# Patient Record
Sex: FEMALE | Race: BLACK OR AFRICAN AMERICAN | Employment: OTHER
[De-identification: names, ages, dates, MRNs, and addresses within clinical notes are randomized per-mention and may not be internally consistent; named-entity substitution may affect disease eponyms.]

---

## 2017-01-03 ENCOUNTER — OFFICE VISIT (OUTPATIENT)
Dept: INTERNAL MEDICINE | Facility: CLINIC | Age: 62
End: 2017-01-03

## 2017-01-03 VITALS
BODY MASS INDEX: 37.28 KG/M2 | WEIGHT: 232 LBS | DIASTOLIC BLOOD PRESSURE: 74 MMHG | HEIGHT: 66 IN | HEART RATE: 67 BPM | SYSTOLIC BLOOD PRESSURE: 108 MMHG

## 2017-01-03 DIAGNOSIS — E78.5 DYSLIPIDEMIA: ICD-10-CM

## 2017-01-03 DIAGNOSIS — R19.7 DIARRHEA, UNSPECIFIED TYPE: ICD-10-CM

## 2017-01-03 DIAGNOSIS — I10 ESSENTIAL HYPERTENSION: ICD-10-CM

## 2017-01-03 DIAGNOSIS — I42.8 CARDIOMYOPATHY, NONISCHEMIC (HCC): Primary | ICD-10-CM

## 2017-01-03 PROCEDURE — 99213 OFFICE O/P EST LOW 20 MIN: CPT | Performed by: INTERNAL MEDICINE

## 2017-01-03 RX ORDER — BLOOD PRESSURE TEST KIT
1 KIT MISCELLANEOUS DAILY
Qty: 1 KIT | Refills: 0 | Status: SHIPPED | OUTPATIENT
Start: 2017-01-03 | End: 2017-01-23 | Stop reason: ALTCHOICE

## 2017-01-03 ASSESSMENT — PATIENT HEALTH QUESTIONNAIRE - PHQ9
SUM OF ALL RESPONSES TO PHQ9 QUESTIONS 1 & 2: 0
1. LITTLE INTEREST OR PLEASURE IN DOING THINGS: 0
2. FEELING DOWN, DEPRESSED OR HOPELESS: 0
SUM OF ALL RESPONSES TO PHQ QUESTIONS 1-9: 0

## 2017-01-14 PROBLEM — R51.9 CHRONIC HEADACHE: Chronic | Status: ACTIVE | Noted: 2017-01-14

## 2017-01-14 PROBLEM — G89.29 CHRONIC HEADACHE: Chronic | Status: ACTIVE | Noted: 2017-01-14

## 2017-01-14 PROBLEM — I34.0 MITRAL REGURGITATION: Chronic | Status: ACTIVE | Noted: 2017-01-14

## 2017-01-15 PROBLEM — E66.01 MORBID OBESITY DUE TO EXCESS CALORIES (HCC): Chronic | Status: ACTIVE | Noted: 2017-01-15

## 2017-01-23 ENCOUNTER — OFFICE VISIT (OUTPATIENT)
Dept: INTERNAL MEDICINE | Facility: CLINIC | Age: 62
End: 2017-01-23

## 2017-01-23 VITALS
SYSTOLIC BLOOD PRESSURE: 109 MMHG | HEIGHT: 66 IN | HEART RATE: 70 BPM | BODY MASS INDEX: 36.96 KG/M2 | WEIGHT: 230 LBS | DIASTOLIC BLOOD PRESSURE: 82 MMHG

## 2017-01-23 DIAGNOSIS — I10 ESSENTIAL HYPERTENSION: Chronic | ICD-10-CM

## 2017-01-23 DIAGNOSIS — I50.23 ACUTE ON CHRONIC SYSTOLIC CONGESTIVE HEART FAILURE (HCC): Primary | Chronic | ICD-10-CM

## 2017-01-23 PROCEDURE — 99495 TRANSJ CARE MGMT MOD F2F 14D: CPT | Performed by: INTERNAL MEDICINE

## 2017-01-23 RX ORDER — POTASSIUM CHLORIDE 750 MG/1
1 TABLET, FILM COATED, EXTENDED RELEASE ORAL DAILY
Status: ON HOLD | COMMUNITY
Start: 2017-01-19 | End: 2017-03-06 | Stop reason: HOSPADM

## 2017-02-07 ENCOUNTER — HOSPITAL ENCOUNTER (OUTPATIENT)
Dept: OTHER | Age: 62
Discharge: HOME | End: 2017-02-07
Attending: INTERNAL MEDICINE | Admitting: INTERNAL MEDICINE
Payer: MEDICARE

## 2017-02-07 VITALS
DIASTOLIC BLOOD PRESSURE: 72 MMHG | RESPIRATION RATE: 18 BRPM | HEART RATE: 70 BPM | WEIGHT: 232.8 LBS | BODY MASS INDEX: 37.57 KG/M2 | OXYGEN SATURATION: 95 % | SYSTOLIC BLOOD PRESSURE: 110 MMHG

## 2017-02-07 PROCEDURE — 99211 OFF/OP EST MAY X REQ PHY/QHP: CPT

## 2017-02-27 ENCOUNTER — TELEPHONE (OUTPATIENT)
Dept: INTERNAL MEDICINE | Facility: CLINIC | Age: 62
End: 2017-02-27

## 2017-03-04 ENCOUNTER — HOSPITAL ENCOUNTER (INPATIENT)
Age: 62
LOS: 3 days | Discharge: HOME | DRG: 309 | End: 2017-03-07
Attending: EMERGENCY MEDICINE | Admitting: INTERNAL MEDICINE
Payer: MEDICARE

## 2017-03-04 ENCOUNTER — APPOINTMENT (OUTPATIENT)
Dept: GENERAL RADIOLOGY | Age: 62
DRG: 309 | End: 2017-03-04
Payer: MEDICARE

## 2017-03-04 DIAGNOSIS — R55 SYNCOPE AND COLLAPSE: Primary | ICD-10-CM

## 2017-03-04 DIAGNOSIS — Z45.02 AICD DISCHARGE: ICD-10-CM

## 2017-03-04 DIAGNOSIS — E87.6 HYPOKALEMIA: ICD-10-CM

## 2017-03-04 DIAGNOSIS — I42.8 NON-ISCHEMIC CARDIOMYOPATHY (HCC): ICD-10-CM

## 2017-03-04 PROBLEM — N17.9 AKI (ACUTE KIDNEY INJURY) (HCC): Status: ACTIVE | Noted: 2017-03-04

## 2017-03-04 PROBLEM — T50.2X1A: Status: ACTIVE | Noted: 2017-03-04

## 2017-03-04 PROBLEM — Z95.810 AICD (AUTOMATIC CARDIOVERTER/DEFIBRILLATOR) PRESENT: Status: ACTIVE | Noted: 2017-03-04

## 2017-03-04 LAB
ABSOLUTE EOS #: 0.2 K/UL (ref 0–0.4)
ABSOLUTE LYMPH #: 1.6 K/UL (ref 1–4.8)
ABSOLUTE MONO #: 0.7 K/UL (ref 0.1–1.2)
ANION GAP SERPL CALCULATED.3IONS-SCNC: 15 MMOL/L (ref 9–17)
BASOPHILS # BLD: 0 % (ref 0–2)
BASOPHILS ABSOLUTE: 0 K/UL (ref 0–0.2)
BNP INTERPRETATION: ABNORMAL
BUN BLDV-MCNC: 19 MG/DL (ref 8–23)
BUN/CREAT BLD: ABNORMAL (ref 9–20)
CALCIUM SERPL-MCNC: 9.3 MG/DL (ref 8.6–10.4)
CHLORIDE BLD-SCNC: 93 MMOL/L (ref 98–107)
CHLORIDE, UR: 61 MMOL/L
CO2: 26 MMOL/L (ref 20–31)
CREAT SERPL-MCNC: 1.18 MG/DL (ref 0.5–0.9)
CREATININE URINE: 127.4 MG/DL (ref 28–217)
DIFFERENTIAL TYPE: ABNORMAL
EOSINOPHILS RELATIVE PERCENT: 2 % (ref 1–4)
GFR AFRICAN AMERICAN: 56 ML/MIN
GFR NON-AFRICAN AMERICAN: 46 ML/MIN
GFR SERPL CREATININE-BSD FRML MDRD: ABNORMAL ML/MIN/{1.73_M2}
GFR SERPL CREATININE-BSD FRML MDRD: ABNORMAL ML/MIN/{1.73_M2}
GLUCOSE BLD-MCNC: 104 MG/DL (ref 70–99)
HCT VFR BLD CALC: 38.7 % (ref 36–46)
HEMOGLOBIN: 13.6 G/DL (ref 12–16)
LYMPHOCYTES # BLD: 19 % (ref 24–44)
MAGNESIUM: 1.6 MG/DL (ref 1.6–2.6)
MCH RBC QN AUTO: 29 PG (ref 26–34)
MCHC RBC AUTO-ENTMCNC: 35.2 G/DL (ref 31–37)
MCV RBC AUTO: 82.4 FL (ref 80–100)
MONOCYTES # BLD: 9 % (ref 2–11)
PDW BLD-RTO: 14.8 % (ref 12.5–15.4)
PLATELET # BLD: 257 K/UL (ref 140–450)
PLATELET ESTIMATE: ABNORMAL
PMV BLD AUTO: 9.1 FL (ref 6–12)
POC TROPONIN I: 0.02 NG/ML (ref 0–0.1)
POC TROPONIN INTERP: NORMAL
POTASSIUM SERPL-SCNC: 2.7 MMOL/L (ref 3.7–5.3)
POTASSIUM SERPL-SCNC: 3.8 MMOL/L (ref 3.7–5.3)
PRO-BNP: 1174 PG/ML
RBC # BLD: 4.69 M/UL (ref 4–5.2)
RBC # BLD: ABNORMAL 10*6/UL
SEG NEUTROPHILS: 70 % (ref 36–66)
SEGMENTED NEUTROPHILS ABSOLUTE COUNT: 5.6 K/UL (ref 1.8–7.7)
SODIUM BLD-SCNC: 134 MMOL/L (ref 135–144)
SODIUM,UR: 82 MMOL/L
TROPONIN INTERP: NORMAL
TROPONIN T: <0.03 NG/ML
UREA NITROGEN, UR: 921 MG/DL
WBC # BLD: 8.1 K/UL (ref 3.5–11)
WBC # BLD: ABNORMAL 10*3/UL

## 2017-03-04 PROCEDURE — 6370000000 HC RX 637 (ALT 250 FOR IP): Performed by: STUDENT IN AN ORGANIZED HEALTH CARE EDUCATION/TRAINING PROGRAM

## 2017-03-04 PROCEDURE — 83735 ASSAY OF MAGNESIUM: CPT

## 2017-03-04 PROCEDURE — 84132 ASSAY OF SERUM POTASSIUM: CPT

## 2017-03-04 PROCEDURE — 1200000000 HC SEMI PRIVATE

## 2017-03-04 PROCEDURE — 93005 ELECTROCARDIOGRAM TRACING: CPT

## 2017-03-04 PROCEDURE — 84300 ASSAY OF URINE SODIUM: CPT

## 2017-03-04 PROCEDURE — 6360000002 HC RX W HCPCS: Performed by: STUDENT IN AN ORGANIZED HEALTH CARE EDUCATION/TRAINING PROGRAM

## 2017-03-04 PROCEDURE — 84540 ASSAY OF URINE/UREA-N: CPT

## 2017-03-04 PROCEDURE — 2580000003 HC RX 258: Performed by: INTERNAL MEDICINE

## 2017-03-04 PROCEDURE — 71020 XR CHEST STANDARD TWO VW: CPT

## 2017-03-04 PROCEDURE — 82570 ASSAY OF URINE CREATININE: CPT

## 2017-03-04 PROCEDURE — 82436 ASSAY OF URINE CHLORIDE: CPT

## 2017-03-04 PROCEDURE — 83880 ASSAY OF NATRIURETIC PEPTIDE: CPT

## 2017-03-04 PROCEDURE — 99285 EMERGENCY DEPT VISIT HI MDM: CPT

## 2017-03-04 PROCEDURE — 36415 COLL VENOUS BLD VENIPUNCTURE: CPT

## 2017-03-04 PROCEDURE — 85025 COMPLETE CBC W/AUTO DIFF WBC: CPT

## 2017-03-04 PROCEDURE — 2580000003 HC RX 258: Performed by: STUDENT IN AN ORGANIZED HEALTH CARE EDUCATION/TRAINING PROGRAM

## 2017-03-04 PROCEDURE — 6370000000 HC RX 637 (ALT 250 FOR IP): Performed by: EMERGENCY MEDICINE

## 2017-03-04 PROCEDURE — 80048 BASIC METABOLIC PNL TOTAL CA: CPT

## 2017-03-04 PROCEDURE — 84484 ASSAY OF TROPONIN QUANT: CPT

## 2017-03-04 RX ORDER — SODIUM CHLORIDE 0.9 % (FLUSH) 0.9 %
10 SYRINGE (ML) INJECTION EVERY 12 HOURS SCHEDULED
Status: DISCONTINUED | OUTPATIENT
Start: 2017-03-04 | End: 2017-03-07 | Stop reason: HOSPADM

## 2017-03-04 RX ORDER — ACETAMINOPHEN 325 MG/1
650 TABLET ORAL EVERY 4 HOURS PRN
Status: DISCONTINUED | OUTPATIENT
Start: 2017-03-04 | End: 2017-03-07 | Stop reason: HOSPADM

## 2017-03-04 RX ORDER — POTASSIUM CHLORIDE 7.45 MG/ML
10 INJECTION INTRAVENOUS ONCE
Status: DISCONTINUED | OUTPATIENT
Start: 2017-03-04 | End: 2017-03-07

## 2017-03-04 RX ORDER — SODIUM CHLORIDE 0.9 % (FLUSH) 0.9 %
10 SYRINGE (ML) INJECTION PRN
Status: DISCONTINUED | OUTPATIENT
Start: 2017-03-04 | End: 2017-03-07 | Stop reason: HOSPADM

## 2017-03-04 RX ORDER — METOPROLOL TARTRATE 50 MG/1
50 TABLET, FILM COATED ORAL 2 TIMES DAILY
Status: DISCONTINUED | OUTPATIENT
Start: 2017-03-04 | End: 2017-03-07 | Stop reason: HOSPADM

## 2017-03-04 RX ORDER — POTASSIUM CHLORIDE 20MEQ/15ML
60 LIQUID (ML) ORAL ONCE
Status: COMPLETED | OUTPATIENT
Start: 2017-03-04 | End: 2017-03-04

## 2017-03-04 RX ORDER — ALLOPURINOL 100 MG/1
100 TABLET ORAL DAILY
Status: DISCONTINUED | OUTPATIENT
Start: 2017-03-04 | End: 2017-03-07 | Stop reason: HOSPADM

## 2017-03-04 RX ORDER — ONDANSETRON 2 MG/ML
4 INJECTION INTRAMUSCULAR; INTRAVENOUS EVERY 6 HOURS PRN
Status: DISCONTINUED | OUTPATIENT
Start: 2017-03-04 | End: 2017-03-07 | Stop reason: HOSPADM

## 2017-03-04 RX ORDER — UREA 10 %
3 LOTION (ML) TOPICAL NIGHTLY PRN
Status: DISCONTINUED | OUTPATIENT
Start: 2017-03-04 | End: 2017-03-04

## 2017-03-04 RX ORDER — ASPIRIN 81 MG/1
81 TABLET ORAL DAILY
Status: DISCONTINUED | OUTPATIENT
Start: 2017-03-04 | End: 2017-03-07 | Stop reason: HOSPADM

## 2017-03-04 RX ORDER — DIPHENHYDRAMINE HCL 25 MG
25 TABLET ORAL ONCE
Status: COMPLETED | OUTPATIENT
Start: 2017-03-04 | End: 2017-03-05

## 2017-03-04 RX ORDER — POTASSIUM CHLORIDE 20 MEQ/1
40 TABLET, EXTENDED RELEASE ORAL ONCE
Status: COMPLETED | OUTPATIENT
Start: 2017-03-04 | End: 2017-03-04

## 2017-03-04 RX ORDER — SODIUM CHLORIDE 9 MG/ML
INJECTION, SOLUTION INTRAVENOUS CONTINUOUS
Status: DISCONTINUED | OUTPATIENT
Start: 2017-03-04 | End: 2017-03-05

## 2017-03-04 RX ORDER — POTASSIUM CHLORIDE 20 MEQ/1
40 TABLET, EXTENDED RELEASE ORAL PRN
Status: DISCONTINUED | OUTPATIENT
Start: 2017-03-04 | End: 2017-03-07 | Stop reason: HOSPADM

## 2017-03-04 RX ORDER — POTASSIUM CHLORIDE 7.45 MG/ML
10 INJECTION INTRAVENOUS PRN
Status: DISCONTINUED | OUTPATIENT
Start: 2017-03-04 | End: 2017-03-07 | Stop reason: HOSPADM

## 2017-03-04 RX ORDER — MAGNESIUM OXIDE 400 MG/1
400 TABLET ORAL DAILY
Status: DISCONTINUED | OUTPATIENT
Start: 2017-03-04 | End: 2017-03-04

## 2017-03-04 RX ORDER — POTASSIUM CHLORIDE 20MEQ/15ML
40 LIQUID (ML) ORAL PRN
Status: DISCONTINUED | OUTPATIENT
Start: 2017-03-04 | End: 2017-03-07 | Stop reason: HOSPADM

## 2017-03-04 RX ORDER — AMIODARONE HYDROCHLORIDE 200 MG/1
200 TABLET ORAL DAILY
Status: DISCONTINUED | OUTPATIENT
Start: 2017-03-04 | End: 2017-03-07 | Stop reason: HOSPADM

## 2017-03-04 RX ORDER — 0.9 % SODIUM CHLORIDE 0.9 %
500 INTRAVENOUS SOLUTION INTRAVENOUS ONCE
Status: COMPLETED | OUTPATIENT
Start: 2017-03-04 | End: 2017-03-05

## 2017-03-04 RX ADMIN — AMIODARONE HYDROCHLORIDE 200 MG: 200 TABLET ORAL at 18:52

## 2017-03-04 RX ADMIN — MAGNESIUM GLUCONATE 500 MG ORAL TABLET 400 MG: 500 TABLET ORAL at 21:08

## 2017-03-04 RX ADMIN — MAGNESIUM SULFATE IN DEXTROSE 1 G: 10 INJECTION, SOLUTION INTRAVENOUS at 18:42

## 2017-03-04 RX ADMIN — METOPROLOL TARTRATE 50 MG: 50 TABLET, FILM COATED ORAL at 21:08

## 2017-03-04 RX ADMIN — POTASSIUM CHLORIDE 60 MEQ: 40 SOLUTION ORAL at 18:48

## 2017-03-04 RX ADMIN — SODIUM CHLORIDE 500 ML: 0.9 INJECTION, SOLUTION INTRAVENOUS at 21:52

## 2017-03-04 RX ADMIN — POTASSIUM CHLORIDE 40 MEQ: 20 TABLET, EXTENDED RELEASE ORAL at 16:11

## 2017-03-04 RX ADMIN — ALLOPURINOL 100 MG: 100 TABLET ORAL at 21:08

## 2017-03-04 RX ADMIN — ASPIRIN 81 MG: 81 TABLET, COATED ORAL at 18:51

## 2017-03-04 RX ADMIN — SODIUM CHLORIDE: 9 INJECTION, SOLUTION INTRAVENOUS at 18:41

## 2017-03-04 RX ADMIN — MAGNESIUM SULFATE IN DEXTROSE 1 G: 10 INJECTION, SOLUTION INTRAVENOUS at 20:44

## 2017-03-04 ASSESSMENT — ENCOUNTER SYMPTOMS
DIARRHEA: 0
TROUBLE SWALLOWING: 0
COLOR CHANGE: 0
ABDOMINAL PAIN: 0
NAUSEA: 0
SHORTNESS OF BREATH: 0
VOMITING: 0
COUGH: 0
SORE THROAT: 0

## 2017-03-04 ASSESSMENT — PAIN SCALES - GENERAL: PAINLEVEL_OUTOF10: 0

## 2017-03-05 LAB
ABSOLUTE EOS #: 0.1 K/UL (ref 0–0.4)
ABSOLUTE LYMPH #: 1.1 K/UL (ref 1–4.8)
ABSOLUTE MONO #: 0.5 K/UL (ref 0.1–1.2)
ALBUMIN SERPL-MCNC: 3.2 G/DL (ref 3.5–5.2)
ALBUMIN/GLOBULIN RATIO: 0.9 (ref 1–2.5)
ALP BLD-CCNC: 216 U/L (ref 35–104)
ALT SERPL-CCNC: 28 U/L (ref 5–33)
ANION GAP SERPL CALCULATED.3IONS-SCNC: 14 MMOL/L (ref 9–17)
ANION GAP SERPL CALCULATED.3IONS-SCNC: 15 MMOL/L (ref 9–17)
AST SERPL-CCNC: 38 U/L
BASOPHILS # BLD: 1 % (ref 0–2)
BASOPHILS ABSOLUTE: 0 K/UL (ref 0–0.2)
BILIRUB SERPL-MCNC: 0.77 MG/DL (ref 0.3–1.2)
BUN BLDV-MCNC: 16 MG/DL (ref 8–23)
BUN BLDV-MCNC: 17 MG/DL (ref 8–23)
BUN/CREAT BLD: ABNORMAL (ref 9–20)
BUN/CREAT BLD: ABNORMAL (ref 9–20)
CALCIUM SERPL-MCNC: 8.8 MG/DL (ref 8.6–10.4)
CALCIUM SERPL-MCNC: 9 MG/DL (ref 8.6–10.4)
CHLORIDE BLD-SCNC: 100 MMOL/L (ref 98–107)
CHLORIDE BLD-SCNC: 101 MMOL/L (ref 98–107)
CO2: 22 MMOL/L (ref 20–31)
CO2: 24 MMOL/L (ref 20–31)
CREAT SERPL-MCNC: 0.7 MG/DL (ref 0.5–0.9)
CREAT SERPL-MCNC: 0.83 MG/DL (ref 0.5–0.9)
DIFFERENTIAL TYPE: ABNORMAL
EOSINOPHILS RELATIVE PERCENT: 3 % (ref 1–4)
GFR AFRICAN AMERICAN: >60 ML/MIN
GFR AFRICAN AMERICAN: >60 ML/MIN
GFR NON-AFRICAN AMERICAN: >60 ML/MIN
GFR NON-AFRICAN AMERICAN: >60 ML/MIN
GFR SERPL CREATININE-BSD FRML MDRD: ABNORMAL ML/MIN/{1.73_M2}
GLUCOSE BLD-MCNC: 120 MG/DL (ref 70–99)
GLUCOSE BLD-MCNC: 128 MG/DL (ref 70–99)
HCT VFR BLD CALC: 34 % (ref 36–46)
HEMOGLOBIN: 12 G/DL (ref 12–16)
INR BLD: 1
LYMPHOCYTES # BLD: 20 % (ref 24–44)
MAGNESIUM: 2.2 MG/DL (ref 1.6–2.6)
MCH RBC QN AUTO: 28.9 PG (ref 26–34)
MCHC RBC AUTO-ENTMCNC: 35.3 G/DL (ref 31–37)
MCV RBC AUTO: 81.9 FL (ref 80–100)
MONOCYTES # BLD: 9 % (ref 2–11)
PDW BLD-RTO: 14.7 % (ref 12.5–15.4)
PLATELET # BLD: 218 K/UL (ref 140–450)
PLATELET ESTIMATE: ABNORMAL
PMV BLD AUTO: 9.2 FL (ref 6–12)
POTASSIUM SERPL-SCNC: 3 MMOL/L (ref 3.7–5.3)
POTASSIUM SERPL-SCNC: 3.6 MMOL/L (ref 3.7–5.3)
PROTHROMBIN TIME: 11 SEC (ref 9.4–12.6)
RBC # BLD: 4.16 M/UL (ref 4–5.2)
RBC # BLD: ABNORMAL 10*6/UL
SEG NEUTROPHILS: 67 % (ref 36–66)
SEGMENTED NEUTROPHILS ABSOLUTE COUNT: 3.5 K/UL (ref 1.8–7.7)
SODIUM BLD-SCNC: 137 MMOL/L (ref 135–144)
SODIUM BLD-SCNC: 139 MMOL/L (ref 135–144)
TOTAL PROTEIN: 6.7 G/DL (ref 6.4–8.3)
TROPONIN INTERP: NORMAL
TROPONIN T: <0.03 NG/ML
WBC # BLD: 5.3 K/UL (ref 3.5–11)
WBC # BLD: ABNORMAL 10*3/UL

## 2017-03-05 PROCEDURE — 83735 ASSAY OF MAGNESIUM: CPT

## 2017-03-05 PROCEDURE — 6370000000 HC RX 637 (ALT 250 FOR IP): Performed by: INTERNAL MEDICINE

## 2017-03-05 PROCEDURE — G8978 MOBILITY CURRENT STATUS: HCPCS

## 2017-03-05 PROCEDURE — 97530 THERAPEUTIC ACTIVITIES: CPT

## 2017-03-05 PROCEDURE — 80048 BASIC METABOLIC PNL TOTAL CA: CPT

## 2017-03-05 PROCEDURE — 6370000000 HC RX 637 (ALT 250 FOR IP): Performed by: STUDENT IN AN ORGANIZED HEALTH CARE EDUCATION/TRAINING PROGRAM

## 2017-03-05 PROCEDURE — 80053 COMPREHEN METABOLIC PANEL: CPT

## 2017-03-05 PROCEDURE — 85610 PROTHROMBIN TIME: CPT

## 2017-03-05 PROCEDURE — G8979 MOBILITY GOAL STATUS: HCPCS

## 2017-03-05 PROCEDURE — 36415 COLL VENOUS BLD VENIPUNCTURE: CPT

## 2017-03-05 PROCEDURE — 97162 PT EVAL MOD COMPLEX 30 MIN: CPT

## 2017-03-05 PROCEDURE — 85025 COMPLETE CBC W/AUTO DIFF WBC: CPT

## 2017-03-05 PROCEDURE — 84484 ASSAY OF TROPONIN QUANT: CPT

## 2017-03-05 PROCEDURE — 99223 1ST HOSP IP/OBS HIGH 75: CPT | Performed by: INTERNAL MEDICINE

## 2017-03-05 PROCEDURE — 1200000000 HC SEMI PRIVATE

## 2017-03-05 RX ORDER — FUROSEMIDE 40 MG/1
40 TABLET ORAL DAILY
Status: DISCONTINUED | OUTPATIENT
Start: 2017-03-05 | End: 2017-03-07 | Stop reason: HOSPADM

## 2017-03-05 RX ORDER — SPIRONOLACTONE 25 MG/1
12.5 TABLET ORAL DAILY
Status: DISCONTINUED | OUTPATIENT
Start: 2017-03-05 | End: 2017-03-07 | Stop reason: HOSPADM

## 2017-03-05 RX ORDER — POTASSIUM CHLORIDE 20MEQ/15ML
60 LIQUID (ML) ORAL ONCE
Status: COMPLETED | OUTPATIENT
Start: 2017-03-05 | End: 2017-03-05

## 2017-03-05 RX ADMIN — ALLOPURINOL 100 MG: 100 TABLET ORAL at 08:29

## 2017-03-05 RX ADMIN — METOPROLOL TARTRATE 50 MG: 50 TABLET, FILM COATED ORAL at 20:23

## 2017-03-05 RX ADMIN — AMIODARONE HYDROCHLORIDE 200 MG: 200 TABLET ORAL at 08:29

## 2017-03-05 RX ADMIN — METOPROLOL TARTRATE 50 MG: 50 TABLET, FILM COATED ORAL at 08:29

## 2017-03-05 RX ADMIN — POTASSIUM CHLORIDE 60 MEQ: 40 SOLUTION ORAL at 08:36

## 2017-03-05 RX ADMIN — SPIRONOLACTONE 12.5 MG: 25 TABLET ORAL at 11:18

## 2017-03-05 RX ADMIN — DIPHENHYDRAMINE HCL 25 MG: 25 TABLET ORAL at 00:42

## 2017-03-05 RX ADMIN — MAGNESIUM GLUCONATE 500 MG ORAL TABLET 400 MG: 500 TABLET ORAL at 08:29

## 2017-03-05 RX ADMIN — POTASSIUM CHLORIDE 60 MEQ: 20 TABLET, EXTENDED RELEASE ORAL at 01:30

## 2017-03-05 RX ADMIN — ASPIRIN 81 MG: 81 TABLET, COATED ORAL at 08:29

## 2017-03-05 RX ADMIN — FUROSEMIDE 40 MG: 40 TABLET ORAL at 15:43

## 2017-03-06 LAB
ANION GAP SERPL CALCULATED.3IONS-SCNC: 13 MMOL/L (ref 9–17)
BUN BLDV-MCNC: 15 MG/DL (ref 8–23)
BUN/CREAT BLD: ABNORMAL (ref 9–20)
CALCIUM SERPL-MCNC: 9.3 MG/DL (ref 8.6–10.4)
CHLORIDE BLD-SCNC: 99 MMOL/L (ref 98–107)
CO2: 26 MMOL/L (ref 20–31)
CREAT SERPL-MCNC: 0.9 MG/DL (ref 0.5–0.9)
GFR AFRICAN AMERICAN: >60 ML/MIN
GFR NON-AFRICAN AMERICAN: >60 ML/MIN
GFR SERPL CREATININE-BSD FRML MDRD: ABNORMAL ML/MIN/{1.73_M2}
GFR SERPL CREATININE-BSD FRML MDRD: ABNORMAL ML/MIN/{1.73_M2}
GLUCOSE BLD-MCNC: 97 MG/DL (ref 70–99)
MAGNESIUM: 1.5 MG/DL (ref 1.6–2.6)
MAGNESIUM: 1.8 MG/DL (ref 1.6–2.6)
POTASSIUM SERPL-SCNC: 3.5 MMOL/L (ref 3.7–5.3)
POTASSIUM SERPL-SCNC: 3.8 MMOL/L (ref 3.7–5.3)
SODIUM BLD-SCNC: 138 MMOL/L (ref 135–144)

## 2017-03-06 PROCEDURE — 1200000000 HC SEMI PRIVATE

## 2017-03-06 PROCEDURE — 80048 BASIC METABOLIC PNL TOTAL CA: CPT

## 2017-03-06 PROCEDURE — 84132 ASSAY OF SERUM POTASSIUM: CPT

## 2017-03-06 PROCEDURE — 6370000000 HC RX 637 (ALT 250 FOR IP): Performed by: STUDENT IN AN ORGANIZED HEALTH CARE EDUCATION/TRAINING PROGRAM

## 2017-03-06 PROCEDURE — 6360000002 HC RX W HCPCS: Performed by: INTERNAL MEDICINE

## 2017-03-06 PROCEDURE — 36415 COLL VENOUS BLD VENIPUNCTURE: CPT

## 2017-03-06 PROCEDURE — 2580000003 HC RX 258: Performed by: STUDENT IN AN ORGANIZED HEALTH CARE EDUCATION/TRAINING PROGRAM

## 2017-03-06 PROCEDURE — 83735 ASSAY OF MAGNESIUM: CPT

## 2017-03-06 PROCEDURE — 6370000000 HC RX 637 (ALT 250 FOR IP): Performed by: INTERNAL MEDICINE

## 2017-03-06 PROCEDURE — 99233 SBSQ HOSP IP/OBS HIGH 50: CPT | Performed by: INTERNAL MEDICINE

## 2017-03-06 RX ORDER — SPIRONOLACTONE 25 MG/1
12.5 TABLET ORAL DAILY
Qty: 30 TABLET | Refills: 3 | Status: SHIPPED | OUTPATIENT
Start: 2017-03-06 | End: 2018-01-19 | Stop reason: SDUPTHER

## 2017-03-06 RX ORDER — POTASSIUM CHLORIDE 20 MEQ/1
40 TABLET, EXTENDED RELEASE ORAL 2 TIMES DAILY WITH MEALS
Qty: 56 TABLET | Refills: 0 | Status: SHIPPED | OUTPATIENT
Start: 2017-03-06 | End: 2017-03-07 | Stop reason: HOSPADM

## 2017-03-06 RX ORDER — MAGNESIUM OXIDE 400 MG/1
400 TABLET ORAL 2 TIMES DAILY
Qty: 30 TABLET | Refills: 0 | Status: SHIPPED | OUTPATIENT
Start: 2017-03-06 | End: 2017-03-07 | Stop reason: HOSPADM

## 2017-03-06 RX ORDER — POTASSIUM CHLORIDE 20 MEQ/1
40 TABLET, EXTENDED RELEASE ORAL 2 TIMES DAILY WITH MEALS
Status: DISCONTINUED | OUTPATIENT
Start: 2017-03-06 | End: 2017-03-07 | Stop reason: HOSPADM

## 2017-03-06 RX ADMIN — MAGNESIUM SULFATE IN DEXTROSE 1 G: 10 INJECTION, SOLUTION INTRAVENOUS at 12:03

## 2017-03-06 RX ADMIN — Medication 10 ML: at 12:04

## 2017-03-06 RX ADMIN — AMIODARONE HYDROCHLORIDE 200 MG: 200 TABLET ORAL at 09:16

## 2017-03-06 RX ADMIN — METOPROLOL TARTRATE 50 MG: 50 TABLET, FILM COATED ORAL at 09:16

## 2017-03-06 RX ADMIN — ASPIRIN 81 MG: 81 TABLET, COATED ORAL at 09:15

## 2017-03-06 RX ADMIN — POTASSIUM CHLORIDE 40 MEQ: 40 SOLUTION ORAL at 18:29

## 2017-03-06 RX ADMIN — ALLOPURINOL 100 MG: 100 TABLET ORAL at 09:16

## 2017-03-06 RX ADMIN — Medication 10 ML: at 09:16

## 2017-03-06 RX ADMIN — MAGNESIUM GLUCONATE 500 MG ORAL TABLET 400 MG: 500 TABLET ORAL at 09:15

## 2017-03-06 RX ADMIN — Medication 10 ML: at 21:05

## 2017-03-06 RX ADMIN — METOPROLOL TARTRATE 50 MG: 50 TABLET, FILM COATED ORAL at 21:05

## 2017-03-06 RX ADMIN — SPIRONOLACTONE 12.5 MG: 25 TABLET ORAL at 09:15

## 2017-03-06 RX ADMIN — POTASSIUM CHLORIDE 40 MEQ: 40 SOLUTION ORAL at 12:04

## 2017-03-06 ASSESSMENT — PAIN SCALES - GENERAL
PAINLEVEL_OUTOF10: 0
PAINLEVEL_OUTOF10: 0

## 2017-03-07 VITALS
WEIGHT: 235.2 LBS | OXYGEN SATURATION: 98 % | TEMPERATURE: 97.9 F | DIASTOLIC BLOOD PRESSURE: 72 MMHG | HEART RATE: 70 BPM | SYSTOLIC BLOOD PRESSURE: 120 MMHG | HEIGHT: 66 IN | BODY MASS INDEX: 37.8 KG/M2 | RESPIRATION RATE: 14 BRPM

## 2017-03-07 PROBLEM — N17.9 AKI (ACUTE KIDNEY INJURY) (HCC): Status: RESOLVED | Noted: 2017-03-04 | Resolved: 2017-03-07

## 2017-03-07 LAB
ANION GAP SERPL CALCULATED.3IONS-SCNC: 14 MMOL/L (ref 9–17)
BUN BLDV-MCNC: 13 MG/DL (ref 8–23)
BUN/CREAT BLD: ABNORMAL (ref 9–20)
CALCIUM SERPL-MCNC: 8.7 MG/DL (ref 8.6–10.4)
CHLORIDE BLD-SCNC: 100 MMOL/L (ref 98–107)
CO2: 22 MMOL/L (ref 20–31)
CREAT SERPL-MCNC: 0.77 MG/DL (ref 0.5–0.9)
GFR AFRICAN AMERICAN: >60 ML/MIN
GFR NON-AFRICAN AMERICAN: >60 ML/MIN
GFR SERPL CREATININE-BSD FRML MDRD: ABNORMAL ML/MIN/{1.73_M2}
GFR SERPL CREATININE-BSD FRML MDRD: ABNORMAL ML/MIN/{1.73_M2}
GLUCOSE BLD-MCNC: 168 MG/DL (ref 70–99)
MAGNESIUM: 2.5 MG/DL (ref 1.6–2.6)
POTASSIUM SERPL-SCNC: 3.6 MMOL/L (ref 3.7–5.3)
SODIUM BLD-SCNC: 136 MMOL/L (ref 135–144)

## 2017-03-07 PROCEDURE — 83735 ASSAY OF MAGNESIUM: CPT

## 2017-03-07 PROCEDURE — 6370000000 HC RX 637 (ALT 250 FOR IP): Performed by: STUDENT IN AN ORGANIZED HEALTH CARE EDUCATION/TRAINING PROGRAM

## 2017-03-07 PROCEDURE — 80048 BASIC METABOLIC PNL TOTAL CA: CPT

## 2017-03-07 PROCEDURE — 6370000000 HC RX 637 (ALT 250 FOR IP): Performed by: INTERNAL MEDICINE

## 2017-03-07 PROCEDURE — 6360000002 HC RX W HCPCS: Performed by: STUDENT IN AN ORGANIZED HEALTH CARE EDUCATION/TRAINING PROGRAM

## 2017-03-07 PROCEDURE — 36415 COLL VENOUS BLD VENIPUNCTURE: CPT

## 2017-03-07 PROCEDURE — 99233 SBSQ HOSP IP/OBS HIGH 50: CPT | Performed by: INTERNAL MEDICINE

## 2017-03-07 RX ORDER — POTASSIUM CHLORIDE 20MEQ/15ML
40 LIQUID (ML) ORAL 2 TIMES DAILY
Qty: 420 ML | Refills: 0 | Status: SHIPPED | OUTPATIENT
Start: 2017-03-07 | End: 2017-03-07

## 2017-03-07 RX ORDER — POTASSIUM CHLORIDE 20MEQ/15ML
40 LIQUID (ML) ORAL 2 TIMES DAILY
Status: DISCONTINUED | OUTPATIENT
Start: 2017-03-07 | End: 2017-03-07 | Stop reason: HOSPADM

## 2017-03-07 RX ORDER — POTASSIUM CHLORIDE 20MEQ/15ML
40 LIQUID (ML) ORAL 2 TIMES DAILY
Qty: 420 ML | Refills: 1 | Status: SHIPPED | OUTPATIENT
Start: 2017-03-07 | End: 2017-03-20 | Stop reason: SDUPTHER

## 2017-03-07 RX ORDER — METOLAZONE 2.5 MG/1
2.5 TABLET ORAL DAILY
Qty: 30 TABLET | Refills: 3 | Status: SHIPPED | OUTPATIENT
Start: 2017-03-07 | End: 2019-02-18 | Stop reason: SDUPTHER

## 2017-03-07 RX ORDER — FUROSEMIDE 40 MG/1
40 TABLET ORAL DAILY
Qty: 60 TABLET | Refills: 3 | Status: SHIPPED | OUTPATIENT
Start: 2017-03-07 | End: 2017-03-16 | Stop reason: SDUPTHER

## 2017-03-07 RX ORDER — POTASSIUM CHLORIDE 20 MEQ/1
40 TABLET, EXTENDED RELEASE ORAL ONCE
Status: DISCONTINUED | OUTPATIENT
Start: 2017-03-07 | End: 2017-03-07 | Stop reason: HOSPADM

## 2017-03-07 RX ORDER — METOLAZONE 2.5 MG/1
2.5 TABLET ORAL DAILY
Status: DISCONTINUED | OUTPATIENT
Start: 2017-03-07 | End: 2017-03-07 | Stop reason: HOSPADM

## 2017-03-07 RX ADMIN — AMIODARONE HYDROCHLORIDE 200 MG: 200 TABLET ORAL at 08:27

## 2017-03-07 RX ADMIN — MAGNESIUM GLUCONATE 500 MG ORAL TABLET 400 MG: 500 TABLET ORAL at 08:27

## 2017-03-07 RX ADMIN — POTASSIUM CHLORIDE 40 MEQ: 40 SOLUTION ORAL at 12:40

## 2017-03-07 RX ADMIN — POTASSIUM CHLORIDE 40 MEQ: 40 SOLUTION ORAL at 08:35

## 2017-03-07 RX ADMIN — MAGNESIUM SULFATE IN DEXTROSE 2 G: 10 INJECTION, SOLUTION INTRAVENOUS at 07:32

## 2017-03-07 RX ADMIN — METOPROLOL TARTRATE 50 MG: 50 TABLET, FILM COATED ORAL at 08:27

## 2017-03-07 RX ADMIN — ALLOPURINOL 100 MG: 100 TABLET ORAL at 08:27

## 2017-03-07 RX ADMIN — ASPIRIN 81 MG: 81 TABLET, COATED ORAL at 08:27

## 2017-03-07 RX ADMIN — POTASSIUM CHLORIDE 40 MEQ: 40 SOLUTION ORAL at 10:52

## 2017-03-07 RX ADMIN — SPIRONOLACTONE 12.5 MG: 25 TABLET ORAL at 08:27

## 2017-03-07 RX ADMIN — METOLAZONE 2.5 MG: 2.5 TABLET ORAL at 12:39

## 2017-03-08 ENCOUNTER — CARE COORDINATION (OUTPATIENT)
Dept: CASE MANAGEMENT | Age: 62
End: 2017-03-08

## 2017-03-08 ENCOUNTER — TELEPHONE (OUTPATIENT)
Dept: INTERNAL MEDICINE | Facility: CLINIC | Age: 62
End: 2017-03-08

## 2017-03-08 ENCOUNTER — TELEPHONE (OUTPATIENT)
Dept: PHARMACY | Age: 62
End: 2017-03-08

## 2017-03-08 LAB
EKG ATRIAL RATE: 70 BPM
EKG P AXIS: 5 DEGREES
EKG P-R INTERVAL: 142 MS
EKG Q-T INTERVAL: 502 MS
EKG QRS DURATION: 194 MS
EKG QTC CALCULATION (BAZETT): 542 MS
EKG R AXIS: -141 DEGREES
EKG T AXIS: 58 DEGREES
EKG VENTRICULAR RATE: 70 BPM

## 2017-03-13 ENCOUNTER — CARE COORDINATION (OUTPATIENT)
Dept: CASE MANAGEMENT | Age: 62
End: 2017-03-13

## 2017-03-16 ENCOUNTER — OFFICE VISIT (OUTPATIENT)
Dept: INTERNAL MEDICINE | Age: 62
End: 2017-03-16
Payer: MEDICARE

## 2017-03-16 ENCOUNTER — HOSPITAL ENCOUNTER (OUTPATIENT)
Age: 62
Setting detail: SPECIMEN
Discharge: HOME | End: 2017-03-16
Payer: MEDICARE

## 2017-03-16 ENCOUNTER — HOSPITAL ENCOUNTER (OUTPATIENT)
Age: 62
Discharge: HOME | End: 2017-03-16
Payer: MEDICARE

## 2017-03-16 VITALS
HEIGHT: 66 IN | HEART RATE: 72 BPM | BODY MASS INDEX: 36.03 KG/M2 | SYSTOLIC BLOOD PRESSURE: 127 MMHG | WEIGHT: 224.2 LBS | DIASTOLIC BLOOD PRESSURE: 85 MMHG

## 2017-03-16 DIAGNOSIS — I50.23 ACUTE ON CHRONIC SYSTOLIC CONGESTIVE HEART FAILURE (HCC): Chronic | ICD-10-CM

## 2017-03-16 DIAGNOSIS — I50.23 ACUTE ON CHRONIC SYSTOLIC CONGESTIVE HEART FAILURE (HCC): Primary | Chronic | ICD-10-CM

## 2017-03-16 DIAGNOSIS — E87.6 HYPOKALEMIA: ICD-10-CM

## 2017-03-16 LAB
ANION GAP SERPL CALCULATED.3IONS-SCNC: 20 MMOL/L (ref 9–17)
BUN BLDV-MCNC: 35 MG/DL (ref 8–23)
BUN/CREAT BLD: ABNORMAL (ref 9–20)
CALCIUM SERPL-MCNC: 9.8 MG/DL (ref 8.6–10.4)
CHLORIDE BLD-SCNC: 93 MMOL/L (ref 98–107)
CO2: 25 MMOL/L (ref 20–31)
CREAT SERPL-MCNC: 1.37 MG/DL (ref 0.5–0.9)
GFR AFRICAN AMERICAN: 47 ML/MIN
GFR NON-AFRICAN AMERICAN: 39 ML/MIN
GFR SERPL CREATININE-BSD FRML MDRD: ABNORMAL ML/MIN/{1.73_M2}
GFR SERPL CREATININE-BSD FRML MDRD: ABNORMAL ML/MIN/{1.73_M2}
GLUCOSE BLD-MCNC: 122 MG/DL (ref 70–99)
MAGNESIUM: 1.8 MG/DL (ref 1.6–2.6)
POTASSIUM SERPL-SCNC: 3.4 MMOL/L (ref 3.7–5.3)
SODIUM BLD-SCNC: 138 MMOL/L (ref 135–144)

## 2017-03-16 PROCEDURE — 99213 OFFICE O/P EST LOW 20 MIN: CPT | Performed by: INTERNAL MEDICINE

## 2017-03-16 PROCEDURE — 83735 ASSAY OF MAGNESIUM: CPT

## 2017-03-16 PROCEDURE — 80048 BASIC METABOLIC PNL TOTAL CA: CPT

## 2017-03-16 PROCEDURE — 36415 COLL VENOUS BLD VENIPUNCTURE: CPT

## 2017-03-16 PROCEDURE — 99495 TRANSJ CARE MGMT MOD F2F 14D: CPT | Performed by: INTERNAL MEDICINE

## 2017-03-16 RX ORDER — FUROSEMIDE 20 MG/1
20 TABLET ORAL DAILY
Qty: 60 TABLET | Refills: 2 | Status: SHIPPED | OUTPATIENT
Start: 2017-03-16 | End: 2017-09-06 | Stop reason: SDUPTHER

## 2017-03-20 ENCOUNTER — TELEPHONE (OUTPATIENT)
Dept: INTERNAL MEDICINE | Age: 62
End: 2017-03-20

## 2017-03-20 DIAGNOSIS — E87.6 HYPOKALEMIA: Primary | ICD-10-CM

## 2017-03-20 RX ORDER — POTASSIUM CHLORIDE 20MEQ/15ML
40 LIQUID (ML) ORAL 2 TIMES DAILY
Qty: 420 ML | Refills: 1 | Status: ON HOLD | OUTPATIENT
Start: 2017-03-20 | End: 2018-05-19 | Stop reason: HOSPADM

## 2017-03-21 ENCOUNTER — HOSPITAL ENCOUNTER (OUTPATIENT)
Dept: OTHER | Age: 62
Discharge: HOME | End: 2017-03-21
Payer: MEDICARE

## 2017-03-21 VITALS
WEIGHT: 229.2 LBS | SYSTOLIC BLOOD PRESSURE: 118 MMHG | DIASTOLIC BLOOD PRESSURE: 76 MMHG | OXYGEN SATURATION: 98 % | RESPIRATION RATE: 18 BRPM | HEART RATE: 70 BPM | BODY MASS INDEX: 36.99 KG/M2

## 2017-03-21 PROCEDURE — 99211 OFF/OP EST MAY X REQ PHY/QHP: CPT | Performed by: NURSE PRACTITIONER

## 2017-04-04 ENCOUNTER — HOSPITAL ENCOUNTER (OUTPATIENT)
Age: 62
Setting detail: SPECIMEN
Discharge: HOME | End: 2017-04-04
Payer: MEDICARE

## 2017-04-04 DIAGNOSIS — E87.6 HYPOKALEMIA: ICD-10-CM

## 2017-04-04 LAB
ANION GAP SERPL CALCULATED.3IONS-SCNC: 20 MMOL/L (ref 9–17)
BUN BLDV-MCNC: 30 MG/DL (ref 8–23)
BUN/CREAT BLD: ABNORMAL (ref 9–20)
CALCIUM SERPL-MCNC: 9.6 MG/DL (ref 8.6–10.4)
CHLORIDE BLD-SCNC: 95 MMOL/L (ref 98–107)
CO2: 23 MMOL/L (ref 20–31)
CREAT SERPL-MCNC: 1.27 MG/DL (ref 0.5–0.9)
GFR AFRICAN AMERICAN: 52 ML/MIN
GFR NON-AFRICAN AMERICAN: 43 ML/MIN
GFR SERPL CREATININE-BSD FRML MDRD: ABNORMAL ML/MIN/{1.73_M2}
GFR SERPL CREATININE-BSD FRML MDRD: ABNORMAL ML/MIN/{1.73_M2}
GLUCOSE BLD-MCNC: 95 MG/DL (ref 70–99)
POTASSIUM SERPL-SCNC: 4.2 MMOL/L (ref 3.7–5.3)
SODIUM BLD-SCNC: 138 MMOL/L (ref 135–144)

## 2017-04-04 PROCEDURE — 80048 BASIC METABOLIC PNL TOTAL CA: CPT

## 2017-04-04 PROCEDURE — 36415 COLL VENOUS BLD VENIPUNCTURE: CPT

## 2017-04-05 ENCOUNTER — TELEPHONE (OUTPATIENT)
Dept: INTERNAL MEDICINE | Age: 62
End: 2017-04-05

## 2017-04-05 RX ORDER — POTASSIUM CHLORIDE 20 MEQ/1
20 TABLET, EXTENDED RELEASE ORAL DAILY
Qty: 30 TABLET | Refills: 1 | Status: SHIPPED | OUTPATIENT
Start: 2017-04-05 | End: 2018-01-19 | Stop reason: SDUPTHER

## 2017-05-09 ENCOUNTER — HOSPITAL ENCOUNTER (OUTPATIENT)
Age: 62
Discharge: HOME | End: 2017-05-09
Payer: MEDICARE

## 2017-05-09 LAB
POTASSIUM SERPL-SCNC: 3.4 MMOL/L (ref 3.7–5.3)
SODIUM BLD-SCNC: 140 MMOL/L (ref 135–144)

## 2017-05-09 PROCEDURE — 84295 ASSAY OF SERUM SODIUM: CPT

## 2017-05-09 PROCEDURE — 36415 COLL VENOUS BLD VENIPUNCTURE: CPT

## 2017-05-09 PROCEDURE — 84132 ASSAY OF SERUM POTASSIUM: CPT

## 2017-06-07 ENCOUNTER — HOSPITAL ENCOUNTER (OUTPATIENT)
Dept: OTHER | Age: 62
Discharge: HOME | End: 2017-06-07
Payer: MEDICARE

## 2017-06-07 VITALS
HEART RATE: 70 BPM | BODY MASS INDEX: 36.96 KG/M2 | WEIGHT: 229 LBS | RESPIRATION RATE: 18 BRPM | SYSTOLIC BLOOD PRESSURE: 106 MMHG | OXYGEN SATURATION: 99 % | DIASTOLIC BLOOD PRESSURE: 78 MMHG

## 2017-06-07 PROCEDURE — 99211 OFF/OP EST MAY X REQ PHY/QHP: CPT | Performed by: NURSE PRACTITIONER

## 2017-06-08 ENCOUNTER — HOSPITAL ENCOUNTER (OUTPATIENT)
Age: 62
Setting detail: SPECIMEN
Discharge: HOME | End: 2017-06-08
Payer: MEDICARE

## 2017-06-08 ENCOUNTER — OFFICE VISIT (OUTPATIENT)
Dept: INTERNAL MEDICINE | Age: 62
End: 2017-06-08
Payer: MEDICARE

## 2017-06-08 VITALS
WEIGHT: 227.6 LBS | HEART RATE: 69 BPM | SYSTOLIC BLOOD PRESSURE: 111 MMHG | DIASTOLIC BLOOD PRESSURE: 76 MMHG | BODY MASS INDEX: 36.58 KG/M2 | HEIGHT: 66 IN

## 2017-06-08 DIAGNOSIS — I10 ESSENTIAL HYPERTENSION: Chronic | ICD-10-CM

## 2017-06-08 DIAGNOSIS — E87.6 HYPOKALEMIA: Primary | ICD-10-CM

## 2017-06-08 DIAGNOSIS — R79.89 ELEVATED SERUM CREATININE: ICD-10-CM

## 2017-06-08 DIAGNOSIS — R74.8 ELEVATED ALKALINE PHOSPHATASE LEVEL: ICD-10-CM

## 2017-06-08 DIAGNOSIS — I42.0 DILATED CARDIOMYOPATHY (HCC): Chronic | ICD-10-CM

## 2017-06-08 LAB
ALBUMIN SERPL-MCNC: 4.1 G/DL (ref 3.5–5.2)
ALBUMIN/GLOBULIN RATIO: 1 (ref 1–2.5)
ALP BLD-CCNC: 239 U/L (ref 35–104)
ALT SERPL-CCNC: 37 U/L (ref 5–33)
ANION GAP SERPL CALCULATED.3IONS-SCNC: 17 MMOL/L (ref 9–17)
AST SERPL-CCNC: 52 U/L
BILIRUB SERPL-MCNC: 0.83 MG/DL (ref 0.3–1.2)
BUN BLDV-MCNC: 23 MG/DL (ref 8–23)
BUN/CREAT BLD: ABNORMAL (ref 9–20)
CALCIUM SERPL-MCNC: 9.7 MG/DL (ref 8.6–10.4)
CHLORIDE BLD-SCNC: 102 MMOL/L (ref 98–107)
CO2: 24 MMOL/L (ref 20–31)
CREAT SERPL-MCNC: 1.04 MG/DL (ref 0.5–0.9)
GFR AFRICAN AMERICAN: >60 ML/MIN
GFR NON-AFRICAN AMERICAN: 54 ML/MIN
GFR SERPL CREATININE-BSD FRML MDRD: ABNORMAL ML/MIN/{1.73_M2}
GFR SERPL CREATININE-BSD FRML MDRD: ABNORMAL ML/MIN/{1.73_M2}
GLUCOSE BLD-MCNC: 98 MG/DL (ref 70–99)
POTASSIUM SERPL-SCNC: 3.5 MMOL/L (ref 3.7–5.3)
SODIUM BLD-SCNC: 143 MMOL/L (ref 135–144)
TOTAL PROTEIN: 8.2 G/DL (ref 6.4–8.3)

## 2017-06-08 PROCEDURE — 1036F TOBACCO NON-USER: CPT | Performed by: INTERNAL MEDICINE

## 2017-06-08 PROCEDURE — 99214 OFFICE O/P EST MOD 30 MIN: CPT | Performed by: INTERNAL MEDICINE

## 2017-06-08 PROCEDURE — 3014F SCREEN MAMMO DOC REV: CPT | Performed by: INTERNAL MEDICINE

## 2017-06-08 PROCEDURE — G8417 CALC BMI ABV UP PARAM F/U: HCPCS | Performed by: INTERNAL MEDICINE

## 2017-06-08 PROCEDURE — 3017F COLORECTAL CA SCREEN DOC REV: CPT | Performed by: INTERNAL MEDICINE

## 2017-06-08 PROCEDURE — 99213 OFFICE O/P EST LOW 20 MIN: CPT | Performed by: INTERNAL MEDICINE

## 2017-06-08 PROCEDURE — 36415 COLL VENOUS BLD VENIPUNCTURE: CPT

## 2017-06-08 PROCEDURE — 80053 COMPREHEN METABOLIC PANEL: CPT

## 2017-06-08 PROCEDURE — G8427 DOCREV CUR MEDS BY ELIG CLIN: HCPCS | Performed by: INTERNAL MEDICINE

## 2017-06-08 RX ORDER — BLOOD PRESSURE TEST KIT
1 KIT MISCELLANEOUS DAILY
Qty: 1 KIT | Refills: 0 | Status: SHIPPED | OUTPATIENT
Start: 2017-06-08 | End: 2017-08-09

## 2017-06-28 ENCOUNTER — TELEPHONE (OUTPATIENT)
Dept: INTERNAL MEDICINE | Age: 62
End: 2017-06-28

## 2017-07-20 ENCOUNTER — HOSPITAL ENCOUNTER (OUTPATIENT)
Dept: OTHER | Age: 62
Discharge: HOME | End: 2017-07-20
Payer: MEDICARE

## 2017-07-20 VITALS
HEART RATE: 70 BPM | BODY MASS INDEX: 36.71 KG/M2 | RESPIRATION RATE: 20 BRPM | SYSTOLIC BLOOD PRESSURE: 102 MMHG | OXYGEN SATURATION: 100 % | DIASTOLIC BLOOD PRESSURE: 70 MMHG | WEIGHT: 228.4 LBS

## 2017-07-20 PROCEDURE — 99211 OFF/OP EST MAY X REQ PHY/QHP: CPT

## 2017-07-20 PROCEDURE — G0463 HOSPITAL OUTPT CLINIC VISIT: HCPCS

## 2017-07-23 ENCOUNTER — APPOINTMENT (OUTPATIENT)
Dept: GENERAL RADIOLOGY | Age: 62
End: 2017-07-23
Payer: MEDICARE

## 2017-07-23 ENCOUNTER — HOSPITAL ENCOUNTER (EMERGENCY)
Age: 62
Discharge: HOME | End: 2017-07-23
Attending: EMERGENCY MEDICINE
Payer: MEDICARE

## 2017-07-23 VITALS
SYSTOLIC BLOOD PRESSURE: 117 MMHG | HEART RATE: 70 BPM | HEIGHT: 66 IN | RESPIRATION RATE: 19 BRPM | TEMPERATURE: 97.9 F | WEIGHT: 226 LBS | OXYGEN SATURATION: 100 % | DIASTOLIC BLOOD PRESSURE: 74 MMHG | BODY MASS INDEX: 36.32 KG/M2

## 2017-07-23 DIAGNOSIS — R00.2 PALPITATIONS: Primary | ICD-10-CM

## 2017-07-23 DIAGNOSIS — R42 DIZZINESS: ICD-10-CM

## 2017-07-23 LAB
ANION GAP SERPL CALCULATED.3IONS-SCNC: 14 MMOL/L (ref 9–17)
BNP INTERPRETATION: ABNORMAL
BUN BLDV-MCNC: 21 MG/DL (ref 8–23)
BUN/CREAT BLD: ABNORMAL (ref 9–20)
CALCIUM SERPL-MCNC: 8.9 MG/DL (ref 8.6–10.4)
CHLORIDE BLD-SCNC: 100 MMOL/L (ref 98–107)
CO2: 23 MMOL/L (ref 20–31)
CREAT SERPL-MCNC: 0.88 MG/DL (ref 0.5–0.9)
GFR AFRICAN AMERICAN: >60 ML/MIN
GFR NON-AFRICAN AMERICAN: >60 ML/MIN
GFR SERPL CREATININE-BSD FRML MDRD: ABNORMAL ML/MIN/{1.73_M2}
GFR SERPL CREATININE-BSD FRML MDRD: ABNORMAL ML/MIN/{1.73_M2}
GLUCOSE BLD-MCNC: 117 MG/DL (ref 70–99)
MAGNESIUM: 1.9 MG/DL (ref 1.6–2.6)
POC TROPONIN I: 0 NG/ML (ref 0–0.1)
POC TROPONIN I: 0 NG/ML (ref 0–0.1)
POC TROPONIN INTERP: NORMAL
POC TROPONIN INTERP: NORMAL
POTASSIUM SERPL-SCNC: 4.4 MMOL/L (ref 3.7–5.3)
PRO-BNP: 1062 PG/ML
SODIUM BLD-SCNC: 137 MMOL/L (ref 135–144)

## 2017-07-23 PROCEDURE — 80048 BASIC METABOLIC PNL TOTAL CA: CPT

## 2017-07-23 PROCEDURE — 71020 XR CHEST STANDARD TWO VW: CPT

## 2017-07-23 PROCEDURE — 83735 ASSAY OF MAGNESIUM: CPT

## 2017-07-23 PROCEDURE — 99285 EMERGENCY DEPT VISIT HI MDM: CPT

## 2017-07-23 PROCEDURE — 93005 ELECTROCARDIOGRAM TRACING: CPT

## 2017-07-23 PROCEDURE — 83880 ASSAY OF NATRIURETIC PEPTIDE: CPT

## 2017-07-23 PROCEDURE — 84484 ASSAY OF TROPONIN QUANT: CPT

## 2017-07-23 ASSESSMENT — ENCOUNTER SYMPTOMS
CONSTIPATION: 0
VOMITING: 0
SHORTNESS OF BREATH: 0
RHINORRHEA: 1
DIARRHEA: 0
ABDOMINAL PAIN: 0
CHEST TIGHTNESS: 0
SORE THROAT: 0
NAUSEA: 0

## 2017-07-24 ENCOUNTER — CARE COORDINATION (OUTPATIENT)
Dept: INTERNAL MEDICINE | Age: 62
End: 2017-07-24

## 2017-07-24 LAB
EKG ATRIAL RATE: 70 BPM
EKG P AXIS: -10 DEGREES
EKG P-R INTERVAL: 162 MS
EKG Q-T INTERVAL: 502 MS
EKG QRS DURATION: 194 MS
EKG QTC CALCULATION (BAZETT): 542 MS
EKG R AXIS: -118 DEGREES
EKG T AXIS: 92 DEGREES
EKG VENTRICULAR RATE: 70 BPM

## 2017-08-09 ENCOUNTER — NURSE ONLY (OUTPATIENT)
Dept: INTERNAL MEDICINE | Age: 62
End: 2017-08-09

## 2017-08-09 ENCOUNTER — OFFICE VISIT (OUTPATIENT)
Dept: INTERNAL MEDICINE | Age: 62
End: 2017-08-09
Payer: MEDICARE

## 2017-08-09 ENCOUNTER — HOSPITAL ENCOUNTER (OUTPATIENT)
Age: 62
Setting detail: SPECIMEN
Discharge: HOME | End: 2017-08-09
Payer: MEDICARE

## 2017-08-09 VITALS
WEIGHT: 235 LBS | HEIGHT: 66 IN | DIASTOLIC BLOOD PRESSURE: 75 MMHG | BODY MASS INDEX: 37.77 KG/M2 | SYSTOLIC BLOOD PRESSURE: 118 MMHG | HEART RATE: 72 BPM

## 2017-08-09 DIAGNOSIS — M17.0 PRIMARY OSTEOARTHRITIS OF BOTH KNEES: ICD-10-CM

## 2017-08-09 DIAGNOSIS — E78.2 MIXED HYPERLIPIDEMIA: ICD-10-CM

## 2017-08-09 DIAGNOSIS — Z71.89 ACP (ADVANCE CARE PLANNING): Primary | ICD-10-CM

## 2017-08-09 DIAGNOSIS — I42.0 DILATED CARDIOMYOPATHY (HCC): Primary | Chronic | ICD-10-CM

## 2017-08-09 DIAGNOSIS — R74.8 ELEVATED ALKALINE PHOSPHATASE LEVEL: ICD-10-CM

## 2017-08-09 LAB
ALBUMIN SERPL-MCNC: 3.9 G/DL (ref 3.5–5.2)
ALBUMIN/GLOBULIN RATIO: 1.1 (ref 1–2.5)
ALP BLD-CCNC: 230 U/L (ref 35–104)
ALT SERPL-CCNC: 29 U/L (ref 5–33)
AST SERPL-CCNC: 38 U/L
BILIRUB SERPL-MCNC: 0.51 MG/DL (ref 0.3–1.2)
BILIRUBIN DIRECT: 0.17 MG/DL
BILIRUBIN, INDIRECT: 0.34 MG/DL (ref 0–1)
GLOBULIN: ABNORMAL G/DL (ref 1.5–3.8)
T3 FREE: 2.26 PG/ML (ref 2.02–4.43)
THYROXINE, FREE: 1.18 NG/DL (ref 0.93–1.7)
TOTAL PROTEIN: 7.6 G/DL (ref 6.4–8.3)
TSH SERPL DL<=0.05 MIU/L-ACNC: 3.5 MIU/L (ref 0.3–5)

## 2017-08-09 PROCEDURE — 80076 HEPATIC FUNCTION PANEL: CPT

## 2017-08-09 PROCEDURE — 84443 ASSAY THYROID STIM HORMONE: CPT

## 2017-08-09 PROCEDURE — 84439 ASSAY OF FREE THYROXINE: CPT

## 2017-08-09 PROCEDURE — 84481 FREE ASSAY (FT-3): CPT

## 2017-08-09 PROCEDURE — 36415 COLL VENOUS BLD VENIPUNCTURE: CPT

## 2017-08-09 PROCEDURE — 99214 OFFICE O/P EST MOD 30 MIN: CPT | Performed by: INTERNAL MEDICINE

## 2017-08-09 RX ORDER — MELOXICAM 7.5 MG/1
7.5 TABLET ORAL DAILY PRN
Qty: 10 TABLET | Refills: 0 | Status: SHIPPED | OUTPATIENT
Start: 2017-08-09 | End: 2017-09-26 | Stop reason: SDUPTHER

## 2017-08-18 ENCOUNTER — TELEPHONE (OUTPATIENT)
Dept: INTERNAL MEDICINE | Age: 62
End: 2017-08-18

## 2017-08-28 ENCOUNTER — HOSPITAL ENCOUNTER (OUTPATIENT)
Dept: MAMMOGRAPHY | Age: 62
Discharge: HOME | End: 2017-08-28
Payer: MEDICARE

## 2017-08-28 DIAGNOSIS — Z12.39 SCREENING FOR BREAST CANCER: ICD-10-CM

## 2017-08-28 DIAGNOSIS — Z12.31 ENCOUNTER FOR SCREENING MAMMOGRAM FOR MALIGNANT NEOPLASM OF BREAST: ICD-10-CM

## 2017-08-28 PROCEDURE — G0202 SCR MAMMO BI INCL CAD: HCPCS

## 2017-09-06 RX ORDER — FUROSEMIDE 20 MG/1
TABLET ORAL
Qty: 60 TABLET | Refills: 0 | Status: SHIPPED | OUTPATIENT
Start: 2017-09-06 | End: 2017-10-10 | Stop reason: SDUPTHER

## 2017-09-11 ENCOUNTER — HOSPITAL ENCOUNTER (OUTPATIENT)
Age: 62
Setting detail: SPECIMEN
Discharge: HOME | End: 2017-09-11
Payer: MEDICARE

## 2017-09-11 LAB
ANION GAP SERPL CALCULATED.3IONS-SCNC: 17 MMOL/L (ref 9–17)
BUN BLDV-MCNC: 39 MG/DL (ref 8–23)
CHLORIDE BLD-SCNC: 96 MMOL/L (ref 98–107)
CO2: 27 MMOL/L (ref 20–31)
POTASSIUM SERPL-SCNC: 3.4 MMOL/L (ref 3.7–5.3)
SODIUM BLD-SCNC: 140 MMOL/L (ref 135–144)

## 2017-09-11 PROCEDURE — 80051 ELECTROLYTE PANEL: CPT

## 2017-09-11 PROCEDURE — 36415 COLL VENOUS BLD VENIPUNCTURE: CPT

## 2017-09-11 PROCEDURE — 84520 ASSAY OF UREA NITROGEN: CPT

## 2017-09-14 ENCOUNTER — HOSPITAL ENCOUNTER (OUTPATIENT)
Age: 62
Discharge: HOME | End: 2017-09-14
Payer: MEDICARE

## 2017-09-14 LAB
ABSOLUTE EOS #: 0.1 K/UL (ref 0–0.4)
ABSOLUTE LYMPH #: 1.7 K/UL (ref 1–4.8)
ABSOLUTE MONO #: 0.8 K/UL (ref 0.1–1.2)
BASOPHILS # BLD: 1 %
BASOPHILS ABSOLUTE: 0 K/UL (ref 0–0.2)
BUN BLDV-MCNC: 30 MG/DL (ref 8–23)
CREAT SERPL-MCNC: 0.99 MG/DL (ref 0.5–0.9)
DIFFERENTIAL TYPE: NORMAL
EOSINOPHILS RELATIVE PERCENT: 2 %
GFR AFRICAN AMERICAN: >60 ML/MIN
GFR NON-AFRICAN AMERICAN: 57 ML/MIN
GFR SERPL CREATININE-BSD FRML MDRD: ABNORMAL ML/MIN/{1.73_M2}
GFR SERPL CREATININE-BSD FRML MDRD: ABNORMAL ML/MIN/{1.73_M2}
HCT VFR BLD CALC: 39 % (ref 36–46)
HEMOGLOBIN: 13.5 G/DL (ref 12–16)
LYMPHOCYTES # BLD: 22 %
MAGNESIUM: 2.2 MG/DL (ref 1.6–2.6)
MCH RBC QN AUTO: 29.3 PG (ref 26–34)
MCHC RBC AUTO-ENTMCNC: 34.6 G/DL (ref 31–37)
MCV RBC AUTO: 84.8 FL (ref 80–100)
MONOCYTES # BLD: 11 %
PDW BLD-RTO: 14.3 % (ref 12.5–15.4)
PLATELET # BLD: 242 K/UL (ref 140–450)
PLATELET ESTIMATE: NORMAL
PMV BLD AUTO: 9.3 FL (ref 6–12)
POTASSIUM SERPL-SCNC: 3.1 MMOL/L (ref 3.7–5.3)
RBC # BLD: 4.6 M/UL (ref 4–5.2)
RBC # BLD: NORMAL 10*6/UL
SEG NEUTROPHILS: 64 %
SEGMENTED NEUTROPHILS ABSOLUTE COUNT: 5.1 K/UL (ref 1.8–7.7)
SODIUM BLD-SCNC: 141 MMOL/L (ref 135–144)
T3 FREE: 2.25 PG/ML (ref 2.02–4.43)
THYROXINE, FREE: 1.18 NG/DL (ref 0.93–1.7)
TSH SERPL DL<=0.05 MIU/L-ACNC: 2.61 MIU/L (ref 0.3–5)
WBC # BLD: 7.8 K/UL (ref 3.5–11)
WBC # BLD: NORMAL 10*3/UL

## 2017-09-14 PROCEDURE — 36415 COLL VENOUS BLD VENIPUNCTURE: CPT

## 2017-09-14 PROCEDURE — 84132 ASSAY OF SERUM POTASSIUM: CPT

## 2017-09-14 PROCEDURE — 84520 ASSAY OF UREA NITROGEN: CPT

## 2017-09-14 PROCEDURE — 84439 ASSAY OF FREE THYROXINE: CPT

## 2017-09-14 PROCEDURE — 84443 ASSAY THYROID STIM HORMONE: CPT

## 2017-09-14 PROCEDURE — 85025 COMPLETE CBC W/AUTO DIFF WBC: CPT

## 2017-09-14 PROCEDURE — 82565 ASSAY OF CREATININE: CPT

## 2017-09-14 PROCEDURE — 84481 FREE ASSAY (FT-3): CPT

## 2017-09-14 PROCEDURE — 83735 ASSAY OF MAGNESIUM: CPT

## 2017-09-14 PROCEDURE — 84295 ASSAY OF SERUM SODIUM: CPT

## 2017-09-17 ENCOUNTER — HOSPITAL ENCOUNTER (OUTPATIENT)
Age: 62
Discharge: HOME | End: 2017-09-17
Payer: MEDICARE

## 2017-09-17 LAB
ABSOLUTE EOS #: 0.1 K/UL (ref 0–0.4)
ABSOLUTE LYMPH #: 1.8 K/UL (ref 1–4.8)
ABSOLUTE MONO #: 0.7 K/UL (ref 0.1–1.2)
BASOPHILS # BLD: 0 %
BASOPHILS ABSOLUTE: 0 K/UL (ref 0–0.2)
BUN BLDV-MCNC: 35 MG/DL (ref 8–23)
CREAT SERPL-MCNC: 1.11 MG/DL (ref 0.5–0.9)
DIFFERENTIAL TYPE: NORMAL
EOSINOPHILS RELATIVE PERCENT: 2 %
GFR AFRICAN AMERICAN: >60 ML/MIN
GFR NON-AFRICAN AMERICAN: 50 ML/MIN
GFR SERPL CREATININE-BSD FRML MDRD: ABNORMAL ML/MIN/{1.73_M2}
GFR SERPL CREATININE-BSD FRML MDRD: ABNORMAL ML/MIN/{1.73_M2}
HCT VFR BLD CALC: 40.5 % (ref 36–46)
HEMOGLOBIN: 14.1 G/DL (ref 12–16)
LYMPHOCYTES # BLD: 22 %
MAGNESIUM: 2.1 MG/DL (ref 1.6–2.6)
MCH RBC QN AUTO: 29.3 PG (ref 26–34)
MCHC RBC AUTO-ENTMCNC: 34.8 G/DL (ref 31–37)
MCV RBC AUTO: 84.4 FL (ref 80–100)
MONOCYTES # BLD: 9 %
PDW BLD-RTO: 14.3 % (ref 12.5–15.4)
PLATELET # BLD: 268 K/UL (ref 140–450)
PLATELET ESTIMATE: NORMAL
PMV BLD AUTO: 8.9 FL (ref 6–12)
POTASSIUM SERPL-SCNC: 3.7 MMOL/L (ref 3.7–5.3)
RBC # BLD: 4.8 M/UL (ref 4–5.2)
RBC # BLD: NORMAL 10*6/UL
SEG NEUTROPHILS: 67 %
SEGMENTED NEUTROPHILS ABSOLUTE COUNT: 5.5 K/UL (ref 1.8–7.7)
SODIUM BLD-SCNC: 135 MMOL/L (ref 135–144)
T3 FREE: 2.37 PG/ML (ref 2.02–4.43)
THYROXINE, FREE: 1.36 NG/DL (ref 0.93–1.7)
TSH SERPL DL<=0.05 MIU/L-ACNC: 2.94 MIU/L (ref 0.3–5)
WBC # BLD: 8.2 K/UL (ref 3.5–11)
WBC # BLD: NORMAL 10*3/UL

## 2017-09-17 PROCEDURE — 84481 FREE ASSAY (FT-3): CPT

## 2017-09-17 PROCEDURE — 84443 ASSAY THYROID STIM HORMONE: CPT

## 2017-09-17 PROCEDURE — 85025 COMPLETE CBC W/AUTO DIFF WBC: CPT

## 2017-09-17 PROCEDURE — 84132 ASSAY OF SERUM POTASSIUM: CPT

## 2017-09-17 PROCEDURE — 84295 ASSAY OF SERUM SODIUM: CPT

## 2017-09-17 PROCEDURE — 82565 ASSAY OF CREATININE: CPT

## 2017-09-17 PROCEDURE — 84439 ASSAY OF FREE THYROXINE: CPT

## 2017-09-17 PROCEDURE — 84520 ASSAY OF UREA NITROGEN: CPT

## 2017-09-17 PROCEDURE — 83735 ASSAY OF MAGNESIUM: CPT

## 2017-09-27 RX ORDER — MELOXICAM 7.5 MG/1
TABLET ORAL
Qty: 10 TABLET | Refills: 0 | Status: SHIPPED | OUTPATIENT
Start: 2017-09-27 | End: 2017-10-10 | Stop reason: SDUPTHER

## 2017-10-02 ENCOUNTER — TELEPHONE (OUTPATIENT)
Dept: INTERNAL MEDICINE | Age: 62
End: 2017-10-02

## 2017-10-25 ENCOUNTER — TELEPHONE (OUTPATIENT)
Dept: PHARMACY | Facility: CLINIC | Age: 62
End: 2017-10-25

## 2017-10-25 NOTE — LETTER
[address]  [address], Valerie [address]  Phone: [phone], option 7  Fax: [phone] Dominique Gonzalez   [address] [place] [address]           10/26/17     Dear Claudene Deal are eligible for a complete medication therapy review performed by a [address] [place] [address],[address] licensed clinical pharmacist. This review helps ensure that you are getting the most benefit from the medications you receive and includes the following:  ? Review of your medications, including over-the-counter and herbal medications. ? Answering questions about your medications and how to get the most benefit from them. ? Identifying and helping to prevent potential drug interactions or side effects. ? Possibly identifying less costly alternatives that are equally effective. Under this program, [facility] will work with you and your doctor to manage your drug therapy. Please contact the [address] [place] [address] office to set up a time for your medication review with one of our clinical pharmacists. To contact us call [phone] and select Option 7. This will be a phone consult and therefore will not require a trip to the medical office. Please note: This is an optional program. It is a free service provided to help ensure that your medicines are safe, necessary, and effective. Your participation is encouraged, but not required.     Sincerely,  Lula Franks, PharmD  [address]  [phone], option 7

## 2017-10-27 ENCOUNTER — APPOINTMENT (OUTPATIENT)
Dept: GENERAL RADIOLOGY | Age: 62
End: 2017-10-27
Payer: MEDICARE

## 2017-10-27 ENCOUNTER — HOSPITAL ENCOUNTER (OUTPATIENT)
Age: 62
Setting detail: SPECIMEN
Discharge: HOME | End: 2017-10-27
Payer: MEDICARE

## 2017-10-27 ENCOUNTER — HOSPITAL ENCOUNTER (OUTPATIENT)
Age: 62
Setting detail: OBSERVATION
Discharge: HOME | End: 2017-10-28
Attending: EMERGENCY MEDICINE | Admitting: EMERGENCY MEDICINE
Payer: MEDICARE

## 2017-10-27 DIAGNOSIS — R42 DIZZINESS: Primary | ICD-10-CM

## 2017-10-27 LAB
ABSOLUTE EOS #: 0.1 K/UL (ref 0–0.4)
ABSOLUTE IMMATURE GRANULOCYTE: NORMAL K/UL (ref 0–0.3)
ABSOLUTE LYMPH #: 1.4 K/UL (ref 1–4.8)
ABSOLUTE MONO #: 0.6 K/UL (ref 0.1–1.2)
ANION GAP SERPL CALCULATED.3IONS-SCNC: 13 MMOL/L (ref 9–17)
ANION GAP SERPL CALCULATED.3IONS-SCNC: 15 MMOL/L (ref 9–17)
BASOPHILS # BLD: 1 %
BASOPHILS ABSOLUTE: 0 K/UL (ref 0–0.2)
BNP INTERPRETATION: ABNORMAL
BUN BLDV-MCNC: 18 MG/DL (ref 8–23)
BUN BLDV-MCNC: 20 MG/DL (ref 8–23)
BUN/CREAT BLD: ABNORMAL (ref 9–20)
BUN/CREAT BLD: ABNORMAL (ref 9–20)
CALCIUM SERPL-MCNC: 9.2 MG/DL (ref 8.6–10.4)
CALCIUM SERPL-MCNC: 9.9 MG/DL (ref 8.6–10.4)
CHLORIDE BLD-SCNC: 100 MMOL/L (ref 98–107)
CHLORIDE BLD-SCNC: 96 MMOL/L (ref 98–107)
CO2: 25 MMOL/L (ref 20–31)
CO2: 26 MMOL/L (ref 20–31)
CREAT SERPL-MCNC: 0.97 MG/DL (ref 0.5–0.9)
CREAT SERPL-MCNC: 0.98 MG/DL (ref 0.5–0.9)
DIFFERENTIAL TYPE: NORMAL
EOSINOPHILS RELATIVE PERCENT: 2 %
GFR AFRICAN AMERICAN: >60 ML/MIN
GFR AFRICAN AMERICAN: >60 ML/MIN
GFR NON-AFRICAN AMERICAN: 57 ML/MIN
GFR NON-AFRICAN AMERICAN: 58 ML/MIN
GFR SERPL CREATININE-BSD FRML MDRD: ABNORMAL ML/MIN/{1.73_M2}
GLUCOSE BLD-MCNC: 104 MG/DL (ref 70–99)
GLUCOSE BLD-MCNC: 97 MG/DL (ref 70–99)
HCT VFR BLD CALC: 38 % (ref 36–46)
HEMOGLOBIN: 13 G/DL (ref 12–16)
IMMATURE GRANULOCYTES: NORMAL %
LYMPHOCYTES # BLD: 20 %
MCH RBC QN AUTO: 29 PG (ref 26–34)
MCHC RBC AUTO-ENTMCNC: 34.1 G/DL (ref 31–37)
MCV RBC AUTO: 84.8 FL (ref 80–100)
MONOCYTES # BLD: 9 %
PDW BLD-RTO: 14.4 % (ref 12.5–15.4)
PLATELET # BLD: 227 K/UL (ref 140–450)
PLATELET ESTIMATE: NORMAL
PMV BLD AUTO: 8.8 FL (ref 6–12)
POC TROPONIN I: 0.01 NG/ML (ref 0–0.1)
POC TROPONIN INTERP: NORMAL
POTASSIUM SERPL-SCNC: 3.4 MMOL/L (ref 3.7–5.3)
POTASSIUM SERPL-SCNC: 4.3 MMOL/L (ref 3.7–5.3)
PRO-BNP: 979 PG/ML
RBC # BLD: 4.48 M/UL (ref 4–5.2)
RBC # BLD: NORMAL 10*6/UL
SEG NEUTROPHILS: 68 %
SEGMENTED NEUTROPHILS ABSOLUTE COUNT: 4.7 K/UL (ref 1.8–7.7)
SODIUM BLD-SCNC: 134 MMOL/L (ref 135–144)
SODIUM BLD-SCNC: 141 MMOL/L (ref 135–144)
WBC # BLD: 6.9 K/UL (ref 3.5–11)
WBC # BLD: NORMAL 10*3/UL

## 2017-10-27 PROCEDURE — 85025 COMPLETE CBC W/AUTO DIFF WBC: CPT

## 2017-10-27 PROCEDURE — 36415 COLL VENOUS BLD VENIPUNCTURE: CPT

## 2017-10-27 PROCEDURE — 71020 XR CHEST STANDARD TWO VW: CPT

## 2017-10-27 PROCEDURE — 84484 ASSAY OF TROPONIN QUANT: CPT

## 2017-10-27 PROCEDURE — G0378 HOSPITAL OBSERVATION PER HR: HCPCS

## 2017-10-27 PROCEDURE — 99285 EMERGENCY DEPT VISIT HI MDM: CPT

## 2017-10-27 PROCEDURE — 6370000000 HC RX 637 (ALT 250 FOR IP): Performed by: EMERGENCY MEDICINE

## 2017-10-27 PROCEDURE — 80048 BASIC METABOLIC PNL TOTAL CA: CPT

## 2017-10-27 PROCEDURE — 93005 ELECTROCARDIOGRAM TRACING: CPT

## 2017-10-27 PROCEDURE — 83880 ASSAY OF NATRIURETIC PEPTIDE: CPT

## 2017-10-27 RX ORDER — METOPROLOL TARTRATE 50 MG/1
50 TABLET, FILM COATED ORAL 2 TIMES DAILY
Status: DISCONTINUED | OUTPATIENT
Start: 2017-10-27 | End: 2017-10-28 | Stop reason: HOSPADM

## 2017-10-27 RX ORDER — SODIUM CHLORIDE 9 MG/ML
INJECTION, SOLUTION INTRAVENOUS CONTINUOUS
Status: DISCONTINUED | OUTPATIENT
Start: 2017-10-27 | End: 2017-10-28 | Stop reason: HOSPADM

## 2017-10-27 RX ORDER — ACETAMINOPHEN 325 MG/1
650 TABLET ORAL EVERY 4 HOURS PRN
Status: DISCONTINUED | OUTPATIENT
Start: 2017-10-27 | End: 2017-10-28 | Stop reason: HOSPADM

## 2017-10-27 RX ORDER — FUROSEMIDE 20 MG/1
20 TABLET ORAL DAILY
Status: DISCONTINUED | OUTPATIENT
Start: 2017-10-28 | End: 2017-10-28 | Stop reason: HOSPADM

## 2017-10-27 RX ORDER — AMIODARONE HYDROCHLORIDE 200 MG/1
200 TABLET ORAL DAILY
Status: DISCONTINUED | OUTPATIENT
Start: 2017-10-28 | End: 2017-10-28 | Stop reason: HOSPADM

## 2017-10-27 RX ORDER — SODIUM CHLORIDE 0.9 % (FLUSH) 0.9 %
10 SYRINGE (ML) INJECTION EVERY 12 HOURS SCHEDULED
Status: DISCONTINUED | OUTPATIENT
Start: 2017-10-27 | End: 2017-10-28 | Stop reason: HOSPADM

## 2017-10-27 RX ORDER — SODIUM CHLORIDE 0.9 % (FLUSH) 0.9 %
10 SYRINGE (ML) INJECTION PRN
Status: DISCONTINUED | OUTPATIENT
Start: 2017-10-27 | End: 2017-10-28 | Stop reason: HOSPADM

## 2017-10-27 RX ORDER — POTASSIUM CHLORIDE 20MEQ/15ML
40 LIQUID (ML) ORAL ONCE
Status: COMPLETED | OUTPATIENT
Start: 2017-10-27 | End: 2017-10-27

## 2017-10-27 RX ORDER — SPIRONOLACTONE 25 MG/1
12.5 TABLET ORAL DAILY
Status: DISCONTINUED | OUTPATIENT
Start: 2017-10-28 | End: 2017-10-28 | Stop reason: HOSPADM

## 2017-10-27 RX ORDER — ASPIRIN 81 MG/1
81 TABLET, CHEWABLE ORAL DAILY
Status: DISCONTINUED | OUTPATIENT
Start: 2017-10-28 | End: 2017-10-28 | Stop reason: HOSPADM

## 2017-10-27 RX ORDER — DIPHENHYDRAMINE HYDROCHLORIDE 50 MG/ML
25 INJECTION INTRAMUSCULAR; INTRAVENOUS NIGHTLY PRN
Status: DISCONTINUED | OUTPATIENT
Start: 2017-10-27 | End: 2017-10-28

## 2017-10-27 RX ADMIN — POTASSIUM CHLORIDE 40 MEQ: 40 SOLUTION ORAL at 22:25

## 2017-10-28 VITALS
TEMPERATURE: 98.6 F | OXYGEN SATURATION: 96 % | HEART RATE: 70 BPM | WEIGHT: 228 LBS | HEIGHT: 66 IN | DIASTOLIC BLOOD PRESSURE: 82 MMHG | BODY MASS INDEX: 36.64 KG/M2 | SYSTOLIC BLOOD PRESSURE: 117 MMHG | RESPIRATION RATE: 16 BRPM

## 2017-10-28 LAB
ANION GAP SERPL CALCULATED.3IONS-SCNC: 16 MMOL/L (ref 9–17)
BUN BLDV-MCNC: 16 MG/DL (ref 8–23)
BUN/CREAT BLD: ABNORMAL (ref 9–20)
CALCIUM SERPL-MCNC: 9.2 MG/DL (ref 8.6–10.4)
CHLORIDE BLD-SCNC: 104 MMOL/L (ref 98–107)
CO2: 22 MMOL/L (ref 20–31)
CREAT SERPL-MCNC: 0.92 MG/DL (ref 0.5–0.9)
EKG ATRIAL RATE: 70 BPM
EKG ATRIAL RATE: 70 BPM
EKG P AXIS: -8 DEGREES
EKG P AXIS: -8 DEGREES
EKG P-R INTERVAL: 176 MS
EKG P-R INTERVAL: 176 MS
EKG Q-T INTERVAL: 502 MS
EKG Q-T INTERVAL: 510 MS
EKG QRS DURATION: 186 MS
EKG QRS DURATION: 186 MS
EKG QTC CALCULATION (BAZETT): 542 MS
EKG QTC CALCULATION (BAZETT): 550 MS
EKG R AXIS: -179 DEGREES
EKG R AXIS: -83 DEGREES
EKG T AXIS: 77 DEGREES
EKG T AXIS: 88 DEGREES
EKG VENTRICULAR RATE: 70 BPM
EKG VENTRICULAR RATE: 70 BPM
GFR AFRICAN AMERICAN: >60 ML/MIN
GFR NON-AFRICAN AMERICAN: >60 ML/MIN
GFR SERPL CREATININE-BSD FRML MDRD: ABNORMAL ML/MIN/{1.73_M2}
GFR SERPL CREATININE-BSD FRML MDRD: ABNORMAL ML/MIN/{1.73_M2}
GLUCOSE BLD-MCNC: 109 MG/DL (ref 70–99)
POTASSIUM SERPL-SCNC: 3.8 MMOL/L (ref 3.7–5.3)
SODIUM BLD-SCNC: 142 MMOL/L (ref 135–144)

## 2017-10-28 PROCEDURE — G0378 HOSPITAL OBSERVATION PER HR: HCPCS

## 2017-10-28 PROCEDURE — 36415 COLL VENOUS BLD VENIPUNCTURE: CPT

## 2017-10-28 PROCEDURE — 93005 ELECTROCARDIOGRAM TRACING: CPT

## 2017-10-28 PROCEDURE — 80048 BASIC METABOLIC PNL TOTAL CA: CPT

## 2017-10-28 PROCEDURE — 6370000000 HC RX 637 (ALT 250 FOR IP): Performed by: STUDENT IN AN ORGANIZED HEALTH CARE EDUCATION/TRAINING PROGRAM

## 2017-10-28 PROCEDURE — 6370000000 HC RX 637 (ALT 250 FOR IP): Performed by: EMERGENCY MEDICINE

## 2017-10-28 RX ORDER — DIPHENHYDRAMINE HCL 25 MG
25 TABLET ORAL NIGHTLY PRN
Status: DISCONTINUED | OUTPATIENT
Start: 2017-10-28 | End: 2017-10-28

## 2017-10-28 RX ORDER — POTASSIUM CHLORIDE 20MEQ/15ML
40 LIQUID (ML) ORAL ONCE
Status: COMPLETED | OUTPATIENT
Start: 2017-10-28 | End: 2017-10-28

## 2017-10-28 RX ORDER — MECLIZINE HCL 12.5 MG/1
12.5 TABLET ORAL 3 TIMES DAILY PRN
Qty: 20 TABLET | Refills: 0 | Status: SHIPPED | OUTPATIENT
Start: 2017-10-28 | End: 2017-10-28

## 2017-10-28 RX ORDER — LISINOPRIL 5 MG/1
5 TABLET ORAL DAILY
Qty: 30 TABLET | Refills: 1 | Status: SHIPPED | OUTPATIENT
Start: 2017-10-28 | End: 2018-01-19

## 2017-10-28 RX ORDER — MECLIZINE HCL 12.5 MG/1
12.5 TABLET ORAL 3 TIMES DAILY PRN
Qty: 20 TABLET | Refills: 0 | Status: SHIPPED | OUTPATIENT
Start: 2017-10-28 | End: 2017-11-07

## 2017-10-28 RX ORDER — LISINOPRIL 5 MG/1
5 TABLET ORAL DAILY
Qty: 30 TABLET | Refills: 1 | Status: SHIPPED | OUTPATIENT
Start: 2017-10-28 | End: 2017-10-28

## 2017-10-28 RX ORDER — DIPHENHYDRAMINE HCL 25 MG
25 TABLET ORAL NIGHTLY PRN
Status: DISCONTINUED | OUTPATIENT
Start: 2017-10-28 | End: 2017-10-28 | Stop reason: HOSPADM

## 2017-10-28 RX ADMIN — POTASSIUM CHLORIDE 40 MEQ: 40 SOLUTION ORAL at 11:56

## 2017-10-28 RX ADMIN — ASPIRIN 81 MG: 81 TABLET, CHEWABLE ORAL at 09:27

## 2017-10-28 RX ADMIN — MAGNESIUM GLUCONATE 500 MG ORAL TABLET 400 MG: 500 TABLET ORAL at 09:27

## 2017-10-28 RX ADMIN — ACETAMINOPHEN 650 MG: 325 TABLET ORAL at 12:53

## 2017-10-28 RX ADMIN — FUROSEMIDE 20 MG: 20 TABLET ORAL at 09:27

## 2017-10-28 RX ADMIN — AMIODARONE HYDROCHLORIDE 200 MG: 200 TABLET ORAL at 09:27

## 2017-10-28 RX ADMIN — DIPHENHYDRAMINE HCL 25 MG: 25 TABLET ORAL at 01:14

## 2017-10-28 RX ADMIN — SPIRONOLACTONE 12.5 MG: 25 TABLET ORAL at 09:19

## 2017-10-28 RX ADMIN — METOPROLOL TARTRATE 50 MG: 50 TABLET, FILM COATED ORAL at 09:19

## 2017-10-28 ASSESSMENT — PAIN SCALES - GENERAL: PAINLEVEL_OUTOF10: 2

## 2017-10-28 ASSESSMENT — ENCOUNTER SYMPTOMS
ABDOMINAL PAIN: 0
RHINORRHEA: 0
SHORTNESS OF BREATH: 0

## 2017-10-28 NOTE — ED PROVIDER NOTES
[facility]  Emergency Department Encounter  Emergency Medicine Resident     Pt Name: Elsie Conley  MRN: [id number]  [place] 1955  Date of evaluation: 10/27/17  PCP:  Nelson Kim MD    [address] [place] [address]       Chief Complaint   Patient presents with    Dizziness       HISTORY OF PRESENT ILLNESS  (Location/Symptom, Timing/Onset, Context/Setting, Quality, Duration, Modifying Factors, Severity, Associated signs/symptoms)     Elsie Conley is a 60 y.o. female who presents with complaints of dizziness that started earlier today. States the dizziness was described as a spinning sensation while she was laying down as well as while she was sitting up. Denies any particular factors that worsen her improved symptoms. States that she does have a history of having hypokalemia that precipitate similar symptoms. States that she does take oral potassium replacement daily. Patient denying any other symptoms including any chest pain, nausea, vomiting, abdominal pain, numbness, weakness, tingling. She does have a pacemaker defibrillator in place and states that she has not felt it go off. Patient also stated that she talked with her cardiologist today who recommended that she come in and be evaluated in the emergency department. Her cardiologist that she spoke to was Dr. Carlota Garnett. PAST MEDICAL / SURGICAL / SOCIAL / FAMILY HISTORY      has a past medical history of Abnormal Pap smear; Acute on chronic systolic congestive heart failure ([address] AICD (automatic cardioverter/defibrillator) present; MAURO (acute kidney injury) ([address] Anemia; Cardiomyopathy ([address] Cardiomyopathy, nonischemic ([address] Ejection fraction < 50%; HTN (hypertension); Ischemic cardiomyopathy; Left bundle branch block; Mitral regurgitation; Morbid obesity due to excess calories ([address] Pulmonary hypertension; and Sudden onset of severe headache.     has a past surgical history that includes other surgical history (12/20/12);  Tubal ligation; and hernia repair. Social History     Social History    Marital status:      Spouse name: N/A    Number of children: 2    Years of education: N/A     Occupational History    Not on file. Social History Main Topics    Smoking status: Never Smoker    Smokeless tobacco: Never Used    Alcohol use No    Drug use: No    Sexual activity: No     Other Topics Concern    Not on file     Social History Narrative    No narrative on file       Family History   Problem Relation Age of Onset    Cancer Other      ovarian    High Blood Pressure Mother     Other Mother      copd    Arthritis Neg Hx     Asthma Neg Hx     Birth Defects Neg Hx     Depression Neg Hx     Diabetes Neg Hx     Early Death Neg Hx     Hearing Loss Neg Hx     Heart Disease Neg Hx     High Cholesterol Neg Hx     Kidney Disease Neg Hx     Learning Disabilities Neg Hx     Mental Retardation Neg Hx     Mental Illness Neg Hx     Miscarriages / Stillbirths Neg Hx     Stroke Neg Hx     Substance Abuse Neg Hx     Vision Loss Neg Hx     Breast Cancer Neg Hx     Colon Cancer Neg Hx     Eclampsia Neg Hx     Hypertension Neg Hx     Ovarian Cancer Neg Hx      Labor Neg Hx     Spont Abortions Neg Hx        Allergies:  Codeine and Morphine    Home Medications:  Prior to Admission medications    Medication Sig Start Date End Date Taking? Authorizing Provider   furosemide (LASIX) 20 MG tablet Take 1 tablet by mouth daily 10/10/17  Yes Mohit Collado MD   metolazone (ZAROXOLYN) 2.5 MG tablet Take 1 tablet by mouth daily 3/7/17  Yes Tony Rabago MD   magnesium oxide (MAG-OX) 400 (241.3 MG) MG TABS tablet Take 1 tablet by mouth daily for 7 days 3/7/17 10/27/17 Yes Tony Rabago MD   metoprolol tartrate (LOPRESSOR) 50 MG tablet Take 1 tablet by mouth 2 times daily 16  Yes Mohit Collado MD   amiodarone (CORDARONE) 200 MG tablet Take 1 tablet by mouth daily.  16  Yes Mohit Collado MD guarding. Neurological: She is alert and oriented to person, place, and time. Skin: Skin is warm and dry. She is not diaphoretic. Nursing note and vitals reviewed.     DIFFERENTIAL  DIAGNOSIS     PLAN (LABS / IMAGING / EKG):  Orders Placed This Encounter   Procedures    XR CHEST STANDARD (2 VW)    Basic Metabolic Panel    Brain Natriuretic Peptide    CBC Auto Differential    Basic Metabolic Panel    Vital signs per unit routine    Notify physician    Notify physician    Place intermittent pneumatic compression device    Telemetry Monitoring    Telemetry monitoring    Full Code    Inpatient consult to Cardiology    Inpatient consult to Cardiology    POCT troponin    POCT troponin    EKG 12 Lead    Insert peripheral IV    Insert peripheral IV    PATIENT STATUS (FROM ED OR OR/PROCEDURAL) Observation       MEDICATIONS ORDERED:  Orders Placed This Encounter   Medications    potassium chloride 20 MEQ/15ML (10%) oral solution 40 mEq    amiodarone (CORDARONE) tablet 200 mg    aspirin chewable tablet 81 mg    furosemide (LASIX) tablet 20 mg    magnesium oxide (MAG-OX) tablet 400 mg    metoprolol tartrate (LOPRESSOR) tablet 50 mg    spironolactone (ALDACTONE) tablet 12.5 mg    sodium chloride flush 0.9 % injection 10 mL    sodium chloride flush 0.9 % injection 10 mL    acetaminophen (TYLENOL) tablet 650 mg    0.9 % sodium chloride infusion    diphenhydrAMINE (BENADRYL) injection 25 mg     DIAGNOSTIC RESULTS / EMERGENCY DEPARTMENT COURSE / MDM     LABS:  Results for orders placed or performed during the hospital encounter of 26/80/73   Basic Metabolic Panel   Result Value Ref Range    Glucose 97 70 - 99 mg/dL    BUN 18 8 - 23 mg/dL    CREATININE 0.98 (H) 0.50 - 0.90 mg/dL    Bun/Cre Ratio NOT REPORTED 9 - 20    Calcium 9.2 8.6 - 10.4 mg/dL    Sodium 134 (L) 135 - 144 mmol/L    Potassium 3.4 (L) 3.7 - 5.3 mmol/L    Chloride 96 (L) 98 - 107 mmol/L    CO2 25 20 - 31 mmol/L    Anion Gap 13 9 - 17 mmol/L    GFR Non-African American 57 (L) >60 mL/min    GFR African American >60 >60 mL/min    GFR Comment          GFR Staging NOT REPORTED    Brain Natriuretic Peptide   Result Value Ref Range    Pro- (H) <300 pg/mL    BNP Interpretation         CBC Auto Differential   Result Value Ref Range    WBC 6.9 3.5 - 11.0 k/uL    RBC 4.48 4.0 - 5.2 m/uL    Hemoglobin 13.0 12.0 - 16.0 g/dL    Hematocrit 38.0 36 - 46 %    MCV 84.8 80 - 100 fL    MCH 29.0 26 - 34 pg    MCHC 34.1 31 - 37 g/dL    RDW 14.4 12.5 - 15.4 %    Platelets 342 511 - 674 k/uL    MPV 8.8 6.0 - 12.0 fL    Differential Type NOT REPORTED     Seg Neutrophils 68 %    Lymphocytes 20 %    Monocytes 9 %    Eosinophils % 2 %    Basophils 1 %    Immature Granulocytes NOT REPORTED 0 %    Segs Absolute 4.70 1.8 - 7.7 k/uL    Absolute Lymph # 1.40 1.0 - 4.8 k/uL    Absolute Mono # 0.60 0.1 - 1.2 k/uL    Absolute Eos # 0.10 0.0 - 0.4 k/uL    Basophils # 0.00 0.0 - 0.2 k/uL    Absolute Immature Granulocyte NOT REPORTED 0.00 - 0.30 k/uL    WBC Morphology NOT REPORTED     RBC Morphology NOT REPORTED     Platelet Estimate NOT REPORTED    POCT troponin   Result Value Ref Range    POC Troponin I 0.01 0.00 - 0.10 ng/mL    POC Troponin Interp       The Troponin-I (POC) results cannot be compared to the Troponin-T results. RADIOLOGY:  Xr Chest Standard (2 Vw)    Result Date: 10/27/2017  EXAMINATION: TWO VIEWS OF THE CHEST 10/27/2017 9:09 pm COMPARISON: None. HISTORY: ORDERING SYSTEM PROVIDED HISTORY: dizziness TECHNOLOGIST PROVIDED HISTORY: Reason for exam:->dizziness FINDINGS: There is a 3 lead AICD on the left. There is mild cardiomegaly. The mediastinum is unremarkable. The lungs are clear. The bony thorax is intact.      No acute disease     EKG    Rhythm: paced rhythm  Rate: normal  Axis: normal  Ectopy: none  Conduction: normal  ST Segments: nonspecific changes  T Waves: non specific changes  Q Waves: none    Clinical Impression: non-specific with a voice recognition program.  Efforts were made to edit the dictations but occasionally words are mis-transcribed.)       Tigre Trivedi MD  Resident  10/28/17 [id number]

## 2017-10-28 NOTE — ED NOTES
Pt presents to ED w/ c/o dizziness which pt states started today approx 1630 whilst pt was laying down. Pt denies any CP or SOB at this time. Pt states that she is concerned that her potassium may be low, states this is how she feels when that occurs. Respirs even/NL. Pt is A&OX4. Pt has a pacer/diffibrillator. Dr. Marian Olguin gives verbal order for pacer interrogation.  Interrogation completed, pt tolerated well     Emilie Hanson RN  10/27/17 9792

## 2017-10-28 NOTE — H&P
[facility]  CDU / OBSERVATION eNCOUnter  Resident Note     Pt Name: Marquita Brito  MRN: [id number]  [place] 1955  Date of evaluation: 10/28/17  Patient's PCP is :  Rubia Freeman MD    CHIEF COMPLAINT       Chief Complaint   Patient presents with    Dizziness         HISTORY OF PRESENT ILLNESS    Marquita Brito is a 60 y.o. female who presents with acute dizziness described as a spinning sensation that occurs during rest as well as while sitting up ×1 day. Patient reports she has experienced similar symptoms in the past, and was usually related to her biventricular ICD firing or when she has had hypokalemia. Patient is on daily oral potassium replacement. Patient's device was interrogated in the ED, no recent events were recorded. Patient was admitted to the observation unit for cardiology evaluation. Patient also with mildly low potassium, was given potassium replacement. Location/Symptom: Dizziness  Timing/Onset: 1 day  Provocation: None  Quality: Spinning sensation  Radiation: N/A   Severity: N/a  Timing/Duration: Intermittent  Modifying Factors: None    REVIEW OF SYSTEMS       General ROS - No fevers, No malaise   Ophthalmic ROS - No discharge, No changes in vision  ENT ROS -  No sore throat, No rhinorrhea,   Respiratory ROS - no shortness of breath, no cough, no  wheezing  Cardiovascular ROS - No chest pain, no dyspnea on exertion  Gastrointestinal ROS - No abdominal pain, no nausea or vomiting, no change in bowel habits, no black or bloody stools  Genito-Urinary ROS - No dysuria, trouble voiding, or hematuria  Musculoskeletal ROS - No myalgias, No arthalgias  Neurological ROS - No headache, +dizziness No focal weakness, no loss of sensation  Dermatological ROS - No lesions, No rash     (PQRS) Advance directives on face sheet per hospital policy.  No change unless specifically mentioned in chart    PAST MEDICAL HISTORY    has a past medical history of Abnormal Pap smear; Acute on chronic systolic congestive heart failure ([address] AICD (automatic cardioverter/defibrillator) present; MAURO (acute kidney injury) ([address] Anemia; Cardiomyopathy ([address] Cardiomyopathy, nonischemic ([address] Ejection fraction < 50%; HTN (hypertension); Ischemic cardiomyopathy; Left bundle branch block; Mitral regurgitation; Morbid obesity due to excess calories ([address] Pulmonary hypertension; and Sudden onset of severe headache. I have reviewed the past medical history with the patient and it is  pertinent to this complaint. SURGICAL HISTORY      has a past surgical history that includes other surgical history (12/20/12); Tubal ligation; and hernia repair. I have reviewed and agree with Surgical History entered    CURRENT MEDICATIONS       No current facility-administered medications for this encounter. All medication charted and reviewed. ALLERGIES     is allergic to codeine and morphine. FAMILY HISTORY     indicated that her mother is alive. She indicated that the status of her father is unknown. She indicated that her other is alive. She indicated that the status of her neg hx is unknown.      family history includes Cancer in an other family member; High Blood Pressure in her mother; Other in her mother. The patient denies any pertinent family history. I have reviewed and agree with the family history entered. I have reviewed the Family History and it is not significant to the case    SOCIAL HISTORY      reports that she has never smoked. She has never used smokeless tobacco. She reports that she does not drink alcohol or use drugs. I have reviewed and agree with all Social.  There are no concerns for substance abuse/use. PHYSICAL EXAM     INITIAL VITALS:  height is 5' 6\" (1.676 m) and weight is 228 lb (103.4 kg). Her oral temperature is 98.6 °F (37 °C). Her blood pressure is 117/82 and her pulse is 70. Her respiration is 16 and oxygen saturation is 96%.       CONSTITUTIONAL: AOx4, no apparent distress, appears stated age    HEAD: normocephalic, atraumatic   EYES: PERRLA, EOMI    ENT: moist mucous membranes, uvula midline   NECK: supple, symmetric   BACK: symmetric   LUNGS: clear to auscultation bilaterally   CARDIOVASCULAR: regular rate and rhythm, no murmurs, rubs or gallops   ABDOMEN: soft, non-tender, non-distended with normal active bowel sounds   NEUROLOGIC:  MAEx4, no focal sensory or motor deficits   MUSCULOSKELETAL: no clubbing, cyanosis or edema   SKIN: no rash or wounds       DIFFERENTIAL DIAGNOSIS/MDM:   Lightheaded/ Dizzy:  DDX:   Vertigo: Peripheral (BPPV, labyrinthitis, Meniere's) Central: (MS, Acoustic neuroma, carotid artery dissection). Lightheaded: Serious (cardiac valve/ arrhytmia, anemia, low glucose, infection), Common (orthostatic, nonspecific)      DIAGNOSTIC RESULTS   RADIOLOGY:   I directly visualized the following  images and reviewed the radiologist interpretations:    Xr Chest Standard (2 Vw)    Result Date: 10/27/2017  EXAMINATION: TWO VIEWS OF THE CHEST 10/27/2017 9:09 pm COMPARISON: None. HISTORY: ORDERING SYSTEM PROVIDED HISTORY: dizziness TECHNOLOGIST PROVIDED HISTORY: Reason for exam:->dizziness FINDINGS: There is a 3 lead AICD on the left. There is mild cardiomegaly. The mediastinum is unremarkable. The lungs are clear. The bony thorax is intact. No acute disease     LABS:  I have reviewed and interpreted all available lab results.   Labs Reviewed   BASIC METABOLIC PANEL - Abnormal; Notable for the following:        Result Value    CREATININE 0.98 (*)     Sodium 134 (*)     Potassium 3.4 (*)     Chloride 96 (*)     GFR Non- 57 (*)     All other components within normal limits   BRAIN NATRIURETIC PEPTIDE - Abnormal; Notable for the following:     Pro- (*)     All other components within normal limits   BASIC METABOLIC PANEL - Abnormal; Notable for the following:     Glucose 109 (*)     CREATININE 0.92 (*)     All other components within normal limits   CBC WITH AUTO DIFFERENTIAL   POCT TROPONIN   POCT TROPONIN   POCT TROPONIN        Mary Graff is a 60 y.o. female who presents with     1. Acute intermittent dizziness described as a spinning sensation that occurs during rest as well as while sitting up ×1 day  -Patient relates symptoms to either hypokalemia or firing of her biventricular ICD, which was interrogated, showed no recent events  -cardiology consultation: will follow recommendations    · IP CONSULT TO CARDIOLOGY  · IP CONSULT TO CARDIOLOGY  · IP CONSULT TO IV TEAM  · Further workup and evaluation   · Follow up recommendations     · Continue home meds, pain control   · Monitor vitals, labs, imaging     DISPO: pending consults and clinical improvement     CONSULTS:    IP CONSULT TO CARDIOLOGY  IP CONSULT TO CARDIOLOGY  IP CONSULT TO IV TEAM    PROCEDURES:  Not indicated      PATIENT REFERRED TO:    Syeda Nicolas MD  [place] [address] [address] [facility] [address] [facility]  [phone]    Schedule an appointment as soon as possible for a visit in 3 days      Lennox Mcmahon MD  [address] [place] [address] 6  [facility]. [address] [place] [address]    Schedule an appointment as soon as possible for a visit in 3 days        --  Aaron White, DO   Emergency Medicine Resident     This dictation was generated by voice recognition computer software. Although all attempts are made to edit the dictation for accuracy, there may be errors in the transcription that are not intended.

## 2017-10-28 NOTE — CONSULTS
showing only nonocclusive CAD. 3. LBBB. 4. Recurrent CHF. 5. Initial ICD implant on 01/24/2008 at [facility] with [address], 5331 and 7680 leads. ICD change-out with Dr. Tony Douglas on 12/20/2012 to include addition of two LV epicardial leads (Medtronic 0628-78). The new device is a Medtronic Protecta CRT-D, model B898MJE. Patient required epicardial LV pacing with previous development of diaphragmatic stimulation from the coronary sinus lead. 6. Hospitalization at [facility] early March 2017 for multiple ICD shocks related to polymorphic ventricular tachycardia, associated with severe hypokalemia. 7. Hypokalemia. Cardiac cath 2012:  Normal coronaries. EF of 15%. Echo 03/2015:  EF of 15-20%. Moderate to severe MR. Past Surgical History:   has a past surgical history that includes other surgical history (12/20/12); Tubal ligation; and hernia repair. Home Medications:    Prior to Admission medications    Medication Sig Start Date End Date Taking? Authorizing Provider   furosemide (LASIX) 20 MG tablet Take 1 tablet by mouth daily 10/10/17  Yes Will MD Jalen   metolazone (ZAROXOLYN) 2.5 MG tablet Take 1 tablet by mouth daily 3/7/17  Yes Vladimir Angulo MD   magnesium oxide (MAG-OX) 400 (241.3 MG) MG TABS tablet Take 1 tablet by mouth daily for 7 days 3/7/17 10/27/17 Yes Vladimir Angulo MD   metoprolol tartrate (LOPRESSOR) 50 MG tablet Take 1 tablet by mouth 2 times daily 12/6/16  Yes Will MD Jalen   amiodarone (CORDARONE) 200 MG tablet Take 1 tablet by mouth daily. 12/6/16  Yes Will MD Jalen   meloxicam (MOBIC) 7.5 MG tablet TAKE ONE TABLET BY MOUTH DAILY AS NEEDED FOR PAIN 10/10/17   Will MD Jalen   allopurinol (ZYLOPRIM) 100 MG tablet TAKE (1) TABLET DAILY.  10/4/17   Will MD Jalen   potassium chloride (KLOR-CON M) 20 MEQ extended release tablet Take 1 tablet by mouth daily 4/5/17   Will MD Jalen   spironolactone (ALDACTONE) 25 MG tablet Take 0.5 appears stated age  [de-identified]: PERRL, no cervical lymphadenopathy. No masses palpable. Normal oral mucosa  Respiratory:  · Normal excursion and expansion without use of accessory muscles  · Resp Auscultation: Good respiratory effort. No for increased work of breathing. On auscultation: Clear to auscultation. Cardiovascular:  · The apical impulse is not displaced  · Heart tones are crisp and normal. regular S1 and S2. Murmurs: None. · Jugular venous pulsation Normal  · The carotid upstroke is normal in amplitude and contour without delay or bruit  · Peripheral pulses are symmetrical and full   Abdomen:  · No masses or tenderness  · Bowel sounds present  Extremities:  ·  No Cyanosis or Clubbing  ·  Lower extremity edema: No.  ·  Skin: Warm and dry  Neurological:  · Alert and oriented. · Moves all extremities well  · No abnormalities of mood, affect, memory, mentation, or behavior are noted    DATA:    Diagnostics:      EKG: Ventricular paced rhythm      Labs:     CBC:   Recent Labs      10/27/17   2125   WBC  6.9   HGB  13.0   HCT  38.0   PLT  227     BMP:   Recent Labs      10/27/17   2125  10/28/17   0758   NA  134*  142   K  3.4*  3.8   CO2  25  22   BUN  18  16   CREATININE  0.98*  0.92*   LABGLOM  57*  >60   GLUCOSE  97  109*     CARDIAC ENZYMES:  Recent Labs      10/27/17   2125   TROPONINI  0.01     FASTING LIPID PANEL:  Lab Results   Component Value Date    HDL 53 11/07/2016    TRIG 104 11/07/2016       Patient Active Problem List   Diagnosis    Dilated cardiomyopathy ([address]    Acute on chronic systolic congestive heart failure ([address]    Pre-diabetes    Essential hypertension    PAH (pulmonary artery hypertension) ([address] 34 on Echo     Mitral regurgitation    Chronic headache    Morbid obesity due to excess calories (HCC)    Fatigue due to excessive exertion    AICD (automatic cardioverter/defibrillator) present    AICD discharge    Syncope and collapse secondary to VTAC     1.  Severe nonischemic cardiomyopathy, LVEF 20-25%. 2. Atypical chest pain prompting cardiac cath on 12/17/2012 showing only nonocclusive CAD. 3. LBBB. 4. Recurrent CHF. 5. Initial ICD implant on 01/24/2008 at [facility] with [address], 6118 and 0805 leads. ICD change-out with Dr. Lisa White on 12/20/2012 to include addition of two LV epicardial leads (Medtronic 4749-84). The new device is a Medtronic Protecta CRT-D, model C792DTE. Patient required epicardial LV pacing with previous development of diaphragmatic stimulation from the coronary sinus lead. 6. Hospitalization at [facility] early March 2017 for multiple ICD shocks related to polymorphic ventricular tachycardia, associated with severe hypokalemia. 7. Hypokalemia. IMPRESSION:      1. Dizziness suggestive of vertigo with no syncopal episode. 2. No events or arrhythmias noted on ICD interrogation. 3. Non-ischemic CMP. 4. Normal coronaries on cardiac cath. 5. Compensated CHF. 6. Hypokalemia. RECOMMENDATIONS:    1. Continue current cardiac medications with BB, Diuretics, spironolactone. 2. Replace K+  3. Add ACEi  4. OK to discharge. 5. Follow up with Dr. Ana Mckee as OP. Discussed with patient and nursing.     Electronically signed by Dina Clark MD on 10/28/2017 at 9:47 AM.  [facility] cardiology Consultant

## 2017-10-28 NOTE — PROGRESS NOTES
[facility]  CDU / OBSERVATION eNCOUnter  Attending NOte       I performed a history and physical examination of the patient and discussed management with the resident. I reviewed the residents note and agree with the documented findings and plan of care. Any areas of disagreement are noted on the chart. I was personally present for the key portions of any procedures. I have documented in the chart those procedures where I was not present during the key portions. I have reviewed the nurses notes. I agree with the chief complaint, past medical history, past surgical history, allergies, medications, social and family history as documented unless otherwise noted below. The Family history, social history, and ROS are effectively unchanged since admission unless noted elsewhere in the chart. Hypokalemic. Evaluated by cardiology. Discussed with cardiologist directly. Medications for adjustment. Patient for follow-up with cardiologist.  Potassium replacement. Patient appears comfortable. Neurologically intact. We'll follow specialists recommendations.     Valerie Boxer MD  Attending Emergency  Physician

## 2017-10-28 NOTE — ED PROVIDER NOTES
[facility] ED     Emergency Department     Faculty Attestation        I performed a history and physical examination of the patient and discussed management with the resident. I reviewed the residents note and agree with the documented findings and plan of care. Any areas of disagreement are noted on the chart. I was personally present for the key portions of any procedures. I have documented in the chart those procedures where I was not present during the key portions. I have reviewed the emergency nurses triage note. I agree with the chief complaint, past medical history, past surgical history, allergies, medications, social and family history as documented unless otherwise noted below. For mid-level providers such as nurse practitioners as well as physicians assistants:    I have personally seen and evaluated the patient. I find the patient's history and physical exam are consistent with NP/PA documentation. I agree with the care provided, treatment rendered, disposition, & follow-up plan. Additional findings are as noted. Vital Signs: BP (!) 139/95   Pulse 70   Temp 99.9 °F (37.7 °C) (Oral)   Wt 228 lb (103.4 kg)   SpO2 98%   BMI 36.80 kg/m²   PCP:  Pranav Allen MD    Pertinent Comments:     Patient complains of dizziness and presyncopal episode. She has a history of a cardiomyopathy and has AICD in place. She feels this way and she becomes hyperkalemic. We'll obtain cardiac workup as well as the AICD interrogation, discussed with cardiology. Critical Care  None         Note, if the patient's blood pressure was elevated, and they have no history of hypertension, they were informed of the following: The patient may have Pre-hypertension/Hypertension: The patient has been informed that they may have pre-hypertension or Hypertension based on a blood pressure reading in the emergency department.  I recommend that the patient

## 2017-10-29 ENCOUNTER — CARE COORDINATION (OUTPATIENT)
Dept: CASE MANAGEMENT | Age: 62
End: 2017-10-29

## 2017-10-29 NOTE — CARE COORDINATION
[facility] Transitions Initial Follow Up Call    Call within 2 business days of discharge: Yes    Patient: Kaz Palomo Patient : 1955   MRN: [id number]  Reason for Admission: Dizziness  Discharge Date: 10/28/17 RARS: [facility] Risk Score: 15.5     Spoke with: [address]: [facility]  Non-face-to-face services provided:  Obtained and reviewed discharge summary and/or continuity of care documents     Patient feeling well today and having no further symptoms. She denies dizziness and denies any cardiac symptoms such as chest pain, shortness of breath or cough. She has all medications at home and says she is doing well. She needs to schedule f/u with cardiology and PCP and requests that writer do this for her. Expressed will call Monday to schedule appointments, contact info given to patient for CTC and expressed to call with any questions or concerns.      Inpatient Assessment  Care Transitions 24 Hour Call    Do you have any ongoing symptoms?:  No  Do you have a copy of your discharge instructions?:  Yes  Do you have all of your prescriptions and are they filled?:  Yes  Have you been contacted by a [address]?:  No  Have you scheduled your follow up appointment?:  No  Were you discharged with any Home Care or Post Acute Services:  No  Do you feel like you have everything you need to keep you well at home?:  Yes  Care Transitions Interventions         Follow Up  Future Appointments  Date Time Provider Yann Olivares   2017 8:30 AM STV CHF CLINIC RM 1 STVZ CHF CLI [facility]   2017 10:15 AM Tejinder Quigley MD [facility] IVETTE Jacob RN

## 2017-10-30 ENCOUNTER — CARE COORDINATION (OUTPATIENT)
Dept: CASE MANAGEMENT | Age: 62
End: 2017-10-30

## 2017-10-30 NOTE — DISCHARGE SUMMARY
CDU Discharge Summary        Patient:  Phill Santizo  YOB: 1955    MRN: [id number]   Acct: [de-identified]    Primary Care Physician: Deborah Keith MD    Admit date:  10/27/2017  8:06 PM  Discharge date: 10/28/2017  4:49 PM     Discharge Diagnoses:     1. Acute intermittent dizziness described as a spinning sensation that occurs during rest as well as while sitting up ×1 day likely secondary to vertigo  -Patient relates symptoms to either hypokalemia or firing of her biventricular ICD, which was interrogated, showed no recent events  -cardiology evaluated patient, cleared for discharge with outpatient PCP follow up    Follow-up:  Call today/tomorrow for a follow up appointment with Deborah Keith MD , or return to the Emergency Room with worsening symptoms    Stressed to patient the importance of following up with primary care doctor for further workup/management of symptoms. Pt verbalizes understanding and agrees with plan. Discharge Medication Changes:       Medication List      START taking these medications    lisinopril 5 MG tablet  Commonly known as:  PRINIVIL;ZESTRIL  Take 1 tablet by mouth daily     meclizine 12.5 MG tablet  Commonly known as:  ANTIVERT  Take 1 tablet by mouth 3 times daily as needed for Dizziness        CHANGE how you take these medications    spironolactone 25 MG tablet  Commonly known as:  ALDACTONE  Take 0.5 tablets by mouth daily  What changed:  when to take this        CONTINUE taking these medications    allopurinol 100 MG tablet  Commonly known as:  ZYLOPRIM  TAKE (1) TABLET DAILY. amiodarone 200 MG tablet  Commonly known as:  CORDARONE  Take 1 tablet by mouth daily.      aspirin 81 MG tablet  Take 1 tablet by mouth daily     furosemide 20 MG tablet  Commonly known as:  LASIX  Take 1 tablet by mouth daily     magnesium oxide 400 (241.3 Mg) MG Tabs tablet  Commonly known as:  MAG-OX  Take 1 tablet by mouth daily for 7 days     meloxicam 7.5 MG Seg Neutrophils 68 %    Lymphocytes 20 %    Monocytes 9 %    Eosinophils % 2 %    Basophils 1 %    Immature Granulocytes NOT REPORTED 0 %    Segs Absolute 4.70 1.8 - 7.7 k/uL    Absolute Lymph # 1.40 1.0 - 4.8 k/uL    Absolute Mono # 0.60 0.1 - 1.2 k/uL    Absolute Eos # 0.10 0.0 - 0.4 k/uL    Basophils # 0.00 0.0 - 0.2 k/uL    Absolute Immature Granulocyte NOT REPORTED 0.00 - 0.30 k/uL    WBC Morphology NOT REPORTED     RBC Morphology NOT REPORTED     Platelet Estimate NOT REPORTED    Basic Metabolic Panel   Result Value Ref Range    Glucose 109 (H) 70 - 99 mg/dL    BUN 16 8 - 23 mg/dL    CREATININE 0.92 (H) 0.50 - 0.90 mg/dL    Bun/Cre Ratio NOT REPORTED 9 - 20    Calcium 9.2 8.6 - 10.4 mg/dL    Sodium 142 135 - 144 mmol/L    Potassium 3.8 3.7 - 5.3 mmol/L    Chloride 104 98 - 107 mmol/L    CO2 22 20 - 31 mmol/L    Anion Gap 16 9 - 17 mmol/L    GFR Non-African American >60 >60 mL/min    GFR African American >60 >60 mL/min    GFR Comment          GFR Staging NOT REPORTED    POCT troponin   Result Value Ref Range    POC Troponin I 0.01 0.00 - 0.10 ng/mL    POC Troponin Interp       The Troponin-I (POC) results cannot be compared to the Troponin-T results. EKG 12 Lead   Result Value Ref Range    Ventricular Rate 70 BPM    Atrial Rate 70 BPM    P-R Interval 176 ms    QRS Duration 186 ms    Q-T Interval 502 ms    QTc Calculation (Bazett) 542 ms    P Axis -8 degrees    R Axis -179 degrees    T Axis 77 degrees   EKG 12 Lead   Result Value Ref Range    Ventricular Rate 70 BPM    Atrial Rate 70 BPM    P-R Interval 176 ms    QRS Duration 186 ms    Q-T Interval 510 ms    QTc Calculation (Bazett) 550 ms    P Axis -8 degrees    R Axis -83 degrees    T Axis 88 degrees     Xr Chest Standard (2 Vw)    Result Date: 10/27/2017  EXAMINATION: TWO VIEWS OF THE CHEST 10/27/2017 9:09 pm COMPARISON: None.  HISTORY: ORDERING SYSTEM PROVIDED HISTORY: dizziness TECHNOLOGIST PROVIDED HISTORY: Reason for exam:->dizziness FINDINGS: There is a 3 lead AICD on the left. There is mild cardiomegaly. The mediastinum is unremarkable. The lungs are clear. The bony thorax is intact. No acute disease           Physical Exam:    General appearance - NAD, AOx 3   Lungs -CTAB, no R/R/R  Heart - RRR, no M/R/G  Abdomen - Soft, NT/ND  Neurological:  MAEx4, No focal motor deficit, sensory loss  Extremities - Cap refil <2 sec in all ext., no edema  Skin -warm, dry      Hospital Course:  Clinical course has improved, labs and imaging reviewed. Karen Guerra originally presented to the hospital on 10/27/2017  8:06 PM with acute dizziness described as a spinning sensation that occurs during rest as well as while sitting up ×1 day. Patient reports she has experienced similar symptoms in the past, and was usually related to her biventricular ICD firing or when she has had hypokalemia. Patient is on daily oral potassium replacement. Patient's device was interrogated in the ED, no recent events were recorded. Patient was admitted to the observation unit for cardiology evaluation. Patient also with mildly low normal potassium, was given potassium replacement. Patient was evaluated by cardiology, determined to have vertigo like symptoms, was cleared for discharge. Labs and imaging were followed daily. Imaging results as above. She is medically stable to be discharged. Disposition: Home    Patient stated that they will not drive themselves home from the hospital if they have gotten pain killers/ narcotics earlier that day and that they will arrange for transportation on their own or work with the  for a ride. Patient counseled NOT to drive while under the influence of narcotics/ pain killers. Condition: Good    Patient stable and ready for discharge home. I have discussed plan of care with patient and they are in understanding. They were instructed to read discharge paperwork. All of their questions and concerns were addressed.

## 2017-11-03 ENCOUNTER — CARE COORDINATION (OUTPATIENT)
Dept: CASE MANAGEMENT | Age: 62
End: 2017-11-03

## 2017-11-03 NOTE — CARE COORDINATION
[facility] Transitions Follow Up Call    11/3/2017    Patient: Nikki Rodarte  Patient : 1955   MRN: [id number]  Reason for Admission: There are no discharge diagnoses documented for the most recent discharge. Discharge Date: 10/28/17 RARS: Risk Score: 15.5       Spoke with: Nikki Rodarte, patient    Patient reports flu like symptoms today with achy joints, scratchy throat, and generalized malaise. She denies any dizziness or other symptoms. Discussed with her drinking plenty of fluids, taking Tylenol for achiness. She has scheduled her appointment with cardiology for next  at 3pm with Dr. Lavina Cheadle. Denies any further needs or concerns. Care Transitions Subsequent and Final Call    Subsequent and Final Calls  Do you have any ongoing symptoms?:  Yes  Onset of Patient-reported symptoms:  Today  Patient-reported symptoms:  Other  Have your medications changed?:  No  Do you have any questions related to your medications?:  No  Do you currently have any active services?:  No  Do you have any needs or concerns that I can assist you with?:  No  Identified Barriers:  Lack of Education  Care Transitions Interventions  Other Interventions:             Follow Up  Future Appointments  Date Time Provider Yann Olivares   2017 8:30 AM STV CHF CLINIC RM 1 ST CHF I [facility]   2017 10:15 AM Antonette Casarez MD [facility] Adela Jacobs RN

## 2017-11-07 ENCOUNTER — CARE COORDINATION (OUTPATIENT)
Dept: CASE MANAGEMENT | Age: 62
End: 2017-11-07

## 2017-11-07 NOTE — CARE COORDINATION
[facility] Transitions Follow Up Call    2017    Patient: Dar Parnell  Patient : 1955   MRN: [id number]    Reason for Admission: Dizziness    Discharge Date: 10/28/17 RARS: Risk Score: 15.5       Spoke with: patient    Spoke with patient who denies symptoms. She requests writer call CHF clinic (appointment scheduled tomorrow) to cancel for tomorrow - patient will call back to reschedule herself. She states she is in school Monday through Thursday [id number]. States appointment with pcp 17 was rescheduled to 17 ( appointment cancelled per Md)  Patient notes cardiology appointment 17 remains. Encouraged call to CTC if any further questions- v/u  Writer call Lon See at [facility] CHF clinic to cancel appointment for tomorrow informing patient will call to reschedule herself- v/u     Care Transitions Subsequent and Final Call    Subsequent and Final Calls  Do you have any ongoing symptoms?:  No  Do you have any questions related to your medications?:  No  Do you currently have any active services?:  No  Do you have any needs or concerns that I can assist you with?:  Yes  Patient-reported Needs or Concerns:  reschedule CHF clinic appointment   Identified Barriers:  Lack of Education, Lack of Support  Care Transitions Interventions  Other Interventions:             Follow Up  Future Appointments  Date Time Provider Yann Olivares   2017 8:30 AM STV CHF CLINIC RM 1 STVZ CHF CLI [facility]   2017 10:15 AM Aminata Klein MD [address] [place] [address] Bladimir Blake RN

## 2017-11-10 ENCOUNTER — CARE COORDINATION (OUTPATIENT)
Dept: CASE MANAGEMENT | Age: 62
End: 2017-11-10

## 2017-11-10 NOTE — CARE COORDINATION
[facility] Transitions Follow Up Call    11/10/2017    Patient: Deangelo Bryson  Patient : 1955   MRN: [id number]  Reason for Admission: There are no discharge diagnoses documented for the most recent discharge. Discharge Date: 10/28/17 RARS: Risk Score: 15.5       Attempted to make final call to patient. VM left to return call if any needs or concerns. Last call on 17 patient was doing very well and having no issues. End of care transitions. Care Transitions Subsequent and Final Call    Subsequent and Final Calls  Care Transitions Interventions  Other Interventions:             Follow Up  Future Appointments  Date Time Provider Yann Olivares   2017 10:15 AM Maulik Miguel MD [facility] IVETTE Ibrahim RN

## 2017-11-13 ENCOUNTER — OFFICE VISIT (OUTPATIENT)
Dept: INTERNAL MEDICINE | Age: 62
End: 2017-11-13
Payer: MEDICARE

## 2017-11-13 VITALS
HEART RATE: 69 BPM | HEIGHT: 66 IN | WEIGHT: 234 LBS | DIASTOLIC BLOOD PRESSURE: 66 MMHG | SYSTOLIC BLOOD PRESSURE: 105 MMHG | BODY MASS INDEX: 37.61 KG/M2

## 2017-11-13 DIAGNOSIS — I42.0 DILATED CARDIOMYOPATHY (HCC): Chronic | ICD-10-CM

## 2017-11-13 DIAGNOSIS — M17.0 BILATERAL PRIMARY OSTEOARTHRITIS OF KNEE: Primary | ICD-10-CM

## 2017-11-13 PROCEDURE — G8484 FLU IMMUNIZE NO ADMIN: HCPCS | Performed by: INTERNAL MEDICINE

## 2017-11-13 PROCEDURE — 99213 OFFICE O/P EST LOW 20 MIN: CPT | Performed by: INTERNAL MEDICINE

## 2017-11-13 PROCEDURE — G8427 DOCREV CUR MEDS BY ELIG CLIN: HCPCS | Performed by: INTERNAL MEDICINE

## 2017-11-13 PROCEDURE — 1036F TOBACCO NON-USER: CPT | Performed by: INTERNAL MEDICINE

## 2017-11-13 PROCEDURE — 3014F SCREEN MAMMO DOC REV: CPT | Performed by: INTERNAL MEDICINE

## 2017-11-13 PROCEDURE — 3017F COLORECTAL CA SCREEN DOC REV: CPT | Performed by: INTERNAL MEDICINE

## 2017-11-13 PROCEDURE — G8417 CALC BMI ABV UP PARAM F/U: HCPCS | Performed by: INTERNAL MEDICINE

## 2017-11-13 RX ORDER — MELOXICAM 7.5 MG/1
7.5 TABLET ORAL DAILY
Qty: 10 TABLET | Refills: 1 | Status: SHIPPED | OUTPATIENT
Start: 2017-11-13 | End: 2018-01-19 | Stop reason: SDUPTHER

## 2017-11-13 NOTE — PROGRESS NOTES
Subjective:      Patient ID: Elsie Conley is a 60 y.o. female. HPI patient is here to follow up on  PHV-was admitted for dizziness, was seen by cardiology- AICD was interrogated-no events were  found, she feels better now  Was started on lisinopril 5 mg on discharge  H/o cardiomyopathy,- NICMP, Clear cornaries cath 2012 follows up with Dr Graham Miller. Pt is also on metoprolol 50 mg and amiodarone 200 mg . S/p AICD  She is  on Lasix 20 mg twice a day and metolazone 2.5. Aldactone 12.5  Follows with CHF clinic  saw cardiology last week  Hypokalemia- on KCl 20 meq-last potassium 3.8         Other medical issues   H/o hypertension- on metoprolol, lasix, Zaroxolyn, aldactone, lisinopril  Dyslipidemia- Not on any meds, she has refused all the anti-lipid medications in the past  C/o bilateral knee pain, right more than left, able to walk but has to slow down-xray shows severe OA. Pt has refused ortho referral in the past but now wants to see them. Also refused physical therapy. uses topical voltaren intermittently  Prediabetes-not on any meds. Last last A1C 01/17-5.8  Recent blood work showed elevated alkaline phosphatase. She has had it in the past and it is actually getting better. Review of Systems  Negative for fever  Respiratory: Negative for cough, shortness of breath, wheezing and stridor. Cardiovascular: Negative for chest pain, palpitations and leg swelling. Gastrointestinal: Negative for nausea, vomiting, abdominal pain, diarrhea and constipation. Objective:   Physical Exam      Constitutional: She is oriented to person, place, and time. She appears well-developed. No distress. Cardiovascular: Normal rate and regular rhythm. Pulmonary/Chest: Effort normal and breath sounds normal. No stridor. No respiratory distress. no wheezes. no rales. Abdominal: Soft. Bowel sounds are normal.  no distension. There is no tenderness. There is no rebound and no guarding.    Musculoskeletal: Minimal LE edema    Assessment:     1. Bilateral primary osteoarthritis of knee    2. Dilated cardiomyopathy ([facility] [address]        Plan:   1. Will refer to ortho. Continue meloxicam on worse days. Continue topical voltaren. Add Tylenol. 2. Continue  Aldactone and metolazone, metoprolol, amiodarone. Continue follow-up with cardiology and CHF clinic. Continue to use compression stockings. Recently started on lisinopril 5 mg. Will check BMP  2. Recheck LFT  3. I have ordered a mammogram in the past, she is scheduled to do it. Refused testing for HIV and hep C. Also refused Pneumovax. Last Pap was in 2013 she wants to wait on it too. Up-to-date on colonoscopy  4. Return in about 2 months (around 1/13/2018).

## 2017-11-13 NOTE — PROGRESS NOTES
Visit Information    Have you changed or started any medications since your last visit including any over-the-counter medicines, vitamins, or herbal medicines? yes - multi vitamin, flax seed oil   Have you stopped taking any of your medications? Is so, why? -  no  Are you having any side effects from any of your medications? - no    Have you seen any other physician or provider since your last visit? yes - cardiologist   Have you had any other diagnostic tests since your last visit? yes -    Have you been seen in the emergency room and/or had an admission in a hospital since we last saw you?  yes - [facility]   Have you had your routine dental cleaning in the past 6 months?  no     Do you have an active MyChart account? If no, what is the barrier?   No:     Patient Care Team:  Mohit Collado MD as PCP - General (Internal Medicine)  Mohit Collado MD as PCP - S Attributed Provider  Earl Reyes MD as Referring Physician (Cardiology)  Abraham Gifford DO as Referring Physician (Cardiology)    Medical History Review  Past Medical, Family, and Social History reviewed and does contribute to the patient presenting condition    Health Maintenance   Topic Date Due    DTaP/Tdap/Td vaccine (1 - Tdap) 04/24/1973    Pneumococcal med risk (1 of 1 - PPSV23) 04/24/1973    Cervical cancer screen  05/28/2016    Flu vaccine (1) 09/01/2017    Hepatitis C screen  08/09/2018 (Originally 1955)    HIV screen  08/09/2018 (Originally 4/24/1970)    Breast cancer screen  08/28/2019    Diabetes screen  01/15/2020    Lipid screen  11/07/2021    Colon cancer screen colonoscopy  02/23/2024    Zostavax vaccine  Addressed

## 2017-12-04 DIAGNOSIS — M25.561 PAIN IN BOTH KNEES, UNSPECIFIED CHRONICITY: Primary | ICD-10-CM

## 2017-12-04 DIAGNOSIS — M25.562 PAIN IN BOTH KNEES, UNSPECIFIED CHRONICITY: Primary | ICD-10-CM

## 2017-12-06 ENCOUNTER — OFFICE VISIT (OUTPATIENT)
Dept: ORTHOPEDIC SURGERY | Age: 62
End: 2017-12-06
Payer: MEDICARE

## 2017-12-06 VITALS — BODY MASS INDEX: 38.25 KG/M2 | WEIGHT: 238 LBS | HEIGHT: 66 IN

## 2017-12-06 DIAGNOSIS — M17.0 PRIMARY OSTEOARTHRITIS OF BOTH KNEES: Primary | ICD-10-CM

## 2017-12-06 PROCEDURE — 99203 OFFICE O/P NEW LOW 30 MIN: CPT | Performed by: ORTHOPAEDIC SURGERY

## 2017-12-06 PROCEDURE — 3017F COLORECTAL CA SCREEN DOC REV: CPT | Performed by: ORTHOPAEDIC SURGERY

## 2017-12-06 PROCEDURE — 3014F SCREEN MAMMO DOC REV: CPT | Performed by: ORTHOPAEDIC SURGERY

## 2017-12-06 PROCEDURE — 20610 DRAIN/INJ JOINT/BURSA W/O US: CPT | Performed by: ORTHOPAEDIC SURGERY

## 2017-12-06 PROCEDURE — G8427 DOCREV CUR MEDS BY ELIG CLIN: HCPCS | Performed by: ORTHOPAEDIC SURGERY

## 2017-12-06 PROCEDURE — G8484 FLU IMMUNIZE NO ADMIN: HCPCS | Performed by: ORTHOPAEDIC SURGERY

## 2017-12-06 PROCEDURE — G8417 CALC BMI ABV UP PARAM F/U: HCPCS | Performed by: ORTHOPAEDIC SURGERY

## 2017-12-06 RX ORDER — BUPIVACAINE HYDROCHLORIDE 2.5 MG/ML
2 INJECTION, SOLUTION INFILTRATION; PERINEURAL ONCE
Status: COMPLETED | OUTPATIENT
Start: 2017-12-06 | End: 2017-12-07

## 2017-12-06 RX ORDER — METHYLPREDNISOLONE ACETATE 80 MG/ML
80 INJECTION, SUSPENSION INTRA-ARTICULAR; INTRALESIONAL; INTRAMUSCULAR; SOFT TISSUE ONCE
Status: COMPLETED | OUTPATIENT
Start: 2017-12-06 | End: 2017-12-07

## 2017-12-06 ASSESSMENT — ENCOUNTER SYMPTOMS
DIARRHEA: 0
ABDOMINAL PAIN: 0
NAUSEA: 0
TROUBLE SWALLOWING: 0
SHORTNESS OF BREATH: 0
COUGH: 0
VOICE CHANGE: 0
CONSTIPATION: 0
CHOKING: 0
VOMITING: 0
WHEEZING: 0

## 2017-12-06 NOTE — LETTER
Dr. Teresa Fairchild  [facility]  [address] [address]  Dept: [phone]  Dept Fax: [phone]        12/17/17    Patient: Ekaterina Hawthorne  YOB: 1955    Dear Oralia Aase, MD,    I had the pleasure of seeing one of your patients, Hola Hagan today in the office. Below are the relevant portions of my assessment and plan of care. IMPRESSION:     1. Primary osteoarthritis of both knees      PLAN:     Discussed etiologies and natural histories of bilateral knee arthritis. The treatment options may include anti inflammatories, bracing, injections further imaging, and last resort surgery. The patient opted to continue with tylenol and meloxicam, also bilateral corticosteroid injections (which the patient tolerated well without complications today) and physical therapy. Return to clinic 6 weeks. Call the office immediately with any problems. Thank you for allowing me to participate in the care of this patient. I will keep you updated on this patient's follow up and I look forward to serving you and your patients again in the future. Please don't hesitate to contact me at my mobile number [phone].         Laurie Nelson

## 2017-12-06 NOTE — PROGRESS NOTES
[facility]  [address] [place], [place] [address]y [address] [address]  Dept: [phone]    Ambulatory Orthopedic Consult      CHIEF COMPLAINT:    Chief Complaint   Patient presents with    Knee Pain     Bilateral       HISTORY OF PRESENT ILLNESS:      The patient is a 60 y.o. female who is being seen at the request of  Syeda Nicolas MD for consultation and evaluation of bilateral knees. Annemarie presents for bilateral knee pain L>R. The pain has been present for couple years but more the past 3 months. The patient has a history of gout. The patient has tried Meloxicam and Tylenol ES,  Topicals with some improvement. The pain is now described as throbbing and a sharp pain is noted occasionally. .  There is pain on weight bearing. The knee does not swell. There is no painful popping or clicking. The knee has not caught or locked up. The knee(s) has not given out. The patient notes stiffness when rising from a seated position with both knees. .  The knee(s) is  painful to go up or down stairs.           Past Medical History:    Past Medical History:   Diagnosis Date    Abnormal Pap smear     Acute on chronic systolic congestive heart failure (HCC)     AICD (automatic cardioverter/defibrillator) present     MAURO (acute kidney injury) ([address] 3/4/2017    Anemia     Cardiomyopathy ([address] 12/20/12    robotic replacement of 2 left ventricular leads/replacement of ICD generator    Cardiomyopathy, nonischemic ([address] 12/20/2012    Ejection fraction < 50%     HTN (hypertension)     Ischemic cardiomyopathy     Left bundle branch block     Mitral regurgitation     Morbid obesity due to excess calories ([address] 1/15/2017    Pulmonary hypertension     Sudden onset of severe headache        Past Surgical History:    Past Surgical History:   Procedure Laterality Date    HERNIA REPAIR      OTHER SURGICAL HISTORY  12/20/12    robotic placement of 2 left ventricular leads/replacement of ICD Other Mother      copd    Arthritis Neg Hx     Asthma Neg Hx     Birth Defects Neg Hx     Depression Neg Hx     Diabetes Neg Hx     Early Death Neg Hx     Hearing Loss Neg Hx     Heart Disease Neg Hx     High Cholesterol Neg Hx     Kidney Disease Neg Hx     Learning Disabilities Neg Hx     Mental Retardation Neg Hx     Mental Illness Neg Hx     Miscarriages / Stillbirths Neg Hx     Stroke Neg Hx     Substance Abuse Neg Hx     Vision Loss Neg Hx     Breast Cancer Neg Hx     Colon Cancer Neg Hx     Eclampsia Neg Hx     Hypertension Neg Hx     Ovarian Cancer Neg Hx      Labor Neg Hx     Spont Abortions Neg Hx          REVIEW OF SYSTEMS:  Review of Systems   Constitutional: Negative. Negative for fever. HENT: Negative for ear discharge, ear pain, hearing loss, tinnitus, trouble swallowing and voice change. Respiratory: Negative for cough, choking, shortness of breath and wheezing. Cardiovascular: Negative for chest pain, palpitations and leg swelling. Gastrointestinal: Negative for abdominal pain, constipation, diarrhea, nausea and vomiting. Endocrine: Negative. Genitourinary: Negative. Musculoskeletal: Positive for arthralgias. Neurological: Negative for dizziness. I have reviewed the CC, HPI, ROS, PMH, FHX, Social History. I agree with the documentation provided by other staff, residents, and/or medical students and have reviewed their documentation prior to providing my signature indicating agreement. PHYSICAL EXAM:  Ht 5' 6\" (1.676 m)   Wt 238 lb (108 kg)   BMI 38.41 kg/m²  Body mass index is 38.41 kg/m². Physical Exam  Gen: alert and oriented  Psych:  Appropriate affect; Appropriate knowledge base; Appropriate mood; No hallucinations; Head: normocephalic atraumatic   Chest: symmetric chest excursion  Pelvis: stable  Ortho Exam  Extremity: Evaluation of the Right knee reveals no significant outward deformity.   There is no erythema, warmth, skin lesions, signs of infection. There is tenderness over the medial joint line. There is a mildknee effusion. Range of motion of the Right knee is  full. No instability of the knee is appreciated at 0 and 30° of flexion. There is a negative anterior drawer Lachman's test.  There is increased pain with varus Enrique's testing. No calf tenderness is noted. There is a negative hip log roll and Stinchfield test.  Motor, sensory, vascular examination to the Right lower extremity is intact. Patient has full range of motion of the ankle. Evaluation of the Left knee reveals no significant outward deformity. There is no erythema, warmth, skin lesions, signs of infection. There is tenderness over the medial joint line. There is a mildknee effusion. Range of motion of the Left knee is  full. No instability of the knee is appreciated at 0 and 30° of flexion. There is a negative anterior drawer Lachman's test.  There is increased pain with varus Enrique's testing. No calf tenderness is noted. There is a positive hip log roll and Stinchfield test.  Motor, sensory, vascular examination to the Left lower extremity is intact. Patient has full range of motion of the ankle. Radiology:     History: Right knee pain    Findings: Standing AP, lateral, merchant, tunnel view x-rays of right knee done the office today shows complete loss of medial joint space with bone-on-bone gonarthrosis, lateral subluxation of the tibia, significant periarticular osteophytosis, medial joint line sclerosis and subchondral cystic changes. No evidence of fracture, subluxation, dislocation or radiopaque foreign body, radiopaque tumors noted. Impression: Severe right knee degenerative changes as described above.         History: Left knee pain    Findings:Standing AP, lateral, merchant, tunnel view x-rays of left knee done the office today shows complete loss of medial joint space with bone-on-bone gonarthrosis, lateral subluxation of methylPREDNISolone acetate (DEPO-MEDROL) injection 80 mg    bupivacaine (MARCAINE) 0.25 % injection 5 mg    bupivacaine (MARCAINE) 0.25 % injection 5 mg       Orders Placed This Encounter   Procedures    Ambulatory referral to Physical Therapy     Referral Priority:   Routine     Referral Type:   Eval and Treat     Referral Reason:   Specialty Services Required     Requested Specialty:   Physical Therapy     Number of Visits Requested:   1    (2)  [id number] - ND DRAIN/INJECT LARGE JOINT/BURSA       I, Velvet Gomez LPN am scribing for and in the presence of Dr Alivia Key. 12/17/2017  10:11 AM      I have reviewed and made changes accordingly to the work scribed by Velvet Gomez LPN. The documentation accurately reflects work and decisions made by me. I have also reviewed documentation completed by clinical staff.     Alivia Key DO, [address] [place] [address]  12/17/2017 10:14 AM          Electronically signed by Stephanie Payne DO, FAOAO on 12/17/2017 at 10:11 AM

## 2017-12-07 RX ADMIN — BUPIVACAINE HYDROCHLORIDE 5 MG: 2.5 INJECTION, SOLUTION INFILTRATION; PERINEURAL at 11:18

## 2017-12-07 RX ADMIN — METHYLPREDNISOLONE ACETATE 80 MG: 80 INJECTION, SUSPENSION INTRA-ARTICULAR; INTRALESIONAL; INTRAMUSCULAR; SOFT TISSUE at 11:19

## 2017-12-11 ENCOUNTER — HOSPITAL ENCOUNTER (EMERGENCY)
Age: 62
Discharge: HOME | End: 2017-12-11
Attending: EMERGENCY MEDICINE
Payer: MEDICARE

## 2017-12-11 ENCOUNTER — APPOINTMENT (OUTPATIENT)
Dept: GENERAL RADIOLOGY | Age: 62
End: 2017-12-11
Payer: MEDICARE

## 2017-12-11 VITALS
SYSTOLIC BLOOD PRESSURE: 144 MMHG | RESPIRATION RATE: 14 BRPM | HEART RATE: 71 BPM | OXYGEN SATURATION: 97 % | WEIGHT: 230 LBS | TEMPERATURE: 98.2 F | HEIGHT: 66 IN | DIASTOLIC BLOOD PRESSURE: 89 MMHG | BODY MASS INDEX: 36.96 KG/M2

## 2017-12-11 DIAGNOSIS — M25.552 CHRONIC LEFT HIP PAIN: Primary | ICD-10-CM

## 2017-12-11 DIAGNOSIS — G89.29 CHRONIC LEFT HIP PAIN: Primary | ICD-10-CM

## 2017-12-11 PROCEDURE — 73502 X-RAY EXAM HIP UNI 2-3 VIEWS: CPT

## 2017-12-11 PROCEDURE — 6370000000 HC RX 637 (ALT 250 FOR IP): Performed by: EMERGENCY MEDICINE

## 2017-12-11 PROCEDURE — G0382 LEV 3 HOSP TYPE B ED VISIT: HCPCS

## 2017-12-11 RX ORDER — TRAMADOL HYDROCHLORIDE 50 MG/1
50 TABLET ORAL ONCE
Status: COMPLETED | OUTPATIENT
Start: 2017-12-11 | End: 2017-12-11

## 2017-12-11 RX ORDER — TRAMADOL HYDROCHLORIDE 50 MG/1
50 TABLET ORAL EVERY 8 HOURS PRN
Qty: 10 TABLET | Refills: 0 | Status: SHIPPED | OUTPATIENT
Start: 2017-12-11 | End: 2017-12-21

## 2017-12-11 RX ADMIN — TRAMADOL HYDROCHLORIDE 50 MG: 50 TABLET, FILM COATED ORAL at 20:20

## 2017-12-11 ASSESSMENT — PAIN SCALES - GENERAL
PAINLEVEL_OUTOF10: 10
PAINLEVEL_OUTOF10: 10

## 2017-12-11 ASSESSMENT — PAIN DESCRIPTION - ORIENTATION: ORIENTATION: LEFT

## 2017-12-11 ASSESSMENT — PAIN DESCRIPTION - LOCATION: LOCATION: HIP

## 2017-12-11 ASSESSMENT — PAIN DESCRIPTION - PAIN TYPE: TYPE: ACUTE PAIN

## 2017-12-12 NOTE — ED PROVIDER NOTES
[facility] ED  eMERGENCY dEPARTMENT eNCOUnter   Attending Attestation     Pt Name: Anisa Mcconnell  MRN: [id number]  [place] 1955  Date of evaluation: 12/11/17       Anisa Mcconnell is a 60 y.o. female who presents with Hip Pain (left hip pain x2 weeks)      History: Patient does have left hip pain for the last 2 weeks. Patient denies any fever, chills, nausea vomiting, diarrhea. Patient saw her orthopedic surgeon for this last week but he only evaluated her knees and told her should her to come back for another visit for her hips. Exam: Patient able to walk without difficulty. Patient has mild point tenderness over the left hip. X-ray of the hip and pelvis. Plan for discharge. Treat the patient's pain. I performed a history and physical examination of the patient and discussed management with the resident. I reviewed the residents note and agree with the documented findings and plan of care. Any areas of disagreement are noted on the chart. I was personally present for the key portions of any procedures. I have documented in the chart those procedures where I was not present during the key portions. I have personally reviewed all images and agree with the resident's interpretation. I have reviewed the emergency nurses triage note. I agree with the chief complaint, past medical history, past surgical history, allergies, medications, social and family history as documented unless otherwise noted below. Documentation of the HPI, Physical Exam and Medical Decision Making performed by medical students or scribes is based on my personal performance of the HPI, PE and MDM. For Phys Assistant/ Nurse Practitioner cases/documentation I have had a face to face evaluation of this patient and have completed at least one if not all key elements of the E/M (history, physical exam, and MDM). Additional findings are as noted.     For APC cases I have personally evaluated and examined the patient

## 2017-12-12 NOTE — ED PROVIDER NOTES
Spouse name: N/A    Number of children: 2    Years of education: N/A     Occupational History    Not on file. Social History Main Topics    Smoking status: Never Smoker    Smokeless tobacco: Never Used    Alcohol use No    Drug use: No    Sexual activity: No     Other Topics Concern    Not on file     Social History Narrative    No narrative on file       Family History   Problem Relation Age of Onset    Cancer Other      ovarian    High Blood Pressure Mother     Other Mother      copd    Arthritis Neg Hx     Asthma Neg Hx     Birth Defects Neg Hx     Depression Neg Hx     Diabetes Neg Hx     Early Death Neg Hx     Hearing Loss Neg Hx     Heart Disease Neg Hx     High Cholesterol Neg Hx     Kidney Disease Neg Hx     Learning Disabilities Neg Hx     Mental Retardation Neg Hx     Mental Illness Neg Hx     Miscarriages / Stillbirths Neg Hx     Stroke Neg Hx     Substance Abuse Neg Hx     Vision Loss Neg Hx     Breast Cancer Neg Hx     Colon Cancer Neg Hx     Eclampsia Neg Hx     Hypertension Neg Hx     Ovarian Cancer Neg Hx      Labor Neg Hx     Spont Abortions Neg Hx        Allergies:  Codeine and Morphine    Home Medications:  Prior to Admission medications    Medication Sig Start Date End Date Taking? Authorizing Provider   traMADol (ULTRAM) 50 MG tablet Take 1 tablet by mouth every 8 hours as needed for Pain . 17 Yes Rasheed Mcclendon, DO   meloxicam (MOBIC) 7.5 MG tablet Take 1 tablet by mouth daily 17   Virgil Griffin MD   lisinopril (PRINIVIL;ZESTRIL) 5 MG tablet Take 1 tablet by mouth daily 10/28/17   Gil Hernandez DO   furosemide (LASIX) 20 MG tablet Take 1 tablet by mouth daily 10/10/17   Virgil Griffin MD   allopurinol (ZYLOPRIM) 100 MG tablet TAKE (1) TABLET DAILY.  10/4/17   Virgil Griffin MD   potassium chloride (KLOR-CON M) 20 MEQ extended release tablet Take 1 tablet by mouth daily 17   Virgil Griffin MD sensory or motor deficits   MUSCULOSKELETAL: no clubbing, cyanosis or edema, painful range of motion of left hip    SKIN: no exposed rash, No overlying cellulitis      DIFFERENTIAL  DIAGNOSIS     PLAN (LABS / IMAGING / EKG):  Orders Placed This Encounter   Procedures    XR HIP 2-3 VW W PELVIS LEFT       MEDICATIONS ORDERED:  Orders Placed This Encounter   Medications    traMADol (ULTRAM) tablet 50 mg    traMADol (ULTRAM) 50 MG tablet     Sig: Take 1 tablet by mouth every 8 hours as needed for Pain . Dispense:  10 tablet     Refill:  0       DDX:Arthritis, dislocation, chronic pain    DIAGNOSTIC RESULTS / EMERGENCY DEPARTMENT COURSE / MDM     LABS:  No results found for this visit on 12/11/17. IMPRESSION: Chronic left hip pain    RADIOLOGY:  Xr Knee Left (min 4 Views)    Result Date: 12/6/2017  Radiology exam is complete. No Radiologist dictation. Please follow up with ordering provider. Xr Knee Right (min 4 Views)    Result Date: 12/6/2017  Radiology exam is complete. No Radiologist dictation. Please follow up with ordering provider. Xr Hip 2-3 Vw W Pelvis Left    Result Date: 12/11/2017  EXAMINATION: SINGLE VIEW OF THE PELVIS AND 2 VIEWS LEFT HIP 12/11/2017 8:10 pm COMPARISON: 10/09/2009 HISTORY: ORDERING SYSTEM PROVIDED HISTORY: L hip pain TECHNOLOGIST PROVIDED HISTORY: Reason for exam:->L hip pain FINDINGS: AP view of the pelvis demonstrates intact sacrum and iliac bones. Pubic rami intact. Mild degenerative changes about the pubic symphysis. Two views of the left hip demonstrate moderate axial joint space narrowing. No fracture or malalignment is appreciated. Soft tissues unremarkable. Degenerative changes are present. No acute bony abnormality detected.        EKG  None indicated    All EKG's are interpreted by the Emergency Department Physician who either signs or Co-signs this chart in the absence of a cardiologist.    EMERGENCY DEPARTMENT COURSE:  60 y.o. female with acute exacerbation of her chronic hip pain. X-ray revealed no acute illness. She received tramadol which improved pain. Patient visualized ambulating around the emergency room. Instructed to follow up with her PCP and orthopedic surgeon. Okay to be discharged home. PLAN/MEDS:  Orders Placed This Encounter   Procedures    XR HIP 2-3 VW W PELVIS LEFT       Orders Placed This Encounter   Medications    traMADol (ULTRAM) tablet 50 mg    traMADol (ULTRAM) 50 MG tablet     Sig: Take 1 tablet by mouth every 8 hours as needed for Pain . Dispense:  10 tablet     Refill:  0       :        PROCEDURES:  None    CONSULTS:  None    CRITICAL CARE:  None    FINAL IMPRESSION      1. Chronic left hip pain          DISPOSITION / PLAN     DISPOSITION Decision to Discharge    PATIENT REFERRED TO:  Jarrod Evans MD  [address] [phone]    Call in 1 day  for re-evaluation      DISCHARGE MEDICATIONS:  New Prescriptions    TRAMADOL (ULTRAM) 50 MG TABLET    Take 1 tablet by mouth every 8 hours as needed for Pain . Carli Hope DO  Emergency Medicine Resident    (Please note that portions of this note were completed with a voice recognition program.  Efforts were made to edit the dictations but occasionally words are mis-transcribed.  Whenever words are used in this note in any gender, they shall be construed as though they were used in the gender appropriate to the circumstances; and whenever words are used in this note in the singular or plural form, they shall be construed as though they were used in the form appropriate to the circumstances.))       Carli Hope DO  Resident  12/11/17 [id number]

## 2017-12-13 ENCOUNTER — CARE COORDINATION (OUTPATIENT)
Dept: CARE COORDINATION | Age: 62
End: 2017-12-13

## 2017-12-13 NOTE — CARE COORDINATION
Ambulatory Care Coordination ED Follow up Call       Reason for ED Visit:  Chronic left hip pain  Care Management Risk Score: CMRS 9  How are you feeling? :     improved  Patient Reports the following:  Pain medication given in ED covered her pain relief until late yesterday, obtained tramadol and will take as ordered             Contact RNCC regarding any worsening symptoms from above. Did you call your PCP prior to going to the ED? No          Post Discharge Status:  What health concerns since you left the Emergency Room? Chronic left hip pain    Do you have wounds that you are caring for at home? No    Do you have a follow up appt scheduled? yes    Review of Instructions:                                 Do you have any questions regarding your discharge instructions?:  No  Medications:    What questions do you have about your medications? No  Are you taking your medications as directed? If not - why? Yes   Can you afford your medications? yes  ADLS:  Do you need assistance of any kind at home? No   What assistance is needed?  na      FU appts/Provider:    Future Appointments  Date Time Provider Yann Olivares   1/17/2018 8:20 AM DO Marylou Ellison [facility]   1/19/2018 3:15 PM Hilario Delgado MD [facility] IM [address] Maintenance Due   Topic Date Due    DTaP/Tdap/Td vaccine (1 - Tdap) 04/24/1973    Pneumococcal med risk (1 of 1 - PPSV23) 04/24/1973    Cervical cancer screen  05/28/2016     Patient advised to contact PCP office to have HM items/records faxed to PCP Office directly?   N/A

## 2017-12-29 ENCOUNTER — HOSPITAL ENCOUNTER (OUTPATIENT)
Age: 62
Discharge: HOME | End: 2017-12-29
Payer: MEDICARE

## 2017-12-29 ENCOUNTER — HOSPITAL ENCOUNTER (OUTPATIENT)
Dept: PHYSICAL THERAPY | Age: 62
Setting detail: THERAPIES SERIES
Discharge: HOME | End: 2017-12-29
Payer: MEDICARE

## 2017-12-29 DIAGNOSIS — I42.0 DILATED CARDIOMYOPATHY (HCC): Chronic | ICD-10-CM

## 2017-12-29 LAB
ANION GAP SERPL CALCULATED.3IONS-SCNC: 13 MMOL/L (ref 9–17)
BUN BLDV-MCNC: 16 MG/DL (ref 8–23)
BUN BLDV-MCNC: 16 MG/DL (ref 8–23)
BUN/CREAT BLD: ABNORMAL (ref 9–20)
CALCIUM SERPL-MCNC: 9.1 MG/DL (ref 8.6–10.4)
CHLORIDE BLD-SCNC: 98 MMOL/L (ref 98–107)
CO2: 28 MMOL/L (ref 20–31)
CREAT SERPL-MCNC: 0.88 MG/DL (ref 0.5–0.9)
CREAT SERPL-MCNC: 0.88 MG/DL (ref 0.5–0.9)
GFR AFRICAN AMERICAN: >60 ML/MIN
GFR AFRICAN AMERICAN: >60 ML/MIN
GFR NON-AFRICAN AMERICAN: >60 ML/MIN
GFR NON-AFRICAN AMERICAN: >60 ML/MIN
GFR SERPL CREATININE-BSD FRML MDRD: ABNORMAL ML/MIN/{1.73_M2}
GFR SERPL CREATININE-BSD FRML MDRD: ABNORMAL ML/MIN/{1.73_M2}
GFR SERPL CREATININE-BSD FRML MDRD: NORMAL ML/MIN/{1.73_M2}
GFR SERPL CREATININE-BSD FRML MDRD: NORMAL ML/MIN/{1.73_M2}
GLUCOSE BLD-MCNC: 90 MG/DL (ref 70–99)
GLUCOSE BLD-MCNC: 90 MG/DL (ref 70–99)
POTASSIUM SERPL-SCNC: 3.3 MMOL/L (ref 3.7–5.3)
POTASSIUM SERPL-SCNC: 3.3 MMOL/L (ref 3.7–5.3)
SODIUM BLD-SCNC: 139 MMOL/L (ref 135–144)
SODIUM BLD-SCNC: 139 MMOL/L (ref 135–144)

## 2017-12-29 PROCEDURE — 80048 BASIC METABOLIC PNL TOTAL CA: CPT

## 2017-12-29 PROCEDURE — G8979 MOBILITY GOAL STATUS: HCPCS

## 2017-12-29 PROCEDURE — 97110 THERAPEUTIC EXERCISES: CPT

## 2017-12-29 PROCEDURE — G8978 MOBILITY CURRENT STATUS: HCPCS

## 2017-12-29 PROCEDURE — 97162 PT EVAL MOD COMPLEX 30 MIN: CPT

## 2017-12-29 NOTE — FLOWSHEET NOTE
[facility] Fall Risk Assessment    Patient Name:  Cristal Junior  : 1955        Risk Factor Scale  Score   History of Falls [x] Yes  [] No 25  0 25   Secondary Diagnosis [] Yes  [x] No 15  0 0   Ambulatory Aid [] Furniture  [] Crutches/cane/walker  [x] None/bedrest/wheelchair/nurse 30  15  0 0   IV/Heparin Lock [] Yes  [x] No 20  0 0   Gait/Transferring [] Impaired  [x] Weak  [] Normal/bedrest/immobile 20  10  0 10   Mental Status [] Forgets limitations  [x] Oriented to own ability 15  0 0      Total: 35     Based on the Assessment score: check the appropriate box.     []  No intervention needed   Low =   Score of 0-24    [x]  Use standard prevention interventions Moderate =  Score of 24-44   [x] Give patient handout and discuss fall prevention strategies   [x] Establish goal of education for patient/family RE: fall prevention strategies    []  Use high risk prevention interventions High = Score of 45 and higher   [] Give patient handout and discuss fall prevention strategies   [] Establish goal of education for patient/family Re: fall prevention strategies   [] Discuss lifeline / other resources    Electronically signed by:   Frida Mcdonald PT  Date: 2017

## 2017-12-29 NOTE — CONSULTS
Normal Duty  [] Light Duty  [] Off D/T Condition  [x] Retired    [] Not Employed    []  Disability  [x] Other:           Return to work:   Job/ADL Description:  Volunteers in  class Mon-Thurs, has to sit a large amount, when gets up to walk is very sore      Pain:  [x] Yes  [] No Location: B knees Pain Rating: (0-10 scale) 4/10 L thigh/hip; 0/10 B knees  Pain altered Tx:  [] Yes  [x] No  Action:  Symptoms:  [] Improving [x] Worsening [] Same  Better:  [] AM    [] PM    [] Sit    [] Rise/Sit    []Stand    [] Walk    [] Lying    [] Other:  Worse: [] AM    [] PM    [] Sit    [] Rise/Sit    [x]Stand    [x] Walk    [x] Lying    [] Bend                             [] Valsalva    [] Other:  Sleep: [] OK    [x] Disturbed-sleeping on R side with pillow b/t knees since hip started hurting    Objective:    ROM  ° A/P STRENGTH TESTS (+/-) Left Right Not Tested    Left Right Left Right Ant.  Drawer   []   Hip Flex   2-p 3+ Post. Drawer   []   Ext     Lachmans   []   ER     Valgus Stress   []   IR     Varus Stress   []   ABD   2+p 2+ Enriques   []   ADD     Apleys Comp.   []   Knee Flex 63°pp 110°p 2-p 2+ Apleys Dist.   []   Ext 12°p 9°p 3+p 4- Hip Scouring   []   Ankle DF   4+ 4+ CHERISEs   []   PF     Piriformis   []   INV     Ernies   []   EVER     Talor Tilt   []        Pat-Fem Grind   []       OBSERVATION No Deficit Deficit Not Tested Comments   Posture       Forward Head [] [x] []    Rounded Shoulders [] [x] []    Kyphosis [] [] []    Lordosis [] [] []    Lateral Shift [] [] []    Scoliosis [] [] []    Iliac Crest [] [] []    PSIS [] [] []    ASIS [] [] []    Genu Valgus [] [] []    Genu Varus [] [] []    Genu Recurvatum [] [] []    Pronation [] [] []    Supination [] [] []    Leg Length Discrp [] [] []    Slumped Sitting [] [] []    Palpation [] [] []    Sensation [x] [] []    Edema [] [] []    Neurological [] [] []    Patellar Mobility [] [] []    Patellar Orientation [] [] []    Gait [] [x] []

## 2018-01-02 ENCOUNTER — HOSPITAL ENCOUNTER (OUTPATIENT)
Dept: PHYSICAL THERAPY | Age: 63
Setting detail: THERAPIES SERIES
Discharge: HOME | End: 2018-01-02
Payer: MEDICARE

## 2018-01-02 PROCEDURE — 97110 THERAPEUTIC EXERCISES: CPT

## 2018-01-02 NOTE — FLOWSHEET NOTE
[x] [facility]       Outpatient Physical        Therapy       [address]       Phone: [phone]       Fax: [phone] [] [facility] at [address]       Phone: [phone]       Fax: [phone] [] [facility]  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 Selwyn Medina: 2018  Visit# / total visits: 2/10  Cancels/No Shows: 0/0    Subjective:    Pain:  [x] Yes  [] No Location: left hip and bilateral knees  Pain Rating: (0-10 scale) 1/10 bilateral knees; 3/10 left hip   Pain altered Tx:  [] No  [] Yes  Action:  Comments:    Objective:  Modalities: consider adding for sympotm control  Precautions:left hip symptoms  Exercises: bilateral knees ; MD instructions for quad strengthening  Exercise   bilateral knees Reps/ Time Weight/ Level Comments   sitting      Long arc quads 10 rep  Reviewed with patient   Adductor sets 20 reps  Reviewed with patient   Hamstring stretching 3 reps  Verbal instruct with demo for home use; .ritten   Sit with abdominal contraction   Verbal instruct with demo for home use; written instructions/education issued to the patient   Sit and lean with abdominal contraction   Verbal instruct with demo for home use; written instructions/education issued to the patient   Sit to stand with lean and abdominal contraction   Verbal instruct with demo for home use; written instructions/education issued to the patient   supine      Quad sets 10 reps  Reviewed with patient   Short arc quads 10 reps  Reviewed with patient   Heel slides  10 reps  Reviewed with patient   Hip abduction 10 reps  Reviewed with

## 2018-01-04 RX ORDER — FUROSEMIDE 20 MG/1
20 TABLET ORAL DAILY
Qty: 60 TABLET | Refills: 0 | Status: SHIPPED | OUTPATIENT
Start: 2018-01-04 | End: 2018-01-09 | Stop reason: SDUPTHER

## 2018-01-05 ENCOUNTER — HOSPITAL ENCOUNTER (OUTPATIENT)
Dept: PHYSICAL THERAPY | Age: 63
Setting detail: THERAPIES SERIES
Discharge: HOME | End: 2018-01-05
Payer: MEDICARE

## 2018-01-05 PROCEDURE — 97110 THERAPEUTIC EXERCISES: CPT

## 2018-01-08 ENCOUNTER — HOSPITAL ENCOUNTER (OUTPATIENT)
Dept: PHYSICAL THERAPY | Age: 63
Setting detail: THERAPIES SERIES
End: 2018-01-08
Payer: MEDICARE

## 2018-01-09 RX ORDER — FUROSEMIDE 20 MG/1
20 TABLET ORAL DAILY
Qty: 60 TABLET | Refills: 0 | Status: SHIPPED | OUTPATIENT
Start: 2018-01-09 | End: 2018-01-19 | Stop reason: SDUPTHER

## 2018-01-12 ENCOUNTER — HOSPITAL ENCOUNTER (OUTPATIENT)
Dept: PHYSICAL THERAPY | Age: 63
Setting detail: THERAPIES SERIES
Discharge: HOME | End: 2018-01-12
Payer: MEDICARE

## 2018-01-12 PROCEDURE — 97110 THERAPEUTIC EXERCISES: CPT

## 2018-01-12 NOTE — FLOWSHEET NOTE
curls 10x  Notes pain with unloading stance leg to change sides. May consider 2 sets of 5 reps next Rx. Hip ext 10x  Tap toe  Notes pain with unloading stance leg to change sides. May consider 2 sets of 5 reps next Rx. Hip abd 2 x 5  Tap toe. Other:     Specific Instructions for next treatment: update HEP, consider SciFit, progress knee ex, consider modalities for symptom control; focus on HEP as Pt will only be able to attend PT 1x per week most weeks; review previous exercises, isometrics for left hip flexion and abduction           Treatment Charges: Mins Units   []  Modalities     [x]  Ther Exercise 35 2   []  Manual Therapy     []  Ther Activities     []  Aquatics     []  Vasocompression     []  Other     Total Treatment time 35 min        Assessment: [x] Progressing toward goals. Added standing hip abd, supine bridge, and supine clamshell as noted. Time constraint as patient needed to get blood work done prior to cab arrival at 1030am.  Patient felt better after exs. Demo'd for patient how to get to floor to do exs and how to get back up. Encouraged patient to practice with niece home to ensure she is able to. Also encouraged patient to keep her cellular phone nearby in case of emergency. Patient stated that she does not feel like she is able to do her HEP on her bed and that is why she has not been doing it consistently. [] No change. [] Other:    STG: (to be met in 10 treatments)  1. ? Pain: B knees 4/10 at worst (goal met)  2. ? ROM: R knee 5-115°(progress made 1/2/18), L knee 8-90° (goal met 12/2/18)  3. ? Strength: L hip 3+/5, R hip 4-/5, L knee 4-/5, R knee 4/5  4. ? Function:LEFS 50% impaired  5. Independent with Home Exercise Programs (progress made)  6.  Demonstrate Knowledge of fall prevention  Patient goals: \"Painfree, able to sleep at night\"     G-CODE     Functional Limitation: Walking, Mobility  Functional Assessment Used: LEFS  Current Status Modifier: CL 60-80%

## 2018-01-15 ENCOUNTER — HOSPITAL ENCOUNTER (OUTPATIENT)
Age: 63
Discharge: HOME | End: 2018-01-15
Payer: MEDICARE

## 2018-01-15 ENCOUNTER — HOSPITAL ENCOUNTER (OUTPATIENT)
Dept: PHYSICAL THERAPY | Age: 63
Setting detail: THERAPIES SERIES
Discharge: HOME | End: 2018-01-15
Payer: MEDICARE

## 2018-01-15 LAB
ANION GAP SERPL CALCULATED.3IONS-SCNC: 17 MMOL/L (ref 9–17)
BUN BLDV-MCNC: 22 MG/DL (ref 8–23)
CHLORIDE BLD-SCNC: 96 MMOL/L (ref 98–107)
CO2: 23 MMOL/L (ref 20–31)
CREAT SERPL-MCNC: 1.03 MG/DL (ref 0.5–0.9)
GFR AFRICAN AMERICAN: >60 ML/MIN
GFR NON-AFRICAN AMERICAN: 54 ML/MIN
GFR SERPL CREATININE-BSD FRML MDRD: ABNORMAL ML/MIN/{1.73_M2}
GFR SERPL CREATININE-BSD FRML MDRD: ABNORMAL ML/MIN/{1.73_M2}
POTASSIUM SERPL-SCNC: 3.6 MMOL/L (ref 3.7–5.3)
SODIUM BLD-SCNC: 136 MMOL/L (ref 135–144)

## 2018-01-15 PROCEDURE — 82565 ASSAY OF CREATININE: CPT

## 2018-01-15 PROCEDURE — 84520 ASSAY OF UREA NITROGEN: CPT

## 2018-01-15 PROCEDURE — 36415 COLL VENOUS BLD VENIPUNCTURE: CPT

## 2018-01-15 PROCEDURE — 80051 ELECTROLYTE PANEL: CPT

## 2018-01-15 PROCEDURE — 97110 THERAPEUTIC EXERCISES: CPT

## 2018-01-15 NOTE — FLOWSHEET NOTE
(switched to R 2x5 from 10 reps)   Clamshell 12x  Increased reps   Bridge 10x            Standing       HR 15x     HS curls 2x5  Switched to 2 sets of 5 reps   Hip ext 2x5  Tap toe. Switched to 2 sets of 5 reps   Hip abd 2x5  Tap toe. Lateral step up 2x5 2\" Added   Other:     Specific Instructions for next treatment: consider SciFit, progress knee ex, consider modalities for symptom control; focus on HEP as Pt will only be able to attend PT 1x per week most weeks; review previous exercises, isometrics for left hip flexion and abduction           Treatment Charges: Mins Units   []  Modalities     [x]  Ther Exercise 45 3   []  Manual Therapy     []  Ther Activities     []  Aquatics     []  Vasocompression     []  Other     Total Treatment time 45 min        Assessment: [x] Progressing toward goals. Patient with fair tolerance to treatment. Limited progressions d/t general deconditioning status and reports of higher pain. Moderate cueing throughout for proper technique. [] No change. [] Other:    STG: (to be met in 10 treatments)  1. ? Pain: B knees 4/10 at worst (goal met)  2. ? ROM: R knee 5-115°(progress made 1/2/18), L knee 8-90° (goal met 12/2/18)  3. ? Strength: L hip 3+/5, R hip 4-/5, L knee 4-/5, R knee 4/5  4. ? Function:LEFS 50% impaired  5. Independent with Home Exercise Programs (progress made)  6. Demonstrate Knowledge of fall prevention  Patient goals: \"Painfree, able to sleep at night\"     G-CODE     Functional Limitation: Walking, Mobility  Functional Assessment Used: LEFS  Current Status Modifier: CL 60-80% limited  Goal Status Modifier: CK40-60% limited  Discharge Status Modifier:     Pt. Education:  [x] Yes  [] No  [x] Reviewed Prior home exercise program/education. instructions  Method of Education: [x] Verbal  [x] Demo  [x] Written  Comprehension of Education:  [x] Verbalizes understanding. [x] Demonstrates understanding. [x] Needs review.   [] Demonstrates/verbalizes home exercise

## 2018-01-19 ENCOUNTER — HOSPITAL ENCOUNTER (OUTPATIENT)
Dept: PHYSICAL THERAPY | Age: 63
Setting detail: THERAPIES SERIES
Discharge: HOME | End: 2018-01-19
Payer: MEDICARE

## 2018-01-19 ENCOUNTER — OFFICE VISIT (OUTPATIENT)
Dept: INTERNAL MEDICINE | Age: 63
End: 2018-01-19
Payer: MEDICARE

## 2018-01-19 ENCOUNTER — OFFICE VISIT (OUTPATIENT)
Dept: ORTHOPEDIC SURGERY | Age: 63
End: 2018-01-19
Payer: MEDICARE

## 2018-01-19 VITALS
DIASTOLIC BLOOD PRESSURE: 83 MMHG | WEIGHT: 235 LBS | HEIGHT: 66 IN | SYSTOLIC BLOOD PRESSURE: 121 MMHG | BODY MASS INDEX: 37.77 KG/M2

## 2018-01-19 VITALS
DIASTOLIC BLOOD PRESSURE: 71 MMHG | WEIGHT: 227.4 LBS | HEART RATE: 70 BPM | BODY MASS INDEX: 36.7 KG/M2 | SYSTOLIC BLOOD PRESSURE: 110 MMHG

## 2018-01-19 DIAGNOSIS — M25.552 LEFT HIP PAIN: ICD-10-CM

## 2018-01-19 DIAGNOSIS — Z23 NEEDS FLU SHOT: ICD-10-CM

## 2018-01-19 DIAGNOSIS — M17.0 PRIMARY OSTEOARTHRITIS OF BOTH KNEES: Primary | ICD-10-CM

## 2018-01-19 DIAGNOSIS — I42.8 NON-ISCHEMIC CARDIOMYOPATHY (HCC): ICD-10-CM

## 2018-01-19 DIAGNOSIS — R73.03 PRE-DIABETES: Chronic | ICD-10-CM

## 2018-01-19 DIAGNOSIS — E87.6 HYPOKALEMIA: Primary | ICD-10-CM

## 2018-01-19 LAB — HBA1C MFR BLD: 6.3 %

## 2018-01-19 PROCEDURE — 1036F TOBACCO NON-USER: CPT | Performed by: INTERNAL MEDICINE

## 2018-01-19 PROCEDURE — 97110 THERAPEUTIC EXERCISES: CPT

## 2018-01-19 PROCEDURE — G8427 DOCREV CUR MEDS BY ELIG CLIN: HCPCS | Performed by: ORTHOPAEDIC SURGERY

## 2018-01-19 PROCEDURE — 99213 OFFICE O/P EST LOW 20 MIN: CPT | Performed by: ORTHOPAEDIC SURGERY

## 2018-01-19 PROCEDURE — 83036 HEMOGLOBIN GLYCOSYLATED A1C: CPT | Performed by: INTERNAL MEDICINE

## 2018-01-19 PROCEDURE — G8417 CALC BMI ABV UP PARAM F/U: HCPCS | Performed by: ORTHOPAEDIC SURGERY

## 2018-01-19 PROCEDURE — 3017F COLORECTAL CA SCREEN DOC REV: CPT | Performed by: INTERNAL MEDICINE

## 2018-01-19 PROCEDURE — 3017F COLORECTAL CA SCREEN DOC REV: CPT | Performed by: ORTHOPAEDIC SURGERY

## 2018-01-19 PROCEDURE — G8417 CALC BMI ABV UP PARAM F/U: HCPCS | Performed by: INTERNAL MEDICINE

## 2018-01-19 PROCEDURE — 1036F TOBACCO NON-USER: CPT | Performed by: ORTHOPAEDIC SURGERY

## 2018-01-19 PROCEDURE — 90688 IIV4 VACCINE SPLT 0.5 ML IM: CPT | Performed by: INTERNAL MEDICINE

## 2018-01-19 PROCEDURE — G8484 FLU IMMUNIZE NO ADMIN: HCPCS | Performed by: ORTHOPAEDIC SURGERY

## 2018-01-19 PROCEDURE — 3014F SCREEN MAMMO DOC REV: CPT | Performed by: INTERNAL MEDICINE

## 2018-01-19 PROCEDURE — G0008 ADMIN INFLUENZA VIRUS VAC: HCPCS | Performed by: INTERNAL MEDICINE

## 2018-01-19 PROCEDURE — G8484 FLU IMMUNIZE NO ADMIN: HCPCS | Performed by: INTERNAL MEDICINE

## 2018-01-19 PROCEDURE — 99213 OFFICE O/P EST LOW 20 MIN: CPT | Performed by: INTERNAL MEDICINE

## 2018-01-19 PROCEDURE — G0444 DEPRESSION SCREEN ANNUAL: HCPCS | Performed by: INTERNAL MEDICINE

## 2018-01-19 PROCEDURE — 3014F SCREEN MAMMO DOC REV: CPT | Performed by: ORTHOPAEDIC SURGERY

## 2018-01-19 PROCEDURE — G8427 DOCREV CUR MEDS BY ELIG CLIN: HCPCS | Performed by: INTERNAL MEDICINE

## 2018-01-19 RX ORDER — POTASSIUM CHLORIDE 1500 MG/1
20 TABLET, FILM COATED, EXTENDED RELEASE ORAL 2 TIMES DAILY WITH MEALS
Qty: 60 TABLET | Refills: 2 | Status: ON HOLD | OUTPATIENT
Start: 2018-01-19 | End: 2018-05-19

## 2018-01-19 RX ORDER — POTASSIUM CHLORIDE 20 MEQ/1
20 TABLET, EXTENDED RELEASE ORAL DAILY
Qty: 30 TABLET | Refills: 1 | Status: ON HOLD | OUTPATIENT
Start: 2018-01-19 | End: 2018-05-19 | Stop reason: HOSPADM

## 2018-01-19 RX ORDER — MELOXICAM 7.5 MG/1
7.5 TABLET ORAL DAILY
Qty: 10 TABLET | Refills: 1 | Status: SHIPPED | OUTPATIENT
Start: 2018-01-19 | End: 2018-05-22 | Stop reason: SDUPTHER

## 2018-01-19 RX ORDER — MELOXICAM 7.5 MG/1
TABLET ORAL
COMMUNITY
Start: 2018-01-04 | End: 2018-02-02 | Stop reason: SDUPTHER

## 2018-01-19 RX ORDER — FUROSEMIDE 20 MG/1
20 TABLET ORAL DAILY
Qty: 60 TABLET | Refills: 0 | Status: SHIPPED | OUTPATIENT
Start: 2018-01-19 | End: 2019-02-18 | Stop reason: SDUPTHER

## 2018-01-19 RX ORDER — AMIODARONE HYDROCHLORIDE 200 MG/1
200 TABLET ORAL DAILY
Qty: 30 TABLET | Refills: 5 | Status: SHIPPED | OUTPATIENT
Start: 2018-01-19 | End: 2019-02-18 | Stop reason: SDUPTHER

## 2018-01-19 RX ORDER — POTASSIUM CHLORIDE 1500 MG/1
TABLET, FILM COATED, EXTENDED RELEASE ORAL
COMMUNITY
Start: 2018-01-08 | End: 2018-01-19 | Stop reason: SDUPTHER

## 2018-01-19 RX ORDER — ALLOPURINOL 100 MG/1
100 TABLET ORAL DAILY
Qty: 30 TABLET | Refills: 3 | Status: SHIPPED | OUTPATIENT
Start: 2018-01-19 | End: 2019-01-07 | Stop reason: SDUPTHER

## 2018-01-19 RX ORDER — SPIRONOLACTONE 25 MG/1
12.5 TABLET ORAL DAILY
Qty: 30 TABLET | Refills: 3 | Status: SHIPPED | OUTPATIENT
Start: 2018-01-19 | End: 2019-02-18 | Stop reason: SDUPTHER

## 2018-01-19 RX ORDER — LOSARTAN POTASSIUM 25 MG/1
25 TABLET ORAL DAILY
Qty: 30 TABLET | Refills: 3 | Status: SHIPPED | OUTPATIENT
Start: 2018-01-19 | End: 2018-07-22 | Stop reason: SDUPTHER

## 2018-01-19 RX ORDER — METHYLPREDNISOLONE 4 MG/1
TABLET ORAL
Qty: 1 KIT | Refills: 0 | Status: SHIPPED | OUTPATIENT
Start: 2018-01-19 | End: 2018-01-25

## 2018-01-19 RX ORDER — METOPROLOL TARTRATE 50 MG/1
50 TABLET, FILM COATED ORAL 2 TIMES DAILY
Qty: 60 TABLET | Refills: 5 | Status: SHIPPED | OUTPATIENT
Start: 2018-01-19 | End: 2019-02-18 | Stop reason: SDUPTHER

## 2018-01-19 ASSESSMENT — PATIENT HEALTH QUESTIONNAIRE - PHQ9
1. LITTLE INTEREST OR PLEASURE IN DOING THINGS: 0
2. FEELING DOWN, DEPRESSED OR HOPELESS: 0
8. MOVING OR SPEAKING SO SLOWLY THAT OTHER PEOPLE COULD HAVE NOTICED. OR THE OPPOSITE, BEING SO FIGETY OR RESTLESS THAT YOU HAVE BEEN MOVING AROUND A LOT MORE THAN USUAL: 0
9. THOUGHTS THAT YOU WOULD BE BETTER OFF DEAD, OR OF HURTING YOURSELF: 0
3. TROUBLE FALLING OR STAYING ASLEEP: 0
10. IF YOU CHECKED OFF ANY PROBLEMS, HOW DIFFICULT HAVE THESE PROBLEMS MADE IT FOR YOU TO DO YOUR WORK, TAKE CARE OF THINGS AT HOME, OR GET ALONG WITH OTHER PEOPLE: 0
6. FEELING BAD ABOUT YOURSELF - OR THAT YOU ARE A FAILURE OR HAVE LET YOURSELF OR YOUR FAMILY DOWN: 0
5. POOR APPETITE OR OVEREATING: 0
4. FEELING TIRED OR HAVING LITTLE ENERGY: 0
SUM OF ALL RESPONSES TO PHQ QUESTIONS 1-9: 0
SUM OF ALL RESPONSES TO PHQ9 QUESTIONS 1 & 2: 0
7. TROUBLE CONCENTRATING ON THINGS, SUCH AS READING THE NEWSPAPER OR WATCHING TELEVISION: 0

## 2018-01-19 ASSESSMENT — ENCOUNTER SYMPTOMS
CONSTIPATION: 0
DIARRHEA: 0
NAUSEA: 0
COUGH: 0

## 2018-01-19 NOTE — FLOWSHEET NOTE
2x5 x AA on L LE for 1 set   Clamshell 12x  x Increased reps   Bridge 15x   x Progressed reps 1/19          Standing        HR 15x  x    HS curls 2x5  x Switched to 2 sets of 5 reps   Hip ext 2x5  x Tap toe. Switched to 2 sets of 5 reps   Hip abd 2x5  x      Lateral step up 2x5 2\" x x   Other: Cont ex per above log, progressed reps bridges. Initiated SciFit, for gentle ROM and increased circulation. Pt reports she liked it SciFit. Specific Instructions for next treatment: update HEP; progress knee ex, consider modalities for symptom control; focus on HEP as Pt will only be able to attend PT 1x per week most weeks; review previous exercises, isometrics for left hip flexion and abduction           Treatment Charges: Mins Units   []  Modalities     [x]  Ther Exercise 49 3   []  Manual Therapy     []  Ther Activities     []  Aquatics     []  Vasocompression     []  Other     Total Treatment time min        Assessment: [x] Progressing toward goals. [] No change. [] Other:    STG: (to be met in 10 treatments)  1. ? Pain: B knees 4/10 at worst (goal met)  2. ? ROM: R knee 5-115°(progress made 1/2/18), L knee 8-90° (goal met 12/2/18)  3. ? Strength: L hip 3+/5, R hip 4-/5, L knee 4-/5, R knee 4/5  4. ? Function:LEFS 50% impaired  5. Independent with Home Exercise Programs (progress made)  6. Demonstrate Knowledge of fall prevention  Patient goals: \"Painfree, able to sleep at night\"     G-CODE (due 10th Rx)     Functional Limitation: Walking, Mobility  Functional Assessment Used: LEFS  Current Status Modifier: CL 60-80% limited  Goal Status Modifier: CK40-60% limited  Discharge Status Modifier:     Pt. Education:  [x] Yes  [] No  [x] Reviewed Prior home exercise program/education. instructions  Method of Education: [x] Verbal  [x] Demo  [x] Written  Comprehension of Education:  [x] Verbalizes understanding. [x] Demonstrates understanding. [x] Needs review.   [] Demonstrates/verbalizes home exercise

## 2018-01-19 NOTE — PROGRESS NOTES
[address] [place] [id number]  Dept: [phone]  Dept Fax: [phone]        Ambulatory Follow Up      Subjective:   Trung Moreno is a 60y.o. year old female who presents to our office today for routine followup regarding her   1. Primary osteoarthritis of both knees    2. Left hip pain    . Chief Complaint   Patient presents with    Follow-up     bilateral knee pain       HPI-Annemarie High presents to the office today for follow-up after corticosteroid  Injections of bilateral knees on 12/06/2017. The patient notes 90% improvement with the injection. Patient states her knees hurt a lot after the first few days but started to feel better. Patient says she has been going to physical therapy and she's doing good. Patient cannot take antiinflammatories due to cardiac issues. Patient also doesn't;t want total knee arthroplasty due to her cardiac issues. Patients doctor did say it was okay to take meloxicam but she is to take it every 3 days. Patient did mention that her left hip is bothering her and she went to the ER and had x-rays taken. Review of Systems   Constitutional: Negative for chills and fever. Respiratory: Negative for cough. Gastrointestinal: Negative for constipation, diarrhea and nausea. Musculoskeletal: Positive for arthralgias (bilateral knees, left hip). Negative for gait problem, joint swelling and myalgias. Neurological: Negative for dizziness, weakness and numbness. I have reviewed the CC, HPI, ROS, PMH, FHX, Social History. I agree with the documentation provided by other staff, residents, and/or medical students and have reviewed their documentation prior to providing my signature indicating agreement. Objective :   General: Trung Moreno is a 60 y.o. female who is alert and oriented and sitting comfortably in our office.   Ortho Exam  MS:  Evaluation of the Bilateral knee reveals no significant outward deformity. There is no erythema, warmth, skin lesions, signs of infection. There is tenderness over the medial joint line. There is a mildknee effusion. Range of motion of the Bilateral knee is  full. No instability of the knee is appreciated at 0 and 30° of flexion. There is a negative anterior drawer Lachman's test.  There is increased pain with varus Enrique's testing. No calf tenderness is noted. There is a mildly postive left hip log roll and Stinchfield test.  Motor, sensory, vascular examination to the Bilateral lower extremity is intact. Patient has full range of motion of the ankle. Neuro: alert. oriented  Eyes: Extra-ocular muscles intact  Mouth: Oral mucosa moist. No perioral lesions  Pulm: Respirations unlabored and regular. Skin: warm, well perfused    Psych:   Patient has good fund of knowledge and displays understanging of exam, diagnosis, and plan. Radiology:     History:  Left hip pain    Findings:   Low AP pelvis, AP left hip, Frog leg lateral xrays left hip done in the office today shows mild degenerative narrowing of joint with mild joint line sclerosis. No evidence of fracture, subluxation, dislocation, radioopaque foreign body/tumor is noted. Impression:   Mild left hip DJD as described above. Assessment:      1. Primary osteoarthritis of both knees    2. Left hip pain       Plan:      Talked over with patient that she would benefit from from knee arthroplasty. In the meantime patient can have corticosteroid injections every 4 months. Patient is to continue meloxicam at her PCP's discretion. Take tylenol as needed. Continue with physical therapy. Will prescribe a medrol dose pack for her left hip. Follow up as needed    Follow up:Return if symptoms worsen or fail to improve.      Renuka Kuo RN am scribing for and in the presence of Dr. Yael Carrillo  1/23/2018 7:34 AM      Orders Placed This Encounter   Medications    methylPREDNISolone

## 2018-01-19 NOTE — PROGRESS NOTES
Non-ischemic cardiomyopathy ([facility]    3. Pre-diabetes    4. Needs flu shot        Plan:   1. Check potassium and magnesium. Continue current medications including potassium  2. Continue  Aldactone and metolazone, metoprolol, amiodarone. Continue follow-up with cardiology and CHF clinic. Continue to use compression stockings. Discontinue lisinopril and start losartan 25 mg.  3.  Lifestyle modifications  4. Flu shot  5. Continue meloxicam on worse days. Continue topical voltaren. Add Tylenol. Continue follow-up with orthopedics  6. I have ordered a mammogram in the past, she is scheduled to do it. Refused testing for HIV and hep C. Also refused Pneumovax. Last Pap was in 2013 she wants to wait on it too. Up-to-date on colonoscopy  7. Return in about 2 months (around 3/19/2018).

## 2018-01-26 ENCOUNTER — HOSPITAL ENCOUNTER (OUTPATIENT)
Dept: PHYSICAL THERAPY | Age: 63
Setting detail: THERAPIES SERIES
Discharge: HOME | End: 2018-01-26
Payer: MEDICARE

## 2018-01-26 ENCOUNTER — HOSPITAL ENCOUNTER (OUTPATIENT)
Age: 63
Discharge: HOME | End: 2018-01-26
Payer: MEDICARE

## 2018-01-26 DIAGNOSIS — E87.6 HYPOKALEMIA: ICD-10-CM

## 2018-01-26 LAB
ANION GAP SERPL CALCULATED.3IONS-SCNC: 12 MMOL/L (ref 9–17)
BUN BLDV-MCNC: 20 MG/DL (ref 8–23)
BUN/CREAT BLD: ABNORMAL (ref 9–20)
CALCIUM SERPL-MCNC: 9.5 MG/DL (ref 8.6–10.4)
CHLORIDE BLD-SCNC: 95 MMOL/L (ref 98–107)
CO2: 29 MMOL/L (ref 20–31)
CREAT SERPL-MCNC: 0.79 MG/DL (ref 0.5–0.9)
GFR AFRICAN AMERICAN: >60 ML/MIN
GFR NON-AFRICAN AMERICAN: >60 ML/MIN
GFR SERPL CREATININE-BSD FRML MDRD: ABNORMAL ML/MIN/{1.73_M2}
GFR SERPL CREATININE-BSD FRML MDRD: ABNORMAL ML/MIN/{1.73_M2}
GLUCOSE BLD-MCNC: 89 MG/DL (ref 70–99)
MAGNESIUM: 1.7 MG/DL (ref 1.6–2.6)
POTASSIUM SERPL-SCNC: 4 MMOL/L (ref 3.7–5.3)
SODIUM BLD-SCNC: 136 MMOL/L (ref 135–144)

## 2018-01-26 PROCEDURE — 80048 BASIC METABOLIC PNL TOTAL CA: CPT

## 2018-01-26 PROCEDURE — 36415 COLL VENOUS BLD VENIPUNCTURE: CPT

## 2018-01-26 PROCEDURE — 83735 ASSAY OF MAGNESIUM: CPT

## 2018-01-26 PROCEDURE — 97110 THERAPEUTIC EXERCISES: CPT

## 2018-01-26 NOTE — PRE-CERTIFICATION NOTE
Medicare Cap   [x] Physical Therapy  [] Speech Therapy  [] Occupational therapy  *PT and Speech caps combine      $2010 Cap limit < kx modifier needed < $5897 requires pre-cert        Patient Name: Ada Mireles  YOB: 1955     Date of [address] Name # units/ charge $$$ charge Daily Total Charge Ongoing Total $$$   1/2 Ex 3 31.19+23.89+23.89  78.97 78.97   1/5 Ex 3 31.19+23.89+23.89  78.97 157.94   1/12 Ex 2 31.19+23.89  55.08 213.02   1/15 Ex 3 31.19+23.89+23.89  78.97 291.99   1/19 Ex 3 31.19+23.89+23.89  78.97 370.96   1/26 Ex 3 31.19+23.89+23.89  78.97 449.93

## 2018-01-26 NOTE — FLOWSHEET NOTE
plan of care.    [] Other:      Time In: 7875           Time Out: 6947    Electronically signed by:  Edwar Esteves PT

## 2018-01-28 ENCOUNTER — APPOINTMENT (OUTPATIENT)
Dept: GENERAL RADIOLOGY | Age: 63
End: 2018-01-28
Payer: MEDICARE

## 2018-01-28 ENCOUNTER — HOSPITAL ENCOUNTER (EMERGENCY)
Age: 63
Discharge: HOME | End: 2018-01-28
Attending: EMERGENCY MEDICINE
Payer: MEDICARE

## 2018-01-28 ENCOUNTER — APPOINTMENT (OUTPATIENT)
Dept: CT IMAGING | Age: 63
End: 2018-01-28
Payer: MEDICARE

## 2018-01-28 VITALS
RESPIRATION RATE: 23 BRPM | TEMPERATURE: 98.6 F | BODY MASS INDEX: 38.25 KG/M2 | OXYGEN SATURATION: 100 % | HEART RATE: 70 BPM | DIASTOLIC BLOOD PRESSURE: 71 MMHG | SYSTOLIC BLOOD PRESSURE: 136 MMHG | WEIGHT: 237 LBS

## 2018-01-28 DIAGNOSIS — R42 VERTIGO: Primary | ICD-10-CM

## 2018-01-28 LAB
ABSOLUTE EOS #: 0.13 K/UL (ref 0–0.44)
ABSOLUTE IMMATURE GRANULOCYTE: <0.03 K/UL (ref 0–0.3)
ABSOLUTE LYMPH #: 2.68 K/UL (ref 1.1–3.7)
ABSOLUTE MONO #: 0.89 K/UL (ref 0.1–1.2)
ANION GAP SERPL CALCULATED.3IONS-SCNC: 13 MMOL/L (ref 9–17)
BASOPHILS # BLD: 0 % (ref 0–2)
BASOPHILS ABSOLUTE: 0.03 K/UL (ref 0–0.2)
BNP INTERPRETATION: ABNORMAL
BUN BLDV-MCNC: 14 MG/DL (ref 8–23)
BUN/CREAT BLD: ABNORMAL (ref 9–20)
CALCIUM SERPL-MCNC: 9.3 MG/DL (ref 8.6–10.4)
CHLORIDE BLD-SCNC: 87 MMOL/L (ref 98–107)
CO2: 26 MMOL/L (ref 20–31)
CREAT SERPL-MCNC: 0.94 MG/DL (ref 0.5–0.9)
DIFFERENTIAL TYPE: ABNORMAL
EKG ATRIAL RATE: 70 BPM
EKG P AXIS: 24 DEGREES
EKG P-R INTERVAL: 176 MS
EKG Q-T INTERVAL: 490 MS
EKG QRS DURATION: 188 MS
EKG QTC CALCULATION (BAZETT): 529 MS
EKG R AXIS: -104 DEGREES
EKG T AXIS: 80 DEGREES
EKG VENTRICULAR RATE: 70 BPM
EOSINOPHILS RELATIVE PERCENT: 1 % (ref 1–4)
GFR AFRICAN AMERICAN: >60 ML/MIN
GFR NON-AFRICAN AMERICAN: >60 ML/MIN
GFR SERPL CREATININE-BSD FRML MDRD: ABNORMAL ML/MIN/{1.73_M2}
GFR SERPL CREATININE-BSD FRML MDRD: ABNORMAL ML/MIN/{1.73_M2}
GLUCOSE BLD-MCNC: 80 MG/DL (ref 70–99)
HCT VFR BLD CALC: 38.7 % (ref 36.3–47.1)
HEMOGLOBIN: 13.7 G/DL (ref 11.9–15.1)
IMMATURE GRANULOCYTES: 0 %
LYMPHOCYTES # BLD: 28 % (ref 24–43)
MCH RBC QN AUTO: 28.8 PG (ref 25.2–33.5)
MCHC RBC AUTO-ENTMCNC: 35.4 G/DL (ref 28.4–34.8)
MCV RBC AUTO: 81.5 FL (ref 82.6–102.9)
MONOCYTES # BLD: 9 % (ref 3–12)
NRBC AUTOMATED: 0 PER 100 WBC
PDW BLD-RTO: 13.1 % (ref 11.8–14.4)
PLATELET # BLD: 284 K/UL (ref 138–453)
PLATELET ESTIMATE: ABNORMAL
PMV BLD AUTO: 10.1 FL (ref 8.1–13.5)
POC TROPONIN I: 0.02 NG/ML (ref 0–0.1)
POC TROPONIN INTERP: NORMAL
POTASSIUM SERPL-SCNC: 3.4 MMOL/L (ref 3.7–5.3)
PRO-BNP: 1609 PG/ML
RBC # BLD: 4.75 M/UL (ref 3.95–5.11)
RBC # BLD: ABNORMAL 10*6/UL
SEG NEUTROPHILS: 62 % (ref 36–65)
SEGMENTED NEUTROPHILS ABSOLUTE COUNT: 5.72 K/UL (ref 1.5–8.1)
SODIUM BLD-SCNC: 126 MMOL/L (ref 135–144)
WBC # BLD: 9.5 K/UL (ref 3.5–11.3)
WBC # BLD: ABNORMAL 10*3/UL

## 2018-01-28 PROCEDURE — 80048 BASIC METABOLIC PNL TOTAL CA: CPT

## 2018-01-28 PROCEDURE — 84484 ASSAY OF TROPONIN QUANT: CPT

## 2018-01-28 PROCEDURE — 99285 EMERGENCY DEPT VISIT HI MDM: CPT

## 2018-01-28 PROCEDURE — 6370000000 HC RX 637 (ALT 250 FOR IP): Performed by: STUDENT IN AN ORGANIZED HEALTH CARE EDUCATION/TRAINING PROGRAM

## 2018-01-28 PROCEDURE — 93005 ELECTROCARDIOGRAM TRACING: CPT

## 2018-01-28 PROCEDURE — 70450 CT HEAD/BRAIN W/O DYE: CPT

## 2018-01-28 PROCEDURE — 85025 COMPLETE CBC W/AUTO DIFF WBC: CPT

## 2018-01-28 PROCEDURE — 83880 ASSAY OF NATRIURETIC PEPTIDE: CPT

## 2018-01-28 PROCEDURE — 71045 X-RAY EXAM CHEST 1 VIEW: CPT

## 2018-01-28 RX ORDER — ACETAMINOPHEN 325 MG/1
650 TABLET ORAL ONCE
Status: COMPLETED | OUTPATIENT
Start: 2018-01-28 | End: 2018-01-28

## 2018-01-28 RX ORDER — MECLIZINE HCL 12.5 MG/1
25 TABLET ORAL 3 TIMES DAILY PRN
Status: DISCONTINUED | OUTPATIENT
Start: 2018-01-28 | End: 2018-01-28

## 2018-01-28 RX ORDER — MECLIZINE HYDROCHLORIDE CHEWABLE TABLETS 25 MG/1
25 TABLET, CHEWABLE ORAL 3 TIMES DAILY PRN
Qty: 30 TABLET | Refills: 0 | Status: SHIPPED | OUTPATIENT
Start: 2018-01-28 | End: 2019-08-27

## 2018-01-28 RX ORDER — MECLIZINE HCL 12.5 MG/1
25 TABLET ORAL ONCE
Status: COMPLETED | OUTPATIENT
Start: 2018-01-28 | End: 2018-01-28

## 2018-01-28 RX ADMIN — ACETAMINOPHEN 650 MG: 325 TABLET ORAL at 15:11

## 2018-01-28 RX ADMIN — MECLIZINE HCL 25 MG: 12.5 TABLET ORAL at 15:11

## 2018-01-28 ASSESSMENT — PAIN SCALES - GENERAL: PAINLEVEL_OUTOF10: 10

## 2018-01-28 NOTE — ED PROVIDER NOTES
[facility] ED  Emergency Department Encounter  Emergency Medicine Resident     Pt Name: Remedios Stone  MRN: [id number]  [place] 1955  Date of evaluation: 1/28/18  PCP:  Quoc Alvarez MD    [address] [place] [address]       Chief Complaint   Patient presents with    Dizziness    Shortness of Breath       HISTORY OF PRESENT ILLNESS  (Location/Symptom, Timing/Onset, Context/Setting, Quality, Duration, Modifying Factors, Severity.)      Remedios Stone is a 60 y.o. female who presents with Dizziness, shortness of breath for 3 days. Patient also states that today, approximately 9:00 on patient started to feel confused and cannot open her bilateral on her IV pad like usual.  Patient denies any slurred speech. She states that she has been having trouble walking, feeling very dizzy like the room is spinning. She has a previous diagnosis of the BPPV. PAST MEDICAL / SURGICAL / SOCIAL / FAMILY HISTORY      has a past medical history of Abnormal Pap smear; Acute on chronic systolic congestive heart failure ([address] AICD (automatic cardioverter/defibrillator) present; MAURO (acute kidney injury) ([address] Anemia; Cardiomyopathy ([address] Cardiomyopathy, nonischemic ([address] Ejection fraction < 50%; HTN (hypertension); Ischemic cardiomyopathy; Left bundle branch block; Mitral regurgitation; Morbid obesity due to excess calories ([address] Pulmonary hypertension; and Sudden onset of severe headache.     has a past surgical history that includes other surgical history (12/20/12); Tubal ligation; and hernia repair. Social History     Social History    Marital status:      Spouse name: N/A    Number of children: 2    Years of education: N/A     Occupational History    Not on file.      Social History Main Topics    Smoking status: Never Smoker    Smokeless tobacco: Never Used    Alcohol use No    Drug use: No    Sexual activity: No     Other Topics Concern    Not on file     Social History Narrative tablet Take 1 tablet by mouth 2 times daily 1/19/18   Tiara Sharp MD   amiodarone (CORDARONE) 200 MG tablet Take 1 tablet by mouth daily 1/19/18   Tiara hSarp MD   potassium chloride 20 MEQ/15ML (10%) oral solution Take 30 mLs by mouth 2 times daily for 14 days 3/20/17 1/19/19  Tiara Sharp MD   metolazone (ZAROXOLYN) 2.5 MG tablet Take 1 tablet by mouth daily 3/7/17   Destiny Kelly MD   aspirin 81 MG tablet Take 1 tablet by mouth daily 5/3/16   Tiara Sharp MD       REVIEW OF SYSTEMS    (2-9 systems for level 4, 10 or more for level 5)      Review of Systems   Constitutional: Negative for chills and fever. HENT: Negative for sore throat and trouble swallowing. Eyes: Negative for photophobia. Respiratory: Negative for cough, chest tightness, shortness of breath and wheezing. Cardiovascular: Negative for chest pain and palpitations. Gastrointestinal: Negative for abdominal pain, nausea and vomiting. Endocrine: Negative for polyuria. Genitourinary: Negative for dysuria and flank pain. Musculoskeletal: Negative for back pain and neck pain. Skin: Negative for rash and wound. Neurological: Negative for syncope, weakness, light-headedness and headaches. Confusion   Psychiatric/Behavioral: Negative for agitation and confusion. PHYSICAL EXAM   (up to 7 for level 4, 8 or more for level 5)      INITIAL VITALS:   /71   Pulse 70   Temp 98.6 °F (37 °C) (Oral)   Resp 23   Wt 237 lb (107.5 kg)   SpO2 100%   BMI 38.25 kg/m²     Physical Exam   Constitutional: She is oriented to person, place, and time. She appears well-developed and well-nourished. HENT:   Head: Normocephalic and atraumatic. Nose: Nose normal.   Mouth/Throat: Oropharynx is clear and moist.   Eyes: EOM are normal. Pupils are equal, round, and reactive to light. Neck: Normal range of motion. Neck supple.    Cardiovascular: Normal rate, regular rhythm, normal heart sounds and intact distal reassess. Labs unremarkable. Patient's symptoms improved with Antivert. We will discharge home with PCP follow-up. PROCEDURES:  None    CONSULTS:  None    CRITICAL CARE:  None    FINAL IMPRESSION      1.  Vertigo          DISPOSITION / PLAN     DISPOSITION Decision To Discharge 01/28/2018 03:49:56 PM      PATIENT REFERRED TO:  Steve Borrego MD  [address] [place], [place] [address]  [phone]    Call in 2 days  As needed      DISCHARGE MEDICATIONS:  Discharge Medication List as of 1/28/2018  3:52 PM          Mavis Griffin MD  Emergency Medicine Resident    (Please note that portions of this note were completed with a voice recognition program.  Efforts were made to edit the dictations but occasionally words are mis-transcribed.)       Mavis Griffin MD  02/01/18 2028

## 2018-02-01 ENCOUNTER — HOSPITAL ENCOUNTER (OUTPATIENT)
Dept: PHYSICAL THERAPY | Age: 63
Setting detail: THERAPIES SERIES
Discharge: HOME | End: 2018-02-01
Payer: MEDICARE

## 2018-02-01 PROCEDURE — 97110 THERAPEUTIC EXERCISES: CPT

## 2018-02-01 ASSESSMENT — ENCOUNTER SYMPTOMS
BACK PAIN: 0
SORE THROAT: 0
ABDOMINAL PAIN: 0
TROUBLE SWALLOWING: 0
SHORTNESS OF BREATH: 0
NAUSEA: 0
VOMITING: 0
CHEST TIGHTNESS: 0
PHOTOPHOBIA: 0
WHEEZING: 0
COUGH: 0

## 2018-02-01 NOTE — FLOWSHEET NOTE
15x   x           Standing        Calf stretch 3x 15sec x Slant board   HR   -    HS curls   - reps   Hip ext   - Tap toe   Hip abd   -      Lateral step up  2\" --    Other: Cont ex per above log, progressed time SciFit, held standing ex due to Pt vertigo. Pt cont to be able to perform SLR actively on L for both sets, note Pt able to perform with less difficulty. Specific Instructions for next treatment: update HEP; progress knee ex, consider modalities for symptom control; focus on HEP as Pt will only be able to attend PT 1x per week most weeks; review previous exercises, isometrics for left hip flexion and abduction           Treatment Charges: Mins Units   []  Modalities     [x]  Ther Exercise 46 3   []  Manual Therapy     []  Ther Activities     []  Aquatics     []  Vasocompression     []  Other     Total Treatment time 46 min        Assessment: [x] Progressing toward goals. [] No change. [] Other:    STG: (to be met in 10 treatments)  1. ? Pain: B knees 4/10 at worst (goal met)  2. ? ROM: R knee 5-115°(progress made 1/2/18), L knee 8-90° (goal met 12/2/18)  3. ? Strength: L hip 3+/5, R hip 4-/5, L knee 4-/5, R knee 4/5  4. ? Function:LEFS 50% impaired  5. Independent with Home Exercise Programs (progress made)  6. Demonstrate Knowledge of fall prevention  Patient goals: \"Painfree, able to sleep at night\"     G-CODE (due 10th Rx)     Functional Limitation: Walking, Mobility  Functional Assessment Used: LEFS  Current Status Modifier: CL 60-80% limited  Goal Status Modifier: CK40-60% limited  Discharge Status Modifier:     Pt. Education:  [x] Yes  [] No  [] Reviewed Prior home exercise program/education. instructions  Method of Education: [x] Verbal  [] Demo  [] Written  Comprehension of Education: 1/29 correct technique, purpose calf stretch  [x] Verbalizes understanding. [] Demonstrates understanding. [] Needs review.   [] Demonstrates/verbalizes home exercise program/education/instructions

## 2018-02-02 ENCOUNTER — OFFICE VISIT (OUTPATIENT)
Dept: OBGYN | Age: 63
End: 2018-02-02
Payer: MEDICARE

## 2018-02-02 ENCOUNTER — HOSPITAL ENCOUNTER (OUTPATIENT)
Age: 63
Setting detail: SPECIMEN
Discharge: HOME | End: 2018-02-02
Payer: MEDICARE

## 2018-02-02 VITALS
BODY MASS INDEX: 36.16 KG/M2 | HEART RATE: 69 BPM | DIASTOLIC BLOOD PRESSURE: 79 MMHG | HEIGHT: 66 IN | WEIGHT: 225 LBS | SYSTOLIC BLOOD PRESSURE: 115 MMHG

## 2018-02-02 DIAGNOSIS — Z01.419 WELL WOMAN EXAM WITH ROUTINE GYNECOLOGICAL EXAM: Primary | ICD-10-CM

## 2018-02-02 DIAGNOSIS — N89.8 VAGINAL DISCHARGE: ICD-10-CM

## 2018-02-02 LAB
DIRECT EXAM: NORMAL
Lab: NORMAL
SPECIMEN DESCRIPTION: NORMAL
STATUS: NORMAL

## 2018-02-02 PROCEDURE — G0101 CA SCREEN;PELVIC/BREAST EXAM: HCPCS | Performed by: OBSTETRICS & GYNECOLOGY

## 2018-02-02 PROCEDURE — 1036F TOBACCO NON-USER: CPT | Performed by: STUDENT IN AN ORGANIZED HEALTH CARE EDUCATION/TRAINING PROGRAM

## 2018-02-02 PROCEDURE — G8484 FLU IMMUNIZE NO ADMIN: HCPCS | Performed by: STUDENT IN AN ORGANIZED HEALTH CARE EDUCATION/TRAINING PROGRAM

## 2018-02-02 PROCEDURE — G8417 CALC BMI ABV UP PARAM F/U: HCPCS | Performed by: STUDENT IN AN ORGANIZED HEALTH CARE EDUCATION/TRAINING PROGRAM

## 2018-02-02 PROCEDURE — 3014F SCREEN MAMMO DOC REV: CPT | Performed by: STUDENT IN AN ORGANIZED HEALTH CARE EDUCATION/TRAINING PROGRAM

## 2018-02-02 PROCEDURE — G8427 DOCREV CUR MEDS BY ELIG CLIN: HCPCS | Performed by: STUDENT IN AN ORGANIZED HEALTH CARE EDUCATION/TRAINING PROGRAM

## 2018-02-02 PROCEDURE — 3017F COLORECTAL CA SCREEN DOC REV: CPT | Performed by: STUDENT IN AN ORGANIZED HEALTH CARE EDUCATION/TRAINING PROGRAM

## 2018-02-02 NOTE — PATIENT INSTRUCTIONS
Patient Education          calcium and vitamin D combination  Pronunciation:  STAR see um and GEORGE ta min D  Brand:  Mushtaq Micaela with D, Caltrate 600 + D, Calvite P & D, Citracal + D, Citrus Calcium with Vitamin D, Os-Chang Extra D3, Oysco D, Oyster Shell Calcium with Vitamin D, Oyster-D, Posture-D H/P, Risacal-D, UpCal D  What is the most important information I should know about calcium and vitamin D combination? Ask a doctor or pharmacist if it is safe for you to take this medicine if you have kidney disease, past or present kidney stones, heart disease, circulation problems, a parathyroid disorder, or if you are pregnant or breast-feeding. Avoid taking any other vitamin or mineral supplements that contain calcium or vitamin D without first talking to your doctor. What is calcium and vitamin D combination? Calcium is a mineral that is found naturally in foods. Calcium is necessary for many normal functions of your body, especially bone formation and maintenance. Vitamin D is important for the absorption of calcium from the stomach and for the functioning of calcium in the body. Calcium and vitamin D combination is used to prevent or to treat a calcium deficiency. Calcium and vitamin D combination may also be used for purposes not listed in this medication guide. What should I discuss with my healthcare provider before taking calcium and vitamin D combination? Ask a doctor or pharmacist if it is safe for you to take this medicine if you have:  · kidney disease;  · past or present kidney stones;  · heart disease;  · cancer;  · high levels of calcium in your blood;  · circulation problems; or  · a parathyroid gland disorder. Do not take calcium and vitamin D without your doctor's advice if you are pregnant or breast-feeding a baby. Your dose needs may be different during this time. How should I take calcium and vitamin D combination?   Use exactly as directed on the label, or as prescribed by your

## 2018-02-02 NOTE — PROGRESS NOTES
arthralgias  Neurological:  negative dizziness, negative weakness  Behavior/Psych: negative depression, negative anxiety    Patient Active Problem List   Diagnosis    Dilated cardiomyopathy ([address]    Acute on chronic systolic congestive heart failure (HCC)    Pre-diabetes    Essential hypertension    PAH (pulmonary artery hypertension) ([address] 34 on Echo     Mitral regurgitation    Chronic headache    Morbid obesity due to excess calories (HCC)    Fatigue due to excessive exertion    AICD (automatic cardioverter/defibrillator) present    AICD discharge    Syncope and collapse secondary to [facility]        Gynecological History:   Age of Menarche: 14  Age of Menopause: 45s     Sexually Active: not sexually active  STD History: past history: trichomonas     Pap History: Last PAP was normal; May/2013. Colposcopy History: denies    Permanent Sterilization: yes - tubal ligation  Reversible Birth Control: no  Hormone Replacement Exposure: no    Obstetrical History:     Obstetric History       T4      L2     SAB0   TAB0   Ectopic0   Molar0   Multiple0   Live Births0       # Outcome Date GA Lbr Severo/2nd Weight Sex Delivery Anes PTL Lv   4 Term      Vag-Spont      3 Term      Vag-Spont      2 Term      Vag-Spont      1 Term      Vag-Spont               Past Medical History:    has a past medical history of Abnormal Pap smear; Acute on chronic systolic congestive heart failure ([address] AICD (automatic cardioverter/defibrillator) present; MAURO (acute kidney injury) ([address] Anemia; Cardiomyopathy ([address] Cardiomyopathy, nonischemic ([address] Ejection fraction < 50%; HTN (hypertension); Ischemic cardiomyopathy; Left bundle branch block; Mitral regurgitation; Morbid obesity due to excess calories ([address] Pulmonary hypertension; and Sudden onset of severe headache. Past Surgical History:    has a past surgical history that includes other surgical history (12); Tubal ligation; and hernia repair.     Allergies: problems were noted     OMM EXAM:  The patient did not complain of a Chief complaint requiring OMM. Chief Complaint:none    Structural Exam: No Interest    Laboratory data:    No results found for this visit on 02/02/18. No results found. ASSESSMENT AND PLAN:    Trung Moreno is a 60 y.o. female [id number]  [facility] was seen today for gynecologic exam.    Diagnoses and all orders for this visit:    Well woman exam with routine gynecological exam    Other orders  -     PAP Smear collected, if WNL will not need another pap per ASCCP guidelines  -     Vaginitis DNA Probe  -     C. Trachomatis / N. Gonorrhoeae, DNA  -     Continue daily MVI   -     Mammogram next August  -     Will need bone density scan at age 71   -     Follow up in 1 year for annual exam, or sooner if needed       Patient Active Problem List    Diagnosis Date Noted    Pre-diabetes 08/08/2014     Priority: High    Essential hypertension 08/08/2014     Priority: High    PAH (pulmonary artery hypertension) ([facility] [address] 34 on Echo  08/08/2014     Priority: High    Acute on chronic systolic congestive heart failure ([address] 02/01/2013     Priority: High    Dilated cardiomyopathy ([address] 12/20/2012     Priority: High    AICD (automatic cardioverter/defibrillator) present 03/04/2017    AICD discharge 03/04/2017    Syncope and collapse secondary to [facility] 03/04/2017    Morbid obesity due to excess calories ([address] 01/15/2017    Fatigue due to excessive exertion     Mitral regurgitation 01/14/2017    Chronic headache 01/14/2017       Follow Up:    Return in about 1 year (around 2/2/2019) for Annual Exam.     Counseling:   · discussed need for repeat pap as per American Society for Colposcopy and Cervical Pathology guidelines. · discussed need for mammograms every 1 year, If >44 yo and last mammogram was negative. · discussed Calcium and Vitamin D dosing. · discussed need for colonoscopy screening as well as onset for bone density testing.   · Hereditary

## 2018-02-05 LAB
C TRACH DNA GENITAL QL NAA+PROBE: NEGATIVE
HPV SAMPLE: NORMAL
HPV SOURCE: NORMAL
HPV, GENOTYPE 16: NOT DETECTED
HPV, GENOTYPE 18: NOT DETECTED
HPV, HIGH RISK OTHER: NOT DETECTED
HPV, INTERPRETATION: NORMAL
N. GONORRHOEAE DNA: NEGATIVE

## 2018-02-09 ENCOUNTER — HOSPITAL ENCOUNTER (OUTPATIENT)
Dept: PHYSICAL THERAPY | Age: 63
Setting detail: THERAPIES SERIES
Discharge: HOME | End: 2018-02-09
Payer: MEDICARE

## 2018-02-13 LAB — CYTOLOGY REPORT: NORMAL

## 2018-02-16 ENCOUNTER — HOSPITAL ENCOUNTER (OUTPATIENT)
Dept: PHYSICAL THERAPY | Age: 63
Setting detail: THERAPIES SERIES
Discharge: HOME | End: 2018-02-16
Payer: MEDICARE

## 2018-02-16 PROCEDURE — 97110 THERAPEUTIC EXERCISES: CPT

## 2018-02-16 NOTE — FLOWSHEET NOTE
[x] Colleen Harding       Outpatient Physical        Therapy       [address]       Phone: [phone]       Fax: [phone] [] [facility] at [address]       Phone: [phone]       Fax: [phone] [] [facility]  [address] [facility]   Phone: [phone]   Fax:  [phone]     Physical Therapy Daily Treatment Note    Date:  2018  Patient Name:  Kenneth Romo    :    MRN: [id number]  Physician: Veronica Negron DO     Insurance: Medicare [facility]  Diagnosis: Primary OA B knees M17.0    Onset Date: 2017   Next Dr. Dede Levy: 2018  Visit# / total visits: 8/10  Cancels/No Shows: 0/0    Subjective:    Pain:  [x] Yes  [] No Location: left hip and bilateral knees    Pain Rating: (0-10 scale) 210 bilateral knees;   Pain altered Tx:  [x] No  [] Yes  Action:  Comments:  Pt reports pain at 2/10 this date. Pt reports has not had any problems with her vertigo. Pt requesting another copy of her HEP handouts. Objective:  Modalities: consider adding for symptom control  Precautions:Left hip symptoms.  Patient reports having a pacemaker/defibulator     Exercises: bilateral knees ; MD instructions for quad strengthening  Exercise   bilateral knees Reps/ Time Weight/ Level  Comments   SciFit 2 10 min ML1.5 x Progressed time 2/   Sitting       LAQ 15 reps 1lb x    Adductor sets 15x 5 sec  x    HS stretch 5 reps 30 sec x reviewed   Sit with abdominal contraction 10x 5 sec x Cues to avoid holding breath          Supine       Quad sets 10x 5sec x Combination quad set/glut set,    Glut sets 10x 5sec x Combination quad set/glut set   SAQ 15 reps 1 lb x    Heel slides  10x 1lb x Orange slide tube   Hip abduction 10x 1lb x Orange slide tube   SLR L 10x R 10x x Indep on L LE    Clamshell 15x  x    Bridge 15x   x           Standing        Calf stretch 3x 15sec x Slant board   HR 15x

## 2018-02-23 ENCOUNTER — HOSPITAL ENCOUNTER (OUTPATIENT)
Dept: PHYSICAL THERAPY | Age: 63
Setting detail: THERAPIES SERIES
Discharge: HOME | End: 2018-02-23
Payer: MEDICARE

## 2018-02-23 PROCEDURE — 97110 THERAPEUTIC EXERCISES: CPT

## 2018-03-02 ENCOUNTER — HOSPITAL ENCOUNTER (OUTPATIENT)
Dept: PHYSICAL THERAPY | Age: 63
Setting detail: THERAPIES SERIES
Discharge: HOME | End: 2018-03-02
Payer: MEDICARE

## 2018-03-02 PROCEDURE — 97110 THERAPEUTIC EXERCISES: CPT

## 2018-03-02 PROCEDURE — G8979 MOBILITY GOAL STATUS: HCPCS

## 2018-03-02 PROCEDURE — G8978 MOBILITY CURRENT STATUS: HCPCS

## 2018-03-02 NOTE — FLOWSHEET NOTE
[x] Feliberto Pelayo       Outpatient Physical        Therapy       [address]       Phone: [phone]       Fax: [phone] [] [facility] at [address] [facility]       Phone: [phone]       Fax: [phone] [] [facility]. [address] [place] [address] [facility]  [address]   Phone: [phone]   Fax:  [phone]     Physical Therapy Daily Treatment Note    Date:  3/2/2018  Patient Name:  Bindu Hobbs    :  1955  MRN: [id number]  Physician: Martha Jones DO     Insurance: Medicare [facility]  Diagnosis: Primary OA B knees M17.0    Onset Date: 2017     Next Dr. Oseguera La: ortho as needed; PCP   Visit# / total visits: 10/10  Cancels/No Shows: 0/0    Subjective:    Pain:  [x] Yes  [] No Location: left hip and bilateral knees    Pain Rating: (0-10 scale) 3/10 bilateral knees;   Pain altered Tx:  [x] No  [] Yes  Action:  Comments:  Pt reports increased pain yesterday, so took a pain pill last night, doesn't seem to be helping this morning. Pt complains of some increased popping in knee when walking, is not painful. Pt reports she would like to work on exercises to stop her limping. Pain at worst 5/10 last night, pain in knees and thighs, unbearable when walks. Objective:  Modalities: consider adding for symptom control  Precautions:Left hip symptoms.  Patient reports having a pacemaker/defibulator     Exercises: bilateral knees ; MD instructions for quad strengthening  Exercise   bilateral knees Reps/ Time Weight/ Level  Comments   SciFit 2 10 min ML1.5 x    Sitting       LAQ 15 reps 1lb x    Adductor sets 15x 5 sec  x    HS stretch 5 reps 30 sec x reviewed   Sit with abdominal contraction 10x 5 sec x Cues to avoid holding breath   Side bends 5x  x Added 3/2          Supine       Quad sets 10x 5sec x Combination quad set/glut set,    Glut sets 10x 5sec x Combination quad set/glut set   SAQ 15 reps 1 lb x Heel slides  15x 1lb x Orange slide tube   Hip abduction 15x 1lb x Orange slide tube, Progressed reps 3/2   SLR L 15x R 15x x w/rest break B   Clamshell 15x  x    Bridge 15x   x           Standing        Calf stretch 3x 15sec x Slant board   HR 15x  x    HS curls 15x  x    Hip ext 15x  x Tap toe, Progressed reps 3/2   Hip abd 15x  x   Progressed reps 3/2   Lateral step up 15x 2\" x Progressed reps 3/2   Hip Hike 10x 4\" x Added 3/2          Other: Note Pt with compensated Trendelenburg gait. Cont ex per above log, progressed reps standing hip abd, hip ext and lat step up. Initiated hip hike off step to improve gait. Pt cont to be able to perform SLR actively on L, note Pt did need a rest break on L this date. LEFS 69% loss of LE function. Specific Instructions for next treatment: progress hip abd strength, update HEP; progress knee ex, consider modalities for symptom control; focus on HEP as Pt will only be able to attend PT 1x per week most weeks; isometrics for left hip flexion and abduction      ROM DEGREES A/P ROM DEGREES A/P STRENGTH  STRENGTH     LEFT  RIGHT LEFT RIGHT   HIP FLEX   3+ 4-   HIPEXT       HIP ABD       HIP ADD              KNEE FLEX 110°      KNEE EXT 7°  4- 4          ANKLE DF                    PF               Treatment Charges: Mins Units   []  Modalities     [x]  Ther Exercise 55 4   []  Manual Therapy     []  Ther Activities     []  Aquatics     []  Vasocompression     []  Other     Total Treatment time 55 min        Assessment: [x] Progressing toward goals. [] No change. [] Other:    STG: (to be met in 10 treatments)  1. ? Pain: B knees 4/10 at worst-Progress Toward  2. ? ROM: R knee 5-115°, L knee 8-90°-Met for L knee, Progress toward R  3. ? Strength: L hip 3+/5, R hip 4-/5, L knee 4-/5, R knee 4/5-Met for B hip flex, knee ext  4. ? Function:LEFS 50% impaired-Progress Toward  5. Independent with Home Exercise Programs -Met for current HEP  6.  Demonstrate Knowledge of fall prevention--Progress Toward    Patient goals: \"Painfree, able to sleep at night\"     G-CODE (updated 10th Rx; due 20th Rx)     Functional Limitation: Walking, Mobility  Functional Assessment Used: LEFS  Current Status Modifier: CL 60-80% limited  Goal Status Modifier: CK40-60% limited  Discharge Status Modifier:     Pt. Education:  [x] Yes  [] No  [] Reviewed Prior home exercise program/education. instructions  Method of Education: [x] Verbal  [] Demo  [] Written  Comprehension of Education: 1/29 correct technique, purpose calf stretch  [x] Verbalizes understanding. [] Demonstrates understanding. [] Needs review. [] Demonstrates/verbalizes home exercise program/education/instructions previously given. 1/15/18: heel raises, HS curls, hip abd/ext, and glut sets     Plan: [x] Continue per plan of care.    [] Other:      Time In: 8705           Time Out: 4850    Electronically signed by:  Zach Spring PT

## 2018-03-09 ENCOUNTER — HOSPITAL ENCOUNTER (OUTPATIENT)
Dept: PHYSICAL THERAPY | Age: 63
Setting detail: THERAPIES SERIES
Discharge: HOME | End: 2018-03-09
Payer: MEDICARE

## 2018-03-09 PROCEDURE — 97110 THERAPEUTIC EXERCISES: CPT

## 2018-03-09 NOTE — FLOWSHEET NOTE
program/education. instructions  Method of Education: [x] Verbal  [] Demo  [] Written  Comprehension of Education: 1/29 correct technique, purpose calf stretch  [x] Verbalizes understanding. [] Demonstrates understanding. [] Needs review. [] Demonstrates/verbalizes home exercise program/education/instructions previously given. 1/15/18: heel raises, HS curls, hip abd/ext, and glut sets     Plan: [x] Continue per plan of care.    [] Other:      Time In: 3119           Time Out: 3484    Electronically signed by:  Izabela Hyatt, PT

## 2018-03-16 ENCOUNTER — HOSPITAL ENCOUNTER (OUTPATIENT)
Dept: PHYSICAL THERAPY | Age: 63
Setting detail: THERAPIES SERIES
Discharge: HOME | End: 2018-03-16
Payer: MEDICARE

## 2018-03-16 PROCEDURE — 97110 THERAPEUTIC EXERCISES: CPT

## 2018-03-16 NOTE — FLOWSHEET NOTE
tube, Progressed weight 3/9   SLR L 15x R 15x x w/rest break B   Clamshell 15x  x    Bridge 15x   x    Hip flexor stretch off side of mat 3x20\"  x Added 3/16   SL hip abd    add          Standing        Calf stretch 3x 20 sec x Slant board, progressed hold time 3/9   HR 15x  x    HS curls 15x  x    Hip ext 15x  x Tap toe   Hip abd 15x  x      Lateral step up 15x 2\" x    Hip Hike 10x 4\" x    Lat lunges 10x  x Added 3/9          Other: Completed exercises marked with \"x\" Added hip flexor stretch and added ball/hip adduction with LAQ for VMO focus. ENCOURAGED PT TO ADDRESS HEP AT LEAST 2X WK. Specific Instructions for next treatment: progress hip abd strength, update HEP; progress knee ex, consider modalities for symptom control; focus on HEP as Pt will only be able to attend PT 1x per week most weeks; add sidelying abduction         Treatment Charges: Mins Units   []  Modalities     [x]  Ther Exercise 52 3   []  Manual Therapy     []  Ther Activities     []  Aquatics     []  Vasocompression     []  Other     Total Treatment time 52 min 3       Assessment: [x] Progressing toward goals Added exercises/stretches  per log pt good tolerance. Pt reports no pain post session. Reports no pain post session. [] No change. [] Other:    STG: (to be met in 10 treatments)  1. ? Pain: B knees 4/10 at worst-Progress Toward  2. ? ROM: R knee 5-115°, L knee 8-90°-Met for L knee, Progress toward R  3. ? Strength: L hip 3+/5, R hip 4-/5, L knee 4-/5, R knee 4/5-Met for B hip flex, knee ext  4. ? Function:LEFS 50% impaired-Progress Toward  5. Independent with Home Exercise Programs -Met for current HEP  6.  Demonstrate Knowledge of fall prevention--Progress Toward    Patient goals: \"Painfree, able to sleep at night\"     G-CODE (updated 10th Rx; due 20th Rx)     Functional Limitation: Walking, Mobility  Functional Assessment Used: LEFS  Current Status Modifier: CL 60-80% limited  Goal Status Modifier: CK40-60% limited  Discharge Status Modifier:     Pt. Education:  [x] Yes  [] No  [x] Reviewed Prior home exercise program/education. instructions  Method of Education: [x] Verbal  [] Demo  [] Written  Comprehension of Education: 1/29 correct technique, purpose calf stretch  [x] Verbalizes understanding. [] Demonstrates understanding. [] Needs review. [] Demonstrates/verbalizes home exercise program/education/instructions previously given. 1/15/18: heel raises, HS curls, hip abd/ext, and glut sets     Plan: [x] Continue per plan of care.    [] Other:      Time In:   1687     AM    Time Out:   0800    AM    Electronically signed by:  Taz Avina PTA

## 2018-04-03 ENCOUNTER — HOSPITAL ENCOUNTER (OUTPATIENT)
Age: 63
Discharge: HOME | End: 2018-04-03
Payer: MEDICARE

## 2018-04-03 ENCOUNTER — HOSPITAL ENCOUNTER (OUTPATIENT)
Dept: PHYSICAL THERAPY | Age: 63
Setting detail: THERAPIES SERIES
Discharge: HOME | End: 2018-04-03
Payer: MEDICARE

## 2018-04-03 LAB
ANION GAP SERPL CALCULATED.3IONS-SCNC: 14 MMOL/L (ref 9–17)
BUN BLDV-MCNC: 16 MG/DL (ref 8–23)
BUN/CREAT BLD: ABNORMAL (ref 9–20)
CALCIUM SERPL-MCNC: 9.5 MG/DL (ref 8.6–10.4)
CHLORIDE BLD-SCNC: 101 MMOL/L (ref 98–107)
CO2: 24 MMOL/L (ref 20–31)
CREAT SERPL-MCNC: 0.79 MG/DL (ref 0.5–0.9)
GFR AFRICAN AMERICAN: >60 ML/MIN
GFR NON-AFRICAN AMERICAN: >60 ML/MIN
GFR SERPL CREATININE-BSD FRML MDRD: ABNORMAL ML/MIN/{1.73_M2}
GFR SERPL CREATININE-BSD FRML MDRD: ABNORMAL ML/MIN/{1.73_M2}
GLUCOSE BLD-MCNC: 175 MG/DL (ref 70–99)
POTASSIUM SERPL-SCNC: 3.2 MMOL/L (ref 3.7–5.3)
SODIUM BLD-SCNC: 139 MMOL/L (ref 135–144)

## 2018-04-03 PROCEDURE — 80048 BASIC METABOLIC PNL TOTAL CA: CPT

## 2018-04-03 PROCEDURE — 97110 THERAPEUTIC EXERCISES: CPT

## 2018-04-03 PROCEDURE — 36415 COLL VENOUS BLD VENIPUNCTURE: CPT

## 2018-04-13 ENCOUNTER — HOSPITAL ENCOUNTER (OUTPATIENT)
Dept: PHYSICAL THERAPY | Age: 63
Setting detail: THERAPIES SERIES
Discharge: HOME | End: 2018-04-13
Payer: MEDICARE

## 2018-04-13 ENCOUNTER — HOSPITAL ENCOUNTER (OUTPATIENT)
Age: 63
Discharge: HOME | End: 2018-04-13
Payer: MEDICARE

## 2018-04-13 LAB
ANION GAP SERPL CALCULATED.3IONS-SCNC: 14 MMOL/L (ref 9–17)
BUN BLDV-MCNC: 16 MG/DL (ref 8–23)
BUN/CREAT BLD: ABNORMAL (ref 9–20)
CALCIUM SERPL-MCNC: 10.1 MG/DL (ref 8.6–10.4)
CHLORIDE BLD-SCNC: 104 MMOL/L (ref 98–107)
CO2: 24 MMOL/L (ref 20–31)
CREAT SERPL-MCNC: 0.83 MG/DL (ref 0.5–0.9)
GFR AFRICAN AMERICAN: >60 ML/MIN
GFR NON-AFRICAN AMERICAN: >60 ML/MIN
GFR SERPL CREATININE-BSD FRML MDRD: ABNORMAL ML/MIN/{1.73_M2}
GFR SERPL CREATININE-BSD FRML MDRD: ABNORMAL ML/MIN/{1.73_M2}
GLUCOSE BLD-MCNC: 104 MG/DL (ref 70–99)
POTASSIUM SERPL-SCNC: 4.1 MMOL/L (ref 3.7–5.3)
SODIUM BLD-SCNC: 142 MMOL/L (ref 135–144)

## 2018-04-13 PROCEDURE — 36415 COLL VENOUS BLD VENIPUNCTURE: CPT

## 2018-04-13 PROCEDURE — 97110 THERAPEUTIC EXERCISES: CPT

## 2018-04-13 PROCEDURE — 80048 BASIC METABOLIC PNL TOTAL CA: CPT

## 2018-04-27 ENCOUNTER — HOSPITAL ENCOUNTER (OUTPATIENT)
Dept: PHYSICAL THERAPY | Age: 63
Setting detail: THERAPIES SERIES
Discharge: HOME | End: 2018-04-27
Payer: MEDICARE

## 2018-04-27 PROCEDURE — 97110 THERAPEUTIC EXERCISES: CPT

## 2018-05-04 ENCOUNTER — HOSPITAL ENCOUNTER (OUTPATIENT)
Dept: PHYSICAL THERAPY | Age: 63
Setting detail: THERAPIES SERIES
Discharge: HOME | End: 2018-05-04
Payer: MEDICARE

## 2018-05-04 PROCEDURE — 97110 THERAPEUTIC EXERCISES: CPT

## 2018-05-04 PROCEDURE — G8979 MOBILITY GOAL STATUS: HCPCS

## 2018-05-04 PROCEDURE — G8978 MOBILITY CURRENT STATUS: HCPCS

## 2018-05-11 ENCOUNTER — HOSPITAL ENCOUNTER (OUTPATIENT)
Dept: PHYSICAL THERAPY | Age: 63
Setting detail: THERAPIES SERIES
Discharge: HOME | End: 2018-05-11
Payer: MEDICARE

## 2018-05-18 ENCOUNTER — HOSPITAL ENCOUNTER (OUTPATIENT)
Age: 63
Setting detail: OBSERVATION
Discharge: HOME | End: 2018-05-19
Attending: EMERGENCY MEDICINE | Admitting: EMERGENCY MEDICINE
Payer: MEDICARE

## 2018-05-18 ENCOUNTER — APPOINTMENT (OUTPATIENT)
Dept: GENERAL RADIOLOGY | Age: 63
End: 2018-05-18
Payer: MEDICARE

## 2018-05-18 DIAGNOSIS — R07.9 CHEST PAIN, UNSPECIFIED TYPE: ICD-10-CM

## 2018-05-18 DIAGNOSIS — E87.6 HYPOKALEMIA: Primary | ICD-10-CM

## 2018-05-18 LAB
ABSOLUTE EOS #: 0.13 K/UL (ref 0–0.44)
ABSOLUTE IMMATURE GRANULOCYTE: <0.03 K/UL (ref 0–0.3)
ABSOLUTE LYMPH #: 1.62 K/UL (ref 1.1–3.7)
ABSOLUTE MONO #: 0.84 K/UL (ref 0.1–1.2)
ANION GAP SERPL CALCULATED.3IONS-SCNC: 13 MMOL/L (ref 9–17)
BASOPHILS # BLD: 0 % (ref 0–2)
BASOPHILS ABSOLUTE: 0.03 K/UL (ref 0–0.2)
BNP INTERPRETATION: ABNORMAL
BUN BLDV-MCNC: 22 MG/DL (ref 8–23)
BUN/CREAT BLD: ABNORMAL (ref 9–20)
CALCIUM SERPL-MCNC: 9.2 MG/DL (ref 8.6–10.4)
CHLORIDE BLD-SCNC: 98 MMOL/L (ref 98–107)
CO2: 28 MMOL/L (ref 20–31)
CREAT SERPL-MCNC: 0.83 MG/DL (ref 0.5–0.9)
DIFFERENTIAL TYPE: ABNORMAL
EOSINOPHILS RELATIVE PERCENT: 2 % (ref 1–4)
GFR AFRICAN AMERICAN: >60 ML/MIN
GFR NON-AFRICAN AMERICAN: >60 ML/MIN
GFR SERPL CREATININE-BSD FRML MDRD: ABNORMAL ML/MIN/{1.73_M2}
GFR SERPL CREATININE-BSD FRML MDRD: ABNORMAL ML/MIN/{1.73_M2}
GLUCOSE BLD-MCNC: 118 MG/DL (ref 70–99)
HCT VFR BLD CALC: 37.2 % (ref 36.3–47.1)
HEMOGLOBIN: 13.1 G/DL (ref 11.9–15.1)
IMMATURE GRANULOCYTES: 0 %
LYMPHOCYTES # BLD: 21 % (ref 24–43)
MCH RBC QN AUTO: 29.6 PG (ref 25.2–33.5)
MCHC RBC AUTO-ENTMCNC: 35.2 G/DL (ref 28.4–34.8)
MCV RBC AUTO: 84 FL (ref 82.6–102.9)
MONOCYTES # BLD: 11 % (ref 3–12)
NRBC AUTOMATED: 0 PER 100 WBC
PDW BLD-RTO: 12.7 % (ref 11.8–14.4)
PLATELET # BLD: 271 K/UL (ref 138–453)
PLATELET ESTIMATE: ABNORMAL
PMV BLD AUTO: 11.2 FL (ref 8.1–13.5)
POC TROPONIN I: 0.01 NG/ML (ref 0–0.1)
POC TROPONIN INTERP: NORMAL
POTASSIUM SERPL-SCNC: 2.9 MMOL/L (ref 3.7–5.3)
POTASSIUM SERPL-SCNC: 3.4 MMOL/L (ref 3.7–5.3)
PRO-BNP: 701 PG/ML
RBC # BLD: 4.43 M/UL (ref 3.95–5.11)
RBC # BLD: ABNORMAL 10*6/UL
SEG NEUTROPHILS: 66 % (ref 36–65)
SEGMENTED NEUTROPHILS ABSOLUTE COUNT: 4.98 K/UL (ref 1.5–8.1)
SODIUM BLD-SCNC: 139 MMOL/L (ref 135–144)
TROPONIN INTERP: NORMAL
TROPONIN INTERP: NORMAL
TROPONIN T: <0.03 NG/ML
TROPONIN T: <0.03 NG/ML
WBC # BLD: 7.6 K/UL (ref 3.5–11.3)
WBC # BLD: ABNORMAL 10*3/UL

## 2018-05-18 PROCEDURE — 99285 EMERGENCY DEPT VISIT HI MDM: CPT

## 2018-05-18 PROCEDURE — G0378 HOSPITAL OBSERVATION PER HR: HCPCS

## 2018-05-18 PROCEDURE — 6370000000 HC RX 637 (ALT 250 FOR IP): Performed by: EMERGENCY MEDICINE

## 2018-05-18 PROCEDURE — 93005 ELECTROCARDIOGRAM TRACING: CPT

## 2018-05-18 PROCEDURE — 80048 BASIC METABOLIC PNL TOTAL CA: CPT

## 2018-05-18 PROCEDURE — 71045 X-RAY EXAM CHEST 1 VIEW: CPT

## 2018-05-18 PROCEDURE — 36415 COLL VENOUS BLD VENIPUNCTURE: CPT

## 2018-05-18 PROCEDURE — 84484 ASSAY OF TROPONIN QUANT: CPT

## 2018-05-18 PROCEDURE — 85025 COMPLETE CBC W/AUTO DIFF WBC: CPT

## 2018-05-18 PROCEDURE — 2580000003 HC RX 258: Performed by: EMERGENCY MEDICINE

## 2018-05-18 PROCEDURE — 83880 ASSAY OF NATRIURETIC PEPTIDE: CPT

## 2018-05-18 PROCEDURE — 84132 ASSAY OF SERUM POTASSIUM: CPT

## 2018-05-18 RX ORDER — POTASSIUM CHLORIDE 20MEQ/15ML
40 LIQUID (ML) ORAL PRN
Status: DISCONTINUED | OUTPATIENT
Start: 2018-05-18 | End: 2018-05-19 | Stop reason: HOSPADM

## 2018-05-18 RX ORDER — ASPIRIN 81 MG/1
TABLET, CHEWABLE ORAL
Status: DISPENSED
Start: 2018-05-18 | End: 2018-05-18

## 2018-05-18 RX ORDER — POTASSIUM CHLORIDE 600 MG/1
20 TABLET, FILM COATED, EXTENDED RELEASE ORAL 2 TIMES DAILY WITH MEALS
Status: DISCONTINUED | OUTPATIENT
Start: 2018-05-18 | End: 2018-05-18

## 2018-05-18 RX ORDER — POTASSIUM CHLORIDE 20 MEQ/1
40 TABLET, EXTENDED RELEASE ORAL PRN
Status: DISCONTINUED | OUTPATIENT
Start: 2018-05-18 | End: 2018-05-19 | Stop reason: HOSPADM

## 2018-05-18 RX ORDER — NITROGLYCERIN 0.4 MG/1
0.4 TABLET SUBLINGUAL EVERY 5 MIN PRN
Status: DISCONTINUED | OUTPATIENT
Start: 2018-05-18 | End: 2018-05-19 | Stop reason: HOSPADM

## 2018-05-18 RX ORDER — SPIRONOLACTONE 25 MG/1
12.5 TABLET ORAL DAILY
Status: DISCONTINUED | OUTPATIENT
Start: 2018-05-18 | End: 2018-05-19 | Stop reason: HOSPADM

## 2018-05-18 RX ORDER — POTASSIUM CHLORIDE 20 MEQ/1
40 TABLET, EXTENDED RELEASE ORAL ONCE
Status: COMPLETED | OUTPATIENT
Start: 2018-05-18 | End: 2018-05-18

## 2018-05-18 RX ORDER — UREA 10 %
6 LOTION (ML) TOPICAL NIGHTLY PRN
Status: DISCONTINUED | OUTPATIENT
Start: 2018-05-18 | End: 2018-05-19 | Stop reason: HOSPADM

## 2018-05-18 RX ORDER — ACETAMINOPHEN 325 MG/1
650 TABLET ORAL EVERY 4 HOURS PRN
Status: DISCONTINUED | OUTPATIENT
Start: 2018-05-18 | End: 2018-05-19 | Stop reason: HOSPADM

## 2018-05-18 RX ORDER — SODIUM CHLORIDE 0.9 % (FLUSH) 0.9 %
10 SYRINGE (ML) INJECTION PRN
Status: DISCONTINUED | OUTPATIENT
Start: 2018-05-18 | End: 2018-05-19 | Stop reason: HOSPADM

## 2018-05-18 RX ORDER — METOLAZONE 2.5 MG/1
2.5 TABLET ORAL DAILY
Status: DISCONTINUED | OUTPATIENT
Start: 2018-05-18 | End: 2018-05-19 | Stop reason: HOSPADM

## 2018-05-18 RX ORDER — LOSARTAN POTASSIUM 25 MG/1
25 TABLET ORAL DAILY
Status: DISCONTINUED | OUTPATIENT
Start: 2018-05-18 | End: 2018-05-19 | Stop reason: HOSPADM

## 2018-05-18 RX ORDER — AMIODARONE HYDROCHLORIDE 200 MG/1
200 TABLET ORAL DAILY
Status: DISCONTINUED | OUTPATIENT
Start: 2018-05-18 | End: 2018-05-19 | Stop reason: HOSPADM

## 2018-05-18 RX ORDER — METOPROLOL TARTRATE 50 MG/1
50 TABLET, FILM COATED ORAL 2 TIMES DAILY
Status: DISCONTINUED | OUTPATIENT
Start: 2018-05-18 | End: 2018-05-19 | Stop reason: HOSPADM

## 2018-05-18 RX ORDER — ONDANSETRON 4 MG/1
4 TABLET, ORALLY DISINTEGRATING ORAL EVERY 8 HOURS PRN
Status: DISCONTINUED | OUTPATIENT
Start: 2018-05-18 | End: 2018-05-19 | Stop reason: HOSPADM

## 2018-05-18 RX ORDER — ASPIRIN 81 MG/1
324 TABLET, CHEWABLE ORAL ONCE
Status: COMPLETED | OUTPATIENT
Start: 2018-05-18 | End: 2018-05-18

## 2018-05-18 RX ORDER — ASPIRIN 81 MG/1
81 TABLET, CHEWABLE ORAL DAILY
Status: DISCONTINUED | OUTPATIENT
Start: 2018-05-18 | End: 2018-05-19 | Stop reason: HOSPADM

## 2018-05-18 RX ORDER — POTASSIUM CHLORIDE 1500 MG/1
20 TABLET, FILM COATED, EXTENDED RELEASE ORAL 2 TIMES DAILY WITH MEALS
Status: DISCONTINUED | OUTPATIENT
Start: 2018-05-18 | End: 2018-05-19 | Stop reason: HOSPADM

## 2018-05-18 RX ORDER — FUROSEMIDE 20 MG/1
20 TABLET ORAL DAILY
Status: DISCONTINUED | OUTPATIENT
Start: 2018-05-18 | End: 2018-05-19 | Stop reason: HOSPADM

## 2018-05-18 RX ORDER — POTASSIUM CHLORIDE 7.45 MG/ML
10 INJECTION INTRAVENOUS PRN
Status: DISCONTINUED | OUTPATIENT
Start: 2018-05-18 | End: 2018-05-19 | Stop reason: HOSPADM

## 2018-05-18 RX ORDER — SODIUM CHLORIDE 0.9 % (FLUSH) 0.9 %
10 SYRINGE (ML) INJECTION EVERY 12 HOURS SCHEDULED
Status: DISCONTINUED | OUTPATIENT
Start: 2018-05-18 | End: 2018-05-19 | Stop reason: HOSPADM

## 2018-05-18 RX ADMIN — NITROGLYCERIN 0.4 MG: 0.4 TABLET SUBLINGUAL at 11:34

## 2018-05-18 RX ADMIN — Medication 10 ML: at 21:31

## 2018-05-18 RX ADMIN — METOLAZONE 2.5 MG: 2.5 TABLET ORAL at 15:23

## 2018-05-18 RX ADMIN — ASPIRIN 81 MG 324 MG: 81 TABLET ORAL at 11:33

## 2018-05-18 RX ADMIN — FUROSEMIDE 20 MG: 20 TABLET ORAL at 15:23

## 2018-05-18 RX ADMIN — AMIODARONE HYDROCHLORIDE 200 MG: 200 TABLET ORAL at 15:23

## 2018-05-18 RX ADMIN — SPIRONOLACTONE 12.5 MG: 25 TABLET ORAL at 15:24

## 2018-05-18 RX ADMIN — POTASSIUM CHLORIDE 40 MEQ: 20 TABLET, EXTENDED RELEASE ORAL at 12:28

## 2018-05-18 RX ADMIN — ASPIRIN 81 MG: 81 TABLET, CHEWABLE ORAL at 15:24

## 2018-05-18 RX ADMIN — POTASSIUM CHLORIDE 20 MEQ: 20 TABLET, EXTENDED RELEASE ORAL at 21:29

## 2018-05-18 RX ADMIN — NITROGLYCERIN 0.4 MG: 0.4 TABLET SUBLINGUAL at 12:28

## 2018-05-18 RX ADMIN — ACETAMINOPHEN 650 MG: 325 TABLET ORAL at 14:54

## 2018-05-18 RX ADMIN — METOPROLOL TARTRATE 50 MG: 50 TABLET, FILM COATED ORAL at 15:23

## 2018-05-18 ASSESSMENT — PAIN DESCRIPTION - DESCRIPTORS: DESCRIPTORS: SHOOTING

## 2018-05-18 ASSESSMENT — ENCOUNTER SYMPTOMS
COLOR CHANGE: 0
VOMITING: 0
ABDOMINAL PAIN: 0
EYE PAIN: 0
NAUSEA: 0
COUGH: 0

## 2018-05-18 ASSESSMENT — PAIN DESCRIPTION - ORIENTATION: ORIENTATION: MID;UPPER;LEFT

## 2018-05-18 ASSESSMENT — PAIN DESCRIPTION - LOCATION: LOCATION: CHEST

## 2018-05-18 ASSESSMENT — PAIN SCALES - GENERAL
PAINLEVEL_OUTOF10: 3
PAINLEVEL_OUTOF10: 0

## 2018-05-18 ASSESSMENT — PAIN DESCRIPTION - PAIN TYPE: TYPE: ACUTE PAIN

## 2018-05-18 ASSESSMENT — PAIN DESCRIPTION - FREQUENCY: FREQUENCY: INTERMITTENT

## 2018-05-19 VITALS
SYSTOLIC BLOOD PRESSURE: 107 MMHG | HEIGHT: 66 IN | WEIGHT: 232.5 LBS | RESPIRATION RATE: 18 BRPM | BODY MASS INDEX: 37.37 KG/M2 | TEMPERATURE: 98.4 F | HEART RATE: 69 BPM | DIASTOLIC BLOOD PRESSURE: 66 MMHG | OXYGEN SATURATION: 96 %

## 2018-05-19 LAB
ANION GAP SERPL CALCULATED.3IONS-SCNC: 13 MMOL/L (ref 9–17)
BUN BLDV-MCNC: 25 MG/DL (ref 8–23)
BUN/CREAT BLD: ABNORMAL (ref 9–20)
CALCIUM SERPL-MCNC: 9.3 MG/DL (ref 8.6–10.4)
CHLORIDE BLD-SCNC: 96 MMOL/L (ref 98–107)
CO2: 27 MMOL/L (ref 20–31)
CREAT SERPL-MCNC: 0.91 MG/DL (ref 0.5–0.9)
EKG ATRIAL RATE: 71 BPM
EKG ATRIAL RATE: 83 BPM
EKG P AXIS: -26 DEGREES
EKG P AXIS: 4 DEGREES
EKG P-R INTERVAL: 176 MS
EKG P-R INTERVAL: 176 MS
EKG Q-T INTERVAL: 504 MS
EKG Q-T INTERVAL: 526 MS
EKG QRS DURATION: 196 MS
EKG QRS DURATION: 198 MS
EKG QTC CALCULATION (BAZETT): 544 MS
EKG QTC CALCULATION (BAZETT): 571 MS
EKG R AXIS: -102 DEGREES
EKG R AXIS: -119 DEGREES
EKG T AXIS: 75 DEGREES
EKG T AXIS: 91 DEGREES
EKG VENTRICULAR RATE: 70 BPM
EKG VENTRICULAR RATE: 71 BPM
GFR AFRICAN AMERICAN: >60 ML/MIN
GFR NON-AFRICAN AMERICAN: >60 ML/MIN
GFR SERPL CREATININE-BSD FRML MDRD: ABNORMAL ML/MIN/{1.73_M2}
GFR SERPL CREATININE-BSD FRML MDRD: ABNORMAL ML/MIN/{1.73_M2}
GLUCOSE BLD-MCNC: 126 MG/DL (ref 70–99)
LV EF: 13 %
LVEF MODALITY: NORMAL
MAGNESIUM: 1.8 MG/DL (ref 1.6–2.6)
POTASSIUM SERPL-SCNC: 3.2 MMOL/L (ref 3.7–5.3)
POTASSIUM SERPL-SCNC: 3.8 MMOL/L (ref 3.7–5.3)
SODIUM BLD-SCNC: 136 MMOL/L (ref 135–144)
TROPONIN INTERP: NORMAL
TROPONIN INTERP: NORMAL
TROPONIN T: <0.03 NG/ML
TROPONIN T: <0.03 NG/ML

## 2018-05-19 PROCEDURE — 84484 ASSAY OF TROPONIN QUANT: CPT

## 2018-05-19 PROCEDURE — 6370000000 HC RX 637 (ALT 250 FOR IP): Performed by: STUDENT IN AN ORGANIZED HEALTH CARE EDUCATION/TRAINING PROGRAM

## 2018-05-19 PROCEDURE — G0378 HOSPITAL OBSERVATION PER HR: HCPCS

## 2018-05-19 PROCEDURE — 6370000000 HC RX 637 (ALT 250 FOR IP): Performed by: EMERGENCY MEDICINE

## 2018-05-19 PROCEDURE — 36415 COLL VENOUS BLD VENIPUNCTURE: CPT

## 2018-05-19 PROCEDURE — 93005 ELECTROCARDIOGRAM TRACING: CPT

## 2018-05-19 PROCEDURE — 84132 ASSAY OF SERUM POTASSIUM: CPT

## 2018-05-19 PROCEDURE — 2580000003 HC RX 258: Performed by: EMERGENCY MEDICINE

## 2018-05-19 PROCEDURE — 93306 TTE W/DOPPLER COMPLETE: CPT

## 2018-05-19 PROCEDURE — 83735 ASSAY OF MAGNESIUM: CPT

## 2018-05-19 PROCEDURE — 80048 BASIC METABOLIC PNL TOTAL CA: CPT

## 2018-05-19 RX ORDER — POTASSIUM CHLORIDE 1500 MG/1
20 TABLET, FILM COATED, EXTENDED RELEASE ORAL 2 TIMES DAILY WITH MEALS
Qty: 30 TABLET | Refills: 0 | Status: SHIPPED | OUTPATIENT
Start: 2018-05-19 | End: 2019-02-18 | Stop reason: SDUPTHER

## 2018-05-19 RX ORDER — POTASSIUM CHLORIDE 1500 MG/1
20 TABLET, FILM COATED, EXTENDED RELEASE ORAL
Qty: 60 TABLET | Refills: 0 | Status: SHIPPED | OUTPATIENT
Start: 2018-05-19 | End: 2018-05-22 | Stop reason: SDUPTHER

## 2018-05-19 RX ADMIN — POTASSIUM CHLORIDE 20 MEQ: 20 TABLET, EXTENDED RELEASE ORAL at 09:00

## 2018-05-19 RX ADMIN — METOPROLOL TARTRATE 50 MG: 50 TABLET, FILM COATED ORAL at 08:55

## 2018-05-19 RX ADMIN — METOLAZONE 2.5 MG: 2.5 TABLET ORAL at 08:57

## 2018-05-19 RX ADMIN — AMIODARONE HYDROCHLORIDE 200 MG: 200 TABLET ORAL at 08:55

## 2018-05-19 RX ADMIN — SPIRONOLACTONE 12.5 MG: 25 TABLET ORAL at 08:55

## 2018-05-19 RX ADMIN — ASPIRIN 81 MG: 81 TABLET, CHEWABLE ORAL at 08:58

## 2018-05-19 RX ADMIN — Medication 10 ML: at 08:56

## 2018-05-19 RX ADMIN — POTASSIUM CHLORIDE 40 MEQ: 20 TABLET, EXTENDED RELEASE ORAL at 08:55

## 2018-05-22 ENCOUNTER — OFFICE VISIT (OUTPATIENT)
Dept: INTERNAL MEDICINE | Age: 63
End: 2018-05-22
Payer: MEDICARE

## 2018-05-22 VITALS
SYSTOLIC BLOOD PRESSURE: 129 MMHG | BODY MASS INDEX: 37.28 KG/M2 | DIASTOLIC BLOOD PRESSURE: 75 MMHG | HEART RATE: 74 BPM | HEIGHT: 66 IN | WEIGHT: 232 LBS

## 2018-05-22 DIAGNOSIS — E87.6 HYPOKALEMIA: Primary | ICD-10-CM

## 2018-05-22 DIAGNOSIS — I42.0 DILATED CARDIOMYOPATHY (HCC): Chronic | ICD-10-CM

## 2018-05-22 PROCEDURE — 1036F TOBACCO NON-USER: CPT | Performed by: INTERNAL MEDICINE

## 2018-05-22 PROCEDURE — 99213 OFFICE O/P EST LOW 20 MIN: CPT | Performed by: INTERNAL MEDICINE

## 2018-05-22 PROCEDURE — G8427 DOCREV CUR MEDS BY ELIG CLIN: HCPCS | Performed by: INTERNAL MEDICINE

## 2018-05-22 PROCEDURE — 3017F COLORECTAL CA SCREEN DOC REV: CPT | Performed by: INTERNAL MEDICINE

## 2018-05-22 PROCEDURE — G8417 CALC BMI ABV UP PARAM F/U: HCPCS | Performed by: INTERNAL MEDICINE

## 2018-05-22 RX ORDER — MELOXICAM 7.5 MG/1
TABLET ORAL
Qty: 10 TABLET | Refills: 1 | Status: SHIPPED | OUTPATIENT
Start: 2018-05-22 | End: 2019-01-07 | Stop reason: SDUPTHER

## 2018-06-25 ENCOUNTER — HOSPITAL ENCOUNTER (OUTPATIENT)
Age: 63
Discharge: HOME | End: 2018-06-25
Payer: MEDICARE

## 2018-06-25 LAB
ANION GAP SERPL CALCULATED.3IONS-SCNC: 14 MMOL/L (ref 9–17)
BUN BLDV-MCNC: 24 MG/DL (ref 8–23)
BUN/CREAT BLD: ABNORMAL (ref 9–20)
CALCIUM SERPL-MCNC: 9.4 MG/DL (ref 8.6–10.4)
CHLORIDE BLD-SCNC: 103 MMOL/L (ref 98–107)
CO2: 23 MMOL/L (ref 20–31)
CREAT SERPL-MCNC: 0.96 MG/DL (ref 0.5–0.9)
GFR AFRICAN AMERICAN: >60 ML/MIN
GFR NON-AFRICAN AMERICAN: 59 ML/MIN
GFR SERPL CREATININE-BSD FRML MDRD: ABNORMAL ML/MIN/{1.73_M2}
GFR SERPL CREATININE-BSD FRML MDRD: ABNORMAL ML/MIN/{1.73_M2}
GLUCOSE BLD-MCNC: 100 MG/DL (ref 70–99)
POTASSIUM SERPL-SCNC: 3.9 MMOL/L (ref 3.7–5.3)
SODIUM BLD-SCNC: 140 MMOL/L (ref 135–144)

## 2018-06-25 PROCEDURE — 80048 BASIC METABOLIC PNL TOTAL CA: CPT

## 2018-06-25 PROCEDURE — 36415 COLL VENOUS BLD VENIPUNCTURE: CPT

## 2018-07-11 ENCOUNTER — TELEPHONE (OUTPATIENT)
Dept: INTERNAL MEDICINE | Age: 63
End: 2018-07-11

## 2018-07-23 RX ORDER — LOSARTAN POTASSIUM 25 MG/1
TABLET ORAL
Qty: 30 TABLET | Refills: 3 | Status: SHIPPED | OUTPATIENT
Start: 2018-07-23 | End: 2019-02-18 | Stop reason: SDUPTHER

## 2018-07-23 NOTE — TELEPHONE ENCOUNTER
escrinayla request for losartan (COZAAR) 25 MG tablet, future appt scheduled      Health Maintenance   Topic Date Due    DTaP/Tdap/Td vaccine (1 - Tdap) 04/24/1973    Pneumococcal med risk (1 of 1 - PPSV23) 04/24/1973    Shingles Vaccine (1 of 2 - 2 Dose Series) 04/24/2004    Hepatitis C screen  08/09/2018 (Originally 1955)    HIV screen  08/09/2018 (Originally 4/24/1970)    Flu vaccine (1) 09/01/2018    TSH testing  09/17/2018    A1C test (Diabetic or Prediabetic)  01/19/2019    Potassium monitoring  06/25/2019    Creatinine monitoring  06/25/2019    Breast cancer screen  08/28/2019    Colon cancer screen colonoscopy  02/23/2020    Lipid screen  11/07/2021    Cervical cancer screen  02/02/2023             (applicable per patient's age: Cancer Screenings, Depression Screening, Fall Risk Screening, Immunizations)    Hemoglobin A1C (%)   Date Value   01/19/2018 6.3   01/15/2017 5.8   05/03/2016 6.0     LDL Cholesterol (mg/dL)   Date Value   11/07/2016 122     AST (U/L)   Date Value   08/09/2017 38 (H)     ALT (U/L)   Date Value   08/09/2017 29     BUN (mg/dL)   Date Value   06/25/2018 24 (H)      (goal A1C is < 7)   (goal LDL is <100) need 30-50% reduction from baseline     BP Readings from Last 3 Encounters:   05/22/18 129/75   05/19/18 107/66   02/02/18 115/79    (goal /80)      All Future Testing planned in [facility]:  Lab Frequency Next Occurrence   Potassium Once 09/02/2018       Next Visit Date:  Future Appointments  Date Time Provider Yann Olivares   8/6/2018 1:30 PM Pato Diaz MD [id number]            Patient Active Problem List:     Dilated cardiomyopathy ([address]     Acute on chronic systolic congestive heart failure ([address]     Pre-diabetes     Essential hypertension     PAH (pulmonary artery hypertension) ([address] 34 on Echo      Mitral regurgitation     Chronic headache     Morbid obesity due to excess calories (HCC)     Fatigue due to excessive exertion     AICD (automatic

## 2018-08-07 ENCOUNTER — OFFICE VISIT (OUTPATIENT)
Dept: INTERNAL MEDICINE | Age: 63
End: 2018-08-07
Payer: MEDICARE

## 2018-08-07 VITALS
DIASTOLIC BLOOD PRESSURE: 79 MMHG | WEIGHT: 230 LBS | HEART RATE: 71 BPM | SYSTOLIC BLOOD PRESSURE: 114 MMHG | HEIGHT: 66 IN | BODY MASS INDEX: 36.96 KG/M2

## 2018-08-07 DIAGNOSIS — R73.03 PRE-DIABETES: Chronic | ICD-10-CM

## 2018-08-07 DIAGNOSIS — I10 ESSENTIAL HYPERTENSION: Chronic | ICD-10-CM

## 2018-08-07 DIAGNOSIS — Z95.810 AICD (AUTOMATIC CARDIOVERTER/DEFIBRILLATOR) PRESENT: ICD-10-CM

## 2018-08-07 DIAGNOSIS — E66.01 MORBID OBESITY DUE TO EXCESS CALORIES (HCC): Chronic | ICD-10-CM

## 2018-08-07 DIAGNOSIS — I42.0 DILATED CARDIOMYOPATHY (HCC): Primary | Chronic | ICD-10-CM

## 2018-08-07 PROCEDURE — 99213 OFFICE O/P EST LOW 20 MIN: CPT | Performed by: STUDENT IN AN ORGANIZED HEALTH CARE EDUCATION/TRAINING PROGRAM

## 2018-08-07 PROCEDURE — 99212 OFFICE O/P EST SF 10 MIN: CPT | Performed by: INTERNAL MEDICINE

## 2018-08-07 PROCEDURE — G8417 CALC BMI ABV UP PARAM F/U: HCPCS | Performed by: STUDENT IN AN ORGANIZED HEALTH CARE EDUCATION/TRAINING PROGRAM

## 2018-08-07 PROCEDURE — G8427 DOCREV CUR MEDS BY ELIG CLIN: HCPCS | Performed by: STUDENT IN AN ORGANIZED HEALTH CARE EDUCATION/TRAINING PROGRAM

## 2018-08-07 PROCEDURE — 1036F TOBACCO NON-USER: CPT | Performed by: STUDENT IN AN ORGANIZED HEALTH CARE EDUCATION/TRAINING PROGRAM

## 2018-08-07 PROCEDURE — 3017F COLORECTAL CA SCREEN DOC REV: CPT | Performed by: STUDENT IN AN ORGANIZED HEALTH CARE EDUCATION/TRAINING PROGRAM

## 2018-08-07 NOTE — PROGRESS NOTES
[facility]    Progress Note    Date of patient's visit: 8/7/2018  YOB: 1955  Patient's Name:  Gladys Fam    Patient Care Team:  Sean Jones MD as PCP - General (Internal Medicine)  Lisa Brown MD as Referring Physician (Cardiology)  Melvin Dumont DO as Referring Physician (Cardiology)  Sean Jones MD as Referring Physician (Internal Medicine)    REASON FOR VISIT: Routine outpatient follow     HISTORY OF PRESENT ILLNESS:    History was obtained from the patient. Gladys Fam is a 58 y.o. is here for a  Regular follow-up. Patient does have history of dilated cardiomyopathy, systolic heart failure with ejection fraction 10-15% status post AICD, hypertension, is here for routine follow-up. Patient does not have any complaint. Patient does have some bilateral leg swelling. As per patient she did not take her Lasix during this weekend. Asked echo showed ejection fraction 10-15%. She is on Aldactone 25 mg, Lasix 20 mg, Lopressor 50 mg twice a day losartan 25 mg and amiodarone.   Patient Active Problem List   Diagnosis    Dilated cardiomyopathy ([address]    Acute on chronic systolic congestive heart failure ([address]    Pre-diabetes    Essential hypertension    PAH (pulmonary artery hypertension) ([address] 34 on Echo     Mitral regurgitation    Chronic headache    Morbid obesity due to excess calories (HCC)    Fatigue due to excessive exertion    AICD (automatic cardioverter/defibrillator) present    AICD discharge    Syncope and collapse secondary to [facility]    Chest pain       ALLERGIES      Allergies   Allergen Reactions    Codeine      Hallucinations    Morphine      Hallucinations           MEDICATIONS:      Current Outpatient Prescriptions on File Prior to Visit   Medication Sig Dispense Refill    losartan (COZAAR) 25 MG tablet TAKE ONE TABLET BY MOUTH ONE TIME A DAY 30 tablet 3    meloxicam (MOBIC) 7.5 MG tablet TAKE ONE TABLET BY MOUTH ONE TIME A DAY 10 tablet 1    potassium chloride (KLOR-CON M) 20 MEQ TBCR extended release tablet Take 1 tablet by mouth 2 times daily (with meals) 30 tablet 0    meclizine (ANTIVERT) 25 MG CHEW Take 1 tablet by mouth 3 times daily as needed (dizziness) 30 tablet 0    furosemide (LASIX) 20 MG tablet Take 1 tablet by mouth daily 60 tablet 0    allopurinol (ZYLOPRIM) 100 MG tablet Take 1 tablet by mouth daily 30 tablet 3    spironolactone (ALDACTONE) 25 MG tablet Take 0.5 tablets by mouth daily 30 tablet 3    metoprolol tartrate (LOPRESSOR) 50 MG tablet Take 1 tablet by mouth 2 times daily 60 tablet 5    amiodarone (CORDARONE) 200 MG tablet Take 1 tablet by mouth daily 30 tablet 5    metolazone (ZAROXOLYN) 2.5 MG tablet Take 1 tablet by mouth daily 30 tablet 3    aspirin 81 MG tablet Take 1 tablet by mouth daily 30 tablet 11     No current facility-administered medications on file prior to visit. SOCIAL HISTORY    Reviewed and no change from previous record. Annemarie  reports that she has never smoked.  She has never used smokeless tobacco.    FAMILY HISTORY:    Reviewed and No change from previous visit    REVIEW OF SYSTEMS:    ENT: negative  Respiratory: no cough, shortness of breath, or wheezing  Cardiovascular: no chest pain or dyspnea on exertion  Gastrointestinal: no abdominal pain, black or bloody stools  Neurological: no TIA or stroke symptoms  Endocrine: negative  Genito-Urinary: no dysuria, trouble voiding, or hematuria  Musculoskeletal: negative  Dermatological: negative    PHYSICAL EXAM:      Vitals:    08/07/18 1017   BP: 114/79   Pulse: 71     General appearance - alert, well appearing, and in no distress  Eyes - pupils equal and reactive, extraocular eye movements intact  Ears - bilateral TM's and external ear canals normal  Chest - clear to auscultation, no wheezes, rales or rhonchi, symmetric air entry  Heart - normal rate, regular rhythm, normal S1, S2, no murmurs, rubs, clicks or gallops  Back exam - full range of motion, no tenderness, palpable spasm or pain on motion  Neurological - alert, oriented, normal speech, no focal findings or movement disorder noted  Musculoskeletal - no joint tenderness, deformity or swelling  Extremities - Bilateral leg swelling. Skin - normal coloration and turgor, no rashes, no suspicious skin lesions noted    LABORATORY FINDINGS:    CBC:  Lab Results   Component Value Date    WBC 7.6 05/18/2018    HGB 13.1 05/18/2018     05/18/2018     04/03/2012     BMP:    Lab Results   Component Value Date     06/25/2018    K 3.9 06/25/2018     06/25/2018    CO2 23 06/25/2018    BUN 24 06/25/2018    CREATININE 0.96 06/25/2018    GLUCOSE 100 06/25/2018    GLUCOSE 100 04/03/2012     HEMOGLOBIN A1C:   Lab Results   Component Value Date    LABA1C 6.3 01/19/2018     MICROALBUMIN URINE: No results found for: MICROALBUR  FASTING LIPID PANEL:  Lab Results   Component Value Date    CHOL 196 11/07/2016    HDL 53 11/07/2016    TRIG 104 11/07/2016     LIVER PROFILE:  Lab Results   Component Value Date    ALT 29 08/09/2017    AST 38 08/09/2017    PROT 7.6 08/09/2017    BILITOT 0.51 08/09/2017    BILIDIR 0.17 08/09/2017    LABALBU 3.9 08/09/2017      THYROID FUNCTION:   Lab Results   Component Value Date    TSH 2.94 09/17/2017      URINE ANALYSIS: No results found for: LABURIN  ASSESSMENT AND PLAN:    1. Dilated cardiomyopathy (HCC)  Lasix, Aldactone, metoprolol, losartan last echo ejection fraction 10-15%. Patient does have cardiologist.    2. Morbid obesity due to excess calories ([address]      3. Pre-diabetes:  Last hemoglobin A1c was 6.1.    4. Essential hypertension  On losartan and metoprolol. 5. AICD (automatic cardioverter/defibrillator) present  *      FOLLOW UP:       1. Annemarie received counseling on the following healthy behaviors: nutrition, exercise and medication adherence  2.   Patient given educational materials - see patient

## 2018-08-13 ENCOUNTER — TELEPHONE (OUTPATIENT)
Dept: INTERNAL MEDICINE | Age: 63
End: 2018-08-13

## 2018-08-13 DIAGNOSIS — Z12.31 BREAST CANCER SCREENING BY MAMMOGRAM: Primary | ICD-10-CM

## 2018-08-16 ENCOUNTER — HOSPITAL ENCOUNTER (OUTPATIENT)
Age: 63
Discharge: HOME | End: 2018-08-16
Payer: MEDICARE

## 2018-08-16 LAB
ALT SERPL-CCNC: 27 U/L (ref 5–33)
AST SERPL-CCNC: 37 U/L
T3 FREE: 2.06 PG/ML (ref 2.02–4.43)
THYROXINE, FREE: 1.3 NG/DL (ref 0.93–1.7)
TSH SERPL DL<=0.05 MIU/L-ACNC: 3.3 MIU/L (ref 0.3–5)

## 2018-08-16 PROCEDURE — 36415 COLL VENOUS BLD VENIPUNCTURE: CPT

## 2018-08-16 PROCEDURE — 84481 FREE ASSAY (FT-3): CPT

## 2018-08-16 PROCEDURE — 84450 TRANSFERASE (AST) (SGOT): CPT

## 2018-08-16 PROCEDURE — 84460 ALANINE AMINO (ALT) (SGPT): CPT

## 2018-08-16 PROCEDURE — 84439 ASSAY OF FREE THYROXINE: CPT

## 2018-08-16 PROCEDURE — 84443 ASSAY THYROID STIM HORMONE: CPT

## 2018-08-16 NOTE — TELEPHONE ENCOUNTER
Mammogram order faxed to [facility] at [phone]
Miguel Dick calling from [facility] calling to get order faxed for mammogram through [employer] to the number given
TRENT Martino at [facility] to call the office regarding mammogram order. PC to patient---She is frustrated that she has received so many phone calls regarding mammogram scheduling. The order is in the computer and she has scheduling phone number. She will call to schedule as soon as she is able with her work and school schedule.
Unable to fax to [phone] (line was busy)  LM for Lenin Mullen to call the office to check on fax number
Morbid obesity due to excess calories (HCC)     Fatigue due to excessive exertion     AICD (automatic cardioverter/defibrillator) present     AICD discharge     Syncope and collapse secondary to [facility]     Chest pain

## 2018-09-28 ENCOUNTER — HOSPITAL ENCOUNTER (OUTPATIENT)
Dept: PULMONOLOGY | Age: 63
Discharge: HOME | End: 2018-09-28
Payer: MEDICARE

## 2018-09-28 PROCEDURE — 94640 AIRWAY INHALATION TREATMENT: CPT

## 2018-09-28 PROCEDURE — 94664 DEMO&/EVAL PT USE INHALER: CPT

## 2018-09-28 PROCEDURE — 94727 GAS DIL/WSHOT DETER LNG VOL: CPT

## 2018-09-28 PROCEDURE — 94729 DIFFUSING CAPACITY: CPT

## 2018-09-28 PROCEDURE — 94726 PLETHYSMOGRAPHY LUNG VOLUMES: CPT

## 2018-09-28 PROCEDURE — 88738 HGB QUANT TRANSCUTANEOUS: CPT

## 2018-10-19 ENCOUNTER — HOSPITAL ENCOUNTER (OUTPATIENT)
Dept: OTHER | Age: 63
Discharge: HOME | End: 2018-10-19
Payer: MEDICARE

## 2018-10-19 VITALS
HEART RATE: 78 BPM | WEIGHT: 234.8 LBS | SYSTOLIC BLOOD PRESSURE: 118 MMHG | OXYGEN SATURATION: 98 % | RESPIRATION RATE: 20 BRPM | DIASTOLIC BLOOD PRESSURE: 72 MMHG | BODY MASS INDEX: 37.9 KG/M2

## 2018-10-19 PROCEDURE — 99211 OFF/OP EST MAY X REQ PHY/QHP: CPT

## 2018-10-19 ASSESSMENT — PAIN SCALES - GENERAL: PAINLEVEL_OUTOF10: 0

## 2018-12-14 ENCOUNTER — HOSPITAL ENCOUNTER (OUTPATIENT)
Dept: OTHER | Age: 63
Discharge: HOME | End: 2018-12-14
Payer: MEDICARE

## 2018-12-14 VITALS
BODY MASS INDEX: 37.77 KG/M2 | OXYGEN SATURATION: 100 % | HEART RATE: 74 BPM | SYSTOLIC BLOOD PRESSURE: 111 MMHG | RESPIRATION RATE: 20 BRPM | WEIGHT: 234 LBS | DIASTOLIC BLOOD PRESSURE: 73 MMHG

## 2018-12-14 PROCEDURE — 99211 OFF/OP EST MAY X REQ PHY/QHP: CPT

## 2019-01-07 RX ORDER — ALLOPURINOL 100 MG/1
TABLET ORAL
Qty: 30 TABLET | Refills: 0 | Status: SHIPPED | OUTPATIENT
Start: 2019-01-07 | End: 2019-02-18 | Stop reason: SDUPTHER

## 2019-01-07 RX ORDER — MELOXICAM 7.5 MG/1
TABLET ORAL
Qty: 10 TABLET | Refills: 0 | Status: SHIPPED | OUTPATIENT
Start: 2019-01-07 | End: 2019-02-18 | Stop reason: SDUPTHER

## 2019-02-11 ENCOUNTER — HOSPITAL ENCOUNTER (OUTPATIENT)
Age: 64
Discharge: HOME | End: 2019-02-11
Payer: MEDICARE

## 2019-02-11 DIAGNOSIS — E87.6 HYPOKALEMIA: ICD-10-CM

## 2019-02-11 LAB
ALT SERPL-CCNC: 20 U/L (ref 5–33)
AST SERPL-CCNC: 27 U/L
POTASSIUM SERPL-SCNC: 3.8 MMOL/L (ref 3.7–5.3)
T3 FREE: 2.46 PG/ML (ref 2.02–4.43)
THYROXINE, FREE: 1.34 NG/DL (ref 0.93–1.7)
TSH SERPL DL<=0.05 MIU/L-ACNC: 4.18 MIU/L (ref 0.3–5)

## 2019-02-11 PROCEDURE — 84460 ALANINE AMINO (ALT) (SGPT): CPT

## 2019-02-11 PROCEDURE — 84439 ASSAY OF FREE THYROXINE: CPT

## 2019-02-11 PROCEDURE — 84132 ASSAY OF SERUM POTASSIUM: CPT

## 2019-02-11 PROCEDURE — 36415 COLL VENOUS BLD VENIPUNCTURE: CPT

## 2019-02-11 PROCEDURE — 84443 ASSAY THYROID STIM HORMONE: CPT

## 2019-02-11 PROCEDURE — 84481 FREE ASSAY (FT-3): CPT

## 2019-02-11 PROCEDURE — 84450 TRANSFERASE (AST) (SGOT): CPT

## 2019-02-14 ENCOUNTER — OFFICE VISIT (OUTPATIENT)
Dept: INTERNAL MEDICINE | Age: 64
End: 2019-02-14
Payer: MEDICARE

## 2019-02-14 VITALS
WEIGHT: 228 LBS | HEART RATE: 70 BPM | DIASTOLIC BLOOD PRESSURE: 65 MMHG | HEIGHT: 66 IN | BODY MASS INDEX: 36.64 KG/M2 | SYSTOLIC BLOOD PRESSURE: 104 MMHG

## 2019-02-14 DIAGNOSIS — I50.22 CHRONIC SYSTOLIC CONGESTIVE HEART FAILURE (HCC): Primary | ICD-10-CM

## 2019-02-14 DIAGNOSIS — R73.03 PRE-DIABETES: Chronic | ICD-10-CM

## 2019-02-14 DIAGNOSIS — Z23 NEED FOR PROPHYLACTIC VACCINATION AGAINST DIPHTHERIA-TETANUS-PERTUSSIS (DTP): ICD-10-CM

## 2019-02-14 DIAGNOSIS — Z23 NEED FOR PROPHYLACTIC VACCINATION AGAINST STREPTOCOCCUS PNEUMONIAE (PNEUMOCOCCUS): ICD-10-CM

## 2019-02-14 DIAGNOSIS — Z12.31 ENCOUNTER FOR SCREENING MAMMOGRAM FOR BREAST CANCER: ICD-10-CM

## 2019-02-14 LAB — HBA1C MFR BLD: 5.8 %

## 2019-02-14 PROCEDURE — 99214 OFFICE O/P EST MOD 30 MIN: CPT | Performed by: INTERNAL MEDICINE

## 2019-02-14 PROCEDURE — 99211 OFF/OP EST MAY X REQ PHY/QHP: CPT | Performed by: INTERNAL MEDICINE

## 2019-02-14 PROCEDURE — 83036 HEMOGLOBIN GLYCOSYLATED A1C: CPT | Performed by: INTERNAL MEDICINE

## 2019-02-14 PROCEDURE — 1036F TOBACCO NON-USER: CPT | Performed by: INTERNAL MEDICINE

## 2019-02-14 PROCEDURE — G8417 CALC BMI ABV UP PARAM F/U: HCPCS | Performed by: INTERNAL MEDICINE

## 2019-02-14 PROCEDURE — G8484 FLU IMMUNIZE NO ADMIN: HCPCS | Performed by: INTERNAL MEDICINE

## 2019-02-14 PROCEDURE — 3017F COLORECTAL CA SCREEN DOC REV: CPT | Performed by: INTERNAL MEDICINE

## 2019-02-14 PROCEDURE — G8427 DOCREV CUR MEDS BY ELIG CLIN: HCPCS | Performed by: INTERNAL MEDICINE

## 2019-02-14 PROCEDURE — 90670 PCV13 VACCINE IM: CPT | Performed by: INTERNAL MEDICINE

## 2019-02-14 ASSESSMENT — PATIENT HEALTH QUESTIONNAIRE - PHQ9
SUM OF ALL RESPONSES TO PHQ9 QUESTIONS 1 & 2: 0
2. FEELING DOWN, DEPRESSED OR HOPELESS: 0
SUM OF ALL RESPONSES TO PHQ QUESTIONS 1-9: 0
1. LITTLE INTEREST OR PLEASURE IN DOING THINGS: 0
SUM OF ALL RESPONSES TO PHQ QUESTIONS 1-9: 0

## 2019-02-18 DIAGNOSIS — I42.8 NON-ISCHEMIC CARDIOMYOPATHY (HCC): ICD-10-CM

## 2019-02-18 RX ORDER — POTASSIUM CHLORIDE 1500 MG/1
20 TABLET, FILM COATED, EXTENDED RELEASE ORAL 2 TIMES DAILY WITH MEALS
Qty: 30 TABLET | Refills: 3 | Status: ON HOLD | OUTPATIENT
Start: 2019-02-18 | End: 2021-06-29 | Stop reason: HOSPADM

## 2019-02-18 RX ORDER — METOPROLOL TARTRATE 50 MG/1
50 TABLET, FILM COATED ORAL 2 TIMES DAILY
Qty: 60 TABLET | Refills: 3 | Status: ON HOLD | OUTPATIENT
Start: 2019-02-18 | End: 2020-05-25 | Stop reason: SDUPTHER

## 2019-02-18 RX ORDER — LOSARTAN POTASSIUM 25 MG/1
25 TABLET ORAL DAILY
Qty: 30 TABLET | Refills: 3 | Status: ON HOLD | OUTPATIENT
Start: 2019-02-18 | End: 2021-06-29 | Stop reason: HOSPADM

## 2019-02-18 RX ORDER — SPIRONOLACTONE 25 MG/1
12.5 TABLET ORAL DAILY
Qty: 30 TABLET | Refills: 3 | Status: SHIPPED | OUTPATIENT
Start: 2019-02-18

## 2019-02-18 RX ORDER — FUROSEMIDE 20 MG/1
20 TABLET ORAL DAILY
Qty: 60 TABLET | Refills: 3 | Status: ON HOLD | OUTPATIENT
Start: 2019-02-18 | End: 2020-05-25 | Stop reason: HOSPADM

## 2019-02-18 RX ORDER — MELOXICAM 7.5 MG/1
7.5 TABLET ORAL DAILY
Qty: 10 TABLET | Refills: 0 | Status: SHIPPED | OUTPATIENT
Start: 2019-02-18 | End: 2019-06-03 | Stop reason: HOSPADM

## 2019-02-18 RX ORDER — ALLOPURINOL 100 MG/1
100 TABLET ORAL DAILY
Qty: 30 TABLET | Refills: 3 | Status: SHIPPED | OUTPATIENT
Start: 2019-02-18

## 2019-02-18 RX ORDER — AMIODARONE HYDROCHLORIDE 200 MG/1
200 TABLET ORAL DAILY
Qty: 30 TABLET | Refills: 5 | Status: ON HOLD | OUTPATIENT
Start: 2019-02-18 | End: 2021-06-29 | Stop reason: HOSPADM

## 2019-02-18 RX ORDER — METOLAZONE 2.5 MG/1
2.5 TABLET ORAL DAILY
Qty: 30 TABLET | Refills: 3 | Status: ON HOLD | OUTPATIENT
Start: 2019-02-18 | End: 2020-05-25 | Stop reason: SDUPTHER

## 2019-03-22 ENCOUNTER — HOSPITAL ENCOUNTER (OUTPATIENT)
Dept: OTHER | Age: 64
Discharge: HOME | End: 2019-03-22
Payer: MEDICARE

## 2019-03-22 VITALS
WEIGHT: 229 LBS | RESPIRATION RATE: 20 BRPM | SYSTOLIC BLOOD PRESSURE: 114 MMHG | DIASTOLIC BLOOD PRESSURE: 73 MMHG | HEART RATE: 73 BPM | BODY MASS INDEX: 36.96 KG/M2 | OXYGEN SATURATION: 99 %

## 2019-03-22 PROCEDURE — 99211 OFF/OP EST MAY X REQ PHY/QHP: CPT

## 2019-05-23 ENCOUNTER — HOSPITAL ENCOUNTER (OUTPATIENT)
Age: 64
Discharge: HOME | End: 2019-05-25
Payer: MEDICARE

## 2019-05-23 ENCOUNTER — HOSPITAL ENCOUNTER (OUTPATIENT)
Dept: GENERAL RADIOLOGY | Age: 64
Discharge: HOME | End: 2019-05-25
Payer: MEDICARE

## 2019-05-23 DIAGNOSIS — T82.110A FAILURE OF IMPLANTABLE CARDIOVERTER-DEFIBRILLATOR (ICD) LEAD, INITIAL ENCOUNTER: ICD-10-CM

## 2019-05-23 PROCEDURE — 71046 X-RAY EXAM CHEST 2 VIEWS: CPT

## 2019-06-03 ENCOUNTER — HOSPITAL ENCOUNTER (OUTPATIENT)
Dept: CARDIAC CATH/INVASIVE PROCEDURES | Age: 64
Discharge: HOME | End: 2019-06-03
Payer: MEDICARE

## 2019-06-03 VITALS
SYSTOLIC BLOOD PRESSURE: 98 MMHG | RESPIRATION RATE: 24 BRPM | DIASTOLIC BLOOD PRESSURE: 63 MMHG | OXYGEN SATURATION: 99 % | HEIGHT: 66 IN | HEART RATE: 70 BPM | BODY MASS INDEX: 37.28 KG/M2 | TEMPERATURE: 97.6 F | WEIGHT: 232 LBS

## 2019-06-03 DIAGNOSIS — Z45.02 ICD (IMPLANTABLE CARDIOVERTER-DEFIBRILLATOR) BATTERY DEPLETION: ICD-10-CM

## 2019-06-03 LAB
BUN BLDV-MCNC: 25 MG/DL (ref 8–23)
EKG ATRIAL RATE: 70 BPM
EKG P AXIS: 10 DEGREES
EKG P-R INTERVAL: 172 MS
EKG Q-T INTERVAL: 526 MS
EKG QRS DURATION: 202 MS
EKG QTC CALCULATION (BAZETT): 568 MS
EKG R AXIS: -96 DEGREES
EKG T AXIS: 87 DEGREES
EKG VENTRICULAR RATE: 70 BPM
GFR NON-AFRICAN AMERICAN: 56 ML/MIN
GFR SERPL CREATININE-BSD FRML MDRD: >60 ML/MIN
GFR SERPL CREATININE-BSD FRML MDRD: ABNORMAL ML/MIN/{1.73_M2}
GLUCOSE BLD-MCNC: 117 MG/DL (ref 74–100)
PLATELET # BLD: 256 K/UL (ref 138–453)
POC CHLORIDE: 103 MMOL/L (ref 98–107)
POC CREATININE: 0.99 MG/DL (ref 0.51–1.19)
POC HEMATOCRIT: 42 % (ref 36–46)
POC HEMOGLOBIN: 14.2 G/DL (ref 12–16)
POC IONIZED CALCIUM: 1.2 MMOL/L (ref 1.15–1.33)
POC POTASSIUM: 3.7 MMOL/L (ref 3.5–4.5)
POC SODIUM: 141 MMOL/L (ref 138–146)

## 2019-06-03 PROCEDURE — 2720000010 HC SURG SUPPLY STERILE

## 2019-06-03 PROCEDURE — 84520 ASSAY OF UREA NITROGEN: CPT

## 2019-06-03 PROCEDURE — 93005 ELECTROCARDIOGRAM TRACING: CPT | Performed by: INTERNAL MEDICINE

## 2019-06-03 PROCEDURE — 7100000010 HC PHASE II RECOVERY - FIRST 15 MIN

## 2019-06-03 PROCEDURE — 82435 ASSAY OF BLOOD CHLORIDE: CPT

## 2019-06-03 PROCEDURE — 84132 ASSAY OF SERUM POTASSIUM: CPT

## 2019-06-03 PROCEDURE — 84295 ASSAY OF SERUM SODIUM: CPT

## 2019-06-03 PROCEDURE — 82947 ASSAY GLUCOSE BLOOD QUANT: CPT

## 2019-06-03 PROCEDURE — C1882 AICD, OTHER THAN SING/DUAL: HCPCS

## 2019-06-03 PROCEDURE — 82330 ASSAY OF CALCIUM: CPT

## 2019-06-03 PROCEDURE — 85049 AUTOMATED PLATELET COUNT: CPT

## 2019-06-03 PROCEDURE — 82565 ASSAY OF CREATININE: CPT

## 2019-06-03 PROCEDURE — 2580000003 HC RX 258

## 2019-06-03 PROCEDURE — 2500000003 HC RX 250 WO HCPCS

## 2019-06-03 PROCEDURE — 6360000002 HC RX W HCPCS

## 2019-06-03 PROCEDURE — 7100000011 HC PHASE II RECOVERY - ADDTL 15 MIN

## 2019-06-03 PROCEDURE — 85014 HEMATOCRIT: CPT

## 2019-06-03 PROCEDURE — 2709999900 HC NON-CHARGEABLE SUPPLY

## 2019-06-03 PROCEDURE — 33264 RMVL & RPLCMT DFB GEN MLT LD: CPT | Performed by: INTERNAL MEDICINE

## 2019-06-03 RX ORDER — CEFAZOLIN SODIUM 1 G/50ML
1 INJECTION, SOLUTION INTRAVENOUS ONCE
Status: DISCONTINUED | OUTPATIENT
Start: 2019-06-03 | End: 2019-06-04 | Stop reason: HOSPADM

## 2019-06-03 RX ORDER — SODIUM CHLORIDE 0.9 % (FLUSH) 0.9 %
10 SYRINGE (ML) INJECTION EVERY 12 HOURS SCHEDULED
Status: DISCONTINUED | OUTPATIENT
Start: 2019-06-03 | End: 2019-06-04 | Stop reason: HOSPADM

## 2019-06-03 RX ORDER — SODIUM CHLORIDE 0.9 % (FLUSH) 0.9 %
10 SYRINGE (ML) INJECTION PRN
Status: DISCONTINUED | OUTPATIENT
Start: 2019-06-03 | End: 2019-06-04 | Stop reason: HOSPADM

## 2019-06-03 RX ORDER — SODIUM CHLORIDE 9 MG/ML
INJECTION, SOLUTION INTRAVENOUS CONTINUOUS
Status: DISCONTINUED | OUTPATIENT
Start: 2019-06-03 | End: 2019-06-04 | Stop reason: HOSPADM

## 2019-06-03 RX ORDER — VANCOMYCIN HYDROCHLORIDE 1 G/200ML
1000 INJECTION, SOLUTION INTRAVENOUS ONCE
Status: DISCONTINUED | OUTPATIENT
Start: 2019-06-03 | End: 2019-06-04 | Stop reason: HOSPADM

## 2019-06-03 RX ORDER — ACETAMINOPHEN 325 MG/1
650 TABLET ORAL EVERY 4 HOURS PRN
Status: DISCONTINUED | OUTPATIENT
Start: 2019-06-03 | End: 2019-06-04 | Stop reason: HOSPADM

## 2019-06-03 NOTE — PROGRESS NOTES
Received post battery change out procedure to [facility] room 8. Assessment obtained. Restrictions reviewed with patient. Post procedure pathway initiated. Left chest  site soft slightly swollen, pressure dressing dry and intact. No hematoma noted. Family at side. Patient without complaints.

## 2019-06-03 NOTE — PROGRESS NOTES
Received patient for battery change on her AICD. Patient is alert and oriented. Denies pain. Vital signs stable. Patient remains npo.

## 2019-06-03 NOTE — PROGRESS NOTES
Dressing to left chest wall dry and intact,patient instructed not  to get the dressing wet . Do not submerge in water, no swimming, no tub bath, and follow up with Dr Vladimir George as instructed. Vital signs stable. Discharge instruction was reviewed with patient and family, and patient was discharged home accompanied by her daughter.

## 2019-06-03 NOTE — H&P
PLEASE SEE UPDATED H/P FROM [facility]    This is a 71 y.o woman with stable cardiomyopathy and ICD in-situ, nearing battery depletion. She comes today for elective outpatient ICD revision.

## 2019-06-03 NOTE — PROGRESS NOTES
Patient was seen by Dr Cristina Baker and he spoke to the family regarding care at home. He reinforced not to touch the dressing until she is seen in his office. Dressing clean dry and intact. Vital signs stable.  Patient remains drowsy but rousable'

## 2019-06-27 ENCOUNTER — HOSPITAL ENCOUNTER (OUTPATIENT)
Dept: OTHER | Age: 64
Discharge: HOME | End: 2019-06-27
Payer: MEDICARE

## 2019-06-27 VITALS
SYSTOLIC BLOOD PRESSURE: 107 MMHG | HEART RATE: 75 BPM | OXYGEN SATURATION: 99 % | DIASTOLIC BLOOD PRESSURE: 65 MMHG | BODY MASS INDEX: 37.77 KG/M2 | RESPIRATION RATE: 20 BRPM | WEIGHT: 234 LBS

## 2019-06-27 PROCEDURE — 99212 OFFICE O/P EST SF 10 MIN: CPT

## 2019-06-27 NOTE — PROGRESS NOTES
Date:  2019  Time:  8:46 AM    CHF Clinic at [facility]    Office: [phone] Fax: [phone]    Re:  Ariadna Castro   Patient : 1955    Vital Signs: /65   Pulse 75   Resp 20   Wt 234 lb (106.1 kg)   SpO2 99%   BMI 37.77 kg/m²                       O2 Device: None (Room air)                           No results for input(s): CBC, HGB, HCT, WBC, PLATELET, NA, K, CL, CO2, BUN, CREATININE, GLUCOSE, BNP, INR in the last 72 hours. Respiratory:         Breath Sounds  Right Upper Lobe: Clear  Right Middle Lobe: Clear  Right Lower Lobe: Clear  Left Upper Lobe: Clear  Left Lower Lobe: Clear    Cough/Sputum  Cough: None         Peripheral Vascular  RLE Edema: Trace  LLE Edema: Trace      Complaints: No c/o shortness of breath or chest pain      Comment : Weight is up 5 lbs in 3 months. She denies shortness of breath, dizziness  or chest pain. States she had a new AICD device placed on . Cardiosight optivol fluid index does not show increased fluid. Left anterior chest AICD device site is healing well without redness, swelling, drainage or S/S of infection. Current diuretic Lasix 20 mg once daily, Zaroxolyn 2.5 mg once daily and Aldactone 25 mg one half tablet once daily. Reminded pt to follow a low sodium diet and fluid limits of 2 liters daily. She has a follow up with Dr. Karolyn Fatima on . Assessment from today along with Cardiosight report faxed to Dr. Mukund Pulido office. Next f/u here in 6 weeks. She was instructed to call for appointment sooner if increased S/S of CHF develop prior to next scheduled appointment.       Electronically signed by Chris Moise RN on 2019 at 8:46 AM

## 2019-08-15 ENCOUNTER — HOSPITAL ENCOUNTER (OUTPATIENT)
Dept: OTHER | Age: 64
Discharge: HOME | End: 2019-08-15
Payer: MEDICARE

## 2019-08-15 ENCOUNTER — HOSPITAL ENCOUNTER (OUTPATIENT)
Age: 64
Discharge: HOME | End: 2019-08-15
Payer: MEDICARE

## 2019-08-15 VITALS
DIASTOLIC BLOOD PRESSURE: 64 MMHG | OXYGEN SATURATION: 100 % | BODY MASS INDEX: 37.87 KG/M2 | HEART RATE: 80 BPM | SYSTOLIC BLOOD PRESSURE: 100 MMHG | RESPIRATION RATE: 18 BRPM | WEIGHT: 234.6 LBS

## 2019-08-15 LAB
ALT SERPL-CCNC: 28 U/L (ref 5–33)
AST SERPL-CCNC: 39 U/L
T3 FREE: 2.22 PG/ML (ref 2.02–4.43)
THYROXINE, FREE: 1.28 NG/DL (ref 0.93–1.7)
TSH SERPL DL<=0.05 MIU/L-ACNC: 3.49 MIU/L (ref 0.3–5)

## 2019-08-15 PROCEDURE — 99212 OFFICE O/P EST SF 10 MIN: CPT

## 2019-08-15 PROCEDURE — 84450 TRANSFERASE (AST) (SGOT): CPT

## 2019-08-15 PROCEDURE — 84460 ALANINE AMINO (ALT) (SGPT): CPT

## 2019-08-15 PROCEDURE — 84481 FREE ASSAY (FT-3): CPT

## 2019-08-15 PROCEDURE — 84443 ASSAY THYROID STIM HORMONE: CPT

## 2019-08-15 PROCEDURE — 84439 ASSAY OF FREE THYROXINE: CPT

## 2019-08-15 PROCEDURE — 36415 COLL VENOUS BLD VENIPUNCTURE: CPT

## 2019-08-15 NOTE — PROGRESS NOTES
Date:  8/15/2019  Time:  8:42 AM    CHF Clinic at [facility]    Office: [phone] Fax: [phone]    Re:  Bertrand Goldman   Patient : 1955    Vital Signs: /64   Pulse 80   Resp 18   Wt 234 lb 9.6 oz (106.4 kg)   SpO2 100%   BMI 37.87 kg/m²                       O2 Device: None (Room air)                           No results for input(s): CBC, HGB, HCT, WBC, PLATELET, NA, K, CL, CO2, BUN, CREATININE, GLUCOSE, BNP, INR in the last 72 hours. Respiratory:         Breath Sounds  Right Upper Lobe: Clear  Right Middle Lobe: Clear  Right Lower Lobe: Clear  Left Upper Lobe: Clear  Left Lower Lobe: Clear    Cough/Sputum  Cough: None         Peripheral Vascular  RLE Edema: Non-pitting, Trace  LLE Edema: Non-pitting, Trace      Complaints: Denies increased shortness of breath or chest pain. Comment : Wt  Is unchanged in 6 weeks. She has some mild increased fluid and decreased thoracic impedance but she states she had some family reunions recently and had been eating a lot of salty foods like hotdogs, hamburgers, and been drinking increased fluids. She denies increased shortness of breath , just occasional with exertion. Reminded pt to follow a low sodium diet and fluid limits of 2 liters daily. Current diuretics: Lasix 20 mg daily, Aldactone 12.5 mg po once daily and Zaroxolyn 2.5 mg every Monday , Wednesday and Friday. ( verified doses with Abiola Hunter at [place] cardiology office per office visit with Dr. Sohail Gaines on 19. Pt brought in a lab slip from Dr. Mary Nunez office  for thyroid studies and AST/ALT. States she will get those drawn today after appt today. Next f/u here in 6 weeks.      Electronically signed by Minerva Roberts RN on 8/15/2019 at 8:42 AM

## 2019-08-27 ENCOUNTER — OFFICE VISIT (OUTPATIENT)
Dept: OBGYN | Age: 64
End: 2019-08-27
Payer: MEDICARE

## 2019-08-27 ENCOUNTER — HOSPITAL ENCOUNTER (OUTPATIENT)
Age: 64
Setting detail: SPECIMEN
Discharge: HOME | End: 2019-08-27
Payer: MEDICARE

## 2019-08-27 VITALS
SYSTOLIC BLOOD PRESSURE: 104 MMHG | WEIGHT: 237.25 LBS | DIASTOLIC BLOOD PRESSURE: 74 MMHG | HEIGHT: 66 IN | BODY MASS INDEX: 38.13 KG/M2 | HEART RATE: 71 BPM

## 2019-08-27 DIAGNOSIS — R10.2 SUPRAPUBIC PAIN: ICD-10-CM

## 2019-08-27 DIAGNOSIS — N89.8 VAGINAL DISCHARGE: ICD-10-CM

## 2019-08-27 DIAGNOSIS — N89.8 VAGINAL DISCHARGE: Primary | ICD-10-CM

## 2019-08-27 PROCEDURE — 81003 URINALYSIS AUTO W/O SCOPE: CPT | Performed by: STUDENT IN AN ORGANIZED HEALTH CARE EDUCATION/TRAINING PROGRAM

## 2019-08-27 PROCEDURE — 99213 OFFICE O/P EST LOW 20 MIN: CPT | Performed by: STUDENT IN AN ORGANIZED HEALTH CARE EDUCATION/TRAINING PROGRAM

## 2019-08-27 PROCEDURE — G8427 DOCREV CUR MEDS BY ELIG CLIN: HCPCS | Performed by: STUDENT IN AN ORGANIZED HEALTH CARE EDUCATION/TRAINING PROGRAM

## 2019-08-27 PROCEDURE — G8400 PT W/DXA NO RESULTS DOC: HCPCS | Performed by: STUDENT IN AN ORGANIZED HEALTH CARE EDUCATION/TRAINING PROGRAM

## 2019-08-27 PROCEDURE — 1090F PRES/ABSN URINE INCON ASSESS: CPT | Performed by: STUDENT IN AN ORGANIZED HEALTH CARE EDUCATION/TRAINING PROGRAM

## 2019-08-27 PROCEDURE — 4040F PNEUMOC VAC/ADMIN/RCVD: CPT | Performed by: STUDENT IN AN ORGANIZED HEALTH CARE EDUCATION/TRAINING PROGRAM

## 2019-08-27 PROCEDURE — 3017F COLORECTAL CA SCREEN DOC REV: CPT | Performed by: STUDENT IN AN ORGANIZED HEALTH CARE EDUCATION/TRAINING PROGRAM

## 2019-08-27 PROCEDURE — G8417 CALC BMI ABV UP PARAM F/U: HCPCS | Performed by: STUDENT IN AN ORGANIZED HEALTH CARE EDUCATION/TRAINING PROGRAM

## 2019-08-27 PROCEDURE — 1123F ACP DISCUSS/DSCN MKR DOCD: CPT | Performed by: STUDENT IN AN ORGANIZED HEALTH CARE EDUCATION/TRAINING PROGRAM

## 2019-08-27 PROCEDURE — 1036F TOBACCO NON-USER: CPT | Performed by: STUDENT IN AN ORGANIZED HEALTH CARE EDUCATION/TRAINING PROGRAM

## 2019-08-27 ASSESSMENT — PATIENT HEALTH QUESTIONNAIRE - PHQ9
SUM OF ALL RESPONSES TO PHQ9 QUESTIONS 1 & 2: 0
SUM OF ALL RESPONSES TO PHQ QUESTIONS 1-9: 0
2. FEELING DOWN, DEPRESSED OR HOPELESS: 0
1. LITTLE INTEREST OR PLEASURE IN DOING THINGS: 0
SUM OF ALL RESPONSES TO PHQ QUESTIONS 1-9: 0

## 2019-08-27 NOTE — PROGRESS NOTES
Ivon Abad  2019    YOB: 1955      HPI:  Ivon Abad is a 71 y.o. female [id number]     The patient was seen today. States that has pubic discomfort and vaginal discharge. She states that 2 weeks ago she noticed increased vaginal discharge that had a foul odor. She bought Monostat 3 at the drug store and this did not decrease her discharge. Reports this pubic pain she is feeling is a dull ache that is tender only to touch. Does admit to urinary hesitation and dysuria. Denies any pruritis or sharp stabbing sensations. Denies any vaginal bleeding. She has not been sexually active in over 5 years.      OB History    Para Term  AB Living   4 4 4 0 0 2   SAB TAB Ectopic Molar Multiple Live Births   0 0 0 0 0 0      # Outcome Date GA Lbr Severo/2nd Weight Sex Delivery Anes PTL Lv   4 Term      Vag-Spont      3 Term      Vag-Spont      2 Term      Vag-Spont      1 Term      Vag-Spont          Past Medical History:   Diagnosis Date    Abnormal Pap smear     Acute on chronic systolic congestive heart failure (HCC)     AICD (automatic cardioverter/defibrillator) present     MAURO (acute kidney injury) ([address] 3/4/2017    Anemia     Arthritis     Cardiomyopathy ([address] 12    robotic replacement of 2 left ventricular leads/replacement of ICD generator    Cardiomyopathy, nonischemic ([address] 2012    Ejection fraction < 50%     HTN (hypertension)     Ischemic cardiomyopathy     Left bundle branch block     Mitral regurgitation     Morbid obesity due to excess calories ([address] 1/15/2017    Pulmonary hypertension ([address]     Sudden onset of severe headache        Past Surgical History:   Procedure Laterality Date    COLONOSCOPY      HERNIA REPAIR      OTHER SURGICAL HISTORY  12    robotic placement of 2 left ventricular leads/replacement of ICD generator    PACEMAKER PLACEMENT      TUBAL LIGATION         Family History   Problem Relation Age of Onset    Cancer

## 2019-08-28 LAB
BILIRUBIN URINE: NEGATIVE
COLOR: YELLOW
COMMENT UA: NORMAL
DIRECT EXAM: NORMAL
GLUCOSE URINE: NEGATIVE
KETONES, URINE: NEGATIVE
LEUKOCYTE ESTERASE, URINE: NEGATIVE
Lab: NORMAL
NITRITE, URINE: NEGATIVE
PH UA: 6 (ref 5–8)
PROTEIN UA: NEGATIVE
SPECIFIC GRAVITY UA: 1.02 (ref 1–1.03)
SPECIMEN DESCRIPTION: NORMAL
TURBIDITY: CLEAR
URINE HGB: NEGATIVE
UROBILINOGEN, URINE: NORMAL

## 2019-08-29 LAB
C TRACH DNA GENITAL QL NAA+PROBE: NEGATIVE
N. GONORRHOEAE DNA: NEGATIVE
SPECIMEN DESCRIPTION: NORMAL

## 2019-09-05 ENCOUNTER — APPOINTMENT (OUTPATIENT)
Dept: GENERAL RADIOLOGY | Age: 64
DRG: 149 | End: 2019-09-05
Payer: MEDICARE

## 2019-09-05 ENCOUNTER — APPOINTMENT (OUTPATIENT)
Dept: CT IMAGING | Age: 64
DRG: 149 | End: 2019-09-05
Payer: MEDICARE

## 2019-09-05 ENCOUNTER — HOSPITAL ENCOUNTER (INPATIENT)
Age: 64
LOS: 1 days | Discharge: HOME | DRG: 149 | End: 2019-09-06
Attending: EMERGENCY MEDICINE | Admitting: INTERNAL MEDICINE
Payer: MEDICARE

## 2019-09-05 DIAGNOSIS — I50.23 ACUTE ON CHRONIC SYSTOLIC CONGESTIVE HEART FAILURE (HCC): ICD-10-CM

## 2019-09-05 DIAGNOSIS — H81.10 BENIGN PAROXYSMAL POSITIONAL VERTIGO, UNSPECIFIED LATERALITY: Primary | ICD-10-CM

## 2019-09-05 DIAGNOSIS — R55 NEAR SYNCOPE: ICD-10-CM

## 2019-09-05 PROBLEM — M10.9 GOUT: Status: ACTIVE | Noted: 2019-09-05

## 2019-09-05 PROBLEM — R42 DIZZINESS: Status: ACTIVE | Noted: 2019-09-05

## 2019-09-05 LAB
ABSOLUTE EOS #: 0.24 K/UL (ref 0–0.44)
ABSOLUTE IMMATURE GRANULOCYTE: <0.03 K/UL (ref 0–0.3)
ABSOLUTE LYMPH #: 1.88 K/UL (ref 1.1–3.7)
ABSOLUTE MONO #: 0.76 K/UL (ref 0.1–1.2)
ANION GAP SERPL CALCULATED.3IONS-SCNC: 14 MMOL/L (ref 9–17)
BASOPHILS # BLD: 1 % (ref 0–2)
BASOPHILS ABSOLUTE: 0.08 K/UL (ref 0–0.2)
BNP INTERPRETATION: ABNORMAL
BUN BLDV-MCNC: 18 MG/DL (ref 8–23)
BUN/CREAT BLD: ABNORMAL (ref 9–20)
CALCIUM SERPL-MCNC: 9.4 MG/DL (ref 8.6–10.4)
CHLORIDE BLD-SCNC: 103 MMOL/L (ref 98–107)
CO2: 23 MMOL/L (ref 20–31)
CREAT SERPL-MCNC: 0.86 MG/DL (ref 0.5–0.9)
DIFFERENTIAL TYPE: NORMAL
EOSINOPHILS RELATIVE PERCENT: 3 % (ref 1–4)
GFR AFRICAN AMERICAN: >60 ML/MIN
GFR NON-AFRICAN AMERICAN: >60 ML/MIN
GFR SERPL CREATININE-BSD FRML MDRD: ABNORMAL ML/MIN/{1.73_M2}
GFR SERPL CREATININE-BSD FRML MDRD: ABNORMAL ML/MIN/{1.73_M2}
GLUCOSE BLD-MCNC: 107 MG/DL (ref 70–99)
HCT VFR BLD CALC: 39.2 % (ref 36.3–47.1)
HEMOGLOBIN: 13.1 G/DL (ref 11.9–15.1)
IMMATURE GRANULOCYTES: 0 %
LYMPHOCYTES # BLD: 27 % (ref 24–43)
MAGNESIUM: 2.1 MG/DL (ref 1.6–2.6)
MCH RBC QN AUTO: 28.9 PG (ref 25.2–33.5)
MCHC RBC AUTO-ENTMCNC: 33.4 G/DL (ref 28.4–34.8)
MCV RBC AUTO: 86.3 FL (ref 82.6–102.9)
MONOCYTES # BLD: 11 % (ref 3–12)
NRBC AUTOMATED: 0 PER 100 WBC
PDW BLD-RTO: 14.3 % (ref 11.8–14.4)
PLATELET # BLD: NORMAL K/UL (ref 138–453)
PLATELET ESTIMATE: NORMAL
PLATELET, FLUORESCENCE: NORMAL K/UL (ref 138–453)
PMV BLD AUTO: NORMAL FL (ref 8.1–13.5)
POTASSIUM SERPL-SCNC: 4.2 MMOL/L (ref 3.7–5.3)
PRO-BNP: 1137 PG/ML
RBC # BLD: 4.54 M/UL (ref 3.95–5.11)
RBC # BLD: NORMAL 10*6/UL
SEG NEUTROPHILS: 58 % (ref 36–65)
SEGMENTED NEUTROPHILS ABSOLUTE COUNT: 4.12 K/UL (ref 1.5–8.1)
SODIUM BLD-SCNC: 140 MMOL/L (ref 135–144)
TROPONIN INTERP: NORMAL
TROPONIN T: NORMAL NG/ML
TROPONIN, HIGH SENSITIVITY: 13 NG/L (ref 0–14)
TROPONIN, HIGH SENSITIVITY: 13 NG/L (ref 0–14)
TROPONIN, HIGH SENSITIVITY: 14 NG/L (ref 0–14)
TSH SERPL DL<=0.05 MIU/L-ACNC: 4.69 MIU/L (ref 0.3–5)
WBC # BLD: 7.1 K/UL (ref 3.5–11.3)
WBC # BLD: NORMAL 10*3/UL

## 2019-09-05 PROCEDURE — 93005 ELECTROCARDIOGRAM TRACING: CPT | Performed by: STUDENT IN AN ORGANIZED HEALTH CARE EDUCATION/TRAINING PROGRAM

## 2019-09-05 PROCEDURE — 99285 EMERGENCY DEPT VISIT HI MDM: CPT

## 2019-09-05 PROCEDURE — 80048 BASIC METABOLIC PNL TOTAL CA: CPT

## 2019-09-05 PROCEDURE — 70450 CT HEAD/BRAIN W/O DYE: CPT

## 2019-09-05 PROCEDURE — 85025 COMPLETE CBC W/AUTO DIFF WBC: CPT

## 2019-09-05 PROCEDURE — G0378 HOSPITAL OBSERVATION PER HR: HCPCS

## 2019-09-05 PROCEDURE — 36415 COLL VENOUS BLD VENIPUNCTURE: CPT

## 2019-09-05 PROCEDURE — 1200000000 HC SEMI PRIVATE

## 2019-09-05 PROCEDURE — 84443 ASSAY THYROID STIM HORMONE: CPT

## 2019-09-05 PROCEDURE — 6370000000 HC RX 637 (ALT 250 FOR IP): Performed by: STUDENT IN AN ORGANIZED HEALTH CARE EDUCATION/TRAINING PROGRAM

## 2019-09-05 PROCEDURE — 71045 X-RAY EXAM CHEST 1 VIEW: CPT

## 2019-09-05 PROCEDURE — 85055 RETICULATED PLATELET ASSAY: CPT

## 2019-09-05 PROCEDURE — 83880 ASSAY OF NATRIURETIC PEPTIDE: CPT

## 2019-09-05 PROCEDURE — 6360000002 HC RX W HCPCS: Performed by: STUDENT IN AN ORGANIZED HEALTH CARE EDUCATION/TRAINING PROGRAM

## 2019-09-05 PROCEDURE — 84484 ASSAY OF TROPONIN QUANT: CPT

## 2019-09-05 PROCEDURE — 83735 ASSAY OF MAGNESIUM: CPT

## 2019-09-05 RX ORDER — SODIUM CHLORIDE 0.9 % (FLUSH) 0.9 %
10 SYRINGE (ML) INJECTION PRN
Status: DISCONTINUED | OUTPATIENT
Start: 2019-09-05 | End: 2019-09-06 | Stop reason: HOSPADM

## 2019-09-05 RX ORDER — SODIUM CHLORIDE 0.9 % (FLUSH) 0.9 %
10 SYRINGE (ML) INJECTION EVERY 12 HOURS SCHEDULED
Status: DISCONTINUED | OUTPATIENT
Start: 2019-09-05 | End: 2019-09-06 | Stop reason: HOSPADM

## 2019-09-05 RX ORDER — SPIRONOLACTONE 25 MG/1
12.5 TABLET ORAL DAILY
Status: DISCONTINUED | OUTPATIENT
Start: 2019-09-05 | End: 2019-09-06 | Stop reason: HOSPADM

## 2019-09-05 RX ORDER — ACETAMINOPHEN 325 MG/1
650 TABLET ORAL EVERY 4 HOURS PRN
Status: DISCONTINUED | OUTPATIENT
Start: 2019-09-05 | End: 2019-09-06 | Stop reason: HOSPADM

## 2019-09-05 RX ORDER — ASPIRIN 81 MG/1
81 TABLET ORAL DAILY
Status: DISCONTINUED | OUTPATIENT
Start: 2019-09-06 | End: 2019-09-06 | Stop reason: HOSPADM

## 2019-09-05 RX ORDER — POTASSIUM CHLORIDE 20 MEQ/1
40 TABLET, EXTENDED RELEASE ORAL PRN
Status: DISCONTINUED | OUTPATIENT
Start: 2019-09-05 | End: 2019-09-06 | Stop reason: HOSPADM

## 2019-09-05 RX ORDER — ONDANSETRON 2 MG/ML
4 INJECTION INTRAMUSCULAR; INTRAVENOUS EVERY 6 HOURS PRN
Status: DISCONTINUED | OUTPATIENT
Start: 2019-09-05 | End: 2019-09-06 | Stop reason: HOSPADM

## 2019-09-05 RX ORDER — FUROSEMIDE 20 MG/1
20 TABLET ORAL DAILY
Status: DISCONTINUED | OUTPATIENT
Start: 2019-09-06 | End: 2019-09-06

## 2019-09-05 RX ORDER — POTASSIUM CHLORIDE 7.45 MG/ML
10 INJECTION INTRAVENOUS PRN
Status: DISCONTINUED | OUTPATIENT
Start: 2019-09-05 | End: 2019-09-06 | Stop reason: HOSPADM

## 2019-09-05 RX ORDER — LOSARTAN POTASSIUM 25 MG/1
25 TABLET ORAL DAILY
Status: DISCONTINUED | OUTPATIENT
Start: 2019-09-06 | End: 2019-09-06 | Stop reason: HOSPADM

## 2019-09-05 RX ORDER — MECLIZINE HCL 12.5 MG/1
25 TABLET ORAL ONCE
Status: COMPLETED | OUTPATIENT
Start: 2019-09-05 | End: 2019-09-05

## 2019-09-05 RX ORDER — POTASSIUM CHLORIDE 750 MG/1
20 CAPSULE, EXTENDED RELEASE ORAL 2 TIMES DAILY WITH MEALS
Status: DISCONTINUED | OUTPATIENT
Start: 2019-09-06 | End: 2019-09-06 | Stop reason: HOSPADM

## 2019-09-05 RX ORDER — FUROSEMIDE 10 MG/ML
20 INJECTION INTRAMUSCULAR; INTRAVENOUS ONCE
Status: DISCONTINUED | OUTPATIENT
Start: 2019-09-05 | End: 2019-09-05

## 2019-09-05 RX ORDER — DIPHENHYDRAMINE HCL 25 MG
25 TABLET ORAL NIGHTLY PRN
Status: DISCONTINUED | OUTPATIENT
Start: 2019-09-06 | End: 2019-09-05

## 2019-09-05 RX ORDER — DIPHENHYDRAMINE HCL 25 MG
50 TABLET ORAL NIGHTLY PRN
Status: DISCONTINUED | OUTPATIENT
Start: 2019-09-06 | End: 2019-09-05

## 2019-09-05 RX ORDER — SODIUM CHLORIDE 0.9 % (FLUSH) 0.9 %
10 SYRINGE (ML) INJECTION EVERY 12 HOURS SCHEDULED
Status: CANCELLED | OUTPATIENT
Start: 2019-09-05

## 2019-09-05 RX ORDER — DIPHENHYDRAMINE HCL 25 MG
25 TABLET ORAL NIGHTLY PRN
Status: DISCONTINUED | OUTPATIENT
Start: 2019-09-05 | End: 2019-09-06 | Stop reason: HOSPADM

## 2019-09-05 RX ORDER — ACETAMINOPHEN 325 MG/1
650 TABLET ORAL EVERY 4 HOURS PRN
Status: CANCELLED | OUTPATIENT
Start: 2019-09-05

## 2019-09-05 RX ORDER — ALLOPURINOL 100 MG/1
100 TABLET ORAL DAILY
Status: DISCONTINUED | OUTPATIENT
Start: 2019-09-06 | End: 2019-09-06 | Stop reason: HOSPADM

## 2019-09-05 RX ORDER — SODIUM CHLORIDE 0.9 % (FLUSH) 0.9 %
10 SYRINGE (ML) INJECTION PRN
Status: CANCELLED | OUTPATIENT
Start: 2019-09-05

## 2019-09-05 RX ORDER — METOPROLOL TARTRATE 50 MG/1
50 TABLET, FILM COATED ORAL 2 TIMES DAILY
Status: DISCONTINUED | OUTPATIENT
Start: 2019-09-05 | End: 2019-09-06 | Stop reason: HOSPADM

## 2019-09-05 RX ORDER — AMIODARONE HYDROCHLORIDE 200 MG/1
200 TABLET ORAL DAILY
Status: DISCONTINUED | OUTPATIENT
Start: 2019-09-06 | End: 2019-09-06 | Stop reason: HOSPADM

## 2019-09-05 RX ORDER — METOLAZONE 2.5 MG/1
2.5 TABLET ORAL
Status: DISCONTINUED | OUTPATIENT
Start: 2019-09-06 | End: 2019-09-06 | Stop reason: HOSPADM

## 2019-09-05 RX ORDER — FUROSEMIDE 10 MG/ML
20 INJECTION INTRAMUSCULAR; INTRAVENOUS ONCE
Status: DISCONTINUED | OUTPATIENT
Start: 2019-09-05 | End: 2019-09-06

## 2019-09-05 RX ORDER — DIPHENHYDRAMINE HCL 25 MG
50 TABLET ORAL NIGHTLY PRN
Status: DISCONTINUED | OUTPATIENT
Start: 2019-09-05 | End: 2019-09-06 | Stop reason: HOSPADM

## 2019-09-05 RX ADMIN — SPIRONOLACTONE 12.5 MG: 25 TABLET ORAL at 22:13

## 2019-09-05 RX ADMIN — MECLIZINE HYDROCHLORIDE 25 MG: 12.5 TABLET, FILM COATED ORAL at 14:53

## 2019-09-05 RX ADMIN — DIPHENHYDRAMINE HCL 25 MG: 25 TABLET ORAL at 23:09

## 2019-09-05 RX ADMIN — METOPROLOL TARTRATE 50 MG: 50 TABLET, FILM COATED ORAL at 22:13

## 2019-09-05 ASSESSMENT — ENCOUNTER SYMPTOMS
CHEST TIGHTNESS: 0
ABDOMINAL PAIN: 0
EYE REDNESS: 0
COUGH: 0
NAUSEA: 0
BLOOD IN STOOL: 0
DIARRHEA: 0
SINUS PAIN: 0
EYE DISCHARGE: 0
SHORTNESS OF BREATH: 1
SINUS PRESSURE: 0
VOMITING: 0
ANAL BLEEDING: 0

## 2019-09-05 ASSESSMENT — PAIN SCALES - GENERAL: PAINLEVEL_OUTOF10: 3

## 2019-09-05 NOTE — ED PROVIDER NOTES
[facility]     Emergency Department     Faculty Note/ Attestation      Pt Name: Stacie Barrera                                       MRN: [id number]  Vanessagfjulio 1955  Date of evaluation: 9/5/2019    Patients PCP:    Kaylee Collier MD    Attestation  I performed a history and physical examination of the patient and discussed management with the resident. I reviewed the residents note and agree with the documented findings and plan of care. Any areas of disagreement are noted on the chart. I was personally present for the key portions of any procedures. I have documented in the chart those procedures where I was not present during the key portions. I have reviewed the emergency nurses triage note. I agree with the chief complaint, past medical history, past surgical history, allergies, medications, social and family history as documented unless otherwise noted below. For Physician Assistant/ Nurse Practitioner cases/documentation I have personally evaluated this patient and have completed at least one if not all key elements of the E/M (history, physical exam, and MDM). Additional findings are as noted. Initial Screens:        Walton Coma Scale  Eye Opening: Spontaneous  Best Verbal Response: Oriented  Best Motor Response: Obeys commands  Barbie Coma Scale Score: 15    Vitals:    Vitals:    09/05/19 1401 09/05/19 1417   BP: 114/83 110/74   Pulse: 70 70   Resp: 16 17   Temp: 98 °F (36.7 °C)    TempSrc: Oral    SpO2: 100% 98%   Weight: 234 lb (106.1 kg)        CHIEF COMPLAINT       Chief Complaint   Patient presents with    Dizziness     pt with c/o dizziness since last week. states that she bought antivert over the counter and is taking with no relief of dizziness. does c/o increased shortness of breath over the past few months. denies chest pain. pt states, \"it feels like im on a boat. \" alert and oriented at this time.         She has been dealing with vertigo type symptoms for the last 2 weeks also having shortness of breath gradually worsening however today symptoms progressively worsening patient notes feeling like she is on a boat. Her meclizine is not helping anymore no numbness weakness tingling no vision loss. DIAGNOSTIC RESULTS     RADIOLOGY:   CT Head WO Contrast    (Results Pending)   XR CHEST PORTABLE    (Results Pending)       LABS:  Labs Reviewed   BASIC METABOLIC PANEL   BRAIN NATRIURETIC PEPTIDE   CBC WITH AUTO DIFFERENTIAL   MAGNESIUM   TROPONIN   TROPONIN       EMERGENCY DEPARTMENT COURSE:     -------------------------  BP: 110/74, Temp: 98 °F (36.7 °C), Pulse: 70, Resp: 17  Physical Exam   Constitutional: She appears well-developed and well-nourished. HENT:   Head: Normocephalic and atraumatic. Right Ear: External ear normal.   Left Ear: External ear normal.   Eyes: Right eye exhibits no discharge. Left eye exhibits no discharge. No scleral icterus. Neck: Normal range of motion. No tracheal deviation present. Cardiovascular: Normal rate, regular rhythm and intact distal pulses. Exam reveals no friction rub. No murmur heard. Pulmonary/Chest: Effort normal. No stridor. No respiratory distress. Abdominal: Soft. Bowel sounds are normal. She exhibits no mass. Musculoskeletal: Normal range of motion. She exhibits no edema. Neurological: She is alert. Coordination normal.   The patient is alert oriented x 4 and Conversant with at GCS of 15    Cranial nerves II through XII intact    Upper extremity strength 5 out of 5 bilaterally in flexors extensors and intrinsics hand muscles. Sensation equal bilaterally. Sense of positioning intact bilaterally. Lower extremity strength 5 out of 5 with hip flexion and extension flexion and extension of the feet. Sense of proprioception intact bilaterally. Sensation equal bilaterally    Patient has no deficit with finger to nose.       DTRs intact    The pt had no Skew on exam did have vertigo sx with An halpike

## 2019-09-05 NOTE — H&P
Lymphatic ROS:  Completed and except as mentioned above were negative  Respiratory ROS:  Completed and except as mentioned above were negative  Cardiovascular ROS:  Completed and except as mentioned above were negative  Gastrointestinal ROS: Completed and except as mentioned above were negative  Genito-Urinary ROS:  Completed and except as mentioned above were negative  Musculoskeletal ROS:  Completed and except as mentioned above were negative  Neurological ROS:  Completed and except as mentioned above were negative  Skin & Dermatological ROS:  Completed and except as mentioned above were negative  Psychological ROS:  Completed and except as mentioned above were negative    PAST MEDICAL HISTORY     Past Medical History:   Diagnosis Date    Abnormal Pap smear     Acute on chronic systolic congestive heart failure (HCC)     AICD (automatic cardioverter/defibrillator) present     [facility]URO (acute kidney [facility]) ([facility] 3/4/2017    Anemia     Arthritis     Cardiomyopathy ([facility] 12/20/12    robotic replacement of 2 left ventricular leads/replacement of ICD generator    Cardiomyopathy, nonischemic ([facility] 12/20/2012    Ejection fraction < 50%     HTN (hypertension)     Ischemic cardiomyopathy     Left bundle branch block     Mitral regurgitation     Morbid obesity due to excess calories ([facility] 1/15/2017    Pulmonary hypertension ([facility]     Sudden onset of severe headache        PAST SURGICAL HISTORY     Past Surgical History:   Procedure Laterality Date    COLONOSCOPY      HERNIA REPAIR      OTHER SURGICAL HISTORY  12/20/12    robotic placement of 2 left ventricular leads/replacement of ICD generator    PACEMAKER PLACEMENT      TUBAL LIGATION         ALLERGIES     Codeine and Morphine    MEDICATIONS PRIOR TO ADMISSION     Prior to Admission medications    Medication Sig Start Date End Date Taking?  Authorizing Provider   aspirin 81 MG tablet Take 81 mg by mouth daily    Historical Provider, MD   allopurinol

## 2019-09-05 NOTE — ED PROVIDER NOTES
Neurological: She is alert and oriented to person, place, and time. CN II-XII grossly intact; 5/5 strength in upper and lower extremities b/l; no changes in sensation; finger to nose normal   Skin: Skin is warm and dry. No rash noted. No erythema. Psychiatric: She has a normal mood and affect. Her behavior is normal.   Nursing note and vitals reviewed. DIFFERENTIAL  DIAGNOSIS     PLAN (LABS / IMAGING / EKG):  Orders Placed This Encounter   Procedures    CT Head WO Contrast    XR CHEST PORTABLE    Basic Metabolic Panel    Brain Natriuretic Peptide    CBC Auto Differential    Magnesium    Troponin    Immature Platelet Fraction    Basic Metabolic Panel w/ Reflex to MG    Magnesium    Troponin    Lipid panel - fasting    CBC auto differential    TSH without Reflex    DIET GENERAL; Low Sodium (2 GM);  Daily Fluid Restriction: 1800 ml    Vital signs per unit routine    Telemetry monitoring    Orthostatic blood pressure    Notify physician    Up with assistance    Daily weights    Intake and output    Tobacco cessation education    Full Code    Inpatient consult to Internal Medicine    Inpatient consult to Heart Failure Nurse/Coordinator    Inpatient consult to Dietitian    Inpatient Consult to Neurology    Initiate Oxygen Therapy Protocol    Pulse oximetry, continuous    Pacer Interrogate    EKG 12 Lead    EKG 12 lead    Echocardiogram complete 2D with doppler with color    PATIENT STATUS (FROM ED OR OR/PROCEDURAL) Inpatient       MEDICATIONS ORDERED:  Orders Placed This Encounter   Medications    meclizine (ANTIVERT) tablet 25 mg    allopurinol (ZYLOPRIM) tablet 100 mg    amiodarone (CORDARONE) tablet 200 mg    aspirin EC tablet 81 mg    furosemide (LASIX) tablet 20 mg    losartan (COZAAR) tablet 25 mg    metOLazone (ZAROXOLYN) tablet 2.5 mg    metoprolol tartrate (LOPRESSOR) tablet 50 mg    potassium chloride (MICRO-K) extended release capsule 20 mEq    spironolactone (ALDACTONE) tablet 12.5 mg    sodium chloride flush 0.9 % injection 10 mL    sodium chloride flush 0.9 % injection 10 mL    OR Linked Order Group     potassium chloride (KLOR-CON M) extended release tablet 40 mEq     potassium bicarb-citric acid (EFFER-K) effervescent tablet 40 mEq     potassium chloride 10 mEq/100 mL IVPB (Peripheral Line)    magnesium hydroxide (MILK OF MAGNESIA) 400 MG/5ML suspension 30 mL    ondansetron (ZOFRAN) injection 4 mg    enoxaparin (LOVENOX) injection 40 mg    furosemide (LASIX) injection 20 mg    furosemide (LASIX) injection 20 mg       DDX: ACS versus CHF exacerbation versus fluid overload versus electrolyte abnormality    Initial MDM/Plan: 71 y.o. female who presents with vertiginous sensation as well as increased shortness of breath and decreased exercise tolerance. We will get chest pain work-up as well as BNP. Symptoms consistent with peripheral vertigo. normal neurological exam and cerebellar testing making acute cerebellar infarct or other CVA unlikely.      DIAGNOSTIC RESULTS / EMERGENCY DEPARTMENT COURSE / MDM     LABS:  Labs Reviewed   BASIC METABOLIC PANEL - Abnormal; Notable for the following components:       Result Value    Glucose 107 (*)     All other components within normal limits   BRAIN NATRIURETIC PEPTIDE - Abnormal; Notable for the following components:    Pro-BNP 1,137 (*)     All other components within normal limits   CBC WITH AUTO DIFFERENTIAL   MAGNESIUM   TROPONIN   TROPONIN   IMMATURE PLATELET FRACTION   BASIC METABOLIC PANEL W/ REFLEX TO MG FOR LOW K   MAGNESIUM   TROPONIN   LIPID PANEL   CBC WITH AUTO DIFFERENTIAL   TSH WITHOUT REFLEX         RADIOLOGY:  Ct Head Wo Contrast    Result Date: 9/5/2019  EXAMINATION: CT OF THE HEAD WITHOUT CONTRAST  9/5/2019 6:48 pm TECHNIQUE: CT of the head was performed without the administration of intravenous contrast. Dose modulation, iterative reconstruction, and/or weight based adjustment of Sensitivity: 13 [MS]   1521 Platelet, Fluorescence: Platelet clumps present, count appears adequate. [MS]   1522 Immature Platelet Fraction:    Platelet, Fluorescence Platelet clumps present, count appears adequate. [MS]   1551 Pro-BNP(!): 1,137 [MS]      ED Course User Index  [MS] Moose Dsouza DO     BNP elevated at 1100. Awaiting results of CT head. All other testing normal.  We will plan admission to internal medicine for acute on chronic heart failure. Discussed case with resident and patient admitted. Signout given to Dr. Lawrence Steward. PROCEDURES:  None    CONSULTS:  IP CONSULT TO INTERNAL MEDICINE  IP CONSULT TO HEART FAILURE NURSE/COORDINATOR  IP CONSULT TO DIETITIAN  IP CONSULT TO NEUROLOGY    CRITICAL CARE:  Please see attending note    FINAL IMPRESSION      1. Near syncope    2.  Acute on chronic systolic congestive heart failure ([facility]          DISPOSITION / PLAN     DISPOSITION Admitted 09/05/2019 04:22:44 PM        PATIENTREFERRED TO:  Josie Parra MD  [address] [phone]            DISCHARGE MEDICATIONS:  Current Discharge Medication List          Moose Dsouza DO  EmergencyMedicine Resident    (Please note that portions of this note were completed with a voice recognition program.  Efforts were made to edit the dictations but occasionally words are mis-transcribed.)      Moose Dsouza DO  Resident  09/05/19 3969

## 2019-09-06 VITALS
HEART RATE: 70 BPM | WEIGHT: 238.1 LBS | DIASTOLIC BLOOD PRESSURE: 71 MMHG | HEIGHT: 66 IN | OXYGEN SATURATION: 98 % | SYSTOLIC BLOOD PRESSURE: 111 MMHG | RESPIRATION RATE: 18 BRPM | TEMPERATURE: 98.4 F | BODY MASS INDEX: 38.27 KG/M2

## 2019-09-06 PROBLEM — H81.10 BENIGN POSITIONAL VERTIGO, UNSPECIFIED LATERALITY: Status: ACTIVE | Noted: 2019-09-06

## 2019-09-06 LAB
ABSOLUTE EOS #: 0.23 K/UL (ref 0–0.44)
ABSOLUTE IMMATURE GRANULOCYTE: <0.03 K/UL (ref 0–0.3)
ABSOLUTE LYMPH #: 1.56 K/UL (ref 1.1–3.7)
ABSOLUTE MONO #: 0.61 K/UL (ref 0.1–1.2)
ANION GAP SERPL CALCULATED.3IONS-SCNC: 12 MMOL/L (ref 9–17)
BASOPHILS # BLD: 1 % (ref 0–2)
BASOPHILS ABSOLUTE: 0.05 K/UL (ref 0–0.2)
BUN BLDV-MCNC: 16 MG/DL (ref 8–23)
BUN/CREAT BLD: NORMAL (ref 9–20)
CALCIUM SERPL-MCNC: 9.2 MG/DL (ref 8.6–10.4)
CHLORIDE BLD-SCNC: 106 MMOL/L (ref 98–107)
CHOLESTEROL/HDL RATIO: 3.8
CHOLESTEROL: 172 MG/DL
CO2: 22 MMOL/L (ref 20–31)
CREAT SERPL-MCNC: 0.84 MG/DL (ref 0.5–0.9)
DIFFERENTIAL TYPE: NORMAL
EKG ATRIAL RATE: 70 BPM
EKG ATRIAL RATE: 70 BPM
EKG P AXIS: 28 DEGREES
EKG P-R INTERVAL: 136 MS
EKG P-R INTERVAL: 136 MS
EKG Q-T INTERVAL: 492 MS
EKG Q-T INTERVAL: 510 MS
EKG QRS DURATION: 174 MS
EKG QRS DURATION: 178 MS
EKG QTC CALCULATION (BAZETT): 531 MS
EKG QTC CALCULATION (BAZETT): 550 MS
EKG R AXIS: -90 DEGREES
EKG R AXIS: -98 DEGREES
EKG T AXIS: 106 DEGREES
EKG T AXIS: 99 DEGREES
EKG VENTRICULAR RATE: 70 BPM
EKG VENTRICULAR RATE: 70 BPM
EOSINOPHILS RELATIVE PERCENT: 4 % (ref 1–4)
GFR AFRICAN AMERICAN: >60 ML/MIN
GFR NON-AFRICAN AMERICAN: >60 ML/MIN
GFR SERPL CREATININE-BSD FRML MDRD: NORMAL ML/MIN/{1.73_M2}
GFR SERPL CREATININE-BSD FRML MDRD: NORMAL ML/MIN/{1.73_M2}
GLUCOSE BLD-MCNC: 94 MG/DL (ref 70–99)
HCT VFR BLD CALC: 36.6 % (ref 36.3–47.1)
HDLC SERPL-MCNC: 45 MG/DL
HEMOGLOBIN: 11.9 G/DL (ref 11.9–15.1)
IMMATURE GRANULOCYTES: 0 %
LDL CHOLESTEROL: 105 MG/DL (ref 0–130)
LYMPHOCYTES # BLD: 26 % (ref 24–43)
MAGNESIUM: 1.9 MG/DL (ref 1.6–2.6)
MCH RBC QN AUTO: 28.8 PG (ref 25.2–33.5)
MCHC RBC AUTO-ENTMCNC: 32.5 G/DL (ref 28.4–34.8)
MCV RBC AUTO: 88.6 FL (ref 82.6–102.9)
MONOCYTES # BLD: 10 % (ref 3–12)
NRBC AUTOMATED: 0 PER 100 WBC
PDW BLD-RTO: 14.3 % (ref 11.8–14.4)
PLATELET # BLD: 185 K/UL (ref 138–453)
PLATELET ESTIMATE: NORMAL
PMV BLD AUTO: 11.7 FL (ref 8.1–13.5)
POTASSIUM SERPL-SCNC: 3.9 MMOL/L (ref 3.7–5.3)
RBC # BLD: 4.13 M/UL (ref 3.95–5.11)
RBC # BLD: NORMAL 10*6/UL
SEG NEUTROPHILS: 59 % (ref 36–65)
SEGMENTED NEUTROPHILS ABSOLUTE COUNT: 3.56 K/UL (ref 1.5–8.1)
SODIUM BLD-SCNC: 140 MMOL/L (ref 135–144)
TRIGL SERPL-MCNC: 108 MG/DL
VLDLC SERPL CALC-MCNC: NORMAL MG/DL (ref 1–30)
WBC # BLD: 6 K/UL (ref 3.5–11.3)
WBC # BLD: NORMAL 10*3/UL

## 2019-09-06 PROCEDURE — G0378 HOSPITAL OBSERVATION PER HR: HCPCS

## 2019-09-06 PROCEDURE — 36415 COLL VENOUS BLD VENIPUNCTURE: CPT

## 2019-09-06 PROCEDURE — 6370000000 HC RX 637 (ALT 250 FOR IP): Performed by: STUDENT IN AN ORGANIZED HEALTH CARE EDUCATION/TRAINING PROGRAM

## 2019-09-06 PROCEDURE — 80048 BASIC METABOLIC PNL TOTAL CA: CPT

## 2019-09-06 PROCEDURE — 85025 COMPLETE CBC W/AUTO DIFF WBC: CPT

## 2019-09-06 PROCEDURE — 99222 1ST HOSP IP/OBS MODERATE 55: CPT | Performed by: PSYCHIATRY & NEUROLOGY

## 2019-09-06 PROCEDURE — 83735 ASSAY OF MAGNESIUM: CPT

## 2019-09-06 PROCEDURE — 80061 LIPID PANEL: CPT

## 2019-09-06 PROCEDURE — 99222 1ST HOSP IP/OBS MODERATE 55: CPT | Performed by: INTERNAL MEDICINE

## 2019-09-06 PROCEDURE — 2580000003 HC RX 258: Performed by: STUDENT IN AN ORGANIZED HEALTH CARE EDUCATION/TRAINING PROGRAM

## 2019-09-06 PROCEDURE — 96376 TX/PRO/DX INJ SAME DRUG ADON: CPT

## 2019-09-06 PROCEDURE — 93010 ELECTROCARDIOGRAM REPORT: CPT | Performed by: INTERNAL MEDICINE

## 2019-09-06 PROCEDURE — 96374 THER/PROPH/DIAG INJ IV PUSH: CPT

## 2019-09-06 PROCEDURE — 93005 ELECTROCARDIOGRAM TRACING: CPT | Performed by: STUDENT IN AN ORGANIZED HEALTH CARE EDUCATION/TRAINING PROGRAM

## 2019-09-06 PROCEDURE — 6360000002 HC RX W HCPCS: Performed by: STUDENT IN AN ORGANIZED HEALTH CARE EDUCATION/TRAINING PROGRAM

## 2019-09-06 RX ORDER — FUROSEMIDE 10 MG/ML
20 INJECTION INTRAMUSCULAR; INTRAVENOUS ONCE
Status: COMPLETED | OUTPATIENT
Start: 2019-09-06 | End: 2019-09-06

## 2019-09-06 RX ORDER — FUROSEMIDE 10 MG/ML
20 INJECTION INTRAMUSCULAR; INTRAVENOUS ONCE
Status: DISCONTINUED | OUTPATIENT
Start: 2019-09-06 | End: 2019-09-06

## 2019-09-06 RX ORDER — MECLIZINE HCL 12.5 MG/1
25 TABLET ORAL DAILY PRN
Status: DISCONTINUED | OUTPATIENT
Start: 2019-09-06 | End: 2019-09-06 | Stop reason: HOSPADM

## 2019-09-06 RX ORDER — FUROSEMIDE 20 MG/1
20 TABLET ORAL DAILY
Status: DISCONTINUED | OUTPATIENT
Start: 2019-09-07 | End: 2019-09-06 | Stop reason: HOSPADM

## 2019-09-06 RX ORDER — MECLIZINE HYDROCHLORIDE 25 MG/1
25 TABLET ORAL 3 TIMES DAILY PRN
Qty: 15 TABLET | Refills: 0 | Status: SHIPPED | OUTPATIENT
Start: 2019-09-06 | End: 2019-09-16

## 2019-09-06 RX ADMIN — METOPROLOL TARTRATE 50 MG: 50 TABLET, FILM COATED ORAL at 08:33

## 2019-09-06 RX ADMIN — Medication 10 ML: at 08:34

## 2019-09-06 RX ADMIN — SPIRONOLACTONE 12.5 MG: 25 TABLET ORAL at 08:32

## 2019-09-06 RX ADMIN — FUROSEMIDE 20 MG: 10 INJECTION, SOLUTION INTRAMUSCULAR; INTRAVENOUS at 08:28

## 2019-09-06 RX ADMIN — FUROSEMIDE 20 MG: 10 INJECTION, SOLUTION INTRAMUSCULAR; INTRAVENOUS at 10:30

## 2019-09-06 RX ADMIN — POTASSIUM CHLORIDE 20 MEQ: 10 CAPSULE, COATED, EXTENDED RELEASE ORAL at 08:33

## 2019-09-06 RX ADMIN — AMIODARONE HYDROCHLORIDE 200 MG: 200 TABLET ORAL at 08:32

## 2019-09-06 RX ADMIN — ALLOPURINOL 100 MG: 100 TABLET ORAL at 08:33

## 2019-09-06 RX ADMIN — ACETAMINOPHEN 650 MG: 325 TABLET ORAL at 08:28

## 2019-09-06 RX ADMIN — Medication 81 MG: at 08:32

## 2019-09-06 RX ADMIN — LOSARTAN POTASSIUM 25 MG: 25 TABLET, FILM COATED ORAL at 08:33

## 2019-09-06 ASSESSMENT — PAIN - FUNCTIONAL ASSESSMENT: PAIN_FUNCTIONAL_ASSESSMENT: ACTIVITIES ARE NOT PREVENTED

## 2019-09-06 ASSESSMENT — PAIN SCALES - GENERAL
PAINLEVEL_OUTOF10: 3
PAINLEVEL_OUTOF10: 0

## 2019-09-06 ASSESSMENT — PAIN DESCRIPTION - LOCATION: LOCATION: HEAD

## 2019-09-06 ASSESSMENT — ENCOUNTER SYMPTOMS
EYES NEGATIVE: 1
RESPIRATORY NEGATIVE: 1
GASTROINTESTINAL NEGATIVE: 1

## 2019-09-06 ASSESSMENT — PAIN DESCRIPTION - PROGRESSION: CLINICAL_PROGRESSION: NOT CHANGED

## 2019-09-06 ASSESSMENT — PAIN DESCRIPTION - ORIENTATION: ORIENTATION: POSTERIOR

## 2019-09-06 ASSESSMENT — PAIN DESCRIPTION - ONSET: ONSET: ON-GOING

## 2019-09-06 ASSESSMENT — PAIN DESCRIPTION - PAIN TYPE: TYPE: ACUTE PAIN

## 2019-09-06 NOTE — CONSULTS
Scores:       Tricep reflexes are 2+ on the right side and 2+ on the left side. Bicep reflexes are 2+ on the right side and 2+ on the left side. Brachioradialis reflexes are 2+ on the right side and 2+ on the left side. Patellar reflexes are 2+ on the right side and 2+ on the left side. Achilles reflexes are 2+ on the right side and 2+ on the left side.         DATA  Recent Results (from the past 24 hour(s))   EKG 12 Lead    Collection Time: 09/05/19  2:00 PM   Result Value Ref Range    Ventricular Rate 70 BPM    Atrial Rate 70 BPM    P-R Interval 136 ms    QRS Duration 174 ms    Q-T Interval 492 ms    QTc Calculation (Bazett) 531 ms    R Axis -90 degrees    T Axis 99 degrees   Basic Metabolic Panel    Collection Time: 09/05/19  2:35 PM   Result Value Ref Range    Glucose 107 (H) 70 - 99 mg/dL    BUN 18 8 - 23 mg/dL    CREATININE 0.86 0.50 - 0.90 mg/dL    Bun/Cre Ratio NOT REPORTED 9 - 20    Calcium 9.4 8.6 - 10.4 mg/dL    Sodium 140 135 - 144 mmol/L    Potassium 4.2 3.7 - 5.3 mmol/L    Chloride 103 98 - 107 mmol/L    CO2 23 20 - 31 mmol/L    Anion Gap 14 9 - 17 mmol/L    GFR Non-African American >60 >60 mL/min    GFR African American >60 >60 mL/min    GFR Comment          GFR Staging NOT REPORTED    Brain Natriuretic Peptide    Collection Time: 09/05/19  2:35 PM   Result Value Ref Range    Pro-BNP 1,137 (H) <300 pg/mL    BNP Interpretation Pro-BNP Reference Range:    CBC Auto Differential    Collection Time: 09/05/19  2:35 PM   Result Value Ref Range    WBC 7.1 3.5 - 11.3 k/uL    RBC 4.54 3.95 - 5.11 m/uL    Hemoglobin 13.1 11.9 - 15.1 g/dL    Hematocrit 39.2 36.3 - 47.1 %    MCV 86.3 82.6 - 102.9 fL    MCH 28.9 25.2 - 33.5 pg    MCHC 33.4 28.4 - 34.8 g/dL    RDW 14.3 11.8 - 14.4 %    Platelets See Reflexed IPF Result 138 - 453 k/uL    MPV NOT REPORTED 8.1 - 13.5 fL    NRBC Automated 0.0 0.0 per 100 WBC    Differential Type NOT REPORTED     WBC Morphology NOT REPORTED     RBC Morphology NOT REPORTED     Platelet Estimate NOT REPORTED     Seg Neutrophils 58 36 - 65 %    Lymphocytes 27 24 - 43 %    Monocytes 11 3 - 12 %    Eosinophils % 3 1 - 4 %    Basophils 1 0 - 2 %    Immature Granulocytes 0 0 %    Segs Absolute 4.12 1.50 - 8.10 k/uL    Absolute Lymph # 1.88 1.10 - 3.70 k/uL    Absolute Mono # 0.76 0.10 - 1.20 k/uL    Absolute Eos # 0.24 0.00 - 0.44 k/uL    Basophils Absolute 0.08 0.00 - 0.20 k/uL    Absolute Immature Granulocyte <0.03 0.00 - 0.30 k/uL   Magnesium    Collection Time: 09/05/19  2:35 PM   Result Value Ref Range    Magnesium 2.1 1.6 - 2.6 mg/dL   Troponin    Collection Time: 09/05/19  2:35 PM   Result Value Ref Range    Troponin, High Sensitivity 13 0 - 14 ng/L    Troponin T NOT REPORTED <0.03 ng/mL    Troponin Interp NOT REPORTED    Immature Platelet Fraction    Collection Time: 09/05/19  2:35 PM   Result Value Ref Range    Platelet, Fluorescence Platelet clumps present, count appears adequate.  138 - 453 k/uL   Troponin    Collection Time: 09/05/19  5:16 PM   Result Value Ref Range    Troponin, High Sensitivity 13 0 - 14 ng/L    Troponin T NOT REPORTED <0.03 ng/mL    Troponin Interp NOT REPORTED    Troponin    Collection Time: 09/05/19 10:26 PM   Result Value Ref Range    Troponin, High Sensitivity 14 0 - 14 ng/L    Troponin T NOT REPORTED <0.03 ng/mL    Troponin Interp NOT REPORTED    TSH without Reflex    Collection Time: 09/05/19 10:26 PM   Result Value Ref Range    TSH 4.69 0.30 - 5.00 mIU/L   Basic Metabolic Panel w/ Reflex to MG    Collection Time: 09/06/19  4:48 AM   Result Value Ref Range    Glucose 94 70 - 99 mg/dL    BUN 16 8 - 23 mg/dL    CREATININE 0.84 0.50 - 0.90 mg/dL    Bun/Cre Ratio NOT REPORTED 9 - 20    Calcium 9.2 8.6 - 10.4 mg/dL    Sodium 140 135 - 144 mmol/L    Potassium 3.9 3.7 - 5.3 mmol/L    Chloride 106 98 - 107 mmol/L    CO2 22 20 - 31 mmol/L    Anion Gap 12 9 - 17 mmol/L    GFR Non-African American >60 >60 mL/min    GFR African American >60 >60 mL/min rehabilitation     Thank you for the consultation. Will follow.  Case consulted with Dr. Prudence Villasenor MD  Neurology Resident PGY-2  9/6/2019 at 7:41 AM

## 2019-09-06 NOTE — PROGRESS NOTES
Smoking Cessation - topics covered   []  Health Risks  []  Benefits of Quitting   []  Smoking Cessation  [x]  Patient has no history of tobacco use  []  Patient is former smoker. [x]  No need for tobacco cessation education. []  Booklet given  []  Patient verbalizes understanding. []  Patient denies need for tobacco cessation education. []  Unable to meet with patient today. Will follow up as able.   Joselin Capellan  7:51 AM
past which does relieve her symptoms. Admits to associated symptoms of headache x 2 days and shortness of breath worsening from baseline. OBJECTIVE     Vital Signs:  /79   Pulse 70   Temp 98.2 °F (36.8 °C) (Oral)   Resp 20   Ht 5' 6\" (1.676 m)   Wt 238 lb 1.6 oz (108 kg)   SpO2 98%   BMI 38.43 kg/m²     Temp (24hrs), Av.1 °F (36.7 °C), Min:98 °F (36.7 °C), Max:98.2 °F (36.8 °C)    No intake/output data recorded. Physical Exam:  Constitutional: This is a well developed, well nourished, 35-39.9 - Obesity Grade II 71y.o. year old female who is alert, oriented, cooperative and in no apparent distress. Appears comfortable making jokes. Head: normocephalic and atraumatic. EENT:  PERRLA. No conjunctival injections. No nystagmus. Neck: No elevated JVP. Trachea was midline. Respiratory: Chest was symmetrical without dullness to percussion. Breath sounds bilaterally were clear to auscultation. There were no wheezes, rhonchi or rales. There is no intercostal retraction or use of accessory muscles. No crackles. Cardiovascular: Regular without murmur, clicks, gallops or rubs. Abdomen: Slightly rounded and soft without organomegaly. No rebound, rigidity or guarding was appreciated. Musculoskeletal: No gross muscle weakness. Extremities:  No lower extremity edema, ulcerations, tenderness, varicosities or erythema. Muscle size, tone and strength are normal.  No involuntary movements are noted. 5/5 strength upper and lower extremities bilaterally. Moves extremities. Skin:  Warm and dry. Good color, turgor and pigmentation. No lesions or scars.   No cyanosis or clubbing  Neurological/Psychiatric: The patient's general behavior, level of consciousness, thought content and emotional status is normal.       Medications:  Scheduled Medications:    allopurinol  100 mg Oral Daily    amiodarone  200 mg Oral Daily    aspirin  81 mg Oral Daily    furosemide  20 mg Oral Daily    losartan  25

## 2019-09-06 NOTE — CARE COORDINATION
Discharge [facility] Case Management Department  Written by: Stef Taylor RN    Patient Name: Sharon Harvey  Attending Provider: No att. providers found  Admit Date: 2019  1:43 PM  MRN: [id number]  Account: [de-identified]                     : 1955  Discharge Date: 2019      Disposition: home    Stef Taylor RN

## 2019-12-30 ENCOUNTER — HOSPITAL ENCOUNTER (OUTPATIENT)
Dept: OTHER | Age: 64
Discharge: HOME | End: 2019-12-30
Payer: MEDICARE

## 2019-12-30 VITALS
SYSTOLIC BLOOD PRESSURE: 100 MMHG | BODY MASS INDEX: 37.64 KG/M2 | WEIGHT: 233.2 LBS | HEART RATE: 83 BPM | DIASTOLIC BLOOD PRESSURE: 70 MMHG | RESPIRATION RATE: 16 BRPM | OXYGEN SATURATION: 100 %

## 2019-12-30 PROCEDURE — 99212 OFFICE O/P EST SF 10 MIN: CPT

## 2019-12-30 ASSESSMENT — PAIN SCALES - GENERAL: PAINLEVEL_OUTOF10: 0

## 2020-01-07 ENCOUNTER — HOSPITAL ENCOUNTER (EMERGENCY)
Age: 65
Discharge: HOME | End: 2020-01-07
Attending: EMERGENCY MEDICINE
Payer: MEDICARE

## 2020-01-07 ENCOUNTER — APPOINTMENT (OUTPATIENT)
Dept: GENERAL RADIOLOGY | Age: 65
End: 2020-01-07
Payer: MEDICARE

## 2020-01-07 VITALS
HEART RATE: 70 BPM | OXYGEN SATURATION: 98 % | SYSTOLIC BLOOD PRESSURE: 114 MMHG | DIASTOLIC BLOOD PRESSURE: 85 MMHG | TEMPERATURE: 98.1 F | RESPIRATION RATE: 17 BRPM

## 2020-01-07 LAB
ABSOLUTE EOS #: 0.23 K/UL (ref 0–0.44)
ABSOLUTE IMMATURE GRANULOCYTE: <0.03 K/UL (ref 0–0.3)
ABSOLUTE LYMPH #: 1.33 K/UL (ref 1.1–3.7)
ABSOLUTE MONO #: 0.61 K/UL (ref 0.1–1.2)
ALBUMIN SERPL-MCNC: 3.8 G/DL (ref 3.5–5.2)
ALBUMIN/GLOBULIN RATIO: 1 (ref 1–2.5)
ALP BLD-CCNC: 237 U/L (ref 35–104)
ALT SERPL-CCNC: 26 U/L (ref 5–33)
ANION GAP SERPL CALCULATED.3IONS-SCNC: 14 MMOL/L (ref 9–17)
AST SERPL-CCNC: 37 U/L
BASOPHILS # BLD: 1 % (ref 0–2)
BASOPHILS ABSOLUTE: 0.05 K/UL (ref 0–0.2)
BILIRUB SERPL-MCNC: 0.61 MG/DL (ref 0.3–1.2)
BUN BLDV-MCNC: 17 MG/DL (ref 8–23)
BUN/CREAT BLD: ABNORMAL (ref 9–20)
CALCIUM SERPL-MCNC: 9.2 MG/DL (ref 8.6–10.4)
CHLORIDE BLD-SCNC: 104 MMOL/L (ref 98–107)
CO2: 21 MMOL/L (ref 20–31)
CREAT SERPL-MCNC: 0.68 MG/DL (ref 0.5–0.9)
DIFFERENTIAL TYPE: ABNORMAL
EOSINOPHILS RELATIVE PERCENT: 4 % (ref 1–4)
GFR AFRICAN AMERICAN: >60 ML/MIN
GFR NON-AFRICAN AMERICAN: >60 ML/MIN
GFR SERPL CREATININE-BSD FRML MDRD: ABNORMAL ML/MIN/{1.73_M2}
GFR SERPL CREATININE-BSD FRML MDRD: ABNORMAL ML/MIN/{1.73_M2}
GLUCOSE BLD-MCNC: 129 MG/DL (ref 70–99)
HCT VFR BLD CALC: 35.1 % (ref 36.3–47.1)
HEMOGLOBIN: 12.2 G/DL (ref 11.9–15.1)
IMMATURE GRANULOCYTES: 0 %
LYMPHOCYTES # BLD: 21 % (ref 24–43)
MCH RBC QN AUTO: 29.1 PG (ref 25.2–33.5)
MCHC RBC AUTO-ENTMCNC: 34.8 G/DL (ref 28.4–34.8)
MCV RBC AUTO: 83.8 FL (ref 82.6–102.9)
MONOCYTES # BLD: 10 % (ref 3–12)
NRBC AUTOMATED: 0 PER 100 WBC
PDW BLD-RTO: 13.9 % (ref 11.8–14.4)
PLATELET # BLD: 235 K/UL (ref 138–453)
PLATELET ESTIMATE: ABNORMAL
PMV BLD AUTO: 10.8 FL (ref 8.1–13.5)
POTASSIUM SERPL-SCNC: 3.8 MMOL/L (ref 3.7–5.3)
RBC # BLD: 4.19 M/UL (ref 3.95–5.11)
RBC # BLD: ABNORMAL 10*6/UL
SEG NEUTROPHILS: 64 % (ref 36–65)
SEGMENTED NEUTROPHILS ABSOLUTE COUNT: 4.01 K/UL (ref 1.5–8.1)
SODIUM BLD-SCNC: 139 MMOL/L (ref 135–144)
TOTAL PROTEIN: 7.6 G/DL (ref 6.4–8.3)
TROPONIN INTERP: NORMAL
TROPONIN T: NORMAL NG/ML
TROPONIN, HIGH SENSITIVITY: 14 NG/L (ref 0–14)
WBC # BLD: 6.3 K/UL (ref 3.5–11.3)
WBC # BLD: ABNORMAL 10*3/UL

## 2020-01-07 PROCEDURE — 80053 COMPREHEN METABOLIC PANEL: CPT

## 2020-01-07 PROCEDURE — 93005 ELECTROCARDIOGRAM TRACING: CPT | Performed by: STUDENT IN AN ORGANIZED HEALTH CARE EDUCATION/TRAINING PROGRAM

## 2020-01-07 PROCEDURE — 84484 ASSAY OF TROPONIN QUANT: CPT

## 2020-01-07 PROCEDURE — 6370000000 HC RX 637 (ALT 250 FOR IP): Performed by: STUDENT IN AN ORGANIZED HEALTH CARE EDUCATION/TRAINING PROGRAM

## 2020-01-07 PROCEDURE — 85025 COMPLETE CBC W/AUTO DIFF WBC: CPT

## 2020-01-07 PROCEDURE — 71046 X-RAY EXAM CHEST 2 VIEWS: CPT

## 2020-01-07 PROCEDURE — 99284 EMERGENCY DEPT VISIT MOD MDM: CPT

## 2020-01-07 RX ORDER — ACETAMINOPHEN 325 MG/1
325 TABLET ORAL EVERY 8 HOURS PRN
Qty: 30 TABLET | Refills: 0 | Status: SHIPPED | OUTPATIENT
Start: 2020-01-07 | End: 2022-01-27

## 2020-01-07 RX ORDER — BENZONATATE 100 MG/1
100 CAPSULE ORAL 3 TIMES DAILY PRN
Qty: 30 CAPSULE | Refills: 0 | Status: SHIPPED | OUTPATIENT
Start: 2020-01-07 | End: 2020-01-14

## 2020-01-07 RX ORDER — ASPIRIN 81 MG/1
324 TABLET, CHEWABLE ORAL ONCE
Status: COMPLETED | OUTPATIENT
Start: 2020-01-07 | End: 2020-01-07

## 2020-01-07 RX ORDER — BENZONATATE 100 MG/1
100 CAPSULE ORAL ONCE
Status: COMPLETED | OUTPATIENT
Start: 2020-01-07 | End: 2020-01-07

## 2020-01-07 RX ADMIN — BENZONATATE 100 MG: 100 CAPSULE ORAL at 11:44

## 2020-01-07 RX ADMIN — ASPIRIN 81 MG 324 MG: 81 TABLET ORAL at 11:44

## 2020-01-07 ASSESSMENT — ENCOUNTER SYMPTOMS
SHORTNESS OF BREATH: 1
VOMITING: 0
RHINORRHEA: 0
SORE THROAT: 0
EYE REDNESS: 0
DIARRHEA: 0
BACK PAIN: 1
NAUSEA: 0
EYE ITCHING: 0
COUGH: 1
BLOOD IN STOOL: 0

## 2020-01-07 NOTE — ED PROVIDER NOTES
[facility] ED     Emergency Department     Faculty Attestation    I performed a history and physical examination of the patient and discussed management with the resident. I reviewed the residents note and agree with the documented findings and plan of care. Any areas of disagreement are noted on the chart. I was personally present for the key portions of any procedures. I have documented in the chart those procedures where I was not present during the key portions. I have reviewed the emergency nurses triage note. I agree with the chief complaint, past medical history, past surgical history, allergies, medications, social and family history as documented unless otherwise noted below. For Physician Assistant/ Nurse Practitioner cases/documentation I have personally evaluated this patient and have completed at least one if not all key elements of the E/M (history, physical exam, and MDM). Additional findings are as noted. Patient presents complaining of cough, shortness of breath, sore throat, and hoarseness of voice. She says this all started on Thursday. She says she has had some chills but does not know if she is had a fever. She denies vomiting, nausea or diarrhea. Patient does have a history of cardiomyopathy and has an AICD in place. Patient does not smoke. On exam, patient is resting comfortably in the bed. She does have a hoarseness to her voice but there is no stridor present patient handle secretions without difficulty. Lungs are clear to auscultation bilaterally and heart sounds are normal.  Abdomen is soft and nontender. The oropharynx appears normal.  Believe patient most likely has an upper respiratory infection. Will get a chest x-ray to rule out pneumonia and EKG and blood work due to patient's heart history. Will reassess.     EKG Interpretation    Interpreted by emergency department physician    Rhythm: paced  Rate: normal  Axis: left  Ectopy: none  Conduction: normal  ST Segments: nonspecific changes  T Waves: non specific changes  Q Waves: nonspecific    Clinical Impression: paced rhythm    Fanny Auguste MD  Attending Emergency  Physician              Vahe Beyer MD  01/07/20 1555

## 2020-01-07 NOTE — ED NOTES
Pt presents to the ED with complaints of a cough that started last sat. Pt states she has a past cardiac history and was concerned when her chest started to ache after her coughing. She is alert and oriented and ambulated back to the room. Line, labs and EKG was obtained. VSS. Will continue to monitor.       Yana Amador RN  01/07/20 9023

## 2020-01-07 NOTE — ED PROVIDER NOTES
[facility]  ED  Emergency Department Encounter  Emergency Medicine Resident     Pt Name: Ed Asim  MRN: [id number]  Vanessagfjulio 1955  Date ofevaluation: 1/7/20  PCP:  Amanda Moyer MD    [address] [place] [address]       Chief Complaint   Patient presents with   [facility]    Pleurisy     when coughing      HISTORY OF PRESENT ILLNESS  (Location/Symptom, Timing/Onset, Context/Setting, Quality, Duration, Modifying Factors, Severity, Associated signs/symptoms)     Ed Asim is a 71 y.o. female who presents acute onset of a productive cough with some associated subjective fevers and chills, shortness of breath and chest discomfort. She states that she has been having the symptoms for the last several days, lost her voice and thinks her symptoms are worsening. Also reports some back pain from this and some urinary incontinence when she coughs. She is a history of pacemaker placement for dilated cardiomyopathy, but denies any history of MI or stent placement. No nausea, vomiting, urinary symptoms, weakness numbness tingling, or other concerns. PAST MEDICAL / SURGICAL / SOCIAL / FAMILY HISTORY      has a past medical history of Abnormal Pap smear, Acute on chronic systolic congestive heart failure ([address] AICD (automatic cardioverter/defibrillator) present, MAURO (acute kidney injury) ([address] Anemia, Arthritis, Cardiomyopathy ([address] Cardiomyopathy, nonischemic ([address] Ejection fraction < 50%, HTN (hypertension), Ischemic cardiomyopathy, Left bundle branch block, Mitral regurgitation, Morbid obesity due to excess calories ([address] Pulmonary hypertension ([address] and Sudden onset of severe headache.     has a past surgical history that includes other surgical history (12/20/12); Tubal ligation; hernia repair; Colonoscopy; and pacemaker placement.     Social History     Socioeconomic History    Marital status:      Spouse name: Not on file    Number of children: 2    Years of education: Not on file    Highest education level: Not on file   Occupational History    Not on file   Social Needs    Financial resource strain: Not on file    Food insecurity:     Worry: Not on file     Inability: Not on file    Transportation needs:     Medical: Not on file     Non-medical: Not on file   Tobacco Use    Smoking status: Never Smoker    Smokeless tobacco: Never Used   Substance and Sexual Activity    Alcohol use: No     Alcohol/week: 0.0 standard drinks    Drug use: Not Currently    Sexual activity: Never   Lifestyle    Physical activity:     Days per week: Not on file     Minutes per session: Not on file    Stress: Not on file   Relationships    Social connections:     Talks on phone: Not on file     Gets together: Not on file     Attends Jehovah's witness service: Not on file     Active member of club or organization: Not on file     Attends meetings of clubs or organizations: Not on file     Relationship status: Not on file    Intimate partner violence:     Fear of current or ex partner: Not on file     Emotionally abused: Not on file     Physically abused: Not on file     Forced sexual activity: Not on file   Other Topics Concern    Not on file   Social History Narrative    Not on file       Family History   Problem Relation Age of Onset    Cancer Other         ovarian    High Blood Pressure Mother     Other Mother         copd    Arthritis Neg Hx     Asthma Neg Hx     Birth Defects Neg Hx     Depression Neg Hx     Diabetes Neg Hx     Early Death Neg Hx     Hearing Loss Neg Hx     Heart Disease Neg Hx     High Cholesterol Neg Hx     Kidney Disease Neg Hx     Learning Disabilities Neg Hx     Mental Retardation Neg Hx     Mental Illness Neg Hx     Miscarriages / Stillbirths Neg Hx     Stroke Neg Hx     Substance Abuse Neg Hx     Vision Loss Neg Hx     Breast Cancer Neg Hx     Colon Cancer Neg Hx     Eclampsia Neg Hx     Hypertension Neg Hx     Ovarian Cancer Neg Hx      Labor Neg Hx     Spont Abortions Neg Hx        Allergies:  Codeine and Morphine    Home Medications:  Prior to Admission medications    Medication Sig Start Date End Date Taking? Authorizing Provider   benzonatate (TESSALON PERLES) 100 MG capsule Take 1 capsule by mouth 3 times daily as needed for Cough 1/7/20 1/14/20 Yes Tj Alt, DO   acetaminophen (TYLENOL) 325 MG tablet Take 1 tablet by mouth every 8 hours as needed for Pain 1/7/20  Yes Tj Alt, DO   aspirin 81 MG tablet Take 81 mg by mouth daily    Historical Provider, MD   allopurinol (ZYLOPRIM) 100 MG tablet Take 1 tablet by mouth daily 2/18/19   Alvin Alvarez MD   amiodarone (CORDARONE) 200 MG tablet Take 1 tablet by mouth daily 2/18/19   Alvin Alvarez MD   furosemide (LASIX) 20 MG tablet Take 1 tablet by mouth daily 2/18/19   Alvin Alvarez MD   losartan (COZAAR) 25 MG tablet Take 1 tablet by mouth daily 2/18/19   Alvin Alvarez MD   metolazone (ZAROXOLYN) 2.5 MG tablet Take 1 tablet by mouth daily 2/18/19   Alvin Alvarez MD   metoprolol tartrate (LOPRESSOR) 50 MG tablet Take 1 tablet by mouth 2 times daily 2/18/19   Alvin Alvarez MD   potassium chloride (KLOR-CON M) 20 MEQ TBCR extended release tablet Take 1 tablet by mouth 2 times daily (with meals) 2/18/19   Alvin Alvarez MD   spironolactone (ALDACTONE) 25 MG tablet Take 0.5 tablets by mouth daily 2/18/19   Alvin Alvarez MD       REVIEW OF SYSTEMS    (2-9 systems for level 4, 10 or more for level 5)      Review of Systems   Constitutional: Positive for chills and fever. HENT: Negative for rhinorrhea and sore throat. Eyes: Negative for redness and itching. Respiratory: Positive for cough and shortness of breath. Cardiovascular: Positive for chest pain. Gastrointestinal: Negative for blood in stool, diarrhea, nausea and vomiting. Genitourinary: Negative for dysuria, frequency and hematuria. Musculoskeletal: Positive for back pain.    Skin: needed for Cough     Dispense:  30 capsule     Refill:  0    acetaminophen (TYLENOL) 325 MG tablet     Sig: Take 1 tablet by mouth every 8 hours as needed for Pain     Dispense:  30 tablet     Refill:  0       DIAGNOSTIC RESULTS / EMERGENCYDEPARTMENT COURSE / MDM     LABS:  Results for orders placed or performed during the hospital encounter of 01/07/20   CBC WITH AUTO DIFFERENTIAL   Result Value Ref Range    WBC 6.3 3.5 - 11.3 k/uL    RBC 4.19 3.95 - 5.11 m/uL    Hemoglobin 12.2 11.9 - 15.1 g/dL    Hematocrit 35.1 (L) 36.3 - 47.1 %    MCV 83.8 82.6 - 102.9 fL    MCH 29.1 25.2 - 33.5 pg    MCHC 34.8 28.4 - 34.8 g/dL    RDW 13.9 11.8 - 14.4 %    Platelets 459 725 - 622 k/uL    MPV 10.8 8.1 - 13.5 fL    NRBC Automated 0.0 0.0 per 100 WBC    Differential Type NOT REPORTED     Seg Neutrophils 64 36 - 65 %    Lymphocytes 21 (L) 24 - 43 %    Monocytes 10 3 - 12 %    Eosinophils % 4 1 - 4 %    Basophils 1 0 - 2 %    Immature Granulocytes 0 0 %    Segs Absolute 4.01 1.50 - 8.10 k/uL    Absolute Lymph # 1.33 1.10 - 3.70 k/uL    Absolute Mono # 0.61 0.10 - 1.20 k/uL    Absolute Eos # 0.23 0.00 - 0.44 k/uL    Basophils Absolute 0.05 0.00 - 0.20 k/uL    Absolute Immature Granulocyte <0.03 0.00 - 0.30 k/uL    WBC Morphology NOT REPORTED     RBC Morphology NOT REPORTED     Platelet Estimate NOT REPORTED    Comprehensive Metabolic Panel   Result Value Ref Range    Glucose 129 (H) 70 - 99 mg/dL    BUN 17 8 - 23 mg/dL    CREATININE 0.68 0.50 - 0.90 mg/dL    Bun/Cre Ratio NOT REPORTED 9 - 20    Calcium 9.2 8.6 - 10.4 mg/dL    Sodium 139 135 - 144 mmol/L    Potassium 3.8 3.7 - 5.3 mmol/L    Chloride 104 98 - 107 mmol/L    CO2 21 20 - 31 mmol/L    Anion Gap 14 9 - 17 mmol/L    Alkaline Phosphatase 237 (H) 35 - 104 U/L    ALT 26 5 - 33 U/L    AST 37 (H) <32 U/L    Total Bilirubin 0.61 0.3 - 1.2 mg/dL    Total Protein 7.6 6.4 - 8.3 g/dL    Alb 3.8 3.5 - 5.2 g/dL    Albumin/Globulin Ratio 1.0 1.0 - 2.5    GFR Non-African American >60 >60 mL/min    GFR African American >60 >60 mL/min    GFR Comment          GFR Staging NOT REPORTED    Troponin   Result Value Ref Range    Troponin, High Sensitivity 14 0 - 14 ng/L    Troponin T NOT REPORTED <0.03 ng/mL    Troponin Interp NOT REPORTED    EKG 12 Lead   Result Value Ref Range    Ventricular Rate 76 BPM    Atrial Rate 76 BPM    P-R Interval 134 ms    QRS Duration 178 ms    Q-T Interval 490 ms    QTc Calculation (Bazett) 551 ms    P Axis 95 degrees    R Axis -123 degrees    T Axis 113 degrees       RADIOLOGY:  XR CHEST STANDARD (2 VW)   Final Result   1. Mild left basilar atelectasis and scarring. 2. Stable mild cardiomegaly. 3. Low lung volume study. No acute focal airspace consolidation. 4. Left-sided subclavian approach cardiac pacemaker device distal lead tip   stable in position compared with 05/23/2019. EKG  Rhythm: AV dual paced rhythm  Rate: normal  Axis: Left  Ectopy: none  Conduction: AV dual paced with resulting prolonged QTc  ST Segments: no acute changes  T Waves: no acute change  Q Waves: none    Clinical Impression: When compared to EKG dated 9/6/2019, no acute changes and non-specific EKG    Normal Interval Reference:  P-wave <110 ms  -200 ms  QRS <100 ms  QT <420 ms  QTc 330-470 ms    All EKG's are interpreted by the Emergency Department Physician who either signs or Co-signsthis chart in the absence of a cardiologist.    EMERGENCY DEPARTMENT COURSE:    Patient evaluated by attending physician and myself. Patient presenting with the acute onset of cough, chest discomfort, and URI symptoms. On exam she is well-appearing no acute distress. Her vitals unremarkable. Heart regular rate and rhythm, lungs are clear to auscultation bilaterally. Abdomen is soft nontender. Differential diagnosis includes URI, pneumonia, pneumothorax, ACS, musculoskeletal chest pain, among others.   Given patient's history of cardiomyopathy status post pacer placement, will obtain cardiac work-up, as well as chest x-ray to rule out pneumonia. Will provide symptomatic relief and reassess. Lab work unremarkable, troponin unchanged from prior levels. Chest x-ray without any evidence of pneumonia. Impression is likely URI. Discussed supportive care measures with patient. Strict return precautions given. Patient instructed to follow with her primary care provider as soon as possible for follow up. Patient and/or family expressed understanding and agreement with this plan. PROCEDURES:  None  CONSULTS:  None    FINAL IMPRESSION      1.  Acute upper respiratory infection          DISPOSITION / PLAN     DISPOSITION Decision To Discharge 01/07/2020 12:38:16 PM      PATIENT REFERRED TO:  [facility]  [address] [facility] [address]  [phone]  Go to   If symptoms worsen    Jose Chavez MD  [id number] [place] [address] 36. [phone]    Schedule an appointment as soon as possible for a visit   Follow up of this visit      DISCHARGE MEDICATIONS:  Discharge Medication List as of 1/7/2020 12:43 PM      START taking these medications    Details   benzonatate (TESSALON PERLES) 100 MG capsule Take 1 capsule by mouth 3 times daily as needed for Cough, Disp-30 capsule, R-0Print      acetaminophen (TYLENOL) 325 MG tablet Take 1 tablet by mouth every 8 hours as needed for Pain, Disp-30 tablet, R-0Print             Nidia Meadows DO  Emergency Medicine Resident  [address]    (Please note that portions of this note were completed with a voice recognition program.  Efforts were made to edit thedictations but occasionally words are mis-transcribed.)      Nidia Meadwos DO  Resident  01/07/20 0655

## 2020-01-08 LAB
EKG ATRIAL RATE: 76 BPM
EKG P AXIS: 95 DEGREES
EKG P-R INTERVAL: 134 MS
EKG Q-T INTERVAL: 490 MS
EKG QRS DURATION: 178 MS
EKG QTC CALCULATION (BAZETT): 551 MS
EKG R AXIS: -123 DEGREES
EKG T AXIS: 113 DEGREES
EKG VENTRICULAR RATE: 76 BPM

## 2020-01-08 PROCEDURE — 93010 ELECTROCARDIOGRAM REPORT: CPT | Performed by: INTERNAL MEDICINE

## 2020-03-11 ENCOUNTER — HOSPITAL ENCOUNTER (OUTPATIENT)
Age: 65
Discharge: HOME | End: 2020-03-11
Payer: MEDICARE

## 2020-03-11 LAB
ALT SERPL-CCNC: 19 U/L (ref 5–33)
AST SERPL-CCNC: 27 U/L
T3 FREE: 2.44 PG/ML (ref 2.02–4.43)
THYROXINE, FREE: 1.28 NG/DL (ref 0.93–1.7)
TSH SERPL DL<=0.05 MIU/L-ACNC: 6.12 MIU/L (ref 0.3–5)

## 2020-03-11 PROCEDURE — 84443 ASSAY THYROID STIM HORMONE: CPT

## 2020-03-11 PROCEDURE — 84439 ASSAY OF FREE THYROXINE: CPT

## 2020-03-11 PROCEDURE — 36415 COLL VENOUS BLD VENIPUNCTURE: CPT

## 2020-03-11 PROCEDURE — 84450 TRANSFERASE (AST) (SGOT): CPT

## 2020-03-11 PROCEDURE — 84481 FREE ASSAY (FT-3): CPT

## 2020-03-11 PROCEDURE — 84460 ALANINE AMINO (ALT) (SGPT): CPT

## 2020-04-14 ENCOUNTER — HOSPITAL ENCOUNTER (OUTPATIENT)
Age: 65
Discharge: HOME | End: 2020-04-14
Payer: MEDICARE

## 2020-04-14 LAB
ANION GAP SERPL CALCULATED.3IONS-SCNC: 14 MMOL/L (ref 9–17)
BUN BLDV-MCNC: 17 MG/DL (ref 8–23)
BUN/CREAT BLD: ABNORMAL (ref 9–20)
CALCIUM SERPL-MCNC: 9.6 MG/DL (ref 8.6–10.4)
CHLORIDE BLD-SCNC: 100 MMOL/L (ref 98–107)
CO2: 23 MMOL/L (ref 20–31)
CREAT SERPL-MCNC: 0.96 MG/DL (ref 0.5–0.9)
GFR AFRICAN AMERICAN: >60 ML/MIN
GFR NON-AFRICAN AMERICAN: 58 ML/MIN
GFR SERPL CREATININE-BSD FRML MDRD: ABNORMAL ML/MIN/{1.73_M2}
GFR SERPL CREATININE-BSD FRML MDRD: ABNORMAL ML/MIN/{1.73_M2}
GLUCOSE BLD-MCNC: 106 MG/DL (ref 70–99)
POTASSIUM SERPL-SCNC: 3.8 MMOL/L (ref 3.7–5.3)
SODIUM BLD-SCNC: 137 MMOL/L (ref 135–144)

## 2020-04-14 PROCEDURE — 80048 BASIC METABOLIC PNL TOTAL CA: CPT

## 2020-04-14 PROCEDURE — 36415 COLL VENOUS BLD VENIPUNCTURE: CPT

## 2020-05-22 ENCOUNTER — HOSPITAL ENCOUNTER (INPATIENT)
Age: 65
LOS: 3 days | Discharge: HOME | DRG: 292 | End: 2020-05-25
Attending: EMERGENCY MEDICINE | Admitting: INTERNAL MEDICINE
Payer: MEDICARE

## 2020-05-22 ENCOUNTER — APPOINTMENT (OUTPATIENT)
Dept: GENERAL RADIOLOGY | Age: 65
DRG: 292 | End: 2020-05-22
Payer: MEDICARE

## 2020-05-22 ENCOUNTER — APPOINTMENT (OUTPATIENT)
Dept: CT IMAGING | Age: 65
DRG: 292 | End: 2020-05-22
Payer: MEDICARE

## 2020-05-22 PROBLEM — I50.9 HEART FAILURE (HCC): Status: ACTIVE | Noted: 2020-05-22

## 2020-05-22 LAB
ABSOLUTE EOS #: 0.1 K/UL (ref 0–0.44)
ABSOLUTE IMMATURE GRANULOCYTE: <0.03 K/UL (ref 0–0.3)
ABSOLUTE LYMPH #: 2.18 K/UL (ref 1.1–3.7)
ABSOLUTE MONO #: 0.84 K/UL (ref 0.1–1.2)
ANION GAP SERPL CALCULATED.3IONS-SCNC: 18 MMOL/L (ref 9–17)
BASOPHILS # BLD: 1 % (ref 0–2)
BASOPHILS ABSOLUTE: 0.04 K/UL (ref 0–0.2)
BNP INTERPRETATION: ABNORMAL
BUN BLDV-MCNC: 27 MG/DL (ref 8–23)
BUN/CREAT BLD: ABNORMAL (ref 9–20)
CALCIUM SERPL-MCNC: 9.8 MG/DL (ref 8.6–10.4)
CHLORIDE BLD-SCNC: 99 MMOL/L (ref 98–107)
CO2: 21 MMOL/L (ref 20–31)
CREAT SERPL-MCNC: 1.38 MG/DL (ref 0.5–0.9)
DIFFERENTIAL TYPE: ABNORMAL
EOSINOPHILS RELATIVE PERCENT: 1 % (ref 1–4)
GFR AFRICAN AMERICAN: 46 ML/MIN
GFR NON-AFRICAN AMERICAN: 38 ML/MIN
GFR SERPL CREATININE-BSD FRML MDRD: ABNORMAL ML/MIN/{1.73_M2}
GFR SERPL CREATININE-BSD FRML MDRD: ABNORMAL ML/MIN/{1.73_M2}
GLUCOSE BLD-MCNC: 127 MG/DL (ref 70–99)
HCT VFR BLD CALC: 41.3 % (ref 36.3–47.1)
HEMOGLOBIN: 14.4 G/DL (ref 11.9–15.1)
IMMATURE GRANULOCYTES: 0 %
LYMPHOCYTES # BLD: 25 % (ref 24–43)
MCH RBC QN AUTO: 29.4 PG (ref 25.2–33.5)
MCHC RBC AUTO-ENTMCNC: 34.9 G/DL (ref 28.4–34.8)
MCV RBC AUTO: 84.5 FL (ref 82.6–102.9)
MONOCYTES # BLD: 10 % (ref 3–12)
NRBC AUTOMATED: 0 PER 100 WBC
PDW BLD-RTO: 13.6 % (ref 11.8–14.4)
PLATELET # BLD: 303 K/UL (ref 138–453)
PLATELET ESTIMATE: ABNORMAL
PMV BLD AUTO: 11.1 FL (ref 8.1–13.5)
POTASSIUM SERPL-SCNC: 4 MMOL/L (ref 3.7–5.3)
PRO-BNP: 3254 PG/ML
RBC # BLD: 4.89 M/UL (ref 3.95–5.11)
RBC # BLD: ABNORMAL 10*6/UL
SEG NEUTROPHILS: 63 % (ref 36–65)
SEGMENTED NEUTROPHILS ABSOLUTE COUNT: 5.47 K/UL (ref 1.5–8.1)
SODIUM BLD-SCNC: 138 MMOL/L (ref 135–144)
TROPONIN INTERP: ABNORMAL
TROPONIN T: ABNORMAL NG/ML
TROPONIN, HIGH SENSITIVITY: 25 NG/L (ref 0–14)
WBC # BLD: 8.7 K/UL (ref 3.5–11.3)
WBC # BLD: ABNORMAL 10*3/UL

## 2020-05-22 PROCEDURE — 80048 BASIC METABOLIC PNL TOTAL CA: CPT

## 2020-05-22 PROCEDURE — 2060000000 HC ICU INTERMEDIATE R&B

## 2020-05-22 PROCEDURE — 71260 CT THORAX DX C+: CPT

## 2020-05-22 PROCEDURE — 6360000004 HC RX CONTRAST MEDICATION: Performed by: STUDENT IN AN ORGANIZED HEALTH CARE EDUCATION/TRAINING PROGRAM

## 2020-05-22 PROCEDURE — 99285 EMERGENCY DEPT VISIT HI MDM: CPT

## 2020-05-22 PROCEDURE — 71046 X-RAY EXAM CHEST 2 VIEWS: CPT

## 2020-05-22 PROCEDURE — 87086 URINE CULTURE/COLONY COUNT: CPT

## 2020-05-22 PROCEDURE — 85025 COMPLETE CBC W/AUTO DIFF WBC: CPT

## 2020-05-22 PROCEDURE — 93005 ELECTROCARDIOGRAM TRACING: CPT | Performed by: INTERNAL MEDICINE

## 2020-05-22 PROCEDURE — 84484 ASSAY OF TROPONIN QUANT: CPT

## 2020-05-22 PROCEDURE — 99222 1ST HOSP IP/OBS MODERATE 55: CPT | Performed by: NURSE PRACTITIONER

## 2020-05-22 PROCEDURE — 6370000000 HC RX 637 (ALT 250 FOR IP): Performed by: NURSE PRACTITIONER

## 2020-05-22 PROCEDURE — 83880 ASSAY OF NATRIURETIC PEPTIDE: CPT

## 2020-05-22 RX ORDER — UREA 10 %
3 LOTION (ML) TOPICAL NIGHTLY PRN
Status: DISCONTINUED | OUTPATIENT
Start: 2020-05-23 | End: 2020-05-25 | Stop reason: HOSPADM

## 2020-05-22 RX ORDER — ALBUTEROL SULFATE 90 UG/1
2 AEROSOL, METERED RESPIRATORY (INHALATION) EVERY 6 HOURS PRN
Status: DISCONTINUED | OUTPATIENT
Start: 2020-05-22 | End: 2020-05-25 | Stop reason: HOSPADM

## 2020-05-22 RX ORDER — FUROSEMIDE 10 MG/ML
40 INJECTION INTRAMUSCULAR; INTRAVENOUS ONCE
Status: DISCONTINUED | OUTPATIENT
Start: 2020-05-22 | End: 2020-05-25 | Stop reason: HOSPADM

## 2020-05-22 RX ADMIN — Medication 3 MG: at 23:21

## 2020-05-22 RX ADMIN — IOHEXOL 75 ML: 350 INJECTION, SOLUTION INTRAVENOUS at 18:41

## 2020-05-22 NOTE — ED PROVIDER NOTES
Neg Hx     Kidney Disease Neg Hx     Learning Disabilities Neg Hx     Mental Retardation Neg Hx     Mental Illness Neg Hx     Miscarriages / Stillbirths Neg Hx     Stroke Neg Hx     Substance Abuse Neg Hx     Vision Loss Neg Hx     Breast Cancer Neg Hx     Colon Cancer Neg Hx     Eclampsia Neg Hx     Hypertension Neg Hx     Ovarian Cancer Neg Hx      Labor Neg Hx     Spont Abortions Neg Hx        Allergies:  Codeine and Morphine    Home Medications:  Prior to Admission medications    Medication Sig Start Date End Date Taking?  Authorizing Provider   acetaminophen (TYLENOL) 325 MG tablet Take 1 tablet by mouth every 8 hours as needed for Pain 20   Curtis Goodwin DO   aspirin 81 MG tablet Take 81 mg by mouth daily    Historical Provider, MD   allopurinol (ZYLOPRIM) 100 MG tablet Take 1 tablet by mouth daily 19   Pedro Pablo Garrison MD   amiodarone (CORDARONE) 200 MG tablet Take 1 tablet by mouth daily 19   Pedro Pablo Garrison MD   furosemide (LASIX) 20 MG tablet Take 1 tablet by mouth daily 19   Pedro Pablo Garrison MD   losartan (COZAAR) 25 MG tablet Take 1 tablet by mouth daily 19   Pedro Pablo Garrison MD   metolazone (ZAROXOLYN) 2.5 MG tablet Take 1 tablet by mouth daily 19   Pedro Pablo Garrison MD   metoprolol tartrate (LOPRESSOR) 50 MG tablet Take 1 tablet by mouth 2 times daily 19   Pedro Pablo Garrison MD   potassium chloride (KLOR-CON M) 20 MEQ TBCR extended release tablet Take 1 tablet by mouth 2 times daily (with meals) 19   Pedro Pablo Garrison MD   spironolactone (ALDACTONE) 25 MG tablet Take 0.5 tablets by mouth daily 19   Pedro Pablo Garrison MD       REVIEW OF SYSTEMS    (2-9 systems for level 4, 10 or more for level 5)      General ROS - No fevers, No chills, no gradual weight loss, no night sweats  Ophthalmic ROS - No discharge, No changes in vision  ENT ROS -  No sore throat, No rhinorrhea,   Respiratory ROS -shortness of breath, mild to little cough, Ref Range    Pro-BNP 3,254 (H) <300 pg/mL    BNP Interpretation Pro-BNP Reference Range:        RADIOLOGY:  No results found. CT CHEST PULMONARY EMBOLISM W CONTRAST   Final Result   1. Negative for pulmonary embolus. 2. Bibasilar bronchiectasis with associated air trapping. XR CHEST STANDARD (2 VW)   Final Result   Cardiomegaly with perihilar congestion. Bibasilar airspace opacities suggestive of atelectasis versus edema. EKG  ECG shows atrial sensed ventricular paced rhythm wide QRS, slightly different morphology in the left lateral leads than prior to deactivation of the left ventricular paced lead rate 106. All EKG's are interpreted by the Emergency Department Physician who either signs or Co-signs this chart in the absence of a cardiologist.    EMERGENCY DEPARTMENT COURSE/IMPRESSION:     58-year-old female history of severe dilated cardiomyopathy here with shortness of breath over 1 day without other classic symptoms of fluid overload. No lower extremity edema no rales no obvious wheezing saturating 96 to 97% on room air admission with blood pressure initially 054 systolic and mild tachycardia 101. I did speak to the [employer] representative who stated that the patient's left-sided pacer lead was turned off on the 30th there is some evidence of fluid impedance but less so than previous, no dysrhythmias. We will get chest x-ray troponins BNP basic labs and speak to patient's cardiologist.  It is possible that since the deactivation of the left-sided pacing lead patient has had worsening of ejection fraction. Bedside ultrasound shows what appears to be a low left ventricular ejection fraction. Symptoms controlled with nasal oxygen 4 L. Initial troponin 25, creatinine up to 1.38 from previous levels of 0.96, 0.68, 0.8. Other electrolytes within normal limits, BNP 3254 -- highest on record.      CT pulmonary embolism study negative spoke to cardiology who requested

## 2020-05-22 NOTE — ED NOTES
Pt to room 16 with c/o SOB starting this morning. Pt reports she has a hx of COPD, \"but you can't believe everything the doctor says. \" Pt reports she does not wear home O2, nor uses any inhalers or nebulizers. Pt reports a pacer/defib by [employer] to the left upper chest. Pt reports her cardiologist recently \"deactivated something,\" and has concerns this is related to that. Pacer interrogated bedside. Pt denies pain. Pt reports \"I should stop watching the news because it works me up. \" Pt denies a hx of anxiety. Pt has notable tachypnea, and is unable to speak a full sentence. Pt maintains O2 saturations above 94% on RA. Pt placed on NC at 5L by Dr. Blue Lerner. Pt placed on continuous cardiac monitoring, BP, Pulse ox. EKG obtained. IV established and labs drawn. Pt alert and oriented x4, talking in incomplete sentences, respirations labored. Pt acting age appropriate. White board updated, will continue to monitor.         Stefani Rich RN  05/22/20 [id number]

## 2020-05-22 NOTE — ED PROVIDER NOTES
[facility] [address] [facility]     Emergency Department     Faculty Attestation    I performed a history and physical examination of the patient and discussed management with the resident. I reviewed the residents note and agree with the documented findings and plan of care. Any areas of disagreement are noted on the chart. I was personally present for the key portions of any procedures. I have documented in the chart those procedures where I was not present during the key portions. I have reviewed the emergency nurses triage note. I agree with the chief complaint, past medical history, past surgical history, allergies, medications, social and family history as documented unless otherwise noted below. For Physician Assistant/ Nurse Practitioner cases/documentation I have personally evaluated this patient and have completed at least one if not all key elements of the E/M (history, physical exam, and MDM). Additional findings are as noted. This patient was evaluated in the Emergency Department for symptoms described in the history of present illness. He/she was evaluated in the context of the global COVID-19 pandemic, which necessitated consideration that the patient might be at risk for infection with the SARS-CoV-2 virus that causes COVID-19. Institutional protocols and algorithms that pertain to the evaluation of patients at risk for COVID-19 are in a state of rapid change based on information released by regulatory bodies including the CDC and federal and state organizations. These policies and algorithms were followed during the patient's care in the ED. Patient here with shortness of breath significantly worse today. No chest pain. Does not wear oxygen at home was not hypoxic but visibly dyspneic on arrival placed on supplemental oxygen and patient did report improvement in symptoms. Speaking in nearly full sentences despite tachypnea.   Symmetrical bilateral lower extremity edema no focal calf

## 2020-05-23 ENCOUNTER — APPOINTMENT (OUTPATIENT)
Dept: GENERAL RADIOLOGY | Age: 65
DRG: 292 | End: 2020-05-23
Payer: MEDICARE

## 2020-05-23 PROBLEM — I95.9 HYPOTENSION (ARTERIAL): Status: ACTIVE | Noted: 2020-05-23

## 2020-05-23 PROBLEM — I47.29 NSVT (NONSUSTAINED VENTRICULAR TACHYCARDIA) (HCC): Status: ACTIVE | Noted: 2020-05-23

## 2020-05-23 LAB
ALBUMIN SERPL-MCNC: 3.7 G/DL (ref 3.5–5.2)
ALBUMIN SERPL-MCNC: 3.7 G/DL (ref 3.5–5.2)
ALBUMIN/GLOBULIN RATIO: 1 (ref 1–2.5)
ALP BLD-CCNC: 183 U/L (ref 35–104)
ALT SERPL-CCNC: 17 U/L (ref 5–33)
ANION GAP SERPL CALCULATED.3IONS-SCNC: 17 MMOL/L (ref 9–17)
AST SERPL-CCNC: 32 U/L
BILIRUB SERPL-MCNC: 0.88 MG/DL (ref 0.3–1.2)
BILIRUBIN DIRECT: 0.3 MG/DL
BILIRUBIN, INDIRECT: 0.58 MG/DL (ref 0–1)
BNP INTERPRETATION: ABNORMAL
BUN BLDV-MCNC: 25 MG/DL (ref 8–23)
BUN/CREAT BLD: ABNORMAL (ref 9–20)
CALCIUM SERPL-MCNC: 9.3 MG/DL (ref 8.6–10.4)
CHLORIDE BLD-SCNC: 95 MMOL/L (ref 98–107)
CHOLESTEROL/HDL RATIO: 4.3
CHOLESTEROL: 180 MG/DL
CO2: 24 MMOL/L (ref 20–31)
CREAT SERPL-MCNC: 1.23 MG/DL (ref 0.5–0.9)
EKG ATRIAL RATE: 70 BPM
EKG ATRIAL RATE: 70 BPM
EKG P AXIS: 1 DEGREES
EKG P AXIS: 44 DEGREES
EKG P-R INTERVAL: 150 MS
EKG P-R INTERVAL: 156 MS
EKG Q-T INTERVAL: 588 MS
EKG Q-T INTERVAL: 594 MS
EKG QRS DURATION: 212 MS
EKG QRS DURATION: 264 MS
EKG QTC CALCULATION (BAZETT): 635 MS
EKG QTC CALCULATION (BAZETT): 641 MS
EKG R AXIS: -71 DEGREES
EKG R AXIS: -85 DEGREES
EKG T AXIS: 111 DEGREES
EKG T AXIS: 115 DEGREES
EKG VENTRICULAR RATE: 70 BPM
EKG VENTRICULAR RATE: 70 BPM
GFR AFRICAN AMERICAN: 53 ML/MIN
GFR NON-AFRICAN AMERICAN: 44 ML/MIN
GFR SERPL CREATININE-BSD FRML MDRD: ABNORMAL ML/MIN/{1.73_M2}
GFR SERPL CREATININE-BSD FRML MDRD: ABNORMAL ML/MIN/{1.73_M2}
GLOBULIN: ABNORMAL G/DL (ref 1.5–3.8)
GLUCOSE BLD-MCNC: 98 MG/DL (ref 70–99)
HCT VFR BLD CALC: 35.7 % (ref 36.3–47.1)
HDLC SERPL-MCNC: 42 MG/DL
HEMOGLOBIN: 12.7 G/DL (ref 11.9–15.1)
LDL CHOLESTEROL: 122 MG/DL (ref 0–130)
MAGNESIUM: 1.7 MG/DL (ref 1.6–2.6)
MCH RBC QN AUTO: 30.2 PG (ref 25.2–33.5)
MCHC RBC AUTO-ENTMCNC: 35.6 G/DL (ref 28.4–34.8)
MCV RBC AUTO: 84.8 FL (ref 82.6–102.9)
NRBC AUTOMATED: 0 PER 100 WBC
PDW BLD-RTO: 13.7 % (ref 11.8–14.4)
PHOSPHORUS: 4 MG/DL (ref 2.6–4.5)
PLATELET # BLD: 211 K/UL (ref 138–453)
PMV BLD AUTO: 11.1 FL (ref 8.1–13.5)
POTASSIUM SERPL-SCNC: 3.5 MMOL/L (ref 3.7–5.3)
PRO-BNP: 4363 PG/ML
RBC # BLD: 4.21 M/UL (ref 3.95–5.11)
SODIUM BLD-SCNC: 136 MMOL/L (ref 135–144)
TOTAL PROTEIN: 7.3 G/DL (ref 6.4–8.3)
TRIGL SERPL-MCNC: 79 MG/DL
TROPONIN INTERP: ABNORMAL
TROPONIN T: ABNORMAL NG/ML
TROPONIN, HIGH SENSITIVITY: 35 NG/L (ref 0–14)
TSH SERPL DL<=0.05 MIU/L-ACNC: 6.09 MIU/L (ref 0.3–5)
VLDLC SERPL CALC-MCNC: NORMAL MG/DL (ref 1–30)
WBC # BLD: 6.9 K/UL (ref 3.5–11.3)

## 2020-05-23 PROCEDURE — 84484 ASSAY OF TROPONIN QUANT: CPT

## 2020-05-23 PROCEDURE — 93010 ELECTROCARDIOGRAM REPORT: CPT | Performed by: INTERNAL MEDICINE

## 2020-05-23 PROCEDURE — 85027 COMPLETE CBC AUTOMATED: CPT

## 2020-05-23 PROCEDURE — P9047 ALBUMIN (HUMAN), 25%, 50ML: HCPCS | Performed by: INTERNAL MEDICINE

## 2020-05-23 PROCEDURE — 83036 HEMOGLOBIN GLYCOSYLATED A1C: CPT

## 2020-05-23 PROCEDURE — 2580000003 HC RX 258: Performed by: NURSE PRACTITIONER

## 2020-05-23 PROCEDURE — 97530 THERAPEUTIC ACTIVITIES: CPT

## 2020-05-23 PROCEDURE — 83735 ASSAY OF MAGNESIUM: CPT

## 2020-05-23 PROCEDURE — 97162 PT EVAL MOD COMPLEX 30 MIN: CPT

## 2020-05-23 PROCEDURE — 80076 HEPATIC FUNCTION PANEL: CPT

## 2020-05-23 PROCEDURE — 93005 ELECTROCARDIOGRAM TRACING: CPT | Performed by: INTERNAL MEDICINE

## 2020-05-23 PROCEDURE — 82248 BILIRUBIN DIRECT: CPT

## 2020-05-23 PROCEDURE — 71046 X-RAY EXAM CHEST 2 VIEWS: CPT

## 2020-05-23 PROCEDURE — 2060000000 HC ICU INTERMEDIATE R&B

## 2020-05-23 PROCEDURE — 36415 COLL VENOUS BLD VENIPUNCTURE: CPT

## 2020-05-23 PROCEDURE — 84443 ASSAY THYROID STIM HORMONE: CPT

## 2020-05-23 PROCEDURE — 99232 SBSQ HOSP IP/OBS MODERATE 35: CPT | Performed by: INTERNAL MEDICINE

## 2020-05-23 PROCEDURE — 6370000000 HC RX 637 (ALT 250 FOR IP): Performed by: NURSE PRACTITIONER

## 2020-05-23 PROCEDURE — 97165 OT EVAL LOW COMPLEX 30 MIN: CPT

## 2020-05-23 PROCEDURE — 80053 COMPREHEN METABOLIC PANEL: CPT

## 2020-05-23 PROCEDURE — 97535 SELF CARE MNGMENT TRAINING: CPT

## 2020-05-23 PROCEDURE — 6360000002 HC RX W HCPCS: Performed by: INTERNAL MEDICINE

## 2020-05-23 PROCEDURE — 80061 LIPID PANEL: CPT

## 2020-05-23 PROCEDURE — 6360000002 HC RX W HCPCS: Performed by: NURSE PRACTITIONER

## 2020-05-23 PROCEDURE — 84100 ASSAY OF PHOSPHORUS: CPT

## 2020-05-23 PROCEDURE — 83880 ASSAY OF NATRIURETIC PEPTIDE: CPT

## 2020-05-23 PROCEDURE — 80069 RENAL FUNCTION PANEL: CPT

## 2020-05-23 RX ORDER — AMIODARONE HYDROCHLORIDE 200 MG/1
200 TABLET ORAL DAILY
Status: DISCONTINUED | OUTPATIENT
Start: 2020-05-23 | End: 2020-05-25 | Stop reason: HOSPADM

## 2020-05-23 RX ORDER — SODIUM CHLORIDE 0.9 % (FLUSH) 0.9 %
10 SYRINGE (ML) INJECTION EVERY 12 HOURS SCHEDULED
Status: DISCONTINUED | OUTPATIENT
Start: 2020-05-23 | End: 2020-05-25 | Stop reason: HOSPADM

## 2020-05-23 RX ORDER — POTASSIUM CHLORIDE 20 MEQ/1
20 TABLET, EXTENDED RELEASE ORAL 2 TIMES DAILY WITH MEALS
Status: DISCONTINUED | OUTPATIENT
Start: 2020-05-23 | End: 2020-05-25 | Stop reason: HOSPADM

## 2020-05-23 RX ORDER — DIPHENHYDRAMINE HYDROCHLORIDE 50 MG/ML
25 INJECTION INTRAMUSCULAR; INTRAVENOUS EVERY 6 HOURS PRN
Status: DISCONTINUED | OUTPATIENT
Start: 2020-05-23 | End: 2020-05-25 | Stop reason: HOSPADM

## 2020-05-23 RX ORDER — PROMETHAZINE HYDROCHLORIDE 25 MG/1
12.5 TABLET ORAL EVERY 6 HOURS PRN
Status: DISCONTINUED | OUTPATIENT
Start: 2020-05-23 | End: 2020-05-25 | Stop reason: HOSPADM

## 2020-05-23 RX ORDER — MAGNESIUM SULFATE 1 G/100ML
1 INJECTION INTRAVENOUS
Status: DISPENSED | OUTPATIENT
Start: 2020-05-23 | End: 2020-05-23

## 2020-05-23 RX ORDER — LOSARTAN POTASSIUM 25 MG/1
25 TABLET ORAL DAILY
Status: DISCONTINUED | OUTPATIENT
Start: 2020-05-23 | End: 2020-05-25 | Stop reason: HOSPADM

## 2020-05-23 RX ORDER — ACETAMINOPHEN 650 MG/1
650 SUPPOSITORY RECTAL EVERY 6 HOURS PRN
Status: DISCONTINUED | OUTPATIENT
Start: 2020-05-23 | End: 2020-05-25 | Stop reason: HOSPADM

## 2020-05-23 RX ORDER — ALBUMIN (HUMAN) 12.5 G/50ML
25 SOLUTION INTRAVENOUS ONCE
Status: COMPLETED | OUTPATIENT
Start: 2020-05-23 | End: 2020-05-23

## 2020-05-23 RX ORDER — FUROSEMIDE 10 MG/ML
40 INJECTION INTRAMUSCULAR; INTRAVENOUS 2 TIMES DAILY
Status: DISCONTINUED | OUTPATIENT
Start: 2020-05-23 | End: 2020-05-23

## 2020-05-23 RX ORDER — METOPROLOL TARTRATE 50 MG/1
50 TABLET, FILM COATED ORAL 2 TIMES DAILY
Status: DISCONTINUED | OUTPATIENT
Start: 2020-05-23 | End: 2020-05-25 | Stop reason: HOSPADM

## 2020-05-23 RX ORDER — ASPIRIN 81 MG/1
81 TABLET ORAL DAILY
Status: DISCONTINUED | OUTPATIENT
Start: 2020-05-23 | End: 2020-05-25 | Stop reason: HOSPADM

## 2020-05-23 RX ORDER — ACETAMINOPHEN 325 MG/1
650 TABLET ORAL EVERY 6 HOURS PRN
Status: DISCONTINUED | OUTPATIENT
Start: 2020-05-23 | End: 2020-05-25 | Stop reason: HOSPADM

## 2020-05-23 RX ORDER — SODIUM CHLORIDE 0.9 % (FLUSH) 0.9 %
10 SYRINGE (ML) INJECTION PRN
Status: DISCONTINUED | OUTPATIENT
Start: 2020-05-23 | End: 2020-05-25 | Stop reason: HOSPADM

## 2020-05-23 RX ORDER — NICOTINE 21 MG/24HR
1 PATCH, TRANSDERMAL 24 HOURS TRANSDERMAL DAILY PRN
Status: DISCONTINUED | OUTPATIENT
Start: 2020-05-23 | End: 2020-05-25 | Stop reason: HOSPADM

## 2020-05-23 RX ORDER — ALLOPURINOL 100 MG/1
100 TABLET ORAL DAILY
Status: DISCONTINUED | OUTPATIENT
Start: 2020-05-23 | End: 2020-05-25 | Stop reason: HOSPADM

## 2020-05-23 RX ORDER — SPIRONOLACTONE 25 MG/1
12.5 TABLET ORAL DAILY
Status: DISCONTINUED | OUTPATIENT
Start: 2020-05-23 | End: 2020-05-25 | Stop reason: HOSPADM

## 2020-05-23 RX ORDER — FUROSEMIDE 10 MG/ML
20 INJECTION INTRAMUSCULAR; INTRAVENOUS 2 TIMES DAILY
Status: DISCONTINUED | OUTPATIENT
Start: 2020-05-24 | End: 2020-05-25 | Stop reason: HOSPADM

## 2020-05-23 RX ORDER — DIPHENHYDRAMINE HYDROCHLORIDE 50 MG/ML
25 INJECTION INTRAMUSCULAR; INTRAVENOUS ONCE
Status: COMPLETED | OUTPATIENT
Start: 2020-05-23 | End: 2020-05-23

## 2020-05-23 RX ORDER — POLYETHYLENE GLYCOL 3350 17 G/17G
17 POWDER, FOR SOLUTION ORAL DAILY PRN
Status: DISCONTINUED | OUTPATIENT
Start: 2020-05-23 | End: 2020-05-25 | Stop reason: HOSPADM

## 2020-05-23 RX ORDER — ONDANSETRON 2 MG/ML
4 INJECTION INTRAMUSCULAR; INTRAVENOUS EVERY 6 HOURS PRN
Status: DISCONTINUED | OUTPATIENT
Start: 2020-05-23 | End: 2020-05-25 | Stop reason: HOSPADM

## 2020-05-23 RX ADMIN — AMIODARONE HYDROCHLORIDE 200 MG: 200 TABLET ORAL at 08:13

## 2020-05-23 RX ADMIN — SODIUM CHLORIDE, PRESERVATIVE FREE 10 ML: 5 INJECTION INTRAVENOUS at 08:15

## 2020-05-23 RX ADMIN — LOSARTAN POTASSIUM 25 MG: 25 TABLET, FILM COATED ORAL at 08:13

## 2020-05-23 RX ADMIN — MAGNESIUM SULFATE IN DEXTROSE 1 G: 10 INJECTION, SOLUTION INTRAVENOUS at 11:00

## 2020-05-23 RX ADMIN — SODIUM CHLORIDE, PRESERVATIVE FREE 10 ML: 5 INJECTION INTRAVENOUS at 22:02

## 2020-05-23 RX ADMIN — FUROSEMIDE 40 MG: 10 INJECTION, SOLUTION INTRAMUSCULAR; INTRAVENOUS at 08:14

## 2020-05-23 RX ADMIN — Medication 81 MG: at 08:13

## 2020-05-23 RX ADMIN — POTASSIUM CHLORIDE 20 MEQ: 1500 TABLET, EXTENDED RELEASE ORAL at 17:14

## 2020-05-23 RX ADMIN — POTASSIUM CHLORIDE 20 MEQ: 1500 TABLET, EXTENDED RELEASE ORAL at 08:14

## 2020-05-23 RX ADMIN — ALLOPURINOL 100 MG: 100 TABLET ORAL at 08:13

## 2020-05-23 RX ADMIN — METOPROLOL TARTRATE 50 MG: 50 TABLET, FILM COATED ORAL at 08:13

## 2020-05-23 RX ADMIN — SPIRONOLACTONE 12.5 MG: 25 TABLET ORAL at 08:13

## 2020-05-23 RX ADMIN — DIPHENHYDRAMINE HYDROCHLORIDE 25 MG: 50 INJECTION, SOLUTION INTRAMUSCULAR; INTRAVENOUS at 13:11

## 2020-05-23 RX ADMIN — ACETAMINOPHEN 650 MG: 325 TABLET ORAL at 07:07

## 2020-05-23 RX ADMIN — ACETAMINOPHEN 650 MG: 325 TABLET ORAL at 18:26

## 2020-05-23 RX ADMIN — ALBUMIN (HUMAN) 25 G: 0.25 INJECTION, SOLUTION INTRAVENOUS at 17:14

## 2020-05-23 ASSESSMENT — PAIN SCALES - GENERAL
PAINLEVEL_OUTOF10: 3
PAINLEVEL_OUTOF10: 4
PAINLEVEL_OUTOF10: 9
PAINLEVEL_OUTOF10: 0

## 2020-05-23 ASSESSMENT — ENCOUNTER SYMPTOMS
CONSTIPATION: 0
COUGH: 1
NAUSEA: 0
BLOOD IN STOOL: 0
SHORTNESS OF BREATH: 1
DIARRHEA: 0
WHEEZING: 1
VOMITING: 0
ABDOMINAL PAIN: 0
STRIDOR: 0

## 2020-05-23 NOTE — PROGRESS NOTES
would lay down\" and then states that since that change her symptoms has been progressively worsening with the dyspnea. Review of Systems:     Constitutional:  negative for chills, fevers, sweats  Respiratory:  negative for cough, positive fordyspnea on exertion, shortness of breath, no wheezing  Cardiovascular:  negative for chest pain, chest pressure/discomfort, lower extremity edema, palpitations  Gastrointestinal:  negative for abdominal pain, constipation, diarrhea, nausea, vomiting  Neurological:  negative for dizziness, headache    Medications: Allergies:     Allergies   Allergen Reactions    Codeine      Hallucinations    Morphine      Hallucinations         Current Meds:   Scheduled Meds:    allopurinol  100 mg Oral Daily    amiodarone  200 mg Oral Daily    aspirin  81 mg Oral Daily    [Held by provider] losartan  25 mg Oral Daily    [Held by provider] metoprolol tartrate  50 mg Oral BID    potassium chloride  20 mEq Oral BID WC    spironolactone  12.5 mg Oral Daily    sodium chloride flush  10 mL Intravenous 2 times per day    enoxaparin  40 mg Subcutaneous Daily    [START ON 5/24/2020] furosemide  20 mg Intravenous BID    furosemide  40 mg Intravenous Once     Continuous Infusions:   PRN Meds: sodium chloride flush, acetaminophen **OR** acetaminophen, polyethylene glycol, promethazine **OR** ondansetron, nicotine, diphenhydrAMINE, albuterol sulfate HFA, melatonin    Data:     Past Medical History:   has a past medical history of Abnormal Pap smear, Acute on chronic systolic congestive heart failure (HCC), AICD (automatic cardioverter/defibrillator) present, MUARO (acute kidney injury) ([facility] [address] [address] Anemia, Arthritis, Cardiomyopathy ([facility] [address] Cardiomyopathy, nonischemic (HCC), Ejection fraction < 50%, HTN (hypertension), Ischemic cardiomyopathy, Left bundle branch block, Mitral regurgitation, Morbid obesity due to excess calories ([address] Pulmonary hypertension ([address] and Sudden onset of severe

## 2020-05-23 NOTE — PROGRESS NOTES
Occupational Therapy   Occupational Therapy Initial Assessment  Date: 2020   Patient Name: Nayely Camarena  MRN: [id number]     : 1955    Date of Service: 2020    Discharge Recommendations:    No occupational therapy recommended at discharge. Assessment   Treatment Diagnosis: COPD excerbation   Prognosis: Good  Decision Making: Low Complexity  Patient Education: pt ed on POC, purpose of eval, importance of movement, balancing rest with movement. good return   No Skilled OT: Independent with functional mobility; Independent with ADL's;No OT goals identified(will defer to PT for further mobility and endurance training )  REQUIRES OT FOLLOW UP: No  Activity Tolerance  Activity Tolerance: Patient Tolerated treatment well;Patient limited by fatigue  Safety Devices  Safety Devices in place: Yes  Type of devices: Gait belt;Call light within reach; Left in bed  Restraints  Initially in place: No           Patient Diagnosis(es): The primary encounter diagnosis was Other congestive heart failure ([address] A diagnosis of Dyspnea, unspecified type was also pertinent to this visit. has a past medical history of Abnormal Pap smear, Acute on chronic systolic congestive heart failure ([address] AICD (automatic cardioverter/defibrillator) present, MAURO (acute kidney injury) ([address] Anemia, Arthritis, Cardiomyopathy ([address] Cardiomyopathy, nonischemic ([address] Ejection fraction < 50%, HTN (hypertension), Ischemic cardiomyopathy, Left bundle branch block, Mitral regurgitation, Morbid obesity due to excess calories ([address] Pulmonary hypertension ([address] and Sudden onset of severe headache.   has a past surgical history that includes other surgical history (12); Tubal ligation; hernia repair; Colonoscopy; and pacemaker placement.     Treatment Diagnosis: COPD excerbation       Restrictions  Restrictions/Precautions  Required Braces or Orthoses?: No  Position Activity Restriction  Other position/activity restrictions: up with assist     Subjective   General  Patient assessed for rehabilitation services?: Yes  Family / Caregiver Present: No  Diagnosis: COPD excerbation   General Comment  Comments: Rn ok'd for therapy this morning. Pt agreeable to participate in session and cooperative/pleasant throughout   Patient Currently in Pain: Denies    Oxygen Therapy  O2 Device: None (Room air)    Social/Functional History  Social/Functional History  Lives With: (mother and niece)  Type of Home: House  Home Layout: One level, Laundry in basement  Home Access: Stairs to enter with rails  Entrance Stairs - Number of Steps: 5  Entrance Stairs - Rails: Both  Bathroom Shower/Tub: Tub/Shower unit  Bathroom Toilet: Standard  Bathroom Equipment: Grab bars in shower  Home Equipment: Cane(ambulates without AD at baseline)  ADL Assistance: [address] [place] [address] Avenue: Independent  Homemaking Responsibilities: Yes(shares with niece)  Ambulation Assistance: Independent  Transfer Assistance: Independent  Active : Yes  Mode of Transportation: Car  Leisure & Hobbies: Enjoys shark tank  Additional Comments: Pt reports providing supervision for mother due to cognitive deficits.        Objective   Vision: Within Functional Limits  Hearing: Within functional limits    Orientation  Overall Orientation Status: Within Functional Limits     Balance  Sitting Balance: Independent(~15 minutes on EOB )  Standing Balance: Stand by assistance  Standing Balance  Time: ~4 minutes   Activity: pt completed functional mobility in hallway with PT and within hospital room  Comment: pt with no LOB, but SOB upon completion   ADL  Feeding: Independent  Grooming: Independent  UE Bathing: Independent  LE Bathing: Modified independent   UE Dressing: Independent(pt donned gown around back )  LE Dressing: Modified independent (pt donned B shoes while sitting on eOB )  Toileting: Modified independent   Tone RUE  RUE Tone: Normotonic  Tone LUE  LUE Tone:

## 2020-05-23 NOTE — PROGRESS NOTES
Physical Therapy    Facility/Department: [facility] 4B STEPDOWN  Initial Assessment    NAME: Jazmine Coelho  : 1955  MRN: [id number]  Chief Complaint   Patient presents with    Shortness of Breath     ongoing, hx of COPD, worsened today     Date of Service: 2020    Discharge Recommendations: No further therapy required at discharge. PT Equipment Recommendations  Equipment Needed: No    Assessment   Body structures, Functions, Activity limitations: Decreased functional mobility ; Decreased endurance;Decreased balance  Assessment: The pt ambulated 100ft on room air and required 4 standing rest breaks. Recommend continued acute PT to address endurance deficits. Prognosis: Good  Decision Making: Medium Complexity  PT Education: Goals;PT Role;Plan of Care  REQUIRES PT FOLLOW UP: Yes  Activity Tolerance  Activity Tolerance: Patient limited by endurance       Patient Diagnosis(es): The primary encounter diagnosis was Other congestive heart failure ([address] A diagnosis of Dyspnea, unspecified type was also pertinent to this visit. has a past medical history of Abnormal Pap smear, Acute on chronic systolic congestive heart failure ([address] AICD (automatic cardioverter/defibrillator) present, MAURO (acute kidney injury) ([address] Anemia, Arthritis, Cardiomyopathy ([address] Cardiomyopathy, nonischemic ([address] Ejection fraction < 50%, HTN (hypertension), Ischemic cardiomyopathy, Left bundle branch block, Mitral regurgitation, Morbid obesity due to excess calories ([address] Pulmonary hypertension ([address] and Sudden onset of severe headache.   has a past surgical history that includes other surgical history (12); Tubal ligation; hernia repair; Colonoscopy; and pacemaker placement.     Restrictions  Restrictions/Precautions  Restrictions/Precautions: Fall Risk  Required Braces or Orthoses?: No  Position Activity Restriction  Other position/activity restrictions: up with assist   Vision/Hearing  Vision: Within Functional Limits  Hearing: Within functional limits     Subjective  General  Patient assessed for rehabilitation services?: Yes  Response To Previous Treatment: Not applicable  Family / Caregiver Present: No  Follows Commands: Within Functional Limits  Subjective  Subjective: RN and pt agreeable to PT. Pt sitting EOB upon arrival, pleasant and cooperative throughout. Pain Screening  Patient Currently in Pain: Denies  Vital Signs  Patient Currently in Pain: Denies       Orientation  Orientation  Overall Orientation Status: Within Functional Limits  Social/Functional History  Social/Functional History  Lives With: (mother and niece)  Type of Home: House  Home Layout: One level, Laundry in basement  Home Access: Stairs to enter with rails  Entrance Stairs - Number of Steps: 5  Entrance Stairs - Rails: Both  Bathroom Shower/Tub: Tub/Shower unit  Bathroom Toilet: Standard  Bathroom Equipment: Grab bars in shower  Home Equipment: Cane(ambulates without AD at baseline)  ADL Assistance: [address] [facility] [address]: Independent  Homemaking Responsibilities: Yes(shares with niece)  Ambulation Assistance: Independent  Transfer Assistance: Independent  Active : Yes  Mode of Transportation: Car  Leisure & Hobbies: Enjoys shark tank  Additional Comments: Pt reports providing supervision for mother due to cognitive deficits.   Cognition   Cognition  Overall Cognitive Status: WFL    Objective          Joint Mobility  Spine: WFL  ROM RLE: WFL  ROM LLE: WFL  ROM RUE: WFL  ROM LUE: WFL  Strength RLE  Strength RLE: WFL  Strength LLE  Strength LLE: WFL  Strength RUE  Strength RUE: WFL  Strength LUE  Strength LUE: WFL  Tone RLE  RLE Tone: Normotonic  Tone LLE  LLE Tone: Normotonic  Motor Control  Gross Motor?: WFL  Sensation  Overall Sensation Status: WFL  Bed mobility  Comment: pt sitting EOB upon arrival and exit  Transfers  Sit to Stand: Contact guard assistance  Stand to sit: Contact guard assistance  Stand Pivot Transfers: Contact guard assistance  Comment: Pt mildly unsteady with wide YAMIL upon standing- pt reports B knee OA  Ambulation  Ambulation?: Yes  Ambulation 1  Surface: level tile  Device: No Device  Assistance: Contact guard assistance  Quality of Gait: wide YAMIL, lateral sway, decreased B knee flexion during swing  Gait Deviations: Slow Marie; Increased YAMIL; Deviated path;Decreased step length  Distance: 100ft  Comments: Pt required 4 standing rest breaks during ambulation, SOB, SPO2 96% following ambulation  Stairs/Curb  Stairs?: No     Balance  Posture: Good  Sitting - Static: Good  Sitting - Dynamic: Good  Standing - Static: Good;-  Standing - Dynamic: Fair;+  Comments: standing balance assessed without AD        Plan   Plan  Times per week: 3-5x/wk  Current Treatment Recommendations: Strengthening, ROM, Balance Training, Functional Mobility Training, Transfer Training, Safety Education & Training, Home Exercise Program, Patient/Caregiver Education & Training, Gait Training, Endurance Training  Safety Devices  Type of devices: Nurse notified, Left in bed, Call light within reach  Restraints  Initially in place: No      AM-PAC Score  AM-PAC Inpatient Mobility Raw Score : 18 (05/23/20 1419)  AM-PAC Inpatient T-Scale Score : 43.63 (05/23/20 1419)  Mobility Inpatient CMS 0-100% Score: 46.58 (05/23/20 1419)  Mobility Inpatient CMS G-Code Modifier : CK (05/23/20 1419)          Goals  Short term goals  Time Frame for Short term goals: 14 visits  Short term goal 1: Perform bed mobility and functional transfers independently  Short term goal 2: Ambulate 300ft independently with SPO2 > 92%  Short term goal 3: Ascend/descend 9 steps with HR and CGA  Short term goal 4: Participate in 30 minutes of therapy to demo increased endurance       Therapy Time   Individual Concurrent Group Co-treatment   Time In 0903         Time Out 0921         Minutes 18         Timed Code Treatment Minutes: 8 Minutes       Guera Osman, PT

## 2020-05-23 NOTE — H&P
k/uL    WBC Morphology NOT REPORTED     RBC Morphology NOT REPORTED     Platelet Estimate NOT REPORTED    BASIC METABOLIC PANEL    Collection Time: 05/22/20  5:28 PM   Result Value Ref Range    Glucose 127 (H) 70 - 99 mg/dL    BUN 27 (H) 8 - 23 mg/dL    CREATININE 1.38 (H) 0.50 - 0.90 mg/dL    Bun/Cre Ratio NOT REPORTED 9 - 20    Calcium 9.8 8.6 - 10.4 mg/dL    Sodium 138 135 - 144 mmol/L    Potassium 4.0 3.7 - 5.3 mmol/L    Chloride 99 98 - 107 mmol/L    CO2 21 20 - 31 mmol/L    Anion Gap 18 (H) 9 - 17 mmol/L    GFR Non-African American 38 (L) >60 mL/min    GFR  46 (L) >60 mL/min    GFR Comment          GFR Staging NOT REPORTED    Brain Natriuretic Peptide    Collection Time: 05/22/20  5:28 PM   Result Value Ref Range    Pro-BNP 3,254 (H) <300 pg/mL    BNP Interpretation Pro-BNP Reference Range:        Imaging/Diagnostics:  Xr Chest Standard (2 Vw)    Result Date: 5/22/2020  Cardiomegaly with perihilar congestion. Bibasilar airspace opacities suggestive of atelectasis versus edema. Ct Chest Pulmonary Embolism W Contrast    Result Date: 5/22/2020  1. Negative for pulmonary embolus. 2. Bibasilar bronchiectasis with associated air trapping. Assessment :      Hospital Problems           Last Modified POA    * (Principal) Acute on chronic systolic heart failure ([address] 5/23/2020 Yes    Pre-diabetes (Chronic) 5/23/2020 Yes    Essential hypertension (Chronic) 5/23/2020 Yes    PAH (pulmonary artery hypertension) ([address] 34 on Echo  (Chronic) 5/23/2020 Yes    AICD (automatic cardioverter/defibrillator) present 5/23/2020 Yes    Syncope and collapse secondary to [facility] 5/23/2020 Yes    Morbid obesity due to excess calories ([address] (Chronic) 5/23/2020 Yes    MAURO (acute kidney injury) ([address] 5/23/2020 Yes          Plan:     Patient status inpatient in the Progressive Unit/Step down    1. Diuresis which may help the kidney function as well. I& O , daily weights. CHF clinic referral   2.  Consult cardiology to evaluate device for additional alterations to settings and leads as she feels she exacerbated right after her EP specialist altered the device last visit. 3. Monitor glucose levels utilize insulin sliding scale as needed. Prevent hyperglycemia that further causes fluid retention and prevent hypoglycemia   4. Monitor kidney function with labs. 5. GI proph  6. dvt proph. Consultations:   IP CONSULT TO CARDIOLOGY  IP CONSULT TO HOSPITALIST  IP CONSULT TO HEART FAILURE NURSE/COORDINATOR  IP CONSULT TO DIETITIAN     Patient is admitted as inpatient status because of co-morbidities listed above, severity of signs and symptoms as outlined, requirement for current medical therapies and most importantly because of direct risk to patient if care not provided in a hospital setting.     Joyce Leong, APRN - CNP  5/23/2020  1:25 AM    Copy sent to Dr. Lina Diaz MD

## 2020-05-23 NOTE — PLAN OF CARE
Will verbalize the importance of balancing activity with adequate rest periods  Description: Will verbalize the importance of balancing activity with adequate rest periods  Outcome: Ongoing     Problem: Cardiac:  Goal: Hemodynamic stability will improve  Description: Hemodynamic stability will improve  Outcome: Ongoing  Goal: Ability to maintain an adequate cardiac output will improve  Description: Ability to maintain an adequate cardiac output will improve  Outcome: Ongoing     Problem: Coping:  Goal: Verbalizations of decreased anxiety will decrease  Description: Verbalizations of decreased anxiety will decrease  Outcome: Ongoing     Problem: Fluid Volume:  Goal: Risk for excess fluid volume will decrease  Description: Risk for excess fluid volume will decrease  Outcome: Ongoing  Goal: Maintenance of adequate hydration will improve  Description: Maintenance of adequate hydration will improve  Outcome: Ongoing  Goal: Will show no signs and symptoms of electrolyte imbalance  Description: Will show no signs and symptoms of electrolyte imbalance  Outcome: Ongoing     Problem: Health Behavior:  Goal: Ability to manage health-related needs will improve  Description: Ability to manage health-related needs will improve  Outcome: Ongoing  Goal: Ability to seek appropriate health care will improve  Description: Ability to seek appropriate health care will improve  Outcome: Ongoing     Problem: Nutritional:  Goal: Maintenance of adequate nutrition will improve  Description: Maintenance of adequate nutrition will improve  Outcome: Ongoing     Problem: Physical Regulation:  Goal: Complications related to the disease process, condition or treatment will be avoided or minimized  Description: Complications related to the disease process, condition or treatment will be avoided or minimized  Outcome: Ongoing     Problem: Respiratory:  Goal: Ability to maintain adequate ventilation will improve  Description: Ability to maintain adequate ventilation will improve  Outcome: Ongoing  Goal: Respiratory status will improve  Description: Respiratory status will improve  Outcome: Ongoing

## 2020-05-23 NOTE — CONSULTS
from the coronary sinus lead. Patient had ICD revision to Medtronic Viva XT CRT-D, on 06/13/16. 6. Hospitalization at [facility] early March 2017 for multiple ICD shocks related to polymorphic ventricular tachycardia, associated with severe hypokalemia. 7. Hypokalemia. 8. THE PATIENT IS PACEMAKER DEPENDENT. Will discuss with rounding attending Dr. Masha Katz for final recommendations. Lili Morelos MD  Cardiology Service  Internal Medicine Residency Program, PGY-1  [facility]    Attending Cardiologist Addendum: I have reviewed and performed the history, physical, subjective, objective, assessment, and plan with the resident/fellow and agree with the note. I performed the history and physical personally. I have made changes to the note above as needed. Thank you for allowing me to participate in the care of this patient, please do not hesitate to call if you have any questions. Annika Ramirez, [id number] [facility]  [facility]. [facility]  [phone]

## 2020-05-23 NOTE — CARE COORDINATION
Case Management Initial Discharge Plan  Roxi Mckeon,             Met with:patient to discuss discharge plans. Information verified: address, contacts, phone number, , insurance Yes  Emergency Contact/Next of Kin name & number: Yashira Nascimento (daughter) [phone]  PCP: Jessica Whelan MD  Date of last visit: 2020 telephone visit with NP    Insurance Provider: [facility] Medicare, [address] [place] [address]    Discharge Planning    Living Arrangements:  Parent   Support Systems:  Family Members, Parent    Home has 1 stories  5 stairs to climb to get into front door, n/a stairs to climb to reach second floor  Location of bedroom/bathroom in home main level    Patient able to perform ADL's:Independent    Current Services (outpatient & in home) n/a  DME equipment: n/a  DME provider: n/a      Potential Assistance Needed:  Transportation    Patient agreeable to home care: No  Orient of choice provided:  n/a    Prior SNF/Rehab Placement and Facility: No  Agreeable to SNF/Rehab: No  Orient of choice provided: n/a   Evaluation: n/a    Expected Discharge date:  20  Patient expects to be discharged to:  home  Follow Up Appointment: Best Day/ Time:      Transportation provider: self or cab  Transportation arrangements needed for discharge: Yes     Readmission Risk              Risk of Unplanned Readmission:        17             Does patient have a readmission risk score greater than 14?: Yes  If yes, follow-up appointment must be made within 7 days of discharge. Goals of Care: easier breathing      Discharge Plan: Home independently,   Has follow up appt with NP on 20.          Electronically signed by Libra Ramirez RN on 20 at 4:05 PM EDT

## 2020-05-24 ENCOUNTER — APPOINTMENT (OUTPATIENT)
Dept: GENERAL RADIOLOGY | Age: 65
DRG: 292 | End: 2020-05-24
Payer: MEDICARE

## 2020-05-24 LAB
ALBUMIN SERPL-MCNC: 3.6 G/DL (ref 3.5–5.2)
ALBUMIN SERPL-MCNC: 4.6 G/DL (ref 3.5–5.2)
ANION GAP SERPL CALCULATED.3IONS-SCNC: 14 MMOL/L (ref 9–17)
ANION GAP SERPL CALCULATED.3IONS-SCNC: 15 MMOL/L (ref 9–17)
BUN BLDV-MCNC: 26 MG/DL (ref 8–23)
BUN BLDV-MCNC: 26 MG/DL (ref 8–23)
BUN/CREAT BLD: ABNORMAL (ref 9–20)
BUN/CREAT BLD: ABNORMAL (ref 9–20)
CALCIUM SERPL-MCNC: 8.9 MG/DL (ref 8.6–10.4)
CALCIUM SERPL-MCNC: 9.7 MG/DL (ref 8.6–10.4)
CHLORIDE BLD-SCNC: 93 MMOL/L (ref 98–107)
CHLORIDE BLD-SCNC: 97 MMOL/L (ref 98–107)
CO2: 23 MMOL/L (ref 20–31)
CO2: 24 MMOL/L (ref 20–31)
CREAT SERPL-MCNC: 1.17 MG/DL (ref 0.5–0.9)
CREAT SERPL-MCNC: 1.31 MG/DL (ref 0.5–0.9)
CULTURE: NORMAL
EKG ATRIAL RATE: 106 BPM
EKG P AXIS: 36 DEGREES
EKG P-R INTERVAL: 116 MS
EKG Q-T INTERVAL: 442 MS
EKG QRS DURATION: 166 MS
EKG QTC CALCULATION (BAZETT): 587 MS
EKG R AXIS: -79 DEGREES
EKG T AXIS: 93 DEGREES
EKG VENTRICULAR RATE: 106 BPM
ESTIMATED AVERAGE GLUCOSE: 114 MG/DL
GFR AFRICAN AMERICAN: 49 ML/MIN
GFR AFRICAN AMERICAN: 56 ML/MIN
GFR NON-AFRICAN AMERICAN: 41 ML/MIN
GFR NON-AFRICAN AMERICAN: 46 ML/MIN
GFR SERPL CREATININE-BSD FRML MDRD: ABNORMAL ML/MIN/{1.73_M2}
GLUCOSE BLD-MCNC: 110 MG/DL (ref 70–99)
GLUCOSE BLD-MCNC: 121 MG/DL (ref 70–99)
HBA1C MFR BLD: 5.6 % (ref 4–6)
Lab: NORMAL
MAGNESIUM: 2 MG/DL (ref 1.6–2.6)
PHOSPHORUS: 3.5 MG/DL (ref 2.6–4.5)
PHOSPHORUS: 3.9 MG/DL (ref 2.6–4.5)
POTASSIUM SERPL-SCNC: 3.3 MMOL/L (ref 3.7–5.3)
POTASSIUM SERPL-SCNC: 3.5 MMOL/L (ref 3.7–5.3)
SODIUM BLD-SCNC: 131 MMOL/L (ref 135–144)
SODIUM BLD-SCNC: 135 MMOL/L (ref 135–144)
SPECIMEN DESCRIPTION: NORMAL

## 2020-05-24 PROCEDURE — 71045 X-RAY EXAM CHEST 1 VIEW: CPT

## 2020-05-24 PROCEDURE — 83735 ASSAY OF MAGNESIUM: CPT

## 2020-05-24 PROCEDURE — 2060000000 HC ICU INTERMEDIATE R&B

## 2020-05-24 PROCEDURE — 6370000000 HC RX 637 (ALT 250 FOR IP): Performed by: NURSE PRACTITIONER

## 2020-05-24 PROCEDURE — 80069 RENAL FUNCTION PANEL: CPT

## 2020-05-24 PROCEDURE — P9047 ALBUMIN (HUMAN), 25%, 50ML: HCPCS | Performed by: INTERNAL MEDICINE

## 2020-05-24 PROCEDURE — 99232 SBSQ HOSP IP/OBS MODERATE 35: CPT | Performed by: INTERNAL MEDICINE

## 2020-05-24 PROCEDURE — 36415 COLL VENOUS BLD VENIPUNCTURE: CPT

## 2020-05-24 PROCEDURE — 6360000002 HC RX W HCPCS: Performed by: INTERNAL MEDICINE

## 2020-05-24 PROCEDURE — 93010 ELECTROCARDIOGRAM REPORT: CPT | Performed by: INTERNAL MEDICINE

## 2020-05-24 PROCEDURE — 6370000000 HC RX 637 (ALT 250 FOR IP): Performed by: INTERNAL MEDICINE

## 2020-05-24 PROCEDURE — 2580000003 HC RX 258: Performed by: NURSE PRACTITIONER

## 2020-05-24 RX ORDER — ALBUMIN (HUMAN) 12.5 G/50ML
25 SOLUTION INTRAVENOUS ONCE
Status: COMPLETED | OUTPATIENT
Start: 2020-05-24 | End: 2020-05-24

## 2020-05-24 RX ORDER — POTASSIUM CHLORIDE 20MEQ/15ML
40 LIQUID (ML) ORAL 2 TIMES DAILY
Status: DISCONTINUED | OUTPATIENT
Start: 2020-05-24 | End: 2020-05-24

## 2020-05-24 RX ADMIN — ALLOPURINOL 100 MG: 100 TABLET ORAL at 07:58

## 2020-05-24 RX ADMIN — SODIUM CHLORIDE, PRESERVATIVE FREE 10 ML: 5 INJECTION INTRAVENOUS at 09:00

## 2020-05-24 RX ADMIN — POTASSIUM BICARBONATE 20 MEQ: 782 TABLET, EFFERVESCENT ORAL at 20:26

## 2020-05-24 RX ADMIN — ALBUMIN (HUMAN) 25 G: 0.25 INJECTION, SOLUTION INTRAVENOUS at 10:23

## 2020-05-24 RX ADMIN — SODIUM CHLORIDE, PRESERVATIVE FREE 10 ML: 5 INJECTION INTRAVENOUS at 20:26

## 2020-05-24 RX ADMIN — Medication 81 MG: at 07:57

## 2020-05-24 RX ADMIN — POTASSIUM CHLORIDE 20 MEQ: 1500 TABLET, EXTENDED RELEASE ORAL at 16:20

## 2020-05-24 RX ADMIN — POTASSIUM BICARBONATE 20 MEQ: 782 TABLET, EFFERVESCENT ORAL at 15:19

## 2020-05-24 RX ADMIN — AMIODARONE HYDROCHLORIDE 200 MG: 200 TABLET ORAL at 07:57

## 2020-05-24 RX ADMIN — Medication 3 MG: at 22:04

## 2020-05-24 RX ADMIN — FUROSEMIDE 20 MG: 10 INJECTION, SOLUTION INTRAMUSCULAR; INTRAVENOUS at 17:29

## 2020-05-24 RX ADMIN — FUROSEMIDE 20 MG: 10 INJECTION, SOLUTION INTRAMUSCULAR; INTRAVENOUS at 09:00

## 2020-05-24 RX ADMIN — SPIRONOLACTONE 12.5 MG: 25 TABLET ORAL at 07:57

## 2020-05-24 RX ADMIN — POTASSIUM CHLORIDE 20 MEQ: 1500 TABLET, EXTENDED RELEASE ORAL at 07:57

## 2020-05-24 NOTE — PROGRESS NOTES
[facility]    Progress Note    5/24/2020    4:37 PM    Name:   Nikki Rodarte  MRN:     [id number]     Acct:      [de-identified]   Room:   [address] [place] [address] Day:  2  Admit Date:  5/22/2020  5:06 PM    PCP:   Antonette Casarez MD  Code Status:  Full Code    Subjective:     C/C:   Chief Complaint   Patient presents with    Shortness of Breath     ongoing, hx of COPD, worsened today      Interval History Status: not changed. Patient seen and examined in the AM. Found again to be hypokalemic with normal magnesium level of 2. Patient was told borderline hypotensive. She was scheduled for 25 g of 25% human albumin. Patient was noted to have dyspnea on exertion when she was walked by her RN in the morning. She was scheduled to walk again sometime in the afternoon. Noted cardiology clearance for discharge. Brief History:     Nikki Rodarte is a 76 y.o. Non-/non  female who presents with Shortness of Breath (ongoing, hx of COPD, worsened today)   and is admitted to the hospital for the management of acute on chronic systolic/diastolic heart failure exacerbation. Patient presents to the emergency room for progressive then acute dyspnea. Patient states that over the past few days she has been experiencing p[rogressive dyspnea and mild swelling, however today the shortness of breath was so extreme she could not even walk more than 6 feet. Her work up in the emergency room revealed acute on chronic exacerbation of CHF as evidence of a bnp of 3254, Cardiomegaly with perihilar congestion. Bibasilar airspace opacities suggestive of atelectasis versus edema, and a CTA which was negative for PE or infectious process.   Patient states that she recently underwent an alteration to her device and \" one of the sections was turned off because I could feel my pulse in my chest when I would lay down\" and then states that since that change her symptoms has been progressively worsening with the dyspnea. Review of Systems:     Constitutional:  negative for chills, fevers, sweats  Respiratory:  negative for cough, positive fordyspnea on exertion, shortness of breath, no wheezing  Cardiovascular:  negative for chest pain, chest pressure/discomfort, lower extremity edema, palpitations  Gastrointestinal:  negative for abdominal pain, constipation, diarrhea, nausea, vomiting  Neurological:  negative for dizziness, headache    Medications: Allergies: Allergies   Allergen Reactions    Codeine      Hallucinations    Morphine      Hallucinations         Current Meds:   Scheduled Meds:    potassium bicarb-citric acid  20 mEq Oral BID    allopurinol  100 mg Oral Daily    amiodarone  200 mg Oral Daily    aspirin  81 mg Oral Daily    [Held by provider] losartan  25 mg Oral Daily    [Held by provider] metoprolol tartrate  50 mg Oral BID    potassium chloride  20 mEq Oral BID WC    spironolactone  12.5 mg Oral Daily    sodium chloride flush  10 mL Intravenous 2 times per day    enoxaparin  40 mg Subcutaneous Daily    furosemide  20 mg Intravenous BID    furosemide  40 mg Intravenous Once     Continuous Infusions:   PRN Meds: sodium chloride flush, acetaminophen **OR** acetaminophen, polyethylene glycol, promethazine **OR** ondansetron, nicotine, diphenhydrAMINE, albuterol sulfate HFA, melatonin    Data:     Past Medical History:   has a past medical history of Abnormal Pap smear, Acute on chronic systolic congestive heart failure (HCC), AICD (automatic cardioverter/defibrillator) present, MAURO (acute kidney injury) ([facility] Anemia, Arthritis, Cardiomyopathy ([facility] Cardiomyopathy, nonischemic (HCC), Ejection fraction < 50%, HTN (hypertension), Ischemic cardiomyopathy, Left bundle branch block, Mitral regurgitation, Morbid obesity due to excess calories ([address] Pulmonary hypertension ([address] and Sudden onset of severe headache.     Social History:

## 2020-05-24 NOTE — PLAN OF CARE
Problem: Breathing Pattern - Ineffective:  Goal: Ability to achieve and maintain a regular respiratory rate will improve  Description: Ability to achieve and maintain a regular respiratory rate will improve  Outcome: Ongoing     Problem: Activity Intolerance:  Goal: Ability to tolerate increased activity will improve  Description: Ability to tolerate increased activity will improve  Outcome: Ongoing     Problem: Airway Clearance - Ineffective:  Goal: Ability to maintain a clear airway will improve  Description: Ability to maintain a clear airway will improve  Outcome: Ongoing     Problem: Breathing Pattern - Ineffective:  Goal: Ability to achieve and maintain a regular respiratory rate will improve  Description: Ability to achieve and maintain a regular respiratory rate will improve  Outcome: Ongoing     Problem:  Activity:  Goal: Capacity to carry out activities will improve  Description: Capacity to carry out activities will improve  Outcome: Ongoing     Problem: Cardiac:  Goal: Hemodynamic stability will improve  Description: Hemodynamic stability will improve  Outcome: Ongoing  Goal: Ability to maintain an adequate cardiac output will improve  Description: Ability to maintain an adequate cardiac output will improve  Outcome: Ongoing     Problem: Fluid Volume:  Goal: Risk for excess fluid volume will decrease  Description: Risk for excess fluid volume will decrease  Outcome: Ongoing

## 2020-05-24 NOTE — PROGRESS NOTES
[facility]        Date:   5/24/2020  Patient name: Sunny Kaiser  Date of admission:  5/22/2020  5:06 PM  MRN:   [id number]  YOB: 1955  PCP: Alla Mendes MD    Reason for Consult:       Subjective: No new cardiac issues. No overnight events. Chart reviewed. Physical Exam:   Vitals: /69   Pulse 72   Temp 97.7 °F (36.5 °C) (Oral)   Resp 18   Ht 5' 6\" (1.676 m)   Wt 229 lb 8 oz (104.1 kg)   SpO2 96%   BMI 37.04 kg/m²   General appearance: alert and cooperative with exam  HEENT: Head: Normocephalic, no lesions, without obvious abnormality. Neck: no adenopathy, no carotid bruit, no JVD, supple, symmetrical, trachea midline and thyroid not enlarged, symmetric, no tenderness/mass/nodules  Lungs: clear to auscultation bilaterally  Heart: regular rate and rhythm, S1, S2 normal, no murmur, click, rub or gallop  Abdomen: soft, non-tender; bowel sounds normal; no masses,  no organomegaly  Extremities: extremities normal, atraumatic, no cyanosis or edema  Neurologic: Mental status: Alert, oriented, thought content appropriate      Lab work, imaging, nursing, and chart reviewed extensively.     Medications:   Scheduled Meds:   potassium chloride  20 mEq Oral BID    allopurinol  100 mg Oral Daily    amiodarone  200 mg Oral Daily    aspirin  81 mg Oral Daily    [Held by provider] losartan  25 mg Oral Daily    [Held by provider] metoprolol tartrate  50 mg Oral BID    potassium chloride  20 mEq Oral BID     spironolactone  12.5 mg Oral Daily    sodium chloride flush  10 mL Intravenous 2 times per day    enoxaparin  40 mg Subcutaneous Daily    furosemide  20 mg Intravenous BID    furosemide  40 mg Intravenous Once     Continuous Infusions:      Labs:     CBC:   Recent Labs     05/22/20  1728 05/23/20  0336   WBC 8.7 6.9   HGB 14.4 12.7    211     BMP:    Recent Labs     05/22/20  1728 05/23/20  1055 05/24/20  0555    136 135   K 4.0 3.5* 3.3*   CL

## 2020-05-25 VITALS
HEART RATE: 75 BPM | DIASTOLIC BLOOD PRESSURE: 84 MMHG | RESPIRATION RATE: 20 BRPM | TEMPERATURE: 98.4 F | HEIGHT: 66 IN | WEIGHT: 229.06 LBS | SYSTOLIC BLOOD PRESSURE: 121 MMHG | BODY MASS INDEX: 36.81 KG/M2 | OXYGEN SATURATION: 99 %

## 2020-05-25 LAB
ALBUMIN SERPL-MCNC: 4.1 G/DL (ref 3.5–5.2)
ANION GAP SERPL CALCULATED.3IONS-SCNC: 15 MMOL/L (ref 9–17)
BNP INTERPRETATION: ABNORMAL
BUN BLDV-MCNC: 29 MG/DL (ref 8–23)
BUN/CREAT BLD: ABNORMAL (ref 9–20)
CALCIUM SERPL-MCNC: 9.6 MG/DL (ref 8.6–10.4)
CHLORIDE BLD-SCNC: 96 MMOL/L (ref 98–107)
CO2: 25 MMOL/L (ref 20–31)
CREAT SERPL-MCNC: 1.14 MG/DL (ref 0.5–0.9)
GFR AFRICAN AMERICAN: 58 ML/MIN
GFR NON-AFRICAN AMERICAN: 48 ML/MIN
GFR SERPL CREATININE-BSD FRML MDRD: ABNORMAL ML/MIN/{1.73_M2}
GFR SERPL CREATININE-BSD FRML MDRD: ABNORMAL ML/MIN/{1.73_M2}
GLUCOSE BLD-MCNC: 115 MG/DL (ref 70–99)
MAGNESIUM: 2 MG/DL (ref 1.6–2.6)
PHOSPHORUS: 3.2 MG/DL (ref 2.6–4.5)
POTASSIUM SERPL-SCNC: 4.2 MMOL/L (ref 3.7–5.3)
PRO-BNP: 1975 PG/ML
SODIUM BLD-SCNC: 136 MMOL/L (ref 135–144)

## 2020-05-25 PROCEDURE — 80069 RENAL FUNCTION PANEL: CPT

## 2020-05-25 PROCEDURE — 83880 ASSAY OF NATRIURETIC PEPTIDE: CPT

## 2020-05-25 PROCEDURE — 83735 ASSAY OF MAGNESIUM: CPT

## 2020-05-25 PROCEDURE — 99239 HOSP IP/OBS DSCHRG MGMT >30: CPT | Performed by: INTERNAL MEDICINE

## 2020-05-25 PROCEDURE — 36415 COLL VENOUS BLD VENIPUNCTURE: CPT

## 2020-05-25 RX ORDER — METOLAZONE 2.5 MG/1
2.5 TABLET ORAL EVERY OTHER DAY
Qty: 30 TABLET | Refills: 3 | Status: ON HOLD | OUTPATIENT
Start: 2020-05-25 | End: 2021-06-29 | Stop reason: HOSPADM

## 2020-05-25 RX ORDER — TORSEMIDE 20 MG/1
20 TABLET ORAL DAILY
Qty: 30 TABLET | Refills: 3 | Status: SHIPPED | OUTPATIENT
Start: 2020-05-25 | End: 2020-06-15 | Stop reason: ALTCHOICE

## 2020-05-26 ENCOUNTER — CARE COORDINATION (OUTPATIENT)
Dept: CASE MANAGEMENT | Age: 65
End: 2020-05-26

## 2020-05-26 PROCEDURE — 1111F DSCHRG MED/CURRENT MED MERGE: CPT | Performed by: INTERNAL MEDICINE

## 2020-05-26 NOTE — CARE COORDINATION
your prescriptions and are they filled?:  Yes (Comment: [facility] Baugh is delivering scripts today)  Have you been contacted by a [address]?:  No  Have you scheduled your follow up appointment?:  Yes (Comment: Cardiologist - Dr Ramirez Mins - )  How are you going to get to your appointment?:  Car - drive self  Were you discharged with any Home Care or Post Acute Services:  No  Do you feel like you have everything you need to keep you well at home?:  Yes (Comment: meds being delivered today)  Care Transitions Interventions         Patient contacted regarding Jerome Pedrochandrakant. Discussed COVID-19 related testing which was not done at this time. Test results were not done. Care Transition Nurse/ contacted the patient by telephone to perform post discharge assessment. Verified name and  with patient as identifiers. Provided introduction to self, and explanation of the CTN/ role, and reason for call due to risk factors for infection and/or exposure to COVID-19. Symptoms reviewed with patient who verbalized the following symptoms: shortness of breath, no new symptoms and no worsening symptoms. Due to no new or worsening symptoms encounter was not routed to provider for escalation. Patient has following risk factors of: heart failure, COPD and diabetes. CTN/ reviewed discharge instructions, medical action plan and red flags such as increased shortness of breath, increasing fever and signs of decompensation with patient who verbalized understanding. Discussed exposure protocols and quarantine with CDC Guidelines What to do if you are sick with coronavirus disease 2019.  Patient was given an opportunity for questions and concerns. The patient agrees to contact the [facility] exposure line [phone], local [facility] department [place] Department of Health: ([phone]) and PCP office for questions related to their healthcare. CTN/provided contact information for future needs.   Patient chose not to write

## 2020-05-28 ENCOUNTER — HOSPITAL ENCOUNTER (OUTPATIENT)
Age: 65
Discharge: HOME | End: 2020-05-28
Payer: MEDICARE

## 2020-05-28 LAB
ANION GAP SERPL CALCULATED.3IONS-SCNC: 19 MMOL/L (ref 9–17)
BUN BLDV-MCNC: 34 MG/DL (ref 8–23)
BUN/CREAT BLD: ABNORMAL (ref 9–20)
CALCIUM SERPL-MCNC: 9.7 MG/DL (ref 8.6–10.4)
CHLORIDE BLD-SCNC: 100 MMOL/L (ref 98–107)
CO2: 23 MMOL/L (ref 20–31)
CREAT SERPL-MCNC: 1.32 MG/DL (ref 0.5–0.9)
GFR AFRICAN AMERICAN: 49 ML/MIN
GFR NON-AFRICAN AMERICAN: 40 ML/MIN
GFR SERPL CREATININE-BSD FRML MDRD: ABNORMAL ML/MIN/{1.73_M2}
GFR SERPL CREATININE-BSD FRML MDRD: ABNORMAL ML/MIN/{1.73_M2}
GLUCOSE BLD-MCNC: 97 MG/DL (ref 70–99)
POTASSIUM SERPL-SCNC: 4.1 MMOL/L (ref 3.7–5.3)
SODIUM BLD-SCNC: 142 MMOL/L (ref 135–144)

## 2020-05-28 PROCEDURE — 36415 COLL VENOUS BLD VENIPUNCTURE: CPT

## 2020-05-28 PROCEDURE — 80048 BASIC METABOLIC PNL TOTAL CA: CPT

## 2020-06-03 ENCOUNTER — CARE COORDINATION (OUTPATIENT)
Dept: CASE MANAGEMENT | Age: 65
End: 2020-06-03

## 2020-06-03 NOTE — CARE COORDINATION
[address] Follow Up Call  6/3/2020    Patient: Aicha Bills  Patient : 1955   MRN: [id number]    Reason for Admission: CHF  Discharge Date: 20       RARS: Readmission Risk Score: 17       Patient reports feeling significantly improved since she had her appt with Dr Frankie Corona EP - he reactivated her RV lead on ICD. Tells me she is now \"doing very well\"  Lasix was switched to demadex. Has appt to be seen at CHF clinic this month    Patient contacted regarding COVID-19 risk and screening. Previously discussed COVID-19 related testing which was available at this time. Test results were negative. Care Transition Nurse/ Ambulatory Care Manager contacted the patient by telephone to perform follow-up assessment. Verified name and  with patient as identifiers. Patient has following risk factors of: heart failure, COPD and diabetes    Symptoms reviewed with patient who verbalized the following symptoms: shortness of breath. Due to no new or worsening symptoms encounter was not routed to provider for escalation. Education provided regarding infection prevention, and signs and symptoms of COVID-19 and when to seek medical attention with patient who verbalized understanding. Discussed exposure protocols and quarantine from [address] you at higher risk for severe illness  and given an opportunity for questions and concerns. The patient agrees to contact the COVID-19 hotline [phone] or PCP office for questions related to their healthcare. CTN/ACM provided contact information for future reference. From CDC: Are you at higher risk for severe illness?  Wash your hands often.  Avoid close contact (6 feet, which is about two arm lengths) with people who are sick.  Put distance between yourself and other people if COVID-19 is spreading in your community.  Clean and disinfect frequently touched surfaces.    Avoid all cruise travel and non-essential

## 2020-06-11 ENCOUNTER — CARE COORDINATION (OUTPATIENT)
Dept: CASE MANAGEMENT | Age: 65
End: 2020-06-11

## 2020-06-11 NOTE — CARE COORDINATION
[facility] Follow Up Call    2020    Patient: Erick Mathur  Patient : 1955   MRN: [id number]       Spoke with: patient. Continues to say how much better she feels on demadex over lasix. Also factored in was the reactivation of RV ICD lead per Dr Lawrence Hobbs. Patient only had to take one extra demadex dose for increase in weight & had good results immediately. She is scheduled for CHF clinic tomorrow,  & is taking her daily weight log with her. Care Transitions Subsequent and Final Call    Subsequent and Final Calls  Do you have any ongoing symptoms?:  No  Have your medications changed?:  Yes  Patient Reports:  continues with demadex instead of lasix  Do you have any questions related to your medications?:  No  Do you currently have any active services?:  Yes  Are you currently active with any services?:  Outpatient/Community Services  Do you have any needs or concerns that I can assist you with?:  No  Care Transitions Interventions  Other Interventions:             Patient resolved from the Care Transitions episode on   COVID-19 related testing was not done at this time. Test results were not done. Patient/family has been provided the following resources and education related to COVID-19:                         Signs, symptoms and red flags related to COVID-19            CDC exposure and quarantine guidelines            [facility] exposure contact - [phone]            Contact for their local Department of Health                 Patient currently reports that the following symptoms have improved: denies further SOB which improved with diuretic change & RV lead activation on ICD. No further outreach scheduled with this CTN/ACM. Episode of Care resolved. Patient has this CTN/ACM contact information if future needs arise.     Follow Up  Future Appointments   Date Time Provider Yann Olivares   2020  2:30 PM [facility]IN[facility] RM 1 [facility]

## 2020-06-12 ENCOUNTER — HOSPITAL ENCOUNTER (OUTPATIENT)
Dept: OTHER | Age: 65
Discharge: HOME | End: 2020-06-12
Payer: MEDICARE

## 2020-06-12 VITALS
OXYGEN SATURATION: 99 % | SYSTOLIC BLOOD PRESSURE: 120 MMHG | WEIGHT: 234 LBS | TEMPERATURE: 97.2 F | BODY MASS INDEX: 37.77 KG/M2 | DIASTOLIC BLOOD PRESSURE: 90 MMHG | RESPIRATION RATE: 14 BRPM | HEART RATE: 88 BPM

## 2020-06-12 PROCEDURE — 99212 OFFICE O/P EST SF 10 MIN: CPT

## 2020-06-12 RX ORDER — MAGNESIUM OXIDE 400 MG/1
400 TABLET ORAL DAILY
Status: ON HOLD | COMMUNITY
End: 2021-12-08 | Stop reason: SDUPTHER

## 2020-06-15 ENCOUNTER — TELEPHONE (OUTPATIENT)
Dept: CARDIOLOGY | Age: 65
End: 2020-06-15

## 2020-06-15 RX ORDER — TORSEMIDE 20 MG/1
20 TABLET ORAL DAILY
Qty: 30 TABLET | Refills: 0 | Status: ON HOLD | OUTPATIENT
Start: 2020-06-15 | End: 2021-06-29 | Stop reason: HOSPADM

## 2020-06-15 NOTE — TELEPHONE ENCOUNTER
Loveihed pt calling in to  CHF Clinic stating she would like brand name demadex ordered, not generic torsemide. She was sent home from last admit with a change to torsemide 20 mg qd, after receiving lasix IV during her admission. Pt states she received Demadex during her admit, but review of MAR shows lasix IV administration, with d/c orders to start on torsemide po at home. CHF Clinic is happy to assist pt with this request, but we have concerns for the cost of the brand name drug pt may encounter. Pharmacy cannot give an estimate of drug cost without an order.  Will place order today per pt request.

## 2020-06-20 ENCOUNTER — HOSPITAL ENCOUNTER (OUTPATIENT)
Dept: MAMMOGRAPHY | Age: 65
Discharge: HOME | End: 2020-06-22
Payer: MEDICARE

## 2020-06-20 PROCEDURE — 77063 BREAST TOMOSYNTHESIS BI: CPT

## 2020-08-26 ENCOUNTER — HOSPITAL ENCOUNTER (OUTPATIENT)
Age: 65
Discharge: HOME | End: 2020-08-26
Payer: MEDICARE

## 2020-08-26 LAB
HCT VFR BLD CALC: 38 % (ref 36.3–47.1)
HEMOGLOBIN: 13.5 G/DL (ref 11.9–15.1)
MCH RBC QN AUTO: 30.3 PG (ref 25.2–33.5)
MCHC RBC AUTO-ENTMCNC: 35.5 G/DL (ref 28.4–34.8)
MCV RBC AUTO: 85.2 FL (ref 82.6–102.9)
NRBC AUTOMATED: 0 PER 100 WBC
PDW BLD-RTO: 13.9 % (ref 11.8–14.4)
PLATELET # BLD: 199 K/UL (ref 138–453)
PMV BLD AUTO: 10.6 FL (ref 8.1–13.5)
POTASSIUM SERPL-SCNC: 3.8 MMOL/L (ref 3.7–5.3)
RBC # BLD: 4.46 M/UL (ref 3.95–5.11)
SODIUM BLD-SCNC: 141 MMOL/L (ref 135–144)
WBC # BLD: 5.7 K/UL (ref 3.5–11.3)

## 2020-08-26 PROCEDURE — 85027 COMPLETE CBC AUTOMATED: CPT

## 2020-08-26 PROCEDURE — 36415 COLL VENOUS BLD VENIPUNCTURE: CPT

## 2020-08-26 PROCEDURE — 84295 ASSAY OF SERUM SODIUM: CPT

## 2020-08-26 PROCEDURE — 84132 ASSAY OF SERUM POTASSIUM: CPT

## 2020-11-23 ENCOUNTER — OFFICE VISIT (OUTPATIENT)
Dept: ORTHOPEDIC SURGERY | Age: 65
End: 2020-11-23
Payer: MEDICARE

## 2020-11-23 ENCOUNTER — HOSPITAL ENCOUNTER (OUTPATIENT)
Age: 65
Discharge: HOME | End: 2020-11-23
Payer: MEDICARE

## 2020-11-23 VITALS — HEIGHT: 66 IN | WEIGHT: 240.6 LBS | BODY MASS INDEX: 38.67 KG/M2

## 2020-11-23 LAB
ANION GAP SERPL CALCULATED.3IONS-SCNC: 15 MMOL/L (ref 9–17)
BUN BLDV-MCNC: 28 MG/DL (ref 8–23)
BUN/CREAT BLD: ABNORMAL (ref 9–20)
CALCIUM SERPL-MCNC: 9.4 MG/DL (ref 8.6–10.4)
CHLORIDE BLD-SCNC: 102 MMOL/L (ref 98–107)
CO2: 23 MMOL/L (ref 20–31)
CREAT SERPL-MCNC: 1.18 MG/DL (ref 0.5–0.9)
GFR AFRICAN AMERICAN: 56 ML/MIN
GFR NON-AFRICAN AMERICAN: 46 ML/MIN
GFR SERPL CREATININE-BSD FRML MDRD: ABNORMAL ML/MIN/{1.73_M2}
GFR SERPL CREATININE-BSD FRML MDRD: ABNORMAL ML/MIN/{1.73_M2}
GLUCOSE BLD-MCNC: 112 MG/DL (ref 70–99)
HCT VFR BLD CALC: 38 % (ref 36.3–47.1)
HEMOGLOBIN: 13.3 G/DL (ref 11.9–15.1)
MAGNESIUM: 1.6 MG/DL (ref 1.6–2.6)
MCH RBC QN AUTO: 29.8 PG (ref 25.2–33.5)
MCHC RBC AUTO-ENTMCNC: 35 G/DL (ref 28.4–34.8)
MCV RBC AUTO: 85.2 FL (ref 82.6–102.9)
NRBC AUTOMATED: 0 PER 100 WBC
PDW BLD-RTO: 13.3 % (ref 11.8–14.4)
PLATELET # BLD: 273 K/UL (ref 138–453)
PMV BLD AUTO: 10.9 FL (ref 8.1–13.5)
POTASSIUM SERPL-SCNC: 3.8 MMOL/L (ref 3.7–5.3)
RBC # BLD: 4.46 M/UL (ref 3.95–5.11)
SODIUM BLD-SCNC: 140 MMOL/L (ref 135–144)
WBC # BLD: 6.1 K/UL (ref 3.5–11.3)

## 2020-11-23 PROCEDURE — 3017F COLORECTAL CA SCREEN DOC REV: CPT | Performed by: ORTHOPAEDIC SURGERY

## 2020-11-23 PROCEDURE — G8417 CALC BMI ABV UP PARAM F/U: HCPCS | Performed by: ORTHOPAEDIC SURGERY

## 2020-11-23 PROCEDURE — 1090F PRES/ABSN URINE INCON ASSESS: CPT | Performed by: ORTHOPAEDIC SURGERY

## 2020-11-23 PROCEDURE — 36415 COLL VENOUS BLD VENIPUNCTURE: CPT

## 2020-11-23 PROCEDURE — 83735 ASSAY OF MAGNESIUM: CPT

## 2020-11-23 PROCEDURE — G8484 FLU IMMUNIZE NO ADMIN: HCPCS | Performed by: ORTHOPAEDIC SURGERY

## 2020-11-23 PROCEDURE — 80048 BASIC METABOLIC PNL TOTAL CA: CPT

## 2020-11-23 PROCEDURE — G8400 PT W/DXA NO RESULTS DOC: HCPCS | Performed by: ORTHOPAEDIC SURGERY

## 2020-11-23 PROCEDURE — 1123F ACP DISCUSS/DSCN MKR DOCD: CPT | Performed by: ORTHOPAEDIC SURGERY

## 2020-11-23 PROCEDURE — G8427 DOCREV CUR MEDS BY ELIG CLIN: HCPCS | Performed by: ORTHOPAEDIC SURGERY

## 2020-11-23 PROCEDURE — 85027 COMPLETE CBC AUTOMATED: CPT

## 2020-11-23 PROCEDURE — 4040F PNEUMOC VAC/ADMIN/RCVD: CPT | Performed by: ORTHOPAEDIC SURGERY

## 2020-11-23 PROCEDURE — 20610 DRAIN/INJ JOINT/BURSA W/O US: CPT | Performed by: ORTHOPAEDIC SURGERY

## 2020-11-23 PROCEDURE — 1036F TOBACCO NON-USER: CPT | Performed by: ORTHOPAEDIC SURGERY

## 2020-11-23 PROCEDURE — 99214 OFFICE O/P EST MOD 30 MIN: CPT | Performed by: ORTHOPAEDIC SURGERY

## 2020-11-23 RX ORDER — BUPIVACAINE HYDROCHLORIDE 2.5 MG/ML
2 INJECTION, SOLUTION INFILTRATION; PERINEURAL ONCE
Status: COMPLETED | OUTPATIENT
Start: 2020-11-23 | End: 2020-11-23

## 2020-11-23 RX ORDER — METHYLPREDNISOLONE ACETATE 80 MG/ML
80 INJECTION, SUSPENSION INTRA-ARTICULAR; INTRALESIONAL; INTRAMUSCULAR; SOFT TISSUE ONCE
Status: COMPLETED | OUTPATIENT
Start: 2020-11-23 | End: 2020-11-23

## 2020-11-23 RX ORDER — LIDOCAINE 50 MG/G
PATCH TOPICAL
Qty: 30 PATCH | Refills: 0 | Status: SHIPPED | OUTPATIENT
Start: 2020-11-23 | End: 2022-09-30

## 2020-11-23 RX ADMIN — METHYLPREDNISOLONE ACETATE 80 MG: 80 INJECTION, SUSPENSION INTRA-ARTICULAR; INTRALESIONAL; INTRAMUSCULAR; SOFT TISSUE at 14:49

## 2020-11-23 RX ADMIN — BUPIVACAINE HYDROCHLORIDE 5 MG: 2.5 INJECTION, SOLUTION INFILTRATION; PERINEURAL at 14:48

## 2020-11-23 RX ADMIN — BUPIVACAINE HYDROCHLORIDE 5 MG: 2.5 INJECTION, SOLUTION INFILTRATION; PERINEURAL at 14:49

## 2020-11-23 ASSESSMENT — ENCOUNTER SYMPTOMS
NAUSEA: 0
CONSTIPATION: 0
DIARRHEA: 0
COUGH: 0

## 2020-11-23 NOTE — PROGRESS NOTES
[facility]  [facility]  [address] [place] [address]  Dept: [phone]  Dept Fax: [phone]        Ambulatory Follow Up      Subjective:   Sherron Menjivar is a 76y.o. year old female who presents to our office today for routine followup regarding her   1. Arthritis of both knees    . Chief Complaint   Patient presents with    Knee Pain     R>L       HPI- Patient presents to office today for bilateral knee pain, right being worse than left. Patient was last seen on 1/19/2018 for bilateral knee arthritis. Patients would like to have corticosteroid injections today. Patient continues not to want a total knee arthroplasty to either knee. She says that with her cardiac history she does not feel comfortable going through with such a big surgery. Patients last corticosteroid injections was on 1/19/2018. Patient continues to take mobic 7.5 mg. She is inquiring about lidocaine patches to see if that can help her pain        Review of Systems   Constitutional: Negative for chills and fever. Respiratory: Negative for cough. Gastrointestinal: Negative for constipation, diarrhea and nausea. Musculoskeletal: Positive for arthralgias (bilateral knee). Negative for gait problem, joint swelling and myalgias. Neurological: Negative for dizziness, weakness and numbness. I have reviewed the CC, HPI, ROS, PMH, FHX, Social History, and if not present in this note, I have reviewed in the patient's chart. I agree with the documentation provided by other staff and have reviewed their documentation prior to providing my signature indicating agreement. Objective :   Ht 5' 6\" (1.676 m)   Wt 240 lb 9.6 oz (109.1 kg)   BMI 38.83 kg/m²  Body mass index is 38.83 kg/m². General: Sherron Menjivar is a 76 y.o. female who is alert and oriented and sitting comfortably in our office. Ortho Exam  MS:  Evaluation of the Right knee reveals no significant outward deformity.   There is no erythema, warmth, skin lesions, signs of infection. There is tenderness over the medial joint line. There is a mildknee effusion. Range of motion of the Right knee is  5-full. No instability of the knee is appreciated at 0 and 30° of flexion. There is a negative anterior drawer Lachman's test.  There is increased pain with varus Enrique's testing. No calf tenderness is noted. There is a negative hip log roll and Stinchfield test.  Motor, sensory, vascular examination to the Right lower extremity is intact. Patient has full range of motion of the ankle. Evaluation of the Left knee reveals no significant outward deformity. There is no erythema, warmth, skin lesions, signs of infection. There is tenderness over the medial joint line. There is a mildknee effusion. Range of motion of the Left knee is  full. No instability of the knee is appreciated at 0 and 30° of flexion. There is a negative anterior drawer Lachman's test.  There is increased pain with varus Enrique's testing. No calf tenderness is noted. There is a negative hip log roll and Stinchfield test.  Motor, sensory, vascular examination to the Left lower extremity is intact. Patient has full range of motion of the ankle. Patient has chronic lymphedema changes bilateral lower extremities with compression socks on. Neuro: alert and oriented to person and place. Eyes: Extra-ocular muscles intact  Mouth: Oral mucosa moist. No perioral lesions  Pulm: Respirations unlabored and regular. Symmetric chest excursion without outward deformity is noted. Skin: warm, well perfused  Psych:   Patient has good fund of knowledge and displays understanging of exam, diagnosis, and plan.     Radiology:     Xr Knee Left (min 4 Views)    Result Date: 11/23/2020  History:   Left knee pain Findings:   Standing AP/Lateral/Tunnel/Merchant view xrays of the Left done in the office today shows severe  medial joint space narrowing, tricompartmental osteophytosis, joint line sclerosis medially. No evidence of fracture, subluxation, dislocation, radioopaque foreign body/tumor is noted. Lateral subluxation of the tibia is appreciated. Impression:   Left knee severe  degenerative changes as described above. Xr Knee Right (min 4 Views)    Result Date: 11/23/2020  History:   Right knee pain Findings:   Standing AP/Lateral/Tunnel/Merchant view xrays of the Right done in the office today shows severe  medial joint space narrowing, tricompartmental osteophytosis, joint line sclerosis medially. No evidence of fracture, subluxation, dislocation, radioopaque foreign body/tumor is noted. Lateral subluxation of the tibia is appreciated. Impression:   Right knee severe  degenerative changes as described above. KNEE INJECTION PROCEDURE NOTE:  The patient was identified. The right knee was confirmed with the patient. After a sterile prep with Betadine the knee was injected using a lateral joint line approach with a mixture of 2 mL of 0.25% Marcaine and 80 mg of Depo-Medrol. Patient tolerated the procedure well without post injection complications. I instructed the patient to call our office immediately if they have any swelling or increased pain at the injection site. KNEE INJECTION PROCEDURE NOTE:  The patient was identified. The left knee was confirmed with the patient. After a sterile prep with Betadine the knee was injected using a lateral joint line approach with a mixture of 2 mL of 0.25% Marcaine and 80 mg of Depo-Medrol. Patient tolerated the procedure well without post injection complications. I instructed the patient to call our office immediately if they have any swelling or increased pain at the injection site. Assessment:      1. Arthritis of both knees       Plan:       Went over radiographs with patient. Discussed with her that she would benefit from a total knee arthroplasty.  In the meantime patient can have corticosteroid injections every 4 months. Patient is to continue meloxicam at her PCP's discretion. Will order lidocaine patches to see if that can help with her pain. She may follow up as needed. Follow up:Return if symptoms worsen or fail to improve. Orders Placed This Encounter   Medications    methylPREDNISolone acetate (DEPO-MEDROL) injection 80 mg    methylPREDNISolone acetate (DEPO-MEDROL) injection 80 mg    bupivacaine (MARCAINE) 0.25 % injection 5 mg    bupivacaine (MARCAINE) 0.25 % injection 5 mg    lidocaine (LIDODERM) 5 %     Sig: Place one patch to both knees 12 hours on, 12 hours off. Dispense:  30 patch     Refill:  0         Orders Placed This Encounter   Procedures    (2)  20610 - FL DRAIN/INJECT LARGE JOINT/BURSA     I, Joseph Molina RN am scribing for and in the presence of Dr. Silas Jorgensen  11/23/2020 3:23 PM      I have reviewed and made changes accordingly to the work scribed by Joseph Molina RN. The documentation accurately reflects work and decisions made by me. I have also reviewed documentation completed by clinical staff.     Silas Jorgensen DO, [facility]  11/23/2020 3:23 PM    This note is created with the assistance of a speech recognition program.  While intending to generate a document that actually reflects the content of the visit, the document can still have some errors including those of syntax and sound a like substitutions which may escape proof reading.  In such instances, actual meaning can be extrapolated by contextual diversion      Electronically signed by Annie Paul DO, Marquez Briceño on 11/23/2020 at 3:23 PM

## 2021-01-30 ENCOUNTER — APPOINTMENT (OUTPATIENT)
Dept: GENERAL RADIOLOGY | Age: 66
End: 2021-01-30
Payer: MEDICARE

## 2021-01-30 ENCOUNTER — HOSPITAL ENCOUNTER (EMERGENCY)
Age: 66
Discharge: HOME | End: 2021-01-30
Attending: EMERGENCY MEDICINE
Payer: MEDICARE

## 2021-01-30 VITALS
BODY MASS INDEX: 41.65 KG/M2 | DIASTOLIC BLOOD PRESSURE: 85 MMHG | SYSTOLIC BLOOD PRESSURE: 119 MMHG | HEART RATE: 73 BPM | WEIGHT: 250 LBS | HEIGHT: 65 IN | TEMPERATURE: 97.3 F | OXYGEN SATURATION: 99 % | RESPIRATION RATE: 20 BRPM

## 2021-01-30 DIAGNOSIS — U07.1 COVID-19: Primary | ICD-10-CM

## 2021-01-30 LAB
ABSOLUTE EOS #: 0.19 K/UL (ref 0–0.44)
ABSOLUTE IMMATURE GRANULOCYTE: <0.03 K/UL (ref 0–0.3)
ABSOLUTE LYMPH #: 1.75 K/UL (ref 1.1–3.7)
ABSOLUTE MONO #: 0.75 K/UL (ref 0.1–1.2)
ANION GAP SERPL CALCULATED.3IONS-SCNC: 11 MMOL/L (ref 9–17)
BASOPHILS # BLD: 1 % (ref 0–2)
BASOPHILS ABSOLUTE: 0.05 K/UL (ref 0–0.2)
BNP INTERPRETATION: ABNORMAL
BUN BLDV-MCNC: 16 MG/DL (ref 8–23)
BUN/CREAT BLD: ABNORMAL (ref 9–20)
CALCIUM SERPL-MCNC: 9.4 MG/DL (ref 8.6–10.4)
CHLORIDE BLD-SCNC: 101 MMOL/L (ref 98–107)
CO2: 22 MMOL/L (ref 20–31)
CREAT SERPL-MCNC: 0.78 MG/DL (ref 0.5–0.9)
D-DIMER QUANTITATIVE: 0.5 MG/L FEU
DIFFERENTIAL TYPE: ABNORMAL
EOSINOPHILS RELATIVE PERCENT: 3 % (ref 1–4)
GFR AFRICAN AMERICAN: >60 ML/MIN
GFR NON-AFRICAN AMERICAN: >60 ML/MIN
GFR SERPL CREATININE-BSD FRML MDRD: ABNORMAL ML/MIN/{1.73_M2}
GFR SERPL CREATININE-BSD FRML MDRD: ABNORMAL ML/MIN/{1.73_M2}
GLUCOSE BLD-MCNC: 119 MG/DL (ref 70–99)
HCT VFR BLD CALC: 38.7 % (ref 36.3–47.1)
HEMOGLOBIN: 13.6 G/DL (ref 11.9–15.1)
IMMATURE GRANULOCYTES: 0 %
LYMPHOCYTES # BLD: 25 % (ref 24–43)
MCH RBC QN AUTO: 29.9 PG (ref 25.2–33.5)
MCHC RBC AUTO-ENTMCNC: 35.1 G/DL (ref 28.4–34.8)
MCV RBC AUTO: 85.1 FL (ref 82.6–102.9)
MONOCYTES # BLD: 11 % (ref 3–12)
NRBC AUTOMATED: 0 PER 100 WBC
PDW BLD-RTO: 13.8 % (ref 11.8–14.4)
PLATELET # BLD: 258 K/UL (ref 138–453)
PLATELET ESTIMATE: ABNORMAL
PMV BLD AUTO: 10.7 FL (ref 8.1–13.5)
POTASSIUM SERPL-SCNC: 3.5 MMOL/L (ref 3.7–5.3)
PRO-BNP: 1949 PG/ML
RBC # BLD: 4.55 M/UL (ref 3.95–5.11)
RBC # BLD: ABNORMAL 10*6/UL
SARS-COV-2, RAPID: DETECTED
SARS-COV-2: ABNORMAL
SARS-COV-2: ABNORMAL
SEG NEUTROPHILS: 60 % (ref 36–65)
SEGMENTED NEUTROPHILS ABSOLUTE COUNT: 4.16 K/UL (ref 1.5–8.1)
SODIUM BLD-SCNC: 134 MMOL/L (ref 135–144)
SOURCE: ABNORMAL
TROPONIN INTERP: ABNORMAL
TROPONIN INTERP: NORMAL
TROPONIN T: ABNORMAL NG/ML
TROPONIN T: NORMAL NG/ML
TROPONIN, HIGH SENSITIVITY: 14 NG/L (ref 0–14)
TROPONIN, HIGH SENSITIVITY: 16 NG/L (ref 0–14)
WBC # BLD: 6.9 K/UL (ref 3.5–11.3)
WBC # BLD: ABNORMAL 10*3/UL

## 2021-01-30 PROCEDURE — 85025 COMPLETE CBC W/AUTO DIFF WBC: CPT

## 2021-01-30 PROCEDURE — 99284 EMERGENCY DEPT VISIT MOD MDM: CPT

## 2021-01-30 PROCEDURE — 71045 X-RAY EXAM CHEST 1 VIEW: CPT

## 2021-01-30 PROCEDURE — 6370000000 HC RX 637 (ALT 250 FOR IP): Performed by: STUDENT IN AN ORGANIZED HEALTH CARE EDUCATION/TRAINING PROGRAM

## 2021-01-30 PROCEDURE — 80048 BASIC METABOLIC PNL TOTAL CA: CPT

## 2021-01-30 PROCEDURE — 85379 FIBRIN DEGRADATION QUANT: CPT

## 2021-01-30 PROCEDURE — 93005 ELECTROCARDIOGRAM TRACING: CPT | Performed by: STUDENT IN AN ORGANIZED HEALTH CARE EDUCATION/TRAINING PROGRAM

## 2021-01-30 PROCEDURE — 84484 ASSAY OF TROPONIN QUANT: CPT

## 2021-01-30 PROCEDURE — 83880 ASSAY OF NATRIURETIC PEPTIDE: CPT

## 2021-01-30 PROCEDURE — U0002 COVID-19 LAB TEST NON-CDC: HCPCS

## 2021-01-30 RX ORDER — ASPIRIN 81 MG/1
324 TABLET, CHEWABLE ORAL ONCE
Status: COMPLETED | OUTPATIENT
Start: 2021-01-30 | End: 2021-01-30

## 2021-01-30 RX ADMIN — ASPIRIN 324 MG: 81 TABLET, CHEWABLE ORAL at 11:47

## 2021-01-30 ASSESSMENT — ENCOUNTER SYMPTOMS
NAUSEA: 0
VOMITING: 0
WHEEZING: 0
SORE THROAT: 1
COUGH: 1
ABDOMINAL PAIN: 0
SHORTNESS OF BREATH: 1

## 2021-01-30 ASSESSMENT — PAIN DESCRIPTION - PAIN TYPE: TYPE: ACUTE PAIN

## 2021-01-30 ASSESSMENT — PAIN DESCRIPTION - LOCATION: LOCATION: BACK

## 2021-01-30 NOTE — ED PROVIDER NOTES
[facility]  Emergency Department  Faculty Attestation     I performed a history and physical examination of the patient and discussed management with the resident. I reviewed the residents note and agree with the documented findings and plan of care. Any areas of disagreement are noted on the chart. I was personally present for the key portions of any procedures. I have documented in the chart those procedures where I was not present during the key portions. I have reviewed the emergency nurses triage note. I agree with the chief complaint, past medical history, past surgical history, allergies, medications, social and family history as documented unless otherwise noted below. For Physician Assistant/ Nurse Practitioner cases/documentation I have personally evaluated this patient and have completed at least one if not all key elements of the E/M (history, physical exam, and MDM). Additional findings are as noted.       Primary Care Physician:  Julianna Gan MD    Screenings:  [unfilled]    CHIEF COMPLAINT       Chief Complaint   Patient presents with    Shortness of Breath     since yesterday    Cough     reported, not actively coughing    Back Pain     with cough       RECENT VITALS:   Temp: 97.3 °F (36.3 °C),  Pulse: 91, Resp: 24,      LABS:  Labs Reviewed   CBC WITH AUTO DIFFERENTIAL - Abnormal; Notable for the following components:       Result Value    MCHC 35.1 (*)     All other components within normal limits   BASIC METABOLIC PANEL - Abnormal; Notable for the following components:    Glucose 119 (*)     Sodium 134 (*)     Potassium 3.5 (*)     All other components within normal limits   TROPONIN - Abnormal; Notable for the following components:    Troponin, High Sensitivity 16 (*)     All other components within normal limits   BRAIN NATRIURETIC PEPTIDE - Abnormal; Notable for the following components:    Pro-BNP 1,949 (*)     All other components within normal limits   D-DIMER, QUANTITATIVE   COVID-19    Narrative:     Rachelle Espinosa RCP     1/30/2021 11:24 AM  RAPID Covid 19 swab taken from right nare, labeled, placed in red   dot bag, and handed off to second healthcare worker outside of   room for transport to laboratory per hospital policy and   procedure. Patient tolerated procedure well. TROPONIN       Radiology  XR CHEST PORTABLE   Final Result   Cardiomegaly. Vascular prominence compatible with mild vascular congestion. Small bilateral effusions. Indwelling pacemaker. EKG:   EKG Interpretation    Interpreted by me    Rhythm: Paced  Rate: normal  Axis: normal  Ectopy: none  Conduction: Bundle branch block pattern  ST Segments: Minimal discordant elevation inferiorly and anteriorly  T Waves: Inversion L  Q Waves: Inferior and anterior    Clinical Impression: Paced rhythm, no acute changes    Attending Physician Additional  Notes    Patient had mild sore throat starting last night as well as some shortness of breath. She thought she heard wheezing with her breathing. She is able to ambulate without dyspnea on exertion. There is a dry cough but no sputum production. No fever chills or sweats. No loss of smell/taste. No myalgias or headache. No vomiting or diarrhea. No exposures to others with Covid illness. Her mother just returned from the hospital, she is the primary caretaker, and the patient noticed that her mother has a dry cough but she is also on lisinopril. Patient has a history of cardiomyopathy, ICD, no recent defibrillations, pulmonary hypertension, hypertension. She is a compliant with her medications but is not yet taken her morning doses. Pulse oximetry on room air is 94-98% while in bed with a respiratory rate of 20. Lungs are clear. No murmur gallop rub or JVD. No edema cords Homans calf tenderness. EKG is paced rhythm.   Plan is chest x-ray, laboratory studies, Covid swab, pulse oximetry stress test and reassess. If she desaturates suggest further Covid inflammatory labs and admission. If her saturations are normal anticipate discharge with early follow-up. Nicole Caruso.  Kike Gil MD, [facility]  Attending Emergency  Physician               Clarita Hong MD  01/30/21 5502

## 2021-01-30 NOTE — ED TRIAGE NOTES
Pt STS she has had \"some SOB since yesterday but it got worst today\". Pt STS intermit cough \"that makes my back hurt sometime\", no t actively coughing at this time. Pt speaking in complete sentences without distress. Pt denied loss of taste and smell. Pt is alert ox3 and cooperative, pt STS \"I think I was just afraid, they keep calling me to get that vaccine\".

## 2021-01-30 NOTE — ED NOTES
Trop obtained and sent. Pt continues to rest quietly at this time.       Jarrod Chavez RN  01/30/21 [id number]

## 2021-01-30 NOTE — ED PROVIDER NOTES
level: Not on file   Occupational History    Not on file   Social Needs    Financial resource strain: Not on file    Food insecurity     Worry: Not on file     Inability: Not on file    Transportation needs     Medical: Not on file     Non-medical: Not on file   Tobacco Use    Smoking status: Never Smoker    Smokeless tobacco: Never Used   Substance and Sexual Activity    Alcohol use: No     Alcohol/week: 0.0 standard drinks    Drug use: Not Currently    Sexual activity: Never   Lifestyle    Physical activity     Days per week: Not on file     Minutes per session: Not on file    Stress: Not on file   Relationships    Social connections     Talks on phone: Not on file     Gets together: Not on file     Attends Jewish service: Not on file     Active member of club or organization: Not on file     Attends meetings of clubs or organizations: Not on file     Relationship status: Not on file    Intimate partner violence     Fear of current or ex partner: Not on file     Emotionally abused: Not on file     Physically abused: Not on file     Forced sexual activity: Not on file   Other Topics Concern    Not on file   Social History Narrative    Not on file       Family History   Problem Relation Age of Onset    Cancer Other         ovarian    High Blood Pressure Mother     Other Mother         copd    Arthritis Neg Hx     Asthma Neg Hx     Birth Defects Neg Hx     Depression Neg Hx     Diabetes Neg Hx     Early Death Neg Hx     Hearing Loss Neg Hx     Heart Disease Neg Hx     High Cholesterol Neg Hx     Kidney Disease Neg Hx     Learning Disabilities Neg Hx     Mental Retardation Neg Hx     Mental Illness Neg Hx     Miscarriages / Stillbirths Neg Hx     Stroke Neg Hx     Substance Abuse Neg Hx     Vision Loss Neg Hx     Breast Cancer Neg Hx     Colon Cancer Neg Hx     Eclampsia Neg Hx     Hypertension Neg Hx     Ovarian Cancer Neg Hx      Labor Neg Hx     Spont Abortions Neg Hx         Allergies:  Codeine and Morphine    Home Medications:  Prior to Admission medications    Medication Sig Start Date End Date Taking? Authorizing Provider   lidocaine (LIDODERM) 5 % Place one patch to both knees 12 hours on, 12 hours off. 11/23/20   Tony Pierce DO   DEMADEX 20 MG tablet Take 1 tablet by mouth daily 6/15/20 7/15/20  Oracio Dougherty APRN Nettie LORA   magnesium oxide (MAG-OX) 400 MG tablet Take 400 mg by mouth daily Patient states takes 200mg PO daily    Historical Provider, MD   metoprolol tartrate (LOPRESSOR) 25 MG tablet Take 1 tablet by mouth 2 times daily 5/25/20   Teofilo Bates MD   metOLazone (ZAROXOLYN) 2.5 MG tablet Take 1 tablet by mouth every other day 5/25/20   Teofilo Bates MD   acetaminophen (TYLENOL) 325 MG tablet Take 1 tablet by mouth every 8 hours as needed for Pain 1/7/20   Abdias Gallo DO   aspirin 81 MG tablet Take 81 mg by mouth daily    Historical Provider, MD   allopurinol (ZYLOPRIM) 100 MG tablet Take 1 tablet by mouth daily 2/18/19   Qi Clark MD   amiodarone (CORDARONE) 200 MG tablet Take 1 tablet by mouth daily 2/18/19   Qi Clark MD   losartan (COZAAR) 25 MG tablet Take 1 tablet by mouth daily 2/18/19   Qi Clark MD   potassium chloride (KLOR-CON M) 20 MEQ TBCR extended release tablet Take 1 tablet by mouth 2 times daily (with meals) 2/18/19   Qi Clark MD   spironolactone (ALDACTONE) 25 MG tablet Take 0.5 tablets by mouth daily 2/18/19   Qi Clark MD       REVIEW OFSYSTEMS    (2-9 systems for level 4, 10 or more for level 5)      Review of Systems   Constitutional: Negative for chills and fever. HENT: Positive for sore throat. Eyes: Negative for visual disturbance. Respiratory: Positive for cough and shortness of breath. Negative for wheezing. Cardiovascular: Negative for chest pain, palpitations and leg swelling. Gastrointestinal: Negative for abdominal pain, nausea and vomiting. Genitourinary: Negative for dysuria and hematuria. Musculoskeletal: Negative for neck pain. Skin: Negative for rash and wound. Neurological: Positive for headaches. Negative for weakness, light-headedness and numbness. PHYSICAL EXAM   (up to 7 for level 4, 8 or more forlevel 5)      INITIAL VITALS:   Vitals:    01/30/21 1021 01/30/21 1032 01/30/21 1116 01/30/21 1200   BP:   119/85    Pulse:  91 72 73   Resp:  24 23 20   Temp: 97.3 °F (36.3 °C)      TempSrc: Infrared      SpO2:  98% 98% 99%   Weight:  250 lb (113.4 kg)     Height:  5' 5\" (1.651 m)             Physical Exam  Vitals signs and nursing note reviewed. Constitutional:       General: She is not in acute distress. Appearance: Normal appearance. She is not ill-appearing, toxic-appearing or diaphoretic. HENT:      Mouth/Throat:      Mouth: Mucous membranes are moist.      Pharynx: Oropharynx is clear. No oropharyngeal exudate or posterior oropharyngeal erythema. Cardiovascular:      Rate and Rhythm: Normal rate and regular rhythm. Heart sounds: No murmur. No gallop. Pulmonary:      Effort: Pulmonary effort is normal. No respiratory distress. Breath sounds: Normal breath sounds. No stridor. No wheezing, rhonchi or rales. Abdominal:      General: There is no distension. Palpations: Abdomen is soft. Tenderness: There is no abdominal tenderness. There is no guarding. Musculoskeletal:         General: No tenderness. Right lower leg: No edema. Left lower leg: No edema. Skin:     General: Skin is warm and dry. Neurological:      Mental Status: She is alert.          DIFFERENTIAL  DIAGNOSIS     PLAN (LABS / IMAGING / EKG):  Orders Placed This Encounter   Procedures    XR CHEST PORTABLE    CBC WITH AUTO DIFFERENTIAL    BASIC METABOLIC PANEL    Troponin    Troponin    D-DIMER, QUANTITATIVE    Brain Natriuretic Peptide    COVID-19    EKG 12 Lead       MEDICATIONS ORDERED:  Orders Placed This Encounter   Medications    aspirin chewable tablet 324 mg       DDX: ACS, CHF exacerbation, COVID-19, pneumonia, viral illness    Initial MDM/Plan/ED course: 76 y.o. female who presents with cough, shortness of breath, headaches and sore throat. On exam vitals are normal patient is in no acute distress and saturating well on room air. Physical exam unremarkable normal heart and lung sounds, abdomen soft nontender, no leg swelling or calf tenderness. Cardiac work-up with troponins x2 as well as BNP was obtained. COVID-19 swab also obtained as patient is a potential admission. All cardiac work-up was baseline although patient was COVID-19 positive. Patient was exerted in the room for 1 minute marching in place and saying alphabet twice and did not desaturate afterwards. Patient already has a home pulse oximeter. Patient does not want to be in any clinical trials at this time for COVID-19. Patient given education material on COVID-19 and instructed to return if her pulse oximeter reads below 92% at home. Patient agreed this plan was discharged.     DIAGNOSTIC RESULTS / EMERGENCY DEPARTMENT COURSE / MDM     LABS:  Labs Reviewed   CBC WITH AUTO DIFFERENTIAL - Abnormal; Notable for the following components:       Result Value    MCHC 35.1 (*)     All other components within normal limits   BASIC METABOLIC PANEL - Abnormal; Notable for the following components:    Glucose 119 (*)     Sodium 134 (*)     Potassium 3.5 (*)     All other components within normal limits   TROPONIN - Abnormal; Notable for the following components:    Troponin, High Sensitivity 16 (*)     All other components within normal limits   BRAIN NATRIURETIC PEPTIDE - Abnormal; Notable for the following components:    Pro-BNP 1,949 (*)     All other components within normal limits   COVID-19 - Abnormal; Notable for the following components:    SARS-CoV-2, Rapid DETECTED (*)     All other components within normal limits   TROPONIN   D-DIMER, QUANTITATIVE         RADIOLOGY:  Xr Chest Portable    Result Date: 1/30/2021  EXAMINATION: ONE XRAY VIEW OF THE CHEST 1/30/2021 11:10 am COMPARISON: 24 May 2020 HISTORY: ORDERING SYSTEM PROVIDED HISTORY: SOB, cough TECHNOLOGIST PROVIDED HISTORY: SOB, cough FINDINGS: AP portable view of the chest time stamped at 1110 hours demonstrates overlying cardiac monitoring electrodes. Heart size is stable. A multiple polar pacemaker enters from the left with intact leads in appropriate positions. Mild vascular congestion is again noted with small effusions. No extrapleural air is noted. Faint strand-like opacities at the bases are noted favoring atelectasis. Cardiomegaly. Vascular prominence compatible with mild vascular congestion. Small bilateral effusions. Indwelling pacemaker.          EKG      All EKG's are interpreted by the EmergencyDepartment Physician who either signs or Co-signs this chart in the absence of a cardiologist.      PROCEDURES:  None    CONSULTS:  None    CRITICAL CARE:  Please see attending note    FINAL IMPRESSION      1. COVID-19          DISPOSITION / [id number]    D/c    PATIENT REFERRED TO:  [facility] ED  [address] [place] [id number]  Go to   As needed, If symptoms worsen, or if your home pulse oximeter dips below 92%      DISCHARGE MEDICATIONS:  New Prescriptions    No medications on file       Muriel Mejia DO  Emergency Medicine Resident    (Please note that portions of this note were completed with a voice recognition program.Efforts were made to edit the dictations but occasionally words are mis-transcribed.)        Mamadou Diaz DO  Resident  01/30/21 0283

## 2021-02-01 ENCOUNTER — CARE COORDINATION (OUTPATIENT)
Dept: CARE COORDINATION | Age: 66
End: 2021-02-01

## 2021-02-01 NOTE — CARE COORDINATION
Patient contacted regarding SSTFB-12 diagnosis\". Discussed COVID-19 related testing which was available at this time. Test results were positive. Patient informed of results, if available? patient is already aware     Care Transition Nurse/ Ambulatory Care Manager contacted the patient by telephone to perform post discharge assessment. Call within 2 business days of discharge: Yes. Verified name and  with patient as identifiers. Provided introduction to self, and explanation of the CTN/ACM role, and reason for call due to risk factors for infection and/or exposure to COVID-19. Symptoms reviewed with patient who verbalized the following symptoms: no new symptoms and no worsening symptoms. Due to no new or worsening symptoms encounter was not routed to provider for escalation. Discussed follow-up appointments. If no appointment was previously scheduled, appointment scheduling offered: [address] follow up appointment(s): No future appointments. Non-[facility] follow up appointment(s):     Non-face-to-face services provided:  Obtained and reviewed discharge summary and/or continuity of care documents  Education of patient/family/caregiver/guardian to support self-management-yes  Assessment and support for treatment adherence and medication management-yes     Advance Care Planning:   Does patient have an Advance Directive:  patient declined education. Patient has following risk factors of: heart failure. CTN/ACM reviewed discharge instructions, medical action plan and red flags such as increased shortness of breath, increasing fever and signs of decompensation with patient who verbalized understanding. Discussed exposure protocols and quarantine with CDC Guidelines What to do if you are sick with coronavirus disease .  Patient was given an opportunity for questions and concerns.  The patient agrees to contact the [facility] exposure line [phone], [facility] department [place] [facility]: ([phone]) and PCP office for questions related to their healthcare. CTN/ACM provided contact information for future needs. Reviewed and educated patient on any new and changed medications related to discharge diagnosis     Patient/family/caregiver given information for [employer] Loop and agrees to enroll no  Patient's preferred e-mail:    Patient's preferred phone number:   Based on Loop alert triggers, patient will be contacted by nurse care manager for worsening symptoms. Plan for follow-up call in 3-5 days based on severity of symptoms and risk factors. Spoke to patient, symptoms improving   No new and no worsening symptoms  Patient is getting a repeat COVID test today  Patient prefers to keep the call [address] with COVID-19 may have no symptoms, mild symptoms, such as fever, cough, and shortness of breath or they may have more severe illness, developing severe and fatal pneumonia. As a result, Advance Care Planning with attention to naming a health care decision maker (someone you trust to make healthcare decisions for you if you could not speak for yourself) and sharing other health care preferences is important BEFORE a possible health crisis. Please contact your Primary Care Provider to discuss Advance Care Planning. Preventing the Spread of Coronavirus Disease 2019 in Homes and Residential Communities  For the most recent information go to [email or web address]    Prevention steps for People with confirmed or suspected COVID-19 (including persons under investigation) who do not need to be hospitalized  and   People with confirmed COVID-19 who were hospitalized and determined to be medically stable to go home    Your healthcare provider and public health staff will evaluate whether you can be cared for at home.  If it is determined that you do not need to be hospitalized and can be isolated at home, you will attention if your illness is worsening (e.g., difficulty breathing). Before seeking care, call your healthcare provider and tell them that you have, or are being evaluated for, COVID-19. Put on a facemask before you enter the facility. These steps will help the healthcare providers office to keep other people in the office or waiting room from getting infected or exposed. Ask your healthcare provider to call the local or state health department. Persons who are placed under active monitoring or facilitated self-monitoring should follow instructions provided by their local health department or occupational health professionals, as appropriate. When working with your local health department check their available hours. If you have a medical emergency and need to call 911, notify the dispatch personnel that you have, or are being evaluated for COVID-19. If possible, put on a facemask before emergency medical services arrive. Discontinuing home isolation  Patients with confirmed COVID-19 should remain under home isolation precautions until the risk of secondary transmission to others is thought to be low. The decision to discontinue home isolation precautions should be made on a case-by-case basis, in consultation with healthcare providers and state and local health departments.

## 2021-02-02 ENCOUNTER — CARE COORDINATION (OUTPATIENT)
Dept: CARE COORDINATION | Age: 66
End: 2021-02-02

## 2021-02-02 LAB
EKG ATRIAL RATE: 90 BPM
EKG P AXIS: 49 DEGREES
EKG P-R INTERVAL: 122 MS
EKG Q-T INTERVAL: 452 MS
EKG QRS DURATION: 184 MS
EKG QTC CALCULATION (BAZETT): 552 MS
EKG R AXIS: -124 DEGREES
EKG T AXIS: 75 DEGREES
EKG VENTRICULAR RATE: 90 BPM

## 2021-02-02 PROCEDURE — 93010 ELECTROCARDIOGRAM REPORT: CPT | Performed by: INTERNAL MEDICINE

## 2021-02-05 ENCOUNTER — CARE COORDINATION (OUTPATIENT)
Dept: CARE COORDINATION | Age: 66
End: 2021-02-05

## 2021-02-05 NOTE — CARE COORDINATION
Patient contacted regarding COVID-19 risk and screening. Discussed COVID-19 related testing which was available at this time. Test results were positive. Patient informed of results, if available? Aware of COVID test results at 21 ED visit     Care Transition Nurse/ Ambulatory Care Manager contacted the patient by telephone to perform follow-up assessment. Verified name and  with patient as identifiers. Patient has following risk factors of: heart failure. Symptoms reviewed with patient who verbalized the following symptoms: no new symptoms, no worsening symptoms and symptoms are improving . Due to no new or worsening symptoms encounter was not routed to provider for escalation. Education provided regarding infection prevention, and signs and symptoms of COVID-19 and when to seek medical attention with patient who verbalized understanding. Discussed exposure protocols and quarantine from [address] you at higher risk for severe illness  and given an opportunity for questions and concerns. The patient agrees to contact the COVID-19 hotline [phone] or PCP office for questions related to their healthcare. CTN/ACM provided contact information for future reference. From CDC: Are you at higher risk for severe illness?  Wash your hands often.  Avoid close contact (6 feet, which is about two arm lengths) with people who are sick.  Put distance between yourself and other people if COVID-19 is spreading in your community.  Clean and disinfect frequently touched surfaces.  Avoid all cruise travel and non-essential air travel.  Call your healthcare professional if you have concerns about COVID-19 and your underlying condition or if you are sick.     For more information on steps you can take to protect yourself, see CDC's How to Protect Yourself      Declines calls     [address] with COVID-19 may have no symptoms, mild symptoms, such as fever, cough, and shortness of breath or they may have more severe illness, developing severe and fatal pneumonia. As a result, Advance Care Planning with attention to naming a health care decision maker (someone you trust to make healthcare decisions for you if you could not speak for yourself) and sharing other health care preferences is important BEFORE a possible health crisis. Please contact your Primary Care Provider to discuss Advance Care Planning. Preventing the Spread of Coronavirus Disease 2019 in Homes and Residential Communities  For the most recent information go to [email or web address]    Prevention steps for People with confirmed or suspected COVID-19 (including persons under investigation) who do not need to be hospitalized  and   People with confirmed COVID-19 who were hospitalized and determined to be medically stable to go home    Your healthcare provider and public health staff will evaluate whether you can be cared for at home. If it is determined that you do not need to be hospitalized and can be isolated at home, you will be monitored by staff from your local or state health department. You should follow the prevention steps below until a healthcare provider or local or state health department says you can return to your normal activities. Stay home except to get medical care  People who are mildly ill with COVID-19 are able to isolate at home during their illness. You should restrict activities outside your home, except for getting medical care. Do not go to work, school, or public areas. Avoid using public transportation, ride-sharing, or taxis. Separate yourself from other people and animals in your home  People: As much as possible, you should stay in a specific room and away from other people in your home. Also, you should use a separate bathroom, if available. Animals:  You should restrict contact with pets and other animals while you are  with at least 60% alcohol, covering all surfaces of your hands and rubbing them together until they feel dry. Soap and water are the best option if hands are visibly dirty. Avoid touching your eyes, nose, and mouth with unwashed hands. Avoid sharing personal household items  You should not share dishes, drinking glasses, cups, eating utensils, towels, or bedding with other people or pets in your home. After using these items, they should be washed thoroughly with soap and water. Clean all high-touch surfaces everyday  High touch surfaces include counters, tabletops, doorknobs, bathroom fixtures, toilets, phones, keyboards, tablets, and bedside tables. Also, clean any surfaces that may have blood, stool, or body fluids on them. Use a household cleaning spray or wipe, according to the label instructions. Labels contain instructions for safe and effective use of the cleaning product including precautions you should take when applying the product, such as wearing gloves and making sure you have good ventilation during use of the product. Monitor your symptoms  Seek prompt medical attention if your illness is worsening (e.g., difficulty breathing). Before seeking care, call your healthcare provider and tell them that you have, or are being evaluated for, COVID-19. Put on a facemask before you enter the facility. These steps will help the healthcare providers office to keep other people in the office or waiting room from getting infected or exposed. Ask your healthcare provider to call the local or state health department. Persons who are placed under active monitoring or facilitated self-monitoring should follow instructions provided by their local health department or occupational health professionals, as appropriate. When working with your local health department check their available hours.   If you have a medical emergency and need to call 911, notify the dispatch personnel that you have, or are being evaluated for COVID-19. If possible, put on a facemask before emergency medical services arrive. Discontinuing home isolation  Patients with confirmed COVID-19 should remain under home isolation precautions until the risk of secondary transmission to others is thought to be low. The decision to discontinue home isolation precautions should be made on a case-by-case basis, in consultation with healthcare providers and state and local health departments.

## 2021-04-09 ENCOUNTER — TELEPHONE (OUTPATIENT)
Dept: ORTHOPEDIC SURGERY | Age: 66
End: 2021-04-09

## 2021-04-09 NOTE — TELEPHONE ENCOUNTER
Last OV 11/23/2021  No future visits scheduled. DX  bilat knee arthritis has been receiving injections. Requesting a handicap placard.      Please Advise

## 2021-04-19 ENCOUNTER — OFFICE VISIT (OUTPATIENT)
Dept: PRIMARY CARE CLINIC | Age: 66
End: 2021-04-19
Payer: MEDICARE

## 2021-04-19 VITALS
SYSTOLIC BLOOD PRESSURE: 131 MMHG | WEIGHT: 243 LBS | OXYGEN SATURATION: 98 % | HEIGHT: 66 IN | BODY MASS INDEX: 39.05 KG/M2 | DIASTOLIC BLOOD PRESSURE: 94 MMHG | TEMPERATURE: 96.8 F | HEART RATE: 98 BPM

## 2021-04-19 DIAGNOSIS — R06.02 SOB (SHORTNESS OF BREATH): Primary | ICD-10-CM

## 2021-04-19 PROCEDURE — 1123F ACP DISCUSS/DSCN MKR DOCD: CPT | Performed by: INTERNAL MEDICINE

## 2021-04-19 PROCEDURE — 3017F COLORECTAL CA SCREEN DOC REV: CPT | Performed by: INTERNAL MEDICINE

## 2021-04-19 PROCEDURE — G8417 CALC BMI ABV UP PARAM F/U: HCPCS | Performed by: INTERNAL MEDICINE

## 2021-04-19 PROCEDURE — 4040F PNEUMOC VAC/ADMIN/RCVD: CPT | Performed by: INTERNAL MEDICINE

## 2021-04-19 PROCEDURE — 1090F PRES/ABSN URINE INCON ASSESS: CPT | Performed by: INTERNAL MEDICINE

## 2021-04-19 PROCEDURE — G8400 PT W/DXA NO RESULTS DOC: HCPCS | Performed by: INTERNAL MEDICINE

## 2021-04-19 PROCEDURE — 99213 OFFICE O/P EST LOW 20 MIN: CPT | Performed by: INTERNAL MEDICINE

## 2021-04-19 PROCEDURE — 1036F TOBACCO NON-USER: CPT | Performed by: INTERNAL MEDICINE

## 2021-04-19 PROCEDURE — G8427 DOCREV CUR MEDS BY ELIG CLIN: HCPCS | Performed by: INTERNAL MEDICINE

## 2021-04-19 RX ORDER — MELOXICAM 7.5 MG/1
7.5 TABLET ORAL DAILY PRN
Status: ON HOLD | COMMUNITY
Start: 2021-02-22 | End: 2021-06-29 | Stop reason: HOSPADM

## 2021-04-19 RX ORDER — AZITHROMYCIN 250 MG/1
TABLET, FILM COATED ORAL
Qty: 1 PACKET | Refills: 0 | Status: ON HOLD | OUTPATIENT
Start: 2021-04-19 | End: 2021-06-29 | Stop reason: HOSPADM

## 2021-04-19 RX ORDER — POTASSIUM CHLORIDE 20 MEQ/1
TABLET, EXTENDED RELEASE ORAL
Status: ON HOLD | COMMUNITY
Start: 2021-01-19 | End: 2021-06-29 | Stop reason: HOSPADM

## 2021-04-19 ASSESSMENT — ENCOUNTER SYMPTOMS
ORTHOPNEA: 0
SHORTNESS OF BREATH: 1

## 2021-04-19 NOTE — PROGRESS NOTES
[facility]  [facility] [place] [facility]  [address] [id number]  Dept: [phone]  Dept Fax: [phone]    Laura Avila is a 76 y.o. female who presents to the urgent care today for her medicalconditions/complaints as noted below. Laura Avila is c/o of Shortness of Breath (onset for 4 days)      HPI:     Shortness of Breath  This is a new problem. The current episode started in the past 7 days. The problem has been gradually worsening. Associated symptoms include chest pain (pain in right shoulder). Pertinent negatives include no orthopnea. The symptoms are aggravated by exercise (any exertion). She has tried OTC cough suppressants for the symptoms. Tested on 3/19/21, and negative for COVID. Tested her AICD yesterday and cardiologist told her things were good except her fluids were low. Past Medical History:   Diagnosis Date    Abnormal Pap smear     Acute on chronic systolic congestive heart failure (HCC)     AICD (automatic cardioverter/defibrillator) present     MAURO (acute kidney injury) ([address] 3/4/2017    Anemia     Arthritis     Cardiomyopathy ([address] 12/20/12    robotic replacement of 2 left ventricular leads/replacement of ICD generator    Cardiomyopathy, nonischemic ([address] 12/20/2012    Ejection fraction < 50%     HTN (hypertension)     Ischemic cardiomyopathy     Left bundle branch block     Mitral regurgitation     Morbid obesity due to excess calories ([address] 1/15/2017    Pulmonary hypertension ([address]     Sudden onset of severe headache         Current Outpatient Medications   Medication Sig Dispense Refill    meloxicam (MOBIC) 7.5 MG tablet Take 7.5 mg by mouth daily as needed      azithromycin (ZITHROMAX) 250 MG tablet Take 2 tabs (500 mg) on Day 1, and take 1 tab (250 mg) on days 2 through 5. 1 packet 0    lidocaine (LIDODERM) 5 % Place one patch to both knees 12 hours on, 12 hours off.  30 patch 0    magnesium oxide (MAG-OX) 400 MG tablet vaccine  Aged Out    Hepatitis B vaccine  Aged Out    Hib vaccine  Aged Out    Meningococcal (ACWY) vaccine  Aged Out       Subjective:      Review of Systems   Respiratory: Positive for shortness of breath. Cardiovascular: Positive for chest pain (pain in right shoulder). Negative for orthopnea. All other systems reviewed and are negative. Objective:     Physical Exam  Vitals signs reviewed. Constitutional:       Appearance: Normal appearance. HENT:      Head: Normocephalic and atraumatic. Cardiovascular:      Rate and Rhythm: Normal rate and regular rhythm. Pulmonary:      Effort: Pulmonary effort is normal.      Breath sounds: Normal breath sounds. Skin:     General: Skin is warm and dry. Neurological:      General: No focal deficit present. Mental Status: She is alert and oriented to person, place, and time. Psychiatric:         Mood and Affect: Mood normal.         Behavior: Behavior normal.       BP (!) 131/94 (Site: Left Upper Arm, Position: Sitting, Cuff Size: Large Adult)   Pulse 98   Temp 96.8 °F (36 °C) (Infrared)   Ht 5' 6\" (1.676 m)   Wt 243 lb (110.2 kg)   SpO2 98%   BMI 39.22 kg/m²       Assessment:       Diagnosis Orders   1. SOB (shortness of breath)  azithromycin (ZITHROMAX) 250 MG tablet    XR CHEST (2 VW)       Plan:      No follow-ups on file. Orders Placed This Encounter   Medications    azithromycin (ZITHROMAX) 250 MG tablet     Sig: Take 2 tabs (500 mg) on Day 1, and take 1 tab (250 mg) on days 2 through 5. Dispense:  1 packet     Refill:  0     Orders Placed This Encounter   Procedures    XR CHEST (2 VW)     Standing Status:   Future     Standing Expiration Date:   4/19/2022            Patient given educational materials - see patient instructions. Discussed use, benefit, and side effects of prescribed medications. All patientquestions answered. Pt voiced understanding.     Electronically signed by Tamiko Hunter MD on 4/19/2021at 5:38 PM

## 2021-06-26 ENCOUNTER — APPOINTMENT (OUTPATIENT)
Dept: GENERAL RADIOLOGY | Age: 66
DRG: 293 | End: 2021-06-26
Payer: MEDICARE

## 2021-06-26 ENCOUNTER — HOSPITAL ENCOUNTER (INPATIENT)
Age: 66
LOS: 3 days | Discharge: HOME | DRG: 293 | End: 2021-06-29
Attending: EMERGENCY MEDICINE | Admitting: INTERNAL MEDICINE
Payer: MEDICARE

## 2021-06-26 DIAGNOSIS — R07.9 CHEST PAIN, UNSPECIFIED TYPE: Primary | ICD-10-CM

## 2021-06-26 DIAGNOSIS — I50.23 ACUTE ON CHRONIC SYSTOLIC CHF (CONGESTIVE HEART FAILURE) (HCC): ICD-10-CM

## 2021-06-26 LAB
ABSOLUTE EOS #: 0.21 K/UL (ref 0–0.44)
ABSOLUTE IMMATURE GRANULOCYTE: <0.03 K/UL (ref 0–0.3)
ABSOLUTE LYMPH #: 1.66 K/UL (ref 1.1–3.7)
ABSOLUTE MONO #: 0.67 K/UL (ref 0.1–1.2)
ANION GAP SERPL CALCULATED.3IONS-SCNC: 12 MMOL/L (ref 9–17)
BASOPHILS # BLD: 1 % (ref 0–2)
BASOPHILS ABSOLUTE: 0.04 K/UL (ref 0–0.2)
BNP INTERPRETATION: ABNORMAL
BUN BLDV-MCNC: 17 MG/DL (ref 8–23)
BUN/CREAT BLD: ABNORMAL (ref 9–20)
CALCIUM SERPL-MCNC: 9.1 MG/DL (ref 8.6–10.4)
CHLORIDE BLD-SCNC: 104 MMOL/L (ref 98–107)
CO2: 21 MMOL/L (ref 20–31)
CREAT SERPL-MCNC: 0.83 MG/DL (ref 0.5–0.9)
DIFFERENTIAL TYPE: NORMAL
EOSINOPHILS RELATIVE PERCENT: 3 % (ref 1–4)
GFR AFRICAN AMERICAN: >60 ML/MIN
GFR NON-AFRICAN AMERICAN: >60 ML/MIN
GFR SERPL CREATININE-BSD FRML MDRD: ABNORMAL ML/MIN/{1.73_M2}
GFR SERPL CREATININE-BSD FRML MDRD: ABNORMAL ML/MIN/{1.73_M2}
GLUCOSE BLD-MCNC: 141 MG/DL (ref 70–99)
HCT VFR BLD CALC: 37.8 % (ref 36.3–47.1)
HEMOGLOBIN: 13 G/DL (ref 11.9–15.1)
IMMATURE GRANULOCYTES: 0 %
LYMPHOCYTES # BLD: 25 % (ref 24–43)
MAGNESIUM: 1.7 MG/DL (ref 1.6–2.6)
MCH RBC QN AUTO: 29.3 PG (ref 25.2–33.5)
MCHC RBC AUTO-ENTMCNC: 34.4 G/DL (ref 28.4–34.8)
MCV RBC AUTO: 85.1 FL (ref 82.6–102.9)
MONOCYTES # BLD: 10 % (ref 3–12)
NRBC AUTOMATED: 0 PER 100 WBC
PDW BLD-RTO: 13.6 % (ref 11.8–14.4)
PLATELET # BLD: 227 K/UL (ref 138–453)
PLATELET ESTIMATE: NORMAL
PMV BLD AUTO: 11.3 FL (ref 8.1–13.5)
POTASSIUM SERPL-SCNC: 3.3 MMOL/L (ref 3.7–5.3)
PRO-BNP: 1650 PG/ML
RBC # BLD: 4.44 M/UL (ref 3.95–5.11)
RBC # BLD: NORMAL 10*6/UL
SEG NEUTROPHILS: 61 % (ref 36–65)
SEGMENTED NEUTROPHILS ABSOLUTE COUNT: 4.14 K/UL (ref 1.5–8.1)
SODIUM BLD-SCNC: 137 MMOL/L (ref 135–144)
THYROXINE, FREE: 0.53 NG/DL (ref 0.93–1.7)
TROPONIN INTERP: ABNORMAL
TROPONIN INTERP: ABNORMAL
TROPONIN T: ABNORMAL NG/ML
TROPONIN T: ABNORMAL NG/ML
TROPONIN, HIGH SENSITIVITY: 16 NG/L (ref 0–14)
TROPONIN, HIGH SENSITIVITY: 17 NG/L (ref 0–14)
TSH SERPL DL<=0.05 MIU/L-ACNC: 17.74 MIU/L (ref 0.3–5)
WBC # BLD: 6.7 K/UL (ref 3.5–11.3)
WBC # BLD: NORMAL 10*3/UL

## 2021-06-26 PROCEDURE — 99223 1ST HOSP IP/OBS HIGH 75: CPT | Performed by: INTERNAL MEDICINE

## 2021-06-26 PROCEDURE — 83880 ASSAY OF NATRIURETIC PEPTIDE: CPT

## 2021-06-26 PROCEDURE — 2500000003 HC RX 250 WO HCPCS: Performed by: STUDENT IN AN ORGANIZED HEALTH CARE EDUCATION/TRAINING PROGRAM

## 2021-06-26 PROCEDURE — 83735 ASSAY OF MAGNESIUM: CPT

## 2021-06-26 PROCEDURE — 99284 EMERGENCY DEPT VISIT MOD MDM: CPT

## 2021-06-26 PROCEDURE — 85025 COMPLETE CBC W/AUTO DIFF WBC: CPT

## 2021-06-26 PROCEDURE — 96374 THER/PROPH/DIAG INJ IV PUSH: CPT

## 2021-06-26 PROCEDURE — 84443 ASSAY THYROID STIM HORMONE: CPT

## 2021-06-26 PROCEDURE — 6360000002 HC RX W HCPCS

## 2021-06-26 PROCEDURE — 6370000000 HC RX 637 (ALT 250 FOR IP): Performed by: STUDENT IN AN ORGANIZED HEALTH CARE EDUCATION/TRAINING PROGRAM

## 2021-06-26 PROCEDURE — 84484 ASSAY OF TROPONIN QUANT: CPT

## 2021-06-26 PROCEDURE — 93005 ELECTROCARDIOGRAM TRACING: CPT | Performed by: STUDENT IN AN ORGANIZED HEALTH CARE EDUCATION/TRAINING PROGRAM

## 2021-06-26 PROCEDURE — 84439 ASSAY OF FREE THYROXINE: CPT

## 2021-06-26 PROCEDURE — 80048 BASIC METABOLIC PNL TOTAL CA: CPT

## 2021-06-26 PROCEDURE — 71045 X-RAY EXAM CHEST 1 VIEW: CPT

## 2021-06-26 PROCEDURE — 2060000000 HC ICU INTERMEDIATE R&B

## 2021-06-26 RX ORDER — SODIUM CHLORIDE 0.9 % (FLUSH) 0.9 %
5-40 SYRINGE (ML) INJECTION PRN
Status: DISCONTINUED | OUTPATIENT
Start: 2021-06-26 | End: 2021-06-26

## 2021-06-26 RX ORDER — ASPIRIN 81 MG/1
81 TABLET ORAL DAILY
Status: DISCONTINUED | OUTPATIENT
Start: 2021-06-26 | End: 2021-06-29 | Stop reason: HOSPADM

## 2021-06-26 RX ORDER — ONDANSETRON 2 MG/ML
4 INJECTION INTRAMUSCULAR; INTRAVENOUS EVERY 6 HOURS PRN
Status: DISCONTINUED | OUTPATIENT
Start: 2021-06-26 | End: 2021-06-29 | Stop reason: HOSPADM

## 2021-06-26 RX ORDER — SPIRONOLACTONE 25 MG/1
12.5 TABLET ORAL DAILY
Status: DISCONTINUED | OUTPATIENT
Start: 2021-06-26 | End: 2021-06-29 | Stop reason: HOSPADM

## 2021-06-26 RX ORDER — SODIUM CHLORIDE 9 MG/ML
25 INJECTION, SOLUTION INTRAVENOUS PRN
Status: DISCONTINUED | OUTPATIENT
Start: 2021-06-26 | End: 2021-06-26

## 2021-06-26 RX ORDER — LOSARTAN POTASSIUM 25 MG/1
25 TABLET ORAL DAILY
Status: DISCONTINUED | OUTPATIENT
Start: 2021-06-26 | End: 2021-06-27

## 2021-06-26 RX ORDER — ONDANSETRON 2 MG/ML
4 INJECTION INTRAMUSCULAR; INTRAVENOUS EVERY 6 HOURS PRN
Status: DISCONTINUED | OUTPATIENT
Start: 2021-06-26 | End: 2021-06-26

## 2021-06-26 RX ORDER — BUMETANIDE 0.25 MG/ML
1 INJECTION, SOLUTION INTRAMUSCULAR; INTRAVENOUS DAILY
Status: DISCONTINUED | OUTPATIENT
Start: 2021-06-26 | End: 2021-06-27

## 2021-06-26 RX ORDER — POTASSIUM CHLORIDE 20 MEQ/1
40 TABLET, EXTENDED RELEASE ORAL ONCE
Status: COMPLETED | OUTPATIENT
Start: 2021-06-26 | End: 2021-06-26

## 2021-06-26 RX ORDER — SODIUM CHLORIDE 0.9 % (FLUSH) 0.9 %
5-40 SYRINGE (ML) INJECTION EVERY 12 HOURS SCHEDULED
Status: DISCONTINUED | OUTPATIENT
Start: 2021-06-26 | End: 2021-06-26

## 2021-06-26 RX ORDER — FAMOTIDINE 20 MG/1
20 TABLET, FILM COATED ORAL 2 TIMES DAILY
Status: DISCONTINUED | OUTPATIENT
Start: 2021-06-26 | End: 2021-06-29 | Stop reason: HOSPADM

## 2021-06-26 RX ORDER — METOLAZONE 2.5 MG/1
2.5 TABLET ORAL EVERY OTHER DAY
Status: DISCONTINUED | OUTPATIENT
Start: 2021-06-26 | End: 2021-06-29 | Stop reason: HOSPADM

## 2021-06-26 RX ORDER — MAGNESIUM SULFATE 1 G/100ML
INJECTION INTRAVENOUS
Status: COMPLETED
Start: 2021-06-26 | End: 2021-06-26

## 2021-06-26 RX ORDER — SODIUM CHLORIDE 0.9 % (FLUSH) 0.9 %
5-40 SYRINGE (ML) INJECTION PRN
Status: DISCONTINUED | OUTPATIENT
Start: 2021-06-26 | End: 2021-06-29 | Stop reason: HOSPADM

## 2021-06-26 RX ORDER — ONDANSETRON 4 MG/1
4 TABLET, ORALLY DISINTEGRATING ORAL EVERY 8 HOURS PRN
Status: DISCONTINUED | OUTPATIENT
Start: 2021-06-26 | End: 2021-06-26

## 2021-06-26 RX ORDER — NITROGLYCERIN 20 MG/100ML
5-200 INJECTION INTRAVENOUS CONTINUOUS
Status: DISCONTINUED | OUTPATIENT
Start: 2021-06-26 | End: 2021-06-29 | Stop reason: HOSPADM

## 2021-06-26 RX ORDER — POLYETHYLENE GLYCOL 3350 17 G/17G
17 POWDER, FOR SOLUTION ORAL DAILY PRN
Status: DISCONTINUED | OUTPATIENT
Start: 2021-06-26 | End: 2021-06-29 | Stop reason: HOSPADM

## 2021-06-26 RX ORDER — ALLOPURINOL 100 MG/1
100 TABLET ORAL DAILY
Status: DISCONTINUED | OUTPATIENT
Start: 2021-06-26 | End: 2021-06-29 | Stop reason: HOSPADM

## 2021-06-26 RX ORDER — SODIUM CHLORIDE 9 MG/ML
25 INJECTION, SOLUTION INTRAVENOUS PRN
Status: DISCONTINUED | OUTPATIENT
Start: 2021-06-26 | End: 2021-06-29 | Stop reason: HOSPADM

## 2021-06-26 RX ORDER — ACETAMINOPHEN 650 MG/1
650 SUPPOSITORY RECTAL EVERY 6 HOURS PRN
Status: DISCONTINUED | OUTPATIENT
Start: 2021-06-26 | End: 2021-06-29 | Stop reason: HOSPADM

## 2021-06-26 RX ORDER — MAGNESIUM SULFATE 1 G/100ML
1000 INJECTION INTRAVENOUS ONCE
Status: COMPLETED | OUTPATIENT
Start: 2021-06-26 | End: 2021-06-26

## 2021-06-26 RX ORDER — NITROGLYCERIN 0.4 MG/1
0.4 TABLET SUBLINGUAL EVERY 5 MIN PRN
Status: DISCONTINUED | OUTPATIENT
Start: 2021-06-26 | End: 2021-06-29 | Stop reason: HOSPADM

## 2021-06-26 RX ORDER — AMIODARONE HYDROCHLORIDE 200 MG/1
200 TABLET ORAL DAILY
Status: DISCONTINUED | OUTPATIENT
Start: 2021-06-26 | End: 2021-06-29 | Stop reason: HOSPADM

## 2021-06-26 RX ORDER — ACETAMINOPHEN 325 MG/1
650 TABLET ORAL EVERY 6 HOURS PRN
Status: DISCONTINUED | OUTPATIENT
Start: 2021-06-26 | End: 2021-06-29 | Stop reason: HOSPADM

## 2021-06-26 RX ORDER — ONDANSETRON 4 MG/1
4 TABLET, ORALLY DISINTEGRATING ORAL EVERY 8 HOURS PRN
Status: DISCONTINUED | OUTPATIENT
Start: 2021-06-26 | End: 2021-06-29 | Stop reason: HOSPADM

## 2021-06-26 RX ORDER — SODIUM CHLORIDE 0.9 % (FLUSH) 0.9 %
5-40 SYRINGE (ML) INJECTION EVERY 12 HOURS SCHEDULED
Status: DISCONTINUED | OUTPATIENT
Start: 2021-06-26 | End: 2021-06-29 | Stop reason: HOSPADM

## 2021-06-26 RX ORDER — ACETAMINOPHEN 325 MG/1
650 TABLET ORAL EVERY 4 HOURS PRN
Status: DISCONTINUED | OUTPATIENT
Start: 2021-06-26 | End: 2021-06-26

## 2021-06-26 RX ORDER — METOPROLOL TARTRATE 50 MG/1
25 TABLET, FILM COATED ORAL 2 TIMES DAILY
Status: DISCONTINUED | OUTPATIENT
Start: 2021-06-26 | End: 2021-06-29 | Stop reason: HOSPADM

## 2021-06-26 RX ORDER — POTASSIUM CHLORIDE 20 MEQ/1
20 TABLET, EXTENDED RELEASE ORAL DAILY
Status: DISCONTINUED | OUTPATIENT
Start: 2021-06-27 | End: 2021-06-27

## 2021-06-26 RX ADMIN — METOLAZONE 2.5 MG: 2.5 TABLET ORAL at 17:00

## 2021-06-26 RX ADMIN — MAGNESIUM SULFATE HEPTAHYDRATE 1000 MG: 1 INJECTION, SOLUTION INTRAVENOUS at 19:46

## 2021-06-26 RX ADMIN — AMIODARONE HYDROCHLORIDE 200 MG: 200 TABLET ORAL at 17:00

## 2021-06-26 RX ADMIN — POTASSIUM CHLORIDE 40 MEQ: 1500 TABLET, EXTENDED RELEASE ORAL at 13:30

## 2021-06-26 RX ADMIN — METOPROLOL TARTRATE 25 MG: 50 TABLET, FILM COATED ORAL at 20:52

## 2021-06-26 RX ADMIN — MAGNESIUM SULFATE 1000 MG: 1 INJECTION INTRAVENOUS at 19:46

## 2021-06-26 RX ADMIN — ALLOPURINOL 100 MG: 100 TABLET ORAL at 17:00

## 2021-06-26 RX ADMIN — LOSARTAN POTASSIUM 25 MG: 25 TABLET, FILM COATED ORAL at 17:00

## 2021-06-26 RX ADMIN — NITROGLYCERIN 5 MCG/MIN: 20 INJECTION INTRAVENOUS at 12:27

## 2021-06-26 RX ADMIN — Medication 81 MG: at 17:00

## 2021-06-26 RX ADMIN — BUMETANIDE 1 MG: 0.25 INJECTION INTRAMUSCULAR; INTRAVENOUS at 17:01

## 2021-06-26 RX ADMIN — SPIRONOLACTONE 12.5 MG: 25 TABLET ORAL at 17:00

## 2021-06-26 RX ADMIN — ACETAMINOPHEN 650 MG: 325 TABLET ORAL at 23:54

## 2021-06-26 RX ADMIN — NITROGLYCERIN 0.4 MG: 0.4 TABLET SUBLINGUAL at 10:45

## 2021-06-26 ASSESSMENT — ENCOUNTER SYMPTOMS
COUGH: 0
WHEEZING: 0
CONSTIPATION: 0
SORE THROAT: 0
SHORTNESS OF BREATH: 1
RHINORRHEA: 0
ABDOMINAL PAIN: 0
NAUSEA: 0
BLOOD IN STOOL: 0
VOMITING: 0
DIARRHEA: 0

## 2021-06-26 ASSESSMENT — PAIN DESCRIPTION - ORIENTATION: ORIENTATION: LEFT

## 2021-06-26 ASSESSMENT — PAIN SCALES - GENERAL
PAINLEVEL_OUTOF10: 6
PAINLEVEL_OUTOF10: 0
PAINLEVEL_OUTOF10: 4

## 2021-06-26 ASSESSMENT — PAIN DESCRIPTION - LOCATION: LOCATION: CHEST

## 2021-06-26 ASSESSMENT — PAIN DESCRIPTION - PAIN TYPE: TYPE: ACUTE PAIN

## 2021-06-26 NOTE — ED NOTES
The following labs labeled with pt sticker and tubed:     [] Lavender   [] on ice   [] Blue   [x] Green/yellow  [] Green/black [] on ice  [] Pink  [] Red  [] Yellow       [] COVID-19 swab    [] Rapid     [] Urine Sample  [] Pelvic Cultures    [] Blood Cultures     Alexia Solorio RN  06/26/21 0832

## 2021-06-26 NOTE — ED PROVIDER NOTES
[facility] ED  Emergency Department Encounter  EmergencyMedicine Resident     Pt Name:Annemarie Ware  MRN: [id number]  [place] 1955  Date of evaluation: 6/26/21  PCP:  Karthikeyan Zacarias MD    This patient was evaluated in the Emergency Department for symptoms described in the history of present illness. The patient was evaluated in the context of the global COVID-19 pandemic, which necessitated consideration that the patient might be at risk for infection with the SARS-CoV-2 virus that causes COVID-19. Institutional protocols and algorithms that pertain to the evaluation of patients at risk for COVID-19 are in a state of rapid change based on information released by regulatory bodies including the CDC and federal and state organizations. These policies and algorithms were followed during the patient's care in the ED. CHIEF COMPLAINT       Chief Complaint   Patient presents with    Chest Pain       HISTORY OF PRESENT ILLNESS  (Location/Symptom, Timing/Onset, Context/Setting, Quality, Duration, Modifying Factors, Severity.)      Nubia Lau is a 78 y.o. female who presents with chest pain. Patient presents with 1 hour of chest pain, anterior, pressure-like, radiating to her left jaw, started while at rest, improved with aspirin, associated with lightheadedness, shortness of breath. Patient denies fevers, chills, cough, congestion, headaches, visual changes, diaphoresis, palpitations, abdominal pain, nausea, vomiting, diarrhea, dysuria, lower extremity pain. Patient does report progressive worsening shortness of breath over the past few days, associated with some progressively worsening bilateral lower extremity edema. Patient has history of CHF with EF less than 15%, has a pacemaker, reports that she usually gets lightheaded when she is having a dysrhythmia.   Patient called EMS, was administered 324 mg aspirin prior to arrival.    PAST MEDICAL / SURGICAL / SOCIAL / FAMILY HISTORY has a past medical history of Abnormal Pap smear, Acute on chronic systolic congestive heart failure ([facility] AICD (automatic cardioverter/defibrillator) present, MAURO (acute kidney injury) ([facility] Anemia, Arthritis, Cardiomyopathy ([facility] Cardiomyopathy, nonischemic ([facility] Ejection fraction < 50%, HTN (hypertension), Ischemic cardiomyopathy, Left bundle branch block, Mitral regurgitation, Morbid obesity due to excess calories ([facility] Pulmonary hypertension ([facility] and Sudden onset of severe headache.       has a past surgical history that includes other surgical history (12/20/12); Tubal ligation; hernia repair; Colonoscopy; and pacemaker placement. Social History     Socioeconomic History    Marital status:      Spouse name: Not on file    Number of children: 2    Years of education: Not on file    Highest education level: Not on file   Occupational History    Not on file   Tobacco Use    Smoking status: Never Smoker    Smokeless tobacco: Never Used   Substance and Sexual Activity    Alcohol use: No     Alcohol/week: 0.0 standard drinks    Drug use: Not Currently    Sexual activity: Never   Other Topics Concern    Not on file   Social History Narrative    Not on file     Social Determinants of Health     Financial Resource Strain:     Difficulty of Paying Living Expenses:    Food Insecurity:     Worried About Running Out of Food in the Last Year:     [address] in the Last Year:    Transportation Needs:     Lack of Transportation (Medical):      Lack of Transportation (Non-Medical):    Physical Activity:     Days of Exercise per Week:     Minutes of Exercise per Session:    Stress:     Feeling of Stress :    Social Connections:     Frequency of Communication with Friends and Family:     Frequency of Social Gatherings with Friends and Family:     Attends [employer] Services:     Active Member of Clubs or Organizations:     Attends Club or Organization Meetings:     Marital Status:    Intimate Partner Violence:     Fear of Current or Ex-Partner:     Emotionally Abused:     Physically Abused:     Sexually Abused:        Family History   Problem Relation Age of Onset    Cancer Other         ovarian    High Blood Pressure Mother     Other Mother         copd    Arthritis Neg Hx     Asthma Neg Hx     Birth Defects Neg Hx     Depression Neg Hx     Diabetes Neg Hx     Early Death Neg Hx     Hearing Loss Neg Hx     Heart Disease Neg Hx     High Cholesterol Neg Hx     Kidney Disease Neg Hx     Learning Disabilities Neg Hx     Mental Retardation Neg Hx     Mental Illness Neg Hx     Miscarriages / Stillbirths Neg Hx     Stroke Neg Hx     Substance Abuse Neg Hx     Vision Loss Neg Hx     Breast Cancer Neg Hx     Colon Cancer Neg Hx     Eclampsia Neg Hx     Hypertension Neg Hx     Ovarian Cancer Neg Hx      Labor Neg Hx     Spont Abortions Neg Hx        Allergies:  Codeine and Morphine    Home Medications:  Prior to Admission medications    Medication Sig Start Date End Date Taking?  Authorizing Provider   potassium chloride (KLOR-CON M) 20 MEQ extended release tablet  21   Historical Provider, MD   meloxicam (MOBIC) 7.5 MG tablet Take 7.5 mg by mouth daily as needed 21   Historical Provider, MD   azithromycin (ZITHROMAX) 250 MG tablet Take 2 tabs (500 mg) on Day 1, and take 1 tab (250 mg) on days 2 through 5. 21   Kaur Alcocer MD   Handicap Placard MISC by Does not apply route 5 years please 21   Stephanie Trotter DO   lidocaine (LIDODERM) 5 % Place one patch to both knees 12 hours on, 12 hours off. 20   Stephanie Trotter DO   DEMADEX 20 MG tablet Take 1 tablet by mouth daily 6/15/20 7/15/20  Alexis Zamora, APRN - CNP   magnesium oxide (MAG-OX) 400 MG tablet Take 400 mg by mouth daily Patient states takes 200mg PO daily    Historical Provider, MD   metoprolol tartrate (LOPRESSOR) 25 MG tablet Take 1 tablet by mouth 2 times daily 5/25/20   Trixie Hope MD   metOLazone (ZAROXOLYN) 2.5 MG tablet Take 1 tablet by mouth every other day 5/25/20   Trixie Hope MD   acetaminophen (TYLENOL) 325 MG tablet Take 1 tablet by mouth every 8 hours as needed for Pain 1/7/20   [place] DO Dominic   aspirin 81 MG tablet Take 81 mg by mouth daily    Historical Provider, MD   allopurinol (ZYLOPRIM) 100 MG tablet Take 1 tablet by mouth daily 2/18/19   Ashley Brown MD   amiodarone (CORDARONE) 200 MG tablet Take 1 tablet by mouth daily 2/18/19   Ashley Brown MD   losartan (COZAAR) 25 MG tablet Take 1 tablet by mouth daily 2/18/19   Ashley Brown MD   potassium chloride (KLOR-CON M) 20 MEQ TBCR extended release tablet Take 1 tablet by mouth 2 times daily (with meals) 2/18/19   Ashley Brown MD   spironolactone (ALDACTONE) 25 MG tablet Take 0.5 tablets by mouth daily 2/18/19   Ashley Brown MD       REVIEW OF SYSTEMS    (2-9 systems for level 4, 10 or more for level 5)      Review of Systems   Constitutional: Negative for chills, diaphoresis, fatigue and fever. HENT: Negative for congestion, rhinorrhea and sore throat. Eyes: Negative for visual disturbance. Respiratory: Positive for shortness of breath. Negative for cough and wheezing. Cardiovascular: Positive for chest pain. Negative for palpitations and leg swelling. Gastrointestinal: Negative for abdominal pain, blood in stool, constipation, diarrhea, nausea and vomiting. Genitourinary: Negative for dysuria, frequency, hematuria and urgency. Musculoskeletal: Negative for neck pain and neck stiffness. Skin: Negative for pallor and rash. Neurological: Positive for light-headedness. Negative for dizziness, syncope, weakness and headaches. Psychiatric/Behavioral: Negative for confusion.        PHYSICAL EXAM   (up to 7 for level 4, 8 or more for level 5)      INITIAL VITALS:   /89   Pulse 69   Temp 97.8 °F (36.6 °C) (Oral)   Resp 18   Ht 5' 7\" (1.702 m)   Wt 242 lb (109.8 kg)   SpO2 100%   BMI 37.90 kg/m²     Physical Exam  Constitutional:       General: She is not in acute distress. Appearance: She is well-developed. She is not ill-appearing, toxic-appearing or diaphoretic. HENT:      Head: Normocephalic and atraumatic. Eyes:      General:         Right eye: No discharge. Left eye: No discharge. Extraocular Movements: Extraocular movements intact. Pupils: Pupils are equal, round, and reactive to light. Neck:      Vascular: No JVD. Trachea: No tracheal deviation. Cardiovascular:      Rate and Rhythm: Normal rate and regular rhythm. Pulses: Normal pulses. Heart sounds: Normal heart sounds. No murmur heard. No friction rub. No gallop. Pulmonary:      Effort: Pulmonary effort is normal. No respiratory distress. Breath sounds: Normal breath sounds. No stridor. No wheezing, rhonchi or rales. Chest:      Chest wall: No tenderness. Abdominal:      General: There is no distension. Palpations: Abdomen is soft. There is no mass. Tenderness: There is no abdominal tenderness. There is no right CVA tenderness, left CVA tenderness or guarding. Musculoskeletal:         General: Normal range of motion. Cervical back: Normal range of motion and neck supple. Right lower leg: No edema. Left lower leg: No edema. Skin:     General: Skin is warm. Capillary Refill: Capillary refill takes less than 2 seconds. Neurological:      Mental Status: She is alert and oriented to person, place, and time. Cranial Nerves: No cranial nerve deficit. Sensory: No sensory deficit. Motor: No weakness.    Psychiatric:         Mood and Affect: Mood normal.         Behavior: Behavior normal.         DIFFERENTIAL  DIAGNOSIS     PLAN (LABS / IMAGING / EKG):  Orders Placed This Encounter   Procedures    XR CHEST PORTABLE    CBC Auto Differential    Basic Metabolic Panel w/ Reflex to MG  Troponin    Brain Natriuretic Peptide    Magnesium    TSH with Reflex    Basic Metabolic Panel w/ Reflex to MG    CBC auto differential    Troponin    T4, Free    ADULT DIET; Regular;  Low Sodium (2 gm); 1500 ml    Vital signs per unit routine    Notify physician    Notify physician    Up as tolerated    Vital signs per unit routine    Up as tolerated    Intake and output    Daily weights    Full Code    Inpatient consult to Cardiology    Inpatient consult to Internal Medicine    Inpatient consult to Case Management    Inpatient consult to CHF Nurse    OT eval and treat    PT evaluation and treat    Initiate Oxygen Therapy Protocol    Pacer Interrogate    EKG 12 Lead    PATIENT STATUS (FROM ED OR OR/PROCEDURAL) Inpatient       MEDICATIONS ORDERED:  Orders Placed This Encounter   Medications    nitroGLYCERIN (NITROSTAT) SL tablet 0.4 mg    nitroGLYCERIN 50 mg in dextrose 5% 250 mL infusion    potassium chloride (KLOR-CON M) extended release tablet 40 mEq    DISCONTD: sodium chloride flush 0.9 % injection 5-40 mL    DISCONTD: sodium chloride flush 0.9 % injection 5-40 mL    DISCONTD: 0.9 % sodium chloride infusion    DISCONTD: enoxaparin (LOVENOX) injection 40 mg    DISCONTD: acetaminophen (TYLENOL) tablet 650 mg    DISCONTD: ondansetron (ZOFRAN-ODT) disintegrating tablet 4 mg    DISCONTD: ondansetron (ZOFRAN) injection 4 mg    allopurinol (ZYLOPRIM) tablet 100 mg    amiodarone (CORDARONE) tablet 200 mg    aspirin EC tablet 81 mg    losartan (COZAAR) tablet 25 mg    metOLazone (ZAROXOLYN) tablet 2.5 mg    metoprolol tartrate (LOPRESSOR) tablet 25 mg    potassium chloride (KLOR-CON M) extended release tablet 20 mEq    spironolactone (ALDACTONE) tablet 12.5 mg    bumetanide (BUMEX) injection 1 mg    sodium chloride flush 0.9 % injection 5-40 mL    sodium chloride flush 0.9 % injection 5-40 mL    0.9 % sodium chloride infusion    enoxaparin (LOVENOX) injection 40 mg  OR Linked Order Group     ondansetron (ZOFRAN-ODT) disintegrating tablet 4 mg     ondansetron (ZOFRAN) injection 4 mg    polyethylene glycol (GLYCOLAX) packet 17 g    OR Linked Order Group     acetaminophen (TYLENOL) tablet 650 mg     acetaminophen (TYLENOL) suppository 650 mg    famotidine (PEPCID) tablet 20 mg       DDX:     DIAGNOSTIC RESULTS / EMERGENCY DEPARTMENT COURSE / MDM   LAB RESULTS:  Results for orders placed or performed during the hospital encounter of 06/26/21   CBC Auto Differential   Result Value Ref Range    WBC 6.7 3.5 - 11.3 k/uL    RBC 4.44 3.95 - 5.11 m/uL    Hemoglobin 13.0 11.9 - 15.1 g/dL    Hematocrit 37.8 36.3 - 47.1 %    MCV 85.1 82.6 - 102.9 fL    MCH 29.3 25.2 - 33.5 pg    MCHC 34.4 28.4 - 34.8 g/dL    RDW 13.6 11.8 - 14.4 %    Platelets 040 291 - 882 k/uL    MPV 11.3 8.1 - 13.5 fL    NRBC Automated 0.0 0.0 per 100 WBC    Differential Type NOT REPORTED     Seg Neutrophils 61 36 - 65 %    Lymphocytes 25 24 - 43 %    Monocytes 10 3 - 12 %    Eosinophils % 3 1 - 4 %    Basophils 1 0 - 2 %    Immature Granulocytes 0 0 %    Segs Absolute 4.14 1.50 - 8.10 k/uL    Absolute Lymph # 1.66 1.10 - 3.70 k/uL    Absolute Mono # 0.67 0.10 - 1.20 k/uL    Absolute Eos # 0.21 0.00 - 0.44 k/uL    Basophils Absolute 0.04 0.00 - 0.20 k/uL    Absolute Immature Granulocyte <0.03 0.00 - 0.30 k/uL    WBC Morphology NOT REPORTED     RBC Morphology NOT REPORTED     Platelet Estimate NOT REPORTED    Basic Metabolic Panel w/ Reflex to MG   Result Value Ref Range    Glucose 141 (H) 70 - 99 mg/dL    BUN 17 8 - 23 mg/dL    CREATININE 0.83 0.50 - 0.90 mg/dL    Bun/Cre Ratio NOT REPORTED 9 - 20    Calcium 9.1 8.6 - 10.4 mg/dL    Sodium 137 135 - 144 mmol/L    Potassium 3.3 (L) 3.7 - 5.3 mmol/L    Chloride 104 98 - 107 mmol/L    CO2 21 20 - 31 mmol/L    Anion Gap 12 9 - 17 mmol/L    GFR Non-African American >60 >60 mL/min    GFR African American >60 >60 mL/min    GFR Comment          GFR Staging NOT REPORTED Troponin   Result Value Ref Range    Troponin, High Sensitivity 17 (H) 0 - 14 ng/L    Troponin T NOT REPORTED <0.03 ng/mL    Troponin Interp NOT REPORTED    Troponin   Result Value Ref Range    Troponin, High Sensitivity 16 (H) 0 - 14 ng/L    Troponin T NOT REPORTED <0.03 ng/mL    Troponin Interp NOT REPORTED    Brain Natriuretic Peptide   Result Value Ref Range    Pro-BNP 1,650 (H) <300 pg/mL    BNP Interpretation Pro-BNP Reference Range:    Magnesium   Result Value Ref Range    Magnesium 1.7 1.6 - 2.6 mg/dL   TSH with Reflex   Result Value Ref Range    TSH 17.74 (H) 0.30 - 5.00 mIU/L   T4, Free   Result Value Ref Range    Thyroxine, Free 0.53 (L) 0.93 - 1.70 ng/dL       IMPRESSION: 58-year-old female with acute onset chest pain, lightheadedness, shortness of breath, concerning story, will obtain EKG, troponins for further evaluation. Will interrogate pacemaker to evaluate for dysrhythmia. Will obtain basic labs to evaluate for electrolyte abnormality, anemia. Will obtain chest x-ray, BMP to evaluate for heart failure, pneumothorax, pneumonia. RADIOLOGY:  XR CHEST PORTABLE    Result Date: 6/26/2021  EXAMINATION: ONE XRAY VIEW OF THE CHEST 6/26/2021 10:49 am COMPARISON: 01/30/2021 HISTORY: ORDERING SYSTEM PROVIDED HISTORY: Chest pain TECHNOLOGIST PROVIDED HISTORY: Chest pain 58-year-old female with chest pain FINDINGS: Portable upright view of the chest. Left subclavian approach cardiac pacemaker device in place. Mild cardiomegaly. Mild pulmonary vascular congestion. Atherosclerotic calcification of the aortic knob. Trachea midline. No pneumothorax. No free air. Mild left basilar atelectasis and scarring. Visualized osseous structures remain unchanged. 1. Mild cardiomegaly. Mild pulmonary vascular congestion. 2. Mild left basilar atelectasis and scarring.        EKG  EKG Interpretation    Interpreted by me    Rhythm: Biventricular paced  Rate: normal  Axis: normal  Ectopy: none  Conduction: Biventricular paced  ST Segments: no acute change  T Waves: no acute change  Q Waves: none    Clinical Impression: no acute changes and normal EKG, similar to prior EKG    All EKG's are interpreted by the Emergency Department Physician who either signs or Co-signs this chart in the absence of a cardiologist.    EMERGENCY DEPARTMENT COURSE:  Patient came to emergency department, HPI and physical exam were conducted. All nursing notes were reviewed. Patient interrogated, patient was found to have rapid atrial rhythm, which is new for patient, consulted with cardiology who will evaluate patient. EKG stable for patient, no ischemic changes, troponin stable. Patient has improvement in pain with nitro, with recurrence of pain, will start patient on a nitro drip. Will admit patient to internal medicine for further management. PROCEDURES:      CONSULTS:  IP CONSULT TO CARDIOLOGY  IP CONSULT TO INTERNAL MEDICINE  IP CONSULT TO CASE MANAGEMENT  IP CONSULT TO HEART FAILURE NURSE/COORDINATOR    CRITICAL CARE:      FINAL IMPRESSION      1. Chest pain, unspecified type          DISPOSITION / PLAN     DISPOSITION Admitted 06/26/2021 12:36:42 PM      PATIENT REFERRED TO:  No follow-up provider specified.     DISCHARGE MEDICATIONS:  New Prescriptions    No medications on file       Kathy Winters MD  Emergency Medicine Resident    (Please note that portions of thisnote were completed with a voice recognition program.  Efforts were made to edit the dictations but occasionally words are mis-transcribed.)        Kathy Winters MD  Resident  06/26/21 2682

## 2021-06-26 NOTE — ED PROVIDER NOTES
[facility]     Emergency Department     Faculty Note/ Attestation      Pt Name: Kiesha Connor                                       MRN: [id number]  [place] 1955  Date of evaluation: 6/26/2021    Patients PCP:    Brandee Bettencourt MD      Attestation  I performed a history and physical examination of the patient and discussed management with the resident. I reviewed the residents note and agree with the documented findings and plan of care. Any areas of disagreement are noted on the chart. I was personally present for the key portions of any procedures. I have documented in the chart those procedures where I was not present during the key portions. I have reviewed the emergency nurses triage note. I agree with the chief complaint, past medical history, past surgical history, allergies, medications, social and family history as documented unless otherwise noted below. For Physician Assistant/ Nurse Practitioner cases/documentation I have personally evaluated this patient and have completed at least one if not all key elements of the E/M (history, physical exam, and MDM). Additional findings are as noted. Initial Screens:           Vitals:    Vitals:    06/26/21 1013   BP: 127/89   Pulse: 69   Resp: 18   Temp: 97.8 °F (36.6 °C)   TempSrc: Oral   SpO2: 100%   Weight: 242 lb (109.8 kg)   Height: 5' 7\" (1.702 m)       CHIEF COMPLAINT       Chief Complaint   Patient presents with    Chest Pain             DIAGNOSTIC RESULTS             RADIOLOGY:   XR CHEST PORTABLE   Final Result   1. Mild cardiomegaly. Mild pulmonary vascular congestion. 2. Mild left basilar atelectasis and scarring.                LABS:  Labs Reviewed   BASIC METABOLIC PANEL W/ REFLEX TO MG FOR LOW K - Abnormal; Notable for the following components:       Result Value    Glucose 141 (*)     Potassium 3.3 (*)     All other components within normal limits   TROPONIN - Abnormal; Notable for the following

## 2021-06-26 NOTE — ED NOTES
Patient presents to ED for evaluation of chest pain. Patient reports left sided chest pain with associated neck and jaw pain beginning one hour PTA. Associated shortness of breath. Patient denies fever, chills, cough, abdominal pain, back pain, nausea, vomiting, diarrhea.      Carlene Lynne, TANVIR  06/26/21  [address], RN  06/26/21 8863

## 2021-06-26 NOTE — ED NOTES
The following labs labeled with pt sticker and tubed:     [x] Lavender   [] on ice   [] Blue   [x] Green/yellow  [x] Green/black [] on ice  [] Pink  [] Red  [] Yellow       [] COVID-19 swab    [] Rapid     [] Urine Sample  [] Pelvic Cultures    [] Blood Cultures     Priscila Herron RN  06/26/21 1552

## 2021-06-26 NOTE — ED NOTES
Patient given boxed lunch and bedside commode. Patient denies any other needs at this time. Awaiting bed placement. Will continue to monitor.      Miranda Rodriguez RN  06/26/21 1038

## 2021-06-26 NOTE — H&P
[address]     Department of Internal Medicine - Staff Internal Medicine Teaching Service          ADMISSION NOTE/HISTORY AND PHYSICAL EXAMINATION   Date: 6/26/2021  Patient Name: Keila Menchaca  Date of admission: 6/26/2021 10:13 AM  YOB: 1955  PCP: Ashvin Lazo MD  History Obtained From:  patient    CHIEF COMPLAINT     Chief complaint: Chest discomfort, jaw pain, shortness of breath, dizziness    HISTORY OF PRESENTING ILLNESS     The patient is a pleasant 78 y.o. female with past medical history of nonischemic cardiomyopathy with AICD in place since 2008, cath in 2012 -clear coronaries ,hypertension , MR, PAH, obesity, syncope and collapse, polymorphic ventricular tachycardia due to severe hypokalemia,  gout, BPPV , Covid infection      presents with a chief complaint of chest pain which started about 9 AM this morning, patient describes it as more like pressure-like discomfort, 4 on 10 in intensity when it started, on the anterior side of chest, radiating to jaw, started at rest, improved with baby aspirin and nitro. She took 2 baby aspirin's at home and then 2 while in route to hospital.  Patient also complained of lightheadedness, states that she usually feels dizzy when she has abnormal rhythm. Patient also complained of worsening shortness of breath with lower extremity edema. She states that she has been using extra pillows at night for past few days. Patient is unsure if she takes all her medications at home. She follows up with Dr. Katy Lucero. Patient denies any fever, chills, cough, abdominal pain, nausea, vomiting, diarrhea, constipation, urinary complaints, palpitations. Patient has been seen multiple times previously for similar complaints. As per chart review, for her dizziness she was given meclizine in ED and Tavo-Hallpike maneuver was done which was positive.  Neurology evaluated the patient and recommended Antivert for BPPV with further follow-up as outpatient. On arrival to ED, vitals stable, afebrile  Patient was also started on nitro drip for pain, currently off the drip  Pacer interrogation revealed rapid atrial rhythm, cardiology consulted  proBNP 1650, troponin 16> 17, potassium 3.3 replaced, TSH 17.74, thyroxine 0.53  EKG showed biventricular paced rhythm, no ischemic changes  Chest x-ray showed mild congestion    Last echo in 2018-showed EF of 10 to 15%, with severe mitral regurgitation, mild to moderate tricuspid regurgitation, RVSP 33 mmHg    Currently she is feeling better, her pain has resolved, she still feels short of breath and dizzy.     Review of Systems:  General ROS: Completed and except as mentioned above were negative   HEENT ROS: Completed and except as mentioned above were negative   Allergy and Immunology ROS:  Completed and except as mentioned above were negative  Hematological and Lymphatic ROS:  Completed and except as mentioned above were negative  Respiratory ROS:  Completed and except as mentioned above were negative  Cardiovascular ROS:  Completed and except as mentioned above were negative  Gastrointestinal ROS: Completed and except as mentioned above were negative  Genito-Urinary ROS:  Completed and except as mentioned above were negative  Musculoskeletal ROS:  Completed and except as mentioned above were negative  Neurological ROS:  Completed and except as mentioned above were negative  Skin & Dermatological ROS:  Completed and except as mentioned above were negative  Psychological ROS:  Completed and except as mentioned above were negative    PAST MEDICAL HISTORY     Past Medical History:   Diagnosis Date    Abnormal Pap smear     Acute on chronic systolic congestive heart failure (HCC)     AICD (automatic cardioverter/defibrillator) present     MAURO (acute kidney injury) ([facility] 3/4/2017    Anemia     Arthritis     Cardiomyopathy ([facility] 12/20/12    robotic replacement of 2 left ventricular leads/replacement of ICD generator    Cardiomyopathy, nonischemic ([facility] 12/20/2012    Ejection fraction < 50%     HTN (hypertension)     Ischemic cardiomyopathy     Left bundle branch block     Mitral regurgitation     Morbid obesity due to excess calories ([facility] 1/15/2017    [facility]     Sudden onset of severe headache        PAST SURGICAL HISTORY     Past Surgical History:   Procedure Laterality Date    COLONOSCOPY      HERNIA REPAIR      OTHER SURGICAL HISTORY  12/20/12    robotic placement of 2 left ventricular leads/replacement of ICD generator    PACEMAKER PLACEMENT      TUBAL LIGATION         ALLERGIES     Codeine and Morphine    MEDICATIONS PRIOR TO ADMISSION     Prior to Admission medications    Medication Sig Start Date End Date Taking?  Authorizing Provider   potassium chloride (KLOR-CON M) 20 MEQ extended release tablet  1/19/21   Historical Provider, MD   meloxicam (MOBIC) 7.5 MG tablet Take 7.5 mg by mouth daily as needed 2/22/21   Historical Provider, MD   azithromycin (ZITHROMAX) 250 MG tablet Take 2 tabs (500 mg) on Day 1, and take 1 tab (250 mg) on days 2 through 5. 4/19/21   Angle Arreaga MD   Handicap Placard MISC by Does not apply route 5 years please 4/12/21   Cher Guerra DO   lidocaine (LIDODERM) 5 % Place one patch to both knees 12 hours on, 12 hours off. 11/23/20   Cher Guerra DO   DEMADEX 20 MG tablet Take 1 tablet by mouth daily 6/15/20 7/15/20  Darryl Rob, APRN - CNP   magnesium oxide (MAG-OX) 400 MG tablet Take 400 mg by mouth daily Patient states takes 200mg PO daily    Historical Provider, MD   metoprolol tartrate (LOPRESSOR) 25 MG tablet Take 1 tablet by mouth 2 times daily 5/25/20   Nata Paul MD   metOLazone (ZAROXOLYN) 2.5 MG tablet Take 1 tablet by mouth every other day 5/25/20   Nata Paul MD   acetaminophen (TYLENOL) 325 MG tablet Take 1 tablet by mouth every 8 hours as needed for Pain 1/7/20   Dionisio Hill Eddie Reddy,    aspirin 81 MG tablet Take 81 mg by mouth daily    Historical Provider, MD   allopurinol (ZYLOPRIM) 100 MG tablet Take 1 tablet by mouth daily 19   Mercedes Mclean MD   amiodarone (CORDARONE) 200 MG tablet Take 1 tablet by mouth daily 19   Mercedes Mclean MD   losartan (COZAAR) 25 MG tablet Take 1 tablet by mouth daily 19   Mercedes Mclean MD   potassium chloride (KLOR-CON M) 20 MEQ TBCR extended release tablet Take 1 tablet by mouth 2 times daily (with meals) 19   Mercedes Mclean MD   spironolactone (ALDACTONE) 25 MG tablet Take 0.5 tablets by mouth daily 19   Mercedes Mclean MD       SOCIAL HISTORY     Tobacco: Denies  Alcohol: Denies   Illicits: Denies  Occupation: Did not ask    FAMILY HISTORY     Family History   Problem Relation Age of Onset    Cancer Other         ovarian    High Blood Pressure Mother     Other Mother         copd    Arthritis Neg Hx     Asthma Neg Hx     Birth Defects Neg Hx     Depression Neg Hx     Diabetes Neg Hx     Early Death Neg Hx     Hearing Loss Neg Hx     Heart Disease Neg Hx     High Cholesterol Neg Hx     Kidney Disease Neg Hx     Learning Disabilities Neg Hx     Mental Retardation Neg Hx     Mental Illness Neg Hx     Miscarriages / Stillbirths Neg Hx     Stroke Neg Hx     Substance Abuse Neg Hx     Vision Loss Neg Hx     Breast Cancer Neg Hx     Colon Cancer Neg Hx     Eclampsia Neg Hx     Hypertension Neg Hx     Ovarian Cancer Neg Hx      Labor Neg Hx     Spont Abortions Neg Hx        PHYSICAL EXAM     Vitals: /89   Pulse 69   Temp 97.8 °F (36.6 °C) (Oral)   Resp 18   Ht 5' 7\" (1.702 m)   Wt 242 lb (109.8 kg)   SpO2 100%   BMI 37.90 kg/m²   Tmax: Temp (24hrs), Av.8 °F (36.6 °C), Min:97.8 °F (36.6 °C), Max:97.8 °F (36.6 °C)    Last Body weight:   Wt Readings from Last 3 Encounters:   21 242 lb (109.8 kg)   21 243 lb (110.2 kg)   21 250 lb (113.4 kg)     Body Mass Result Value Ref Range    Thyroxine, Free 0.53 (L) 0.93 - 1.70 ng/dL       Imaging:   XR CHEST PORTABLE    Result Date: 6/26/2021  1. Mild cardiomegaly. Mild pulmonary vascular congestion. 2. Mild left basilar atelectasis and scarring. ASSESSMENT & PLAN     ASSESSMENT / PLAN:     Acute on chronic systolic heart failure   Severe nonischemic cardiomyopathy, LVEF 15% by most recent echo  S/p ICD implant in 2008, with revision in 2012 and then in 2016  Nonocclusive CAD on cath in 2012  Severe MR, PAH  -Continue Bumex IV for now, continue metolazone and Aldactone, did not resume Demadex  -Cardiology on board  -Pacer interrogation revealed rapid atrial rhythm. -Trend troponins  -Continue aspirin, Cozaar, Lopressor, nitro as needed  -CHF education, daily weights, intake output    Dyslipidemia  Patient has refused antilipid medications in the past, will discuss again with patient. History of polymorphic ventricular tachycardia associated with severe hypokalemia  Hold amiodarone for now because of abnormal thyroid function test. Will follow cardiology recommendations.  -Continue potassium supplements, replace magnesium as needed. Essential hypertension  -Continue home meds, adjust as needed    BPPV  -Previously evaluated by neurology, Dhaval positive  -Minimal improvement with meclizine  - follow-up outpatient    Gout  Continue home meds    DVT ppx: Lovenox  GI ppx: Pepcid    PT/OT/SW-ongoing  Discharge Planning: Ongoing    Aidee Yanez MD  Internal Medicine Resident, PGY-1  [facility];  [place], [place]  6/26/2021, 5:39 PM

## 2021-06-26 NOTE — CARE COORDINATION
Case Management Initial Discharge Plan  Cedrick Camarena,             Met with:patient to discuss discharge plans. Information verified: address, contacts, phone number, , insurance Yes  [facility]    Emergency Contact/Next of Kin name & number: Junior0 Jaren Landis daughter as per face sheet  Who are involved in patient's support system? daughter    PCP: Jaziel Morgan MD  Date of last visit: 2 months      Discharge Planning    Living Arrangements:    jordyn lives with patient,, patient is care giver    Home has 1 stories (lives in lower duplex)  3 stairs to climb to get into front door, no stairs to climb to reach second floor  Location of bedroom/bathroom in home main    Patient able to perform ADL's:Independent    Current Services (outpatient & in home) DME  DME equipment: has [employer] device that interogates Pacer (pacer is interogated automatically every month)  DME provider:     Is patient receiving oral anticoagulation therapy? No    If indicated:   Physician managing anticoagulation treatment: na  Where does patient obtain lab work for ATC treatment? na      Potential Assistance Needed:       Patient agreeable to home care: Yes  Freedom of choice provided:  if needed    Prior SNF/Rehab Placement and Facility: yes  Agreeable to SNF/Rehab: No  [place] of choice provided: was in [facility] SNF doesnt remember where     Evaluation: no    Expected Discharge date:       Patient expects to be discharged to:        If home: is the family and/or caregiver wiling & able to provide support at home? yes  Who will be providing this support? daughter    Follow Up Appointment: Best Day/ Time:      Transportation provider: daughter  Transportation arrangements needed for discharge: No    Readmission Risk              Risk of Unplanned Readmission:  7             Does patient have a readmission risk score greater than 14?: No  If yes, follow-up appointment must be made within 7 days of discharge.      Goals of Care: self care      Educated pt on transitional options, provided freedom of choice and are agreeable with plan      Discharge Plan: home independent, is agreeable to [address] [place] [address] if needed for [facility].          Electronically signed by Shira Calderón RN on 6/26/21 at 2:44 PM EDT

## 2021-06-27 LAB
ABSOLUTE EOS #: 0.17 K/UL (ref 0–0.44)
ABSOLUTE IMMATURE GRANULOCYTE: <0.03 K/UL (ref 0–0.3)
ABSOLUTE LYMPH #: 1.27 K/UL (ref 1.1–3.7)
ABSOLUTE MONO #: 0.62 K/UL (ref 0.1–1.2)
ANION GAP SERPL CALCULATED.3IONS-SCNC: 12 MMOL/L (ref 9–17)
BASOPHILS # BLD: 1 % (ref 0–2)
BASOPHILS ABSOLUTE: 0.04 K/UL (ref 0–0.2)
BUN BLDV-MCNC: 19 MG/DL (ref 8–23)
BUN/CREAT BLD: ABNORMAL (ref 9–20)
CALCIUM SERPL-MCNC: 8.8 MG/DL (ref 8.6–10.4)
CHLORIDE BLD-SCNC: 104 MMOL/L (ref 98–107)
CO2: 22 MMOL/L (ref 20–31)
CREAT SERPL-MCNC: 0.87 MG/DL (ref 0.5–0.9)
DIFFERENTIAL TYPE: ABNORMAL
EOSINOPHILS RELATIVE PERCENT: 3 % (ref 1–4)
GFR AFRICAN AMERICAN: >60 ML/MIN
GFR NON-AFRICAN AMERICAN: >60 ML/MIN
GFR SERPL CREATININE-BSD FRML MDRD: ABNORMAL ML/MIN/{1.73_M2}
GFR SERPL CREATININE-BSD FRML MDRD: ABNORMAL ML/MIN/{1.73_M2}
GLUCOSE BLD-MCNC: 102 MG/DL (ref 70–99)
HCT VFR BLD CALC: 34 % (ref 36.3–47.1)
HEMOGLOBIN: 11.8 G/DL (ref 11.9–15.1)
IMMATURE GRANULOCYTES: 0 %
LYMPHOCYTES # BLD: 24 % (ref 24–43)
MCH RBC QN AUTO: 29.4 PG (ref 25.2–33.5)
MCHC RBC AUTO-ENTMCNC: 34.7 G/DL (ref 28.4–34.8)
MCV RBC AUTO: 84.8 FL (ref 82.6–102.9)
MONOCYTES # BLD: 12 % (ref 3–12)
NRBC AUTOMATED: 0 PER 100 WBC
PDW BLD-RTO: 13.6 % (ref 11.8–14.4)
PLATELET # BLD: 211 K/UL (ref 138–453)
PLATELET ESTIMATE: ABNORMAL
PMV BLD AUTO: 11 FL (ref 8.1–13.5)
POTASSIUM SERPL-SCNC: 3.6 MMOL/L (ref 3.7–5.3)
RBC # BLD: 4.01 M/UL (ref 3.95–5.11)
RBC # BLD: ABNORMAL 10*6/UL
SEG NEUTROPHILS: 60 % (ref 36–65)
SEGMENTED NEUTROPHILS ABSOLUTE COUNT: 3.23 K/UL (ref 1.5–8.1)
SODIUM BLD-SCNC: 138 MMOL/L (ref 135–144)
TROPONIN INTERP: ABNORMAL
TROPONIN T: ABNORMAL NG/ML
TROPONIN, HIGH SENSITIVITY: 22 NG/L (ref 0–14)
WBC # BLD: 5.4 K/UL (ref 3.5–11.3)
WBC # BLD: ABNORMAL 10*3/UL

## 2021-06-27 PROCEDURE — 85025 COMPLETE CBC W/AUTO DIFF WBC: CPT

## 2021-06-27 PROCEDURE — 2580000003 HC RX 258: Performed by: STUDENT IN AN ORGANIZED HEALTH CARE EDUCATION/TRAINING PROGRAM

## 2021-06-27 PROCEDURE — 86800 THYROGLOBULIN ANTIBODY: CPT

## 2021-06-27 PROCEDURE — 6370000000 HC RX 637 (ALT 250 FOR IP): Performed by: STUDENT IN AN ORGANIZED HEALTH CARE EDUCATION/TRAINING PROGRAM

## 2021-06-27 PROCEDURE — 86376 MICROSOMAL ANTIBODY EACH: CPT

## 2021-06-27 PROCEDURE — 99232 SBSQ HOSP IP/OBS MODERATE 35: CPT | Performed by: INTERNAL MEDICINE

## 2021-06-27 PROCEDURE — 84484 ASSAY OF TROPONIN QUANT: CPT

## 2021-06-27 PROCEDURE — 2500000003 HC RX 250 WO HCPCS: Performed by: STUDENT IN AN ORGANIZED HEALTH CARE EDUCATION/TRAINING PROGRAM

## 2021-06-27 PROCEDURE — 80048 BASIC METABOLIC PNL TOTAL CA: CPT

## 2021-06-27 PROCEDURE — 36415 COLL VENOUS BLD VENIPUNCTURE: CPT

## 2021-06-27 PROCEDURE — 2060000000 HC ICU INTERMEDIATE R&B

## 2021-06-27 RX ORDER — POTASSIUM CHLORIDE 20 MEQ/1
40 TABLET, EXTENDED RELEASE ORAL DAILY
Status: DISCONTINUED | OUTPATIENT
Start: 2021-06-27 | End: 2021-06-28

## 2021-06-27 RX ORDER — BUMETANIDE 0.25 MG/ML
1 INJECTION, SOLUTION INTRAMUSCULAR; INTRAVENOUS 2 TIMES DAILY
Status: DISCONTINUED | OUTPATIENT
Start: 2021-06-27 | End: 2021-06-28

## 2021-06-27 RX ADMIN — POTASSIUM CHLORIDE 40 MEQ: 1500 TABLET, EXTENDED RELEASE ORAL at 09:30

## 2021-06-27 RX ADMIN — SODIUM CHLORIDE, PRESERVATIVE FREE 10 ML: 5 INJECTION INTRAVENOUS at 10:00

## 2021-06-27 RX ADMIN — LOSARTAN POTASSIUM 25 MG: 25 TABLET, FILM COATED ORAL at 09:30

## 2021-06-27 RX ADMIN — SODIUM CHLORIDE, PRESERVATIVE FREE 10 ML: 5 INJECTION INTRAVENOUS at 21:13

## 2021-06-27 RX ADMIN — ALLOPURINOL 100 MG: 100 TABLET ORAL at 09:29

## 2021-06-27 RX ADMIN — FAMOTIDINE 20 MG: 20 TABLET, FILM COATED ORAL at 21:13

## 2021-06-27 RX ADMIN — ACETAMINOPHEN 650 MG: 325 TABLET ORAL at 21:13

## 2021-06-27 RX ADMIN — Medication 81 MG: at 09:29

## 2021-06-27 RX ADMIN — BUMETANIDE 1 MG: 0.25 INJECTION INTRAMUSCULAR; INTRAVENOUS at 10:31

## 2021-06-27 RX ADMIN — METOPROLOL TARTRATE 25 MG: 50 TABLET, FILM COATED ORAL at 09:30

## 2021-06-27 RX ADMIN — FAMOTIDINE 20 MG: 20 TABLET, FILM COATED ORAL at 09:29

## 2021-06-27 RX ADMIN — SPIRONOLACTONE 12.5 MG: 25 TABLET ORAL at 09:29

## 2021-06-27 ASSESSMENT — PAIN SCALES - GENERAL
PAINLEVEL_OUTOF10: 0
PAINLEVEL_OUTOF10: 4
PAINLEVEL_OUTOF10: 0

## 2021-06-27 NOTE — FLOWSHEET NOTE
[address] [facility] [address]  PROGRESS NOTE    Room # 5283/2500-95   Name: Mary Rizvi            Age: 78 y.o. Gender: female            Admit Date & Time: 6/26/2021 10:13 AM    PATIENT/EVENT DESCRIPTION:  Mary Rizvi is a 78 y.o. female        SPIRITUAL ASSESSMENT/INTERVENTION:  The patient was calm and approachable. The patient had family members visiting her. The  was able to activity listen and explore their thoughts and feelings. They engaged in the conversation and appeared to be coping. The  offered spiritual support. They were comforted by prayer. SPIRITUAL CARE FOLLOW-UP PLAN:  Chaplains will remain available to offer spiritual and emotional support as needed. Chaplains can be contacted 24/7 by [facility]    Electronically signed by Emily Franco, on 6/27/2021 at 5:38 PM.  [address]  [phone]       06/27/21 9748   Encounter Summary   Services provided to: Patient and family together   Referral/Consult From: [address] [place] [address] Family members   Continue Visiting   (06/27/21)   Complexity of Encounter Moderate   Length of Encounter 15 minutes   Spiritual Assessment Completed Yes   Routine   Type Initial   Assessment Calm; Approachable   Intervention Active listening   Outcome Engaged in conversation

## 2021-06-27 NOTE — ED NOTES
Report called to [facility] TANVIR Olivas, all questions answered at this time.      Lurdes Cadleron RN  06/26/21 2121

## 2021-06-27 NOTE — PROGRESS NOTES
[facility]  Internal Medicine Teaching Residency Program  Inpatient Daily Progress Note  ______________________________________________________________________________    Patient: Chino Gil  YOB: 1955   ICD:[id number]    Acct: [de-identified]     Room: [address] [place] [address]  Admit date: 6/26/2021  Today's date: 06/27/21  Number of days in the hospital: 1    SUBJECTIVE   Admitting Diagnosis: <principal problem not specified>  CC: Chest discomfort, jaw pain, shortness of breath, dizziness  Pt examined at bedside. Chart & results reviewed. Patient states that her shortness of breath has improved since yesterday. Troponins 16> 17> 22  Potassium replaced this morning  Thyroid antibodies pending.   Repeat echo pending, continued on IV Bumex  Losartan switched to Entresto half tablet 24/26 mg p.o. twice daily  We will probably need referral to structural heart disease clinic  Recommendations regarding anticoagulation after discussion with EP physician,    ROS:  Constitutional:  negative for chills, fevers, sweats  Respiratory:  negative for cough, dyspnea on exertion, hemoptysis, shortness of breath, wheezing  Cardiovascular:  negative for chest pain, chest pressure/discomfort, lower extremity edema, palpitations  Gastrointestinal:  negative for abdominal pain, constipation, diarrhea, nausea, vomiting  Neurological:  negative for dizziness, headache  BRIEF HISTORY     The patient is a pleasant 78 y.o. female with past medical history of nonischemic cardiomyopathy with AICD in place since 2008, cath in 2012 -clear coronaries ,hypertension , MR, PAH, obesity, syncope and collapse, polymorphic ventricular tachycardia due to severe hypokalemia,  gout, BPPV , Covid infection        presents with a chief complaint of chest pain which started about 9 AM this morning, patient describes it as more like pressure-like discomfort, 4 on 10 in intensity when it started, on the anterior side of chest, radiating to jaw, started at rest, improved with baby aspirin and nitro. She took 2 baby aspirin's at home and then 2 while in route to hospital.  Patient also complained of lightheadedness, states that she usually feels dizzy when she has abnormal rhythm. Patient also complained of worsening shortness of breath with lower extremity edema. She states that she has been using extra pillows at night for past few days.     Patient is unsure if she takes all her medications at home. She follows up with Dr. Mario Alberto Lopez.      Patient denies any fever, chills, cough, abdominal pain, nausea, vomiting, diarrhea, constipation, urinary complaints, palpitations.     Patient has been seen multiple times previously for similar complaints. As per chart review, for her dizziness she was given meclizine in ED and [place]-Hallpike maneuver was done which was positive.  Neurology evaluated the patient and recommended Antivert for BPPV with further follow-up as outpatient.     On arrival to ED, vitals stable, afebrile  Patient was also started on nitro drip for pain, currently off the drip  Pacer interrogation revealed rapid atrial rhythm, cardiology consulted  proBNP 1650, troponin 16> 17, potassium 3.3 replaced, TSH 17.74, thyroxine 0.53  EKG showed biventricular paced rhythm, no ischemic changes  Chest x-ray showed mild congestion     Last echo in 2018-showed EF of 10 to 15%, with severe mitral regurgitation, mild to moderate tricuspid regurgitation, RVSP 33 mmHg     Currently she is feeling better, her pain has resolved, she still feels short of breath and dizzy.       OBJECTIVE     Vital Signs:  /62   Pulse 70   Temp 97.3 °F (36.3 °C) (Axillary)   Resp 23   Ht 5' 6\" (1.676 m)   Wt 233 lb 11.2 oz (106 kg)   SpO2 93%   BMI 37.72 kg/m²     Temp (24hrs), Av.8 °F (36.6 °C), Min:97.3 °F (36.3 °C), Max:98.1 °F (36.7 °C)    In: 300   Out: 1650 [Urine:1650]    Physical Exam:  Constitutional: This is a well developed, well nourished, 35-39.9 - Obesity Grade II 78y.o. year old female who is alert, oriented, cooperative and in no apparent distress. Head:normocephalic and atraumatic. EENT:  PERRLA. No conjunctival injections. Septum was midline, mucosa was without erythema, exudates or cobblestoning. No thrush was noted. Neck: Supple without thyromegaly. No elevated JVP. Trachea was midline. Respiratory: Chest was symmetrical without dullness to percussion. Breath sounds bilaterally were clear to auscultation. There were no wheezes, rhonchi or rales. There is no intercostal retraction or use of accessory muscles. No egophony noted. Cardiovascular: Regular without murmur, clicks, gallops or rubs. Abdomen: Slightly rounded and soft without organomegaly. No rebound, rigidity or guarding was appreciated. Lymphatic: No lymphadenopathy. Musculoskeletal: Normal curvature of the spine. No gross muscle weakness. Extremities:  No lower extremity edema, ulcerations, tenderness, varicosities or erythema. Muscle size, tone and strength are normal.  No involuntary movements are noted. Skin:  Warm and dry. Good color, turgor and pigmentation. No lesions or scars.   No cyanosis or clubbing  Neurological/Psychiatric: The patient's general behavior, level of consciousness, thought content and emotional status is normal.        Medications:  Scheduled Medications:    potassium chloride  40 mEq Oral Daily    [START ON 6/28/2021] sacubitril-valsartan  0.5 tablet Oral BID    allopurinol  100 mg Oral Daily    [Held by provider] amiodarone  200 mg Oral Daily    aspirin  81 mg Oral Daily    metOLazone  2.5 mg Oral Every Other Day    metoprolol tartrate  25 mg Oral BID    spironolactone  12.5 mg Oral Daily    bumetanide  1 mg Intravenous Daily    sodium chloride flush  5-40 mL Intravenous 2 times per day    enoxaparin  40 mg Subcutaneous Daily    famotidine  20 mg Oral BID     Continuous Infusions:    nitroGLYCERIN Stopped (06/26/21 1332)    sodium chloride       PRN MedicationsnitroGLYCERIN, 0.4 mg, Q5 Min PRN  sodium chloride flush, 5-40 mL, PRN  sodium chloride, 25 mL, PRN  ondansetron, 4 mg, Q8H PRN   Or  ondansetron, 4 mg, Q6H PRN  polyethylene glycol, 17 g, Daily PRN  acetaminophen, 650 mg, Q6H PRN   Or  acetaminophen, 650 mg, Q6H PRN        Diagnostic Labs:  CBC:   Recent Labs     06/26/21  1034 06/27/21  0636   WBC 6.7 5.4   RBC 4.44 4.01   HGB 13.0 11.8*   HCT 37.8 34.0*   MCV 85.1 84.8   RDW 13.6 13.6    211     BMP:   Recent Labs     06/26/21  1034 06/27/21  0636    138   K 3.3* 3.6*    104   CO2 21 22   BUN 17 19   CREATININE 0.83 0.87     BNP: No results for input(s): BNP in the last 72 hours. PT/INR: No results for input(s): PROTIME, INR in the last 72 hours. APTT: No results for input(s): APTT in the last 72 hours. CARDIAC ENZYMES: No results for input(s): CKMB, CKMBINDEX, TROPONINI in the last 72 hours. Invalid input(s): CKTOTAL;3  FASTING LIPID PANEL:  Lab Results   Component Value Date    CHOL 180 05/23/2020    HDL 42 05/23/2020    TRIG 79 05/23/2020     LIVER PROFILE: No results for input(s): AST, ALT, ALB, BILIDIR, BILITOT, ALKPHOS in the last 72 hours. MICROBIOLOGY:   Lab Results   Component Value Date/Time    CULTURE NO SPECIMEN RECEIVED 05/22/2020 05:15 PM       Imaging:    XR CHEST PORTABLE    Result Date: 6/26/2021  1. Mild cardiomegaly. Mild pulmonary vascular congestion. 2. Mild left basilar atelectasis and scarring.        ASSESSMENT & PLAN     ASSESSMENT / PLAN:     Acute on chronic systolic heart failure   Severe nonischemic cardiomyopathy, LVEF 15% by most recent echo  S/p ICD implant in 2008, with revision in 2012 and then in 2016  Nonocclusive CAD on cath in 2012  Severe MR, PAH  -Continue Bumex IV for now, continue metolazone and Aldactone, did not resume Demadex  -Cardiology on board  -Pacer interrogation revealed rapid atrial rhythm.  -Troponin 16>17>22  -Continue aspirin,  Lopressor, nitro as needed, start on Entresto as per cardiology recommendations. -CHF education, daily weights, intake output  -Pending repeat echo  -Referral to structural heart disease clinic  -Decision for anticoagulation to be decided by cardiology.     Dyslipidemia  Patient has refused antilipid medications in the past, will discuss again with patient.     History of polymorphic ventricular tachycardia associated with severe hypokalemia  Hold amiodarone for now because of abnormal thyroid function test. Will follow cardiology recommendations.  -Continue potassium supplements, replace magnesium as needed.     Essential hypertension  -Continue home meds, adjust as needed     BPPV  -Previously evaluated by neurology, Dhaval positive  -Minimal improvement with meclizine  - follow-up outpatient     Gout  Continue home meds     DVT ppx: Lovenox  GI ppx: Pepcid     PT/OT/SW-ongoing  Discharge Planning: Ongoing     Lucrecia Whittaker MD  Internal Medicine Resident, PGY-1  [facility];  [place], [place]  6/27/2021, 10:38 AM

## 2021-06-27 NOTE — CONSULTS
[facility] Cardiology Cardiology   History & Physical               Today's Date: 6/27/2021  Patient Name: Nenita Dhillon  Date of admission: 6/26/2021 10:13 AM  Patient's age: 78 y.o., 1955  Admission Dx: Chest pain [R07.9]    Reason for Admission:  CHF    CHIEF COMPLAINT: Patient need  Chief Complaint   Patient presents with    Chest Pain       History Obtained From:  patient, electronic medical record    HISTORY OF PRESENT ILLNESS:      The patient is a 78 y.o.  female who is admitted to the hospital for shortness of breath. She has history of nonischemic cardiomyopathy with last EF 15%. She has a CRT-D in place. She presented with heart failure symptoms. Denies any chest pain. No nausea or vomiting. No diaphoresis. Interrogation of the device showed rapid atrial rhythm. Chest x-ray showed mild cardiomegaly/mild pulmonary vascular congestion  Last echocardiogram in our chart in 2018. Showed EF 15%/severe mitral regurgitation. ProBNP 1600  -1.3 L   TSH high low  Free thyroxine   EKG biventricular paced       Past Medical History:   has a past medical history of Abnormal Pap smear, Acute on chronic systolic congestive heart failure ([address] AICD (automatic cardioverter/defibrillator) present, MAURO (acute kidney injury) ([address] Anemia, Arthritis, Cardiomyopathy ([address] Cardiomyopathy, nonischemic ([address] Ejection fraction < 50%, HTN (hypertension), Hypothyroidism, Ischemic cardiomyopathy, Left bundle branch block, Mitral regurgitation, Morbid obesity due to excess calories ([address] Pulmonary hypertension ([address] and Sudden onset of severe headache. Past Surgical History:   has a past surgical history that includes other surgical history (12/20/12); Tubal ligation; hernia repair; Colonoscopy; and pacemaker placement. Home Medications:    Prior to Admission medications    Medication Sig Start Date End Date Taking?  Authorizing Provider   potassium chloride (KLOR-CON M) 20 MEQ extended Continuous  allopurinol (ZYLOPRIM) tablet 100 mg, 100 mg, Oral, Daily  [Held by provider] amiodarone (CORDARONE) tablet 200 mg, 200 mg, Oral, Daily  aspirin EC tablet 81 mg, 81 mg, Oral, Daily  losartan (COZAAR) tablet 25 mg, 25 mg, Oral, Daily  metOLazone (ZAROXOLYN) tablet 2.5 mg, 2.5 mg, Oral, Every Other Day  metoprolol tartrate (LOPRESSOR) tablet 25 mg, 25 mg, Oral, BID  potassium chloride (KLOR-CON M) extended release tablet 20 mEq, 20 mEq, Oral, Daily  spironolactone (ALDACTONE) tablet 12.5 mg, 12.5 mg, Oral, Daily  bumetanide (BUMEX) injection 1 mg, 1 mg, Intravenous, Daily  sodium chloride flush 0.9 % injection 5-40 mL, 5-40 mL, Intravenous, 2 times per day  sodium chloride flush 0.9 % injection 5-40 mL, 5-40 mL, Intravenous, PRN  0.9 % sodium chloride infusion, 25 mL, Intravenous, PRN  enoxaparin (LOVENOX) injection 40 mg, 40 mg, Subcutaneous, Daily  ondansetron (ZOFRAN-ODT) disintegrating tablet 4 mg, 4 mg, Oral, Q8H PRN **OR** ondansetron (ZOFRAN) injection 4 mg, 4 mg, Intravenous, Q6H PRN  polyethylene glycol (GLYCOLAX) packet 17 g, 17 g, Oral, Daily PRN  acetaminophen (TYLENOL) tablet 650 mg, 650 mg, Oral, Q6H PRN **OR** acetaminophen (TYLENOL) suppository 650 mg, 650 mg, Rectal, Q6H PRN  famotidine (PEPCID) tablet 20 mg, 20 mg, Oral, BID    Allergies:  Codeine and Morphine    Social History:   reports that she has never smoked. She has never used smokeless tobacco. She reports previous drug use. She reports that she does not drink alcohol. Family History: family history includes Cancer in an other family member; High Blood Pressure in her mother; Other in her mother. REVIEW OF SYSTEMS:      Constitutional: there has been no unanticipated weight loss. Eyes: No visual changes or diplopia. ENT: No Headaches  Cardiovascular:  Remaining as above  Respiratory: No cough  Gastrointestinal: No abdominal pain. No change in bowel or bladder habits.   Genitourinary: No dysuria, trouble voiding, or hematuria. Neurological: No headache. PHYSICAL EXAM:      /62   Pulse 70   Temp 97.3 °F (36.3 °C) (Axillary)   Resp 23   Ht 5' 6\" (1.676 m)   Wt 233 lb 11.2 oz (106 kg)   SpO2 93%   BMI 37.72 kg/m²      Intake/Output Summary (Last 24 hours) at 6/27/2021 0644  Last data filed at 6/27/2021 0600  Gross per 24 hour   Intake 302.55 ml   Output 1650 ml   Net -1347.45 ml           Constitutional and General Appearance:   alert, cooperative, no distress and appears stated age  HEENT:  PERRL, EOMI  Respiratory:  Normal excursion and expansion without use of accessory muscles  Resp Auscultation:  Good respiratory effort. No for increased work of breathing. On auscultation: clear to auscultation bilaterally  Cardiovascular:  Regular rate and rhythm  X2/A1  Systolic murmur   The apical impulse is not displaced  Abdomen:  Soft  Bowel sounds present  Non-tender to palpation  Extremities:  No cyanosis or clubbing  Lower extremity edema: No  Skin:  Warm and dry  Neurological:  Alert and oriented. Moves all extremities well        DATA:    Diagnostics:       Echo 2018. EF 15 %. Severe MR     ICD CHANGE OUT 6/13/16: Done by Dr. Ruba Orozco due to battery depletion.      ECHO 3/9/15: EF 10-15%, moderate-severe MR, mild TR, RVSP is 34 mmHg       CATH 12/17/12: Nonischemic cardiomyopathy with EF 15% and normal coronaries       ICD 12/16/12: Medtronic device placed by Dr. Gabe Barrios 6/20/11: Small area anteroapically of mild ischemia, moderate to large anteroapical infarct.  EF 25%  Labs:     CBC:   Recent Labs     06/26/21  1034   WBC 6.7   HGB 13.0   HCT 37.8        BMP:   Recent Labs     06/26/21  1034      K 3.3*   CO2 21   BUN 17   CREATININE 0.83   LABGLOM >60   GLUCOSE 141*     Pro-BNP:    Recent Labs     06/26/21  1034   PROBNP 1,650*       Recent Labs     06/26/21  1034 06/26/21  1159   TROPONINT NOT REPORTED NOT REPORTED       FASTING LIPID PANEL:  Lab Results   Component Value Date    HDL 42 05/23/2020    TRIG 79 05/23/2020     LIVER PROFILE:No results for input(s): AST, ALT, LABALBU in the last 72 hours. Patient's Active Problem List  Active Problems:    Acute on chronic systolic CHF (congestive heart failure) (HCC)    Chest pain  Resolved Problems:    * No resolved hospital problems. *        IMPRESSION:      Acute on chronic systolic heart failure  AT/AFIB on device interrogation. 5%   Severe nonischemic cardiomyopathy, LVEF 15% by most recent echo in 2015. Continue IV Lasix 40 mg twice daily and monitor strict I's and O's. Advised the patient to take metolazone before Lasix at home. Cardiac cath on 12/17/2012 showing only nonocclusive CAD. Minimal troponin elevation secondary to 1   LBBB. Initial ICD implant on 01/24/2008 at [facility] with [address], 3162 and 4103 leads. ICD change-out with Dr. Bernarda Flores on 12/20/2012 to include addition of two LV epicardial leads (Medtronic 9339-46). The new device is a Medtronic Protecta CRT-D, model A889YIV. Patient required epicardial LV pacing with previous development of diaphragmatic stimulation from the coronary sinus lead. Patient had ICD revision to Medtronic Viva XT CRT-D, on 06/13/16. Hospitalization at [facility] early March 2017 for multiple ICD shocks related to polymorphic ventricular tachycardia, associated with severe hypokalemia. Hypokalemia    RECOMMENDATIONS:    Acute Systolic heart failure. IV bumex 1 mg BID. Check 2D Echocardiogram. Strict I/Os. Monitor kidney function  Continue Metoprolol 25 mg BID  On GDMT On aldactone/Metolazone/Cozaar. Follow up echo and mitral valve regurgitation. Device interrogation and further recommendations to follow   Replace lytes         Discussed with patient and Nurse. Lulu Dietz MD, M.D. Fellow, [address] [facility]      Please note that part of this chart were generated using voice recognition  dictation software.   Although every effort was made to ensure the accuracy of this automated transcription, some errors in transcription may have occurred. Attestation signed by      Attending Physician Statement:    I have discussed the care of  Stefani Terry , including pertinent history and exam findings, with the Cardiology fellow/resident. I have seen and examined the patient and the key elements of all parts of the encounter have been performed by me. I agree with the assessment, plan and orders as documented by the fellow/resident, after I modified exam findings and plan of treatments, and the final version is my approved version of the assessment. Additional Comments: Dyspnea, orthopnea. Acute on chronic systolic CHF. Continue IV bumex. Replace electrolytes as needed. Switch losartan to Entresto 1/2 tablet 24/26mg po BID. Obtain TTE. She has h/o severe MR with NICM. Will refer to structural heart disease clinic. Will review ICD interrogation with Dr. Pam Salmeron to decide about need for anticoagulation. 5.1% AT/AF noted on interrogation.     Maria Elena White MD

## 2021-06-27 NOTE — PLAN OF CARE
Educated patient on pain scale for assessing level of pain, the need to notify a [facility] provider of episodes of pain, pharmacologic/non-pharmacologic pain management,and potential side effects of prescribed medications. Educate patient on safety precautions and fall prevention measures. Bed/Chair alarms remain on. Falling star in place. Fall sticker on ID band. Non-slip socks on. Clutter free environment. Inspect skin daily. Elevate heels off of the bed at all times. Turn/reposition every 2 hours. Mepilex to coccyx for prevention and assess every shift. Compression therapy to bilateral legs. Educate on how to cough and deep breathe, dyspnea management, and optimal breathing techniques. Give incentive spirometer, instruct how to properly use it, and educate benefits of using. Educate patient on cardiac monitoring, information regarding decreased cardiac output, energy conservation techniques and activity level. Maintain telemetry and Spo2 monitoring. Check vitals every 4 hrs and PRN.

## 2021-06-27 NOTE — FLOWSHEET NOTE
Pt admitted from ER to room  3012. Pt oriented to room and call light use. Telemetry applied and connected to monitor. Assessment completed and vital signs obtained. Plan of care reviewed with pt. Pt assisted to and from bathroom without difficulty, but did become very short of breath and labored breathing. SpO2 maintained at 98%. Pt's sister is in room 2011.

## 2021-06-28 ENCOUNTER — APPOINTMENT (OUTPATIENT)
Dept: ULTRASOUND IMAGING | Age: 66
DRG: 293 | End: 2021-06-28
Payer: MEDICARE

## 2021-06-28 LAB
ABSOLUTE EOS #: 0.15 K/UL (ref 0–0.44)
ABSOLUTE IMMATURE GRANULOCYTE: <0.03 K/UL (ref 0–0.3)
ABSOLUTE LYMPH #: 1.19 K/UL (ref 1.1–3.7)
ABSOLUTE MONO #: 0.68 K/UL (ref 0.1–1.2)
ANION GAP SERPL CALCULATED.3IONS-SCNC: 15 MMOL/L (ref 9–17)
BASOPHILS # BLD: 1 % (ref 0–2)
BASOPHILS ABSOLUTE: 0.05 K/UL (ref 0–0.2)
BUN BLDV-MCNC: 20 MG/DL (ref 8–23)
BUN/CREAT BLD: ABNORMAL (ref 9–20)
CALCIUM SERPL-MCNC: 8.9 MG/DL (ref 8.6–10.4)
CHLORIDE BLD-SCNC: 104 MMOL/L (ref 98–107)
CO2: 21 MMOL/L (ref 20–31)
CREAT SERPL-MCNC: 0.86 MG/DL (ref 0.5–0.9)
DIFFERENTIAL TYPE: ABNORMAL
EKG ATRIAL RATE: 227 BPM
EKG Q-T INTERVAL: 480 MS
EKG QRS DURATION: 184 MS
EKG QTC CALCULATION (BAZETT): 521 MS
EKG R AXIS: -123 DEGREES
EKG T AXIS: 73 DEGREES
EKG VENTRICULAR RATE: 71 BPM
EOSINOPHILS RELATIVE PERCENT: 3 % (ref 1–4)
GFR AFRICAN AMERICAN: >60 ML/MIN
GFR NON-AFRICAN AMERICAN: >60 ML/MIN
GFR SERPL CREATININE-BSD FRML MDRD: ABNORMAL ML/MIN/{1.73_M2}
GFR SERPL CREATININE-BSD FRML MDRD: ABNORMAL ML/MIN/{1.73_M2}
GLUCOSE BLD-MCNC: 123 MG/DL (ref 70–99)
HCT VFR BLD CALC: 35.3 % (ref 36.3–47.1)
HEMOGLOBIN: 12.1 G/DL (ref 11.9–15.1)
IMMATURE GRANULOCYTES: 0 %
LV EF: 17 %
LVEF MODALITY: NORMAL
LYMPHOCYTES # BLD: 20 % (ref 24–43)
MAGNESIUM: 1.6 MG/DL (ref 1.6–2.6)
MCH RBC QN AUTO: 29.5 PG (ref 25.2–33.5)
MCHC RBC AUTO-ENTMCNC: 34.3 G/DL (ref 28.4–34.8)
MCV RBC AUTO: 86.1 FL (ref 82.6–102.9)
MONOCYTES # BLD: 11 % (ref 3–12)
NRBC AUTOMATED: 0 PER 100 WBC
PDW BLD-RTO: 13.6 % (ref 11.8–14.4)
PLATELET # BLD: 211 K/UL (ref 138–453)
PLATELET ESTIMATE: ABNORMAL
PMV BLD AUTO: 10.9 FL (ref 8.1–13.5)
POTASSIUM SERPL-SCNC: 3.2 MMOL/L (ref 3.7–5.3)
RBC # BLD: 4.1 M/UL (ref 3.95–5.11)
RBC # BLD: ABNORMAL 10*6/UL
SEG NEUTROPHILS: 66 % (ref 36–65)
SEGMENTED NEUTROPHILS ABSOLUTE COUNT: 3.95 K/UL (ref 1.5–8.1)
SODIUM BLD-SCNC: 140 MMOL/L (ref 135–144)
THYROGLOBULIN AB: <12 IU/ML (ref 0–40)
THYROID PEROXIDASE (TPO) AB: <4 IU/ML (ref 0–25)
WBC # BLD: 6 K/UL (ref 3.5–11.3)
WBC # BLD: ABNORMAL 10*3/UL

## 2021-06-28 PROCEDURE — 99232 SBSQ HOSP IP/OBS MODERATE 35: CPT | Performed by: INTERNAL MEDICINE

## 2021-06-28 PROCEDURE — 6370000000 HC RX 637 (ALT 250 FOR IP): Performed by: STUDENT IN AN ORGANIZED HEALTH CARE EDUCATION/TRAINING PROGRAM

## 2021-06-28 PROCEDURE — 2060000000 HC ICU INTERMEDIATE R&B

## 2021-06-28 PROCEDURE — 36415 COLL VENOUS BLD VENIPUNCTURE: CPT

## 2021-06-28 PROCEDURE — 6370000000 HC RX 637 (ALT 250 FOR IP): Performed by: NURSE PRACTITIONER

## 2021-06-28 PROCEDURE — 80048 BASIC METABOLIC PNL TOTAL CA: CPT

## 2021-06-28 PROCEDURE — 2580000003 HC RX 258: Performed by: STUDENT IN AN ORGANIZED HEALTH CARE EDUCATION/TRAINING PROGRAM

## 2021-06-28 PROCEDURE — 76536 US EXAM OF HEAD AND NECK: CPT

## 2021-06-28 PROCEDURE — 93010 ELECTROCARDIOGRAM REPORT: CPT | Performed by: INTERNAL MEDICINE

## 2021-06-28 PROCEDURE — 6360000002 HC RX W HCPCS: Performed by: NURSE PRACTITIONER

## 2021-06-28 PROCEDURE — 85025 COMPLETE CBC W/AUTO DIFF WBC: CPT

## 2021-06-28 PROCEDURE — 83735 ASSAY OF MAGNESIUM: CPT

## 2021-06-28 PROCEDURE — 93306 TTE W/DOPPLER COMPLETE: CPT

## 2021-06-28 PROCEDURE — 2500000003 HC RX 250 WO HCPCS: Performed by: STUDENT IN AN ORGANIZED HEALTH CARE EDUCATION/TRAINING PROGRAM

## 2021-06-28 RX ORDER — DIPHENHYDRAMINE HYDROCHLORIDE 50 MG/ML
12.5 INJECTION INTRAMUSCULAR; INTRAVENOUS ONCE
Status: COMPLETED | OUTPATIENT
Start: 2021-06-28 | End: 2021-06-28

## 2021-06-28 RX ORDER — POTASSIUM CHLORIDE 20 MEQ/1
40 TABLET, EXTENDED RELEASE ORAL 2 TIMES DAILY WITH MEALS
Status: COMPLETED | OUTPATIENT
Start: 2021-06-28 | End: 2021-06-29

## 2021-06-28 RX ORDER — POTASSIUM CHLORIDE 7.45 MG/ML
10 INJECTION INTRAVENOUS PRN
Status: DISCONTINUED | OUTPATIENT
Start: 2021-06-28 | End: 2021-06-29 | Stop reason: HOSPADM

## 2021-06-28 RX ORDER — BUMETANIDE 0.25 MG/ML
1 INJECTION, SOLUTION INTRAMUSCULAR; INTRAVENOUS ONCE
Status: DISCONTINUED | OUTPATIENT
Start: 2021-06-29 | End: 2021-06-29

## 2021-06-28 RX ORDER — POTASSIUM CHLORIDE 20 MEQ/1
40 TABLET, EXTENDED RELEASE ORAL PRN
Status: DISCONTINUED | OUTPATIENT
Start: 2021-06-28 | End: 2021-06-29 | Stop reason: HOSPADM

## 2021-06-28 RX ORDER — MAGNESIUM SULFATE HEPTAHYDRATE 40 MG/ML
4000 INJECTION, SOLUTION INTRAVENOUS ONCE
Status: COMPLETED | OUTPATIENT
Start: 2021-06-28 | End: 2021-06-28

## 2021-06-28 RX ORDER — MELOXICAM 7.5 MG/1
7.5 TABLET ORAL DAILY
Status: DISCONTINUED | OUTPATIENT
Start: 2021-06-28 | End: 2021-06-29 | Stop reason: HOSPADM

## 2021-06-28 RX ORDER — LEVOTHYROXINE SODIUM 0.03 MG/1
25 TABLET ORAL DAILY
Status: DISCONTINUED | OUTPATIENT
Start: 2021-06-28 | End: 2021-06-29 | Stop reason: HOSPADM

## 2021-06-28 RX ADMIN — POTASSIUM CHLORIDE 40 MEQ: 1500 TABLET, EXTENDED RELEASE ORAL at 16:42

## 2021-06-28 RX ADMIN — MAGNESIUM SULFATE IN WATER 4000 MG: 40 INJECTION, SOLUTION INTRAVENOUS at 14:09

## 2021-06-28 RX ADMIN — MELOXICAM 7.5 MG: 7.5 TABLET ORAL at 09:43

## 2021-06-28 RX ADMIN — SACUBITRIL AND VALSARTAN 0.5 TABLET: 24; 26 TABLET, FILM COATED ORAL at 09:42

## 2021-06-28 RX ADMIN — FAMOTIDINE 20 MG: 20 TABLET, FILM COATED ORAL at 20:13

## 2021-06-28 RX ADMIN — SODIUM CHLORIDE, PRESERVATIVE FREE 10 ML: 5 INJECTION INTRAVENOUS at 07:55

## 2021-06-28 RX ADMIN — SODIUM CHLORIDE, PRESERVATIVE FREE 10 ML: 5 INJECTION INTRAVENOUS at 20:14

## 2021-06-28 RX ADMIN — SPIRONOLACTONE 12.5 MG: 25 TABLET ORAL at 07:52

## 2021-06-28 RX ADMIN — FAMOTIDINE 20 MG: 20 TABLET, FILM COATED ORAL at 07:54

## 2021-06-28 RX ADMIN — METOPROLOL TARTRATE 25 MG: 50 TABLET, FILM COATED ORAL at 07:53

## 2021-06-28 RX ADMIN — Medication 81 MG: at 09:43

## 2021-06-28 RX ADMIN — POTASSIUM CHLORIDE 40 MEQ: 1500 TABLET, EXTENDED RELEASE ORAL at 07:53

## 2021-06-28 RX ADMIN — BUMETANIDE 1 MG: 0.25 INJECTION INTRAMUSCULAR; INTRAVENOUS at 07:55

## 2021-06-28 RX ADMIN — LEVOTHYROXINE SODIUM 25 MCG: 25 TABLET ORAL at 16:41

## 2021-06-28 RX ADMIN — ALLOPURINOL 100 MG: 100 TABLET ORAL at 07:55

## 2021-06-28 RX ADMIN — ACETAMINOPHEN 650 MG: 325 TABLET ORAL at 20:13

## 2021-06-28 RX ADMIN — ACETAMINOPHEN 650 MG: 325 TABLET ORAL at 08:02

## 2021-06-28 RX ADMIN — RIVAROXABAN 20 MG: 20 TABLET, FILM COATED ORAL at 16:41

## 2021-06-28 RX ADMIN — SACUBITRIL AND VALSARTAN 0.5 TABLET: 24; 26 TABLET, FILM COATED ORAL at 20:13

## 2021-06-28 RX ADMIN — DIPHENHYDRAMINE HYDROCHLORIDE 12.5 MG: 50 INJECTION, SOLUTION INTRAMUSCULAR; INTRAVENOUS at 23:40

## 2021-06-28 RX ADMIN — METOLAZONE 2.5 MG: 2.5 TABLET ORAL at 07:54

## 2021-06-28 ASSESSMENT — PAIN SCALES - GENERAL
PAINLEVEL_OUTOF10: 4
PAINLEVEL_OUTOF10: 6
PAINLEVEL_OUTOF10: 6

## 2021-06-28 NOTE — PROGRESS NOTES
Physical Therapy        Physical Therapy Cancel Note      DATE: 2021    NAME: Castillo Santana  MRN: [id number]   : 1955      Patient not seen this date for Physical Therapy due to:    Patient independent with functional mobility. Will defer PT evaluation at this time. Pt independently ambulating within hallway. Please reorder PT if future needs arise.        Electronically signed by Cj Maurice PT on 2021 at 11:00 AM

## 2021-06-28 NOTE — PROGRESS NOTES
[facility]  Occupational Therapy Not Seen Note    DATE: 2021    NAME: Rosendo Narvaez  MRN: [id number]   : 1955      Patient not seen this date for Occupational Therapy due to:    Patient independent with ADLs and functional tasks with no acute OT needs. Pt observed walking in hallway independently. Will defer OT evaluation at this time. Please reorder OT if future needs arise.      Next Scheduled Treatment: NA     Electronically signed by SANDY Jose on 2021 at 9:24 AM

## 2021-06-28 NOTE — PLAN OF CARE
Problem: Falls - Risk of:  Goal: Will remain free from falls  Description: Will remain free from falls  6/28/2021 1029 by Boris Milton RN  Outcome: Ongoing  6/28/2021 0157 by Valentin Franco RN  Outcome: Ongoing  Goal: Absence of physical injury  Description: Absence of physical injury  6/28/2021 1029 by Boris Milton RN  Outcome: Ongoing  6/28/2021 0157 by Valentin Franco RN  Outcome: Ongoing     Problem: Cardiac Output - Decreased:  Goal: Hemodynamic stability will improve  Description: Hemodynamic stability will improve  6/28/2021 1030 by Boris Milton RN  Outcome: Ongoing  6/28/2021 1029 by Boris Milton RN  Outcome: Ongoing  6/28/2021 0157 by Valentin Franco RN  Outcome: Ongoing     Problem: Discharge Planning:  Goal: Discharged to appropriate level of care  Description: Discharged to appropriate level of care  6/28/2021 1030 by Boris Milton RN  Outcome: Ongoing  6/28/2021 1029 by Boris Milton RN  Outcome: Ongoing  6/28/2021 0157 by Valentin Franco RN  Outcome: Ongoing

## 2021-06-28 NOTE — PROGRESS NOTES
[facility]   Progress Note                   Date:   6/28/2021  Patient name: Cedrick Camarena  Date of admission:  6/26/2021 10:13 AM  MRN:   [id number]  YOB: 1955  PCP: Jaziel Morgan MD    Reason for Admission: Chest pain [R07.9]    Subjective:       Clinical Changes / Abnormalities: Patient seen and examined at the bed side. No new acute events overnight. Patient is doing well, has no complaints. Denies chest pain or SOB. Reviewed vitals, labs, tele, & previous testing. AV paced on tele. Medications:   Scheduled Meds:   meloxicam  7.5 mg Oral Daily    potassium chloride  40 mEq Oral BID WC    sacubitril-valsartan  0.5 tablet Oral BID    bumetanide  1 mg Intravenous BID    allopurinol  100 mg Oral Daily    [Held by provider] amiodarone  200 mg Oral Daily    aspirin  81 mg Oral Daily    metOLazone  2.5 mg Oral Every Other Day    metoprolol tartrate  25 mg Oral BID    spironolactone  12.5 mg Oral Daily    sodium chloride flush  5-40 mL Intravenous 2 times per day    enoxaparin  40 mg Subcutaneous Daily    famotidine  20 mg Oral BID     Continuous Infusions:   nitroGLYCERIN Stopped (06/26/21 1332)    sodium chloride       CBC:   Recent Labs     06/26/21  1034 06/27/21  0636 06/28/21  0644   WBC 6.7 5.4 6.0   HGB 13.0 11.8* 12.1    211 211     BMP:    Recent Labs     06/26/21  1034 06/27/21  0636 06/28/21  0644    138 140   K 3.3* 3.6* 3.2*    104 104   CO2 21 22 21   BUN 17 19 20   CREATININE 0.83 0.87 0.86   GLUCOSE 141* 102* 123*     Hepatic: No results for input(s): AST, ALT, ALB, BILITOT, ALKPHOS in the last 72 hours. Troponin:   Recent Labs     06/26/21  1034 06/26/21  1159 06/27/21  0636   TROPHS 17* 16* 22*     BNP: No results for input(s): BNP in the last 72 hours. Lipids: No results for input(s): CHOL, HDL in the last 72 hours. Invalid input(s): LDLCALCU  INR: No results for input(s): INR in the last 72 hours.     Objective: Vitals: /71   Pulse 71   Temp 98.2 °F (36.8 °C) (Oral)   Resp 13   Ht 5' 6\" (1.676 m)   Wt 240 lb 3.2 oz (109 kg)   SpO2 97%   BMI 38.77 kg/m²   General appearance: alert and cooperative with exam  HEENT: Head: Normocephalic, no lesions, without obvious abnormality. Neck: no JVD, trachea midline, no adenopathy  Lungs: Clear to auscultation  Heart: Regular rate and rhythm, s1/s2 auscultated, no murmurs. AV paced. Abdomen: soft, non-tender, bowel sounds active  Extremities: no edema  Neurologic: not done    ECHO 6/28/2021  Summary  Left ventricle is moderately enlarged. Global left ventricular systolic function is severely reduced, calculated  ejection fraction is 17% (by Hatfield's Method.)  [place] is shyanne with severe global hypokinesis of remaining segments. Increased septal wall thickness. Evidence of severe (grade III) diastolic dysfunction. Left atrium is severely dilated. Right atrium is mildly dilated . Pacing/ICD lead seen in the right atrium/ventricle. Right ventricular dilatation with normal systolic function. Aortic valve is sclerotic but opens well. Trivial aortic insufficiency. Mitral valve is sclerotic. Mean gradient is 3mmHg. There is posterior leaflet restriction. Severe mitral regurgitation (eccentric jet.)  Mitral regurgitation: MR radius 1.0cm, MR EOA 0.48cm2, MR Volume 75mL, MR  Vena Contracta 0.91cm. Mild to moderate tricuspid regurgitation. Estimated right ventricular systolic pressure is 49 mmHg, suggesting mild  pulmonary HTN. Trivial pulmonic insufficiency. ECHO 5/19/2018  LVEF 10-15%  Severe Mitral Regurgitation with Pulmonary vein flow reversal  Mild to moderate Tricuspid Regurgitations. Assessment / Acute Cardiac Problems:   1. Acute on chronic systolic heart failure  2. AT/AFIB on device interrogation. 5%   3. Severe nonischemic cardiomyopathy, LVEF 15% by most recent echo in 2015.   4. Cardiac cath on 12/17/2012 showing only nonocclusive CAD. 5. Minimal troponin elevation secondary to 1   6. LBBB. 7. Initial ICD implant on 01/24/2008 at [facility] with [address], 3052 and 9471 leads. ICD change-out with Dr. Pepe Daniel on 12/20/2012 to include addition of two LV epicardial leads (Medtronic 3562-49). The new device is a Medtronic Protecta CRT-D, model Q133TNT. Patient required epicardial LV pacing with previous development of diaphragmatic stimulation from the coronary sinus lead. Patient had ICD revision to Medtronic Viva XT CRT-D, on 06/13/16. 8. Hospitalization at [facility] early March 2017 for multiple ICD shocks related to polymorphic ventricular tachycardia, associated with severe hypokalemia.    9. Hypokalemia    Patient Active Problem List:     Dilated cardiomyopathy ([address]     Pre-diabetes     Essential hypertension     PAH (pulmonary artery hypertension) ([address] 34 on Echo      Mitral regurgitation     Chronic headache     Morbid obesity due to excess calories ([address]     AICD (automatic cardioverter/defibrillator) present     Syncope and collapse secondary to [facility]     MAURO (acute kidney injury) ([address]     ICD (implantable cardioverter-defibrillator) battery depletion     Acute on chronic systolic CHF (congestive heart failure) (HCC)     Gout     Dizziness     Benign paroxysmal positional vertigo     Heart failure (HCC)     Hypotension (arterial)     NSVT (nonsustained ventricular tachycardia) ([facility])     Chest pain     Hypothyroidism    [id number] Score for Atrial Fibrillation Stroke Risk   Risk   Factors  Component Value   C CHF Yes 1   H HTN Yes 1   A2 Age >= 75 No,  (77 y.o.) 0   D DM No 0   S2 Prior Stroke/TIA No 0   V Vascular Disease No 0   A Age 74-69 Yes,  (77 y.o.) 1   Sc Sex female 1    [id number]  Score  4   Score last updated 6/28/21 4:67 PM EDT    Click here for a link to the UpToDate guideline \"Atrial Fibrillation: Anticoagulation therapy to prevent embolization    Disclaimer: Risk Score calculation is dependent on accuracy of patient problem list and past encounter diagnosis. Plan of Treatment:     1. Acute Systolic heart failure. Negative -2,600 since admission. Will decrease Bumex to 1mg IV daily and change to PO diuretics tomorrow. 2. 2D Echocardiogram completed with results as noted above. LVEF remains reduced at 17%. Continues to show severe MR and mild to mod TR. Continue GDMT. Continue strict I/Os. And daily weights. Continue BB, aldactone, zaroxolyn, and Entresto. 3. Hx of AF/AT. Amiodarone held per primary service elevated TSH. Continue Metoprolol 25 mg BID. Thyroid US pending. Patient expressed desire to remain off Amiodarone at this time due to thyroid dysfunction. 4. Device interrogation completed and in chart. Reviewed AT/AF with 5.1% burden with Dr. Joseph Jurado who recommends patient be started on oral anticoagulation. [facility]. Reviewed benefits and risks of anticoagulation therapy and she agrees to start [facility]. Will start Xarelto today. 5. Keep K+ > 4.0, and Mg+ > 2.0. K 3.2 today. Sliding scale potassium replacement ordered. Mag 1.6 today. Replacement ordered. Will recheck labs in the morning.          Electronically signed by NELLA Fernandes CNP on 6/28/2021 at 11:31 AM  [facility]      [phone]

## 2021-06-28 NOTE — PROGRESS NOTES
[facility]  Internal Medicine Teaching Residency Program  Inpatient Daily Progress Note  ______________________________________________________________________________    Patient: Kaia Quiroz  YOB: 1955   AX    Acct: [de-identified]     Room: [facility]-  Admit date: 2021  Today's date: 21  Number of days in the hospital: 2    SUBJECTIVE   Admitting Diagnosis: Acute on chronic systolic CHF (congestive heart failure) ([facility] [address]  CC: Chest discomfort, jaw pain, shortness of breath, dizziness    Patient was seen and evaluated at bedside, no acute events overnight. She continues to be hemodynamically stable, was started on entresto - /71 mm Hg, HR 70/min. She diuresed 1.5 L in last 24 hours. Labs and imaging reviewed, K 3.2 - replaced, Mg 1.6 - replaced. 2D ECHO revealed EF 17% with grade III DD. US thyroid revealed thyroid nodule with recommended follow up for the same OP. Amiodarone was held as per Cardiology recommendations and the patient was started on synthroid 25 mcg daily.      ROS:  Constitutional:  negative for chills, fevers, sweats  Respiratory:  negative for cough, dyspnea on exertion, hemoptysis, shortness of breath, wheezing  Cardiovascular:  negative for chest pain, chest pressure/discomfort, lower extremity edema, palpitations  Gastrointestinal:  negative for abdominal pain, constipation, diarrhea, nausea, vomiting  Neurological:  negative for dizziness, headache  BRIEF HISTORY     The patient is a pleasant 78 y.o. female with past medical history of nonischemic cardiomyopathy with AICD in place since , cath in  -clear coronaries ,hypertension , MR, PAH, obesity, syncope and collapse, polymorphic ventricular tachycardia due to severe hypokalemia,  gout, BPPV , Covid infection        presents with a chief complaint of chest pain which started about 9 AM this morning, patient describes it as more like pressure-like discomfort, 4 on 10 in intensity when it started, on the anterior side of chest, radiating to jaw, started at rest, improved with baby aspirin and nitro. She took 2 baby aspirin's at home and then 2 while in route to hospital.  Patient also complained of lightheadedness, states that she usually feels dizzy when she has abnormal rhythm. Patient also complained of worsening shortness of breath with lower extremity edema. She states that she has been using extra pillows at night for past few days.     Patient is unsure if she takes all her medications at home. She follows up with Dr. Shantal Elizalde.      Patient denies any fever, chills, cough, abdominal pain, nausea, vomiting, diarrhea, constipation, urinary complaints, palpitations.     Patient has been seen multiple times previously for similar complaints. As per chart review, for her dizziness she was given meclizine in ED and Tavo-Hallpike maneuver was done which was positive.  Neurology evaluated the patient and recommended Antivert for BPPV with further follow-up as outpatient.     On arrival to ED, vitals stable, afebrile  Patient was also started on nitro drip for pain, currently off the drip  Pacer interrogation revealed rapid atrial rhythm, cardiology consulted  proBNP 1650, troponin 16> 17, potassium 3.3 replaced, TSH 17.74, thyroxine 0.53  EKG showed biventricular paced rhythm, no ischemic changes  Chest x-ray showed mild congestion     Last echo in 2018-showed EF of 10 to 15%, with severe mitral regurgitation, mild to moderate tricuspid regurgitation, RVSP 33 mmHg     Currently she is feeling better, her pain has resolved, she still feels short of breath and dizzy.       OBJECTIVE     Vital Signs:  /71   Pulse 70   Temp 98.2 °F (36.8 °C) (Oral)   Resp 13   Ht 5' 6\" (1.676 m)   Wt 240 lb 3.2 oz (109 kg)   SpO2 97%   BMI 38.77 kg/m²     Temp (24hrs), Av °F (36.7 °C), Min:97.4 °F (36.3 °C), Max:98.4 °F (36.9 °C)    In: 210   Out: 1700 [Urine:1700]    Physical Exam:  Constitutional: This is a well developed, well nourished, 35-39.9 - Obesity Grade II 78y.o. year old female who is alert, oriented, cooperative and in no apparent distress. Head:normocephalic and atraumatic. EENT:  PERRLA. No conjunctival injections. Septum was midline, mucosa was without erythema, exudates or cobblestoning. No thrush was noted. Neck: Supple without thyromegaly. No elevated JVP. Trachea was midline. Respiratory: Chest was symmetrical without dullness to percussion. Breath sounds bilaterally were clear to auscultation. There were no wheezes, rhonchi or rales. There is no intercostal retraction or use of accessory muscles. No egophony noted. Cardiovascular: Regular without murmur, clicks, gallops or rubs. Abdomen: Slightly rounded and soft without organomegaly. No rebound, rigidity or guarding was appreciated. Lymphatic: No lymphadenopathy. Musculoskeletal: Normal curvature of the spine. No gross muscle weakness. Extremities:  No lower extremity edema, ulcerations, tenderness, varicosities or erythema. Muscle size, tone and strength are normal.  No involuntary movements are noted. Skin:  Warm and dry. Good color, turgor and pigmentation. No lesions or scars.   No cyanosis or clubbing  Neurological/Psychiatric: The patient's general behavior, level of consciousness, thought content and emotional status is normal.        Medications:  Scheduled Medications:    meloxicam  7.5 mg Oral Daily    potassium chloride  40 mEq Oral Daily    sacubitril-valsartan  0.5 tablet Oral BID    bumetanide  1 mg Intravenous BID    allopurinol  100 mg Oral Daily    [Held by provider] amiodarone  200 mg Oral Daily    aspirin  81 mg Oral Daily    metOLazone  2.5 mg Oral Every Other Day    metoprolol tartrate  25 mg Oral BID    spironolactone  12.5 mg Oral Daily    sodium chloride flush  5-40 mL Intravenous 2 times per day    enoxaparin  40 mg Subcutaneous Daily    famotidine  20 mg Oral BID     Continuous Infusions:    nitroGLYCERIN Stopped (06/26/21 1332)    sodium chloride       PRN MedicationsnitroGLYCERIN, 0.4 mg, Q5 Min PRN  sodium chloride flush, 5-40 mL, PRN  sodium chloride, 25 mL, PRN  ondansetron, 4 mg, Q8H PRN   Or  ondansetron, 4 mg, Q6H PRN  polyethylene glycol, 17 g, Daily PRN  acetaminophen, 650 mg, Q6H PRN   Or  acetaminophen, 650 mg, Q6H PRN        Diagnostic Labs:  CBC:   Recent Labs     06/26/21  1034 06/27/21  0636 06/28/21  0644   WBC 6.7 5.4 6.0   RBC 4.44 4.01 4.10   HGB 13.0 11.8* 12.1   HCT 37.8 34.0* 35.3*   MCV 85.1 84.8 86.1   RDW 13.6 13.6 13.6    211 211     BMP:   Recent Labs     06/26/21  1034 06/27/21  0636 06/28/21  0644    138 140   K 3.3* 3.6* 3.2*    104 104   CO2 21 22 21   BUN 17 19 20   CREATININE 0.83 0.87 0.86     BNP: No results for input(s): BNP in the last 72 hours. PT/INR: No results for input(s): PROTIME, INR in the last 72 hours. APTT: No results for input(s): APTT in the last 72 hours. CARDIAC ENZYMES: No results for input(s): CKMB, CKMBINDEX, TROPONINI in the last 72 hours. Invalid input(s): CKTOTAL;3  FASTING LIPID PANEL:  Lab Results   Component Value Date    CHOL 180 05/23/2020    HDL 42 05/23/2020    TRIG 79 05/23/2020     LIVER PROFILE: No results for input(s): AST, ALT, ALB, BILIDIR, BILITOT, ALKPHOS in the last 72 hours. MICROBIOLOGY:   Lab Results   Component Value Date/Time    CULTURE NO SPECIMEN RECEIVED 05/22/2020 05:15 PM       Imaging:    XR CHEST PORTABLE    Result Date: 6/26/2021  1. Mild cardiomegaly. Mild pulmonary vascular congestion. 2. Mild left basilar atelectasis and scarring.        ASSESSMENT & PLAN     ASSESSMENT / PLAN:     Acute on chronic systolic heart failure   Severe nonischemic cardiomyopathy, LVEF 15% by most recent echo  S/p ICD implant in 2008, with revision in 2012 and then in 2016  Nonocclusive CAD on cath in 2012  Severe MR, PAH  - Continue Bumex IV daily, will change to PO tomorrow. Continue metolazone every other day and Aldactone. - Pacer interrogation revealed 5.1% AT/AF.   - Cardiology on board, started on entresto, continuing metoprolol, await Dr. Miki Ibrahim recommendations regarding anticoagulation and amiodarone considering newly diagnosed hypothyroidism.   - Continue aspirin.   - CHF education, daily weights, intake output  - Referral to structural heart disease clinic     Dyslipidemia  Patient has refused antilipid medications in the past.      History of polymorphic ventricular tachycardia associated with severe hypokalemia  Hold amiodarone for now because of abnormal thyroid function test. Will follow cardiology recommendations. - Continue potassium and magnesium supplementation as needed to keep K > 4 and Mg > 2.      Newly diagnosed Hypothyroidism  - TSH 17, T4 low, started on synthroid 25 mcg daily and plan to follow up with PCP OP. TSH in 6-8 weeks. - US thyroid revealed thyroid nodule, plan to follow up the same outpatient in 1 year with repeat scan. Essential hypertension  - Continue home meds, adjust as needed     BPPV  - Previously evaluated by neurology, Dhaval positive  - Minimal improvement with meclizine     Gout  Continue home meds     DVT ppx: Lovenox  GI ppx: Pepcid     PT/OT/SW-ongoing  Discharge Planning: Ongoing     Shwetha Alvarado MD  Internal Medicine Resident, PGY-2  Gracie De La Fuente  [facility]  6/28/2021, 8:15 AM

## 2021-06-28 NOTE — PLAN OF CARE
Problem: Falls - Risk of:  Goal: Will remain free from falls  Description: Will remain free from falls  6/28/2021 0157 by Sundar Marcano RN  Outcome: Ongoing  6/27/2021 1750 by Raul Bills RN  Outcome: Ongoing  Goal: Absence of physical injury  Description: Absence of physical injury  6/28/2021 0157 by Sundar Marcano RN  Outcome: Ongoing  6/27/2021 1750 by Raul Bills RN  Outcome: Ongoing     Problem: Breathing Pattern - Ineffective:  Goal: Ability to achieve and maintain a regular respiratory rate will improve  Description: Ability to achieve and maintain a regular respiratory rate will improve  6/28/2021 0157 by Sundar Marcano RN  Outcome: Ongoing  6/27/2021 1750 by Raul Bills RN  Outcome: Ongoing     Problem: Cardiac Output - Decreased:  Goal: Hemodynamic stability will improve  Description: Hemodynamic stability will improve  6/28/2021 0157 by Sundar Marcano RN  Outcome: Ongoing  6/27/2021 1750 by Raul Bills RN  Outcome: Ongoing     Problem: Discharge Planning:  Goal: Discharged to appropriate level of care  Description: Discharged to appropriate level of care  6/28/2021 0157 by Sundar Marcano RN  Outcome: Ongoing  6/27/2021 1750 by Raul Bills RN  Outcome: Ongoing

## 2021-06-28 NOTE — PLAN OF CARE
Problem: Falls - Risk of:  Goal: Will remain free from falls  Description: Will remain free from falls  6/28/2021 1029 by Los Castro RN  Outcome: Ongoing  6/28/2021 0157 by Giacomo Fleischer, RN  Outcome: Ongoing  Goal: Absence of physical injury  Description: Absence of physical injury  6/28/2021 1029 by Los Castro RN  Outcome: Ongoing  6/28/2021 0157 by Giacomo Fleischer, RN  Outcome: Ongoing     Problem: Breathing Pattern - Ineffective:  Goal: Ability to achieve and maintain a regular respiratory rate will improve  Description: Ability to achieve and maintain a regular respiratory rate will improve  6/28/2021 1029 by Los Castro RN  Outcome: Ongoing  6/28/2021 0157 by Giacomo Fleischer, RN  Outcome: Ongoing     Problem: Cardiac Output - Decreased:  Goal: Hemodynamic stability will improve  Description: Hemodynamic stability will improve  6/28/2021 1029 by Los Castro RN  Outcome: Ongoing  6/28/2021 0157 by Giacomo Fleischer, RN  Outcome: Ongoing     Problem: Discharge Planning:  Goal: Discharged to appropriate level of care  Description: Discharged to appropriate level of care  6/28/2021 1029 by Los Castro RN  Outcome: Ongoing  6/28/2021 0157 by Giacomo Fleischer, RN  Outcome: Ongoing

## 2021-06-29 VITALS
DIASTOLIC BLOOD PRESSURE: 68 MMHG | RESPIRATION RATE: 16 BRPM | OXYGEN SATURATION: 96 % | SYSTOLIC BLOOD PRESSURE: 96 MMHG | BODY MASS INDEX: 38.6 KG/M2 | WEIGHT: 240.2 LBS | TEMPERATURE: 97.8 F | HEIGHT: 66 IN | HEART RATE: 70 BPM

## 2021-06-29 LAB
ABSOLUTE EOS #: 0.19 K/UL (ref 0–0.44)
ABSOLUTE IMMATURE GRANULOCYTE: <0.03 K/UL (ref 0–0.3)
ABSOLUTE LYMPH #: 1.57 K/UL (ref 1.1–3.7)
ABSOLUTE MONO #: 0.68 K/UL (ref 0.1–1.2)
ANION GAP SERPL CALCULATED.3IONS-SCNC: 13 MMOL/L (ref 9–17)
BASOPHILS # BLD: 1 % (ref 0–2)
BASOPHILS ABSOLUTE: 0.05 K/UL (ref 0–0.2)
BUN BLDV-MCNC: 19 MG/DL (ref 8–23)
BUN/CREAT BLD: ABNORMAL (ref 9–20)
CALCIUM SERPL-MCNC: 9.4 MG/DL (ref 8.6–10.4)
CHLORIDE BLD-SCNC: 104 MMOL/L (ref 98–107)
CO2: 21 MMOL/L (ref 20–31)
CREAT SERPL-MCNC: 0.83 MG/DL (ref 0.5–0.9)
DIFFERENTIAL TYPE: ABNORMAL
EOSINOPHILS RELATIVE PERCENT: 3 % (ref 1–4)
GFR AFRICAN AMERICAN: >60 ML/MIN
GFR NON-AFRICAN AMERICAN: >60 ML/MIN
GFR SERPL CREATININE-BSD FRML MDRD: ABNORMAL ML/MIN/{1.73_M2}
GFR SERPL CREATININE-BSD FRML MDRD: ABNORMAL ML/MIN/{1.73_M2}
GLUCOSE BLD-MCNC: 113 MG/DL (ref 70–99)
HCT VFR BLD CALC: 40.5 % (ref 36.3–47.1)
HEMOGLOBIN: 13.6 G/DL (ref 11.9–15.1)
IMMATURE GRANULOCYTES: 0 %
LYMPHOCYTES # BLD: 23 % (ref 24–43)
MAGNESIUM: 2 MG/DL (ref 1.6–2.6)
MCH RBC QN AUTO: 29.3 PG (ref 25.2–33.5)
MCHC RBC AUTO-ENTMCNC: 33.6 G/DL (ref 28.4–34.8)
MCV RBC AUTO: 87.3 FL (ref 82.6–102.9)
MONOCYTES # BLD: 10 % (ref 3–12)
NRBC AUTOMATED: 0 PER 100 WBC
PDW BLD-RTO: 13.8 % (ref 11.8–14.4)
PLATELET # BLD: 247 K/UL (ref 138–453)
PLATELET ESTIMATE: ABNORMAL
PMV BLD AUTO: 10.9 FL (ref 8.1–13.5)
POTASSIUM SERPL-SCNC: 4.3 MMOL/L (ref 3.7–5.3)
RBC # BLD: 4.64 M/UL (ref 3.95–5.11)
RBC # BLD: ABNORMAL 10*6/UL
SEG NEUTROPHILS: 63 % (ref 36–65)
SEGMENTED NEUTROPHILS ABSOLUTE COUNT: 4.23 K/UL (ref 1.5–8.1)
SODIUM BLD-SCNC: 138 MMOL/L (ref 135–144)
WBC # BLD: 6.7 K/UL (ref 3.5–11.3)
WBC # BLD: ABNORMAL 10*3/UL

## 2021-06-29 PROCEDURE — 36415 COLL VENOUS BLD VENIPUNCTURE: CPT

## 2021-06-29 PROCEDURE — 85025 COMPLETE CBC W/AUTO DIFF WBC: CPT

## 2021-06-29 PROCEDURE — 83735 ASSAY OF MAGNESIUM: CPT

## 2021-06-29 PROCEDURE — 6370000000 HC RX 637 (ALT 250 FOR IP): Performed by: STUDENT IN AN ORGANIZED HEALTH CARE EDUCATION/TRAINING PROGRAM

## 2021-06-29 PROCEDURE — 99239 HOSP IP/OBS DSCHRG MGMT >30: CPT | Performed by: INTERNAL MEDICINE

## 2021-06-29 PROCEDURE — 80048 BASIC METABOLIC PNL TOTAL CA: CPT

## 2021-06-29 RX ORDER — BUMETANIDE 2 MG/1
2 TABLET ORAL DAILY
Qty: 30 TABLET | Refills: 3 | Status: SHIPPED | OUTPATIENT
Start: 2021-06-29 | End: 2022-09-30

## 2021-06-29 RX ORDER — NITROGLYCERIN 0.4 MG/1
TABLET SUBLINGUAL
Qty: 25 TABLET | Refills: 3 | Status: SHIPPED | OUTPATIENT
Start: 2021-06-29 | End: 2022-11-02 | Stop reason: SDUPTHER

## 2021-06-29 RX ORDER — BUMETANIDE 1 MG/1
2 TABLET ORAL DAILY
Status: DISCONTINUED | OUTPATIENT
Start: 2021-06-29 | End: 2021-06-29 | Stop reason: HOSPADM

## 2021-06-29 RX ORDER — LEVOTHYROXINE SODIUM 0.03 MG/1
25 TABLET ORAL DAILY
Qty: 30 TABLET | Refills: 3 | Status: SHIPPED | OUTPATIENT
Start: 2021-06-30 | End: 2022-09-13

## 2021-06-29 RX ORDER — POTASSIUM CHLORIDE 20 MEQ/1
40 TABLET, EXTENDED RELEASE ORAL PRN
Qty: 60 TABLET | Refills: 3 | Status: ON HOLD | OUTPATIENT
Start: 2021-06-29 | End: 2021-12-08 | Stop reason: SDUPTHER

## 2021-06-29 RX ADMIN — MELOXICAM 7.5 MG: 7.5 TABLET ORAL at 09:45

## 2021-06-29 RX ADMIN — SACUBITRIL AND VALSARTAN 0.5 TABLET: 24; 26 TABLET, FILM COATED ORAL at 09:45

## 2021-06-29 RX ADMIN — Medication 81 MG: at 09:44

## 2021-06-29 RX ADMIN — FAMOTIDINE 20 MG: 20 TABLET, FILM COATED ORAL at 09:44

## 2021-06-29 RX ADMIN — LEVOTHYROXINE SODIUM 25 MCG: 25 TABLET ORAL at 09:44

## 2021-06-29 RX ADMIN — ALLOPURINOL 100 MG: 100 TABLET ORAL at 09:44

## 2021-06-29 RX ADMIN — POTASSIUM CHLORIDE 40 MEQ: 1500 TABLET, EXTENDED RELEASE ORAL at 09:44

## 2021-06-29 ASSESSMENT — PAIN SCALES - GENERAL: PAINLEVEL_OUTOF10: 5

## 2021-06-29 NOTE — PROGRESS NOTES
[facility]   Progress Note                   Date:   6/29/2021  Patient name: Stefani Terry  Date of admission:  6/26/2021 10:13 AM  MRN:   [id number]  YOB: 1955  PCP: René Mayes MD    Reason for Admission: Chest pain [R07.9]    Subjective:       Clinical Changes / Abnormalities: Patient seen and examined at the bed side. No new acute events overnight. Patient is doing well, has no complaints. Denies chest pain or SOB. Reviewed vitals, labs, tele, & previous testing. AV paced on tele. Medications:   Scheduled Meds:   meloxicam  7.5 mg Oral Daily    bumetanide  1 mg Intravenous Once    levothyroxine  25 mcg Oral Daily    rivaroxaban  20 mg Oral Daily    sacubitril-valsartan  0.5 tablet Oral BID    allopurinol  100 mg Oral Daily    [Held by provider] amiodarone  200 mg Oral Daily    aspirin  81 mg Oral Daily    metOLazone  2.5 mg Oral Every Other Day    metoprolol tartrate  25 mg Oral BID    spironolactone  12.5 mg Oral Daily    sodium chloride flush  5-40 mL Intravenous 2 times per day    famotidine  20 mg Oral BID     Continuous Infusions:   nitroGLYCERIN Stopped (06/26/21 1332)    sodium chloride       CBC:   Recent Labs     06/27/21  0636 06/28/21  0644 06/29/21  0750   WBC 5.4 6.0 6.7   HGB 11.8* 12.1 13.6    211 247     BMP:    Recent Labs     06/27/21  0636 06/28/21  0644 06/29/21  0750    140 138   K 3.6* 3.2* 4.3    104 104   CO2 22 21 21   BUN 19 20 19   CREATININE 0.87 0.86 0.83   GLUCOSE 102* 123* 113*     Hepatic: No results for input(s): AST, ALT, ALB, BILITOT, ALKPHOS in the last 72 hours. Troponin:   Recent Labs     06/27/21  0636   TROPHS 22*     BNP: No results for input(s): BNP in the last 72 hours. Lipids: No results for input(s): CHOL, HDL in the last 72 hours. Invalid input(s): LDLCALCU  INR: No results for input(s): INR in the last 72 hours.     Objective:   Vitals: BP 96/68   Pulse 70   Temp 97.8 °F (36.6 °C) (Oral)   Resp 16   Ht 5' 6\" (1.676 m)   Wt 240 lb 3.2 oz (109 kg)   SpO2 96%   BMI 38.77 kg/m²   General appearance: alert and cooperative with exam  HEENT: Head: Normocephalic, no lesions, without obvious abnormality. Neck: no JVD, trachea midline, no adenopathy  Lungs: Clear to auscultation  Heart: Regular rate and rhythm, s1/s2 auscultated, no murmurs. AV paced. Abdomen: soft, non-tender, bowel sounds active  Extremities: no edema  Neurologic: not done    ECHO 6/28/2021  Summary  Left ventricle is moderately enlarged. Global left ventricular systolic function is severely reduced, calculated  ejection fraction is 17% (by Hatfield's Method.)  Benoit is shyanne with severe global hypokinesis of remaining segments. Increased septal wall thickness. Evidence of severe (grade III) diastolic dysfunction. Left atrium is severely dilated. Right atrium is mildly dilated . Pacing/ICD lead seen in the right atrium/ventricle. Right ventricular dilatation with normal systolic function. Aortic valve is sclerotic but opens well. Trivial aortic insufficiency. Mitral valve is sclerotic. Mean gradient is 3mmHg. There is posterior leaflet restriction. Severe mitral regurgitation (eccentric jet.)  Mitral regurgitation: MR radius 1.0cm, MR EOA 0.48cm2, MR Volume 75mL, MR  Vena Contracta 0.91cm. Mild to moderate tricuspid regurgitation. Estimated right ventricular systolic pressure is 49 mmHg, suggesting mild  pulmonary HTN. Trivial pulmonic insufficiency. ECHO 5/19/2018  LVEF 10-15%  Severe Mitral Regurgitation with Pulmonary vein flow reversal  Mild to moderate Tricuspid Regurgitations. Assessment / Acute Cardiac Problems:   1. Acute on chronic systolic heart failure  2. AT/AFIB on device interrogation. 5%   3. Severe nonischemic cardiomyopathy, LVEF 15% by most recent echo in 2015.   4. Cardiac cath on 12/17/2012 showing only nonocclusive CAD. 5. Minimal troponin elevation secondary to 1   6. LBBB. 7. Initial ICD implant on 01/24/2008 at [facility] with [address], 8848 and 8070 leads. ICD change-out with Dr. Zackary Goodwin on 12/20/2012 to include addition of two LV epicardial leads (Medtronic 6511-66). The new device is a Medtronic Protecta CRT-D, model F682JOU. Patient required epicardial LV pacing with previous development of diaphragmatic stimulation from the coronary sinus lead. Patient had ICD revision to Medtronic Viva XT CRT-D, on 06/13/16. 8. Hospitalization at [facility] early March 2017 for multiple ICD shocks related to polymorphic ventricular tachycardia, associated with severe hypokalemia.    9. Hypokalemia    Patient Active Problem List:     Dilated cardiomyopathy ([facility] [address]     Pre-diabetes     Essential hypertension     PAH (pulmonary artery hypertension) ([facility] [address] 34 on Echo      Mitral regurgitation     Chronic headache     Morbid obesity due to excess calories ([facility] [address]     AICD (automatic cardioverter/defibrillator) present     Syncope and collapse secondary to [facility]     MAURO (acute kidney injury) ([facility] [address]     ICD (implantable cardioverter-defibrillator) battery depletion     Acute on chronic systolic CHF (congestive heart failure) (HCC)     Gout     Dizziness     Benign paroxysmal positional vertigo     Heart failure (HCC)     Hypotension (arterial)     NSVT (nonsustained ventricular tachycardia) ([facility])     Chest pain     Hypothyroidism    [id number] Score for Atrial Fibrillation Stroke Risk   Risk   Factors  Component Value   C CHF Yes 1   H HTN Yes 1   A2 Age >= 75 No,  (77 y.o.) 0   D DM No 0   S2 Prior Stroke/TIA No 0   V Vascular Disease No 0   A Age 74-69 Yes,  (77 y.o.) 1   Sc Sex female 1    [id number]  Score  4   Score last updated 6/28/21 0:16 PM EDT    Click here for a link to the UpToDate guideline \"Atrial Fibrillation: Anticoagulation therapy to prevent embolization    Disclaimer: Risk Score calculation is dependent on accuracy of patient problem list and past encounter diagnosis. Plan of Treatment:     1. Acute Systolic heart failure. Improved. Will have patient begin taking Bumex 2mg PO daily at discharge. Stressed importance of daily weights. Encouraged to monitor BP 2 hours after morning medications. 2. 2D Echocardiogram completed with results as noted above. LVEF remains reduced at 17%. Continues to show severe MR and mild to mod TR. Continue GDMT. Continue strict I/Os. And daily weights. Continue BB, aldactone, zaroxolyn, and Entresto. 3. Hx of AF/AT. Amiodarone held per primary service elevated TSH. Continue Metoprolol 25 mg BID. Thyroid US noted. Patient expressed desire to remain off Amiodarone at this time due to thyroid dysfunction. Will re-evaluate Amiodarone with Dr. Jorje Meehan at follow up. 4. Device interrogation completed and in chart. Reviewed AT/AF with 5.1% burden with Dr. Charles Gaitan who recommends patient be started on oral anticoagulation. CHANDSVASC score 4. Continue Xarelto daily. 5. No objection to discharge today from CV perspective. Patient has follow up appointment scheduled with Dr. Jorje Meehan on July 8th.        Electronically signed by Jaxon Tanner, NELLA Sheffield CNP on 6/29/2021 at 12:16 PM  [facility]      [phone]

## 2021-06-29 NOTE — PROGRESS NOTES
CLINICAL PHARMACY NOTE: MEDS TO BEDS    Total # of Prescriptions Filled: 5   The following medications were delivered to the patient:  · xarelto  · entresto  · Levothyroxine  · Nitroglycerin  · bumex    Additional Documentation:

## 2021-06-29 NOTE — PROGRESS NOTES
[facility]  Internal Medicine Teaching Residency Program  Inpatient Daily Progress Note  ______________________________________________________________________________    Patient: Jay Morales  YOB: 1955   KAH:[id number]    Acct: [de-identified]     Room: [facility]  Admit date: 6/26/2021  Today's date: 06/29/21  Number of days in the hospital: 3    SUBJECTIVE   Admitting Diagnosis: Acute on chronic systolic CHF (congestive heart failure) ([facility] [address]  CC: Chest discomfort, jaw pain, shortness of breath, dizziness    Patient was seen and evaluated at bedside, no acute events overnight. Vitals stable, afebrile, blood pressure on lower side, did not receive diuretics today. Labs unremarkable today , hemoglobin stable after starting Xarelto  Needs optimization of medications before discharge, will follow cardiology recommendations. ROS:  Constitutional:  negative for chills, fevers, sweats  Respiratory:  negative for cough, dyspnea on exertion, hemoptysis, shortness of breath, wheezing  Cardiovascular:  negative for chest pain, chest pressure/discomfort, lower extremity edema, palpitations  Gastrointestinal:  negative for abdominal pain, constipation, diarrhea, nausea, vomiting  Neurological:  negative for dizziness, headache  BRIEF HISTORY     The patient is a pleasant 78 y.o. female with past medical history of nonischemic cardiomyopathy with AICD in place since 2008, cath in 2012 -clear coronaries ,hypertension , MR, PAH, obesity, syncope and collapse, polymorphic ventricular tachycardia due to severe hypokalemia,  gout, BPPV , Covid infection        presents with a chief complaint of chest pain which started about 9 AM this morning, patient describes it as more like pressure-like discomfort, 4 on 10 in intensity when it started, on the anterior side of chest, radiating to jaw, started at rest, improved with baby aspirin and nitro.   She took 2 baby aspirin's at home and then 2 while in route to hospital.  Patient also complained of lightheadedness, states that she usually feels dizzy when she has abnormal rhythm. Patient also complained of worsening shortness of breath with lower extremity edema. She states that she has been using extra pillows at night for past few days.     Patient is unsure if she takes all her medications at home. She follows up with Dr. Chad Piedra.      Patient denies any fever, chills, cough, abdominal pain, nausea, vomiting, diarrhea, constipation, urinary complaints, palpitations.     Patient has been seen multiple times previously for similar complaints. As per chart review, for her dizziness she was given meclizine in ED and Tavo-Hallpike maneuver was done which was positive. Neurology evaluated the patient and recommended Antivert for BPPV with further follow-up as outpatient.     On arrival to ED, vitals stable, afebrile  Patient was also started on nitro drip for pain, currently off the drip  Pacer interrogation revealed rapid atrial rhythm, cardiology consulted  proBNP 1650, troponin 16> 17, potassium 3.3 replaced, TSH 17.74, thyroxine 0.53  EKG showed biventricular paced rhythm, no ischemic changes  Chest x-ray showed mild congestion     Last echo in 2018-showed EF of 10 to 15%, with severe mitral regurgitation, mild to moderate tricuspid regurgitation, RVSP 33 mmHg     Currently she is feeling better, her pain has resolved, she still feels short of breath and dizzy.       OBJECTIVE     Vital Signs:  BP (!) 94/58   Pulse 70   Temp 98.4 °F (36.9 °C) (Oral)   Resp 16   Ht 5' 6\" (1.676 m)   Wt 240 lb 3.2 oz (109 kg)   SpO2 96%   BMI 38.77 kg/m²     Temp (24hrs), Av.1 °F (36.7 °C), Min:97.9 °F (36.6 °C), Max:98.4 °F (36.9 °C)    No intake/output data recorded.     Physical Exam:  Constitutional: This is a well developed, well nourished, 35-39.9 - Obesity Grade II 78y.o. year old female who is alert, oriented, cooperative and in no apparent distress. Head:normocephalic and atraumatic. EENT:  PERRLA. No conjunctival injections. Septum was midline, mucosa was without erythema, exudates or cobblestoning. No thrush was noted. Neck: Supple without thyromegaly. No elevated JVP. Trachea was midline. Respiratory: Chest was symmetrical without dullness to percussion. Breath sounds bilaterally were clear to auscultation. There were no wheezes, rhonchi or rales. There is no intercostal retraction or use of accessory muscles. No egophony noted. Cardiovascular: Regular without murmur, clicks, gallops or rubs. Abdomen: Slightly rounded and soft without organomegaly. No rebound, rigidity or guarding was appreciated. Lymphatic: No lymphadenopathy. Musculoskeletal: Normal curvature of the spine. No gross muscle weakness. Extremities:  No lower extremity edema, ulcerations, tenderness, varicosities or erythema. Muscle size, tone and strength are normal.  No involuntary movements are noted. Skin:  Warm and dry. Good color, turgor and pigmentation. No lesions or scars.   No cyanosis or clubbing  Neurological/Psychiatric: The patient's general behavior, level of consciousness, thought content and emotional status is normal.        Medications:  Scheduled Medications:    meloxicam  7.5 mg Oral Daily    bumetanide  1 mg Intravenous Once    levothyroxine  25 mcg Oral Daily    rivaroxaban  20 mg Oral Daily    sacubitril-valsartan  0.5 tablet Oral BID    allopurinol  100 mg Oral Daily    [Held by provider] amiodarone  200 mg Oral Daily    aspirin  81 mg Oral Daily    metOLazone  2.5 mg Oral Every Other Day    metoprolol tartrate  25 mg Oral BID    spironolactone  12.5 mg Oral Daily    sodium chloride flush  5-40 mL Intravenous 2 times per day    famotidine  20 mg Oral BID     Continuous Infusions:    nitroGLYCERIN Stopped (06/26/21 1332)    sodium chloride       PRN Medicationspotassium chloride, 10 mEq, PRN  potassium chloride, 40 mEq, PRN   Or  potassium alternative oral replacement, 40 mEq, PRN   Or  potassium chloride, 10 mEq, PRN  nitroGLYCERIN, 0.4 mg, Q5 Min PRN  sodium chloride flush, 5-40 mL, PRN  sodium chloride, 25 mL, PRN  ondansetron, 4 mg, Q8H PRN   Or  ondansetron, 4 mg, Q6H PRN  polyethylene glycol, 17 g, Daily PRN  acetaminophen, 650 mg, Q6H PRN   Or  acetaminophen, 650 mg, Q6H PRN        Diagnostic Labs:  CBC:   Recent Labs     06/27/21  0636 06/28/21  0644 06/29/21  0750   WBC 5.4 6.0 6.7   RBC 4.01 4.10 4.64   HGB 11.8* 12.1 13.6   HCT 34.0* 35.3* 40.5   MCV 84.8 86.1 87.3   RDW 13.6 13.6 13.8    211 247     BMP:   Recent Labs     06/27/21  0636 06/28/21  0644 06/29/21  0750    140 138   K 3.6* 3.2* 4.3    104 104   CO2 22 21 21   BUN 19 20 19   CREATININE 0.87 0.86 0.83     BNP: No results for input(s): BNP in the last 72 hours. PT/INR: No results for input(s): PROTIME, INR in the last 72 hours. APTT: No results for input(s): APTT in the last 72 hours. CARDIAC ENZYMES: No results for input(s): CKMB, CKMBINDEX, TROPONINI in the last 72 hours. Invalid input(s): CKTOTAL;3  FASTING LIPID PANEL:  Lab Results   Component Value Date    CHOL 180 05/23/2020    HDL 42 05/23/2020    TRIG 79 05/23/2020     LIVER PROFILE: No results for input(s): AST, ALT, ALB, BILIDIR, BILITOT, ALKPHOS in the last 72 hours. MICROBIOLOGY:   Lab Results   Component Value Date/Time    CULTURE NO SPECIMEN RECEIVED 05/22/2020 05:15 PM       Imaging:    XR CHEST PORTABLE    Result Date: 6/26/2021  1. Mild cardiomegaly. Mild pulmonary vascular congestion. 2. Mild left basilar atelectasis and scarring.        ASSESSMENT & PLAN     ASSESSMENT / PLAN:     Acute on chronic systolic heart failure   Severe nonischemic cardiomyopathy, LVEF 15% by most recent echo  S/p ICD implant in 2008, with revision in 2012 and then in 2016  Nonocclusive CAD on cath in 2012  Severe MR, PAH  History of polymorphic ventricular tachycardia associated with severe hypokalemia- Continue Bumex oral, continue metolazone every other day and Aldactone daily.  - Pacer interrogation revealed 5.1% AT/AF. Repeat echo showed EF of 17% with grade 3 diastolic dysfunction with severe MR  - Cardiology on board, started on Entresto, continuing metoprolol, aspirin, started on Xarelto  - CHF education, daily weights, intake output  - Referral to structural heart disease clinic as outpatient.  -We will discontinue amiodarone , we will follow cardiology recommendations regarding any change in medications.     Dyslipidemia  Patient has refused antilipid medications in the past.      Newly diagnosed Hypothyroidism  - TSH 17, T4 low, started on synthroid 25 mcg daily and plan to follow up with PCP OP. TSH in 6-8 weeks.   -Discontinue amiodarone  - US thyroid revealed thyroid nodule, plan to follow up the same outpatient in 1 year with repeat scan. Essential hypertension  - Continue home meds, adjust as needed     BPPV  - Previously evaluated by neurology, Dhaval positive  - Minimal improvement with meclizine     Gout  Continue home meds     DVT ppx:  Xarelto  GI ppx: Pepcid     PT/OT/SW-ongoing  Discharge Planning: Ongoing     Carrie Corbin MD  Internal Medicine Resident, PGY-1  [address], [place]  6/29/2021, 11:25 AM

## 2021-06-29 NOTE — PROGRESS NOTES
Congestive Heart Failure Education completed and charted. CHF booklet given. Patient was receptive to education. Discussed the  importance of medication compliance. Discussed the importance of a heart healthy diet. Discussed 2000 mg sodium-restricted daily diet. Patient instructed to limit fluid intake to  1.5 to 2 liters per day. Patient instructed to weigh self at the same time of each day each morning, reinforced teaching to monitor for 3-5 lb weight increase over 1-2 days notify physician if change noted. Signs and symptoms of CHF discussed with patient, such as feeling more tired than normal, feeling short of breath, coughing that increases when lying down, sudden weight gain, swelling of the feet, legs or belly. Patient verbalized understanding to notify physician office if these symptoms occur. EF 17%   Pt was previously active at  [facility].  If her referral is currently inactive, will place new referral.

## 2021-06-29 NOTE — CARE COORDINATION
Transitional Planning:    Met with the pt, she stated that she feels safe to return home with her mother, she declined need for  DME/HH and reported that she will have a ride home by her daughter.      Discharge 1 [facility] Case Management Department  Written by: Ean Contreras RN    Patient Name: Chino Gil  Attending Provider: Jesús Wilson MD  Admit Date: 2021 10:13 AM  MRN: [id number]  Account: [de-identified]                     : 1955  Discharge Date: 21      Disposition: home    Ean Contreras RN

## 2021-07-01 NOTE — PROGRESS NOTES
Physician Progress Note      PATIENT:               Amarilis Altamirano  CSN #:                  [id number]  :                       1955  ADMIT DATE:       2021 10:13 AM  DISCH DATE:        2021 3:22 PM  RESPONDING  PROVIDER #:        Daniel Cerna MD          QUERY TEXT:    Patient admitted with Acute CHF , noted to have atrial fibrillation. on   amiodarone at home and metoprolol per cardiology progress note on  pacer   interrogation reviewed AT/AF. and starting on anticoagulation. noted xarelto   ordered . If possible, please document in progress notes and discharge summary   further specificity regarding the type of atrial fibrillation: The medical record reflects the following:  Risk Factors: history of a fib , pacemaker device , acute CHF cardiomyopathy  Clinical Indicators: per cardiology consult device interrogation showed AF/AT   holding amiodarone due to hypothyroidism recommend starting on   anticoagulation. .  Treatment: po xarelto  Chronic: nonspecific term that could be referring to paroxysmal, persistent,   or permanent  Longstanding persistent: persistent and continuous, lasting > 1 year. Paroxysmal - self-terminating or intermittent; resolves with or without   intervention within 7 days of onset; may recur with various frequency. Persistent - Fails to terminate within 7 days; Often requires meds or   cardioversion to restore to NSR. Permanent - longstanding & persistent; Medication has been ineffective in   restoring NSR &/or cardioversion is contraindicated    Definitions per MS-DR[employer] Training Guide and Quick Reference Guide, [address] 5   Diseases and Disorders of the Circulatory System; 2019; [employer]. Software content   from the [employer]?  Advanced [employer] Transformation Program    Please Call if any questions Thank you  Christy Atkins RN BSN CCDS  Options provided:  -- Longstanding Persistent Atrial Fibrillation  -- Permanent Atrial Fibrillation  -- Persistent Atrial Fibrillation  -- Chronic Atrial

## 2021-07-11 NOTE — DISCHARGE SUMMARY
[facility]     Department of Internal Medicine - Staff Internal Medicine Teaching Service    INPATIENT DISCHARGE SUMMARY      Patient Identification:  Nubia Lau is a 78 y.o. female. :  1955  MRN: [id number]     Acct: [de-identified]   PCP: Karthikeyan Zacarias MD  Admit Date:  2021  Discharge date and time: 2021  3:22 PM   Attending Provider: No att. providers found                                     [address] Problem Lists:  Principal Problem:    Acute on chronic systolic CHF (congestive heart failure) ([address]  Active Problems:    Dilated cardiomyopathy ([address]    Essential hypertension    PAH (pulmonary artery hypertension) ([address] 34 on Echo     Mitral regurgitation    AICD (automatic cardioverter/defibrillator) present    Chest pain    Hypothyroidism  Resolved Problems:    * No resolved hospital problems. *      HOSPITAL STAY     Brief Inpatient course:   Nubia Lau is a 78 y.o. female who was admitted for the management of Acute on chronic systolic CHF (congestive heart failure) ([address] presented to the emergency department with chest pain, dizziness and lightheadedness, shortness of breath with lower extremity edema. Has been admitted multiple times for similar complaints, on arrival to ED started on nitro drip for pain, pacer interrogation revealed rapid atrial rhythm, cardiology consulted, EKG showed biventricular paced rhythm, no ischemic changes, chest x-ray showed mild congestion, patient was diuresed while in hospital, with modification in cardiac medications as below. Patient is to follow-up with her PCP and cardiologist as outpatient. She has newly diagnosed hypothyroidism which is why amiodarone was discontinued, ultrasound thyroid revealed thyroid nodule, patient to follow-up outpatient for the same.   Currently stable for discharge    Consults:     Consults:     Final Specialist Recommendations/Findings:   IP CONSULT TO INTERNAL MEDICINE  IP CONSULT TO CASE MANAGEMENT  IP CONSULT TO HEART FAILURE NURSE/COORDINATOR  IP CONSULT TO CARDIOLOGY      Any Hospital Acquired Infections: none    Discharge Functional Status:  stable    DISCHARGE PLAN     Disposition: home    Patient Instructions:   Discharge Medication List as of 6/29/2021  2:37 PM      START taking these medications    Details   nitroGLYCERIN (NITROSTAT) 0.4 MG SL tablet up to max of 3 total doses.  If no relief after 1 dose, call 911., Disp-25 tablet, R-3Normal      rivaroxaban (XARELTO) 20 MG TABS tablet Take 1 tablet by mouth daily, Disp-30 tablet, R-11Normal      sacubitril-valsartan (ENTRESTO) 24-26 MG per tablet Take 0.5 tablets by mouth 2 times daily, Disp-60 tablet, R-11Normal      bumetanide (BUMEX) 2 MG tablet Take 1 tablet by mouth daily, Disp-30 tablet, R-3Normal      levothyroxine (SYNTHROID) 25 MCG tablet Take 1 tablet by mouth Daily, Disp-30 tablet, R-3Normal         CONTINUE these medications which have CHANGED    Details   potassium chloride (KLOR-CON M) 20 MEQ extended release tablet Take 2 tablets by mouth as needed (Potassium Replacement), Disp-60 tablet, R-3Normal         CONTINUE these medications which have NOT CHANGED    Details   Handicap Placard [facility] Starting Mon 4/12/2021, Disp-1 each, R-0, Print5 years please      lidocaine (LIDODERM) 5 % Place one patch to both knees 12 hours on, 12 hours off., Disp-30 patch,R-0Normal      magnesium oxide (MAG-OX) 400 MG tablet Take 400 mg by mouth daily Patient states takes 200mg PO dailyHistorical Med      metoprolol tartrate (LOPRESSOR) 25 MG tablet Take 1 tablet by mouth 2 times daily, Disp-60 tablet, R-2Normal      acetaminophen (TYLENOL) 325 MG tablet Take 1 tablet by mouth every 8 hours as needed for Pain, Disp-30 tablet, R-0Print      aspirin 81 MG tablet Take 81 mg by mouth dailyHistorical Med      allopurinol (ZYLOPRIM) 100 MG tablet Take 1 tablet by mouth daily, Disp-30 tablet, R-3Normal spironolactone (ALDACTONE) 25 MG tablet Take 0.5 tablets by mouth daily, Disp-30 tablet, R-3Normal         STOP taking these medications       meloxicam (MOBIC) 7.5 MG tablet Comments:   Reason for Stopping:         azithromycin (ZITHROMAX) 250 MG tablet Comments:   Reason for Stopping:         DEMADEX 20 MG tablet Comments:   Reason for Stopping:         metOLazone (ZAROXOLYN) 2.5 MG tablet Comments:   Reason for Stopping:         amiodarone (CORDARONE) 200 MG tablet Comments:   Reason for Stopping:         losartan (COZAAR) 25 MG tablet Comments:   Reason for Stopping:         potassium chloride (KLOR-CON M) 20 MEQ TBCR extended release tablet Comments:   Reason for Stopping:               Activity: activity as tolerated    Diet: cardiac diet    Follow-up:    Pina Melendez MD  [facility] [address] [place] [address]    On 7/8/2021  [address]  [phone]    Schedule an appointment as soon as possible for a visit in 2 weeks  Please follow up with PCP for post hospitalization follow up. Patient Instructions: Please stop taking demadex, metolazone, amiodarone and losartan. Start taking bumex 2 mg daily, entresto and xarelto. Please start taking synthroid 25 mcg daily and follow up with PCP for a repeat TSH test in 6-8 weeks. Follow up with PCP for post hospitalization follow up. Please follow up with Dr. Kristi Hernandez for low EF and medication changes made during hospitalization. Follow up labs: None  Follow up imaging: None    Note that over 30 minutes was spent in preparing discharge papers, discussing discharge with patient, medication review, etc.      Whitney Haas MD,   Internal Medicine Resident, PGY-2  [address] [place], [place]  7/11/2021, 12:03 PM

## 2021-07-13 ENCOUNTER — HOSPITAL ENCOUNTER (OUTPATIENT)
Dept: OTHER | Age: 66
Discharge: HOME | End: 2021-07-13
Payer: MEDICARE

## 2021-07-13 VITALS
DIASTOLIC BLOOD PRESSURE: 78 MMHG | WEIGHT: 228.6 LBS | RESPIRATION RATE: 20 BRPM | BODY MASS INDEX: 36.9 KG/M2 | HEART RATE: 78 BPM | SYSTOLIC BLOOD PRESSURE: 100 MMHG | OXYGEN SATURATION: 100 %

## 2021-07-13 PROCEDURE — 99212 OFFICE O/P EST SF 10 MIN: CPT

## 2021-07-13 RX ORDER — AMIODARONE HYDROCHLORIDE 100 MG/1
200 TABLET ORAL DAILY
COMMUNITY
End: 2022-02-08

## 2021-07-13 NOTE — PROGRESS NOTES
Date:  2021  Time:  10:19 AM    CHF Clinic at [facility]    Office: [phone] Fax: [phone]    Re:  Stefani Terry   Patient : 1955    Vital Signs: /78   Pulse 78   Resp 20   Wt 228 lb 9.6 oz (103.7 kg)   SpO2 100%   BMI 36.90 kg/m²                       O2 Device: None (Room air)                           No results for input(s): CBC, HGB, HCT, WBC, PLATELET, NA, K, CL, CO2, BUN, CREATININE, GLUCOSE, BNP, INR in the last 72 hours. Respiratory:    Assessment  Charting Type: Reassessment    Breath Sounds  Right Upper Lobe: Clear  Right Middle Lobe: Clear  Right Lower Lobe: Clear  Left Upper Lobe: Clear  Left Lower Lobe: Clear    Cough/Sputum  Cough: None       Peripheral Vascular  RLE Edema: None  LLE Edema: None    Complaints: Nothing new    Comment : Patient here ambulatory for routine visit. Weight down 6 lbs since last visit one year ago. OptiVol fluid index shows a steep decrease in fluid index, after recent hospitalization. She is now on bumex 2 mg 1x day. Reminded of low salt diet and fluid limits. No new or acute s/s CHF. Next visit here 2 months 2021. Seeing Tiny Alatorre CNP with cardiology 2021.     Electronically signed by Rob Washburn RN on 2021 at 10:19 AM

## 2021-07-14 PROBLEM — I50.42 CHRONIC COMBINED SYSTOLIC (CONGESTIVE) AND DIASTOLIC (CONGESTIVE) HEART FAILURE (HCC): Status: ACTIVE | Noted: 2021-07-14

## 2021-07-21 ENCOUNTER — TELEPHONE (OUTPATIENT)
Dept: CARDIOLOGY | Age: 66
End: 2021-07-21

## 2021-07-21 NOTE — TELEPHONE ENCOUNTER
Writer communicated  with Radha Bullock NP regarding pt. Pt will not be referred to [place] [address] [facility] despite her severe MR. Tarik Mclean NP has discussed pt with Dr Scott Dumont in the past. Pt has had severe MR for some time now.  Her EF remains low and she is not a candidate for Brittany Clip repair, per her Cardiologist.     Pati Dietz APRN G[place]  [place] [address] [facility]

## 2021-09-07 ENCOUNTER — HOSPITAL ENCOUNTER (OUTPATIENT)
Dept: OTHER | Age: 66
Discharge: HOME | End: 2021-09-07
Payer: MEDICARE

## 2021-09-07 VITALS
RESPIRATION RATE: 20 BRPM | DIASTOLIC BLOOD PRESSURE: 70 MMHG | OXYGEN SATURATION: 100 % | WEIGHT: 234.6 LBS | BODY MASS INDEX: 37.87 KG/M2 | HEART RATE: 89 BPM | SYSTOLIC BLOOD PRESSURE: 120 MMHG

## 2021-09-07 PROCEDURE — 99212 OFFICE O/P EST SF 10 MIN: CPT

## 2021-09-07 NOTE — PROGRESS NOTES
Date:  2021  Time:  9:38 AM    CHF Clinic at [facility]    Office: [phone] Fax: [phone]    Re:  Herrera Patricio   Patient : 1955    Vital Signs: /70   Pulse 89   Resp 20   Wt 234 lb 9.6 oz (106.4 kg)   SpO2 100%   BMI 37.87 kg/m²                       O2 Device: None (Room air)                           No results for input(s): CBC, HGB, HCT, WBC, PLATELET, NA, K, CL, CO2, BUN, CREATININE, GLUCOSE, BNP, INR in the last 72 hours. Respiratory:    Assessment  Charting Type: Reassessment    Breath Sounds  Right Upper Lobe: Clear  Right Middle Lobe: Clear  Right Lower Lobe: Clear  Left Upper Lobe: Clear  Left Lower Lobe: Clear    Cough/Sputum  Cough: None       Peripheral Vascular  RLE Edema: None  LLE Edema: None    Complaints: Tired and SOB    Comment : Patient here ambulatory for routine visit. Weight increased 6 lbs in one month. OptiVol fluid index has been increasing since early July. Patient states she has had a lot of family stress lately and probably has not been eating correctly. States compliance with medications and cardiology follow up. Will schedule an appt with PCP soon. Has appt with cardiology Oct 15. She will take an extra bumex for one day only, for her SOB. She states if she takes it for 3 days straight her \"kidneys hurt\". Next visit here she wishes for 6 weeks. Instructed to phone here if SOB continues or worsens.      Electronically signed by Miguel Mendes RN on 2021 at 9:38 AM

## 2021-09-14 ENCOUNTER — HOSPITAL ENCOUNTER (OUTPATIENT)
Age: 66
Discharge: HOME | End: 2021-09-14
Payer: MEDICARE

## 2021-09-14 LAB
ANION GAP SERPL CALCULATED.3IONS-SCNC: 13 MMOL/L (ref 9–17)
BUN BLDV-MCNC: 18 MG/DL (ref 8–23)
BUN/CREAT BLD: ABNORMAL (ref 9–20)
CALCIUM SERPL-MCNC: 9.3 MG/DL (ref 8.6–10.4)
CHLORIDE BLD-SCNC: 105 MMOL/L (ref 98–107)
CO2: 21 MMOL/L (ref 20–31)
CREAT SERPL-MCNC: 0.98 MG/DL (ref 0.5–0.9)
GFR AFRICAN AMERICAN: >60 ML/MIN
GFR NON-AFRICAN AMERICAN: 57 ML/MIN
GFR SERPL CREATININE-BSD FRML MDRD: ABNORMAL ML/MIN/{1.73_M2}
GFR SERPL CREATININE-BSD FRML MDRD: ABNORMAL ML/MIN/{1.73_M2}
GLUCOSE BLD-MCNC: 107 MG/DL (ref 70–99)
MAGNESIUM: 1.8 MG/DL (ref 1.6–2.6)
POTASSIUM SERPL-SCNC: 4 MMOL/L (ref 3.7–5.3)
SODIUM BLD-SCNC: 139 MMOL/L (ref 135–144)

## 2021-09-14 PROCEDURE — 83735 ASSAY OF MAGNESIUM: CPT

## 2021-09-14 PROCEDURE — 80048 BASIC METABOLIC PNL TOTAL CA: CPT

## 2021-10-26 ENCOUNTER — HOSPITAL ENCOUNTER (OUTPATIENT)
Dept: OTHER | Age: 66
Discharge: HOME | End: 2021-10-26
Payer: MEDICARE

## 2021-10-26 VITALS
RESPIRATION RATE: 20 BRPM | DIASTOLIC BLOOD PRESSURE: 80 MMHG | OXYGEN SATURATION: 100 % | WEIGHT: 236 LBS | BODY MASS INDEX: 38.09 KG/M2 | SYSTOLIC BLOOD PRESSURE: 110 MMHG | HEART RATE: 85 BPM

## 2021-10-26 PROCEDURE — 99212 OFFICE O/P EST SF 10 MIN: CPT

## 2021-10-26 ASSESSMENT — PAIN SCALES - GENERAL: PAINLEVEL_OUTOF10: 0

## 2021-10-26 NOTE — PROGRESS NOTES
Verbally reviewed medication list with patient; patient verbalized understanding. Discussed 2000mg/day sodium restricted diet; patient verbalized understanding. Moderate daily exercise encouraged as tolerated. Discussed rest breaks as needed; patient verbalized understanding. Patient instructed to weigh self at the same time of each day, using same clothes and same scale; reinforced teaching to monitor for 3-5 lb weight increase over 1-2 days, and to notify the CHF clinic at [phone] or physician office if weight change noted. Patient verbalized understanding. Risks of smoking discussed with the patient if applicable; patient strongly discouraged to smoke. Patient verbalized understanding. Signs and symptoms of CHF discussed with patient, such as feeling more tired than normal, feeling short of breath, coughing that increases when you lie down, sudden weight gain, swelling of your feet, legs or belly. Patient verbalized understanding to notify the CHF clinic at [phone] or physician office if these symptoms occur. Compliance with plan of care and further disease process causes discussed with patient, patient encouraged to keep all follow up appointments. Patient verbalized understanding. Ductal carcinoma in situ (DCIS) of left breast  09/25/2018    Active  Chela Espinal

## 2021-12-04 ENCOUNTER — HOSPITAL ENCOUNTER (INPATIENT)
Age: 66
LOS: 4 days | Discharge: HOME | DRG: 392 | End: 2021-12-08
Attending: EMERGENCY MEDICINE | Admitting: INTERNAL MEDICINE
Payer: MEDICARE

## 2021-12-04 ENCOUNTER — APPOINTMENT (OUTPATIENT)
Dept: CT IMAGING | Age: 66
DRG: 392 | End: 2021-12-04
Payer: MEDICARE

## 2021-12-04 DIAGNOSIS — K57.32 DIVERTICULITIS OF COLON: Primary | ICD-10-CM

## 2021-12-04 DIAGNOSIS — K65.1 ABSCESS OF ABDOMINAL CAVITY (HCC): ICD-10-CM

## 2021-12-04 PROBLEM — K57.92 DIVERTICULITIS: Status: ACTIVE | Noted: 2021-12-04

## 2021-12-04 PROBLEM — K63.0 ABSCESS OF SIGMOID COLON: Status: ACTIVE | Noted: 2021-12-04

## 2021-12-04 PROBLEM — I50.42 CHRONIC HEART FAILURE WITH REDUCED EJECTION FRACTION AND DIASTOLIC DYSFUNCTION (HCC): Status: ACTIVE | Noted: 2020-05-22

## 2021-12-04 PROBLEM — K57.90 DIVERTICULOSIS: Status: ACTIVE | Noted: 2021-12-04

## 2021-12-04 LAB
ABSOLUTE EOS #: 0.08 K/UL (ref 0–0.44)
ABSOLUTE IMMATURE GRANULOCYTE: 0.03 K/UL (ref 0–0.3)
ABSOLUTE LYMPH #: 1.24 K/UL (ref 1.1–3.7)
ABSOLUTE MONO #: 0.83 K/UL (ref 0.1–1.2)
ALBUMIN SERPL-MCNC: 3.7 G/DL (ref 3.5–5.2)
ALBUMIN/GLOBULIN RATIO: 0.9 (ref 1–2.5)
ALP BLD-CCNC: 236 U/L (ref 35–104)
ALT SERPL-CCNC: 14 U/L (ref 5–33)
ANION GAP SERPL CALCULATED.3IONS-SCNC: 13 MMOL/L (ref 9–17)
AST SERPL-CCNC: 21 U/L
BASOPHILS # BLD: 0 % (ref 0–2)
BASOPHILS ABSOLUTE: 0.04 K/UL (ref 0–0.2)
BILIRUB SERPL-MCNC: 0.89 MG/DL (ref 0.3–1.2)
BILIRUBIN URINE: NEGATIVE
BUN BLDV-MCNC: 21 MG/DL (ref 8–23)
BUN/CREAT BLD: ABNORMAL (ref 9–20)
CALCIUM SERPL-MCNC: 9.6 MG/DL (ref 8.6–10.4)
CHLORIDE BLD-SCNC: 101 MMOL/L (ref 98–107)
CO2: 19 MMOL/L (ref 20–31)
COLOR: YELLOW
COMMENT UA: ABNORMAL
CREAT SERPL-MCNC: 1.05 MG/DL (ref 0.5–0.9)
DIFFERENTIAL TYPE: ABNORMAL
EOSINOPHILS RELATIVE PERCENT: 1 % (ref 1–4)
GFR AFRICAN AMERICAN: >60 ML/MIN
GFR NON-AFRICAN AMERICAN: 52 ML/MIN
GFR SERPL CREATININE-BSD FRML MDRD: ABNORMAL ML/MIN/{1.73_M2}
GFR SERPL CREATININE-BSD FRML MDRD: ABNORMAL ML/MIN/{1.73_M2}
GLUCOSE BLD-MCNC: 110 MG/DL (ref 70–99)
GLUCOSE URINE: NEGATIVE
HCT VFR BLD CALC: 37 % (ref 36.3–47.1)
HEMOGLOBIN: 13.5 G/DL (ref 11.9–15.1)
IMMATURE GRANULOCYTES: 0 %
KETONES, URINE: NEGATIVE
LEUKOCYTE ESTERASE, URINE: NEGATIVE
LIPASE: 19 U/L (ref 13–60)
LYMPHOCYTES # BLD: 12 % (ref 24–43)
MCH RBC QN AUTO: 30.9 PG (ref 25.2–33.5)
MCHC RBC AUTO-ENTMCNC: 36.5 G/DL (ref 28.4–34.8)
MCV RBC AUTO: 84.7 FL (ref 82.6–102.9)
MONOCYTES # BLD: 8 % (ref 3–12)
NITRITE, URINE: NEGATIVE
NRBC AUTOMATED: 0 PER 100 WBC
PDW BLD-RTO: 12.9 % (ref 11.8–14.4)
PH UA: 5 (ref 5–8)
PLATELET # BLD: 319 K/UL (ref 138–453)
PLATELET ESTIMATE: ABNORMAL
PMV BLD AUTO: 11.1 FL (ref 8.1–13.5)
POTASSIUM SERPL-SCNC: 4.1 MMOL/L (ref 3.7–5.3)
PROTEIN UA: NEGATIVE
RBC # BLD: 4.37 M/UL (ref 3.95–5.11)
RBC # BLD: ABNORMAL 10*6/UL
SEG NEUTROPHILS: 79 % (ref 36–65)
SEGMENTED NEUTROPHILS ABSOLUTE COUNT: 8.3 K/UL (ref 1.5–8.1)
SODIUM BLD-SCNC: 133 MMOL/L (ref 135–144)
SPECIFIC GRAVITY UA: 1.09 (ref 1–1.03)
TOTAL PROTEIN: 8 G/DL (ref 6.4–8.3)
TURBIDITY: CLEAR
URINE HGB: NEGATIVE
UROBILINOGEN, URINE: NORMAL
WBC # BLD: 10.5 K/UL (ref 3.5–11.3)
WBC # BLD: ABNORMAL 10*3/UL

## 2021-12-04 PROCEDURE — 80053 COMPREHEN METABOLIC PANEL: CPT

## 2021-12-04 PROCEDURE — 1200000000 HC SEMI PRIVATE

## 2021-12-04 PROCEDURE — 74177 CT ABD & PELVIS W/CONTRAST: CPT

## 2021-12-04 PROCEDURE — 6370000000 HC RX 637 (ALT 250 FOR IP): Performed by: STUDENT IN AN ORGANIZED HEALTH CARE EDUCATION/TRAINING PROGRAM

## 2021-12-04 PROCEDURE — 99284 EMERGENCY DEPT VISIT MOD MDM: CPT

## 2021-12-04 PROCEDURE — 96365 THER/PROPH/DIAG IV INF INIT: CPT

## 2021-12-04 PROCEDURE — 6360000002 HC RX W HCPCS: Performed by: STUDENT IN AN ORGANIZED HEALTH CARE EDUCATION/TRAINING PROGRAM

## 2021-12-04 PROCEDURE — 2580000003 HC RX 258: Performed by: STUDENT IN AN ORGANIZED HEALTH CARE EDUCATION/TRAINING PROGRAM

## 2021-12-04 PROCEDURE — 6360000004 HC RX CONTRAST MEDICATION: Performed by: STUDENT IN AN ORGANIZED HEALTH CARE EDUCATION/TRAINING PROGRAM

## 2021-12-04 PROCEDURE — 96366 THER/PROPH/DIAG IV INF ADDON: CPT

## 2021-12-04 PROCEDURE — 85025 COMPLETE CBC W/AUTO DIFF WBC: CPT

## 2021-12-04 PROCEDURE — 87086 URINE CULTURE/COLONY COUNT: CPT

## 2021-12-04 PROCEDURE — 99223 1ST HOSP IP/OBS HIGH 75: CPT | Performed by: INTERNAL MEDICINE

## 2021-12-04 PROCEDURE — 96376 TX/PRO/DX INJ SAME DRUG ADON: CPT

## 2021-12-04 PROCEDURE — 83690 ASSAY OF LIPASE: CPT

## 2021-12-04 PROCEDURE — 6360000002 HC RX W HCPCS: Performed by: EMERGENCY MEDICINE

## 2021-12-04 PROCEDURE — 96375 TX/PRO/DX INJ NEW DRUG ADDON: CPT

## 2021-12-04 PROCEDURE — 81003 URINALYSIS AUTO W/O SCOPE: CPT

## 2021-12-04 RX ORDER — SODIUM CHLORIDE 9 MG/ML
INJECTION, SOLUTION INTRAVENOUS CONTINUOUS
Status: DISCONTINUED | OUTPATIENT
Start: 2021-12-04 | End: 2021-12-07

## 2021-12-04 RX ORDER — HEPARIN SODIUM 1000 [USP'U]/ML
4000 INJECTION, SOLUTION INTRAVENOUS; SUBCUTANEOUS ONCE
Status: COMPLETED | OUTPATIENT
Start: 2021-12-04 | End: 2021-12-05

## 2021-12-04 RX ORDER — POTASSIUM CHLORIDE 20 MEQ/1
40 TABLET, EXTENDED RELEASE ORAL PRN
Status: DISCONTINUED | OUTPATIENT
Start: 2021-12-04 | End: 2021-12-08 | Stop reason: HOSPADM

## 2021-12-04 RX ORDER — BUMETANIDE 1 MG/1
2 TABLET ORAL DAILY
Status: DISCONTINUED | OUTPATIENT
Start: 2021-12-04 | End: 2021-12-08 | Stop reason: HOSPADM

## 2021-12-04 RX ORDER — HEPARIN SODIUM 1000 [USP'U]/ML
2000 INJECTION, SOLUTION INTRAVENOUS; SUBCUTANEOUS PRN
Status: DISCONTINUED | OUTPATIENT
Start: 2021-12-04 | End: 2021-12-07

## 2021-12-04 RX ORDER — METOPROLOL TARTRATE 50 MG/1
25 TABLET, FILM COATED ORAL 2 TIMES DAILY
Status: DISCONTINUED | OUTPATIENT
Start: 2021-12-04 | End: 2021-12-08 | Stop reason: HOSPADM

## 2021-12-04 RX ORDER — FENTANYL CITRATE 50 UG/ML
50 INJECTION, SOLUTION INTRAMUSCULAR; INTRAVENOUS ONCE
Status: COMPLETED | OUTPATIENT
Start: 2021-12-04 | End: 2021-12-04

## 2021-12-04 RX ORDER — POLYETHYLENE GLYCOL 3350 17 G/17G
17 POWDER, FOR SOLUTION ORAL DAILY PRN
Status: DISCONTINUED | OUTPATIENT
Start: 2021-12-04 | End: 2021-12-08 | Stop reason: HOSPADM

## 2021-12-04 RX ORDER — HEPARIN SODIUM 1000 [USP'U]/ML
4000 INJECTION, SOLUTION INTRAVENOUS; SUBCUTANEOUS PRN
Status: DISCONTINUED | OUTPATIENT
Start: 2021-12-04 | End: 2021-12-07

## 2021-12-04 RX ORDER — PROMETHAZINE HYDROCHLORIDE 12.5 MG/1
12.5 TABLET ORAL EVERY 6 HOURS PRN
Status: DISCONTINUED | OUTPATIENT
Start: 2021-12-04 | End: 2021-12-08 | Stop reason: HOSPADM

## 2021-12-04 RX ORDER — POTASSIUM CHLORIDE 7.45 MG/ML
10 INJECTION INTRAVENOUS PRN
Status: DISCONTINUED | OUTPATIENT
Start: 2021-12-04 | End: 2021-12-08 | Stop reason: HOSPADM

## 2021-12-04 RX ORDER — ACETAMINOPHEN 650 MG/1
650 SUPPOSITORY RECTAL EVERY 6 HOURS PRN
Status: DISCONTINUED | OUTPATIENT
Start: 2021-12-04 | End: 2021-12-08 | Stop reason: HOSPADM

## 2021-12-04 RX ORDER — SODIUM CHLORIDE 9 MG/ML
25 INJECTION, SOLUTION INTRAVENOUS PRN
Status: DISCONTINUED | OUTPATIENT
Start: 2021-12-04 | End: 2021-12-08 | Stop reason: HOSPADM

## 2021-12-04 RX ORDER — FENTANYL CITRATE 50 UG/ML
25 INJECTION, SOLUTION INTRAMUSCULAR; INTRAVENOUS
Status: DISCONTINUED | OUTPATIENT
Start: 2021-12-04 | End: 2021-12-08 | Stop reason: HOSPADM

## 2021-12-04 RX ORDER — ASPIRIN 81 MG/1
81 TABLET ORAL DAILY
Status: DISCONTINUED | OUTPATIENT
Start: 2021-12-04 | End: 2021-12-07

## 2021-12-04 RX ORDER — ACETAMINOPHEN 500 MG
1000 TABLET ORAL ONCE
Status: COMPLETED | OUTPATIENT
Start: 2021-12-04 | End: 2021-12-04

## 2021-12-04 RX ORDER — 0.9 % SODIUM CHLORIDE 0.9 %
1000 INTRAVENOUS SOLUTION INTRAVENOUS ONCE
Status: COMPLETED | OUTPATIENT
Start: 2021-12-04 | End: 2021-12-04

## 2021-12-04 RX ORDER — AMIODARONE HYDROCHLORIDE 200 MG/1
200 TABLET ORAL DAILY
Status: DISCONTINUED | OUTPATIENT
Start: 2021-12-04 | End: 2021-12-08 | Stop reason: HOSPADM

## 2021-12-04 RX ORDER — ONDANSETRON 2 MG/ML
4 INJECTION INTRAMUSCULAR; INTRAVENOUS EVERY 6 HOURS PRN
Status: DISCONTINUED | OUTPATIENT
Start: 2021-12-04 | End: 2021-12-08 | Stop reason: HOSPADM

## 2021-12-04 RX ORDER — SODIUM CHLORIDE 0.9 % (FLUSH) 0.9 %
10 SYRINGE (ML) INJECTION PRN
Status: DISCONTINUED | OUTPATIENT
Start: 2021-12-04 | End: 2021-12-08 | Stop reason: HOSPADM

## 2021-12-04 RX ORDER — LEVOTHYROXINE SODIUM 0.03 MG/1
25 TABLET ORAL DAILY
Status: DISCONTINUED | OUTPATIENT
Start: 2021-12-04 | End: 2021-12-05

## 2021-12-04 RX ORDER — SPIRONOLACTONE 25 MG/1
12.5 TABLET ORAL DAILY
Status: DISCONTINUED | OUTPATIENT
Start: 2021-12-04 | End: 2021-12-08 | Stop reason: HOSPADM

## 2021-12-04 RX ORDER — ONDANSETRON 2 MG/ML
4 INJECTION INTRAMUSCULAR; INTRAVENOUS ONCE
Status: COMPLETED | OUTPATIENT
Start: 2021-12-04 | End: 2021-12-04

## 2021-12-04 RX ORDER — SODIUM CHLORIDE 0.9 % (FLUSH) 0.9 %
10 SYRINGE (ML) INJECTION EVERY 12 HOURS SCHEDULED
Status: DISCONTINUED | OUTPATIENT
Start: 2021-12-04 | End: 2021-12-08 | Stop reason: HOSPADM

## 2021-12-04 RX ORDER — HEPARIN SODIUM 10000 [USP'U]/100ML
5-30 INJECTION, SOLUTION INTRAVENOUS CONTINUOUS
Status: DISCONTINUED | OUTPATIENT
Start: 2021-12-04 | End: 2021-12-07

## 2021-12-04 RX ORDER — ACETAMINOPHEN 325 MG/1
650 TABLET ORAL EVERY 6 HOURS PRN
Status: DISCONTINUED | OUTPATIENT
Start: 2021-12-04 | End: 2021-12-08 | Stop reason: HOSPADM

## 2021-12-04 RX ADMIN — ONDANSETRON 4 MG: 2 INJECTION INTRAMUSCULAR; INTRAVENOUS at 09:24

## 2021-12-04 RX ADMIN — SODIUM CHLORIDE: 9 INJECTION, SOLUTION INTRAVENOUS at 18:02

## 2021-12-04 RX ADMIN — FENTANYL CITRATE 50 MCG: 50 INJECTION, SOLUTION INTRAMUSCULAR; INTRAVENOUS at 11:45

## 2021-12-04 RX ADMIN — ACETAMINOPHEN 1000 MG: 500 TABLET ORAL at 09:19

## 2021-12-04 RX ADMIN — PIPERACILLIN AND TAZOBACTAM 4500 MG: 4; .5 INJECTION, POWDER, LYOPHILIZED, FOR SOLUTION INTRAVENOUS; PARENTERAL at 11:38

## 2021-12-04 RX ADMIN — PIPERACILLIN AND TAZOBACTAM 3375 MG: 3; .375 INJECTION, POWDER, FOR SOLUTION INTRAVENOUS at 20:04

## 2021-12-04 RX ADMIN — FENTANYL CITRATE 25 MCG: 50 INJECTION, SOLUTION INTRAMUSCULAR; INTRAVENOUS at 18:07

## 2021-12-04 RX ADMIN — FENTANYL CITRATE 25 MCG: 50 INJECTION, SOLUTION INTRAMUSCULAR; INTRAVENOUS at 20:09

## 2021-12-04 RX ADMIN — SACUBITRIL AND VALSARTAN 0.5 TABLET: 24; 26 TABLET, FILM COATED ORAL at 20:02

## 2021-12-04 RX ADMIN — FENTANYL CITRATE 50 MCG: 50 INJECTION, SOLUTION INTRAMUSCULAR; INTRAVENOUS at 13:36

## 2021-12-04 RX ADMIN — LEVOTHYROXINE SODIUM 25 MCG: 25 TABLET ORAL at 18:02

## 2021-12-04 RX ADMIN — IOPAMIDOL 75 ML: 755 INJECTION, SOLUTION INTRAVENOUS at 10:39

## 2021-12-04 RX ADMIN — AMIODARONE HYDROCHLORIDE 200 MG: 200 TABLET ORAL at 18:03

## 2021-12-04 RX ADMIN — ENOXAPARIN SODIUM 40 MG: 40 INJECTION SUBCUTANEOUS at 18:06

## 2021-12-04 RX ADMIN — METOPROLOL TARTRATE 25 MG: 50 TABLET, FILM COATED ORAL at 20:01

## 2021-12-04 RX ADMIN — SODIUM CHLORIDE: 9 INJECTION, SOLUTION INTRAVENOUS at 21:28

## 2021-12-04 RX ADMIN — SODIUM CHLORIDE 1000 ML: 9 INJECTION, SOLUTION INTRAVENOUS at 09:18

## 2021-12-04 RX ADMIN — FENTANYL CITRATE 50 MCG: 50 INJECTION, SOLUTION INTRAMUSCULAR; INTRAVENOUS at 09:19

## 2021-12-04 RX ADMIN — SPIRONOLACTONE 12.5 MG: 25 TABLET ORAL at 18:03

## 2021-12-04 RX ADMIN — FENTANYL CITRATE 25 MCG: 50 INJECTION, SOLUTION INTRAMUSCULAR; INTRAVENOUS at 23:11

## 2021-12-04 ASSESSMENT — ENCOUNTER SYMPTOMS
TROUBLE SWALLOWING: 0
COUGH: 1
ABDOMINAL PAIN: 1
SHORTNESS OF BREATH: 1
EYES NEGATIVE: 1
BACK PAIN: 0
VOMITING: 0
VOICE CHANGE: 0
NAUSEA: 1
DIARRHEA: 1
CONSTIPATION: 0

## 2021-12-04 ASSESSMENT — PAIN DESCRIPTION - LOCATION: LOCATION: ABDOMEN

## 2021-12-04 ASSESSMENT — PAIN SCALES - GENERAL
PAINLEVEL_OUTOF10: 8
PAINLEVEL_OUTOF10: 8
PAINLEVEL_OUTOF10: 6
PAINLEVEL_OUTOF10: 8
PAINLEVEL_OUTOF10: 8
PAINLEVEL_OUTOF10: 9
PAINLEVEL_OUTOF10: 5

## 2021-12-04 ASSESSMENT — PAIN DESCRIPTION - PAIN TYPE: TYPE: ACUTE PAIN

## 2021-12-04 ASSESSMENT — PAIN DESCRIPTION - ONSET: ONSET: ON-GOING

## 2021-12-04 ASSESSMENT — PAIN DESCRIPTION - DIRECTION: RADIATING_TOWARDS: LEFT FLANK AND BACK

## 2021-12-04 ASSESSMENT — PAIN DESCRIPTION - PROGRESSION: CLINICAL_PROGRESSION: GRADUALLY WORSENING

## 2021-12-04 ASSESSMENT — PAIN DESCRIPTION - FREQUENCY: FREQUENCY: CONTINUOUS

## 2021-12-04 ASSESSMENT — PAIN DESCRIPTION - ORIENTATION: ORIENTATION: MID

## 2021-12-04 ASSESSMENT — PAIN DESCRIPTION - DESCRIPTORS: DESCRIPTORS: DULL;SHARP

## 2021-12-04 NOTE — ED NOTES
Bed: 26  Expected date:   Expected time:   Means of arrival:   Comments:     Iwona Seay RN  12/04/21 5877

## 2021-12-04 NOTE — ED PROVIDER NOTES
[facility]  FACULTY HANDOFF       Handoff taken on the following patient from prior Attending Physician:  Pt Name: Rosa Godfrey  PCP:  Pb Roldan MD    Attestation  I was available and discussed any additional care issues that arose and coordinated the management plans with the resident(s) caring for the patient during my duty period. Any areas of disagreement with resident's documentation of care or procedures are noted on the chart. I was personally present for the key portions of any/all procedures during my duty period. I have documented in the chart those procedures where I was not present during the key portions.             Kaylyn Bills MD  12/04/21 0902

## 2021-12-04 NOTE — ED PROVIDER NOTES
[facility]     Emergency Department     Faculty Attestation    I performed a history and physical examination of the patient and discussed management with the resident. I reviewed the residents note and agree with the documented findings and plan of care. Any areas of disagreement are noted on the chart. I was personally present for the key portions of any procedures. I have documented in the chart those procedures where I was not present during the key portions. I have reviewed the emergency nurses triage note. I agree with the chief complaint, past medical history, past surgical history, allergies, medications, social and family history as documented unless otherwise noted below. For Physician Assistant/ Nurse Practitioner cases/documentation I have personally evaluated this patient and have completed at least one if not all key elements of the E/M (history, physical exam, and MDM). Additional findings are as noted. With history of diverticulitis presents with left lower quadrant abdominal pain that she says radiates around to her back. She says that this started on Wednesday. She has had nausea and vomiting as well. She says that her bowel movements have been pasty in consistency but denies any blood in the stool. She denies any problems with urinating. She denies fever, chest pain, shortness of breath. On exam, patient is lying in the bed and appears mildly uncomfortable. Lungs clear to auscultation bilaterally heart sounds are normal.  Abdomen is soft with moderate left-sided tenderness. No rebound or guarding is present. Will check labs and CT scan of the abdomen. Will treat patient's pain and nausea and reassess.       Aren Staley MD  Attending Emergency  Physician             Jeannette Stinosn MD  12/04/21 [id number]

## 2021-12-04 NOTE — ED PROVIDER NOTES
[facility] ED  Emergency Department Encounter  Emergency Medicine Resident     Pt Name: Elle Aguilar  PVB:[id number]  Jessicatrongfjulio 1955  Date of evaluation: 12/4/21  PCP:  Vishal Thapa MD    CHIEF COMPLAINT       Chief Complaint   Patient presents with    Abdominal Pain     middle radiating to left flank, patient states it is a diverticulitis flare up, took tylenol extra strength at 12:30am w/no relief       HISTORY OF PRESENT ILLNESS  (Location/Symptom, Timing/Onset, Context/Setting, Quality, Duration, ModifyingFactors, Severity.)      Elle Aguilar is a 78 y.o. female with PMH of Diverticulitis, MAURO presents emergency department for acute onset of abdominal pain. Patient states that she has a mild peanut allergy and states that she did eat a payday candy bar on Tuesday. Patient states that on Wednesday all of her symptoms began with acute onset left lower quadrant and hypogastric abdominal pain in addition to several large bouts of diarrhea. Patient states diarrhea has continued and the pain now radiates from her belly back up to her flank. Patient denies any blood in the urine, urinary frequency urgency. Patient states that she has had diverticulitis flares up in the past and this feels very similar to that. PAST MEDICAL / SURGICAL / SOCIAL /FAMILY HISTORY      has a past medical history of Abnormal Pap smear, Acute on chronic systolic congestive heart failure (HCC), AICD (automatic cardioverter/defibrillator) present, MAURO (acute kidney injury) ([address] Anemia, Arthritis, Cardiomyopathy ([address] Cardiomyopathy, nonischemic (HCC), Chronic combined systolic and diastolic congestive heart failure ([address] Ejection fraction < 50%, HTN (hypertension), Hypothyroidism, Ischemic cardiomyopathy, Left bundle branch block, Mitral regurgitation, Morbid obesity due to excess calories ([address] Pulmonary hypertension ([address] and Sudden onset of severe headache.   No other pertinent PMH on review with patient/guardian. has a past surgical history that includes other surgical history (12/20/2012); Tubal ligation; hernia repair; Colonoscopy; and pacemaker placement. No other pertinent PSH on review with patient/guardian. Social History     Socioeconomic History    Marital status:      Spouse name: Not on file    Number of children: 2    Years of education: Not on file    Highest education level: Not on file   Occupational History    Not on file   Tobacco Use    Smoking status: Never Smoker    Smokeless tobacco: Never Used   Substance and Sexual Activity    Alcohol use: No     Alcohol/week: 0.0 standard drinks    Drug use: Not Currently    Sexual activity: Never   Other Topics Concern    Not on file   Social History Narrative    Not on file     Social Determinants of Health     Financial Resource Strain:     Difficulty of Paying Living Expenses: Not on file   Food Insecurity:     Worried About Running Out of Food in the Last Year: Not on file    Ninfa of Food in the Last Year: Not on file   Transportation Needs:     Lack of Transportation (Medical): Not on file    Lack of Transportation (Non-Medical):  Not on file   Physical Activity:     Days of Exercise per Week: Not on file    Minutes of Exercise per Session: Not on file   Stress:     Feeling of Stress : Not on file   Social Connections:     Frequency of Communication with Friends and Family: Not on file    Frequency of Social Gatherings with Friends and Family: Not on file    Attends Zoroastrianism Services: Not on file    Active Member of Clubs or Organizations: Not on file    Attends Club or Organization Meetings: Not on file    Marital Status: Not on file   Intimate Partner Violence:     Fear of Current or Ex-Partner: Not on file    Emotionally Abused: Not on file    Physically Abused: Not on file    Sexually Abused: Not on file   Housing Stability:     Unable to Pay for Housing in the Last Year: Not on file    Handicap Placard MISC by Does not apply route 5 years please 4/12/21   Govind Valdes DO   lidocaine (LIDODERM) 5 % Place one patch to both knees 12 hours on, 12 hours off. 11/23/20   Govind Valdes DO   magnesium oxide (MAG-OX) 400 MG tablet Take 400 mg by mouth daily Patient states takes 200mg PO daily    Historical Provider, MD   metoprolol tartrate (LOPRESSOR) 25 MG tablet Take 1 tablet by mouth 2 times daily 5/25/20   Natali Little MD   acetaminophen (TYLENOL) 325 MG tablet Take 1 tablet by mouth every 8 hours as needed for Pain 1/7/20   Tucker Fermin DO   aspirin 81 MG tablet Take 81 mg by mouth daily    Historical Provider, MD   allopurinol (ZYLOPRIM) 100 MG tablet Take 1 tablet by mouth daily 2/18/19   Gerald Fofana MD   spironolactone (ALDACTONE) 25 MG tablet Take 0.5 tablets by mouth daily 2/18/19   Gerald Fofana MD       REVIEW OF SYSTEMS    (2-9 systems for level 4, 10 ormore for level 5)      Review of Systems   Constitutional: Negative for activity change, appetite change and fever. HENT: Negative for congestion, trouble swallowing and voice change. Eyes: Negative. Respiratory: Positive for cough and shortness of breath. Cardiovascular: Negative for chest pain. Gastrointestinal: Positive for abdominal pain, diarrhea and nausea. Negative for constipation and vomiting. Genitourinary: Negative. Musculoskeletal: Negative for arthralgias and back pain. Skin: Negative. Neurological: Negative. Psychiatric/Behavioral: Negative. PHYSICAL EXAM   (up to 7 for level 4, 8 or more for level 5)      INITIAL VITALS:   /73   Pulse 76   Temp 97.9 °F (36.6 °C) (Oral)   Resp 18   Ht 5' 6\" (1.676 m)   Wt 234 lb (106.1 kg)   SpO2 96%   BMI 37.77 kg/m²     Physical Exam  Constitutional:       Appearance: She is obese. She is not ill-appearing or diaphoretic.    HENT:      Mouth/Throat:      Mouth: Mucous membranes are moist.      Pharynx: Oropharynx is clear. Eyes:      Extraocular Movements: Extraocular movements intact. Cardiovascular:      Rate and Rhythm: Normal rate and regular rhythm. Heart sounds: Normal heart sounds. Pulmonary:      Effort: Pulmonary effort is normal.      Breath sounds: Normal breath sounds. Abdominal:      General: Abdomen is protuberant. There is no distension. Palpations: Abdomen is soft. Tenderness: There is abdominal tenderness in the periumbilical area and left lower quadrant. There is left CVA tenderness. There is no right CVA tenderness. Skin:     General: Skin is warm and dry. Capillary Refill: Capillary refill takes less than 2 seconds. Neurological:      General: No focal deficit present. Mental Status: She is alert.    Psychiatric:         Mood and Affect: Mood normal.         DIFFERENTIAL  DIAGNOSIS     DDX: Diverticulitis, kidney stone, acute cystitis, bowel perforation    PLAN (LABS / IMAGING / EKG):  Orders Placed This Encounter   Procedures    Culture, Urine    CT ABDOMEN PELVIS W IV CONTRAST Additional Contrast? None    CBC Auto Differential    Comprehensive Metabolic Panel w/ Reflex to MG    Lipase    Urinalysis, reflex to microscopic    Inpatient consult to General Surgery    Inpatient consult to IR    Inpatient consult to Internal Medicine    PATIENT STATUS (FROM ED OR OR/PROCEDURAL) Inpatient       MEDICATIONS ORDERED:  Orders Placed This Encounter   Medications    fentaNYL (SUBLIMAZE) injection 50 mcg    acetaminophen (TYLENOL) tablet 1,000 mg    0.9 % sodium chloride bolus    ondansetron (ZOFRAN) injection 4 mg    iopamidol (ISOVUE-370) 76 % injection 75 mL    piperacillin-tazobactam (ZOSYN) 4,500 mg in dextrose 5 % 100 mL IVPB (mini-bag)     Order Specific Question:   Antimicrobial Indications     Answer:   Intra-Abdominal Infection    fentaNYL (SUBLIMAZE) injection 50 mcg    fentaNYL (SUBLIMAZE) injection 50 mcg    piperacillin-tazobactam (ZOSYN) 3,375 mg in dextrose 5 % 50 mL IVPB extended infusion (mini-bag)     Order Specific Question:   Antimicrobial Indications     Answer:   Intra-Abdominal Infection           DIAGNOSTIC RESULTS / EMERGENCY DEPARTMENT COURSE / MDM     LABS:  Results for orders placed or performed during the hospital encounter of 12/04/21   CBC Auto Differential   Result Value Ref Range    WBC 10.5 3.5 - 11.3 k/uL    RBC 4.37 3.95 - 5.11 m/uL    Hemoglobin 13.5 11.9 - 15.1 g/dL    Hematocrit 37.0 36.3 - 47.1 %    MCV 84.7 82.6 - 102.9 fL    MCH 30.9 25.2 - 33.5 pg    MCHC 36.5 (H) 28.4 - 34.8 g/dL    RDW 12.9 11.8 - 14.4 %    Platelets 418 609 - 899 k/uL    MPV 11.1 8.1 - 13.5 fL    NRBC Automated 0.0 0.0 per 100 WBC    Differential Type NOT REPORTED     Seg Neutrophils 79 (H) 36 - 65 %    Lymphocytes 12 (L) 24 - 43 %    Monocytes 8 3 - 12 %    Eosinophils % 1 1 - 4 %    Basophils 0 0 - 2 %    Immature Granulocytes 0 0 %    Segs Absolute 8.30 (H) 1.50 - 8.10 k/uL    Absolute Lymph # 1.24 1.10 - 3.70 k/uL    Absolute Mono # 0.83 0.10 - 1.20 k/uL    Absolute Eos # 0.08 0.00 - 0.44 k/uL    Basophils Absolute 0.04 0.00 - 0.20 k/uL    Absolute Immature Granulocyte 0.03 0.00 - 0.30 k/uL    WBC Morphology NOT REPORTED     RBC Morphology NOT REPORTED     Platelet Estimate NOT REPORTED    Comprehensive Metabolic Panel w/ Reflex to MG   Result Value Ref Range    Glucose 110 (H) 70 - 99 mg/dL    BUN 21 8 - 23 mg/dL    CREATININE 1.05 (H) 0.50 - 0.90 mg/dL    Bun/Cre Ratio NOT REPORTED 9 - 20    Calcium 9.6 8.6 - 10.4 mg/dL    Sodium 133 (L) 135 - 144 mmol/L    Potassium 4.1 3.7 - 5.3 mmol/L    Chloride 101 98 - 107 mmol/L    CO2 19 (L) 20 - 31 mmol/L    Anion Gap 13 9 - 17 mmol/L    Alkaline Phosphatase 236 (H) 35 - 104 U/L    ALT 14 5 - 33 U/L    AST 21 <32 U/L    Total Bilirubin 0.89 0.3 - 1.2 mg/dL    Total Protein 8.0 6.4 - 8.3 g/dL    Albumin 3.7 3.5 - 5.2 g/dL    Albumin/Globulin Ratio 0.9 (L) 1.0 - 2.5    GFR Non-African American 52 (L) >60 mL/min GFR African American >60 >60 mL/min    GFR Comment          GFR Staging NOT REPORTED    Lipase   Result Value Ref Range    Lipase 19 13 - 60 U/L   Urinalysis, reflex to microscopic   Result Value Ref Range    Color, UA Yellow Yellow    Turbidity UA Clear Clear    Glucose, Ur NEGATIVE NEGATIVE    Bilirubin Urine NEGATIVE NEGATIVE    Ketones, Urine NEGATIVE NEGATIVE    Specific Gravity, UA 1.090 (H) 1.005 - 1.030    Urine Hgb NEGATIVE NEGATIVE    pH, UA 5.0 5.0 - 8.0    Protein, UA NEGATIVE NEGATIVE    Urobilinogen, Urine Normal Normal    Nitrite, Urine NEGATIVE NEGATIVE    Leukocyte Esterase, Urine NEGATIVE NEGATIVE    Urinalysis Comments       Microscopic exam not performed based on chemical results unless requested in original order. IMPRESSION/MDM/ED COURSE:  78 y.o. female presented with abdominal pain does localize more to the left lower quadrant and periumbilical area. Given patient's history of diverticulitis, will get CT scans as well as full laboratory work-up as this is most likely diagnosis at this time. Also considering for possible bowel perforation, kidney stone given the area of pain. Will pain control with fentanyl Tylenol and reassess after CT scans have returned. ED Course as of 12/04/21 1629   Sat Dec 04, 2021   1213 Was able to speak with surgical team.  Recommending internal medicine admitted for multiple comorbidities. Surgery spoke with interventional radiology, patient will get IR drainage tomorrow after having 48 hours without Xarelto. [ES]   3019 Internal medicine contacted, was able to speak with him the emergency department. Stated they will accept the patient onto their service. [ES]      ED Course User Index  [ES] Lina Driver MD         RADIOLOGY:  CT ABDOMEN PELVIS W IV CONTRAST Additional Contrast? None   Final Result   1.  Wall thickening in the proximal to mid sigmoid colon is most prominent in   the mid sigmoid colon and is likely related to acute or subacute diverticulitis in the setting of severe colonic diverticulosis. There is an   associated contained rupture into the sigmoid mesocolon with a 3.9 cm x 4.6   cm x 3.4 cm abscess in the sigmoid mesocolon and an apparent sinus tract   extending from the mid sigmoid colon into the collection. Consider   colonoscopy following treatment to exclude underlying malignancy. 2. Focal peritonitis near the sigmoid colon and trace ascites are likely   reactive. 3. Prominent paracolic lymph nodes in the sigmoid mesocolon are likely   reactive, although kaleb metastases in the setting of malignancy could appear   similar. Recommend attention on follow-up imaging. 4. Mild cirrhotic changes of the liver may be sequelae of congestive   hepatopathy. No focal liver lesion. 5. Additional incidental findings as above. EKG  None    All EKG's are interpreted by the Emergency Department Physician who either signs or Co-signs this chart in the absence of a cardiologist.      PROCEDURES:  None    CONSULTS:  IP CONSULT TO GENERAL SURGERY  IP CONSULT TO INTERVENTIONAL RADIOLOGY  IP CONSULT TO INTERNAL MEDICINE  IP CONSULT TO CASE MANAGEMENT        FINAL IMPRESSION      1. Diverticulitis of colon    2. Abscess of abdominal cavity ([address]          DISPOSITION / PLAN     DISPOSITION Admitted 12/04/2021 03:14:54 PM      PATIENT REFERREDTO:  No follow-up provider specified.     DISCHARGE MEDICATIONS:  New Prescriptions    No medications on file       Donaldo Lind MD  PGY 2  Resident Physician Emergency Medicine  12/04/21 4:29 PM        (Please note that portions of this note were completed with a voice recognition program.Efforts were made to edit the dictations but occasionally words are mis-transcribed.)        Donaldo Lind MD  Resident  12/04/21 [id number]

## 2021-12-04 NOTE — H&P
[address]     Department of Internal Medicine - Staff Internal Medicine Teaching Service          ADMISSION NOTE/HISTORY AND PHYSICAL EXAMINATION   Date: 12/4/2021  Patient Name: Dougie Flannery  Date of admission: 12/4/2021  8:38 AM  YOB: 1955  PCP: Charo Crystal MD  History Obtained From:  patient, electronic medical record    CHIEF COMPLAINT     Chief complaint: abdominal pain    HISTORY OF PRESENTING ILLNESS     The patient is a pleasant 78 y.o. female presents with a chief complaint of abdominal pain located in LLQ that started on Tuesday. It worsened next day, was intermittent until last night when she had some food and it worsened to 9/10, radiating to the back associated with nausea, vomiting and diarrhea. Patient stated she had 2 episodes of diarrhea and several episodes of diarrhea. Patient stated her stools were dark brown but not black. And she wasn't able to keep food most of the week due to vomiting and abdominal pain. CT scan consistent with - severe colonic diverticulosis with contained rupture of sigmoid abscess 3.9 cm x 4.6 cm x 3.4 cm in mid sigmoid colon; focal peritonitis; consider colonoscopy to exclude malignancy; prominent paracolic LN. General surgery evalulated patient - recommended NPO, IV abx zosyn, IR to drain abscess Monday morning. Patient hemodynamically stable, afebrile. CT scan - showed diverticulitis  Labs showed Na 133,   MAURO with creatinine of 1.05  Glucose 110  Wbc wnl    UA negative, blood cultures pending. PMH - HFrEF EF 17% S/P AICD (patient referred to [facility] has appt this Wednesday), N-ICM, HTN, Hypothyroidism, MR, pulm htn, a fib, cardiac cath 2012 - non obst CAD    Echo 06/21 - Summary  Left ventricle is moderately enlarged.   Global left ventricular systolic function is severely reduced, calculated  ejection fraction is 17% (by Hatfield's Method.)  Stanton is shyanne with severe global (acute kidney injury) ([facility] 03/04/2017    Anemia     Arthritis     Cardiomyopathy ([facility] 12/20/2012    robotic replacement of 2 left ventricular leads/replacement of ICD generator    Cardiomyopathy, nonischemic ([facility] 12/20/2012    Chronic combined systolic and diastolic congestive heart failure (HCC)     Ejection fraction < 50%     HTN (hypertension)     Hypothyroidism     Ischemic cardiomyopathy     Left bundle branch block     Mitral regurgitation     Morbid obesity due to excess calories ([facility] 01/15/2017    Pulmonary hypertension ([facility]     Sudden onset of severe headache        PAST SURGICAL HISTORY     Past Surgical History:   Procedure Laterality Date    COLONOSCOPY      HERNIA REPAIR      OTHER SURGICAL HISTORY  12/20/2012    robotic placement of 2 left ventricular leads/replacement of ICD generator    PACEMAKER PLACEMENT      [employer] Viva XT CRT -D [id number] Serial : [id number]    TUBAL LIGATION         ALLERGIES     Codeine and Morphine    MEDICATIONS PRIOR TO ADMISSION     Prior to Admission medications    Medication Sig Start Date End Date Taking? Authorizing Provider   amiodarone (PACERONE) 100 MG tablet Take 200 mg by mouth daily    Historical Provider, MD   nitroGLYCERIN (NITROSTAT) 0.4 MG SL tablet up to max of 3 total doses.  If no relief after 1 dose, call 911. 6/29/21   Lobo Strickland MD   rivaroxaban Carol Young) 20 MG TABS tablet Take 1 tablet by mouth daily 6/29/21   Lobo Strickland MD   sacubitril-valsartan (ENTRESTO) 24-26 MG per tablet Take 0.5 tablets by mouth 2 times daily 6/29/21   Lobo Strickland MD   bumetanide (BUMEX) 2 MG tablet Take 1 tablet by mouth daily 6/29/21   Lobo Strickland MD   potassium chloride (KLOR-CON M) 20 MEQ extended release tablet Take 2 tablets by mouth as needed (Potassium Replacement) 6/29/21   Lobo Strickland MD   levothyroxine (SYNTHROID) 25 MCG tablet Take 1 tablet by mouth Daily 6/30/21   MD Darren Duran MIS by Does not apply route 5 years please 21   Brayan Beckham DO   lidocaine (LIDODERM) 5 % Place one patch to both knees 12 hours on, 12 hours off. 20   Brayan Beckham DO   magnesium oxide (MAG-OX) 400 MG tablet Take 400 mg by mouth daily Patient states takes 200mg PO daily    Historical Provider, MD   metoprolol tartrate (LOPRESSOR) 25 MG tablet Take 1 tablet by mouth 2 times daily 20   Chelsea Potter MD   acetaminophen (TYLENOL) 325 MG tablet Take 1 tablet by mouth every 8 hours as needed for Pain 20   Kyle Fear,    aspirin 81 MG tablet Take 81 mg by mouth daily    Historical Provider, MD   allopurinol (ZYLOPRIM) 100 MG tablet Take 1 tablet by mouth daily 19   Ginette White MD   spironolactone (ALDACTONE) 25 MG tablet Take 0.5 tablets by mouth daily 19   Ginette White MD       SOCIAL HISTORY     Tobacco: no  Alcohol: no  Illicits: no  Occupation:     FAMILY HISTORY     Family History   Problem Relation Age of Onset    Cancer Other         ovarian    High Blood Pressure Mother     Other Mother         copd    Arthritis Neg Hx     Asthma Neg Hx     Birth Defects Neg Hx     Depression Neg Hx     Diabetes Neg Hx     Early Death Neg Hx     Hearing Loss Neg Hx     Heart Disease Neg Hx     High Cholesterol Neg Hx     Kidney Disease Neg Hx     Learning Disabilities Neg Hx     Mental Retardation Neg Hx     Mental Illness Neg Hx     Miscarriages / Stillbirths Neg Hx     Stroke Neg Hx     Substance Abuse Neg Hx     Vision Loss Neg Hx     Breast Cancer Neg Hx     Colon Cancer Neg Hx     Eclampsia Neg Hx     Hypertension Neg Hx     Ovarian Cancer Neg Hx      Labor Neg Hx     Spont Abortions Neg Hx        PHYSICAL EXAM     Vitals: /73   Pulse 76   Temp 97.9 °F (36.6 °C) (Oral)   Resp 18   Ht 5' 6\" (1.676 m)   Wt 234 lb (106.1 kg)   SpO2 96%   BMI 37.77 kg/m²   Tmax: Temp (24hrs), Av.9 °F (36.6 °C), Min:97.9 °F (36.6 °C), Max:97.9 °F (36.6 °C)    Last Body weight:   Wt Readings from Last 3 Encounters:   12/04/21 234 lb (106.1 kg)   10/26/21 236 lb (107 kg)   09/07/21 234 lb 9.6 oz (106.4 kg)     Body Mass Index : Body mass index is 37.77 kg/m². PHYSICAL EXAMINATION:  Constitutional: This is a well developed, well nourished, 35-39.9 - Obesity Grade II 78y.o. year old female who is alert, oriented, cooperative and in no apparent distress. Head:normocephalic and atraumatic. EENT:  PERRLA. No conjunctival injections. Septum was midline, mucosa was without erythema, exudates or cobblestoning. No thrush was noted. Neck: Supple without thyromegaly. No elevated JVP. Trachea was midline. Respiratory: Chest was symmetrical without dullness to percussion. Breath sounds bilaterally were clear to auscultation. There were no wheezes, rhonchi or rales. There is no intercostal retraction or use of accessory muscles. No egophony noted. Cardiovascular: Regular without murmur, clicks, gallops or rubs. Abdomen: LLQ tenderness, Bilateral flank tenderness  Lymphatic: No lymphadenopathy. Extremities:  No lower extremity edema, ulcerations, tenderness, varicosities or erythema. Muscle size, tone and strength are normal.  No involuntary movements are noted. Skin:  Warm and dry. Good color, turgor and pigmentation. No lesions or scars.   No cyanosis or clubbing  Neurological/Psychiatric: The patient's general behavior, level of consciousness, thought content and emotional status is normal.          INVESTIGATIONS     Laboratory Testing:     Recent Results (from the past 24 hour(s))   CBC Auto Differential    Collection Time: 12/04/21  9:00 AM   Result Value Ref Range    WBC 10.5 3.5 - 11.3 k/uL    RBC 4.37 3.95 - 5.11 m/uL    Hemoglobin 13.5 11.9 - 15.1 g/dL    Hematocrit 37.0 36.3 - 47.1 %    MCV 84.7 82.6 - 102.9 fL    MCH 30.9 25.2 - 33.5 pg    MCHC 36.5 (H) 28.4 - 34.8 g/dL    RDW 12.9 11.8 - 14.4 %    Platelets 746 627 - 235 k/uL    MPV 11.1 8.1 - 13.5 fL    NRBC Automated 0.0 0.0 per 100 WBC    Differential Type NOT REPORTED     Seg Neutrophils 79 (H) 36 - 65 %    Lymphocytes 12 (L) 24 - 43 %    Monocytes 8 3 - 12 %    Eosinophils % 1 1 - 4 %    Basophils 0 0 - 2 %    Immature Granulocytes 0 0 %    Segs Absolute 8.30 (H) 1.50 - 8.10 k/uL    Absolute Lymph # 1.24 1.10 - 3.70 k/uL    Absolute Mono # 0.83 0.10 - 1.20 k/uL    Absolute Eos # 0.08 0.00 - 0.44 k/uL    Basophils Absolute 0.04 0.00 - 0.20 k/uL    Absolute Immature Granulocyte 0.03 0.00 - 0.30 k/uL    WBC Morphology NOT REPORTED     RBC Morphology NOT REPORTED     Platelet Estimate NOT REPORTED    Comprehensive Metabolic Panel w/ Reflex to MG    Collection Time: 12/04/21  9:00 AM   Result Value Ref Range    Glucose 110 (H) 70 - 99 mg/dL    BUN 21 8 - 23 mg/dL    CREATININE 1.05 (H) 0.50 - 0.90 mg/dL    Bun/Cre Ratio NOT REPORTED 9 - 20    Calcium 9.6 8.6 - 10.4 mg/dL    Sodium 133 (L) 135 - 144 mmol/L    Potassium 4.1 3.7 - 5.3 mmol/L    Chloride 101 98 - 107 mmol/L    CO2 19 (L) 20 - 31 mmol/L    Anion Gap 13 9 - 17 mmol/L    Alkaline Phosphatase 236 (H) 35 - 104 U/L    ALT 14 5 - 33 U/L    AST 21 <32 U/L    Total Bilirubin 0.89 0.3 - 1.2 mg/dL    Total Protein 8.0 6.4 - 8.3 g/dL    Albumin 3.7 3.5 - 5.2 g/dL    Albumin/Globulin Ratio 0.9 (L) 1.0 - 2.5    GFR Non-African American 52 (L) >60 mL/min    GFR African American >60 >60 mL/min    GFR Comment          GFR Staging NOT REPORTED    Lipase    Collection Time: 12/04/21  9:00 AM   Result Value Ref Range    Lipase 19 13 - 60 U/L   Urinalysis, reflex to microscopic    Collection Time: 12/04/21  1:21 PM   Result Value Ref Range    Color, UA Yellow Yellow    Turbidity UA Clear Clear    Glucose, Ur NEGATIVE NEGATIVE    Bilirubin Urine NEGATIVE NEGATIVE    Ketones, Urine NEGATIVE NEGATIVE    Specific Gravity, UA 1.090 (H) 1.005 - 1.030    Urine Hgb NEGATIVE NEGATIVE    pH, UA 5.0 5.0 - 8.0    Protein, UA NEGATIVE NEGATIVE Urobilinogen, Urine Normal Normal    Nitrite, Urine NEGATIVE NEGATIVE    Leukocyte Esterase, Urine NEGATIVE NEGATIVE    Urinalysis Comments       Microscopic exam not performed based on chemical results unless requested in original order. Imaging:   CT ABDOMEN PELVIS W IV CONTRAST Additional Contrast? None    Result Date: 12/4/2021  1. Wall thickening in the proximal to mid sigmoid colon is most prominent in the mid sigmoid colon and is likely related to acute or subacute diverticulitis in the setting of severe colonic diverticulosis. There is an associated contained rupture into the sigmoid mesocolon with a 3.9 cm x 4.6 cm x 3.4 cm abscess in the sigmoid mesocolon and an apparent sinus tract extending from the mid sigmoid colon into the collection. Consider colonoscopy following treatment to exclude underlying malignancy. 2. Focal peritonitis near the sigmoid colon and trace ascites are likely reactive. 3. Prominent paracolic lymph nodes in the sigmoid mesocolon are likely reactive, although kaleb metastases in the setting of malignancy could appear similar. Recommend attention on follow-up imaging. 4. Mild cirrhotic changes of the liver may be sequelae of congestive hepatopathy. No focal liver lesion. 5. Additional incidental findings as above. ASSESSMENT & PLAN     ASSESSMENT / PLAN:     IMPRESSION  This is a 78 y.o. female who presented with abdominal pain and found to have sigmoid abscess.  Patient admitted to inpatient status because to drain sigmoid abscess    Principal Problem:    Abscess of sigmoid colon  Active Problems:    Essential hypertension    PAH (pulmonary artery hypertension) ([address] 34 on Echo     Mitral regurgitation    Morbid obesity due to excess calories ([address]    AICD (automatic cardioverter/defibrillator) present    Chronic heart failure with reduced ejection fraction and diastolic dysfunction (HCC)    Hypothyroidism    Chronic combined systolic (congestive) and diastolic (congestive) heart failure (HCC)    Acute diverticulitis  Resolved Problems:    * No resolved hospital problems. *    1. Sigmoid abscess with severe diverticulosis - evaluated by general surgery, recommended npo diet, IR to drain abscess Monday morning. Recommended admission to medicine due to co-morbidities. Currently on zosyn. 2.  increase in creatinine secondary to dehydration from vomiting/diarrhea - (baseline 0.8)  3. HFrEF - NICM - s/p AICD, EF 17%, G3 DD, severe MR - resumed home entresto bid, aldactone 12.5 mg qd, bumex 2 mg qd, asa. Has appointment with [facility] on Wednesday. 4. Essential hypertension - resumed lopressor, bumex, aldactone  5. A. Fib - resumed home lopressor 25 mg bid, amio 200 mg qd  6. Hypothyroidism - resumed home synthroid 25 mcg. DIET - NPO  DVT ppx: lovenox   GI ppx: not indicated    PT/OT/SW  Discharge Planning:    Jennifer Kaplan MD  Internal Medicine Resident, PGY-1  [facility]  12/4/2021, 9:13 PM    Attending Physician Statement  I have discussed the care of Alvin Pelletier with the resident team. I have examined the patient myself and taken ros and hpi , including pertinent history and exam findings,  with the resident. I have reviewed the key elements of all parts of the encounter with the resident. I agree with the assessment, plan and orders as documented by the resident. Principal Problem:    Abscess of sigmoid colon  Active Problems:    Essential hypertension    PAH (pulmonary artery hypertension) ([address] 34 on Echo     Mitral regurgitation    Morbid obesity due to excess calories ([address]    AICD (automatic cardioverter/defibrillator) present    Chronic heart failure with reduced ejection fraction and diastolic dysfunction (HCC)    Hypothyroidism    Chronic combined systolic (congestive) and diastolic (congestive) heart failure ([address]    Acute diverticulitis  Resolved Problems:    * No resolved hospital problems.  *    Sigmoid abscess with diverticulitis started on Zosyn plan for IR drainage on Monday.  appreciate general surgery recommendations    History of A. fib, Xarelto held started on heparin infusionChronic combined systolic diastolic heart failure EF 17%-cautious fluid hydration will monitor urine output closely  Increasing creatinine secondary to dehydration-resolved      Electronically signed by Mackenzie Blevins MD

## 2021-12-04 NOTE — Clinical Note
Patient Class: Inpatient [101]   REQUIRED: Diagnosis: Diverticulitis [536182]   Estimated Length of Stay: Estimated stay of more than 2 midnights (3) walks occasionally

## 2021-12-04 NOTE — DISCHARGE INSTR - COC
Continuity of Care Form    Patient Name: Kadi Wilder   :  1955  MRN:  [id number]    Admit date:  2021  Discharge date:  ***    Code Status Order: Prior   Advance Directives:      Admitting Physician:  No admitting provider for patient encounter.   PCP: Trista Chaudhry MD    Discharging Nurse: [facility] Unit/Room#:   Discharging Unit Phone Number: ***    Emergency Contact:   Extended Emergency Contact Information  Primary Emergency Contact: Ragini Guillen  Address: NOT AVAILABLE   [address] Phone: [phone]  Work Phone: [phone]  Mobile Phone: [phone]  Relation: Child    Past Surgical History:  Past Surgical History:   Procedure Laterality Date    COLONOSCOPY      HERNIA REPAIR      OTHER SURGICAL HISTORY  2012    robotic placement of 2 left ventricular leads/replacement of ICD generator    PACEMAKER PLACEMENT      [employer] Viva XT CRT -D [id number] Serial : [id number]    TUBAL LIGATION         Immunization History:   Immunization History   Administered Date(s) Administered    COVID-19, Pfizer, PF, 30mcg/0.3mL 2021, 2021    Influenza, Martha Tucker, 6 mo and older, IM (Fluzone, Flulaval) 2018    Influenza, Quadv, IM, (6 mo and older Fluzone, Flulaval, Fluarix and 3 yrs and older Afluria) 2016    Pneumococcal Conjugate 13-valent ([place]) 2019       Active Problems:  Patient Active Problem List   Diagnosis Code    Dilated cardiomyopathy ([facility] I42.0    Pre-diabetes R73.03    Essential hypertension I10    PAH (pulmonary artery hypertension) ([facility] 34 on Echo  I27.21    Mitral regurgitation I34.0    Chronic headache R51.9, G89.29    Morbid obesity due to excess calories (HCC) E66.01    AICD (automatic cardioverter/defibrillator) present Z95.810    Syncope and collapse secondary to VTAC R55    MAURO (acute kidney injury) ([facility] N17.9    ICD (implantable cardioverter-defibrillator) battery depletion Z45.02    Acute on chronic systolic CHF (congestive heart failure) ([facility]) I50.23    Gout M10.9    Dizziness R42    Benign paroxysmal positional vertigo H81.10    Heart failure ([facility]) I50.9    Hypotension (arterial) I95.9    NSVT (nonsustained ventricular tachycardia) ([facility]) I47.2    Chest pain R07.9    Hypothyroidism E03.9    Chronic combined systolic (congestive) and diastolic (congestive) heart failure ([facility]) I50.42       Isolation/Infection:   Isolation            No Isolation          Patient Infection Status       Infection Onset Added Last Indicated Last Indicated By Review Planned Expiration Resolved Resolved By    None active    Resolved    COVID-19 21 COVID-19   21     COVID-19 (Rule Out) 21 COVID-19 (Ordered)   21 Rule-Out Test Resulted            Nurse Assessment:  Last Vital Signs: /77   Pulse 76   Temp 97.9 °F (36.6 °C) (Oral)   Resp 18   Ht 5' 6\" (1.676 m)   Wt 234 lb (106.1 kg)   SpO2 100%   BMI 37.77 kg/m²     Last documented pain score (0-10 scale): Pain Level: 8  Last Weight:   Wt Readings from Last 1 Encounters:   21 234 lb (106.1 kg)     Mental Status:  {IP PT MENTAL STATUS:}    IV Access:  { TIM IV ACCESS:451222597}    Nursing Mobility/ADLs:  Walking   {P DME YICD:970778083}  Transfer  {P DME FKYC:267047373}  Bathing  {CHP DME LFDP:523756868}  Dressing  {CHP DME GRTL:209495393}  Toileting  {P DME FOOT:047789068}  Feeding  {P DME SEXZ:756236486}  Med Admin  {P DME DQI}  Med Delivery   { TIM MED Delivery:907979092}    Wound Care Documentation and Therapy:        Elimination:  Continence: Bowel: {YES / D}  Bladder: {YES / AV:73712}  Urinary Catheter: {Urinary Catheter:026492277}   Colostomy/Ileostomy/Ileal Conduit: {YES / EB:53572}       Date of Last BM: ***  No intake or output data in the 24 hours ending 21 0852  No intake/output data recorded.     Safety Concerns:     [facility] Safety Concerns:785728806}    Impairments/Disabilities:      [address] Impairments/Disabilities:541682004}    Nutrition Therapy:  Current Nutrition Therapy:   [address] Diet List:825432898}    Routes of Feeding: {CHP DME Other Feedings:311531095}  Liquids: {Slp liquid thickness:14883}  Daily Fluid Restriction: {CHP DME Yes amt example:619002604}  Last Modified Barium Swallow with Video (Video Swallowing Test): {Done Not Done ZMEU:604532527}    Treatments at the Time of Hospital Discharge:   Respiratory Treatments: ***  Oxygen Therapy:  {Therapy; copd oxygen:10165}  Ventilator:    {MH CC Vent YLNZ:271736214}    Rehab Therapies: {THERAPEUTIC INTERVENTION:3379531099}  Weight Bearing Status/Restrictions: [address]  Weight Bearin}  Other Medical Equipment (for information only, NOT a DME order):  {EQUIPMENT:302426869}  Other Treatments: ***    Patient's personal belongings (please select all that are sent with patient):  {CHP DME Belongings:652940429}    RN SIGNATURE:  {Esignature:203634910}    CASE MANAGEMENT/SOCIAL WORK SECTION    Inpatient Status Date: ***    Readmission Risk Assessment Score:  Readmission Risk              Risk of Unplanned Readmission:  0           Discharging to Facility/ Agency   Name:   Address:  Phone:  Fax:    Dialysis Facility (if applicable)   Name:  Address:  Dialysis Schedule:  Phone:  Fax:    / signature: {Esignature:746684457}    PHYSICIAN SECTION    Prognosis: {Prognosis:2634311392}    Condition at Discharge: [address] Patient Condition:789347655}    Rehab Potential (if transferring to Rehab): {Prognosis:6935788262}    Recommended Labs or Other Treatments After Discharge: ***    Physician Certification: I certify the above information and transfer of Elle Aguilar  is necessary for the continuing treatment of the diagnosis listed and that she requires {Admit to Appropriate Level of Care:51967} for {GREATER/LESS:304772041} 30 days.      Update Admission H&P: {CHP DME Changes in [facility]:108890934}    PHYSICIAN SIGNATURE:  {Esignature:429834438}

## 2021-12-04 NOTE — CONSULTS
History and Physical - General Surgery    PATIENT NAME: Fauzia Piña  AGE: 78 y.o. MEDICAL RECORD NO. [id number]  DATE: 12/4/2021  SURGEON: Kimberlyn Gruber  PRIMARY CARE PHYSICIAN: Joseph Anthony MD    Chief complaint: Lower quadrant abdominal pain    IMPRESSION:   78 y.o. female with diverticulosis, sigmoid abscess      MEDICAL DECISION MAKING AND PLAN:   Recommend admit to medicine  n.p.o.  Recommend IV antibiotics, zosyn  Gentle iv fluids  Spoke with IR regarding sigmoid abscess, plan for drain placement Monday morning      HISTORY:   History of Chief Complaint:    Fauzia Piña is a 78 y.o. female who presents with left lower quadrant pain since Wednesday, stabbing and cramping. She also endorses nausea vomited yesterday morning. Still having bowel movements, last bowel movement this morning, reports dark and tarry in nature. She does report having an episode of abdominal pain many years ago, was told she had diverticulitis at that time, and was treated with antibiotics. Last colonoscopy was 10 years ago, reports it was with Dr. Olivier Downing and recommended follow-up in 10 years. Reports history of hernia repair, although does not remember what type of hernia, where on her abdomen, or when. She has significant history of CHF, follows with [facility], has an appointment this Wednesday for AICD lead repositioning. She takes Xarelto, last dose was yesterday around 6 PM she has not taken her dose today.          Past Medical History   has a past medical history of Abnormal Pap smear, Acute on chronic systolic congestive heart failure ([address] AICD (automatic cardioverter/defibrillator) present, MAURO (acute kidney injury) ([address] Anemia, Arthritis, Cardiomyopathy ([address] Cardiomyopathy, nonischemic ([address] Chronic combined systolic and diastolic congestive heart failure ([address] Ejection fraction < 50%, HTN (hypertension), Hypothyroidism, Ischemic cardiomyopathy, Left bundle branch block, Mitral regurgitation, Morbid obesity due to excess calories ([facility] Pulmonary hypertension ([facility] and Sudden onset of severe headache. Past Surgical History   has a past surgical history that includes other surgical history (12/20/2012); Tubal ligation; hernia repair; Colonoscopy; and pacemaker placement. Medications  Prior to Admission medications    Medication Sig Start Date End Date Taking? Authorizing Provider   amiodarone (PACERONE) 100 MG tablet Take 200 mg by mouth daily    Historical Provider, MD   nitroGLYCERIN (NITROSTAT) 0.4 MG SL tablet up to max of 3 total doses.  If no relief after 1 dose, call 911. 6/29/21   Chasity rBennan MD   rivaroxaban Goyo Sidle) 20 MG TABS tablet Take 1 tablet by mouth daily 6/29/21   Chasity Brennan MD   sacubitril-valsartan (ENTRESTO) 24-26 MG per tablet Take 0.5 tablets by mouth 2 times daily 6/29/21   Chasity Brennan MD   bumetanide (BUMEX) 2 MG tablet Take 1 tablet by mouth daily 6/29/21   Chasity Brennan MD   potassium chloride (KLOR-CON M) 20 MEQ extended release tablet Take 2 tablets by mouth as needed (Potassium Replacement) 6/29/21   Chasity Brennan MD   levothyroxine (SYNTHROID) 25 MCG tablet Take 1 tablet by mouth Daily 6/30/21   Chasity Brennan MD   Handicap Placard MISC by Does not apply route 5 years please 4/12/21   Afia York,    lidocaine (LIDODERM) 5 % Place one patch to both knees 12 hours on, 12 hours off. 11/23/20   Afia York,    magnesium oxide (MAG-OX) 400 MG tablet Take 400 mg by mouth daily Patient states takes 200mg PO daily    Historical Provider, MD   metoprolol tartrate (LOPRESSOR) 25 MG tablet Take 1 tablet by mouth 2 times daily 5/25/20   Darren Mcmullen MD   acetaminophen (TYLENOL) 325 MG tablet Take 1 tablet by mouth every 8 hours as needed for Pain 1/7/20   Saira Lee DO   aspirin 81 MG tablet Take 81 mg by mouth daily    Historical Provider, MD   allopurinol (ZYLOPRIM) 100 MG tablet Take 1 tablet by mouth daily 2/18/19   Raven Clarke Linda Ibrahim MD   spironolactone (ALDACTONE) 25 MG tablet Take 0.5 tablets by mouth daily 2/18/19   Laurita Bravo MD    Scheduled Meds:   piperacillin-tazobactam  4,500 mg IntraVENous Once     Allergies  is allergic to codeine and morphine. Family History  family history includes Cancer in an other family member; High Blood Pressure in her mother; Other in her mother. Social History   reports that she has never smoked. She has never used smokeless tobacco.   reports no history of alcohol use. reports previous drug use. Review of Systems  General Denies any fever or chills  HEENT Denies any diplopia, tinnitus or vertigo  Resp Denies any shortness of breath, cough or wheezing  Cardiac denies chest pain, claudication, endorses bilateral lower extremity edema  GI endorses nausea vomiting, denies hematemesis, denies hematochezia   Denies any frequency, urgency, hesitancy or incontinence  Heme Denies bruising or bleeding easily  Endocrine Endorses hypothyroidism, denies history of diabetes  Neuro Denies any focal motor or sensory deficits    PHYSICAL:   VITALS:  height is 5' 6\" (1.676 m) and weight is 234 lb (106.1 kg). Her oral temperature is 97.9 °F (36.6 °C). Her blood pressure is 110/77 and her pulse is 76. Her respiration is 18 and oxygen saturation is 100%. CONSTITUTIONAL: Alert and oriented times 3, no acute distress and cooperative to examination. HEENT: Head is normocephalic, atraumatic. EOMI, PERRLA  NECK: Soft, trachea midline and straight  LUNGS: Chest expands equally bilaterally upon respiration, no accessory muscle used. CARDIOVASCULAR: Heart regular rate and rhythm  ABDOMEN: Soft, nondistended, mildly tender in left lower quadrant no obvious surgical scars noted  NEUROLOGIC: CN II-XII are grossly intact.  There are no focalizing motor or sensory deficits  EXTREMITIES: no cyanosis, clubbing or edema    LABS:     Recent Labs     12/04/21  0900   WBC 10.5   HGB 13.5   HCT 37.0      * K 4.1      CO2 19*   BUN 21   CREATININE 1.05*   CALCIUM 9.6   AST 21   ALT 14   BILITOT 0.89     Recent Labs     12/04/21  0900   ALKPHOS 236*   ALT 14   AST 21   BILITOT 0.89   LABALBU 3.7   LIPASE 19     CBC:   Lab Results   Component Value Date    WBC 10.5 12/04/2021    RBC 4.37 12/04/2021    RBC 4.45 04/03/2012    HGB 13.5 12/04/2021    HCT 37.0 12/04/2021    MCV 84.7 12/04/2021    MCH 30.9 12/04/2021    MCHC 36.5 12/04/2021    RDW 12.9 12/04/2021     12/04/2021     04/03/2012    MPV 11.1 12/04/2021     BMP:    Lab Results   Component Value Date     12/04/2021    K 4.1 12/04/2021     12/04/2021    CO2 19 12/04/2021    BUN 21 12/04/2021    LABALBU 3.7 12/04/2021    CREATININE 1.05 12/04/2021    CALCIUM 9.6 12/04/2021    GFRAA >60 12/04/2021    LABGLOM 52 12/04/2021    GLUCOSE 110 12/04/2021    GLUCOSE 100 04/03/2012       RADIOLOGY:   CT ABDOMEN PELVIS W IV CONTRAST Additional Contrast? None    Result Date: 12/4/2021  1. Wall thickening in the proximal to mid sigmoid colon is most prominent in the mid sigmoid colon and is likely related to acute or subacute diverticulitis in the setting of severe colonic diverticulosis. There is an associated contained rupture into the sigmoid mesocolon with a 3.9 cm x 4.6 cm x 3.4 cm abscess in the sigmoid mesocolon and an apparent sinus tract extending from the mid sigmoid colon into the collection. Consider colonoscopy following treatment to exclude underlying malignancy. 2. Focal peritonitis near the sigmoid colon and trace ascites are likely reactive. 3. Prominent paracolic lymph nodes in the sigmoid mesocolon are likely reactive, although kaleb metastases in the setting of malignancy could appear similar. Recommend attention on follow-up imaging. 4. Mild cirrhotic changes of the liver may be sequelae of congestive hepatopathy. No focal liver lesion. 5. Additional incidental findings as above.      Thank you for the interesting evaluation. Further recommendations to follow.     Denis Richards,   12/4/21, 11:58 AM

## 2021-12-04 NOTE — ED TRIAGE NOTES
Pt had a baby aishwarya ad had divertiulitis. She believes that's what is causing the abd pain. Pain is on the left side of the abd and let flank pain.

## 2021-12-05 LAB
ABSOLUTE EOS #: 0.08 K/UL (ref 0–0.44)
ABSOLUTE IMMATURE GRANULOCYTE: <0.03 K/UL (ref 0–0.3)
ABSOLUTE LYMPH #: 0.99 K/UL (ref 1.1–3.7)
ABSOLUTE MONO #: 0.7 K/UL (ref 0.1–1.2)
ANION GAP SERPL CALCULATED.3IONS-SCNC: 14 MMOL/L (ref 9–17)
BASOPHILS # BLD: 1 % (ref 0–2)
BASOPHILS ABSOLUTE: 0.05 K/UL (ref 0–0.2)
BUN BLDV-MCNC: 14 MG/DL (ref 8–23)
BUN/CREAT BLD: ABNORMAL (ref 9–20)
C-REACTIVE PROTEIN: 121.8 MG/L (ref 0–5)
CALCIUM SERPL-MCNC: 8.6 MG/DL (ref 8.6–10.4)
CHLORIDE BLD-SCNC: 103 MMOL/L (ref 98–107)
CO2: 18 MMOL/L (ref 20–31)
CREAT SERPL-MCNC: 0.87 MG/DL (ref 0.5–0.9)
CULTURE: NO GROWTH
DIFFERENTIAL TYPE: ABNORMAL
EOSINOPHILS RELATIVE PERCENT: 1 % (ref 1–4)
GFR AFRICAN AMERICAN: >60 ML/MIN
GFR NON-AFRICAN AMERICAN: >60 ML/MIN
GFR SERPL CREATININE-BSD FRML MDRD: ABNORMAL ML/MIN/{1.73_M2}
GFR SERPL CREATININE-BSD FRML MDRD: ABNORMAL ML/MIN/{1.73_M2}
GLUCOSE BLD-MCNC: 85 MG/DL (ref 70–99)
HCT VFR BLD CALC: 34 % (ref 36.3–47.1)
HEMOGLOBIN: 12 G/DL (ref 11.9–15.1)
IMMATURE GRANULOCYTES: 0 %
LYMPHOCYTES # BLD: 13 % (ref 24–43)
Lab: NORMAL
MCH RBC QN AUTO: 30.2 PG (ref 25.2–33.5)
MCHC RBC AUTO-ENTMCNC: 35.3 G/DL (ref 28.4–34.8)
MCV RBC AUTO: 85.6 FL (ref 82.6–102.9)
MONOCYTES # BLD: 9 % (ref 3–12)
NRBC AUTOMATED: 0 PER 100 WBC
PARTIAL THROMBOPLASTIN TIME: 106.5 SEC (ref 20.5–30.5)
PARTIAL THROMBOPLASTIN TIME: 35.5 SEC (ref 20.5–30.5)
PARTIAL THROMBOPLASTIN TIME: 38.1 SEC (ref 20.5–30.5)
PARTIAL THROMBOPLASTIN TIME: 39.5 SEC (ref 20.5–30.5)
PDW BLD-RTO: 13.2 % (ref 11.8–14.4)
PLATELET # BLD: 264 K/UL (ref 138–453)
PLATELET ESTIMATE: ABNORMAL
PMV BLD AUTO: 10.6 FL (ref 8.1–13.5)
POTASSIUM SERPL-SCNC: 4.1 MMOL/L (ref 3.7–5.3)
RBC # BLD: 3.97 M/UL (ref 3.95–5.11)
RBC # BLD: ABNORMAL 10*6/UL
SEG NEUTROPHILS: 76 % (ref 36–65)
SEGMENTED NEUTROPHILS ABSOLUTE COUNT: 5.59 K/UL (ref 1.5–8.1)
SODIUM BLD-SCNC: 135 MMOL/L (ref 135–144)
SPECIMEN DESCRIPTION: NORMAL
THYROXINE, FREE: 0.91 NG/DL (ref 0.93–1.7)
TSH SERPL DL<=0.05 MIU/L-ACNC: 17.11 MIU/L (ref 0.3–5)
URIC ACID: 5.7 MG/DL (ref 2.4–5.7)
WBC # BLD: 7.4 K/UL (ref 3.5–11.3)
WBC # BLD: ABNORMAL 10*3/UL

## 2021-12-05 PROCEDURE — 80048 BASIC METABOLIC PNL TOTAL CA: CPT

## 2021-12-05 PROCEDURE — 84443 ASSAY THYROID STIM HORMONE: CPT

## 2021-12-05 PROCEDURE — 2580000003 HC RX 258: Performed by: STUDENT IN AN ORGANIZED HEALTH CARE EDUCATION/TRAINING PROGRAM

## 2021-12-05 PROCEDURE — 36415 COLL VENOUS BLD VENIPUNCTURE: CPT

## 2021-12-05 PROCEDURE — 1200000000 HC SEMI PRIVATE

## 2021-12-05 PROCEDURE — 6370000000 HC RX 637 (ALT 250 FOR IP): Performed by: STUDENT IN AN ORGANIZED HEALTH CARE EDUCATION/TRAINING PROGRAM

## 2021-12-05 PROCEDURE — 84550 ASSAY OF BLOOD/URIC ACID: CPT

## 2021-12-05 PROCEDURE — 6360000002 HC RX W HCPCS: Performed by: STUDENT IN AN ORGANIZED HEALTH CARE EDUCATION/TRAINING PROGRAM

## 2021-12-05 PROCEDURE — 85025 COMPLETE CBC W/AUTO DIFF WBC: CPT

## 2021-12-05 PROCEDURE — 85730 THROMBOPLASTIN TIME PARTIAL: CPT

## 2021-12-05 PROCEDURE — 2060000000 HC ICU INTERMEDIATE R&B

## 2021-12-05 PROCEDURE — 84439 ASSAY OF FREE THYROXINE: CPT

## 2021-12-05 PROCEDURE — 86140 C-REACTIVE PROTEIN: CPT

## 2021-12-05 PROCEDURE — 6370000000 HC RX 637 (ALT 250 FOR IP)

## 2021-12-05 RX ORDER — OXYCODONE HYDROCHLORIDE AND ACETAMINOPHEN 5; 325 MG/1; MG/1
1 TABLET ORAL EVERY 6 HOURS PRN
Status: DISCONTINUED | OUTPATIENT
Start: 2021-12-05 | End: 2021-12-08 | Stop reason: HOSPADM

## 2021-12-05 RX ORDER — FENTANYL CITRATE 50 UG/ML
50 INJECTION, SOLUTION INTRAMUSCULAR; INTRAVENOUS
Status: DISCONTINUED | OUTPATIENT
Start: 2021-12-05 | End: 2021-12-05

## 2021-12-05 RX ORDER — LEVOTHYROXINE SODIUM 0.07 MG/1
37.5 TABLET ORAL DAILY
Status: DISCONTINUED | OUTPATIENT
Start: 2021-12-06 | End: 2021-12-08 | Stop reason: HOSPADM

## 2021-12-05 RX ADMIN — AMIODARONE HYDROCHLORIDE 200 MG: 200 TABLET ORAL at 10:09

## 2021-12-05 RX ADMIN — FENTANYL CITRATE 25 MCG: 50 INJECTION, SOLUTION INTRAMUSCULAR; INTRAVENOUS at 04:16

## 2021-12-05 RX ADMIN — PIPERACILLIN AND TAZOBACTAM 3375 MG: 3; .375 INJECTION, POWDER, FOR SOLUTION INTRAVENOUS at 05:25

## 2021-12-05 RX ADMIN — PIPERACILLIN AND TAZOBACTAM 3375 MG: 3; .375 INJECTION, POWDER, FOR SOLUTION INTRAVENOUS at 19:11

## 2021-12-05 RX ADMIN — SODIUM CHLORIDE: 9 INJECTION, SOLUTION INTRAVENOUS at 10:54

## 2021-12-05 RX ADMIN — HEPARIN SODIUM AND DEXTROSE 12 UNITS/KG/HR: 10000; 5 INJECTION INTRAVENOUS at 02:10

## 2021-12-05 RX ADMIN — PIPERACILLIN AND TAZOBACTAM 3375 MG: 3; .375 INJECTION, POWDER, FOR SOLUTION INTRAVENOUS at 10:49

## 2021-12-05 RX ADMIN — FENTANYL CITRATE 25 MCG: 50 INJECTION, SOLUTION INTRAMUSCULAR; INTRAVENOUS at 06:25

## 2021-12-05 RX ADMIN — HEPARIN SODIUM 4000 UNITS: 1000 INJECTION INTRAVENOUS; SUBCUTANEOUS at 02:09

## 2021-12-05 RX ADMIN — HEPARIN SODIUM 2000 UNITS: 1000 INJECTION INTRAVENOUS; SUBCUTANEOUS at 18:14

## 2021-12-05 RX ADMIN — OXYCODONE HYDROCHLORIDE AND ACETAMINOPHEN 1 TABLET: 5; 325 TABLET ORAL at 10:49

## 2021-12-05 RX ADMIN — HEPARIN SODIUM AND DEXTROSE 6 UNITS/KG/HR: 10000; 5 INJECTION INTRAVENOUS at 18:15

## 2021-12-05 RX ADMIN — FENTANYL CITRATE 25 MCG: 50 INJECTION, SOLUTION INTRAMUSCULAR; INTRAVENOUS at 01:09

## 2021-12-05 ASSESSMENT — PAIN SCALES - GENERAL
PAINLEVEL_OUTOF10: 7
PAINLEVEL_OUTOF10: 6
PAINLEVEL_OUTOF10: 1
PAINLEVEL_OUTOF10: 7
PAINLEVEL_OUTOF10: 4
PAINLEVEL_OUTOF10: 7

## 2021-12-05 ASSESSMENT — PAIN DESCRIPTION - PAIN TYPE
TYPE: ACUTE PAIN
TYPE: ACUTE PAIN

## 2021-12-05 ASSESSMENT — PAIN DESCRIPTION - DESCRIPTORS: DESCRIPTORS: DISCOMFORT

## 2021-12-05 ASSESSMENT — PAIN DESCRIPTION - LOCATION
LOCATION: ABDOMEN
LOCATION: BACK

## 2021-12-05 ASSESSMENT — PAIN DESCRIPTION - ORIENTATION: ORIENTATION: LOWER

## 2021-12-05 ASSESSMENT — PAIN DESCRIPTION - FREQUENCY
FREQUENCY: CONTINUOUS
FREQUENCY: CONTINUOUS

## 2021-12-05 NOTE — CARE COORDINATION
Case Management Initial Discharge Plan  Yonis Albright,             Met with:patient to discuss discharge plans. Information verified: address, contacts, phone number, , insurance Yes  Insurance Provider: [facility] Medicare    Emergency Contact/Next of Kin name & number: frederick Rebolledo as per face sheet  Who are involved in patient's support system? Lives with mom and brother    PCP: Gabrielle Best MD  Date of last visit: monday      Discharge Planning    Living Arrangements:  Parent, Other (Comment), Family Members (mother and brother)     Home has 1 stories  4 stairs to climb to get into front door, no stairs to climb to reach second floor  Location of bedroom/bathroom in home main    Patient able to perform ADL's:Independent    Current Services (outpatient & in home) DME  DME equipment: none other than pacer interogator she uses once a month  DME provider: na    Is patient receiving oral anticoagulation therapy? Yes    If indicated: xarelto  Physician managing anticoagulation treatment: Dr Nicole Koo  Where does patient obtain lab work for ATC treatment? na      Potential Assistance Needed:  N/A    Patient agreeable to home care: No  [place] of choice provided:  n/a    Prior SNF/Rehab Placement and Facility: none  Agreeable to SNF/Rehab: No  [place] of choice provided: n/a     Evaluation: n/a    Expected Discharge date:  21    Patient expects to be discharged to:   home    If home: is the family and/or caregiver wiling & able to provide support at home? yes  Who will be providing this support? Lives with mother and brother    Follow Up Appointment: Best Day/ Time: Monday AM    Transportation provider: family  Transportation arrangements needed for discharge: No    Readmission Risk              Risk of Unplanned Readmission:  13             Does patient have a readmission risk score greater than 14?: No  If yes, follow-up appointment must be made within 7 days of discharge.      Goals of Care: self care      Educated pt on transitional options, provided freedom of choice and are agreeable with plan      Discharge Plan: home independent, lives with family members, has transportation          Electronically signed by Kristi Krause RN on 12/5/21 at 12:12 PM EST

## 2021-12-05 NOTE — PLAN OF CARE
Problem: Pain:  Goal: Pain level will decrease  Description: Pain level will decrease  12/5/2021 1321 by Claribel Mcneil RN  Outcome: Ongoing  12/5/2021 0513 by Timoteo Ramirez RN  Outcome: Ongoing  Goal: Control of acute pain  Description: Control of acute pain  12/5/2021 1321 by Claribel Mcneil RN  Outcome: Ongoing  12/5/2021 0513 by Timoteo Ramirez RN  Outcome: Ongoing  Goal: Control of chronic pain  Description: Control of chronic pain  12/5/2021 1321 by Claribel Mcneil RN  Outcome: Ongoing  12/5/2021 0513 by Timoteo Ramirez RN  Outcome: Ongoing

## 2021-12-05 NOTE — PROGRESS NOTES
PROGRESS NOTE        PATIENT NAME:  Sarah Rd RECORD NO. [id number]  DATE: 2021  SURGEON: Dr. Cindi Del Real: Elizabeth Dhillon MD    HD: # 1    ASSESSMENT    Patient Active Problem List   Diagnosis    Dilated cardiomyopathy ([facility]    Pre-diabetes    Essential hypertension    PAH (pulmonary artery hypertension) ([facility] 34 on Echo     Mitral regurgitation    Chronic headache    Morbid obesity due to excess calories ([facility]    AICD (automatic cardioverter/defibrillator) present    Syncope and collapse secondary to [facility]    MAURO (acute kidney injury) ([facility]    ICD (implantable cardioverter-defibrillator) battery depletion    Acute on chronic systolic CHF (congestive heart failure) (HCC)    Gout    Dizziness    Benign paroxysmal positional vertigo    Chronic heart failure with reduced ejection fraction and diastolic dysfunction (HCC)    Hypotension (arterial)    NSVT (nonsustained ventricular tachycardia) ([facility]    Chest pain    Hypothyroidism    Chronic combined systolic (congestive) and diastolic (congestive) heart failure (HCC)    Acute diverticulitis    Abscess of sigmoid colon       MEDICAL DECISION MAKING AND PLAN    1. Patient seen and examined at bedside this morning. 2. Continue n.p.o.  3. IV fluids  4. We will plan for interventional radiology to discuss placing drain on 2021.  5. Encourage patient to be out of bed to ambulate/up to chair. 6. We will continue serial abdominal exams  7. Labs reviewed WBC-7.4 today currently afebrile, abdominal pain improved. 8. Continue IV Zosyn  9. We will follow along      Karson Daniel was seen and examined at bedside this morning. Afebrile. No acute events overnight. Out of bed ambulating. Patient reports she overall does feel better than when she presented. No nausea or vomiting.   Currently n.p.o.      OBJECTIVE  VITALS: Temp: Temp: 98.2 °F (36.8 °C)Temp  Av.1 °F (36.7 °C)  Min: 97.9 °F (36.6 °C)  Max: 98.2 °F (60.7 °C) BP Systolic (12EZU), FEB:991 , Min:98 , CHET:965   Diastolic (29ITB), ZYQ:67, Min:64, Max:81   Pulse Pulse  Av.5  Min: 73  Max: 76 Resp Resp  Av  Min: 16  Max: 18 Pulse ox SpO2  Av.5 %  Min: 91 %  Max: 100 %  GENERAL: alert, no distress  NEURO: Cranial nerves II through XII intact, no focal deficits  HEENT: Neck supple, trachea midline  LUNGS: clear to ausculation, without wheezes, rales or rhonci  HEART: normal rate and regular rhythm  ABDOMEN: soft, tenderness to palpation in the left lower quadrant, nondistended, no peritoneal signs, no rebound tenderness  EXTREMITY: no cyanosis, clubbing or edema    No intake/output data recorded. Drain/tube output:  No intake/output data recorded.     LAB:  CBC:   Recent Labs     21  0900 21  0806   WBC 10.5 7.4   HGB 13.5 12.0   HCT 37.0 34.0*   MCV 84.7 85.6    264     BMP:   Recent Labs     21  0900   *   K 4.1      CO2 19*   BUN 21   CREATININE 1.05*   GLUCOSE 110*     COAGS:   Recent Labs     21  0900 21  0109   APTT  --  35.5*   PROT 8.0  --          Blondell Primus, DO  21, 8:25 AM

## 2021-12-05 NOTE — PROGRESS NOTES
Physical Therapy         Physical Therapy Cancel Note      DATE: 2021    NAME: Alvin Pelletier  MRN: [id number]   : 1955      Patient not seen this date for Physical Therapy due to:    Discussed with nurse. Patient is independent. No need for acute physical therapy. Defer evaluation.       Electronically signed by Shaquille Darling PT on 2021 at 2:58 PM

## 2021-12-05 NOTE — PROGRESS NOTES
[facility]  Internal Medicine Teaching Residency Program  Inpatient Daily Progress Note  ______________________________________________________________________________    Patient: Inga Levy  YOB: 1955   YEA:[id number]    Acct: [de-identified]     Room: [facility]0236-  Admit date: 12/4/2021  Today's date: 12/05/21  Number of days in the hospital: 1    SUBJECTIVE   Admitting Diagnosis: Abscess of sigmoid colon  CC: abdominal pain  Pt examined at bedside. Chart & results reviewed. Patient hemodynamically stable, afebrile  TSH 17, t4 0.91 -  Increased synthroid from 25 to 37.5 mcg  IR to drain abscess in morning, continue to be NO as per general surgery. ROS:  Constitutional:  negative for chills, fevers, sweats  Respiratory:  negative for cough, dyspnea on exertion, hemoptysis, shortness of breath, wheezing  Cardiovascular:  negative for chest pain, chest pressure/discomfort, lower extremity edema, palpitations  Gastrointestinal:  negative for abdominal pain, constipation, diarrhea, nausea, vomiting  Neurological:  negative for dizziness, headache  BRIEF HISTORY     The patient is a pleasant 78 y.o. female presents with a chief complaint of abdominal pain located in LLQ that started on Tuesday. It worsened next day, was intermittent until last night when she had some food and it worsened to 9/10, radiating to the back associated with nausea, vomiting and diarrhea. Patient stated she had 2 episodes of diarrhea and several episodes of diarrhea. Patient stated her stools were dark brown but not black.  And she wasn't able to keep food most of the week due to vomiting and abdominal pain.      CT scan consistent with - severe colonic diverticulosis with contained rupture of sigmoid abscess 3.9 cm x 4.6 cm x 3.4 cm in mid sigmoid colon; focal peritonitis; consider colonoscopy to exclude malignancy; prominent paracolic LN.      General surgery evalulated patient - recommended NPO, IV abx zosyn, IR to drain abscess Monday morning.      Patient hemodynamically stable, afebrile. CT scan - showed diverticulitis  Labs showed Na 133,   MAURO with creatinine of 1.05  Glucose 110  Wbc wnl     UA negative, blood cultures pending.      PMH - HFrEF EF 17% S/P AICD (patient referred to [address] [place] [address] has appt this Wednesday), N-ICM, HTN, Hypothyroidism, MR, pulm htn, a fib, cardiac cath  - non obst CAD     Echo  - Summary  Left ventricle is moderately enlarged. Global left ventricular systolic function is severely reduced, calculated  ejection fraction is 17% (by Hatfield's Method.)  Shock is shyanne with severe global hypokinesis of remaining segments. Increased septal wall thickness. Evidence of severe (grade III) diastolic dysfunction. Left atrium is severely dilated. Right atrium is mildly dilated . Pacing/ICD lead seen in the right atrium/ventricle. Right ventricular dilatation with normal systolic function. Aortic valve is sclerotic but opens well. Trivial aortic insufficiency. Mitral valve is sclerotic. Mean gradient is 3mmHg. There is posterior leaflet restriction. Severe mitral regurgitation (eccentric jet.)  Mitral regurgitation: MR radius 1.0cm, MR EOA 0.48cm2, MR Volume 75mL, MR  Vena Contracta 0.91cm. Mild to moderate tricuspid regurgitation. Estimated right ventricular systolic pressure is 49 mmHg, suggesting mild  pulmonary HTN. Trivial pulmonic insufficiency. OBJECTIVE     Vital Signs:  /81   Pulse 73   Temp 98.2 °F (36.8 °C) (Oral)   Resp 16   Ht 5' 6\" (1.676 m)   Wt 234 lb (106.1 kg)   SpO2 96%   BMI 37.77 kg/m²     Temp (24hrs), Av.1 °F (36.7 °C), Min:97.9 °F (36.6 °C), Max:98.2 °F (36.8 °C)    No intake/output data recorded.     Physical Exam:  Constitutional: This is a well developed, well nourished, 35-39.9 - Obesity Grade II 78y.o. year old female who is alert, oriented, cooperative and in no apparent distress. Head:normocephalic and atraumatic. EENT:  PERRLA. No thrush was noted. Neck: Supple without thyromegaly. No elevated JVP. Respiratory: Chest was symmetrical without dullness to percussion. Breath sounds bilaterally were clear to auscultation. There were no wheezes, rhonchi or rales. Cardiovascular: Regular without murmur, clicks, gallops or rubs. Abdomen: LLQ tenderness with bilateral flank pain    Lymphatic: No lymphadenopathy. Extremities:  No lower extremity edema, ulcerations, varicosities or erythema. Muscle size, tone and strength are normal.  No involuntary movements are noted. Skin:  Warm and dry. Good color, turgor and pigmentation. No lesions or scars.   No cyanosis or clubbing  Neurological/Psychiatric: The patient's general behavior, level of consciousness, thought content and emotional status is normal.        Medications:  Scheduled Medications:    amiodarone  200 mg Oral Daily    aspirin  81 mg Oral Daily    bumetanide  2 mg Oral Daily    levothyroxine  25 mcg Oral Daily    metoprolol tartrate  25 mg Oral BID    sacubitril-valsartan  0.5 tablet Oral BID    spironolactone  12.5 mg Oral Daily    sodium chloride flush  10 mL IntraVENous 2 times per day    piperacillin-tazobactam  3,375 mg IntraVENous Q8H     Continuous Infusions:    sodium chloride      sodium chloride 100 mL/hr at 12/04/21 2128    heparin (PORCINE) Infusion 12 Units/kg/hr (12/05/21 0210)     PRN MedicationsfentanNYL, 50 mcg, Q2H PRN  sodium chloride flush, 10 mL, PRN  sodium chloride, 25 mL, PRN  promethazine, 12.5 mg, Q6H PRN   Or  ondansetron, 4 mg, Q6H PRN  polyethylene glycol, 17 g, Daily PRN  acetaminophen, 650 mg, Q6H PRN   Or  acetaminophen, 650 mg, Q6H PRN  potassium chloride, 40 mEq, PRN   Or  potassium alternative oral replacement, 40 mEq, PRN   Or  potassium chloride, 10 mEq, PRN  fentanNYL, 25 mcg, Q2H PRN  heparin (porcine), 4,000 Units, PRN  heparin (porcine), 2,000 Units, PRN        Diagnostic Labs:  CBC:   Recent Labs     12/04/21  0900   WBC 10.5   RBC 4.37   HGB 13.5   HCT 37.0   MCV 84.7   RDW 12.9        BMP:   Recent Labs     12/04/21  0900   *   K 4.1      CO2 19*   BUN 21   CREATININE 1.05*     BNP: No results for input(s): BNP in the last 72 hours. PT/INR: No results for input(s): PROTIME, INR in the last 72 hours. APTT:   Recent Labs     12/05/21  0109   APTT 35.5*     CARDIAC ENZYMES: No results for input(s): CKMB, CKMBINDEX, TROPONINI in the last 72 hours. Invalid input(s): CKTOTAL;3  FASTING LIPID PANEL:  Lab Results   Component Value Date    CHOL 180 05/23/2020    HDL 42 05/23/2020    TRIG 79 05/23/2020     LIVER PROFILE:   Recent Labs     12/04/21  0900   AST 21   ALT 14   BILITOT 0.89   ALKPHOS 236*      MICROBIOLOGY:   Lab Results   Component Value Date/Time    CULTURE NO SPECIMEN RECEIVED 05/22/2020 05:15 PM       Imaging:    CT ABDOMEN PELVIS W IV CONTRAST Additional Contrast? None    Result Date: 12/4/2021  1. Wall thickening in the proximal to mid sigmoid colon is most prominent in the mid sigmoid colon and is likely related to acute or subacute diverticulitis in the setting of severe colonic diverticulosis. There is an associated contained rupture into the sigmoid mesocolon with a 3.9 cm x 4.6 cm x 3.4 cm abscess in the sigmoid mesocolon and an apparent sinus tract extending from the mid sigmoid colon into the collection. Consider colonoscopy following treatment to exclude underlying malignancy. 2. Focal peritonitis near the sigmoid colon and trace ascites are likely reactive. 3. Prominent paracolic lymph nodes in the sigmoid mesocolon are likely reactive, although kaleb metastases in the setting of malignancy could appear similar. Recommend attention on follow-up imaging. 4. Mild cirrhotic changes of the liver may be sequelae of congestive hepatopathy. No focal liver lesion. 5. Additional incidental findings as above. ASSESSMENT & PLAN     ASSESSMENT / PLAN:     Principal Problem:    Abscess of sigmoid colon  Active Problems:    Essential hypertension    PAH (pulmonary artery hypertension) ([address] 34 on Echo     Mitral regurgitation    Morbid obesity due to excess calories ([address]    AICD (automatic cardioverter/defibrillator) present    Chronic heart failure with reduced ejection fraction and diastolic dysfunction (HCC)    Hypothyroidism    Chronic combined systolic (congestive) and diastolic (congestive) heart failure ([address]    Acute diverticulitis  Resolved Problems:    * No resolved hospital problems. *    1. Sigmoid abscess with severe diverticulosis - evaluated by general surgery, recommended npo diet, IR to drain abscess Monday morning. Recommended admission to medicine due to co-morbidities. Currently on zosyn. 2. MAURO secondary to dehydration from vomiting/diarrhea - (baseline 0.8)  3. HFrEF - NICM - s/p AICD, EF 17%, G3 DD, severe MR - resumed home entresto bid, aldactone 12.5 mg qd, bumex 2 mg qd, asa. Has appointment with [facility] on Wednesday. 4. Essential hypertension - resumed lopressor, bumex, aldactone  5. A. Fib - resumed home lopressor 25 mg bid, amio 200 mg qd  6. Hypothyroidism undertreated- increased to synthroid 37.5 mcg. DIET - NPO  DVT ppx : lovenox  GI ppx: not indicated    PT/OT: on board  Discharge Planning / SW: on board    Simón Ball MD  Internal Medicine Resident, PGY-1  [address], [place]  12/5/2021, 6:39 AM

## 2021-12-06 ENCOUNTER — APPOINTMENT (OUTPATIENT)
Dept: CT IMAGING | Age: 66
DRG: 392 | End: 2021-12-06
Payer: MEDICARE

## 2021-12-06 LAB
ABSOLUTE EOS #: 0.12 K/UL (ref 0–0.44)
ABSOLUTE IMMATURE GRANULOCYTE: <0.03 K/UL (ref 0–0.3)
ABSOLUTE LYMPH #: 0.95 K/UL (ref 1.1–3.7)
ABSOLUTE MONO #: 0.66 K/UL (ref 0.1–1.2)
ANION GAP SERPL CALCULATED.3IONS-SCNC: 13 MMOL/L (ref 9–17)
BASOPHILS # BLD: 1 % (ref 0–2)
BASOPHILS ABSOLUTE: 0.06 K/UL (ref 0–0.2)
BUN BLDV-MCNC: 11 MG/DL (ref 8–23)
BUN/CREAT BLD: ABNORMAL (ref 9–20)
CALCIUM SERPL-MCNC: 8.5 MG/DL (ref 8.6–10.4)
CHLORIDE BLD-SCNC: 105 MMOL/L (ref 98–107)
CO2: 17 MMOL/L (ref 20–31)
CREAT SERPL-MCNC: 1.02 MG/DL (ref 0.5–0.9)
DIFFERENTIAL TYPE: ABNORMAL
EOSINOPHILS RELATIVE PERCENT: 2 % (ref 1–4)
GFR AFRICAN AMERICAN: >60 ML/MIN
GFR NON-AFRICAN AMERICAN: 54 ML/MIN
GFR SERPL CREATININE-BSD FRML MDRD: ABNORMAL ML/MIN/{1.73_M2}
GFR SERPL CREATININE-BSD FRML MDRD: ABNORMAL ML/MIN/{1.73_M2}
GLUCOSE BLD-MCNC: 98 MG/DL (ref 70–99)
HCT VFR BLD CALC: 31.9 % (ref 36.3–47.1)
HEMOGLOBIN: 11.5 G/DL (ref 11.9–15.1)
IMMATURE GRANULOCYTES: 0 %
INR BLD: 1
LYMPHOCYTES # BLD: 16 % (ref 24–43)
MCH RBC QN AUTO: 31.1 PG (ref 25.2–33.5)
MCHC RBC AUTO-ENTMCNC: 36.1 G/DL (ref 28.4–34.8)
MCV RBC AUTO: 86.2 FL (ref 82.6–102.9)
MONOCYTES # BLD: 11 % (ref 3–12)
NRBC AUTOMATED: 0 PER 100 WBC
PARTIAL THROMBOPLASTIN TIME: 53.2 SEC (ref 20.5–30.5)
PDW BLD-RTO: 13 % (ref 11.8–14.4)
PLATELET # BLD: 345 K/UL (ref 138–453)
PLATELET ESTIMATE: ABNORMAL
PMV BLD AUTO: 11.1 FL (ref 8.1–13.5)
POTASSIUM SERPL-SCNC: 3.9 MMOL/L (ref 3.7–5.3)
PROTHROMBIN TIME: 11.1 SEC (ref 9.1–12.3)
RBC # BLD: 3.7 M/UL (ref 3.95–5.11)
RBC # BLD: ABNORMAL 10*6/UL
SEG NEUTROPHILS: 70 % (ref 36–65)
SEGMENTED NEUTROPHILS ABSOLUTE COUNT: 4.11 K/UL (ref 1.5–8.1)
SODIUM BLD-SCNC: 135 MMOL/L (ref 135–144)
WBC # BLD: 5.9 K/UL (ref 3.5–11.3)
WBC # BLD: ABNORMAL 10*3/UL

## 2021-12-06 PROCEDURE — 85025 COMPLETE CBC W/AUTO DIFF WBC: CPT

## 2021-12-06 PROCEDURE — 85610 PROTHROMBIN TIME: CPT

## 2021-12-06 PROCEDURE — 6360000002 HC RX W HCPCS: Performed by: STUDENT IN AN ORGANIZED HEALTH CARE EDUCATION/TRAINING PROGRAM

## 2021-12-06 PROCEDURE — 2060000000 HC ICU INTERMEDIATE R&B

## 2021-12-06 PROCEDURE — 80048 BASIC METABOLIC PNL TOTAL CA: CPT

## 2021-12-06 PROCEDURE — 99233 SBSQ HOSP IP/OBS HIGH 50: CPT | Performed by: INTERNAL MEDICINE

## 2021-12-06 PROCEDURE — 85730 THROMBOPLASTIN TIME PARTIAL: CPT

## 2021-12-06 PROCEDURE — 6370000000 HC RX 637 (ALT 250 FOR IP)

## 2021-12-06 PROCEDURE — 36415 COLL VENOUS BLD VENIPUNCTURE: CPT

## 2021-12-06 PROCEDURE — 72192 CT PELVIS W/O DYE: CPT

## 2021-12-06 PROCEDURE — 2580000003 HC RX 258: Performed by: STUDENT IN AN ORGANIZED HEALTH CARE EDUCATION/TRAINING PROGRAM

## 2021-12-06 PROCEDURE — 6370000000 HC RX 637 (ALT 250 FOR IP): Performed by: STUDENT IN AN ORGANIZED HEALTH CARE EDUCATION/TRAINING PROGRAM

## 2021-12-06 RX ADMIN — SODIUM CHLORIDE, PRESERVATIVE FREE 10 ML: 5 INJECTION INTRAVENOUS at 10:35

## 2021-12-06 RX ADMIN — OXYCODONE HYDROCHLORIDE AND ACETAMINOPHEN 1 TABLET: 5; 325 TABLET ORAL at 17:27

## 2021-12-06 RX ADMIN — OXYCODONE HYDROCHLORIDE AND ACETAMINOPHEN 1 TABLET: 5; 325 TABLET ORAL at 05:29

## 2021-12-06 RX ADMIN — AMIODARONE HYDROCHLORIDE 200 MG: 200 TABLET ORAL at 07:59

## 2021-12-06 RX ADMIN — PIPERACILLIN AND TAZOBACTAM 3375 MG: 3; .375 INJECTION, POWDER, FOR SOLUTION INTRAVENOUS at 19:18

## 2021-12-06 RX ADMIN — LEVOTHYROXINE SODIUM 37.5 MCG: 75 TABLET ORAL at 10:03

## 2021-12-06 RX ADMIN — PIPERACILLIN AND TAZOBACTAM 3375 MG: 3; .375 INJECTION, POWDER, FOR SOLUTION INTRAVENOUS at 03:10

## 2021-12-06 RX ADMIN — HEPARIN SODIUM AND DEXTROSE 8 UNITS/KG/HR: 10000; 5 INJECTION INTRAVENOUS at 03:10

## 2021-12-06 RX ADMIN — HEPARIN SODIUM 2000 UNITS: 1000 INJECTION INTRAVENOUS; SUBCUTANEOUS at 00:00

## 2021-12-06 RX ADMIN — PIPERACILLIN AND TAZOBACTAM 3375 MG: 3; .375 INJECTION, POWDER, FOR SOLUTION INTRAVENOUS at 11:01

## 2021-12-06 RX ADMIN — METOPROLOL TARTRATE 25 MG: 50 TABLET, FILM COATED ORAL at 20:45

## 2021-12-06 ASSESSMENT — PAIN SCALES - GENERAL
PAINLEVEL_OUTOF10: 4
PAINLEVEL_OUTOF10: 7

## 2021-12-06 NOTE — PROGRESS NOTES
morning. Afebrile. No acute events overnight. Patient states she feels much better today, denies any SOB or CP. Denies N/V, had a small BM yesterday, passing flatus. OBJECTIVE  VITALS: Temp: Temp: 98.7 °F (37.1 °C)Temp  Av.2 °F (36.8 °C)  Min: 97.9 °F (36.6 °C)  Max: 98.7 °F (17.3 °C) BP Systolic (70TRR), LPM:115 , Min:95 , FOU:040   Diastolic (35QEY), RXZ:03, Min:70, Max:73   Pulse Pulse  Av  Min: 70  Max: 70 Resp Resp  Av.3  Min: 16  Max: 18 Pulse ox SpO2  Av %  Min: 95 %  Max: 98 %  GENERAL: alert, no distress  NEURO: sanchez, no focal deficits  HEENT: Neck supple, trachea midline  LUNGS: equal chest rise and fall, no increased wob  HEART: normal rate and regular rhythm  ABDOMEN: soft, very mild tenderness to palpation in the left lower quadrant, nondistended, no peritoneal signs, no rebound tenderness  EXTREMITY: no cyanosis, clubbing or edema    No intake/output data recorded. Drain/tube output:  No intake/output data recorded. LAB:  CBC:   Recent Labs     21  0921  0806 21  0542   WBC 10.5 7.4 5.9   HGB 13.5 12.0 11.5*   HCT 37.0 34.0* 31.9*   MCV 84.7 85.6 86.2    264 345     BMP:   Recent Labs     21  0900 21  0806 21  0542   * 135 135   K 4.1 4.1 3.9    103 105   CO2 19* 18* 17*   BUN 21 14 11   CREATININE 1.05* 0.87 1.02*   GLUCOSE 110* 85 98     COAGS:   Recent Labs     21  0900 21  0109 21  1635 21  2245 21  0542   APTT  --    < > 38.1* 39.5* 53.2*   PROT 8.0  --   --   --   --    INR  --   --   --   --  1.0    < > = values in this interval not displayed.          Electronically signed by Terrence Lugo DO on 2021 at 4:01 PM

## 2021-12-06 NOTE — PROGRESS NOTES
Repeat abdominal CT today reviewed by IR attending, Dr. Vahe Fuentes. CT did not show any safe access into abscess. Abscess has significantly decreased in size from prior CT. Procedure was aborted.

## 2021-12-06 NOTE — PROGRESS NOTES
[facility]  Internal Medicine Teaching Residency Program  Inpatient Daily Progress Note  ______________________________________________________________________________    Patient: Stefanie Maguire  YOB: 1955   [id number]    Acct: [de-identified]     Room: [address] [place] [address]  Admit date: 12/4/2021  Today's date: 12/06/21  Number of days in the hospital: 2    SUBJECTIVE   Admitting Diagnosis: Abscess of sigmoid colon  CC: abdominal pain  Pt examined at bedside. Chart & results reviewed. Patient hemodynamically stable, afebrile. NS at 50 cc/hour  Gen surgery following - okay for clear liquids if no plan for drainage today  Creatinine trended up 0.87 > 1.02    Abdominal pain improved    ROS:  Constitutional:  negative for chills, fevers, sweats  Respiratory:  negative for cough, dyspnea on exertion, hemoptysis, shortness of breath, wheezing  Cardiovascular:  negative for chest pain, chest pressure/discomfort, lower extremity edema, palpitations  Gastrointestinal:  negative for abdominal pain, constipation, diarrhea, nausea, vomiting  Neurological:  negative for dizziness, headache  BRIEF HISTORY     The patient is a pleasant 66 y.o. female presents with a chief complaint of abdominal pain located in LLQ that started on Tuesday. It worsened next day, was intermittent until last night when she had some food and it worsened to 9/10, radiating to the back associated with nausea, vomiting and diarrhea. Patient stated she had 2 episodes of diarrhea and several episodes of diarrhea. Patient stated her stools were dark brown but not black. And she wasn't able to keep food most of the week due to vomiting and abdominal pain.      CT scan consistent with - severe colonic diverticulosis with contained rupture of sigmoid abscess 3.9 cm x 4.6 cm x 3.4 cm in mid sigmoid colon; focal peritonitis; consider colonoscopy to exclude malignancy; prominent paracolic LN. cooperative and in no apparent distress. Head:normocephalic and atraumatic. EENT:  PERRLA. No thrush was noted. Neck: Supple without thyromegaly. No elevated JVP. Respiratory: Chest was symmetrical without dullness to percussion. Breath sounds bilaterally were clear to auscultation. There were no wheezes, rhonchi or rales. Cardiovascular: Regular without murmur, clicks, gallops or rubs. Abdomen: LLQ tenderness  Lymphatic: No lymphadenopathy. Extremities:  No lower extremity edema, ulcerations, varicosities or erythema. Muscle size, tone and strength are normal.  No involuntary movements are noted. Skin:  Warm and dry. Good color, turgor and pigmentation. No lesions or scars.   No cyanosis or clubbing  Neurological/Psychiatric: The patient's general behavior, level of consciousness, thought content and emotional status is normal.        Medications:  Scheduled Medications:    levothyroxine  37.5 mcg Oral Daily    amiodarone  200 mg Oral Daily    aspirin  81 mg Oral Daily    [Held by provider] bumetanide  2 mg Oral Daily    metoprolol tartrate  25 mg Oral BID    [Held by provider] sacubitril-valsartan  0.5 tablet Oral BID    [Held by provider] spironolactone  12.5 mg Oral Daily    sodium chloride flush  10 mL IntraVENous 2 times per day    piperacillin-tazobactam  3,375 mg IntraVENous Q8H     Continuous Infusions:    sodium chloride      sodium chloride 50 mL/hr at 12/05/21 1054    heparin (PORCINE) Infusion 8 Units/kg/hr (12/06/21 0310)     PRN MedicationsoxyCODONE-acetaminophen, 1 tablet, Q6H PRN  sodium chloride flush, 10 mL, PRN  sodium chloride, 25 mL, PRN  promethazine, 12.5 mg, Q6H PRN   Or  ondansetron, 4 mg, Q6H PRN  polyethylene glycol, 17 g, Daily PRN  acetaminophen, 650 mg, Q6H PRN   Or  acetaminophen, 650 mg, Q6H PRN  potassium chloride, 40 mEq, PRN   Or  potassium alternative oral replacement, 40 mEq, PRN   Or  potassium chloride, 10 mEq, PRN  fentanNYL, 25 mcg, Q2H PRN  heparin (porcine), 4,000 Units, PRN  heparin (porcine), 2,000 Units, PRN        Diagnostic Labs:  CBC:   Recent Labs     12/04/21  0900 12/05/21  0806 12/06/21  0542   WBC 10.5 7.4 5.9   RBC 4.37 3.97 3.70*   HGB 13.5 12.0 11.5*   HCT 37.0 34.0* 31.9*   MCV 84.7 85.6 86.2   RDW 12.9 13.2 13.0    264 345     BMP:   Recent Labs     12/04/21  0900 12/05/21  0806 12/06/21  0542   * 135 135   K 4.1 4.1 3.9    103 105   CO2 19* 18* 17*   BUN 21 14 11   CREATININE 1.05* 0.87 1.02*     BNP: No results for input(s): BNP in the last 72 hours. PT/INR: No results for input(s): PROTIME, INR in the last 72 hours. APTT:   Recent Labs     12/05/21  1635 12/05/21  2245 12/06/21  0542   APTT 38.1* 39.5* 53.2*     CARDIAC ENZYMES: No results for input(s): CKMB, CKMBINDEX, TROPONINI in the last 72 hours. Invalid input(s): CKTOTAL;3  FASTING LIPID PANEL:  Lab Results   Component Value Date    CHOL 180 05/23/2020    HDL 42 05/23/2020    TRIG 79 05/23/2020     LIVER PROFILE:   Recent Labs     12/04/21  0900   AST 21   ALT 14   BILITOT 0.89   ALKPHOS 236*      MICROBIOLOGY:   Lab Results   Component Value Date/Time    CULTURE NO GROWTH 12/04/2021 01:21 PM       Imaging:    CT ABDOMEN PELVIS W IV CONTRAST Additional Contrast? None    Result Date: 12/4/2021  1. Wall thickening in the proximal to mid sigmoid colon is most prominent in the mid sigmoid colon and is likely related to acute or subacute diverticulitis in the setting of severe colonic diverticulosis. There is an associated contained rupture into the sigmoid mesocolon with a 3.9 cm x 4.6 cm x 3.4 cm abscess in the sigmoid mesocolon and an apparent sinus tract extending from the mid sigmoid colon into the collection. Consider colonoscopy following treatment to exclude underlying malignancy. 2. Focal peritonitis near the sigmoid colon and trace ascites are likely reactive.  3. Prominent paracolic lymph nodes in the sigmoid mesocolon are likely reactive, although kaleb metastases in the setting of malignancy could appear similar. Recommend attention on follow-up imaging. 4. Mild cirrhotic changes of the liver may be sequelae of congestive hepatopathy. No focal liver lesion. 5. Additional incidental findings as above. ASSESSMENT & PLAN     ASSESSMENT / PLAN:     Principal Problem:    Abscess of sigmoid colon  Active Problems:    Essential hypertension    PAH (pulmonary artery hypertension) ([address] 34 on Echo     Mitral regurgitation    Morbid obesity due to excess calories ([address]    AICD (automatic cardioverter/defibrillator) present    Chronic heart failure with reduced ejection fraction and diastolic dysfunction (HCC)    Hypothyroidism    Chronic combined systolic (congestive) and diastolic (congestive) heart failure ([address]    Acute diverticulitis  Resolved Problems:    * No resolved hospital problems. *    Sigmoid abscess with severe diverticulosis - evaluated by general surgery, recommended npo, ice chips okay diet, IR to drain abscess today. Recommended admission to medicine due to co-morbidities. Currently on zosyn.   MAURO secondary to dehydration from vomiting/diarrhea - improving (baseline 0.8)  HFrEF - NICM - s/p AICD, EF 17%, G3 DD, severe MR - resumed home entresto bid, aldactone 12.5 mg qd, bumex 2 mg qd, asa. Has appointment with [facility] on Wednesday.   Essential hypertension - controlled - resumed lopressor, bumex, aldactone  A. Fib - rate controlled - resumed home lopressor 25 mg bid, amio 200 mg qd  Hypothyroidism - continue synthroid       DVT ppx : lovenox  GI ppx: not indicated    PT/OT: on board  Discharge Planning / SW: on board    Claude Plank, MD  Internal Medicine Resident, PGY-1  [facility]; [place], [place]  12/6/2021, 7:27 AM    Attestation and add on       I have discussed the care of Yonis Albright , including pertinent history and exam findings,      9/12/2021    with the resident.   I have seen and examined the patient and the key elements of all parts of the encounter have been performed by me . I agree with the assessment, plan and orders as documented by the resident. Principal Problem:    Abscess of sigmoid colon  Active Problems:    Essential hypertension    PAH (pulmonary artery hypertension) ([address] 34 on Echo     Mitral regurgitation    Morbid obesity due to excess calories ([address]    AICD (automatic cardioverter/defibrillator) present    Chronic heart failure with reduced ejection fraction and diastolic dysfunction (HCC)    Hypothyroidism    Chronic combined systolic (congestive) and diastolic (congestive) heart failure ([address]    Acute diverticulitis  Resolved Problems:    * No resolved hospital problems. *         ---- ;     Seda Villela MD      [address] Phone [phone]   Fax: [phone]  Answering Service: [phone] and add on       I have discussed the care of Nubia Herrmann , including pertinent history and exam findings,      12/6/21    with the resident. I have seen and examined the patient and the key elements of all parts of the encounter have been performed by me . I agree with the assessment, plan and orders as documented by the resident. Principal Problem:    Abscess of sigmoid colon  Active Problems:    Essential hypertension    PAH (pulmonary artery hypertension) ([address] 34 on Echo     Mitral regurgitation    Morbid obesity due to excess calories ([address]    AICD (automatic cardioverter/defibrillator) present    Chronic heart failure with reduced ejection fraction and diastolic dysfunction (HCC)    Hypothyroidism    Chronic combined systolic (congestive) and diastolic (congestive) heart failure ([address]    Acute diverticulitis  Resolved Problems:    * No resolved hospital problems.  *         ---   Patient is ill and symptomatic .   ----    Condition    [x] ill ,     [x] high risk , [] critical ,           [x] Iseptic---  [] delirium     [] -end organ failure ----,        [] debility---           [x] I-multiorgan insufficiency---        - ;     MD DARRYL Devries[address], [address] [place] [address].    Phone [phone]   Fax: [phone]  Answering Service: [phone]

## 2021-12-07 LAB
ABSOLUTE EOS #: 0.15 K/UL (ref 0–0.44)
ABSOLUTE IMMATURE GRANULOCYTE: <0.03 K/UL (ref 0–0.3)
ABSOLUTE LYMPH #: 1.03 K/UL (ref 1.1–3.7)
ABSOLUTE MONO #: 0.6 K/UL (ref 0.1–1.2)
ANION GAP SERPL CALCULATED.3IONS-SCNC: 11 MMOL/L (ref 9–17)
BASOPHILS # BLD: 1 % (ref 0–2)
BASOPHILS ABSOLUTE: 0.05 K/UL (ref 0–0.2)
BUN BLDV-MCNC: 8 MG/DL (ref 8–23)
BUN/CREAT BLD: ABNORMAL (ref 9–20)
CALCIUM SERPL-MCNC: 9 MG/DL (ref 8.6–10.4)
CHLORIDE BLD-SCNC: 104 MMOL/L (ref 98–107)
CO2: 18 MMOL/L (ref 20–31)
CREAT SERPL-MCNC: 0.86 MG/DL (ref 0.5–0.9)
DIFFERENTIAL TYPE: ABNORMAL
DIRECT EXAM: NORMAL
EOSINOPHILS RELATIVE PERCENT: 3 % (ref 1–4)
GFR AFRICAN AMERICAN: >60 ML/MIN
GFR NON-AFRICAN AMERICAN: >60 ML/MIN
GFR SERPL CREATININE-BSD FRML MDRD: ABNORMAL ML/MIN/{1.73_M2}
GFR SERPL CREATININE-BSD FRML MDRD: ABNORMAL ML/MIN/{1.73_M2}
GLUCOSE BLD-MCNC: 92 MG/DL (ref 70–99)
HCT VFR BLD CALC: 32.6 % (ref 36.3–47.1)
HEMOGLOBIN: 11.5 G/DL (ref 11.9–15.1)
IMMATURE GRANULOCYTES: 0 %
LYMPHOCYTES # BLD: 20 % (ref 24–43)
Lab: NORMAL
MCH RBC QN AUTO: 30.3 PG (ref 25.2–33.5)
MCHC RBC AUTO-ENTMCNC: 35.3 G/DL (ref 28.4–34.8)
MCV RBC AUTO: 86 FL (ref 82.6–102.9)
MONOCYTES # BLD: 12 % (ref 3–12)
NRBC AUTOMATED: 0 PER 100 WBC
PDW BLD-RTO: 13.3 % (ref 11.8–14.4)
PLATELET # BLD: 286 K/UL (ref 138–453)
PLATELET ESTIMATE: ABNORMAL
PMV BLD AUTO: 11 FL (ref 8.1–13.5)
POTASSIUM SERPL-SCNC: 4.1 MMOL/L (ref 3.7–5.3)
RBC # BLD: 3.79 M/UL (ref 3.95–5.11)
RBC # BLD: ABNORMAL 10*6/UL
SEG NEUTROPHILS: 64 % (ref 36–65)
SEGMENTED NEUTROPHILS ABSOLUTE COUNT: 3.33 K/UL (ref 1.5–8.1)
SODIUM BLD-SCNC: 133 MMOL/L (ref 135–144)
SPECIMEN DESCRIPTION: NORMAL
WBC # BLD: 5.2 K/UL (ref 3.5–11.3)
WBC # BLD: ABNORMAL 10*3/UL

## 2021-12-07 PROCEDURE — 6370000000 HC RX 637 (ALT 250 FOR IP): Performed by: STUDENT IN AN ORGANIZED HEALTH CARE EDUCATION/TRAINING PROGRAM

## 2021-12-07 PROCEDURE — 6360000002 HC RX W HCPCS: Performed by: STUDENT IN AN ORGANIZED HEALTH CARE EDUCATION/TRAINING PROGRAM

## 2021-12-07 PROCEDURE — 80048 BASIC METABOLIC PNL TOTAL CA: CPT

## 2021-12-07 PROCEDURE — 85025 COMPLETE CBC W/AUTO DIFF WBC: CPT

## 2021-12-07 PROCEDURE — 2060000000 HC ICU INTERMEDIATE R&B

## 2021-12-07 PROCEDURE — 2580000003 HC RX 258: Performed by: STUDENT IN AN ORGANIZED HEALTH CARE EDUCATION/TRAINING PROGRAM

## 2021-12-07 PROCEDURE — 36415 COLL VENOUS BLD VENIPUNCTURE: CPT

## 2021-12-07 PROCEDURE — 99232 SBSQ HOSP IP/OBS MODERATE 35: CPT | Performed by: INTERNAL MEDICINE

## 2021-12-07 RX ORDER — AMOXICILLIN AND CLAVULANATE POTASSIUM 875; 125 MG/1; MG/1
1 TABLET, FILM COATED ORAL 2 TIMES DAILY
Qty: 14 TABLET | Refills: 0 | Status: SHIPPED | OUTPATIENT
Start: 2021-12-07 | End: 2021-12-14

## 2021-12-07 RX ADMIN — PIPERACILLIN AND TAZOBACTAM 3375 MG: 3; .375 INJECTION, POWDER, FOR SOLUTION INTRAVENOUS at 03:44

## 2021-12-07 RX ADMIN — RIVAROXABAN 20 MG: 20 TABLET, FILM COATED ORAL at 08:56

## 2021-12-07 RX ADMIN — ACETAMINOPHEN 650 MG: 325 TABLET ORAL at 23:42

## 2021-12-07 RX ADMIN — ACETAMINOPHEN 650 MG: 325 TABLET ORAL at 14:59

## 2021-12-07 RX ADMIN — LEVOTHYROXINE SODIUM 37.5 MCG: 75 TABLET ORAL at 05:56

## 2021-12-07 RX ADMIN — ACETAMINOPHEN 650 MG: 325 TABLET ORAL at 11:12

## 2021-12-07 RX ADMIN — PIPERACILLIN AND TAZOBACTAM 3375 MG: 3; .375 INJECTION, POWDER, FOR SOLUTION INTRAVENOUS at 18:33

## 2021-12-07 RX ADMIN — METOPROLOL TARTRATE 25 MG: 50 TABLET, FILM COATED ORAL at 08:55

## 2021-12-07 RX ADMIN — AMIODARONE HYDROCHLORIDE 200 MG: 200 TABLET ORAL at 08:55

## 2021-12-07 RX ADMIN — PIPERACILLIN AND TAZOBACTAM 3375 MG: 3; .375 INJECTION, POWDER, FOR SOLUTION INTRAVENOUS at 11:11

## 2021-12-07 ASSESSMENT — PAIN SCALES - GENERAL
PAINLEVEL_OUTOF10: 0
PAINLEVEL_OUTOF10: 2
PAINLEVEL_OUTOF10: 3
PAINLEVEL_OUTOF10: 2
PAINLEVEL_OUTOF10: 3
PAINLEVEL_OUTOF10: 2
PAINLEVEL_OUTOF10: 7

## 2021-12-07 ASSESSMENT — PAIN DESCRIPTION - LOCATION: LOCATION: BACK

## 2021-12-07 ASSESSMENT — PAIN DESCRIPTION - FREQUENCY: FREQUENCY: CONTINUOUS

## 2021-12-07 ASSESSMENT — PAIN DESCRIPTION - ONSET: ONSET: ON-GOING

## 2021-12-07 ASSESSMENT — PAIN DESCRIPTION - ORIENTATION: ORIENTATION: LOWER

## 2021-12-07 ASSESSMENT — PAIN DESCRIPTION - DESCRIPTORS: DESCRIPTORS: CONSTANT;DISCOMFORT

## 2021-12-07 ASSESSMENT — PAIN DESCRIPTION - PAIN TYPE: TYPE: ACUTE PAIN

## 2021-12-07 NOTE — PROGRESS NOTES
RN asked internal medicine resident about whether restarting pt's heparin drip or their home dose of xarelto and also advancing their diet from clears this morning. Internal medicine resident stated they will \"let RN know and primary will be here in a while. \"

## 2021-12-07 NOTE — PROGRESS NOTES
Pt states does not take asa rt on xaralto .  Perfect served  Medicine team to dc asa since pt refusing and does not take

## 2021-12-07 NOTE — PROGRESS NOTES
[facility]  Internal Medicine Teaching Residency Program  Inpatient Daily Progress Note  ______________________________________________________________________________    Patient: Brian Francis  YOB: 1955   [id number]    Acct: [de-identified]     Room: [address] [place] [address]  Admit date: 12/4/2021  Today's date: 12/07/21  Number of days in the hospital: 3    SUBJECTIVE   Admitting Diagnosis: Abscess of sigmoid colon  CC: abdominal pain  Pt examined at bedside. Chart & results reviewed. Patient hemodynamically stable, afebrile. No acute events overnight. Will switch her heparin to her home dose of xarelto. Okay from surgery standpoint to discharge on oral antibiotics. Vitals stable   Will discontinue fluids   Will advance her diet if able to tolerate and will discharge her today. ROS:  Constitutional:  negative for chills, fevers, sweats  Respiratory:  negative for cough, dyspnea on exertion, hemoptysis, shortness of breath, wheezing  Cardiovascular:  negative for chest pain, chest pressure/discomfort, lower extremity edema, palpitations  Gastrointestinal:  negative for abdominal pain, constipation, diarrhea, nausea, vomiting  Neurological:  negative for dizziness, headache  BRIEF HISTORY     The patient is a FZMJCEAK 70 y.o. female presents with a chief complaint of abdominal pain located in LLQ that started on Tuesday. It worsened next day, was intermittent until last night when she had some food and it worsened to 9/10, radiating to the back associated with nausea, vomiting and diarrhea. Patient stated she had 2 episodes of diarrhea and several episodes of diarrhea. Patient stated her stools were dark brown but not black.  And she wasn't able to keep food most of the week due to vomiting and abdominal pain.      CT scan consistent with - severe colonic diverticulosis with contained rupture of sigmoid abscess 3.9 cm x 4.6 cm x 3.4 cm in mid sigmoid colon; focal peritonitis; consider colonoscopy to exclude malignancy; prominent paracolic LN.      General surgery evalulated patient - recommended NPO, IV abx zosyn, IR to drain abscess Monday morning.      Patient hemodynamically stable, afebrile. CT scan - showed diverticulitis  Labs showed Na 133,   MAURO with creatinine of 1.05  Glucose 110  Wbc wnl     UA negative, blood cultures pending.      PMH - HFrEF EF 17% S/P AICD (patient referred to [address] [place] [address] has appt this Wednesday), N-ICM, HTN, Hypothyroidism, MR, pulm htn, a fib, cardiac cath  - non obst CAD     Echo  - Summary  Left ventricle is moderately enlarged. Global left ventricular systolic function is severely reduced, calculated  ejection fraction is 17% (by Hatfield's Method.)  [place] is shyanne with severe global hypokinesis of remaining segments. Increased septal wall thickness. Evidence of severe (grade III) diastolic dysfunction. Left atrium is severely dilated. Right atrium is mildly dilated . Pacing/ICD lead seen in the right atrium/ventricle. Right ventricular dilatation with normal systolic function. Aortic valve is sclerotic but opens well. Trivial aortic insufficiency. Mitral valve is sclerotic. Mean gradient is 3mmHg. There is posterior leaflet restriction. Severe mitral regurgitation (eccentric jet.)  Mitral regurgitation: MR radius 1.0cm, MR EOA 0.48cm2, MR Volume 75mL, MR  Vena Contracta 0.91cm. Mild to moderate tricuspid regurgitation. Estimated right ventricular systolic pressure is 49 mmHg, suggesting mild  pulmonary HTN. Trivial pulmonic insufficiency. OBJECTIVE     Vital Signs:  /73   Pulse 70   Temp 97.4 °F (36.3 °C) (Oral)   Resp 16   Ht 5' 6\" (1.676 m)   Wt 239 lb 6 oz (108.6 kg)   SpO2 98%   BMI 38.64 kg/m²     Temp (24hrs), Av °F (36.7 °C), Min:97.4 °F (36.3 °C), Max:98.7 °F (37.1 °C)    No intake/output data recorded.     Physical Exam:  Constitutional: This is a well developed, well nourished, 35-39.9 - Obesity Grade II 78y.o. year old female who is alert, oriented, cooperative and in no apparent distress. Head:normocephalic and atraumatic. EENT:  PERRLA. No thrush was noted. Neck: Supple without thyromegaly. No elevated JVP. Respiratory: Chest was symmetrical without dullness to percussion. Breath sounds bilaterally were clear to auscultation. There were no wheezes, rhonchi or rales. Cardiovascular: Regular without murmur, clicks, gallops or rubs. Abdomen: LLQ tenderness  Lymphatic: No lymphadenopathy. Extremities:  No lower extremity edema, ulcerations, varicosities or erythema. Muscle size, tone and strength are normal.  No involuntary movements are noted. Skin:  Warm and dry. Good color, turgor and pigmentation. No lesions or scars.   No cyanosis or clubbing  Neurological/Psychiatric: The patient's general behavior, level of consciousness, thought content and emotional status is normal.        Medications:  Scheduled Medications:    rivaroxaban  20 mg Oral Daily    levothyroxine  37.5 mcg Oral Daily    amiodarone  200 mg Oral Daily    aspirin  81 mg Oral Daily    [Held by provider] bumetanide  2 mg Oral Daily    metoprolol tartrate  25 mg Oral BID    [Held by provider] sacubitril-valsartan  0.5 tablet Oral BID    [Held by provider] spironolactone  12.5 mg Oral Daily    sodium chloride flush  10 mL IntraVENous 2 times per day    piperacillin-tazobactam  3,375 mg IntraVENous Q8H     Continuous Infusions:    sodium chloride      sodium chloride 50 mL/hr at 12/05/21 1054    heparin (PORCINE) Infusion 8 Units/kg/hr (12/06/21 0310)     PRN MedicationsoxyCODONE-acetaminophen, 1 tablet, Q6H PRN  sodium chloride flush, 10 mL, PRN  sodium chloride, 25 mL, PRN  promethazine, 12.5 mg, Q6H PRN   Or  ondansetron, 4 mg, Q6H PRN  polyethylene glycol, 17 g, Daily PRN  acetaminophen, 650 mg, Q6H PRN   Or  acetaminophen, 650 mg, Q6H PRN  potassium chloride, 40 mEq, PRN and trace ascites are likely reactive. 3. Prominent paracolic lymph nodes in the sigmoid mesocolon are likely reactive, although kaleb metastases in the setting of malignancy could appear similar. Recommend attention on follow-up imaging. 4. Mild cirrhotic changes of the liver may be sequelae of congestive hepatopathy. No focal liver lesion. 5. Additional incidental findings as above. ASSESSMENT & PLAN     ASSESSMENT / PLAN:     Principal Problem:    Abscess of sigmoid colon  Active Problems:    Essential hypertension    PAH (pulmonary artery hypertension) ([address] 34 on Echo     Mitral regurgitation    Morbid obesity due to excess calories ([address]    AICD (automatic cardioverter/defibrillator) present    Chronic heart failure with reduced ejection fraction and diastolic dysfunction (HCC)    Hypothyroidism    Chronic combined systolic (congestive) and diastolic (congestive) heart failure ([address]    Acute diverticulitis  Resolved Problems:    * No resolved hospital problems. *    Sigmoid abscess with severe diverticulosis - evaluated by general surgery, recommended npo, ice chips okay diet, IR to drain abscess today. Recommended admission to medicine due to co-morbidities. Currently on zosyn.   MAURO secondary to dehydration from vomiting/diarrhea - improving (baseline 0.8)  HFrEF - NICM - s/p AICD, EF 17%, G3 DD, severe MR - resumed home entresto bid, aldactone 12.5 mg qd, bumex 2 mg qd, asa. Has appointment with [facility] on Wednesday.   Essential hypertension - controlled - resumed lopressor, bumex, aldactone  A. Fib - rate controlled - resumed home lopressor 25 mg bid, amio 200 mg qd  Hypothyroidism - continue synthroid       12/7/21       Will switch her heparin to her home dose of xarelto. Okay from surgery standpoint to discharge on oral antibiotics. Will discontinue fluids   Will advance her diet.  Patient feeling nauseated after breakfast. Will hold her discharge today               DVT ppx : lovenox  GI ppx: not indicated    PT/OT: on board  Discharge Planning / SW: on board    Bella Fink MD  Internal Medicine Resident, PGY-2  [facility]; [facility], [facility]  12/7/2021, 7:47 AM    Attestation and add on       I have discussed the care of Gilma Reyes , including pertinent history and exam findings,      9/12/2021    with the resident. I have seen and examined the patient and the key elements of all parts of the encounter have been performed by me . I agree with the assessment, plan and orders as documented by the resident. Principal Problem:    Abscess of sigmoid colon  Active Problems:    Essential hypertension    PAH (pulmonary artery hypertension) ([address] 34 on Echo     Mitral regurgitation    Morbid obesity due to excess calories ([address]    AICD (automatic cardioverter/defibrillator) present    Chronic heart failure with reduced ejection fraction and diastolic dysfunction (HCC)    Hypothyroidism    Chronic combined systolic (congestive) and diastolic (congestive) heart failure ([address]    Acute diverticulitis  Resolved Problems:    * No resolved hospital problems. *         ---- ;     MD DARRYL Goss [address], [address] [place] [address]. Phone [phone]   Fax: [phone]  Answering Service: [phone] and add on       I have discussed the care of Gilma Reyes , including pertinent history and exam findings,      12/7/21    with the resident. I have seen and examined the patient and the key elements of all parts of the encounter have been performed by me . I agree with the assessment, plan and orders as documented by the resident.      Principal Problem:    Abscess of sigmoid colon  Active Problems:    Essential hypertension    PAH (pulmonary artery hypertension) ([address] 34 on Echo     Mitral regurgitation    Morbid obesity due to excess calories ([address]    AICD (automatic cardioverter/defibrillator) present Chronic heart failure with reduced ejection fraction and diastolic dysfunction (HCC)    Hypothyroidism    Chronic combined systolic (congestive) and diastolic (congestive) heart failure (HCC)    Acute diverticulitis  Resolved Problems:    * No resolved hospital problems. *         -c/o pain post prandial . Will watch  Tomorrow if progress ok-- ;     MD DARRYL Mercedes[address], [address] [place] [address].    Phone [phone]   Fax: [phone]  Answering Service: [phone]

## 2021-12-07 NOTE — PROGRESS NOTES
Congestive Heart Failure Education completed and charted. CHF booklet given. Patient was receptive to education. Discussed the  importance of medication compliance. Discussed the importance of a heart healthy diet. Discussed 2000 mg sodium-restricted daily diet. Patient instructed to limit fluid intake to  1.5 to 2 liters per day. Patient instructed to weigh self at the same time of each day each morning, reinforced teaching to monitor for 3-5 lb weight increase over 1-2 days notify physician if change noted. Signs and symptoms of CHF discussed with patient, such as feeling more tired than normal, feeling short of breath, coughing that increases when lying down, sudden weight gain, swelling of the feet, legs or belly. Patient verbalized understanding to notify physician office if these symptoms occur. EF 17%   Pt is established with  [facility] . Pt does not wish to make a f/u appt with  CHF Clinic at this time.  She states she plans to schedule a CARDIO appt with [facility], and does not want to additionally make appts with the  CHF Clinic

## 2021-12-07 NOTE — PROGRESS NOTES
Cancelled pt cardiology appointment with dr Lexy Vega at [address] [place] [address] per pts request

## 2021-12-07 NOTE — PROGRESS NOTES
[facility]  Occupational Therapy Not Seen Note    DATE: 2021    NAME: Marjean Nissen  MRN: [id number]   : 1955      Patient not seen this date for Occupational Therapy due to:    OT collaborated w/ patient to determined POC, patient reports being at baseline functional level with no acute OT needs. Will defer OT evaluation at this time. Please reorder OT if future needs arise.      Next Scheduled Treatment:     Electronically signed by Chidi Alarcon on 2021 at 11:21 AM

## 2021-12-07 NOTE — PLAN OF CARE
Problem: Pain:  Goal: Pain level will decrease  Description: Pain level will decrease  12/7/2021 1220 by Miracle Hui RN  Outcome: Ongoing  12/7/2021 0516 by Sherry Reynolds RN  Outcome: Ongoing  Goal: Control of acute pain  Description: Control of acute pain  12/7/2021 1220 by Miracle Hui RN  Outcome: Ongoing  12/7/2021 0516 by Sherry Reynolds RN  Outcome: Ongoing  Goal: Control of chronic pain  Description: Control of chronic pain  12/7/2021 1220 by Miracle Hui RN  Outcome: Ongoing  12/7/2021 0516 by Sherry Reynolds RN  Outcome: Ongoing

## 2021-12-08 VITALS
SYSTOLIC BLOOD PRESSURE: 104 MMHG | WEIGHT: 240.4 LBS | OXYGEN SATURATION: 98 % | RESPIRATION RATE: 18 BRPM | HEART RATE: 70 BPM | HEIGHT: 66 IN | DIASTOLIC BLOOD PRESSURE: 74 MMHG | TEMPERATURE: 98 F | BODY MASS INDEX: 38.63 KG/M2

## 2021-12-08 LAB
ABSOLUTE EOS #: 0.12 K/UL (ref 0–0.44)
ABSOLUTE IMMATURE GRANULOCYTE: <0.03 K/UL (ref 0–0.3)
ABSOLUTE LYMPH #: 1.08 K/UL (ref 1.1–3.7)
ABSOLUTE MONO #: 0.62 K/UL (ref 0.1–1.2)
ANION GAP SERPL CALCULATED.3IONS-SCNC: 11 MMOL/L (ref 9–17)
BASOPHILS # BLD: 0 % (ref 0–2)
BASOPHILS ABSOLUTE: <0.03 K/UL (ref 0–0.2)
BUN BLDV-MCNC: 7 MG/DL (ref 8–23)
BUN/CREAT BLD: ABNORMAL (ref 9–20)
CALCIUM SERPL-MCNC: 8.8 MG/DL (ref 8.6–10.4)
CHLORIDE BLD-SCNC: 106 MMOL/L (ref 98–107)
CO2: 18 MMOL/L (ref 20–31)
CREAT SERPL-MCNC: 0.88 MG/DL (ref 0.5–0.9)
DIFFERENTIAL TYPE: ABNORMAL
EOSINOPHILS RELATIVE PERCENT: 2 % (ref 1–4)
GFR AFRICAN AMERICAN: >60 ML/MIN
GFR NON-AFRICAN AMERICAN: >60 ML/MIN
GFR SERPL CREATININE-BSD FRML MDRD: ABNORMAL ML/MIN/{1.73_M2}
GFR SERPL CREATININE-BSD FRML MDRD: ABNORMAL ML/MIN/{1.73_M2}
GLUCOSE BLD-MCNC: 109 MG/DL (ref 70–99)
HCT VFR BLD CALC: 31.8 % (ref 36.3–47.1)
HEMOGLOBIN: 11 G/DL (ref 11.9–15.1)
IMMATURE GRANULOCYTES: 0 %
LYMPHOCYTES # BLD: 22 % (ref 24–43)
MCH RBC QN AUTO: 30.5 PG (ref 25.2–33.5)
MCHC RBC AUTO-ENTMCNC: 34.6 G/DL (ref 28.4–34.8)
MCV RBC AUTO: 88.1 FL (ref 82.6–102.9)
MONOCYTES # BLD: 13 % (ref 3–12)
NRBC AUTOMATED: 0 PER 100 WBC
PDW BLD-RTO: 13.4 % (ref 11.8–14.4)
PLATELET # BLD: 285 K/UL (ref 138–453)
PLATELET ESTIMATE: ABNORMAL
PMV BLD AUTO: 10.8 FL (ref 8.1–13.5)
POTASSIUM SERPL-SCNC: 3.8 MMOL/L (ref 3.7–5.3)
RBC # BLD: 3.61 M/UL (ref 3.95–5.11)
RBC # BLD: ABNORMAL 10*6/UL
SEG NEUTROPHILS: 63 % (ref 36–65)
SEGMENTED NEUTROPHILS ABSOLUTE COUNT: 3.04 K/UL (ref 1.5–8.1)
SODIUM BLD-SCNC: 135 MMOL/L (ref 135–144)
WBC # BLD: 4.9 K/UL (ref 3.5–11.3)
WBC # BLD: ABNORMAL 10*3/UL

## 2021-12-08 PROCEDURE — 99232 SBSQ HOSP IP/OBS MODERATE 35: CPT | Performed by: INTERNAL MEDICINE

## 2021-12-08 PROCEDURE — 36415 COLL VENOUS BLD VENIPUNCTURE: CPT

## 2021-12-08 PROCEDURE — 85025 COMPLETE CBC W/AUTO DIFF WBC: CPT

## 2021-12-08 PROCEDURE — 80048 BASIC METABOLIC PNL TOTAL CA: CPT

## 2021-12-08 PROCEDURE — 6370000000 HC RX 637 (ALT 250 FOR IP)

## 2021-12-08 PROCEDURE — 6370000000 HC RX 637 (ALT 250 FOR IP): Performed by: STUDENT IN AN ORGANIZED HEALTH CARE EDUCATION/TRAINING PROGRAM

## 2021-12-08 RX ORDER — MAGNESIUM OXIDE 400 MG/1
200 TABLET ORAL DAILY
Qty: 30 TABLET | Refills: 1 | Status: SHIPPED | OUTPATIENT
Start: 2021-12-08

## 2021-12-08 RX ORDER — AMOXICILLIN AND CLAVULANATE POTASSIUM 875; 125 MG/1; MG/1
1 TABLET, FILM COATED ORAL EVERY 12 HOURS SCHEDULED
Status: DISCONTINUED | OUTPATIENT
Start: 2021-12-08 | End: 2021-12-08 | Stop reason: HOSPADM

## 2021-12-08 RX ORDER — POTASSIUM CHLORIDE 20 MEQ/1
20 TABLET, EXTENDED RELEASE ORAL DAILY
Qty: 30 TABLET | Refills: 0 | Status: SHIPPED | OUTPATIENT
Start: 2021-12-08

## 2021-12-08 RX ADMIN — RIVAROXABAN 20 MG: 20 TABLET, FILM COATED ORAL at 08:55

## 2021-12-08 RX ADMIN — AMIODARONE HYDROCHLORIDE 200 MG: 200 TABLET ORAL at 08:53

## 2021-12-08 RX ADMIN — AMOXICILLIN AND CLAVULANATE POTASSIUM 1 TABLET: 875; 125 TABLET, FILM COATED ORAL at 10:47

## 2021-12-08 RX ADMIN — LEVOTHYROXINE SODIUM 37.5 MCG: 75 TABLET ORAL at 08:53

## 2021-12-08 RX ADMIN — ACETAMINOPHEN 650 MG: 325 TABLET ORAL at 08:55

## 2021-12-08 ASSESSMENT — PAIN SCALES - GENERAL
PAINLEVEL_OUTOF10: 5
PAINLEVEL_OUTOF10: 2

## 2021-12-08 NOTE — PROGRESS NOTES
CLINICAL PHARMACY NOTE: MEDS TO BEDS    Total # of Prescriptions Filled: 1   The following medications were delivered to the patient:  · AUGMENTIN 876    Additional Documentation:    DELIVERED MED TO PT IN ROOM 236, NO COPAY

## 2021-12-08 NOTE — DISCHARGE SUMMARY
[address]     Department of Internal Medicine - Staff Internal Medicine Teaching Service    INPATIENT DISCHARGE SUMMARY      Patient Identification:  Alvin Pelletier is a 78 y.o. female. :  1955  MRN: [id number]     Acct: [de-identified]   PCP: Son Kc MD  Admit Date:  2021  Discharge date and time: 2021  2:45 PM   Attending Provider: Dr. Leila Murray Problem Lists:  Principal Problem:    Abscess of sigmoid colon  Active Problems:    Essential hypertension    PAH (pulmonary artery hypertension) ([address] 34 on Echo     Mitral regurgitation    Morbid obesity due to excess calories ([address]    AICD (automatic cardioverter/defibrillator) present    Chronic heart failure with reduced ejection fraction and diastolic dysfunction (HCC)    Hypothyroidism    Chronic combined systolic (congestive) and diastolic (congestive) heart failure ([address]    Acute diverticulitis  Resolved Problems:    * No resolved hospital problems. *      HOSPITAL STAY     Brief Inpatient course:   Alvin Pelletier is a 78 y.o. female who was admitted for the management of Abscess of sigmoid colon, presented to the emergency department with abdominal pain and found to have severe diverticulosis with sigmoid abscess. Patient was treated for -       Sigmoid abscess with severe diverticulosis - treated with NPO diet, advanced diet as tolerated, on regular diet on discharge, tx with zosyn. IR could not reach abscess to drain. MAURO secondary to dehydration from vomiting/diarrhea - Resolved (baseline 0.8)  HFrEF - NICM - s/p AICD, EF 17%, G3 DD, severe MR - resumed home entresto bid, aldactone 12.5 mg qd, bumex 2 mg qd, asa. Has appointment with [facility] on Wednesday. Essential hypertension - controlled - resumed lopressor, bumex, aldactone  A. Fib - rate controlled - resumed home lopressor 25 mg bid, amio 200 mg qd. Switched to Shon Townsend. D/C heparin  Hypothyroidism - continue synthroid        Procedures/ Significant Interventions:        Consults:     Consults:     Final Specialist Recommendations/Findings:   IP CONSULT TO GENERAL SURGERY  IP CONSULT TO INTERNAL MEDICINE  IP CONSULT TO CASE MANAGEMENT  IP CONSULT TO SOCIAL WORK      Any Hospital Acquired Infections: none    Discharge Functional Status:  stable    DISCHARGE PLAN     Disposition: home    Patient Instructions:   Current Discharge Medication List        START taking these medications    Details   amoxicillin-clavulanate (AUGMENTIN) 875-125 MG per tablet Take 1 tablet by mouth 2 times daily for 7 days  Qty: 14 tablet, Refills: 0           CONTINUE these medications which have NOT CHANGED    Details   amiodarone (PACERONE) 100 MG tablet Take 200 mg by mouth daily      nitroGLYCERIN (NITROSTAT) 0.4 MG SL tablet up to max of 3 total doses. If no relief after 1 dose, call 911. Qty: 25 tablet, Refills: 3      rivaroxaban (XARELTO) 20 MG TABS tablet Take 1 tablet by mouth daily  Qty: 30 tablet, Refills: 11      sacubitril-valsartan (ENTRESTO) 24-26 MG per tablet Take 0.5 tablets by mouth 2 times daily  Qty: 60 tablet, Refills: 11      bumetanide (BUMEX) 2 MG tablet Take 1 tablet by mouth daily  Qty: 30 tablet, Refills: 3      potassium chloride (KLOR-CON M) 20 MEQ extended release tablet Take 2 tablets by mouth as needed (Potassium Replacement)  Qty: 60 tablet, Refills: 3      levothyroxine (SYNTHROID) 25 MCG tablet Take 1 tablet by mouth Daily  Qty: 30 tablet, Refills: 3      lidocaine (LIDODERM) 5 % Place one patch to both knees 12 hours on, 12 hours off.   Qty: 30 patch, Refills: 0    Associated Diagnoses: Arthritis of both knees      magnesium oxide (MAG-OX) 400 MG tablet Take 400 mg by mouth daily Patient states takes 200mg PO daily      metoprolol tartrate (LOPRESSOR) 25 MG tablet Take 1 tablet by mouth 2 times daily  Qty: 60 tablet, Refills: 2      acetaminophen (TYLENOL) 325 MG tablet Take 1 tablet by mouth every 8 hours as needed for Pain  Qty: 30 tablet, Refills: 0      allopurinol (ZYLOPRIM) 100 MG tablet Take 1 tablet by mouth daily  Qty: 30 tablet, Refills: 3      spironolactone (ALDACTONE) 25 MG tablet Take 0.5 tablets by mouth daily  Qty: 30 tablet, Refills: 3           STOP taking these medications       Handicap Placard MISC Comments:   Reason for Stopping:         aspirin 81 MG tablet Comments:   Reason for Stopping:               Activity: activity as tolerated    Diet: cardiac diet    Follow-up:    Melissa Perez MD  [address] [place] [address]  [phone]    Schedule an appointment as soon as possible for a visit in 1 week      [address] [address]  In 2 months  diverticulitis      Patient Instructions: Follow up with gastroenterology within 2 months for colonoscopy   Follow up with PCP with in 2 weeks   Follow up with cardiologist   Take Augmentin twice daily for 7 days     Follow up labs: Follow up imaging:     Jerad Holly MD, MD  Internal Medicine Resident, PGY-1  [facility]; [place], [place]  12/8/2021, 4:43 PM  Attending Physician Statement  I have reviewed and edited the discharge summary of  Annemarie Worrell AS NEEDED  ,   including pertinent history and exam findings. I have personally seen the patient and participated in discharge planning and evaluation . I have reviewed the key elements of all parts of the discharge summary . I agree with the information and plans as documented by the resident. Time spent on discharge planning ;          [] less than 30 minutes . [x]   more  than 30 minutes . 34 minutes    Electronically signed by Tatiana Aguilar MD on 12/8/2021 .

## 2021-12-08 NOTE — PLAN OF CARE
Problem: Pain:  Goal: Pain level will decrease  Description: Pain level will decrease  12/7/2021 1950 by Renee Sánchez RN  Outcome: Ongoing  12/7/2021 1220 by Lonny Ha RN  Outcome: Ongoing  Goal: Control of acute pain  Description: Control of acute pain  12/7/2021 1950 by Renee Sánchez RN  Outcome: Ongoing  12/7/2021 1220 by Lonny Ha RN  Outcome: Ongoing  Goal: Control of chronic pain  Description: Control of chronic pain  12/7/2021 1950 by Renee Sánchez RN  Outcome: Ongoing  12/7/2021 1220 by Lonny Ha RN  Outcome: Ongoing

## 2021-12-08 NOTE — PROGRESS NOTES
CLINICAL PHARMACY NOTE: MEDS TO BEDS    Total # of Prescriptions Filled: 0   The following medications were delivered to the patient:  · See notes    Additional Documentation:I tried to deliver a second medication but patient was already discharged, I called the patient and she said she already has it at home. (magnesium oxide).

## 2021-12-08 NOTE — PROGRESS NOTES
[facility]  Internal Medicine Teaching Residency Program  Inpatient Daily Progress Note  ______________________________________________________________________________    Patient: Dasha Lazo  YOB: 1955   TFT:[id number]    Acct: [de-identified]     Room: [address] [place] [address]  Admit date: 12/4/2021  Today's date: 12/08/21  Number of days in the hospital: 4    SUBJECTIVE   Admitting Diagnosis: Abscess of sigmoid colon  CC: abdominal pain  Pt examined at bedside. Chart & results reviewed. Patient hemodynamically stable, afebrile  On regular diet  No acute events overnight  BMP wnl  CBC - similar to yesterday  Glucose 109  Patient tolerating diet, no nausea or vomiting. Okay to discharge on po antibiotics from surgery standpoint    ROS:  Constitutional:  negative for chills, fevers, sweats  Respiratory:  negative for cough, dyspnea on exertion, hemoptysis, shortness of breath, wheezing  Cardiovascular:  negative for chest pain, chest pressure/discomfort, lower extremity edema, palpitations  Gastrointestinal:  negative for abdominal pain, constipation, diarrhea, nausea, vomiting  Neurological:  negative for dizziness, headache  BRIEF HISTORY     The patient is a [id number] 59 y.o. female presents with a chief complaint of abdominal pain located in LLQ that started on Tuesday. It worsened next day, was intermittent until last night when she had some food and it worsened to 9/10, radiating to the back associated with nausea, vomiting and diarrhea. Patient stated she had 2 episodes of diarrhea and several episodes of diarrhea. Patient stated her stools were dark brown but not black.  And she wasn't able to keep food most of the week due to vomiting and abdominal pain.      CT scan consistent with - severe colonic diverticulosis with contained rupture of sigmoid abscess 3.9 cm x 4.6 cm x 3.4 cm in mid sigmoid colon; focal peritonitis; consider colonoscopy to exclude malignancy; prominent paracolic LN.      General surgery evalulated patient - recommended NPO, IV abx zosyn, IR to drain abscess Monday morning.      Patient hemodynamically stable, afebrile. CT scan - showed diverticulitis  Labs showed Na 133,   MAURO with creatinine of 1.05  Glucose 110  Wbc wnl     UA negative, blood cultures pending.      PMH - HFrEF EF 17% S/P AICD (patient referred to [address] [place] [address] has appt this Wednesday), N-ICM, HTN, Hypothyroidism, MR, pulm htn, a fib, cardiac cath  - non obst CAD     Echo  - Summary  Left ventricle is moderately enlarged. Global left ventricular systolic function is severely reduced, calculated  ejection fraction is 17% (by Hatfield's Method.)  [place] is shyanne with severe global hypokinesis of remaining segments. Increased septal wall thickness. Evidence of severe (grade III) diastolic dysfunction. Left atrium is severely dilated. Right atrium is mildly dilated . Pacing/ICD lead seen in the right atrium/ventricle. Right ventricular dilatation with normal systolic function. Aortic valve is sclerotic but opens well. Trivial aortic insufficiency. Mitral valve is sclerotic. Mean gradient is 3mmHg. There is posterior leaflet restriction. Severe mitral regurgitation (eccentric jet.)  Mitral regurgitation: MR radius 1.0cm, MR EOA 0.48cm2, MR Volume 75mL, MR  Vena Contracta 0.91cm. Mild to moderate tricuspid regurgitation. Estimated right ventricular systolic pressure is 49 mmHg, suggesting mild  pulmonary HTN. Trivial pulmonic insufficiency.     OBJECTIVE     Vital Signs:  /74   Pulse 70   Temp 97.8 °F (36.6 °C) (Oral)   Resp 18   Ht 5' 6\" (1.676 m)   Wt 240 lb 6.4 oz (109 kg)   SpO2 98%   BMI 38.80 kg/m²     Temp (24hrs), Av.9 °F (36.6 °C), Min:97.4 °F (36.3 °C), Max:98.2 °F (36.8 °C)    In: 3889   Out: 1030 [Urine:1030]    Physical Exam:  Constitutional: This is a well developed, well nourished, 35-39.9 - Obesity Grade II 78 y.o. year old female who is alert, oriented, cooperative and in no apparent distress. Head:normocephalic and atraumatic. EENT:  PERRLA. No thrush was noted. Neck: Supple without thyromegaly. No elevated JVP. Respiratory: Chest was symmetrical without dullness to percussion. Breath sounds bilaterally were clear to auscultation. There were no wheezes, rhonchi or rales. Cardiovascular: Regular without murmur, clicks, gallops or rubs. Abdomen: Slightly rounded and soft without organomegaly. No rebound, rigidity or guarding was appreciated. Lymphatic: No lymphadenopathy. Extremities:  No lower extremity edema, ulcerations, varicosities or erythema. Muscle size, tone and strength are normal.  No involuntary movements are noted. Skin:  Warm and dry. Good color, turgor and pigmentation. No lesions or scars.   No cyanosis or clubbing  Neurological/Psychiatric: The patient's general behavior, level of consciousness, thought content and emotional status is normal.        Medications:  Scheduled Medications:    rivaroxaban  20 mg Oral Daily    levothyroxine  37.5 mcg Oral Daily    amiodarone  200 mg Oral Daily    [Held by provider] bumetanide  2 mg Oral Daily    metoprolol tartrate  25 mg Oral BID    [Held by provider] sacubitril-valsartan  0.5 tablet Oral BID    [Held by provider] spironolactone  12.5 mg Oral Daily    sodium chloride flush  10 mL IntraVENous 2 times per day    piperacillin-tazobactam  3,375 mg IntraVENous Q8H     Continuous Infusions:    sodium chloride       PRN MedicationsoxyCODONE-acetaminophen, 1 tablet, Q6H PRN  sodium chloride flush, 10 mL, PRN  sodium chloride, 25 mL, PRN  promethazine, 12.5 mg, Q6H PRN   Or  ondansetron, 4 mg, Q6H PRN  polyethylene glycol, 17 g, Daily PRN  acetaminophen, 650 mg, Q6H PRN   Or  acetaminophen, 650 mg, Q6H PRN  potassium chloride, 40 mEq, PRN   Or  potassium alternative oral replacement, 40 mEq, PRN   Or  potassium chloride, 10 mEq, PRN  fentanNYL, 25 mcg, Q2H PRN        Diagnostic Labs:  CBC:   Recent Labs     12/06/21  0542 12/07/21  0543 12/08/21  0520   WBC 5.9 5.2 4.9   RBC 3.70* 3.79* 3.61*   HGB 11.5* 11.5* 11.0*   HCT 31.9* 32.6* 31.8*   MCV 86.2 86.0 88.1   RDW 13.0 13.3 13.4    286 285     BMP:   Recent Labs     12/06/21  0542 12/07/21  0543 12/08/21  0520    133* 135   K 3.9 4.1 3.8    104 106   CO2 17* 18* 18*   BUN 11 8 7*   CREATININE 1.02* 0.86 0.88     BNP: No results for input(s): BNP in the last 72 hours. PT/INR:   Recent Labs     12/06/21 0542   PROTIME 11.1   INR 1.0     APTT:   Recent Labs     12/05/21  1635 12/05/21  2245 12/06/21  0542   APTT 38.1* 39.5* 53.2*     CARDIAC ENZYMES: No results for input(s): CKMB, CKMBINDEX, TROPONINI in the last 72 hours. Invalid input(s): CKTOTAL;3  FASTING LIPID PANEL:  Lab Results   Component Value Date    CHOL 180 05/23/2020    HDL 42 05/23/2020    TRIG 79 05/23/2020     LIVER PROFILE: No results for input(s): AST, ALT, ALB, BILIDIR, BILITOT, ALKPHOS in the last 72 hours. MICROBIOLOGY:   Lab Results   Component Value Date/Time    CULTURE NO GROWTH 12/04/2021 01:21 PM       Imaging:    CT PELVIS WO CONTRAST Additional Contrast? None    Result Date: 12/6/2021  Sigmoid diverticulosis with adjacent inflammatory changes consistent with diverticulitis. This appears improved since the comparison exam.  There is also interval improvement in the adjacent pelvic abscess which now measures 3.4 x 3.0 cm in axial dimensions compared to 4.6 x 3.4 cm previously. CT ABDOMEN PELVIS W IV CONTRAST Additional Contrast? None    Result Date: 12/4/2021  1. Wall thickening in the proximal to mid sigmoid colon is most prominent in the mid sigmoid colon and is likely related to acute or subacute diverticulitis in the setting of severe colonic diverticulosis.   There is an associated contained rupture into the sigmoid mesocolon with a 3.9 cm x 4.6 cm x 3.4 cm abscess in the sigmoid mesocolon and an apparent sinus tract extending from the mid sigmoid colon into the collection. Consider colonoscopy following treatment to exclude underlying malignancy. 2. Focal peritonitis near the sigmoid colon and trace ascites are likely reactive. 3. Prominent paracolic lymph nodes in the sigmoid mesocolon are likely reactive, although kaleb metastases in the setting of malignancy could appear similar. Recommend attention on follow-up imaging. 4. Mild cirrhotic changes of the liver may be sequelae of congestive hepatopathy. No focal liver lesion. 5. Additional incidental findings as above. ASSESSMENT & PLAN     ASSESSMENT / PLAN:     Principal Problem:    Abscess of sigmoid colon  Active Problems:    Essential hypertension    PAH (pulmonary artery hypertension) ([address] 34 on Echo     Mitral regurgitation    Morbid obesity due to excess calories ([address]    AICD (automatic cardioverter/defibrillator) present    Chronic heart failure with reduced ejection fraction and diastolic dysfunction (HCC)    Hypothyroidism    Chronic combined systolic (congestive) and diastolic (congestive) heart failure ([address]    Acute diverticulitis  Resolved Problems:    * No resolved hospital problems. *    Sigmoid abscess with severe diverticulosis - improved; evaluated by general surgery, recommended no regular diet, tolerating diet, no nausea or vomiting. . Recommended admission to medicine due to co-morbidities. Currently on zosyn.   MAURO secondary to dehydration from vomiting/diarrhea - Resolved (baseline 0.8)  HFrEF - NICM - s/p AICD, EF 17%, G3 DD, severe MR - resumed home entresto bid, aldactone 12.5 mg qd, bumex 2 mg qd, asa. Has appointment with [facility] on Wednesday.   Essential hypertension - controlled - resumed lopressor, bumex, aldactone  A. Fib - rate controlled - resumed home lopressor 25 mg bid, amio 200 mg qd. Switched to [facility].  D/C heparin  Hypothyroidism - continue synthroid       12/08   Patient tolerating diet, no c/o of nausea or vomiting  BP controlled  No acute events overnight  Discharge today on po antibiotics       DVT ppx : xarelto  GI ppx: not indicated    PT/OT: on board  Discharge Planning / SW: on board    Lizz Blanco MD  Internal Medicine Resident, PGY-1  [address], [place]  12/8/2021, 7:04 AM    Attestation and add on       I have discussed the care of Inga Levy , including pertinent history and exam findings,      9/12/2021    with the resident. I have seen and examined the patient and the key elements of all parts of the encounter have been performed by me . I agree with the assessment, plan and orders as documented by the resident. Principal Problem:    Abscess of sigmoid colon  Active Problems:    Essential hypertension    PAH (pulmonary artery hypertension) ([address] 34 on Echo     Mitral regurgitation    Morbid obesity due to excess calories ([address]    AICD (automatic cardioverter/defibrillator) present    Chronic heart failure with reduced ejection fraction and diastolic dysfunction (HCC)    Hypothyroidism    Chronic combined systolic (congestive) and diastolic (congestive) heart failure ([address]    Acute diverticulitis  Resolved Problems:    * No resolved hospital problems. *         ---- ;     MD DARRYL Parkinson [address], [address] [place] [address]. Phone [phone]   Fax: [phone]  Answering Service: [phone] and add on       I have discussed the care of Inga Levy , including pertinent history and exam findings,      12/8/21    with the resident. I have seen and examined the patient and the key elements of all parts of the encounter have been performed by me . I agree with the assessment, plan and orders as documented by the resident.      Principal Problem:    Abscess of sigmoid colon  Active Problems:    Essential hypertension    PAH (pulmonary artery hypertension) ([address] 34 on Echo     Mitral regurgitation    Morbid obesity due to excess calories (HCC)    AICD (automatic cardioverter/defibrillator) present    Chronic heart failure with reduced ejection fraction and diastolic dysfunction (HCC)    Hypothyroidism    Chronic combined systolic (congestive) and diastolic (congestive) heart failure [address]    Acute diverticulitis  Resolved Problems:    * No resolved hospital problems. *         ---- ;     MD DARRYL Estrada[address], [address] [place] [address].    Phone [phone]   Fax: [phone]  Answering Service: [phone]

## 2021-12-08 NOTE — PLAN OF CARE
Problem: Pain:  Goal: Pain level will decrease  Description: Pain level will decrease  12/8/2021 0946 by Jose Ackerman RN  Outcome: Completed  12/7/2021 1950 by Bladimir Levine RN  Outcome: Ongoing  Goal: Control of acute pain  Description: Control of acute pain  12/8/2021 0946 by Jose Ackerman RN  Outcome: Completed  12/7/2021 1950 by Bladimir Levine RN  Outcome: Ongoing  Goal: Control of chronic pain  Description: Control of chronic pain  12/8/2021 0946 by Jose Ackerman RN  Outcome: Completed  12/7/2021 1950 by Bladimir Levine RN  Outcome: Ongoing

## 2021-12-09 ENCOUNTER — CARE COORDINATION (OUTPATIENT)
Dept: CASE MANAGEMENT | Age: 66
End: 2021-12-09

## 2021-12-09 NOTE — CARE COORDINATION
[facility] Transitions Initial Follow Up Call    Call within 2 business days of discharge: Yes    Patient: Alvin Pelletier Patient : 1955   MRN: [id number]  Reason for Admission: Abscess of sigmoid colon  Discharge Date: 21 RARS: Readmission Risk Score: 10.9 ( )      Last Discharge [facility]       Complaint Diagnosis Description Type Department Provider    21 Abdominal Pain Diverticulitis of colon . .. ED to Hosp-Admission (Discharged) (ADMITTED) Leisa Archibald MD; Jeannette Organ. .. Spoke with: Annemarie she is doing much better. Denies abdominal pain, dizziness, nausea, she is SOB upon exertion. Eating and drinking well. Bowel and bladder elimination normal. Is waiting for call from [address] [place] [address] to schedule . PCP saw last week and does not want to see her until January medication reviewed and taking all as directed. Denies concerns. Facility: [facility]    Non-face-to-face services provided:  Obtained and reviewed discharge summary and/or continuity of care documents  Transitions of Care Initial Call    Was this an external facility discharge? No     Challenges to be reviewed by the provider   Additional needs identified to be addressed with provider: No  none             Method of communication with provider : none      Advance Care Planning:   Does patient have an Advance Directive: reviewed and current, reviewed and needs to be updated, not on file; education provided, decision maker updated and referral to internal ACP facilitator. Was this a readmission? No  Patient stated reason for admission: Abscess of sigmoid colon  Patients top risk factors for readmission: lack of knowledge about disease    Care Transition Nurse (CTN) contacted the patient by telephone to perform post hospital discharge assessment. Verified name and  with patient as identifiers. Provided introduction to self, and explanation of the CTN role.      CTN reviewed discharge filled?: Yes (Comment: [facility] is delivering scripts today)  Have you been contacted by a [address] [place] [address] Avenue?: No  Have you scheduled your follow up appointment?: No (Comment: waiting on [facility] to call to schedule. PCP does not wnat to see until January saw 1 week ago)  Were you discharged with any Home Care or Post Acute Services: No  Post Acute Services: Outpatient/Community Services (Comment: CHF clinic)  Do you feel like you have everything you need to keep you well at home?: Yes  Care Transitions Interventions         Follow Up  No future appointments.     Rizwan Hutson LPN

## 2021-12-26 LAB
CULTURE: NORMAL
DIRECT EXAM: NORMAL
Lab: NORMAL
SPECIMEN DESCRIPTION: NORMAL

## 2022-01-12 ENCOUNTER — APPOINTMENT (OUTPATIENT)
Dept: CARDIAC REHAB | Age: 67
End: 2022-01-12
Payer: MEDICARE

## 2022-01-19 ENCOUNTER — HOSPITAL ENCOUNTER (OUTPATIENT)
Age: 67
Discharge: HOME | End: 2022-01-19
Payer: MEDICARE

## 2022-01-19 LAB
ANION GAP SERPL CALCULATED.3IONS-SCNC: 12 MMOL/L (ref 9–17)
BUN BLDV-MCNC: 24 MG/DL (ref 8–23)
BUN/CREAT BLD: ABNORMAL (ref 9–20)
CALCIUM SERPL-MCNC: 9.4 MG/DL (ref 8.6–10.4)
CHLORIDE BLD-SCNC: 101 MMOL/L (ref 98–107)
CO2: 25 MMOL/L (ref 20–31)
CREAT SERPL-MCNC: 1.31 MG/DL (ref 0.5–0.9)
GFR AFRICAN AMERICAN: 49 ML/MIN
GFR NON-AFRICAN AMERICAN: 41 ML/MIN
GFR SERPL CREATININE-BSD FRML MDRD: ABNORMAL ML/MIN/{1.73_M2}
GFR SERPL CREATININE-BSD FRML MDRD: ABNORMAL ML/MIN/{1.73_M2}
GLUCOSE BLD-MCNC: 111 MG/DL (ref 70–99)
POTASSIUM SERPL-SCNC: 3.6 MMOL/L (ref 3.7–5.3)
SODIUM BLD-SCNC: 138 MMOL/L (ref 135–144)

## 2022-01-19 PROCEDURE — 80048 BASIC METABOLIC PNL TOTAL CA: CPT

## 2022-01-19 PROCEDURE — 36415 COLL VENOUS BLD VENIPUNCTURE: CPT

## 2022-01-27 ENCOUNTER — APPOINTMENT (OUTPATIENT)
Dept: CT IMAGING | Age: 67
End: 2022-01-27
Payer: MEDICARE

## 2022-01-27 ENCOUNTER — HOSPITAL ENCOUNTER (EMERGENCY)
Age: 67
Discharge: HOME | End: 2022-01-27
Attending: EMERGENCY MEDICINE
Payer: MEDICARE

## 2022-01-27 ENCOUNTER — HOSPITAL ENCOUNTER (OUTPATIENT)
Dept: CARDIAC REHAB | Age: 67
Setting detail: THERAPIES SERIES
Discharge: HOME | End: 2022-01-27
Payer: MEDICARE

## 2022-01-27 VITALS
RESPIRATION RATE: 18 BRPM | DIASTOLIC BLOOD PRESSURE: 88 MMHG | BODY MASS INDEX: 36 KG/M2 | HEART RATE: 72 BPM | WEIGHT: 224 LBS | SYSTOLIC BLOOD PRESSURE: 112 MMHG | HEIGHT: 66 IN

## 2022-01-27 VITALS
HEIGHT: 66 IN | RESPIRATION RATE: 16 BRPM | SYSTOLIC BLOOD PRESSURE: 102 MMHG | DIASTOLIC BLOOD PRESSURE: 72 MMHG | HEART RATE: 70 BPM | OXYGEN SATURATION: 100 % | BODY MASS INDEX: 36 KG/M2 | WEIGHT: 224 LBS

## 2022-01-27 DIAGNOSIS — R10.32 LEFT LOWER QUADRANT ABDOMINAL PAIN: Primary | ICD-10-CM

## 2022-01-27 LAB
ABSOLUTE EOS #: 0 K/UL (ref 0–0.4)
ABSOLUTE IMMATURE GRANULOCYTE: ABNORMAL K/UL (ref 0–0.3)
ABSOLUTE LYMPH #: 0.9 K/UL (ref 1–4.8)
ABSOLUTE MONO #: 0.6 K/UL (ref 0.1–1.3)
ALBUMIN SERPL-MCNC: 4.1 G/DL (ref 3.5–5.2)
ALBUMIN/GLOBULIN RATIO: ABNORMAL (ref 1–2.5)
ALP BLD-CCNC: 241 U/L (ref 35–104)
ALT SERPL-CCNC: 14 U/L (ref 5–33)
ANION GAP SERPL CALCULATED.3IONS-SCNC: 12 MMOL/L (ref 9–17)
AST SERPL-CCNC: 17 U/L
BASOPHILS # BLD: 0 % (ref 0–2)
BASOPHILS ABSOLUTE: 0 K/UL (ref 0–0.2)
BILIRUB SERPL-MCNC: 0.74 MG/DL (ref 0.3–1.2)
BUN BLDV-MCNC: 13 MG/DL (ref 8–23)
BUN/CREAT BLD: ABNORMAL (ref 9–20)
CALCIUM SERPL-MCNC: 9.5 MG/DL (ref 8.6–10.4)
CHLORIDE BLD-SCNC: 100 MMOL/L (ref 98–107)
CO2: 25 MMOL/L (ref 20–31)
CREAT SERPL-MCNC: 1.07 MG/DL (ref 0.5–0.9)
DIFFERENTIAL TYPE: ABNORMAL
EOSINOPHILS RELATIVE PERCENT: 1 % (ref 0–4)
GFR AFRICAN AMERICAN: >60 ML/MIN
GFR NON-AFRICAN AMERICAN: 51 ML/MIN
GFR SERPL CREATININE-BSD FRML MDRD: ABNORMAL ML/MIN/{1.73_M2}
GFR SERPL CREATININE-BSD FRML MDRD: ABNORMAL ML/MIN/{1.73_M2}
GLUCOSE BLD-MCNC: 109 MG/DL (ref 70–99)
HCT VFR BLD CALC: 36.9 % (ref 36–46)
HEMOGLOBIN: 12.6 G/DL (ref 12–16)
IMMATURE GRANULOCYTES: ABNORMAL %
INR BLD: 2
LYMPHOCYTES # BLD: 11 % (ref 24–44)
MCH RBC QN AUTO: 29.7 PG (ref 26–34)
MCHC RBC AUTO-ENTMCNC: 34.2 G/DL (ref 31–37)
MCV RBC AUTO: 86.6 FL (ref 80–100)
MONOCYTES # BLD: 8 % (ref 1–7)
NRBC AUTOMATED: ABNORMAL PER 100 WBC
PDW BLD-RTO: 14.9 % (ref 11.5–14.9)
PLATELET # BLD: 323 K/UL (ref 150–450)
PLATELET ESTIMATE: ABNORMAL
PMV BLD AUTO: 8.4 FL (ref 6–12)
POTASSIUM SERPL-SCNC: 3.9 MMOL/L (ref 3.7–5.3)
PROTHROMBIN TIME: 22.4 SEC (ref 11.8–14.6)
RBC # BLD: 4.26 M/UL (ref 4–5.2)
RBC # BLD: ABNORMAL 10*6/UL
SEG NEUTROPHILS: 80 % (ref 36–66)
SEGMENTED NEUTROPHILS ABSOLUTE COUNT: 6.4 K/UL (ref 1.3–9.1)
SODIUM BLD-SCNC: 137 MMOL/L (ref 135–144)
TOTAL PROTEIN: 8.2 G/DL (ref 6.4–8.3)
WBC # BLD: 8 K/UL (ref 3.5–11)
WBC # BLD: ABNORMAL 10*3/UL

## 2022-01-27 PROCEDURE — 2580000003 HC RX 258: Performed by: EMERGENCY MEDICINE

## 2022-01-27 PROCEDURE — 96374 THER/PROPH/DIAG INJ IV PUSH: CPT

## 2022-01-27 PROCEDURE — 96375 TX/PRO/DX INJ NEW DRUG ADDON: CPT

## 2022-01-27 PROCEDURE — 6360000002 HC RX W HCPCS: Performed by: EMERGENCY MEDICINE

## 2022-01-27 PROCEDURE — 99284 EMERGENCY DEPT VISIT MOD MDM: CPT

## 2022-01-27 PROCEDURE — 85025 COMPLETE CBC W/AUTO DIFF WBC: CPT

## 2022-01-27 PROCEDURE — 85610 PROTHROMBIN TIME: CPT

## 2022-01-27 PROCEDURE — 36415 COLL VENOUS BLD VENIPUNCTURE: CPT

## 2022-01-27 PROCEDURE — 6370000000 HC RX 637 (ALT 250 FOR IP): Performed by: EMERGENCY MEDICINE

## 2022-01-27 PROCEDURE — 6360000004 HC RX CONTRAST MEDICATION: Performed by: EMERGENCY MEDICINE

## 2022-01-27 PROCEDURE — 80053 COMPREHEN METABOLIC PANEL: CPT

## 2022-01-27 PROCEDURE — 74177 CT ABD & PELVIS W/CONTRAST: CPT

## 2022-01-27 PROCEDURE — 93798 PHYS/QHP OP CAR RHAB W/ECG: CPT

## 2022-01-27 RX ORDER — SODIUM CHLORIDE 0.9 % (FLUSH) 0.9 %
10 SYRINGE (ML) INJECTION PRN
Status: DISCONTINUED | OUTPATIENT
Start: 2022-01-27 | End: 2022-01-27 | Stop reason: HOSPADM

## 2022-01-27 RX ORDER — ACETAMINOPHEN 500 MG
1000 TABLET ORAL EVERY 6 HOURS PRN
Qty: 60 TABLET | Refills: 0 | Status: SHIPPED | OUTPATIENT
Start: 2022-01-27 | End: 2022-08-05 | Stop reason: SDUPTHER

## 2022-01-27 RX ORDER — CIPROFLOXACIN 500 MG/1
500 TABLET, FILM COATED ORAL ONCE
Status: COMPLETED | OUTPATIENT
Start: 2022-01-27 | End: 2022-01-27

## 2022-01-27 RX ORDER — 0.9 % SODIUM CHLORIDE 0.9 %
80 INTRAVENOUS SOLUTION INTRAVENOUS ONCE
Status: COMPLETED | OUTPATIENT
Start: 2022-01-27 | End: 2022-01-27

## 2022-01-27 RX ORDER — 0.9 % SODIUM CHLORIDE 0.9 %
1000 INTRAVENOUS SOLUTION INTRAVENOUS ONCE
Status: DISCONTINUED | OUTPATIENT
Start: 2022-01-27 | End: 2022-01-27

## 2022-01-27 RX ORDER — FENTANYL CITRATE 50 UG/ML
25 INJECTION, SOLUTION INTRAMUSCULAR; INTRAVENOUS ONCE
Status: COMPLETED | OUTPATIENT
Start: 2022-01-27 | End: 2022-01-27

## 2022-01-27 RX ORDER — SODIUM CHLORIDE 9 MG/ML
INJECTION, SOLUTION INTRAVENOUS CONTINUOUS
Status: DISCONTINUED | OUTPATIENT
Start: 2022-01-27 | End: 2022-01-27 | Stop reason: HOSPADM

## 2022-01-27 RX ORDER — METRONIDAZOLE 500 MG/1
500 TABLET ORAL 2 TIMES DAILY
Qty: 14 TABLET | Refills: 0 | Status: SHIPPED | OUTPATIENT
Start: 2022-01-27 | End: 2022-02-03

## 2022-01-27 RX ORDER — METRONIDAZOLE 500 MG/1
500 TABLET ORAL ONCE
Status: COMPLETED | OUTPATIENT
Start: 2022-01-27 | End: 2022-01-27

## 2022-01-27 RX ORDER — CIPROFLOXACIN 500 MG/1
500 TABLET, FILM COATED ORAL 2 TIMES DAILY
Qty: 20 TABLET | Refills: 0 | Status: SHIPPED | OUTPATIENT
Start: 2022-01-27 | End: 2022-02-06

## 2022-01-27 RX ADMIN — METRONIDAZOLE 500 MG: 500 TABLET ORAL at 18:59

## 2022-01-27 RX ADMIN — FENTANYL CITRATE 25 MCG: 50 INJECTION INTRAMUSCULAR; INTRAVENOUS at 16:19

## 2022-01-27 RX ADMIN — IOVERSOL 100 ML: 741 INJECTION INTRA-ARTERIAL; INTRAVENOUS at 15:41

## 2022-01-27 RX ADMIN — SODIUM CHLORIDE, PRESERVATIVE FREE 10 ML: 5 INJECTION INTRAVENOUS at 15:41

## 2022-01-27 RX ADMIN — SODIUM CHLORIDE 80 ML: 9 INJECTION, SOLUTION INTRAVENOUS at 15:41

## 2022-01-27 RX ADMIN — HYDROMORPHONE HYDROCHLORIDE 1 MG: 1 INJECTION, SOLUTION INTRAMUSCULAR; INTRAVENOUS; SUBCUTANEOUS at 14:24

## 2022-01-27 RX ADMIN — CIPROFLOXACIN 500 MG: 500 TABLET, FILM COATED ORAL at 18:59

## 2022-01-27 ASSESSMENT — PATIENT HEALTH QUESTIONNAIRE - PHQ9
SUM OF ALL RESPONSES TO PHQ QUESTIONS 1-9: 0

## 2022-01-27 ASSESSMENT — ENCOUNTER SYMPTOMS
ABDOMINAL PAIN: 1
DIARRHEA: 1
NAUSEA: 0
VOMITING: 0

## 2022-01-27 ASSESSMENT — PAIN SCALES - GENERAL: PAINLEVEL_OUTOF10: 6

## 2022-01-27 NOTE — ED NOTES
Mode of arrival (squad #, walk in, police, etc) : Walk in        Chief complaint(s): Abdominal pain         Arrival Note (brief scenario, treatment PTA, etc). : Pt complaining of abdominal pain that \"radiates to her lower back\". Pt states she had diarrhea on Monday after starting antibiotics. Pt alert and oriented x3.              [facility]  01/27/22 1513

## 2022-01-27 NOTE — ED PROVIDER NOTES
EMERGENCY DEPARTMENT ENCOUNTER    Pt Name: Rosa Burgos  MRN: [id number]  [place] 1955  Date of evaluation: 1/27/22  CHIEF COMPLAINT       Chief Complaint   Patient presents with    Abdominal Pain     HISTORY OF PRESENT ILLNESS     Abdominal Pain  Pain location:  LLQ  Pain quality: aching    Pain radiates to:  Does not radiate  Pain severity:  Severe  Onset quality:  Gradual  Duration:  1 week  Timing:  Constant  Chronicity:  New  Relieved by:  Nothing  Worsened by:  Nothing  Ineffective treatments:  None tried  Associated symptoms: diarrhea    Associated symptoms: no nausea and no vomiting        Feels like previous flair up of diverticulitis and abscess  Started back on amoxicillin Tuesday    REVIEW OF SYSTEMS     Review of Systems   Gastrointestinal: Positive for abdominal pain and diarrhea. Negative for nausea and vomiting. All other systems reviewed and are negative.     PASTMEDICAL HISTORY     Past Medical History:   Diagnosis Date    Abnormal Pap smear     Acute on chronic systolic congestive heart failure (HCC)     AICD (automatic cardioverter/defibrillator) present     MAURO (acute kidney injury) ([address] 03/04/2017    Anemia     Arthritis     Cardiomyopathy ([address] 12/20/2012    robotic replacement of 2 left ventricular leads/replacement of ICD generator    Cardiomyopathy, nonischemic ([address] 12/20/2012    Chronic combined systolic and diastolic congestive heart failure (HCC)     Ejection fraction < 50%     HTN (hypertension)     Hypothyroidism     Ischemic cardiomyopathy     Left bundle branch block     Mitral regurgitation     Morbid obesity due to excess calories ([address] 01/15/2017    Pulmonary hypertension ([address]     Sudden onset of severe headache      Past Problem List  Patient Active Problem List   Diagnosis Code    Dilated cardiomyopathy ([address] I42.0    Pre-diabetes R73.03    Essential hypertension I10    PAH (pulmonary artery hypertension) ([address] 34 on Echo  I27.21    Mitral regurgitation I34.0    Chronic headache R51.9, G89.29    Morbid obesity due to excess calories ([facility]) E66.01    AICD (automatic cardioverter/defibrillator) present Z95.810    Syncope and collapse secondary to VTAC R55    MAURO (acute kidney injury) ([facility] N17.9    ICD (implantable cardioverter-defibrillator) battery depletion Z45.02    Acute on chronic systolic CHF (congestive heart failure) ([facility]) I50.23    Gout M10.9    Dizziness R42    Benign paroxysmal positional vertigo H81.10    Chronic heart failure with reduced ejection fraction and diastolic dysfunction ([facility]) I50.42    Hypotension (arterial) I95.9    NSVT (nonsustained ventricular tachycardia) ([facility]) I47.2    Chest pain R07.9    Hypothyroidism E03.9    Chronic combined systolic (congestive) and diastolic (congestive) heart failure ([facility]) I50.42    Acute diverticulitis K57.92    Abscess of sigmoid colon K63.0     SURGICAL HISTORY       Past Surgical History:   Procedure Laterality Date    COLONOSCOPY      HERNIA REPAIR      OTHER SURGICAL HISTORY  12/20/2012    robotic placement of 2 left ventricular leads/replacement of ICD generator    PACEMAKER PLACEMENT      medtronic Viva XT CRT Rayma Me Serial : [id number]    TUBAL LIGATION       CURRENT MEDICATIONS       Discharge Medication List as of 1/27/2022  6:33 PM      CONTINUE these medications which have NOT CHANGED    Details   potassium chloride (KLOR-CON M) 20 MEQ extended release tablet Take 1 tablet by mouth daily, Disp-30 tablet, R-0Normal      magnesium oxide (MAG-OX) 400 MG tablet Take 0.5 tablets by mouth daily, Disp-30 tablet, R-1Normal      amiodarone (PACERONE) 100 MG tablet Take 200 mg by mouth dailyHistorical Med      nitroGLYCERIN (NITROSTAT) 0.4 MG SL tablet up to max of 3 total doses.  If no relief after 1 dose, call 911., Disp-25 tablet, R-3Normal      rivaroxaban (XARELTO) 20 MG TABS tablet Take 1 tablet by mouth daily, Disp-30 tablet, R-11Normal      sacubitril-valsartan (ENTRESTO) 24-26 MG per tablet Take 0.5 tablets by mouth 2 times daily, Disp-60 tablet, R-11Normal      bumetanide (BUMEX) 2 MG tablet Take 1 tablet by mouth daily, Disp-30 tablet, R-3Normal      levothyroxine (SYNTHROID) 25 MCG tablet Take 1 tablet by mouth Daily, Disp-30 tablet, R-3Normal      lidocaine (LIDODERM) 5 % Place one patch to both knees 12 hours on, 12 hours off., Disp-30 patch,R-0Normal      metoprolol tartrate (LOPRESSOR) 25 MG tablet Take 1 tablet by mouth 2 times daily, Disp-60 tablet, R-2Normal      allopurinol (ZYLOPRIM) 100 MG tablet Take 1 tablet by mouth daily, Disp-30 tablet, R-3Normal      spironolactone (ALDACTONE) 25 MG tablet Take 0.5 tablets by mouth daily, Disp-30 tablet, R-3Normal           ALLERGIES     is allergic to codeine and morphine. FAMILY HISTORY     She indicated that her mother is alive. She indicated that the status of her father is unknown. She indicated that her other is alive. She indicated that the status of her neg hx is unknown. SOCIAL HISTORY       Social History     Tobacco Use    Smoking status: Never Smoker    Smokeless tobacco: Never Used   Substance Use Topics    Alcohol use: No     Alcohol/week: 0.0 standard drinks    Drug use: Not Currently     PHYSICAL EXAM     INITIAL VITALS: /72   Pulse 70   Resp 16   Ht 5' 6\" (1.676 m)   Wt 224 lb (101.6 kg)   SpO2 100%   BMI 36.15 kg/m²    Physical Exam  Constitutional:       General: She is not in acute distress. Appearance: Normal appearance. She is well-developed. She is not diaphoretic. HENT:      Head: Normocephalic and atraumatic. Right Ear: External ear normal.      Left Ear: External ear normal.      Nose: Nose normal. No congestion. Mouth/Throat:      Mouth: Mucous membranes are moist.      Pharynx: Oropharynx is clear. Eyes:      General:         Right eye: No discharge. Left eye: No discharge.       Conjunctiva/sclera: Conjunctivae normal.      Pupils: Pupils are equal, round, and reactive to light. Neck:      Trachea: No tracheal deviation. Cardiovascular:      Rate and Rhythm: Normal rate and regular rhythm. Pulses: Normal pulses. Heart sounds: Normal heart sounds. Pulmonary:      Effort: Pulmonary effort is normal. No respiratory distress. Breath sounds: Normal breath sounds. No stridor. No wheezing or rales. Abdominal:      Palpations: Abdomen is soft. Tenderness: There is no guarding or rebound. Comments: LLQ tenderness   Musculoskeletal:         General: No tenderness or deformity. Normal range of motion. Cervical back: Normal range of motion and neck supple. Skin:     General: Skin is warm and dry. Capillary Refill: Capillary refill takes less than 2 seconds. Findings: No erythema or rash. Neurological:      General: No focal deficit present. Mental Status: She is alert and oriented to person, place, and time. Coordination: Coordination normal.   Psychiatric:         Mood and Affect: Mood normal.         Behavior: Behavior normal.         Thought Content: Thought content normal.         Judgment: Judgment normal.         MEDICAL DECISION MAKING:       ED Course as of 01/27/22 2119   Thu Jan 27, 2022   1602 Patient asking for more pain meds but a milder dose, will try fentanyl 25 mcg [WM]   5914 Reviewed labs and ct imaging, imaging shows unchanged thickening of the sigmoid with improved stable fluid collection.   Calling the surgery group that saw her during recent admission at [facility] [WM]   [address] Dr Yu Dunham, gen surg, rec dc home on cipro and flagyl 7 days, fu Dr [facility] next week [WM]      ED Course User Index  [WM] Hussain Casarez MD       Patient improves  rx cipro and flagyl  No urinary symptoms, patient never gave a urine, do not suspect UTI    Procedures    DIAGNOSTIC RESULTS     RADIOLOGY:All plain film, CT, MRI, and formal ultrasound images (except ED bedside ultrasound) are read by the radiologist, see reports below, unless otherwisenoted in MDM or here. CT ABDOMEN PELVIS W IV CONTRAST Additional Contrast? None   Final Result   Recurrent versus residual abnormal wall thickening in the mid sigmoid colon   which is not substantially changed from 2 months prior. As this occurs in an   area with extensive diverticula this may represent sequelae of subacute on   chronic diverticulitis, though an underlying neoplastic cause is not able to   be excluded and would appear similar. Persistent abscess in the adjacent sigmoid mesocolon which is decreased in   size as detailed above. Unchanged prominent paracolic lymph nodes in the sigmoid mesocolon with   differential considerations including reactive or neoplastic etiology. Cholelithiasis without findings of cholecystitis. Macrolobulated contour of the liver with linear bands of suspected fibrosis   and possible cirrhotic morphology, unchanged in appearance from prior. Marked cardiomegaly, unchanged. LABS: All lab results were reviewed by myself, and all abnormals are listed below.   Labs Reviewed   CBC WITH AUTO DIFFERENTIAL - Abnormal; Notable for the following components:       Result Value    Seg Neutrophils 80 (*)     Lymphocytes 11 (*)     Monocytes 8 (*)     Absolute Lymph # 0.90 (*)     All other components within normal limits   COMPREHENSIVE METABOLIC PANEL - Abnormal; Notable for the following components:    Glucose 109 (*)     CREATININE 1.07 (*)     Alkaline Phosphatase 241 (*)     GFR Non- 51 (*)     All other components within normal limits   PROTIME-INR - Abnormal; Notable for the following components:    Protime 22.4 (*)     All other components within normal limits       EMERGENCY DEPARTMENTCOURSE:         Vitals:    Vitals:    01/27/22 1345 01/27/22 1530   BP: 102/72    Pulse: 70 70   Resp: 18 16   SpO2: 100% 100%   Weight: 224 lb (101.6 kg)    Height: 5' 6\" (1.676 m)        The patient was given the following medications while in the emergency department:  Orders Placed This Encounter   Medications    DISCONTD: 0.9 % sodium chloride bolus    HYDROmorphone (DILAUDID) injection 1 mg    DISCONTD: 0.9 % sodium chloride infusion    ioversol (OPTIRAY) 74 % injection 100 mL    DISCONTD: sodium chloride flush 0.9 % injection 10 mL    0.9 % sodium chloride bolus    fentaNYL (SUBLIMAZE) injection 25 mcg    ciprofloxacin (CIPRO) 500 MG tablet     Sig: Take 1 tablet by mouth 2 times daily for 10 days     Dispense:  20 tablet     Refill:  0    metroNIDAZOLE (FLAGYL) 500 MG tablet     Sig: Take 1 tablet by mouth 2 times daily for 7 days     Dispense:  14 tablet     Refill:  0    acetaminophen (TYLENOL) 500 MG tablet     Sig: Take 2 tablets by mouth every 6 hours as needed for Pain     Dispense:  60 tablet     Refill:  0    ciprofloxacin (CIPRO) tablet 500 mg     Order Specific Question:   Antimicrobial Indications     Answer:   Intra-Abdominal Infection    metroNIDAZOLE (FLAGYL) tablet 500 mg     Order Specific Question:   Antimicrobial Indications     Answer:   Intra-Abdominal Infection     CONSULTS:  IP CONSULT TO GENERAL SURGERY    FINAL IMPRESSION      1.  Left lower quadrant abdominal pain          DISPOSITION/PLAN   DISPOSITION Decision To Discharge 01/27/2022 06:16:11 PM      PATIENT REFERRED TO:  Florence Clemens MD  [address] [place] [address]  [phone]    Schedule an appointment as soon as possible for a visit in 1 day      [facility], [address], [address]  [place]  [phone]    Schedule an appointment as soon as possible for a visit in 1 day      DISCHARGE MEDICATIONS:  Discharge Medication List as of 1/27/2022  6:33 PM      START taking these medications    Details   ciprofloxacin (CIPRO) 500 MG tablet Take 1 tablet by mouth 2 times daily for 10 days, Disp-20 tablet, R-0Normal      metroNIDAZOLE (FLAGYL) 500 MG tablet Take

## 2022-01-28 NOTE — ED NOTES
Pt discharged in stable condition with prescriptions and dc instructions. Pt ambulates to door with steady gait and without assistance.         Berry Peraza RN  01/27/22 9842

## 2022-02-03 ENCOUNTER — HOSPITAL ENCOUNTER (OUTPATIENT)
Dept: CARDIAC REHAB | Age: 67
Setting detail: THERAPIES SERIES
Discharge: HOME | End: 2022-02-03
Payer: MEDICARE

## 2022-02-08 ENCOUNTER — OFFICE VISIT (OUTPATIENT)
Dept: SURGERY | Age: 67
End: 2022-02-08

## 2022-02-08 ENCOUNTER — HOSPITAL ENCOUNTER (OUTPATIENT)
Dept: CARDIAC REHAB | Age: 67
Setting detail: THERAPIES SERIES
End: 2022-02-08
Payer: MEDICARE

## 2022-02-08 VITALS
BODY MASS INDEX: 36 KG/M2 | HEART RATE: 84 BPM | DIASTOLIC BLOOD PRESSURE: 89 MMHG | HEIGHT: 66 IN | SYSTOLIC BLOOD PRESSURE: 129 MMHG | WEIGHT: 224 LBS

## 2022-02-08 DIAGNOSIS — K57.92 DIVERTICULITIS: Primary | ICD-10-CM

## 2022-02-08 PROCEDURE — 99024 POSTOP FOLLOW-UP VISIT: CPT | Performed by: SPECIALIST

## 2022-02-08 RX ORDER — AMIODARONE HYDROCHLORIDE 200 MG/1
TABLET ORAL
COMMUNITY
Start: 2021-12-01

## 2022-02-08 RX ORDER — EMPAGLIFLOZIN 10 MG/1
10 TABLET, FILM COATED ORAL DAILY
COMMUNITY
Start: 2022-01-04

## 2022-02-08 NOTE — PROGRESS NOTES
[facility]    Patient's Name: Brendan Ayoub  YOB: 1955 (78 y.o.)    Subjective:  Brendan Ayoub is a very pleasant 78 y.o. female that presents to the [facility] Surgery [facility] status post ED visit for diverticulitis. She was first admitted on 4 Dec for a sigmoid abscess that was treated medically and discharged on Augmentin. She states her pain improved but never completely resolved. Flared back even worse on the 27th of Jan and repeat scan showed persistent but shrinking abscess. She was discharged from the ED with cipro and flagyl and she reports all her pain resolved within 5 days of starting abx (completed last dose yesterday) and has not had recurrence. She reports her stools on the first few days were dark and foul smelling but have since returned to normal.     When discharged in Dec she was to schedule a colonoscopy at 2 months from discharge, she has the information but has not called to schedule yet. Last scope was >10 yrs ago. She has changed her diet to include more fruits and vegetables. Review of Systems   General: Denies fever, chills, night sweats, weight loss, malaise, fatigue  HEENT: Denies sore throat, sinus problems, allergic rhinosinusitis  Card: Denies chest pain, palpitations, orthopnea/PND. Denies h/o murmurs  Pulm: Denies cough, shortness of breath, BLANCHARD  GI:  per HPI; denies history of constipation, diarrhea, hematochezia or melena  : Denies polyuria, dysuria, hematuria  Endo: Denies diabetes, thyroid problems. Heme: Denies anemia, h/o bleeding or clotting problems. Neuro: Denies h/o CVA, TIA  Skin: Denies rashes, ulcers  Musculoskeletal: Denies muscle, joint, back pain.     Allergies   Allergen Reactions    Codeine      Hallucinations    Morphine      Hallucinations         Current Outpatient Medications on File Prior to Visit   Medication Sig Dispense Refill    amiodarone (CORDARONE) 200 MG tablet  diclofenac sodium (VOLTAREN) 1 % GEL APPLY 2 GRAMS TOPICALLY 4 (FOUR) TIMES A DAY.  empagliflozin (JARDIANCE) 10 MG tablet Take 10 mg by mouth daily      acetaminophen (TYLENOL) 500 MG tablet Take 2 tablets by mouth every 6 hours as needed for Pain 60 tablet 0    potassium chloride (KLOR-CON M) 20 MEQ extended release tablet Take 1 tablet by mouth daily 30 tablet 0    magnesium oxide (MAG-OX) 400 MG tablet Take 0.5 tablets by mouth daily 30 tablet 1    nitroGLYCERIN (NITROSTAT) 0.4 MG SL tablet up to max of 3 total doses. If no relief after 1 dose, call 911. 25 tablet 3    rivaroxaban (XARELTO) 20 MG TABS tablet Take 1 tablet by mouth daily 30 tablet 11    sacubitril-valsartan (ENTRESTO) 24-26 MG per tablet Take 0.5 tablets by mouth 2 times daily 60 tablet 11    bumetanide (BUMEX) 2 MG tablet Take 1 tablet by mouth daily 30 tablet 3    levothyroxine (SYNTHROID) 25 MCG tablet Take 1 tablet by mouth Daily 30 tablet 3    lidocaine (LIDODERM) 5 % Place one patch to both knees 12 hours on, 12 hours off. 30 patch 0    metoprolol tartrate (LOPRESSOR) 25 MG tablet Take 1 tablet by mouth 2 times daily 60 tablet 2    allopurinol (ZYLOPRIM) 100 MG tablet Take 1 tablet by mouth daily 30 tablet 3    spironolactone (ALDACTONE) 25 MG tablet Take 0.5 tablets by mouth daily 30 tablet 3     No current facility-administered medications on file prior to visit. Physical Examination:   Vitals:    02/08/22 1327   BP: 129/89   Pulse: 84       Gen:  A&Ox3, NAD  HEENT: NCAT, PERRLA, EOMI, no scleral icterus,  oral mucosa moist  Chest: Symmetric rise with inhalation, no evidence of trauma  CVS: Regular rate and rhythm, no murmurs, no rubs or gallops  Resp: Good bilateral air entry, clear to auscultation b/l, no wheeze or rhonchi  Abd: soft, non-tender, non-distended, no hepatosplenomegaly or palpable masses, bowel sounds present.   Ext: No clubbing, cyanosis, edema  CNS: Moves all extremities, no gross focal motor deficits  Skin: No erythema or ulcerations       Assessment:   Diagnosis Orders   1. Diverticulitis       1. Pain resolved s/p appropriate abx course    Plan:  1. Call GI to schedule colonoscopy in approx 1 month  2. Return to clinic if needed post scope for poss resection if indicated  3. ED sooner if return of pain/fever    Patient is to follow up in the Trauma Clinic prn. No orders of the defined types were placed in this encounter.       Electronically signed by Derrell Benton MD  on 2/8/2022 at 3:16 PM

## 2022-02-10 ENCOUNTER — HOSPITAL ENCOUNTER (OUTPATIENT)
Dept: CARDIAC REHAB | Age: 67
Setting detail: THERAPIES SERIES
Discharge: HOME | End: 2022-02-10
Payer: MEDICARE

## 2022-02-10 VITALS — BODY MASS INDEX: 35.99 KG/M2 | WEIGHT: 223 LBS

## 2022-02-10 PROCEDURE — 93798 PHYS/QHP OP CAR RHAB W/ECG: CPT

## 2022-02-15 ENCOUNTER — HOSPITAL ENCOUNTER (OUTPATIENT)
Dept: CARDIAC REHAB | Age: 67
Setting detail: THERAPIES SERIES
Discharge: HOME | End: 2022-02-15
Payer: MEDICARE

## 2022-02-15 VITALS — BODY MASS INDEX: 36.48 KG/M2 | WEIGHT: 226 LBS

## 2022-02-15 PROCEDURE — 93798 PHYS/QHP OP CAR RHAB W/ECG: CPT

## 2022-02-17 ENCOUNTER — HOSPITAL ENCOUNTER (OUTPATIENT)
Dept: CARDIAC REHAB | Age: 67
Setting detail: THERAPIES SERIES
End: 2022-02-17
Payer: MEDICARE

## 2022-02-22 ENCOUNTER — HOSPITAL ENCOUNTER (OUTPATIENT)
Dept: CARDIAC REHAB | Age: 67
Setting detail: THERAPIES SERIES
Discharge: HOME | End: 2022-02-22
Payer: MEDICARE

## 2022-02-22 VITALS — WEIGHT: 221.9 LBS | BODY MASS INDEX: 35.82 KG/M2

## 2022-02-22 PROCEDURE — 93798 PHYS/QHP OP CAR RHAB W/ECG: CPT

## 2022-02-24 ENCOUNTER — HOSPITAL ENCOUNTER (OUTPATIENT)
Dept: CARDIAC REHAB | Age: 67
Setting detail: THERAPIES SERIES
Discharge: HOME | End: 2022-02-24
Payer: MEDICARE

## 2022-02-24 VITALS — BODY MASS INDEX: 35.99 KG/M2 | WEIGHT: 223 LBS

## 2022-02-24 PROCEDURE — 93798 PHYS/QHP OP CAR RHAB W/ECG: CPT

## 2022-02-28 ENCOUNTER — TELEPHONE (OUTPATIENT)
Dept: GASTROENTEROLOGY | Age: 67
End: 2022-02-28

## 2022-02-28 NOTE — TELEPHONE ENCOUNTER
Pt LVM stating someone called her from our office but did not leave a message. Called pt back, informed her it was probably a reminder call for her appt on 3/2/22 at 215pm. Pt voices understanding.

## 2022-03-01 ENCOUNTER — HOSPITAL ENCOUNTER (OUTPATIENT)
Dept: CARDIAC REHAB | Age: 67
Setting detail: THERAPIES SERIES
Discharge: HOME | End: 2022-03-01
Payer: MEDICARE

## 2022-03-01 VITALS — BODY MASS INDEX: 35.99 KG/M2 | WEIGHT: 223 LBS

## 2022-03-01 PROCEDURE — 93798 PHYS/QHP OP CAR RHAB W/ECG: CPT

## 2022-03-01 NOTE — PLAN OF CARE
[address] [place] [address] Cardiac Rehabilitation  Individual Treatment Plan    Patient Name:  Aman Villavicencio  :  1955  Age:  78 y.o. Diagnosis: CHF AICD HTN  Date of Event: 21  Date Entered Program: 22  Physician:  Maralyn Halsted, R HASHMI  History:   Past Medical History:   Diagnosis Date    Abnormal Pap smear     Acute on chronic systolic [facility]     AICD (automatic cardioverter/defibrillator) present     MAURO (acute kidney injury) ([facility] 2017    Anemia     Arthritis     Cardiomyopathy ([facility] 2012    robotic replacement of 2 left ventricular leads/replacement of ICD generator    Cardiomyopathy, nonischemic ([facility] 2012    Chronic combined systolic and diastolic congestive heart failure (HCC)     Ejection fraction < 50%     HTN (hypertension)     Hypothyroidism     Ischemic cardiomyopathy     Left bundle branch block     Mitral regurgitation     Morbid obesity due to excess calories ([facility] 01/15/2017    Pulmonary hypertension ([facility]     Sudden onset of severe headache      Allergies:    Allergies   Allergen Reactions    Codeine      Hallucinations    Morphine      Hallucinations       Patient Goal: INCREASE STRENGTH AND IMPROVE DIET    Amount of Minutes per piece  Week  Warm Up  Leg Arm  Leg  Arm  Leg  Arm  Duration   1 4 7 3 7 3 7 - 31   2 4 7 3 7 3 7 3 34   3 4 8 3 8 3 8 3 37   4 4 8 4 8 4 8 4 40   5 4 9 4 9 4 9 4 43   6 4 9 5 9 5 9 5 46   Max 7 4 10 5 10 5 10 5 49        Exercise Prescription:    Type of exercise:  Legs- Treadmill, Airdyne, Nustep  Arms- Free weights, Airdyne, Ergometer, Nustep                               Frequency:  2-3 times per week         Plan of Progresssion:  Amount and duration will increase every       2-3 visits          Phase 2 Cardiac Rehabilitation  Individualized Cardiac Treatment Plan    Individual Cardiac Treatment Plan - EXERCISE  EXERCISE  REASSESSMENT   Re-Assessment   Stages of Change [x]Contemplation   []Action   []Relapse   EXERCISE ASSESSMENT   Home Exercise   []Yes    [x]No  Type:  Aerobics   []  Bicycling  []  Cardiovascular  []  Gardening  []  Hiking  []  House Cleaning   []  Run/Jog  []  Swim  []  Walk  [x]  Yard Work  []  Other  []     Frequency:  Daily  []  Q2 days  []  Q3 days  []  Biweekly  []  Irregularly  [x]  Other  []     Duration:   Seconds []  Minutes [x] 10  Hours []  Other []   EXERCISE PLAN   Interventions*  Education:   [x]Self Pulse   [x]Medication HR/BP   [x]Exercise Safety   [x]S/S to report   [x]RPE scale   [x]Equip. [place]. [x]Warmup/cool down   [x]Hand    [x]Home exercise encouraged   Target Goal:  Follow individual exercise Rx, aerobic exercise, 30+minutes 5x/week   Exercise Prescription  (per physician & CR staff)  7Met Level: 1.7       Individual Cardiac Treatment Plan - EDUCATION  Reassessment  Learning Barriers   Speech  []  Hearing  []  Vision  []  Cognitive  []  Ready to Learn [x]     Tobacco Use  []Y  [x]N  []Quit    HTN [x]Y  []N      Diabetic []Y  [x]N     Pre-cardiac test:__100___     Intervention/Education   []Referal to smoking cessation  []Encouraged smoking cessation   [x]CAD   [x]Risk factors   [x]Med Compliance   [x]Cardiac A/P   [x]Angina S/S   [x]NTG use   [x]Sexual activity    Target Goal:  Increase knowledge of risk factors     Individual Cardiac Treatment Plan - NUTRITION  NUTRITION  REASSESSMENT      Stages of Change    []Contemplation   [x]Action   []Relapse   NUTRITION ASSESSMENT   Weight Management  Weight: 223 LB     Height:  Ht Readings from Last 1 Encounters:   02/08/22 5' 6\" (1.676 m)          BMI:  Body mass index is 35.99 kg/m².    Date:  Lipids:  Cholesterol (mg/dL)   Date Value   05/23/2020 180     HDL (mg/dL)   Date Value   05/23/2020 42     LDL Cholesterol (mg/dL)   Date Value   05/23/2020 122     Chol/HDL Ratio (no units)   Date Value   05/23/2020 4.3     Triglycerides (mg/dL)   Date Value   05/23/2020 79     VLDL (mg/dL) Date Value   05/23/2020 NOT REPORTED      Cholesterol Drug Therapy:   []Y  [x]N  Why:   Diabetic:   []Y  [x]N  Education Needed    []Y  [x]N   Goal: Maintain Heart Healthy Weight. Professional Referral  Please check if needed. []Dietician Consult    [x]Nurse/Patient Ed   []Wt. Management Referral   []Other:   Goal: Understand Lipid Results; Target:  LDL below 70, HDL above 40          Individual Cardiac Treatment Plan - PYSCHOSOCIAL  PSYCHOSOCIAL  REASSESSMENT      Psychosocial test:  PHQ 9 score:_0____   Intervention/  Education  If score 5 or above:   []Psych consult   []Physician notified  If pt declined, Why?    []Relaxation technique   [x]S/S of depression   [x]Coping techniques   Target goal:  Decreased stress levels

## 2022-03-01 NOTE — PROGRESS NOTES
ITP UPDATED, PT STATES SHE IS WALKING AT HOME BUT FOR ONLY UP TO 10 MINUTES, WITH CANE. PT WEIGHT STABLE, FOLLOWING LOW SOLDIUM DIET AND SEE THE CHF CLINIC AT [facility] TO INCREASE ACTIVITY.

## 2022-03-02 ENCOUNTER — OFFICE VISIT (OUTPATIENT)
Dept: GASTROENTEROLOGY | Age: 67
End: 2022-03-02
Payer: MEDICARE

## 2022-03-02 VITALS
WEIGHT: 224.9 LBS | HEIGHT: 66 IN | SYSTOLIC BLOOD PRESSURE: 110 MMHG | DIASTOLIC BLOOD PRESSURE: 81 MMHG | BODY MASS INDEX: 36.14 KG/M2 | TEMPERATURE: 97 F | OXYGEN SATURATION: 100 % | HEART RATE: 70 BPM

## 2022-03-02 DIAGNOSIS — E66.01 SEVERE OBESITY (BMI 35.0-39.9) WITH COMORBIDITY (HCC): ICD-10-CM

## 2022-03-02 DIAGNOSIS — K57.90 DIVERTICULOSIS: Primary | ICD-10-CM

## 2022-03-02 DIAGNOSIS — I50.22 CHRONIC SYSTOLIC HEART FAILURE (HCC): ICD-10-CM

## 2022-03-02 DIAGNOSIS — K63.0 ABSCESS OF SIGMOID COLON: ICD-10-CM

## 2022-03-02 PROCEDURE — 4040F PNEUMOC VAC/ADMIN/RCVD: CPT | Performed by: INTERNAL MEDICINE

## 2022-03-02 PROCEDURE — 1123F ACP DISCUSS/DSCN MKR DOCD: CPT | Performed by: INTERNAL MEDICINE

## 2022-03-02 PROCEDURE — 99204 OFFICE O/P NEW MOD 45 MIN: CPT | Performed by: INTERNAL MEDICINE

## 2022-03-02 PROCEDURE — APPSS60 APP SPLIT SHARED TIME 46-60 MINUTES: Performed by: NURSE PRACTITIONER

## 2022-03-02 PROCEDURE — G8400 PT W/DXA NO RESULTS DOC: HCPCS | Performed by: INTERNAL MEDICINE

## 2022-03-02 PROCEDURE — G8427 DOCREV CUR MEDS BY ELIG CLIN: HCPCS | Performed by: INTERNAL MEDICINE

## 2022-03-02 PROCEDURE — 1090F PRES/ABSN URINE INCON ASSESS: CPT | Performed by: INTERNAL MEDICINE

## 2022-03-02 PROCEDURE — 1036F TOBACCO NON-USER: CPT | Performed by: INTERNAL MEDICINE

## 2022-03-02 PROCEDURE — G8417 CALC BMI ABV UP PARAM F/U: HCPCS | Performed by: INTERNAL MEDICINE

## 2022-03-02 PROCEDURE — 3017F COLORECTAL CA SCREEN DOC REV: CPT | Performed by: INTERNAL MEDICINE

## 2022-03-02 PROCEDURE — G8484 FLU IMMUNIZE NO ADMIN: HCPCS | Performed by: INTERNAL MEDICINE

## 2022-03-02 RX ORDER — METOPROLOL SUCCINATE 50 MG/1
TABLET, EXTENDED RELEASE ORAL
COMMUNITY
Start: 2022-02-25 | End: 2022-05-26

## 2022-03-02 ASSESSMENT — ENCOUNTER SYMPTOMS
ABDOMINAL DISTENTION: 0
SORE THROAT: 0
DIARRHEA: 0
RECTAL PAIN: 0
NAUSEA: 0
COUGH: 0
SHORTNESS OF BREATH: 0
TROUBLE SWALLOWING: 0
CONSTIPATION: 0
ANAL BLEEDING: 0
BLOOD IN STOOL: 0
VOICE CHANGE: 1
BACK PAIN: 0
ABDOMINAL PAIN: 0
CHOKING: 0
VOMITING: 0

## 2022-03-02 NOTE — PROGRESS NOTES
Reason for Referral:   No referring provider defined for this encounter. HISTORY OF PRESENT ILLNESS:   Chief Complaint   Patient presents with    New Patient     Patient is here today as a new patient    Diverticulitis     Patient is here today for a hospital f/u Dx. Diverticulitis     New patient being seen for hospital follow-up. Patient was initially admitted to [facility] for abdominal pain, flank pain. Has PMH for diverticulittis. She had CT abdomen and pelvis at that time that revealed sigmoid abscess with severe diverticulosis. Patient was treated with IV Zosyn. IR was consulted for possible drainage however CT did not show any safe access into the abscess. Repeat CT was performed and abscess had significantly decreased in size from prior CT. Procedure was aborted. Again patient was in the ED on 1/27/2022 for abdominal pain. She had repeat CT abdomen and pelvis that demonstrated recurrent versus residual abnormal wall thickening in the mid sigmoid colon, not substantially changed from 2 months prior. Also noted persistent abscess in the adjacent sigmoid mesocolon which is decreased in size as detailed above. Does have unchanged prominent pericolic lymph nodes in the sigmoid mesocolon with differential considerations including reactive or neoplastic etiology. Patient was started on oral Cipro, Flagyl and [address] home. At present patient is stable. No abdominal pain, nausea, vomiting, fevers, chills. No trouble with micturition. No significant weight loss. Ports good appetite. Denies any constipation. Reports 1-2 formed bowel movements daily. Denies any melena, hematochezia. No straining with bowel movements. Last colonoscopy 10 years ago. Noted hemorrhoids, diverticulosis. She is currently on Xarelto. Patient has nonischemic cardiomyopathy with chronic systolic dysfunction - LVEF 15%, mitral valve regurgitation, A fib.     Past Medical,Family, and Social History reviewed and does contribute to the patient presentingcondition. Patient's PMH/PSH,SH,PSYCH Hx, MEDs, ALLERGIES, and ROS were all reviewed and updated in the appropriate sections.     PAST MEDICAL HISTORY:  Past Medical History:   Diagnosis Date    Abnormal Pap smear     Acute on chronic systolic congestive heart failure (HCC)     AICD (automatic cardioverter/defibrillator) present     MAURO (acute kidney injury) ([facility] 03/04/2017    Anemia     Arthritis     Cardiomyopathy ([facility] 12/20/2012    robotic replacement of 2 left ventricular leads/replacement of ICD generator    Cardiomyopathy, nonischemic ([facility] 12/20/2012    Chronic combined systolic and diastolic congestive heart failure (HCC)     Ejection fraction < 50%     HTN (hypertension)     Hypothyroidism     Ischemic cardiomyopathy     Left bundle branch block     Mitral regurgitation     Morbid obesity due to excess calories ([facility] 01/15/2017    Pulmonary hypertension ([facility]     Sudden onset of severe headache        Past Surgical History:   Procedure Laterality Date    COLONOSCOPY      HERNIA REPAIR      OTHER SURGICAL HISTORY  12/20/2012    robotic placement of 2 left ventricular leads/replacement of ICD generator    PACEMAKER PLACEMENT      medtronic Viva XT CRT -D [id number] Serial : [id number]    TUBAL LIGATION         CURRENT MEDICATIONS:    Current Outpatient Medications:     metoprolol succinate (TOPROL XL) 50 MG extended release tablet, , Disp: , Rfl:     amiodarone (CORDARONE) 200 MG tablet, , Disp: , Rfl:     diclofenac sodium (VOLTAREN) 1 % GEL, APPLY 2 GRAMS TOPICALLY 4 (FOUR) TIMES A DAY., Disp: , Rfl:     empagliflozin (JARDIANCE) 10 MG tablet, Take 10 mg by mouth daily, Disp: , Rfl:     acetaminophen (TYLENOL) 500 MG tablet, Take 2 tablets by mouth every 6 hours as needed for Pain, Disp: 60 tablet, Rfl: 0    potassium chloride (KLOR-CON M) 20 MEQ extended release tablet, Take 1 tablet by mouth daily, Disp: 30 tablet, Rfl: 0    magnesium oxide (MAG-OX) 400 MG tablet, Take 0.5 tablets by mouth daily, Disp: 30 tablet, Rfl: 1    nitroGLYCERIN (NITROSTAT) 0.4 MG SL tablet, up to max of 3 total doses.  If no relief after 1 dose, call 911., Disp: 25 tablet, Rfl: 3    rivaroxaban (XARELTO) 20 MG TABS tablet, Take 1 tablet by mouth daily, Disp: 30 tablet, Rfl: 11    sacubitril-valsartan (ENTRESTO) 24-26 MG per tablet, Take 0.5 tablets by mouth 2 times daily, Disp: 60 tablet, Rfl: 11    bumetanide (BUMEX) 2 MG tablet, Take 1 tablet by mouth daily, Disp: 30 tablet, Rfl: 3    levothyroxine (SYNTHROID) 25 MCG tablet, Take 1 tablet by mouth Daily, Disp: 30 tablet, Rfl: 3    lidocaine (LIDODERM) 5 %, Place one patch to both knees 12 hours on, 12 hours off., Disp: 30 patch, Rfl: 0    metoprolol tartrate (LOPRESSOR) 25 MG tablet, Take 1 tablet by mouth 2 times daily, Disp: 60 tablet, Rfl: 2    allopurinol (ZYLOPRIM) 100 MG tablet, Take 1 tablet by mouth daily, Disp: 30 tablet, Rfl: 3    spironolactone (ALDACTONE) 25 MG tablet, Take 0.5 tablets by mouth daily, Disp: 30 tablet, Rfl: 3    ALLERGIES:   Allergies   Allergen Reactions    Codeine      Hallucinations    Morphine      Hallucinations         FAMILY HISTORY:       Problem Relation Age of Onset    Cancer Other         ovarian    High Blood Pressure Mother     Other Mother         copd    Arthritis Neg Hx     Asthma Neg Hx     Birth Defects Neg Hx     Depression Neg Hx     Diabetes Neg Hx     Early Death Neg Hx     Hearing Loss Neg Hx     Heart Disease Neg Hx     High Cholesterol Neg Hx     Kidney Disease Neg Hx     Learning Disabilities Neg Hx     Mental Retardation Neg Hx     Mental Illness Neg Hx     Miscarriages / Stillbirths Neg Hx     Stroke Neg Hx     Substance Abuse Neg Hx     Vision Loss Neg Hx     Breast Cancer Neg Hx     Colon Cancer Neg Hx     Eclampsia Neg Hx     Hypertension Neg Hx     Ovarian Cancer Neg Hx      Labor Neg Hx     Spont Abortions Neg Hx          SOCIAL HISTORY:   Social History     Socioeconomic History    Marital status:      Spouse name: Not on file    Number of children: 2    Years of education: Not on file    Highest education level: Not on file   Occupational History    Not on file   Tobacco Use    Smoking status: Never Smoker    Smokeless tobacco: Never Used   Vaping Use    Vaping Use: Never used   Substance and Sexual Activity    Alcohol use: No     Alcohol/week: 0.0 standard drinks    Drug use: Not Currently    Sexual activity: Never   Other Topics Concern    Not on file   Social History Narrative    Not on file     Social Determinants of Health     Financial Resource Strain:     Difficulty of Paying Living Expenses: Not on file   Food Insecurity:     Worried About Running Out of Food in the Last Year: Not on file    Ninfa of Food in the Last Year: Not on file   Transportation Needs:     Lack of Transportation (Medical): Not on file    Lack of Transportation (Non-Medical):  Not on file   Physical Activity:     Days of Exercise per Week: Not on file    Minutes of Exercise per Session: Not on file   Stress:     Feeling of Stress : Not on file   Social Connections:     Frequency of Communication with Friends and Family: Not on file    Frequency of Social Gatherings with Friends and Family: Not on file    Attends [employer] Services: Not on file    Active Member of [address] [place] [address] or Organizations: Not on file    Attends Club or Organization Meetings: Not on file    Marital Status: Not on file   Intimate Partner Violence:     Fear of Current or Ex-Partner: Not on file    Emotionally Abused: Not on file    Physically Abused: Not on file    Sexually Abused: Not on file   Housing Stability:     Unable to Pay for Housing in the Last Year: Not on file    Number of [place] in the Last Year: Not on file    Unstable Housing in the Last Year: Not on file       REVIEW OF SYSTEMS:       Review of Systems   Constitutional: Positive for appetite change (increased). Negative for fatigue and unexpected weight change. HENT: Positive for voice change. Negative for dental problem, sore throat and trouble swallowing. Eyes: Negative for visual disturbance. Respiratory: Negative for cough, choking and shortness of breath. Cardiovascular: Negative for chest pain and leg swelling. Gastrointestinal: Negative for abdominal distention, abdominal pain, anal bleeding, blood in stool, constipation, diarrhea, nausea, rectal pain and vomiting. Genitourinary: Negative for difficulty urinating. Musculoskeletal: Negative for back pain, joint swelling and myalgias. Neurological: Negative for dizziness, tremors, weakness, light-headedness (get up too fast), numbness and headaches. Hematological: Does not bruise/bleed easily. Psychiatric/Behavioral: Negative for sleep disturbance. The patient is not nervous/anxious. PHYSICAL EXAMINATION: Vital signs reviewed per the nursing documentation. /81   Pulse 70   Temp 97 °F (36.1 °C)   Ht 5' 6\" (1.676 m)   Wt 224 lb 14.4 oz (102 kg)   SpO2 100%   BMI 36.30 kg/m²   Body mass index is 36.3 kg/m².    Physical Exam      LABORATORY DATA: Reviewed  Lab Results   Component Value Date    WBC 8.0 01/27/2022    HGB 12.6 01/27/2022    HCT 36.9 01/27/2022    MCV 86.6 01/27/2022     01/27/2022     01/27/2022    K 3.9 01/27/2022     01/27/2022    CO2 25 01/27/2022    BUN 13 01/27/2022    CREATININE 1.07 (H) 01/27/2022    LABALBU 4.1 01/27/2022    BILITOT 0.74 01/27/2022    ALKPHOS 241 (H) 01/27/2022    AST 17 01/27/2022    ALT 14 01/27/2022    INR 2.0 01/27/2022         Lab Results   Component Value Date    RBC 4.26 01/27/2022    HGB 12.6 01/27/2022    MCV 86.6 01/27/2022    MCH 29.7 01/27/2022    MCHC 34.2 01/27/2022    RDW 14.9 01/27/2022    MPV 8.4 01/27/2022    BASOPCT 0 01/27/2022    LYMPHSABS 0.90 (L) 01/27/2022    MONOSABS 0.60 01/27/2022    NEUTROABS 6.40 01/27/2022    EOSABS 0.00 01/27/2022    BASOSABS 0.00 01/27/2022         DIAGNOSTIC TESTING:     No results found. IMPRESSION: Ms. Kyle Aguirre is a 78 y.o. female with    Diagnosis Orders   1. Diverticulosis  CT Colonography DX W/WO Contrast   2. Abscess of sigmoid colon  CT Colonography DX W/WO Contrast   3. Chronic systolic heart failure (HCC)  CT Colonography DX W/WO Contrast   4. Severe obesity (BMI 35.0-39. 9) with comorbidity ([address]           Assessment:  1. Diverticulosis    2. Abscess of sigmoid colon    3. Chronic systolic heart failure ([address]    4. Severe obesity (BMI 35.0-39. 9) with comorbidity ([address]        Plan: At present patient is stable. No fever, chills, abdominal pain, nausea, vomiting. Patient currently high risk for any surgical, endoscopic procedures. We will plan CT colonography. To follow-up in the next 4 weeks. Patient advised if she has any recurrent fever, chills, nausea, vomiting to go to ED. GI attending physician note. Patient seen with APRN     I independently performed an evaluation on Annemarie Gm. I have reviewed the above documentation completed by the Nurse Practitioner and agree with the history, examination, and management plan. Recommendations discussed. Patient seen, history discussed with, and examined. As mentioned by APRN, patient is asymptomatic. She has normal bowel movements. Does not strain at bowel movements. Overall she is doing well. However, CT scan that was done in January 2022 still revealing sequelae of diverticular disease, and a fluid collection suggestive of small abscess. Ideally patient may need sigmoidoscopy to evaluate sigmoid pathology. However considering patient's severe heart disease with poor ejection fraction, we elected CT colonography. Discussed with the patient regarding this she understood and agreed. We will reevaluate the patient in the next 3 to 4 weeks.             Spent 30 minutes providing patient education and counseling. Thank you for allowing me to participate in the care of Ms. Kristyn Watson. For any further questions please do not hesitate to contact me. Note is dictated utilizing voice recognition software. Unfortunately this leads to occasional typographical errors. Please contact our office if you have any questions. I have reviewed and agree with the MA/ROXYN ROS.      Connor Blackwell MD, [facility]  Board Certified in Gastroenterology and [address] [place] [address] Gastroenterology  Office #: [phone]

## 2022-03-03 ENCOUNTER — HOSPITAL ENCOUNTER (OUTPATIENT)
Dept: CARDIAC REHAB | Age: 67
Setting detail: THERAPIES SERIES
Discharge: HOME | End: 2022-03-03
Payer: MEDICARE

## 2022-03-03 VITALS — BODY MASS INDEX: 36.15 KG/M2 | WEIGHT: 224 LBS

## 2022-03-03 PROCEDURE — 93798 PHYS/QHP OP CAR RHAB W/ECG: CPT

## 2022-03-03 NOTE — PROGRESS NOTES
Goals reviewed, pt states she is feeling a little stronger since starting Cardiac Rehab, will try ERG today for one set of arms and considering the treadmill soon.

## 2022-03-08 ENCOUNTER — HOSPITAL ENCOUNTER (OUTPATIENT)
Dept: CARDIAC REHAB | Age: 67
Setting detail: THERAPIES SERIES
Discharge: HOME | End: 2022-03-08
Payer: MEDICARE

## 2022-03-08 VITALS — WEIGHT: 225.1 LBS | BODY MASS INDEX: 36.33 KG/M2

## 2022-03-08 PROCEDURE — 93798 PHYS/QHP OP CAR RHAB W/ECG: CPT

## 2022-03-08 NOTE — PROGRESS NOTES
Pt states feeling a little winded this morning and weight slightly up, hasn't taken Bumex yet today.

## 2022-03-10 ENCOUNTER — HOSPITAL ENCOUNTER (OUTPATIENT)
Dept: CARDIAC REHAB | Age: 67
Setting detail: THERAPIES SERIES
End: 2022-03-10
Payer: MEDICARE

## 2022-03-15 ENCOUNTER — HOSPITAL ENCOUNTER (OUTPATIENT)
Dept: CARDIAC REHAB | Age: 67
Setting detail: THERAPIES SERIES
Discharge: HOME | End: 2022-03-15
Payer: MEDICARE

## 2022-03-15 VITALS — WEIGHT: 225.9 LBS | BODY MASS INDEX: 36.46 KG/M2

## 2022-03-15 PROCEDURE — 93798 PHYS/QHP OP CAR RHAB W/ECG: CPT

## 2022-03-17 ENCOUNTER — HOSPITAL ENCOUNTER (OUTPATIENT)
Dept: CARDIAC REHAB | Age: 67
Setting detail: THERAPIES SERIES
Discharge: HOME | End: 2022-03-17
Payer: MEDICARE

## 2022-03-17 VITALS — BODY MASS INDEX: 36.19 KG/M2 | WEIGHT: 224.2 LBS

## 2022-03-17 PROCEDURE — 93798 PHYS/QHP OP CAR RHAB W/ECG: CPT

## 2022-03-17 NOTE — PROGRESS NOTES
GOAL REVIEWED WITH PT. PT STATES SHE FEELS STRONGER AND IS USING THE TREADMILL NOW, PT STATES SHE WILL START USING THE ERGOMETER SOON.

## 2022-03-18 ENCOUNTER — TELEPHONE (OUTPATIENT)
Dept: GASTROENTEROLOGY | Age: 67
End: 2022-03-18

## 2022-03-18 DIAGNOSIS — K57.50 DIVERTICUL DISEASE SMALL AND LARGE INTESTINE, NO PERFORATI OR ABSCESS: ICD-10-CM

## 2022-03-18 DIAGNOSIS — K57.30 DIVERTICULOSIS OF LARGE INTESTINE WITHOUT DIVERTICULITIS: Primary | ICD-10-CM

## 2022-03-18 NOTE — TELEPHONE ENCOUNTER
Aarti PAZ for a call back regarding new diagnosis codes for the CT colonography. The ones used do not pass.     [phone]

## 2022-03-22 ENCOUNTER — HOSPITAL ENCOUNTER (OUTPATIENT)
Dept: CARDIAC REHAB | Age: 67
Setting detail: THERAPIES SERIES
Discharge: HOME | End: 2022-03-22
Payer: MEDICARE

## 2022-03-22 VITALS — BODY MASS INDEX: 36.17 KG/M2 | WEIGHT: 224.1 LBS

## 2022-03-22 PROCEDURE — 93798 PHYS/QHP OP CAR RHAB W/ECG: CPT

## 2022-03-24 ENCOUNTER — HOSPITAL ENCOUNTER (OUTPATIENT)
Dept: CARDIAC REHAB | Age: 67
Setting detail: THERAPIES SERIES
Discharge: HOME | End: 2022-03-24
Payer: MEDICARE

## 2022-03-25 NOTE — TELEPHONE ENCOUNTER
Nithya Wilkins, can  You please check with [facility] on this Chonodrocutaneous Helical Advancement Flap Text: The defect edges were debeveled with a #15 scalpel blade.  Given the location of the defect and the proximity to free margins a chondrocutaneous helical advancement flap was deemed most appropriate.  Using a sterile surgical marker, the appropriate advancement flap was drawn incorporating the defect and placing the expected incisions within the relaxed skin tension lines where possible.    The area thus outlined was incised deep to adipose tissue with a #15 scalpel blade.  The skin margins were undermined to an appropriate distance in all directions utilizing iris scissors.

## 2022-03-28 NOTE — TELEPHONE ENCOUNTER
[facility] called back. Requesting diagnosis to be changed and orders refaxed. States if Diverticulosis code can include the small or large intestine, the procedure will be covered.  (if applicable) The covered codes are below:  k57.30  K57.50    [phone] fax #

## 2022-03-29 ENCOUNTER — HOSPITAL ENCOUNTER (OUTPATIENT)
Dept: CARDIAC REHAB | Age: 67
Setting detail: THERAPIES SERIES
Discharge: HOME | End: 2022-03-29
Payer: MEDICARE

## 2022-03-29 NOTE — TELEPHONE ENCOUNTER
Writer sent the new order to the fax # [phone] with the new codes. Order was sent at 10:00 am due to Epic being down. Writer was unable to [employer], phones or fax machines.

## 2022-03-31 ENCOUNTER — HOSPITAL ENCOUNTER (OUTPATIENT)
Dept: CARDIAC REHAB | Age: 67
Setting detail: THERAPIES SERIES
Discharge: HOME | End: 2022-03-31
Payer: MEDICARE

## 2022-03-31 VITALS — WEIGHT: 222.2 LBS | BODY MASS INDEX: 35.86 KG/M2

## 2022-03-31 PROCEDURE — 93798 PHYS/QHP OP CAR RHAB W/ECG: CPT

## 2022-03-31 NOTE — PLAN OF CARE
[address] [place] [address] Cardiac Rehabilitation  Individual Treatment Plan    Patient Name:  Nan Harry  :  1955  Age:  78 y.o. Diagnosis: CHF  Date of Event: 21  Date Entered Program: 22  Physician:  Mohit DONIS  History:   Past Medical History:   Diagnosis Date    Abnormal Pap smear     Acute on chronic systolic congestive heart failure ([address]     AICD (automatic cardioverter/defibrillator) present     MAURO (acute kidney injury) ([address] 2017    Anemia     Arthritis     Cardiomyopathy ([address] 2012    robotic replacement of 2 left ventricular leads/replacement of ICD generator    Cardiomyopathy, nonischemic ([facility] 2012    Chronic combined systolic and diastolic congestive heart failure (HCC)     Ejection fraction < 50%     HTN (hypertension)     Hypothyroidism     Ischemic cardiomyopathy     Left bundle branch block     Mitral regurgitation     Morbid obesity due to excess calories ([facility] 01/15/2017    Pulmonary hypertension ([facility]     Sudden onset of severe headache      Allergies:    Allergies   Allergen Reactions    Codeine      Hallucinations    Morphine      Hallucinations       Patient Goal: INCREASE STRENGTH/LOSE WEIGHT    Amount of Minutes per piece  Week  Warm Up  Leg Arm  Leg  Arm  Leg  Arm  Duration   1 4 7 3 7 3 7 - 31   2 4 7 3 7 3 7 3 34   3 4 8 3 8 3 8 3 37   4 4 8 4 8 4 8 4 40   5 4 9 4 9 4 9 4 43   6 4 9 5 9 5 9 5 46   Max 7 4 10 5 10 5 10 5 49        Exercise Prescription:    Type of exercise:  Legs- Treadmill, Airdyne, Nustep  Arms- Free weights, Airdyne, Ergometer, Nustep                               Frequency:  2-3 times per week         Plan of Progresssion:  Amount and duration will increase every       2-3 visits          Phase 2 Cardiac Rehabilitation  Individualized Cardiac Treatment Plan    Individual Cardiac Treatment Plan - EXERCISE  EXERCISE  REASSESSMENT   Re-Assessment   Stages of Change    []Contemplation [x]Action   []Relapse   EXERCISE ASSESSMENT   Home Exercise   [x]Yes    []No  Type:  Aerobics   []  Bicycling  []  Cardiovascular  []  Gardening  []  Hiking  []  House Cleaning   [x]  Run/Jog  []  Swim  []  Walk  [x]  Yard Work  []  Other  []     Frequency:  Daily  []  Q2 days  []  Q3 days  [x]  Biweekly  []  Irregularly  []  Other  []      Duration:   Seconds []  Minutes [x]  30+  Hours []  Other []   EXERCISE PLAN   Interventions*  Education:   [x]Self Pulse   [x]Medication HR/BP   [x]Exercise Safety   [x]S/S to report   [x]RPE scale   [x]Equip. [place]. [x]Warmup/cool down   [x]Hand    [x]Home exercise encouraged   Target Goal:  Follow individual exercise Rx, aerobic exercise, 30+minutes 5x/week   Exercise Prescription  (per physician & CR staff)  Met Level: 1.7       Individual Cardiac Treatment Plan - EDUCATION  Reassessment  Learning Barriers   Speech  []  Hearing  []  Vision  []  Cognitive  []  Ready to Learn [x]     Tobacco Use  []Y  [x]N  []Quit    HTN [x]Y  []N      Diabetic []Y  [x]N     Pre-cardiac test:__100___     Intervention/Education   []Referal to smoking cessation  []Encouraged smoking cessation   [x]CAD   [x]Risk factors   [x]Med Compliance   [x]Cardiac A/P   [x]Angina S/S   [x]NTG use   [x]Sexual activity    Target Goal:  Increase knowledge of risk factors     Individual Cardiac Treatment Plan - NUTRITION  NUTRITION  REASSESSMENT      Stages of Change    []Contemplation   [x]Action   []Relapse   NUTRITION ASSESSMENT   Weight Management  Weight: 222 LB      Height:  Ht Readings from Last 1 Encounters:   03/02/22 5' 6\" (1.676 m)          BMI:  Body mass index is 35.86 kg/m².    Date:  Lipids:  Cholesterol (mg/dL)   Date Value   05/23/2020 180     HDL (mg/dL)   Date Value   05/23/2020 42     LDL Cholesterol (mg/dL)   Date Value   05/23/2020 122     Chol/HDL Ratio (no units)   Date Value   05/23/2020 4.3     Triglycerides (mg/dL)   Date Value   05/23/2020 79     VLDL (mg/dL)   Date Value 05/23/2020 NOT REPORTED      Cholesterol Drug Therapy:   [x]Y  []N  Why:   Diabetic:   []Y  [x]N  Education Needed    []Y  [x]N   Goal: Maintain Heart Healthy Weight. Professional Referral  Please check if needed. []Dietician Consult    []Nurse/Patient Ed   []Wt. Management Referral   []Other:   Goal: Understand Lipid Results; Target:  LDL below 70, HDL above 40          Individual Cardiac Treatment Plan - PYSCHOSOCIAL  PSYCHOSOCIAL  REASSESSMENT      Psychosocial test:  PHQ 9 score:___0__   Intervention/  Education  If score 5 or above:   []Psych consult   []Physician notified  If pt declined, Why?    [x]Relaxation technique   [x]S/S of depression   [x]Coping techniques   Target goal:  Decreased stress levels

## 2022-04-05 ENCOUNTER — HOSPITAL ENCOUNTER (OUTPATIENT)
Dept: CARDIAC REHAB | Age: 67
Setting detail: THERAPIES SERIES
Discharge: HOME | End: 2022-04-05
Payer: MEDICARE

## 2022-04-05 ENCOUNTER — OFFICE VISIT (OUTPATIENT)
Dept: GASTROENTEROLOGY | Age: 67
End: 2022-04-05
Payer: MEDICARE

## 2022-04-05 VITALS
HEIGHT: 66 IN | SYSTOLIC BLOOD PRESSURE: 131 MMHG | HEART RATE: 73 BPM | BODY MASS INDEX: 36.08 KG/M2 | OXYGEN SATURATION: 98 % | WEIGHT: 224.5 LBS | TEMPERATURE: 97.9 F | DIASTOLIC BLOOD PRESSURE: 80 MMHG

## 2022-04-05 VITALS — BODY MASS INDEX: 36.2 KG/M2 | WEIGHT: 224.3 LBS

## 2022-04-05 DIAGNOSIS — K57.30 DIVERTICULOSIS OF LARGE INTESTINE WITHOUT DIVERTICULITIS: ICD-10-CM

## 2022-04-05 DIAGNOSIS — K63.0 ABSCESS OF SIGMOID COLON: ICD-10-CM

## 2022-04-05 DIAGNOSIS — I50.22 CHRONIC SYSTOLIC HEART FAILURE (HCC): ICD-10-CM

## 2022-04-05 DIAGNOSIS — K63.0 ABSCESS OF SIGMOID COLON: Primary | ICD-10-CM

## 2022-04-05 DIAGNOSIS — K57.90 DIVERTICULOSIS: ICD-10-CM

## 2022-04-05 DIAGNOSIS — K57.50 DIVERTICUL DISEASE SMALL AND LARGE INTESTINE, NO PERFORATI OR ABSCESS: ICD-10-CM

## 2022-04-05 PROCEDURE — 1123F ACP DISCUSS/DSCN MKR DOCD: CPT | Performed by: INTERNAL MEDICINE

## 2022-04-05 PROCEDURE — 4040F PNEUMOC VAC/ADMIN/RCVD: CPT | Performed by: INTERNAL MEDICINE

## 2022-04-05 PROCEDURE — 93798 PHYS/QHP OP CAR RHAB W/ECG: CPT

## 2022-04-05 PROCEDURE — G8400 PT W/DXA NO RESULTS DOC: HCPCS | Performed by: INTERNAL MEDICINE

## 2022-04-05 PROCEDURE — 3017F COLORECTAL CA SCREEN DOC REV: CPT | Performed by: INTERNAL MEDICINE

## 2022-04-05 PROCEDURE — G8417 CALC BMI ABV UP PARAM F/U: HCPCS | Performed by: INTERNAL MEDICINE

## 2022-04-05 PROCEDURE — G8427 DOCREV CUR MEDS BY ELIG CLIN: HCPCS | Performed by: INTERNAL MEDICINE

## 2022-04-05 PROCEDURE — 99213 OFFICE O/P EST LOW 20 MIN: CPT | Performed by: INTERNAL MEDICINE

## 2022-04-05 PROCEDURE — 1036F TOBACCO NON-USER: CPT | Performed by: INTERNAL MEDICINE

## 2022-04-05 PROCEDURE — 1090F PRES/ABSN URINE INCON ASSESS: CPT | Performed by: INTERNAL MEDICINE

## 2022-04-05 ASSESSMENT — ENCOUNTER SYMPTOMS
CONSTIPATION: 0
BACK PAIN: 0
SORE THROAT: 0
ANAL BLEEDING: 0
DIARRHEA: 0
RECTAL PAIN: 0
NAUSEA: 0
ABDOMINAL PAIN: 0
TROUBLE SWALLOWING: 0
COUGH: 0
VOICE CHANGE: 1
SHORTNESS OF BREATH: 0
VOMITING: 0
BLOOD IN STOOL: 0
ABDOMINAL DISTENTION: 0
CHOKING: 0

## 2022-04-05 NOTE — PROGRESS NOTES
50%     HTN (hypertension)     Hypothyroidism     Ischemic cardiomyopathy     Left bundle branch block     Mitral regurgitation     Morbid obesity due to excess calories ([facility] [address] [address] 01/15/2017    Pulmonary hypertension ([facility] [address] [facility]     Sudden onset of severe headache        Past Surgical History:   Procedure Laterality Date    COLONOSCOPY      HERNIA REPAIR      OTHER SURGICAL HISTORY  12/20/2012    robotic placement of 2 left ventricular leads/replacement of ICD generator    PACEMAKER PLACEMENT      medtronic Viva XT CRT -D [id number] Serial : [id number]    TUBAL LIGATION         CURRENT MEDICATIONS:    Current Outpatient Medications:     metoprolol succinate (TOPROL XL) 50 MG extended release tablet, , Disp: , Rfl:     amiodarone (CORDARONE) 200 MG tablet, , Disp: , Rfl:     diclofenac sodium (VOLTAREN) 1 % GEL, APPLY 2 GRAMS TOPICALLY 4 (FOUR) TIMES A DAY., Disp: , Rfl:     empagliflozin (JARDIANCE) 10 MG tablet, Take 10 mg by mouth daily, Disp: , Rfl:     acetaminophen (TYLENOL) 500 MG tablet, Take 2 tablets by mouth every 6 hours as needed for Pain, Disp: 60 tablet, Rfl: 0    potassium chloride (KLOR-CON M) 20 MEQ extended release tablet, Take 1 tablet by mouth daily, Disp: 30 tablet, Rfl: 0    magnesium oxide (MAG-OX) 400 MG tablet, Take 0.5 tablets by mouth daily, Disp: 30 tablet, Rfl: 1    nitroGLYCERIN (NITROSTAT) 0.4 MG SL tablet, up to max of 3 total doses.  If no relief after 1 dose, call 911., Disp: 25 tablet, Rfl: 3    rivaroxaban (XARELTO) 20 MG TABS tablet, Take 1 tablet by mouth daily, Disp: 30 tablet, Rfl: 11    sacubitril-valsartan (ENTRESTO) 24-26 MG per tablet, Take 0.5 tablets by mouth 2 times daily, Disp: 60 tablet, Rfl: 11    bumetanide (BUMEX) 2 MG tablet, Take 1 tablet by mouth daily, Disp: 30 tablet, Rfl: 3    levothyroxine (SYNTHROID) 25 MCG tablet, Take 1 tablet by mouth Daily, Disp: 30 tablet, Rfl: 3    lidocaine (LIDODERM) 5 %, Place one patch to both knees 12 hours on, 12 hours off., Disp: 30 patch, Rfl: 0    metoprolol tartrate (LOPRESSOR) 25 MG tablet, Take 1 tablet by mouth 2 times daily, Disp: 60 tablet, Rfl: 2    allopurinol (ZYLOPRIM) 100 MG tablet, Take 1 tablet by mouth daily, Disp: 30 tablet, Rfl: 3    spironolactone (ALDACTONE) 25 MG tablet, Take 0.5 tablets by mouth daily, Disp: 30 tablet, Rfl: 3    ALLERGIES:   Allergies   Allergen Reactions    Codeine      Hallucinations    Morphine      Hallucinations         FAMILY HISTORY:       Problem Relation Age of Onset    Cancer Other         ovarian    High Blood Pressure Mother     Other Mother         copd    Arthritis Neg Hx     Asthma Neg Hx     Birth Defects Neg Hx     Depression Neg Hx     Diabetes Neg Hx     Early Death Neg Hx     Hearing Loss Neg Hx     Heart Disease Neg Hx     High Cholesterol Neg Hx     Kidney Disease Neg Hx     Learning Disabilities Neg Hx     Mental Retardation Neg Hx     Mental Illness Neg Hx     Miscarriages / Stillbirths Neg Hx     Stroke Neg Hx     Substance Abuse Neg Hx     Vision Loss Neg Hx     Breast Cancer Neg Hx     Colon Cancer Neg Hx     Eclampsia Neg Hx     Hypertension Neg Hx     Ovarian Cancer Neg Hx      Labor Neg Hx     Spont Abortions Neg Hx          SOCIAL HISTORY:   Social History     Socioeconomic History    Marital status:      Spouse name: Not on file    Number of children: 2    Years of education: Not on file    Highest education level: Not on file   Occupational History    Not on file   Tobacco Use    Smoking status: Never Smoker    Smokeless tobacco: Never Used   Vaping Use    Vaping Use: Never used   Substance and Sexual Activity    Alcohol use: No     Alcohol/week: 0.0 standard drinks    Drug use: Not Currently    Sexual activity: Never   Other Topics Concern    Not on file   Social History Narrative    Not on file     Social Determinants of Health     Financial Resource Strain:     Difficulty of Paying Living Expenses: Not on file   Food Insecurity:     Worried About Running Out of Food in the Last Year: Not on file    Ninfa of Food in the Last Year: Not on file   Transportation Needs:     Lack of Transportation (Medical): Not on file    Lack of Transportation (Non-Medical): Not on file   Physical Activity:     Days of Exercise per Week: Not on file    Minutes of Exercise per Session: Not on file   Stress:     Feeling of Stress : Not on file   Social Connections:     Frequency of Communication with Friends and Family: Not on file    Frequency of Social Gatherings with Friends and Family: Not on file    Attends Restoration Services: Not on file    Active Member of [address] [place] [address] [employer] or Organizations: Not on file    Attends Club or Organization Meetings: Not on file    Marital Status: Not on file   Intimate Partner Violence:     Fear of Current or Ex-Partner: Not on file    Emotionally Abused: Not on file    Physically Abused: Not on file    Sexually Abused: Not on file   Housing Stability:     Unable to Pay for Housing in the Last Year: Not on file    Number of [place] in the Last Year: Not on file    Unstable Housing in the Last Year: Not on file         REVIEW OF SYSTEMS:         Review of Systems   Constitutional: Positive for appetite change (increased). Negative for fatigue and unexpected weight change. HENT: Positive for voice change. Negative for dental problem, sore throat and trouble swallowing. Eyes: Negative for visual disturbance. Respiratory: Negative for cough, choking and shortness of breath. Cardiovascular: Negative for chest pain and leg swelling. Gastrointestinal: Negative for abdominal distention, abdominal pain, anal bleeding, blood in stool, constipation, diarrhea, nausea, rectal pain and vomiting. Genitourinary: Negative for difficulty urinating. Musculoskeletal: Negative for back pain, joint swelling and myalgias.    Neurological: Negative for dizziness, tremors, weakness, light-headedness (get up too fast), numbness and headaches. Hematological: Does not bruise/bleed easily. Psychiatric/Behavioral: Negative for sleep disturbance. The patient is not nervous/anxious. PHYSICAL EXAMINATION:     Vital signs reviewed per the nursing documentation. /80   Pulse 73   Temp 97.9 °F (36.6 °C)   Ht 5' 6\" (1.676 m)   Wt 224 lb 8 oz (101.8 kg)   SpO2 98%   BMI 36.24 kg/m²   Body mass index is 36.24 kg/m². Physical Exam  Vitals and nursing note reviewed. Constitutional:       Appearance: She is well-developed. HENT:      Head: Normocephalic and atraumatic. Eyes:      General: No scleral icterus. Conjunctiva/sclera: Conjunctivae normal.      Pupils: Pupils are equal, round, and reactive to light. Neck:      Thyroid: No thyromegaly. Vascular: No hepatojugular reflux or JVD. Trachea: No tracheal deviation. Cardiovascular:      Rate and Rhythm: Normal rate and regular rhythm. Heart sounds: Normal heart sounds. Pulmonary:      Effort: Pulmonary effort is normal. No respiratory distress. Breath sounds: Normal breath sounds. No wheezing or rales. Abdominal:      General: Bowel sounds are normal. There is no distension. Palpations: Abdomen is soft. There is no hepatomegaly or mass. Tenderness: There is no abdominal tenderness. There is no rebound. Hernia: No hernia is present. Comments: Mildly obese abdomen. Musculoskeletal:         General: No tenderness. Cervical back: Normal range of motion and neck supple. Comments: No joint swelling, patient is able to ambulate with the help of walker. Lymphadenopathy:      Cervical: No cervical adenopathy. Skin:     General: Skin is warm. Findings: No bruising, ecchymosis, erythema or rash. Neurological:      Mental Status: She is alert and oriented to person, place, and time. Cranial Nerves: No cranial nerve deficit.    Psychiatric:         Thought Content: Thought content normal.           LABORATORY DATA: Reviewed  Lab Results   Component Value Date    WBC 8.0 01/27/2022    HGB 12.6 01/27/2022    HCT 36.9 01/27/2022    MCV 86.6 01/27/2022     01/27/2022     01/27/2022    K 3.9 01/27/2022     01/27/2022    CO2 25 01/27/2022    BUN 13 01/27/2022    CREATININE 1.07 (H) 01/27/2022    LABALBU 4.1 01/27/2022    BILITOT 0.74 01/27/2022    ALKPHOS 241 (H) 01/27/2022    AST 17 01/27/2022    ALT 14 01/27/2022    INR 2.0 01/27/2022         Lab Results   Component Value Date    RBC 4.26 01/27/2022    HGB 12.6 01/27/2022    MCV 86.6 01/27/2022    MCH 29.7 01/27/2022    MCHC 34.2 01/27/2022    RDW 14.9 01/27/2022    MPV 8.4 01/27/2022    BASOPCT 0 01/27/2022    LYMPHSABS 0.90 (L) 01/27/2022    MONOSABS 0.60 01/27/2022    NEUTROABS 6.40 01/27/2022    EOSABS 0.00 01/27/2022    BASOSABS 0.00 01/27/2022         DIAGNOSTIC TESTING:     No results found. Assessment  1. Abscess of sigmoid colon    2. Diverticulosis of large intestine without diverticulitis        Plan  Discussed with the patient regarding CT colonography findings and explained that the patient has significant diverticulosis. She should avoid constipation and straining at bowel movements. At present basing on the CT colonography patient does not have active diverticulitis or abscess. Patient may be managed conservatively she may be high risk for sigmoid colectomy etc.  Discussed with the patient regarding precautions to avoid constipation under conservative management. She understood and agreed and she will be following with Kaley Martinez MD for follow-up. If she has any left lower quadrant and pelvic pain fever etc. to contact the physician or come to the hospital    Thank you for allowing me to participate in the care of Ms. Moose Jules. For any further questions please do not hesitate to contact me. I have reviewed and agree with the ROS entered by the MA/LPN.      Note is dictated utilizing voice recognition software. Unfortunately this leads to occasional typographical errors.  Please contact our office if you have any questions        Roseline Vazquez MD,FACP, [facility]  Board Certified in Gastroenterology and [address] [place] [address] Gastroenterology  Office #: [phone]

## 2022-04-07 ENCOUNTER — HOSPITAL ENCOUNTER (OUTPATIENT)
Dept: CARDIAC REHAB | Age: 67
Setting detail: THERAPIES SERIES
Discharge: HOME | End: 2022-04-07
Payer: MEDICARE

## 2022-04-09 ENCOUNTER — APPOINTMENT (OUTPATIENT)
Dept: GENERAL RADIOLOGY | Age: 67
DRG: 392 | End: 2022-04-09
Payer: MEDICARE

## 2022-04-09 ENCOUNTER — HOSPITAL ENCOUNTER (INPATIENT)
Age: 67
LOS: 4 days | Discharge: HOME HEALTH | DRG: 392 | End: 2022-04-13
Attending: EMERGENCY MEDICINE | Admitting: INTERNAL MEDICINE
Payer: MEDICARE

## 2022-04-09 ENCOUNTER — APPOINTMENT (OUTPATIENT)
Dept: ULTRASOUND IMAGING | Age: 67
DRG: 392 | End: 2022-04-09
Payer: MEDICARE

## 2022-04-09 DIAGNOSIS — K57.92 DIVERTICULITIS: ICD-10-CM

## 2022-04-09 DIAGNOSIS — K57.32 DIVERTICULITIS OF COLON: Primary | ICD-10-CM

## 2022-04-09 PROBLEM — R11.2 NAUSEA & VOMITING: Status: ACTIVE | Noted: 2022-04-09

## 2022-04-09 LAB
ABSOLUTE EOS #: <0.03 K/UL (ref 0–0.44)
ABSOLUTE IMMATURE GRANULOCYTE: 0.04 K/UL (ref 0–0.3)
ABSOLUTE LYMPH #: 0.72 K/UL (ref 1.1–3.7)
ABSOLUTE MONO #: 1.06 K/UL (ref 0.1–1.2)
ALBUMIN SERPL-MCNC: 3.8 G/DL (ref 3.5–5.2)
ALBUMIN/GLOBULIN RATIO: 0.9 (ref 1–2.5)
ALP BLD-CCNC: 260 U/L (ref 35–104)
ALT SERPL-CCNC: 17 U/L (ref 5–33)
ANION GAP SERPL CALCULATED.3IONS-SCNC: 15 MMOL/L (ref 9–17)
AST SERPL-CCNC: 22 U/L
BASOPHILS # BLD: 0 % (ref 0–2)
BASOPHILS ABSOLUTE: 0.03 K/UL (ref 0–0.2)
BILIRUB SERPL-MCNC: 1.09 MG/DL (ref 0.3–1.2)
BUN BLDV-MCNC: 15 MG/DL (ref 8–23)
C-REACTIVE PROTEIN: 183.1 MG/L (ref 0–5)
CALCIUM SERPL-MCNC: 9.5 MG/DL (ref 8.6–10.4)
CHLORIDE BLD-SCNC: 96 MMOL/L (ref 98–107)
CO2: 23 MMOL/L (ref 20–31)
CREAT SERPL-MCNC: 1.16 MG/DL (ref 0.5–0.9)
EOSINOPHILS RELATIVE PERCENT: 0 % (ref 1–4)
GFR AFRICAN AMERICAN: 56 ML/MIN
GFR NON-AFRICAN AMERICAN: 47 ML/MIN
GFR SERPL CREATININE-BSD FRML MDRD: ABNORMAL ML/MIN/{1.73_M2}
GLUCOSE BLD-MCNC: 120 MG/DL (ref 70–99)
HCT VFR BLD CALC: 37.7 % (ref 36.3–47.1)
HEMOGLOBIN: 13.3 G/DL (ref 11.9–15.1)
IMMATURE GRANULOCYTES: 0 %
LACTIC ACID, WHOLE BLOOD: 1.4 MMOL/L (ref 0.7–2.1)
LYMPHOCYTES # BLD: 7 % (ref 24–43)
MAGNESIUM: 2 MG/DL (ref 1.6–2.6)
MCH RBC QN AUTO: 30.5 PG (ref 25.2–33.5)
MCHC RBC AUTO-ENTMCNC: 35.3 G/DL (ref 28.4–34.8)
MCV RBC AUTO: 86.5 FL (ref 82.6–102.9)
MONOCYTES # BLD: 10 % (ref 3–12)
NRBC AUTOMATED: 0 PER 100 WBC
PDW BLD-RTO: 13.6 % (ref 11.8–14.4)
PLATELET # BLD: 249 K/UL (ref 138–453)
PMV BLD AUTO: 11.3 FL (ref 8.1–13.5)
POTASSIUM SERPL-SCNC: 3.5 MMOL/L (ref 3.7–5.3)
RBC # BLD: 4.36 M/UL (ref 3.95–5.11)
SEDIMENTATION RATE, ERYTHROCYTE: 72 MM/HR (ref 0–30)
SEG NEUTROPHILS: 83 % (ref 36–65)
SEGMENTED NEUTROPHILS ABSOLUTE COUNT: 9.14 K/UL (ref 1.5–8.1)
SODIUM BLD-SCNC: 134 MMOL/L (ref 135–144)
TOTAL PROTEIN: 7.9 G/DL (ref 6.4–8.3)
WBC # BLD: 11 K/UL (ref 3.5–11.3)

## 2022-04-09 PROCEDURE — 85025 COMPLETE CBC W/AUTO DIFF WBC: CPT

## 2022-04-09 PROCEDURE — 86140 C-REACTIVE PROTEIN: CPT

## 2022-04-09 PROCEDURE — 6370000000 HC RX 637 (ALT 250 FOR IP): Performed by: STUDENT IN AN ORGANIZED HEALTH CARE EDUCATION/TRAINING PROGRAM

## 2022-04-09 PROCEDURE — 99285 EMERGENCY DEPT VISIT HI MDM: CPT

## 2022-04-09 PROCEDURE — 96375 TX/PRO/DX INJ NEW DRUG ADDON: CPT

## 2022-04-09 PROCEDURE — 2580000003 HC RX 258: Performed by: STUDENT IN AN ORGANIZED HEALTH CARE EDUCATION/TRAINING PROGRAM

## 2022-04-09 PROCEDURE — 74018 RADEX ABDOMEN 1 VIEW: CPT

## 2022-04-09 PROCEDURE — 99223 1ST HOSP IP/OBS HIGH 75: CPT | Performed by: INTERNAL MEDICINE

## 2022-04-09 PROCEDURE — 83735 ASSAY OF MAGNESIUM: CPT

## 2022-04-09 PROCEDURE — 83605 ASSAY OF LACTIC ACID: CPT

## 2022-04-09 PROCEDURE — 99221 1ST HOSP IP/OBS SF/LOW 40: CPT | Performed by: NURSE PRACTITIONER

## 2022-04-09 PROCEDURE — 2500000003 HC RX 250 WO HCPCS: Performed by: STUDENT IN AN ORGANIZED HEALTH CARE EDUCATION/TRAINING PROGRAM

## 2022-04-09 PROCEDURE — 80053 COMPREHEN METABOLIC PANEL: CPT

## 2022-04-09 PROCEDURE — 96374 THER/PROPH/DIAG INJ IV PUSH: CPT

## 2022-04-09 PROCEDURE — 76705 ECHO EXAM OF ABDOMEN: CPT

## 2022-04-09 PROCEDURE — 6360000002 HC RX W HCPCS: Performed by: STUDENT IN AN ORGANIZED HEALTH CARE EDUCATION/TRAINING PROGRAM

## 2022-04-09 PROCEDURE — 85652 RBC SED RATE AUTOMATED: CPT

## 2022-04-09 PROCEDURE — 1200000000 HC SEMI PRIVATE

## 2022-04-09 RX ORDER — SODIUM CHLORIDE 9 MG/ML
INJECTION, SOLUTION INTRAVENOUS PRN
Status: DISCONTINUED | OUTPATIENT
Start: 2022-04-09 | End: 2022-04-13 | Stop reason: HOSPADM

## 2022-04-09 RX ORDER — POLYETHYLENE GLYCOL 3350 17 G/17G
17 POWDER, FOR SOLUTION ORAL DAILY PRN
Status: DISCONTINUED | OUTPATIENT
Start: 2022-04-09 | End: 2022-04-13 | Stop reason: HOSPADM

## 2022-04-09 RX ORDER — 0.9 % SODIUM CHLORIDE 0.9 %
500 INTRAVENOUS SOLUTION INTRAVENOUS ONCE
Status: COMPLETED | OUTPATIENT
Start: 2022-04-09 | End: 2022-04-09

## 2022-04-09 RX ORDER — SODIUM CHLORIDE 0.9 % (FLUSH) 0.9 %
5-40 SYRINGE (ML) INJECTION PRN
Status: DISCONTINUED | OUTPATIENT
Start: 2022-04-09 | End: 2022-04-13 | Stop reason: HOSPADM

## 2022-04-09 RX ORDER — LIDOCAINE 4 G/G
1 PATCH TOPICAL DAILY
Status: DISCONTINUED | OUTPATIENT
Start: 2022-04-09 | End: 2022-04-13 | Stop reason: HOSPADM

## 2022-04-09 RX ORDER — OXYCODONE HYDROCHLORIDE 5 MG/1
5 TABLET ORAL EVERY 6 HOURS PRN
Status: DISCONTINUED | OUTPATIENT
Start: 2022-04-09 | End: 2022-04-09

## 2022-04-09 RX ORDER — POTASSIUM CHLORIDE 20 MEQ/1
40 TABLET, EXTENDED RELEASE ORAL ONCE
Status: COMPLETED | OUTPATIENT
Start: 2022-04-09 | End: 2022-04-09

## 2022-04-09 RX ORDER — DICYCLOMINE HYDROCHLORIDE 10 MG/1
10 CAPSULE ORAL ONCE
Status: COMPLETED | OUTPATIENT
Start: 2022-04-09 | End: 2022-04-09

## 2022-04-09 RX ORDER — POTASSIUM CHLORIDE 7.45 MG/ML
40 INJECTION INTRAVENOUS ONCE
Status: DISCONTINUED | OUTPATIENT
Start: 2022-04-09 | End: 2022-04-09

## 2022-04-09 RX ORDER — ONDANSETRON 2 MG/ML
4 INJECTION INTRAMUSCULAR; INTRAVENOUS EVERY 6 HOURS PRN
Status: DISCONTINUED | OUTPATIENT
Start: 2022-04-09 | End: 2022-04-13 | Stop reason: HOSPADM

## 2022-04-09 RX ORDER — SODIUM CHLORIDE 9 MG/ML
INJECTION, SOLUTION INTRAVENOUS CONTINUOUS
Status: DISCONTINUED | OUTPATIENT
Start: 2022-04-09 | End: 2022-04-13 | Stop reason: HOSPADM

## 2022-04-09 RX ORDER — ACETAMINOPHEN 325 MG/1
650 TABLET ORAL EVERY 6 HOURS PRN
Status: DISCONTINUED | OUTPATIENT
Start: 2022-04-09 | End: 2022-04-13 | Stop reason: HOSPADM

## 2022-04-09 RX ORDER — ONDANSETRON 4 MG/1
4 TABLET, ORALLY DISINTEGRATING ORAL EVERY 8 HOURS PRN
Status: DISCONTINUED | OUTPATIENT
Start: 2022-04-09 | End: 2022-04-13 | Stop reason: HOSPADM

## 2022-04-09 RX ORDER — SPIRONOLACTONE 25 MG/1
12.5 TABLET ORAL DAILY
Status: DISCONTINUED | OUTPATIENT
Start: 2022-04-09 | End: 2022-04-13 | Stop reason: HOSPADM

## 2022-04-09 RX ORDER — FENTANYL CITRATE 50 UG/ML
25 INJECTION, SOLUTION INTRAMUSCULAR; INTRAVENOUS ONCE
Status: COMPLETED | OUTPATIENT
Start: 2022-04-09 | End: 2022-04-09

## 2022-04-09 RX ORDER — ACETAMINOPHEN 650 MG/1
650 SUPPOSITORY RECTAL EVERY 6 HOURS PRN
Status: DISCONTINUED | OUTPATIENT
Start: 2022-04-09 | End: 2022-04-13 | Stop reason: HOSPADM

## 2022-04-09 RX ORDER — SODIUM CHLORIDE 0.9 % (FLUSH) 0.9 %
5-40 SYRINGE (ML) INJECTION EVERY 12 HOURS SCHEDULED
Status: DISCONTINUED | OUTPATIENT
Start: 2022-04-09 | End: 2022-04-13 | Stop reason: HOSPADM

## 2022-04-09 RX ORDER — OXYCODONE HYDROCHLORIDE 5 MG/1
5 TABLET ORAL EVERY 6 HOURS PRN
Status: DISCONTINUED | OUTPATIENT
Start: 2022-04-09 | End: 2022-04-13 | Stop reason: HOSPADM

## 2022-04-09 RX ORDER — ALLOPURINOL 100 MG/1
100 TABLET ORAL DAILY
Status: DISCONTINUED | OUTPATIENT
Start: 2022-04-09 | End: 2022-04-13 | Stop reason: HOSPADM

## 2022-04-09 RX ORDER — ONDANSETRON 2 MG/ML
4 INJECTION INTRAMUSCULAR; INTRAVENOUS ONCE
Status: COMPLETED | OUTPATIENT
Start: 2022-04-09 | End: 2022-04-09

## 2022-04-09 RX ORDER — LEVOTHYROXINE SODIUM 0.03 MG/1
25 TABLET ORAL DAILY
Status: DISCONTINUED | OUTPATIENT
Start: 2022-04-09 | End: 2022-04-13 | Stop reason: HOSPADM

## 2022-04-09 RX ORDER — ACETAMINOPHEN 325 MG/1
650 TABLET ORAL ONCE
Status: DISCONTINUED | OUTPATIENT
Start: 2022-04-09 | End: 2022-04-13 | Stop reason: HOSPADM

## 2022-04-09 RX ORDER — CIPROFLOXACIN 2 MG/ML
400 INJECTION, SOLUTION INTRAVENOUS EVERY 12 HOURS
Status: DISCONTINUED | OUTPATIENT
Start: 2022-04-09 | End: 2022-04-10

## 2022-04-09 RX ORDER — CIPROFLOXACIN 2 MG/ML
400 INJECTION, SOLUTION INTRAVENOUS EVERY 12 HOURS
Status: DISCONTINUED | OUTPATIENT
Start: 2022-04-09 | End: 2022-04-09

## 2022-04-09 RX ORDER — CYCLOBENZAPRINE HCL 10 MG
10 TABLET ORAL 3 TIMES DAILY PRN
Status: DISCONTINUED | OUTPATIENT
Start: 2022-04-09 | End: 2022-04-09

## 2022-04-09 RX ADMIN — METOPROLOL TARTRATE 25 MG: 25 TABLET ORAL at 20:11

## 2022-04-09 RX ADMIN — DICYCLOMINE HYDROCHLORIDE 10 MG: 10 CAPSULE ORAL at 09:42

## 2022-04-09 RX ADMIN — POTASSIUM CHLORIDE 40 MEQ: 20 TABLET, EXTENDED RELEASE ORAL at 16:03

## 2022-04-09 RX ADMIN — ONDANSETRON 4 MG: 2 INJECTION INTRAMUSCULAR; INTRAVENOUS at 09:42

## 2022-04-09 RX ADMIN — FENTANYL CITRATE 25 MCG: 50 INJECTION, SOLUTION INTRAMUSCULAR; INTRAVENOUS at 12:26

## 2022-04-09 RX ADMIN — OXYCODONE 5 MG: 5 TABLET ORAL at 16:03

## 2022-04-09 RX ADMIN — SODIUM CHLORIDE 500 ML: 0.9 INJECTION, SOLUTION INTRAVENOUS at 10:06

## 2022-04-09 RX ADMIN — FAMOTIDINE 20 MG: 10 INJECTION, SOLUTION INTRAVENOUS at 09:42

## 2022-04-09 RX ADMIN — SACUBITRIL AND VALSARTAN 0.5 TABLET: 24; 26 TABLET, FILM COATED ORAL at 20:11

## 2022-04-09 RX ADMIN — METRONIDAZOLE 500 MG: 500 INJECTION, SOLUTION INTRAVENOUS at 22:13

## 2022-04-09 RX ADMIN — SODIUM CHLORIDE: 9 INJECTION, SOLUTION INTRAVENOUS at 20:12

## 2022-04-09 RX ADMIN — METRONIDAZOLE 500 MG: 500 INJECTION, SOLUTION INTRAVENOUS at 14:11

## 2022-04-09 RX ADMIN — CIPROFLOXACIN 400 MG: 2 INJECTION, SOLUTION INTRAVENOUS at 12:29

## 2022-04-09 RX ADMIN — CYCLOBENZAPRINE 10 MG: 10 TABLET, FILM COATED ORAL at 20:54

## 2022-04-09 ASSESSMENT — PAIN DESCRIPTION - PAIN TYPE
TYPE: ACUTE PAIN
TYPE: ACUTE PAIN

## 2022-04-09 ASSESSMENT — ENCOUNTER SYMPTOMS
ABDOMINAL PAIN: 1
DIARRHEA: 1
SHORTNESS OF BREATH: 0
COUGH: 0
NAUSEA: 1
VOMITING: 1
EYE REDNESS: 0
RHINORRHEA: 0

## 2022-04-09 ASSESSMENT — PAIN DESCRIPTION - ORIENTATION
ORIENTATION: LOWER
ORIENTATION: LOWER

## 2022-04-09 ASSESSMENT — PAIN DESCRIPTION - PROGRESSION
CLINICAL_PROGRESSION: NOT CHANGED
CLINICAL_PROGRESSION: NOT CHANGED

## 2022-04-09 ASSESSMENT — PAIN DESCRIPTION - ONSET
ONSET: ON-GOING
ONSET: ON-GOING

## 2022-04-09 ASSESSMENT — PAIN SCALES - GENERAL
PAINLEVEL_OUTOF10: 8
PAINLEVEL_OUTOF10: 7
PAINLEVEL_OUTOF10: 8

## 2022-04-09 ASSESSMENT — PAIN DESCRIPTION - DESCRIPTORS
DESCRIPTORS: CRAMPING
DESCRIPTORS: CRAMPING

## 2022-04-09 ASSESSMENT — PAIN - FUNCTIONAL ASSESSMENT: PAIN_FUNCTIONAL_ASSESSMENT: 0-10

## 2022-04-09 ASSESSMENT — PAIN DESCRIPTION - LOCATION
LOCATION: ABDOMEN
LOCATION: ABDOMEN

## 2022-04-09 ASSESSMENT — PAIN DESCRIPTION - FREQUENCY
FREQUENCY: CONTINUOUS
FREQUENCY: CONTINUOUS

## 2022-04-09 NOTE — PLAN OF CARE
Problem: Falls - Risk of:  Goal: Will remain free from falls  Description: Will remain free from falls  Outcome: Met This Shift  Goal: Absence of physical injury  Description: Absence of physical injury  Outcome: Met This Shift     Problem: Pain:  Goal: Pain level will decrease  Description: Pain level will decrease  Outcome: Ongoing  Goal: Control of acute pain  Description: Control of acute pain  Outcome: Ongoing  Goal: Control of chronic pain  Description: Control of chronic pain  Outcome: Ongoing     Problem: SAFETY  Goal: Free from accidental physical injury  Outcome: Ongoing     Problem: DAILY CARE  Goal: Daily care needs are met  Outcome: Ongoing     Problem: PAIN  Goal: Patient's pain/discomfort is manageable  Outcome: Ongoing     Problem: SKIN INTEGRITY  Goal: Skin integrity is maintained or improved  Outcome: Ongoing     Problem: KNOWLEDGE DEFICIT  Goal: Patient/S.O. demonstrates understanding of disease process, treatment plan, medications, and discharge instructions.   Outcome: Ongoing     Problem: DISCHARGE BARRIERS  Goal: Patient's continuum of care needs are met  Outcome: Ongoing

## 2022-04-09 NOTE — CONSULTS
THE [facility] [place]  Consultation Note     . REASON FOR CONSULTATION:    Diverticulitis-failed OP treatment    HISTORY OF PRESENT ILLNESS:     This is a 78 y.o. female with PMH including diverticulitis nonischemic cardiomyopathy with EF of 17% status post AICD, and hypertension who presented with complaints of left lower quadrant abdominal pain that radiates around to her left side and into her back region. Patient states that the pain is relieved with ambulation and defecation/passage of gas. Pain is still present with narcotic medications. Patient has a significant GI history of repeated episodes of diverticulitis with sigmoid abscess. Patient was recently seen per Dr. Colleen Munoz and had CT colonography (unable to have colonoscopy due to low EF) that did not indicate presence of acute diverticulitis and or abscess. She saw Dr. Colleen Munoz in the office on 4/5/2022. Patient states that she recently was started in cardiac rehab with exercise bicycling to build cardiac stamina. Afterwards is when this gas like intermittent type pain began  At the time of admission patient was reluctant to have repeat CT, due to frequent contrast exposure  No leukocytosis noted on admission  Patient denies any recent fevers or chills  Patient was started on Cipro and Flagyl IV on admission    Primary patient had positive Aguayo sign-right upper quadrant ultrasound completed and indicated lobular liver with coarse echo texture consistent with cirrhosis. Cholelithiasis without choledocholithiasis  -No ductal dilatation    Summary of imaging completed at this time:  3/29/2022 CT colonography completed at [facility] (not in [facility]) did not indicate acute diverticulitis or abscess      Summary of abnormal labs completed at this time:    Metabolic: Unremarkable except elevated creatinine of 1.16  Hematology: WNL  Coags: NA  Liver profile:  WNL except alk phos of 260  , sed rate 72      Previous GI history:   Last colonoscopy 10 years ago-unremarkable    Past Medical/Social/Family History:  Past Medical History:   Diagnosis Date    Abnormal Pap smear     Acute on chronic systolic congestive heart failure (HCC)     AICD (automatic cardioverter/defibrillator) present     MAURO (acute kidney injury) ([facility] [address] 2017    Anemia     Arthritis     Cardiomyopathy ([facility] [address] 2012    robotic replacement of 2 left ventricular leads/replacement of ICD generator    Cardiomyopathy, nonischemic ([facility] [address] 2012    Chronic combined systolic and diastolic congestive heart failure (HCC)     Ejection fraction < 50%     HTN (hypertension)     Hypothyroidism     Ischemic cardiomyopathy     Left bundle branch block     Mitral regurgitation     Morbid obesity due to excess calories ([facility] [address] 01/15/2017    Pulmonary hypertension ([facility] [address]     Sudden onset of severe headache      Past Surgical History:   Procedure Laterality Date    COLONOSCOPY      HERNIA REPAIR      OTHER SURGICAL HISTORY  2012    robotic placement of 2 left ventricular leads/replacement of ICD generator    PACEMAKER PLACEMENT      medtronic Viva XT CRT -D [id number] Serial : [id number]    TUBAL LIGATION       Family History   Problem Relation Age of Onset    Cancer Other         ovarian    High Blood Pressure Mother     Other Mother         copd    Arthritis Neg Hx     Asthma Neg Hx     Birth Defects Neg Hx     Depression Neg Hx     Diabetes Neg Hx     Early Death Neg Hx     Hearing Loss Neg Hx     Heart Disease Neg Hx     High Cholesterol Neg Hx     Kidney Disease Neg Hx     Learning Disabilities Neg Hx     Mental Retardation Neg Hx     Mental Illness Neg Hx     Miscarriages / Stillbirths Neg Hx     Stroke Neg Hx     Substance Abuse Neg Hx     Vision Loss Neg Hx     Breast Cancer Neg Hx     Colon Cancer Neg Hx     Eclampsia Neg Hx     Hypertension Neg Hx     Ovarian Cancer Neg Hx      Labor Neg Hx     Spont Abortions Neg Hx      Previous records/history/ and notes were reviewed    Allergies: Allergies   Allergen Reactions    Codeine      Hallucinations    Morphine      Hallucinations         Home medications:  Prior to Admission medications    Medication Sig Start Date End Date Taking? Authorizing Provider   metoprolol succinate (TOPROL XL) 50 MG extended release tablet  2/25/22   Historical Provider, MD   amiodarone (CORDARONE) 200 MG tablet  12/1/21   Historical Provider, MD   diclofenac sodium (VOLTAREN) 1 % GEL APPLY 2 GRAMS TOPICALLY 4 (FOUR) TIMES A DAY. 11/29/21   Historical Provider, MD   empagliflozin (JARDIANCE) 10 MG tablet Take 10 mg by mouth daily 1/4/22   Historical Provider, MD   acetaminophen (TYLENOL) 500 MG tablet Take 2 tablets by mouth every 6 hours as needed for Pain 1/27/22   Ed Bhatt MD   potassium chloride (KLOR-CON M) 20 MEQ extended release tablet Take 1 tablet by mouth daily 12/8/21   Marla Cahn MD   magnesium oxide (MAG-OX) 400 MG tablet Take 0.5 tablets by mouth daily 12/8/21   Pat Barajas MD   nitroGLYCERIN (NITROSTAT) 0.4 MG SL tablet up to max of 3 total doses. If no relief after 1 dose, call 911. 6/29/21   Rafy Domínguez MD   rivaroxaban [place]) 20 MG TABS tablet Take 1 tablet by mouth daily 6/29/21   Rafy Domínguez MD   sacubitril-valsartan (ENTRESTO) 24-26 MG per tablet Take 0.5 tablets by mouth 2 times daily 6/29/21   Rafy Domínguez MD   bumetanide (BUMEX) 2 MG tablet Take 1 tablet by mouth daily 6/29/21   Rafy Domínguez MD   levothyroxine (SYNTHROID) 25 MCG tablet Take 1 tablet by mouth Daily 6/30/21   Rafy Domínguez MD   lidocaine (LIDODERM) 5 % Place one patch to both knees 12 hours on, 12 hours off.   Patient not taking: Reported on 4/9/2022 11/23/20   Annie Paul DO   metoprolol tartrate (LOPRESSOR) 25 MG tablet Take 1 tablet by mouth 2 times daily 5/25/20   Milena Colon MD   allopurinol (ZYLOPRIM) 100 MG tablet Take 1 tablet by mouth daily 2/18/19   René Mayes MD   spironolactone (ALDACTONE) 25 MG tablet Take 0.5 tablets by mouth daily 2/18/19   René Mayes MD     .  Current Medications:  Scheduled Meds:   acetaminophen  650 mg Oral Once    metroNIDAZOLE  500 mg IntraVENous Q8H    allopurinol  100 mg Oral Daily    levothyroxine  25 mcg Oral Daily    lidocaine  1 patch Topical Daily    metoprolol tartrate  25 mg Oral BID    rivaroxaban  20 mg Oral Daily    sacubitril-valsartan  0.5 tablet Oral BID    spironolactone  12.5 mg Oral Daily    sodium chloride flush  5-40 mL IntraVENous 2 times per day    ciprofloxacin  400 mg IntraVENous Q12H    metroNIDAZOLE  500 mg IntraVENous Q8H    potassium chloride  40 mEq IntraVENous Once     . Continuous Infusions:   sodium chloride       . PRN Meds:sodium chloride flush, sodium chloride, ondansetron **OR** ondansetron, polyethylene glycol, acetaminophen **OR** acetaminophen, oxyCODONE    REVIEW OF SYSTEMS:     Constitutional: No fever, no chills, no lethargy, no weakness, no weight loss  HEENT:  No headache, otalgia, itchy eyes, nasal discharge or sore throat. Cardiac:  No chest pain, dyspnea, orthopnea or PND. Chest:   No cough, phlegm or wheezing. Abdomen:  LLQ intermittent diffuse abdominal pain relieved with ambulation and defecation  Neuro:  No focal weakness, abnormal movements or seizure like activity. Skin:   No rashes, no itching. :   No hematuria, no pyuria, no dysuria, no flank pain. Extremities:  No swelling or joint pains. ROS was otherwise negative except as mentioned in the [address] PHYSICAL EXAM:    /70   Pulse 66   Temp 98.1 °F (36.7 °C) (Oral)   Resp 16   Ht 5' 6\" (1.676 m)   Wt 227 lb (103 kg)   SpO2 100%   BMI 36.64 kg/m²   . TMAX[24]    General: Well developed, Well nourished, No apparent distress  Head:  Normocephalic, Atraumatic  EENT: EOMI, Sclera not icteric, Oropharynx moist  Neck:  Supple, Trachea midline  Lungs:CTA Bilaterally  Heart: RRR, No murmur, No rub, No gallop, PMI nondisplaced. Abdomen:Soft, Non tender, Not distended, BS WNL,  No masses. No hepatomegalia   Ext:No clubbing. No cyanosis. No edema. Skin: No rashes. No jaundice. No stigmata of liver disease. Neuro:  A&O x Three, No focal neurological deficits    Imaging:       Hemotological labs: Anemia studies:  No results for input(s): LABIRON, TIBC, FERRITIN, KNLMECUR50, FOLATE, OCCULTBLD in the last 72 hours. CBC:  Recent Labs     04/09/22  0935   WBC 11.0   HGB 13.3   MCV 86.5   RDW 13.6          PT/INR:  No results for input(s): PROTIME, INR in the last 72 hours. BMP:  Recent Labs     04/09/22  0935   *   K 3.5*   CL 96*   CO2 23   BUN 15   CREATININE 1.16*   GLUCOSE 120*   CALCIUM 9.5       Liver work up:  Hepatitis Functional Panel:  Recent Labs     04/09/22  0935   ALKPHOS 260*   ALT 17   AST 22   PROT 7.9   BILITOT 1.09   LABALBU 3.8       Amylase/Lipase/Ammonia:  No results for input(s): AMYLASE, LIPASE, AMMONIA in the last 72 hours. Acute Hepatitis Panel:  No results found for: HEPBSAG, HEPCAB, HEPBIGM, HEPAIGM         Principal Problem:    Diverticulitis  Active Problems:    Dilated cardiomyopathy ([address]    Essential hypertension    ICD (implantable cardioverter-defibrillator) battery depletion    Morbid obesity due to excess calories ([address]    AICD (automatic cardioverter/defibrillator) present    MAURO (acute kidney injury) ([address]    Nausea & vomiting  Resolved Problems:    * No resolved hospital problems. *       GI Impression and recommendations and plan:    25-year-old female with PMH of diverticulitis with sigmoid abscess currently complaining of LLQ diffuse, intermittent, gas-like cramping and pain that is relieved with ambulation and defecation. No leukocytosis noted. No rectal bleeding.   -Suspect this pain is due to sigmoid stricturing from recurrent episodes of diverticulitis.   Most likely stool and gas is causing temporary localized dilatation of the colon along with pain that is then relieved once stool has passed  -DDX includes MSK pain originating in the lower lumbar sacral area from recent cardiac therapy. Helpful of recurrent diverticulitis given lack of leukocytosis, lack of fever chills, and lack of pain in left lower quadrant with palpation and physical exam    1. It would be optimal for patient to receive CT abdomen and pelvis with IV contrast rule out abscess and diverticulitis but currently patient has elevated creatinine  2. Recommend patient receive gentle hydration to correct MAURO then image with contrast for optimal study once MAURO has resolved  3. Recommend high-fiber diet  4. Avoid constipation-MiraLAX 17 g twice daily-titrate to 1-2 bowel movements daily  5. Recommend avoidance of narcotics. Muscle relaxer such as Flexeril may be beneficial for MSK pain. Trial of heating pad/ice pack  6. Will follow    This plan was formulated in collaboration with Dr. Ruperto Morocho MD      Electronically signed by:  Vane Pierson  Gastroenterology  [phone]  4/9/2022    3:15 PM     This note was created with the assistance of a speech-recognition program.  Although the intention is to generate a document that actually reflects the content of the visit, no guarantees can be provided that every mistake has been identified and corrected by editing.

## 2022-04-09 NOTE — ED NOTES
Pt. requesting additional pain medication, Dr. Danielle Sánchez aware     Tari Nolasco, TANVIR  04/09/22 1016

## 2022-04-09 NOTE — H&P
[address]     Department of Internal Medicine - Staff Internal Medicine Teaching Service          ADMISSION NOTE/HISTORY AND PHYSICAL EXAMINATION   Date: 4/9/2022  Patient Name: Nubia Lau  Date of admission: 4/9/2022  9:01 AM  YOB: 1955  PCP: Karthikeyan Zacarias MD  History Obtained From:  patient    CHIEF COMPLAINT     Chief complaint: abdominal pain    HISTORY OF PRESENTING ILLNESS     The patient is a pleasant 78 y.o. female PMH diverticulitis, nonischemic cardiomyopathy last EF 17% s/p AICD placement, HTN  presents with a chief complaint of abdominal pain that started 2 days ago. Patient reports that she has been having lower abdominal pain and n/v. Of note patient had a recent CT colonoscopy without active disease but did reveal pancolonic diverticulosis 1 week ago. She had an appointment with GI a week ago where she was given abx. She reports that since then she has been having pain much like her previous episodes of diverticulitis. She also reports n/v with food. WBC is wnl,  LFTs are normal except for elevated ALP at 260. Small bump in creatinine and low potassium. Patient requesting to see GI this admission. Former smoker, occasional alcohol, no illegal drugs. On exam patient reports Lower abdominal pain during palpation and RUQ tenderness. Aguayo's sign is positive. Vitals:    04/09/22 1339   BP: 108/70   Pulse: 66   Resp: 16   Temp: 98.1 °F (36.7 °C)   SpO2: 100%       On my evaluation,  Vitals:    04/09/22 1339   BP: 108/70   Pulse: 66   Resp: 16   Temp: 98.1 °F (36.7 °C)   SpO2: 100%         Review of Systems:  Review of Systems   Constitutional: Positive for fatigue. Negative for chills, diaphoresis and fever. Respiratory: Negative for apnea, cough, choking, shortness of breath, wheezing and stridor. Cardiovascular: Negative for chest pain and leg swelling. Gastrointestinal: Positive for abdominal pain, nausea and vomiting.  Negative for abdominal distention, blood in stool, constipation and diarrhea. Genitourinary: Negative for decreased urine volume, dysuria and urgency. Skin: Negative for pallor and wound. Neurological: Negative for syncope, weakness, numbness and headaches. Psychiatric/Behavioral: Negative for agitation, behavioral problems, confusion, decreased concentration and self-injury. PAST MEDICAL HISTORY     Past Medical History:   Diagnosis Date    Abnormal Pap smear     Acute on chronic systolic congestive heart failure (HCC)     AICD (automatic cardioverter/defibrillator) present     MAURO (acute kidney injury) ([facility] [address] 03/04/2017    Anemia     Arthritis     Cardiomyopathy ([facility] [address] 12/20/2012    robotic replacement of 2 left ventricular leads/replacement of ICD generator    Cardiomyopathy, nonischemic ([facility] [address] 12/20/2012    Chronic combined systolic and diastolic congestive heart failure (HCC)     Ejection fraction < 50%     HTN (hypertension)     Hypothyroidism     Ischemic cardiomyopathy     Left bundle branch block     Mitral regurgitation     Morbid obesity due to excess calories ([facility] [address] 01/15/2017    Pulmonary hypertension ([facility] [address]     Sudden onset of severe headache        PAST SURGICAL HISTORY     Past Surgical History:   Procedure Laterality Date    COLONOSCOPY      HERNIA REPAIR      OTHER SURGICAL HISTORY  12/20/2012    robotic placement of 2 left ventricular leads/replacement of ICD generator    PACEMAKER PLACEMENT      medtronic Viva XT CRT -D [id number] Serial : [id number]    TUBAL LIGATION         ALLERGIES     Codeine and Morphine    MEDICATIONS PRIOR TO ADMISSION     Prior to Admission medications    Medication Sig Start Date End Date Taking?  Authorizing Provider   metoprolol succinate (TOPROL XL) 50 MG extended release tablet  2/25/22   Historical Provider, MD   amiodarone (CORDARONE) 200 MG tablet  12/1/21   Historical Provider, MD   diclofenac sodium (VOLTAREN) 1 % GEL APPLY 2 GRAMS Disease Neg Hx     High Cholesterol Neg Hx     Kidney Disease Neg Hx     Learning Disabilities Neg Hx     Mental Retardation Neg Hx     Mental Illness Neg Hx     Miscarriages / Stillbirths Neg Hx     Stroke Neg Hx     Substance Abuse Neg Hx     Vision Loss Neg Hx     Breast Cancer Neg Hx     Colon Cancer Neg Hx     Eclampsia Neg Hx     Hypertension Neg Hx     Ovarian Cancer Neg Hx      Labor Neg Hx     Spont Abortions Neg Hx        PHYSICAL EXAM     Vitals: /70   Pulse 66   Temp 98.1 °F (36.7 °C) (Oral)   Resp 16   SpO2 100%   Tmax: Temp (24hrs), Av.9 °F (36.6 °C), Min:97.7 °F (36.5 °C), Max:98.1 °F (36.7 °C)    Last Body weight:   Wt Readings from Last 3 Encounters:   22 224 lb 4.8 oz (101.7 kg)   22 224 lb 8 oz (101.8 kg)   22 222 lb 3.2 oz (100.8 kg)     Body Mass Index : There is no height or weight on file to calculate BMI. PHYSICAL EXAMINATION:  Physical Exam  Constitutional:       General: She is not in acute distress. Appearance: Normal appearance. She is obese. She is not ill-appearing, toxic-appearing or diaphoretic. HENT:      Mouth/Throat:      Mouth: Mucous membranes are moist.      Pharynx: Oropharynx is clear. Eyes:      General: No scleral icterus. Cardiovascular:      Rate and Rhythm: Normal rate and regular rhythm. Pulses: Normal pulses. Heart sounds: Normal heart sounds. No murmur heard. No friction rub. No gallop. Pulmonary:      Effort: Pulmonary effort is normal.      Breath sounds: Normal breath sounds. Abdominal:      General: Abdomen is flat. Bowel sounds are normal. There is no distension. Palpations: Abdomen is soft. There is no mass. Tenderness: There is abdominal tenderness. There is no guarding or rebound. Hernia: No hernia is present. Comments: RUQ, positive gore's sign    Musculoskeletal:         General: No swelling, tenderness, deformity or signs of injury.       Right lower leg: No edema. Left lower leg: No edema. Skin:     General: Skin is warm and dry. Coloration: Skin is not jaundiced or pale. Findings: No bruising. Neurological:      General: No focal deficit present. Mental Status: She is alert and oriented to person, place, and time. Cranial Nerves: No cranial nerve deficit. Motor: No weakness. Psychiatric:         Mood and Affect: Mood normal.         Behavior: Behavior normal.         Thought Content:  Thought content normal.         Judgment: Judgment normal.           INVESTIGATIONS     Laboratory Testing:     Recent Results (from the past 24 hour(s))   CBC with Auto Differential    Collection Time: 04/09/22  9:35 AM   Result Value Ref Range    WBC 11.0 3.5 - 11.3 k/uL    RBC 4.36 3.95 - 5.11 m/uL    Hemoglobin 13.3 11.9 - 15.1 g/dL    Hematocrit 37.7 36.3 - 47.1 %    MCV 86.5 82.6 - 102.9 fL    MCH 30.5 25.2 - 33.5 pg    MCHC 35.3 (H) 28.4 - 34.8 g/dL    RDW 13.6 11.8 - 14.4 %    Platelets 288 096 - 973 k/uL    MPV 11.3 8.1 - 13.5 fL    NRBC Automated 0.0 0.0 per 100 WBC    Seg Neutrophils 83 (H) 36 - 65 %    Lymphocytes 7 (L) 24 - 43 %    Monocytes 10 3 - 12 %    Eosinophils % 0 (L) 1 - 4 %    Basophils 0 0 - 2 %    Immature Granulocytes 0 0 %    Segs Absolute 9.14 (H) 1.50 - 8.10 k/uL    Absolute Lymph # 0.72 (L) 1.10 - 3.70 k/uL    Absolute Mono # 1.06 0.10 - 1.20 k/uL    Absolute Eos # <0.03 0.00 - 0.44 k/uL    Basophils Absolute 0.03 0.00 - 0.20 k/uL    Absolute Immature Granulocyte 0.04 0.00 - 0.30 k/uL   Comprehensive Metabolic Panel    Collection Time: 04/09/22  9:35 AM   Result Value Ref Range    Glucose 120 (H) 70 - 99 mg/dL    BUN 15 8 - 23 mg/dL    CREATININE 1.16 (H) 0.50 - 0.90 mg/dL    Calcium 9.5 8.6 - 10.4 mg/dL    Sodium 134 (L) 135 - 144 mmol/L    Potassium 3.5 (L) 3.7 - 5.3 mmol/L    Chloride 96 (L) 98 - 107 mmol/L    CO2 23 20 - 31 mmol/L    Anion Gap 15 9 - 17 mmol/L    Alkaline Phosphatase 260 (H) 35 - 104 U/L    ALT 17 5 - 33 U/L    AST 22 <32 U/L    Total Bilirubin 1.09 0.3 - 1.2 mg/dL    Total Protein 7.9 6.4 - 8.3 g/dL    Albumin 3.8 3.5 - 5.2 g/dL    Albumin/Globulin Ratio 0.9 (L) 1.0 - 2.5    GFR Non- 47 (L) >60 mL/min    GFR  56 (L) >60 mL/min    GFR Comment         Sedimentation Rate    Collection Time: 04/09/22  9:35 AM   Result Value Ref Range    Sed Rate 72 (H) 0 - 30 mm/Hr   C-Reactive Protein    Collection Time: 04/09/22  9:35 AM   Result Value Ref Range    .1 (H) 0.0 - 5.0 mg/L   Magnesium    Collection Time: 04/09/22  9:35 AM   Result Value Ref Range    Magnesium 2.0 1.6 - 2.6 mg/dL   Lactic Acid    Collection Time: 04/09/22 12:48 PM   Result Value Ref Range    Lactic Acid, Whole Blood 1.4 0.7 - 2.1 mmol/L       Imaging:   XR ABDOMEN (KUB) (SINGLE AP VIEW)    Result Date: 4/9/2022  No evidence of bowel obstruction.        ASSESSMENT & PLAN     ASSESSMENT:     Primary Problem  Diverticulitis    Active Hospital Problems    Diagnosis Date Noted    ICD (implantable cardioverter-defibrillator) battery depletion [Z45.02] 06/03/2019     Priority: High    Essential hypertension [I10] 08/08/2014     Priority: High    Dilated cardiomyopathy ([facility] [address] [I42.0] 12/20/2012     Priority: High    Diverticulitis [K57.92] 04/09/2022    Nausea & vomiting [R11.2] 04/09/2022    AICD (automatic cardioverter/defibrillator) present [Z95.810] 03/04/2017    MAURO (acute kidney injury) ([facility] [address] [N17.9] 03/04/2017    Morbid obesity due to excess calories ([address] [E66.01] 01/15/2017       PLAN:     IMPRESSION  This is a 78 y.o. female who presented with above mentioned complaints and was admitted to inpatient service for further management as follows:     Principal Problem:    Diverticulitis  Active Problems:    Dilated cardiomyopathy ([facility] [address]    Essential hypertension    ICD (implantable cardioverter-defibrillator) battery depletion    Morbid obesity due to excess calories ([address]    AICD (automatic cardioverter/defibrillator) present    MAURO (acute kidney injury) ([address]    Nausea & vomiting  Resolved Problems:    * No resolved hospital problems. *    Diverticulitis  - will start cipro and flagyl IV  - pain and nausea control  - will consult GI  - check lactate. If elevated then will get CT AP   -  NPO for now    Nausea vomiting  - positive gore's sign on exam  - check RUQ US  - continue NPO    MAURO  - Cr currently 1.16 mildly elevated  - suspect this is due to dehydration  - avoid nephrotoxic meds    Nonischemic cardiomyopathy  - last EF was 17%  - s/p AICD placement  - avoid IVF    HTN  - resume lopressor,entresto, aldectone home dose    Morbid obesity  - BMI of 36.6  - recommend weight loss. PT/OT/SW-consulted  Discharge Planning:consulted       Uzma Liu MD  Internal Medicine Resident  [facility]  4/9/2022 1:43 PM   Attending Physician Statement  I have discussed the care of Niyah Curry, including pertinent history and exam findings,  with the resident. I have seen and examined the patient and the key elements of all parts of the encounter have been performed by me. I agree with the assessment, plan and orders as documented by the resident with additions . Treatment plan Discussed with nursing staff in detail , all questions answered . Electronically signed by Venice Lanier MD on   4/10/22 at 8:55 AM EDT    Please note that this chart was generated using voice recognition Dragon dictation software. Although every effort was made to ensure the accuracy of this automated transcription, some errors in transcription may have occurred.

## 2022-04-09 NOTE — ED NOTES
Pt. To ER room 10 from triage, ambulatory with steady gait  Pt. Presents with abdominal pain secondary to her diverticulitis. Pt. States she does not want a workup or CT scan for this, she states, \"I just want a GI doctor and some pain medicine. \" Pt. States she saw Dr. Oralee Saint at his office and was placed on antibiotics that she started yesterday. Pt. States she had prune juice this morning with the antibiotics and vomited these up. Pt. Denies CP and SOB, vitals stable, NAD, RR even and unlabored. Awaiting orders.  Will continue to monitor      Kan Wooten RN  04/09/22 [id number]

## 2022-04-09 NOTE — ED PROVIDER NOTES
in sigmoid mesocolon decreased in size, unchanged paracolic lymph nodes in sigmoid mesocolon w/ differnetial considerations including reactive or neoplastic etiology    PAST MEDICAL / SURGICAL / SOCIAL / FAMILY HISTORY      has a past medical history of Abnormal Pap smear, Acute on chronic systolic congestive heart failure ([address] AICD (automatic cardioverter/defibrillator) present, MAURO (acute kidney injury) ([address] Anemia, Arthritis, Cardiomyopathy ([address] Cardiomyopathy, nonischemic ([address] Chronic combined systolic and diastolic congestive heart failure ([address] Ejection fraction < 50%, HTN (hypertension), Hypothyroidism, Ischemic cardiomyopathy, Left bundle branch block, Mitral regurgitation, Morbid obesity due to excess calories ([facility] [address] Pulmonary hypertension ([address] and Sudden onset of severe headache.     has a past surgical history that includes other surgical history (12/20/2012); Tubal ligation; hernia repair; Colonoscopy; and pacemaker placement. Social History     Socioeconomic History    Marital status:      Spouse name: Not on file    Number of children: 2    Years of education: Not on file    Highest education level: Not on file   Occupational History    Not on file   Tobacco Use    Smoking status: Never Smoker    Smokeless tobacco: Never Used   Vaping Use    Vaping Use: Never used   Substance and Sexual Activity    Alcohol use: No     Alcohol/week: 0.0 standard drinks    Drug use: Not Currently    Sexual activity: Never   Other Topics Concern    Not on file   Social History Narrative    Not on file     Social Determinants of Health     Financial Resource Strain:     Difficulty of Paying Living Expenses: Not on file   Food Insecurity:     Worried About Running Out of Food in the Last Year: Not on file    Ninfa of Food in the Last Year: Not on file   Transportation Needs:     Lack of Transportation (Medical): Not on file    Lack of Transportation (Non-Medical):  Not on file Physical Activity:     Days of Exercise per Week: Not on file    Minutes of Exercise per Session: Not on file   Stress:     Feeling of Stress : Not on file   Social Connections:     Frequency of Communication with Friends and Family: Not on file    Frequency of Social Gatherings with Friends and Family: Not on file    Attends [employer] Services: Not on file    Active Member of Clubs or Organizations: Not on file    Attends Club or Organization Meetings: Not on file    Marital Status: Not on file   Intimate Partner Violence:     Fear of Current or Ex-Partner: Not on file    Emotionally Abused: Not on file    Physically Abused: Not on file    Sexually Abused: Not on file   Housing Stability:     Unable to Pay for Housing in the Last Year: Not on file    Number of Places Lived in the Last Year: Not on file    Unstable Housing in the Last Year: Not on file       Family History   Problem Relation Age of Onset    Cancer Other         ovarian    High Blood Pressure Mother     Other Mother         copd    Arthritis Neg Hx     Asthma Neg Hx     Birth Defects Neg Hx     Depression Neg Hx     Diabetes Neg Hx     Early Death Neg Hx     Hearing Loss Neg Hx     Heart Disease Neg Hx     High Cholesterol Neg Hx     Kidney Disease Neg Hx     Learning Disabilities Neg Hx     Mental Retardation Neg Hx     Mental Illness Neg Hx     Miscarriages / Stillbirths Neg Hx     Stroke Neg Hx     Substance Abuse Neg Hx     Vision Loss Neg Hx     Breast Cancer Neg Hx     Colon Cancer Neg Hx     Eclampsia Neg Hx     Hypertension Neg Hx     Ovarian Cancer Neg Hx      Labor Neg Hx     Spont Abortions Neg Hx        Allergies:  Codeine and Morphine    Home Medications:  Prior to Admission medications    Medication Sig Start Date End Date Taking?  Authorizing Provider   metoprolol succinate (TOPROL XL) 50 MG extended release tablet  22   Historical Provider, MD   amiodarone (CORDARONE) 200 MG tablet  12/1/21   Historical Provider, MD   diclofenac sodium (VOLTAREN) 1 % GEL APPLY 2 GRAMS TOPICALLY 4 (FOUR) TIMES A DAY. 11/29/21   Historical Provider, MD   empagliflozin (JARDIANCE) 10 MG tablet Take 10 mg by mouth daily 1/4/22   Historical Provider, MD   acetaminophen (TYLENOL) 500 MG tablet Take 2 tablets by mouth every 6 hours as needed for Pain 1/27/22   Creasie Homans, MD   potassium chloride (KLOR-CON M) 20 MEQ extended release tablet Take 1 tablet by mouth daily 12/8/21   Bulmaro Zaragoza MD   magnesium oxide (MAG-OX) 400 MG tablet Take 0.5 tablets by mouth daily 12/8/21   Spenser Benavides MD   nitroGLYCERIN (NITROSTAT) 0.4 MG SL tablet up to max of 3 total doses. If no relief after 1 dose, call 911. 6/29/21   Melissa Mcgill MD   rivaroxaban Cam Nelson) 20 MG TABS tablet Take 1 tablet by mouth daily 6/29/21   Melissa Mcgill MD   sacubitril-valsartan (ENTRESTO) 24-26 MG per tablet Take 0.5 tablets by mouth 2 times daily 6/29/21   Melissa Mcgill MD   bumetanide (BUMEX) 2 MG tablet Take 1 tablet by mouth daily 6/29/21   Melissa Mcgill MD   levothyroxine (SYNTHROID) 25 MCG tablet Take 1 tablet by mouth Daily 6/30/21   Melissa Mcgill MD   lidocaine (LIDODERM) 5 % Place one patch to both knees 12 hours on, 12 hours off. 11/23/20   Amanda Vásquez DO   metoprolol tartrate (LOPRESSOR) 25 MG tablet Take 1 tablet by mouth 2 times daily 5/25/20   Rebekah Aguilera MD   allopurinol (ZYLOPRIM) 100 MG tablet Take 1 tablet by mouth daily 2/18/19   Yovana Sanchez MD   spironolactone (ALDACTONE) 25 MG tablet Take 0.5 tablets by mouth daily 2/18/19   Yovana Sanchez MD       REVIEW OF SYSTEMS    (2-9 systems for level 4, 10 or more for level 5)      Review of Systems   Constitutional: Positive for appetite change (decreased). Negative for fever. HENT: Negative for congestion and rhinorrhea. Eyes: Negative for redness. Respiratory: Negative for cough and shortness of breath.     Cardiovascular: Negative for chest pain. Gastrointestinal: Positive for abdominal pain, diarrhea, nausea and vomiting. Genitourinary: Negative for dysuria. Musculoskeletal: Negative for joint swelling. Skin: Negative for rash and wound. Neurological: Negative for headaches. PHYSICAL EXAM   (up to 7 for level 4, 8 or more for level 5)     INITIAL VITALS:    oral temperature is 97.7 °F (36.5 °C). Her blood pressure is 112/79 and her pulse is 74. Her respiration is 16 and oxygen saturation is 98%. Physical Exam  Constitutional:       General: She is not in acute distress. Appearance: She is obese. She is not toxic-appearing. HENT:      Head: Normocephalic. Mouth/Throat:      Mouth: Mucous membranes are moist.   Eyes:      Extraocular Movements: Extraocular movements intact. Pupils: Pupils are equal, round, and reactive to light. Cardiovascular:      Rate and Rhythm: Normal rate and regular rhythm. Heart sounds: Normal heart sounds. No murmur heard. Pulmonary:      Effort: Pulmonary effort is normal. No respiratory distress. Breath sounds: No wheezing. Abdominal:      General: There is no distension. Palpations: Abdomen is soft. Tenderness: There is abdominal tenderness in the suprapubic area and left lower quadrant. There is no right CVA tenderness, left CVA tenderness or guarding. Negative signs include Aguayo's sign and McBurney's sign. Skin:     Capillary Refill: Capillary refill takes less than 2 seconds. Neurological:      General: No focal deficit present. Mental Status: She is alert and oriented to person, place, and time.        DIFFERENTIAL  DIAGNOSIS     PLAN (LABS / IMAGING / EKG):  Orders Placed This Encounter   Procedures    XR ABDOMEN (KUB) (SINGLE AP VIEW)    US GALLBLADDER RUQ    CBC with Auto Differential    Comprehensive Metabolic Panel    Sedimentation Rate    C-Reactive Protein    Magnesium    Lactic Acid    Basic Metabolic Panel w/ Reflex to MG    CBC with Auto Differential    Diet NPO    Vital signs per unit routine    Notify physician    Up with assistance    Daily weights    Intake and output    Neurovascular checks    Elevate Head of Bed     Full Code    Inpatient consult to Internal Medicine    Inpatient consult to Case Management    OT eval and treat    PT evaluation and treat    Initiate Oxygen Therapy Protocol    ADMIT TO INPATIENT     MEDICATIONS ORDERED:  Orders Placed This Encounter   Medications    famotidine (PEPCID) injection 20 mg    ondansetron (ZOFRAN) injection 4 mg    dicyclomine (BENTYL) capsule 10 mg    0.9 % sodium chloride bolus    acetaminophen (TYLENOL) tablet 650 mg    fentaNYL (SUBLIMAZE) injection 25 mcg    ciprofloxacin (CIPRO) IVPB 400 mg     Order Specific Question:   Antimicrobial Indications     Answer:   Intra-Abdominal Infection    metronidazole (FLAGYL) 500 mg in NaCl 100 mL IVPB premix     Order Specific Question:   Antimicrobial Indications     Answer:   Intra-Abdominal Infection    allopurinol (ZYLOPRIM) tablet 100 mg    levothyroxine (SYNTHROID) tablet 25 mcg    lidocaine 4 % external patch 1 patch    metoprolol tartrate (LOPRESSOR) tablet 25 mg    rivaroxaban (XARELTO) tablet 20 mg    sacubitril-valsartan (ENTRESTO) 24-26 MG per tablet 0.5 tablet    spironolactone (ALDACTONE) tablet 12.5 mg    sodium chloride flush 0.9 % injection 5-40 mL    sodium chloride flush 0.9 % injection 5-40 mL    0.9 % sodium chloride infusion    OR Linked Order Group     ondansetron (ZOFRAN-ODT) disintegrating tablet 4 mg     ondansetron (ZOFRAN) injection 4 mg    polyethylene glycol (GLYCOLAX) packet 17 g    OR Linked Order Group     acetaminophen (TYLENOL) tablet 650 mg     acetaminophen (TYLENOL) suppository 650 mg    ciprofloxacin (CIPRO) IVPB 400 mg     Order Specific Question:   Antimicrobial Indications     Answer:   Intra-Abdominal Infection    metronidazole (FLAGYL) 500 mg in NaCl 100 mL IVPB premix     Order Specific Question:   Antimicrobial Indications     Answer:   Intra-Abdominal Infection     DDX: Diverticulosis, diverticulitis, gastroenteritis    Initial MDM/Plan: 78 y.o. female who presents with lower quadrant abdominal pain in addition to nausea, vomiting and diarrhea since Thursday. Will provide symptomatic relief with Zofran, Bentyl and Pepcid. Will obtain lab work to look at inflammatory markers as well as CBC and CMP. If lab work returns normal and symptomatic relief obtained, consider discharge. If no symptomatic relief and changes seen on lab work, consider repeat imaging and/or admission for symptom relief.     DIAGNOSTIC RESULTS / EMERGENCY DEPARTMENT COURSE / MDM     LABS:  Labs Reviewed   CBC WITH AUTO DIFFERENTIAL - Abnormal; Notable for the following components:       Result Value    MCHC 35.3 (*)     Seg Neutrophils 83 (*)     Lymphocytes 7 (*)     Eosinophils % 0 (*)     Segs Absolute 9.14 (*)     Absolute Lymph # 0.72 (*)     All other components within normal limits   COMPREHENSIVE METABOLIC PANEL - Abnormal; Notable for the following components:    Glucose 120 (*)     CREATININE 1.16 (*)     Sodium 134 (*)     Potassium 3.5 (*)     Chloride 96 (*)     Alkaline Phosphatase 260 (*)     Albumin/Globulin Ratio 0.9 (*)     GFR Non- 47 (*)     GFR  56 (*)     All other components within normal limits   SEDIMENTATION RATE - Abnormal; Notable for the following components:    Sed Rate 72 (*)     All other components within normal limits   C-REACTIVE PROTEIN - Abnormal; Notable for the following components:    .1 (*)     All other components within normal limits   MAGNESIUM   LACTIC ACID       RADIOLOGY:  XR ABDOMEN (KUB) (SINGLE AP VIEW)    Result Date: 4/9/2022  EXAMINATION: ONE SUPINE XRAY VIEW(S) OF THE ABDOMEN 4/9/2022 9:37 am COMPARISON:  CT of 01/27/2022 HISTORY: ORDERING SYSTEM PROVIDED HISTORY: LLQ abdominal pain TECHNOLOGIST PROVIDED HISTORY: LLQ abdominal pain Reason for Exam: supine FINDINGS: There are multiple diverticula in the left colon. Nonspecific bowel gas pattern without evidence of obstruction. No abnormal calcifications. No acute osseous abnormality. No evidence of bowel obstruction. EMERGENCY DEPARTMENT COURSE:  Patient is a 69-year-old female with significant medical history including diverticulitis and, presenting to the emergency department after being prescribed Cipro and Flagyl for abdominal pain symptoms. Patient states that she has been unable to tolerate p.o. intake at home vomited prune juice which she has been taking. She has also been diarrhea in addition to vomiting. Patient did recently have CT colonography on 3/29/2022 and CT abdomen pelvis on 1/27/2022 which demonstrated decreased size of sigmoid colonic abscess. At this time patient is complaining of tenderness with palpation in the left lower quadrant otherwise abdomen is nontender and soft. Did trial Zofran, Bentyl and Pepcid without symptom relief. She was given dose of fentanyl here in the emergency department which did help with her symptoms. Lab work returned with a slightly elevated ESR but CRP not resulted due to  occurring in the lab. Did have slightly decreased sodium potassium and chloride but liver enzymes normal.  Slightly elevated creatinine. She was given a small 500 cc bolus as her last EF noted on echo from 6/26/2021 was noted to be 17%. Abdominal x-ray did not show evidence of free air nor bowel obstruction. Given failed outpatient treatment as well as intractable pain and multiple lab abnormalities, patient was admitted to the internal medicine service for further evaluation. Internal medicine requested right upper quadrant ultrasound as they feel she does have positive Aguayo sign.     PROCEDURES:  None    CONSULTS:  IP CONSULT TO INTERNAL MEDICINE  IP CONSULT TO CASE MANAGEMENT    CRITICAL CARE:  Please see attending note    FINAL IMPRESSION      1. Diverticulitis of colon        DISPOSITION / PLAN     DISPOSITION Admitted 04/09/2022 12:31:03 PM    Admit    PATIENTREFERRED TO:  No follow-up provider specified.     DISCHARGE MEDICATIONS:  Current Discharge Medication List          Bradley Brink MD  Emergency Medicine Resident    (Please note that portions of this note were completed with a voice recognition program.  Efforts were made to edit the dictations but occasionally words are mis-transcribed.)        Luther Bhat MD  Resident  04/09/22 [id number]

## 2022-04-10 ENCOUNTER — APPOINTMENT (OUTPATIENT)
Dept: CT IMAGING | Age: 67
DRG: 392 | End: 2022-04-10
Payer: MEDICARE

## 2022-04-10 LAB
ABSOLUTE EOS #: <0.03 K/UL (ref 0–0.44)
ABSOLUTE IMMATURE GRANULOCYTE: 0.03 K/UL (ref 0–0.3)
ABSOLUTE LYMPH #: 0.99 K/UL (ref 1.1–3.7)
ABSOLUTE MONO #: 1.02 K/UL (ref 0.1–1.2)
ANION GAP SERPL CALCULATED.3IONS-SCNC: 10 MMOL/L (ref 9–17)
BASOPHILS # BLD: 0 % (ref 0–2)
BASOPHILS ABSOLUTE: 0.03 K/UL (ref 0–0.2)
BUN BLDV-MCNC: 14 MG/DL (ref 8–23)
CALCIUM SERPL-MCNC: 8.7 MG/DL (ref 8.6–10.4)
CHLORIDE BLD-SCNC: 100 MMOL/L (ref 98–107)
CO2: 22 MMOL/L (ref 20–31)
CREAT SERPL-MCNC: 0.99 MG/DL (ref 0.5–0.9)
EOSINOPHILS RELATIVE PERCENT: 0 % (ref 1–4)
GFR AFRICAN AMERICAN: >60 ML/MIN
GFR NON-AFRICAN AMERICAN: 56 ML/MIN
GFR SERPL CREATININE-BSD FRML MDRD: ABNORMAL ML/MIN/{1.73_M2}
GLUCOSE BLD-MCNC: 99 MG/DL (ref 70–99)
HCT VFR BLD CALC: 32.1 % (ref 36.3–47.1)
HEMOGLOBIN: 11.4 G/DL (ref 11.9–15.1)
IMMATURE GRANULOCYTES: 0 %
LYMPHOCYTES # BLD: 12 % (ref 24–43)
MAGNESIUM: 1.9 MG/DL (ref 1.6–2.6)
MCH RBC QN AUTO: 30.8 PG (ref 25.2–33.5)
MCHC RBC AUTO-ENTMCNC: 35.5 G/DL (ref 28.4–34.8)
MCV RBC AUTO: 86.8 FL (ref 82.6–102.9)
MONOCYTES # BLD: 12 % (ref 3–12)
NRBC AUTOMATED: 0 PER 100 WBC
PDW BLD-RTO: 13.5 % (ref 11.8–14.4)
PLATELET # BLD: 222 K/UL (ref 138–453)
PMV BLD AUTO: 11 FL (ref 8.1–13.5)
POTASSIUM SERPL-SCNC: 3.5 MMOL/L (ref 3.7–5.3)
RBC # BLD: 3.7 M/UL (ref 3.95–5.11)
SEG NEUTROPHILS: 76 % (ref 36–65)
SEGMENTED NEUTROPHILS ABSOLUTE COUNT: 6.51 K/UL (ref 1.5–8.1)
SODIUM BLD-SCNC: 132 MMOL/L (ref 135–144)
WBC # BLD: 8.6 K/UL (ref 3.5–11.3)

## 2022-04-10 PROCEDURE — 6370000000 HC RX 637 (ALT 250 FOR IP): Performed by: STUDENT IN AN ORGANIZED HEALTH CARE EDUCATION/TRAINING PROGRAM

## 2022-04-10 PROCEDURE — 85025 COMPLETE CBC W/AUTO DIFF WBC: CPT

## 2022-04-10 PROCEDURE — 99232 SBSQ HOSP IP/OBS MODERATE 35: CPT | Performed by: INTERNAL MEDICINE

## 2022-04-10 PROCEDURE — 6360000002 HC RX W HCPCS: Performed by: STUDENT IN AN ORGANIZED HEALTH CARE EDUCATION/TRAINING PROGRAM

## 2022-04-10 PROCEDURE — 1200000000 HC SEMI PRIVATE

## 2022-04-10 PROCEDURE — 6360000004 HC RX CONTRAST MEDICATION: Performed by: STUDENT IN AN ORGANIZED HEALTH CARE EDUCATION/TRAINING PROGRAM

## 2022-04-10 PROCEDURE — 2580000003 HC RX 258: Performed by: NURSE PRACTITIONER

## 2022-04-10 PROCEDURE — 36415 COLL VENOUS BLD VENIPUNCTURE: CPT

## 2022-04-10 PROCEDURE — 6360000002 HC RX W HCPCS: Performed by: NURSE PRACTITIONER

## 2022-04-10 PROCEDURE — 83735 ASSAY OF MAGNESIUM: CPT

## 2022-04-10 PROCEDURE — 2500000003 HC RX 250 WO HCPCS: Performed by: STUDENT IN AN ORGANIZED HEALTH CARE EDUCATION/TRAINING PROGRAM

## 2022-04-10 PROCEDURE — 80048 BASIC METABOLIC PNL TOTAL CA: CPT

## 2022-04-10 PROCEDURE — 74177 CT ABD & PELVIS W/CONTRAST: CPT

## 2022-04-10 RX ORDER — DIPHENHYDRAMINE HCL 25 MG
25 TABLET ORAL EVERY 6 HOURS PRN
Status: DISCONTINUED | OUTPATIENT
Start: 2022-04-10 | End: 2022-04-13 | Stop reason: HOSPADM

## 2022-04-10 RX ADMIN — CIPROFLOXACIN 400 MG: 2 INJECTION, SOLUTION INTRAVENOUS at 12:29

## 2022-04-10 RX ADMIN — SACUBITRIL AND VALSARTAN 0.5 TABLET: 24; 26 TABLET, FILM COATED ORAL at 22:04

## 2022-04-10 RX ADMIN — CIPROFLOXACIN 400 MG: 2 INJECTION, SOLUTION INTRAVENOUS at 00:04

## 2022-04-10 RX ADMIN — OXYCODONE HYDROCHLORIDE 5 MG: 5 TABLET ORAL at 23:39

## 2022-04-10 RX ADMIN — SACUBITRIL AND VALSARTAN 0.5 TABLET: 24; 26 TABLET, FILM COATED ORAL at 09:02

## 2022-04-10 RX ADMIN — RIVAROXABAN 20 MG: 20 TABLET, FILM COATED ORAL at 09:03

## 2022-04-10 RX ADMIN — OXYCODONE HYDROCHLORIDE 5 MG: 5 TABLET ORAL at 06:01

## 2022-04-10 RX ADMIN — IOHEXOL 50 ML: 240 INJECTION, SOLUTION INTRATHECAL; INTRAVASCULAR; INTRAVENOUS; ORAL at 08:57

## 2022-04-10 RX ADMIN — METRONIDAZOLE 500 MG: 500 INJECTION, SOLUTION INTRAVENOUS at 14:15

## 2022-04-10 RX ADMIN — LEVOTHYROXINE SODIUM 25 MCG: 25 TABLET ORAL at 06:01

## 2022-04-10 RX ADMIN — DIPHENHYDRAMINE HCL 25 MG: 25 TABLET ORAL at 16:52

## 2022-04-10 RX ADMIN — OXYCODONE HYDROCHLORIDE 5 MG: 5 TABLET ORAL at 13:15

## 2022-04-10 RX ADMIN — PIPERACILLIN AND TAZOBACTAM 3375 MG: 3; .375 INJECTION, POWDER, FOR SOLUTION INTRAVENOUS at 17:16

## 2022-04-10 RX ADMIN — METOPROLOL TARTRATE 25 MG: 25 TABLET ORAL at 22:04

## 2022-04-10 RX ADMIN — ALLOPURINOL 100 MG: 100 TABLET ORAL at 09:03

## 2022-04-10 RX ADMIN — PIPERACILLIN AND TAZOBACTAM 3375 MG: 3; .375 INJECTION, POWDER, FOR SOLUTION INTRAVENOUS at 23:39

## 2022-04-10 RX ADMIN — METRONIDAZOLE 500 MG: 500 INJECTION, SOLUTION INTRAVENOUS at 06:02

## 2022-04-10 RX ADMIN — SPIRONOLACTONE 12.5 MG: 25 TABLET ORAL at 09:03

## 2022-04-10 RX ADMIN — OXYCODONE HYDROCHLORIDE 5 MG: 5 TABLET ORAL at 00:01

## 2022-04-10 RX ADMIN — MAGNESIUM HYDROXIDE 30 ML: 400 SUSPENSION ORAL at 00:30

## 2022-04-10 RX ADMIN — IOPAMIDOL 75 ML: 755 INJECTION, SOLUTION INTRAVENOUS at 10:45

## 2022-04-10 ASSESSMENT — PAIN SCALES - GENERAL
PAINLEVEL_OUTOF10: 6
PAINLEVEL_OUTOF10: 4
PAINLEVEL_OUTOF10: 5
PAINLEVEL_OUTOF10: 9
PAINLEVEL_OUTOF10: 7
PAINLEVEL_OUTOF10: 8
PAINLEVEL_OUTOF10: 5

## 2022-04-10 ASSESSMENT — PAIN DESCRIPTION - ORIENTATION
ORIENTATION: LOWER
ORIENTATION: LOWER

## 2022-04-10 ASSESSMENT — ENCOUNTER SYMPTOMS
WHEEZING: 0
ABDOMINAL DISTENTION: 0
COUGH: 0
NAUSEA: 1
SHORTNESS OF BREATH: 0
VOMITING: 1
ABDOMINAL PAIN: 1
BLOOD IN STOOL: 0
CHOKING: 0
STRIDOR: 0
DIARRHEA: 0
CONSTIPATION: 0
APNEA: 0

## 2022-04-10 ASSESSMENT — PAIN DESCRIPTION - FREQUENCY
FREQUENCY: CONTINUOUS

## 2022-04-10 ASSESSMENT — PAIN DESCRIPTION - PAIN TYPE
TYPE: ACUTE PAIN

## 2022-04-10 ASSESSMENT — PAIN DESCRIPTION - ONSET
ONSET: ON-GOING
ONSET: ON-GOING

## 2022-04-10 ASSESSMENT — PAIN DESCRIPTION - LOCATION
LOCATION: ABDOMEN

## 2022-04-10 ASSESSMENT — PAIN DESCRIPTION - DESCRIPTORS
DESCRIPTORS: CRAMPING
DESCRIPTORS: CRAMPING

## 2022-04-10 NOTE — PROGRESS NOTES
[facility]  Internal Medicine Teaching Residency Program  Inpatient Daily Progress Note  ______________________________________________________________________________    Patient: Cricket Lozoya  YOB: 1955   [id number]    Acct: [de-identified]     Room: [facility]9-  Admit date: 4/9/2022  Today's date: 04/10/22  Number of days in the hospital: 1    SUBJECTIVE   Admitting Diagnosis: Diverticulitis  CC: abdominal pain   NAEON. Pain is improving. Will get CT AP today       ROS:  Constitutional:  negative for chills, fevers, sweats  Respiratory:  negative for cough, dyspnea on exertion, hemoptysis, shortness of breath, wheezing  Cardiovascular:  negative for chest pain, chest pressure/discomfort, lower extremity edema, palpitations  Gastrointestinal:  Abdominal pain with n/v  Neurological:  negative for dizziness, headache    BRIEF HISTORY     The patient is a pleasant 78 y.o. female PMH diverticulitis, nonischemic cardiomyopathy last EF 17% s/p AICD placement, HTN  presents with a chief complaint of abdominal pain that started 2 days ago. Patient reports that she has been having lower abdominal pain and n/v. Of note patient had a recent CT colonoscopy without active disease but did reveal pancolonic diverticulosis 1 week ago. She had an appointment with GI a week ago where she was given abx. She reports that since then she has been having pain much like her previous episodes of diverticulitis. She also reports n/v with food. WBC is wnl,  LFTs are normal except for elevated ALP at 260. Small bump in creatinine and low potassium. Patient requesting to see GI this admission. Former smoker, occasional alcohol, no illegal drugs. On exam patient reports Lower abdominal pain during palpation and RUQ tenderness.  Gauayo's sign is positive.        OBJECTIVE     Vital Signs:  /67   Pulse 70   Temp 97.7 °F (36.5 °C) (Oral)   Resp 18   Ht 5' 6\" (1.676 m)   Wt 223 lb 8 oz (101.4 kg)   SpO2 97%   BMI 36.07 kg/m²     Temp (24hrs), Av.8 °F (36.6 °C), Min:97.7 °F (36.5 °C), Max:98.1 °F (36.7 °C)    In: 1729.6   Out: -     Physical Exam:  Physical Exam  Constitutional:       General: She is not in acute distress. Appearance: Normal appearance. She is obese. She is not ill-appearing, toxic-appearing or diaphoretic. HENT:      Mouth/Throat:      Mouth: Mucous membranes are moist.      Pharynx: Oropharynx is clear. Eyes:      General: No scleral icterus. Cardiovascular:      Rate and Rhythm: Normal rate and regular rhythm. Pulses: Normal pulses. Heart sounds: Normal heart sounds. No murmur heard. No friction rub. No gallop. Pulmonary:      Effort: Pulmonary effort is normal.      Breath sounds: Normal breath sounds. Abdominal:      General: Abdomen is flat. Bowel sounds are normal. There is no distension. Palpations: Abdomen is soft. There is no mass. Tenderness: There is abdominal tenderness. There is no guarding or rebound. Hernia: No hernia is present. Musculoskeletal:         General: No swelling, tenderness, deformity or signs of injury. Right lower leg: No edema. Left lower leg: No edema. Skin:     General: Skin is warm and dry. Coloration: Skin is not jaundiced or pale. Findings: No bruising. Neurological:      General: No focal deficit present. Mental Status: She is alert and oriented to person, place, and time. Cranial Nerves: No cranial nerve deficit. Motor: No weakness. Psychiatric:         Mood and Affect: Mood normal.         Behavior: Behavior normal.         Thought Content:  Thought content normal.         Judgment: Judgment normal.           Medications:  Scheduled Medications:    acetaminophen  650 mg Oral Once    metroNIDAZOLE  500 mg IntraVENous Q8H    allopurinol  100 mg Oral Daily    levothyroxine  25 mcg Oral Daily    lidocaine  1 patch Topical Daily    metoprolol tartrate  25 mg Oral BID    rivaroxaban  20 mg Oral Daily    sacubitril-valsartan  0.5 tablet Oral BID    spironolactone  12.5 mg Oral Daily    sodium chloride flush  5-40 mL IntraVENous 2 times per day    ciprofloxacin  400 mg IntraVENous Q12H     Continuous Infusions:    sodium chloride      sodium chloride 75 mL/hr at 04/09/22 2012     PRN Medicationsmagnesium hydroxide, 30 mL, Daily PRN  sodium chloride flush, 5-40 mL, PRN  sodium chloride, , PRN  ondansetron, 4 mg, Q8H PRN   Or  ondansetron, 4 mg, Q6H PRN  polyethylene glycol, 17 g, Daily PRN  acetaminophen, 650 mg, Q6H PRN   Or  acetaminophen, 650 mg, Q6H PRN  oxyCODONE, 5 mg, Q6H PRN        Diagnostic Labs:  CBC:   Recent Labs     04/09/22  0935 04/10/22  0650   WBC 11.0 8.6   RBC 4.36 3.70*   HGB 13.3 11.4*   HCT 37.7 32.1*   MCV 86.5 86.8   RDW 13.6 13.5    222     BMP:   Recent Labs     04/09/22  0935 04/10/22  0650   * 132*   K 3.5* 3.5*   CL 96* 100   CO2 23 22   BUN 15 14   CREATININE 1.16* 0.99*     BNP: No results for input(s): BNP in the last 72 hours. PT/INR: No results for input(s): PROTIME, INR in the last 72 hours. APTT: No results for input(s): APTT in the last 72 hours. CARDIAC ENZYMES: No results for input(s): CKMB, CKMBINDEX, TROPONINI in the last 72 hours. Invalid input(s): CKTOTAL;3  FASTING LIPID PANEL:  Lab Results   Component Value Date    CHOL 180 05/23/2020    HDL 42 05/23/2020    TRIG 79 05/23/2020     LIVER PROFILE:   Recent Labs     04/09/22  0935   AST 22   ALT 17   BILITOT 1.09   ALKPHOS 260*      MICROBIOLOGY:   Lab Results   Component Value Date/Time    CULTURE PATIENT DISCHARGED BEFORE SAMPLE COLLECTED 12/06/2021 10:30 AM       Imaging:    XR ABDOMEN (KUB) (SINGLE AP VIEW)    Result Date: 4/9/2022  No evidence of bowel obstruction. US GALLBLADDER RUQ    Result Date: 4/9/2022  1. Cirrhosis, without discrete intrahepatic nodule or mass.  2. Cholelithiasis, without sonographic evidence of cholecystitis. 3. Unremarkable sonographic appearance of the common bile duct, right kidney, and pancreas. ASSESSMENT & PLAN     Assessment and Plan:    Principal Problem:    Diverticulitis  Active Problems:    Dilated cardiomyopathy ([address]    Essential hypertension    ICD (implantable cardioverter-defibrillator) battery depletion    Morbid obesity due to excess calories ([address]    AICD (automatic cardioverter/defibrillator) present    MAURO (acute kidney injury) ([address]    Nausea & vomiting  Resolved Problems:    * No resolved hospital problems. *    Diverticulitis  - continue cipro and flagyl IV  - pain and nausea control  - GI consulted, will order CT AP  -  FLD     Nausea vomiting  - positive gore's sign on exam  - RUQ US is negative     MAURO  - Cr initally  1.16 mildly elevated  - suspect this is due to dehydration  - avoid nephrotoxic meds     Nonischemic cardiomyopathy  - last EF was 17%  - s/p AICD placement  - avoid IVF     HTN  - resume lopressor,entresto, aldectone home dose     Morbid obesity  - BMI of 36.6  - recommend weight loss.          Sherin Davila MD  Internal Medicine Resident  [id number]  4/10/2022 8:22 AM

## 2022-04-10 NOTE — PROGRESS NOTES
Patient was told by GI doctor that order was to be placed for [facility] for pain control since existing medication hasn't been effective. Writer attempted to reach out to GI physician but Jimy will not be responded to until 0700 4/10/22. Writer reached out to internal medicine resident on call and new orders placed.

## 2022-04-10 NOTE — PLAN OF CARE
Problem: Pain:  Goal: Pain level will decrease  Description: Pain level will decrease  4/10/2022 0132 by Nia Hess RN  Outcome: Ongoing     Problem: Pain:  Goal: Control of acute pain  Description: Control of acute pain  4/10/2022 0132 by Nia Hess RN  Outcome: Ongoing     Problem: Pain:  Goal: Control of chronic pain  Description: Control of chronic pain  4/10/2022 0132 by Nia Hess RN  Outcome: Ongoing     Problem: SAFETY  Goal: Free from accidental physical injury  4/10/2022 0132 by Nia Hess RN  Outcome: Ongoing     Problem: DAILY CARE  Goal: Daily care needs are met  4/10/2022 0132 by Nia Hess RN  Outcome: Ongoing     Problem: PAIN  Goal: Patient's pain/discomfort is manageable  4/10/2022 0132 by Nia Hess RN  Outcome: Ongoing     Problem: SKIN INTEGRITY  Goal: Skin integrity is maintained or improved  4/10/2022 0132 by Nia Hess RN  Outcome: Ongoing     Problem: KNOWLEDGE DEFICIT  Goal: Patient/S.O. demonstrates understanding of disease process, treatment plan, medications, and discharge instructions.   4/10/2022 0132 by Nia Hess RN  Outcome: Ongoing     Problem: DISCHARGE BARRIERS  Goal: Patient's continuum of care needs are met  4/10/2022 0132 by Nia Hess RN  Outcome: Ongoing     Problem: Falls - Risk of:  Goal: Will remain free from falls  Description: Will remain free from falls  4/10/2022 0132 by Nia Hess RN  Outcome: Ongoing     Problem: Falls - Risk of:  Goal: Absence of physical injury  Description: Absence of physical injury  4/10/2022 0132 by Nia Hess RN  Outcome: Ongoing

## 2022-04-10 NOTE — PROGRESS NOTES
Dr. Hermes Foster notified- patient states she feels confused and saw a cat running in her room. She's A/O X4 with new confusion. Writer will continue to monitor.

## 2022-04-10 NOTE — CARE COORDINATION
Case Management Initial Discharge Plan  Low Chu,             Met with:patient to discuss discharge plans. Information verified: address, contacts, phone number, , insurance Yes  Insurance Provider: [address]    Emergency Contact/Next of Kin name & number: Daniel Stein [phone]  Who are involved in patient's support system? daughter    PCP: Opal Mistry MD  Date of last visit: 22      Discharge Planning    Living Arrangements:  Parent     Home has 1 story  4 stairs to climb to get into front door    Patient able to perform ADL's:Independent    Current Services (outpatient & in home) none  DME equipment: cane  DME provider:     Is patient receiving oral anticoagulation therapy? Yes    If indicated:   Physician managing anticoagulation treatment: Dr Mays-cardiologist  Where does patient obtain lab work for ATC treatment? N/A-on [facility]    Does patient have any issues/concerns obtaining medications? No  If yes, what are patient's concerns? Is there a preferred Pharmacy after hours or on weekends? yes     If yes, which pharmacy? [facility]    Potential Assistance Needed:  N/A    Patient agreeable to home care: No  [place] of choice provided:  n/a    Prior SNF/Rehab Placement and Facility: [facility]  Agreeable to SNF/Rehab: No  [place] of choice provided: n/a     Evaluation: no    Expected Discharge date:  04/10/22    Patient expects to be discharged to: If home: is the family and/or caregiver wiling & able to provide support at home? yes  Who will be providing this support? daughter*    Follow Up Appointment: Best Day/ Time: Monday AM    Transportation provider: daughter  Transportation arrangements needed for discharge: No    Readmission Risk              Risk of Unplanned Readmission:  15             Does patient have a readmission risk score greater than 14?: Yes  If yes, follow-up appointment must be made within 7 days of discharge. Goals of Care: Abd pain resolving      Educated pt on transitional options, provided freedom of choice and are agreeable with plan      Discharge Plan: Home with daughter support, lives with Piyush Ruiz        Electronically signed by Sharon Carrillo RN on 4/10/22 at 8:58 AM EDT

## 2022-04-10 NOTE — PROGRESS NOTES
[facility]   Progress Note    Tai Maharaj is a 78 y.o. female patient. Hospitalization Day:1      Chief consult reason:     Diverticulitis-failed OP treatment    Subjective:  Patient seen and examined no acute events overnight. Patient still having left lower quadrant pain  CT abdomen and pelvis completed this a.m. is concerning for persistent thick wall mixed complex fluid and gas collection in the sigmoid mesocolon which is increased in size from prior currently measuring 5.2cm x 4.5 cm (previously 3.5 cm x 3.6 cm)  Patient is currently on Xarelto    VITALS:  /67   Pulse 70   Temp 97.7 °F (36.5 °C) (Oral)   Resp 18   Ht 5' 6\" (1.676 m)   Wt 223 lb 8 oz (101.4 kg)   SpO2 97%   BMI 36.07 kg/m²   TEMPERATURE:  Current - Temp: 97.7 °F (36.5 °C); Max - Temp  Av.8 °F (36.6 °C)  Min: 97.7 °F (36.5 °C)  Max: 98.1 °F (36.7 °C)    Physical Assessment:  General appearance:  alert, cooperative and mild pain distress  Mental Status:  oriented to person, place and time and normal affect  Lungs:  clear to auscultation bilaterally, normal effort  Heart:  regular rate and rhythm, no murmur  Abdomen:  soft, nontender, nondistended, normal bowel sounds, no masses, hepatomegaly, splenomegaly  Extremities:  no edema, redness, tenderness in the calves  Skin:  no gross lesions, rashes, induration    Data Review:    Labs and Imaging:     CBC:  Recent Labs     22  0935 04/10/22  0650   WBC 11.0 8.6   HGB 13.3 11.4*   MCV 86.5 86.8   RDW 13.6 13.5    222       ANEMIA STUDIES:  No results for input(s): LABIRON, TIBC, FERRITIN, BJBHZERT31, FOLATE, OCCULTBLD in the last 72 hours.     BMP:  Recent Labs     22  0935 04/10/22  0650   * 132*   K 3.5* 3.5*   CL 96* 100   CO2 23 22   BUN 15 14   CREATININE 1.16* 0.99*   GLUCOSE 120* 99   CALCIUM 9.5 8.7       LFTS:  Recent Labs     22  0935   ALKPHOS 260*   ALT 17   AST 22   BILITOT 1.09   LABALBU 3.8       Amylase/Lipase and Ammonia:  No results for input(s): AMYLASE, LIPASE, AMMONIA in the last 72 hours. Acute Hepatitis Panel:  No results found for: HEPBSAG, HEPCAB, HEPBIGM, HEPAIGM    HCV Genotype:  No results found for: HEPATITISCGENOTYPE    HCV Quantitative:  No results found for: HCVQNT    LIVER WORK UP:    AFP  No results found for: AFP    Alpha 1 antitrypsin   No results found for: A1A    MARY  Lab Results   Component Value Date    MARY NEGATIVE 01/14/2013       AMA  No results found for: MITOAB    ASMA  No results found for: SMOOTHMUSCAB    PT/INR  No results for input(s): PROTIME, INR in the last 72 hours. Cancer Markers:  CEA:  No results for input(s): CEA in the last 72 hours. Ca 125:  No results for input(s):  in the last 72 hours. Ca 19-9:   Invalid input(s):   AFP: No results for input(s): AFP in the last 72 hours. Lactic acid:Invalid input(s): LACTIC ACID    Radiology Review:    4/10/2022 CT abdomen and pelvis with IV contrast  FINDINGS:   Lower Chest: No acute abnormality in the lung bases with similar scarring and       atelectasis.  Unchanged cardiomegaly with streak artifact from an indwelling       ICD.  Coronary artery calcifications.       Organs: Unchanged macrolobulated contour of the liver with unchanged       heterogeneous attenuation.  Cholelithiasis without       pericholecystic inflammatory change.  Spleen is normal in size and       attenuation with a small splenule inferiorly.  Atrophic pancreas.  Adrenal       glands are normal caliber.  Kidneys enhance symmetrically without       hydronephrosis.  Ureters are normal caliber.       GI/Bowel:  There is persistent abnormal wall thickening of the mid sigmoid       colon with associated extensive diverticula.  Moderate persistent       inflammatory changes and fat stranding in the adjacent pericolonic fat.  No       bowel obstruction.  Normal appendix.  Incidental duodenal diverticulum       without surrounding inflammatory change.     Pelvis: Diminutive postmenopausal uterus.  Inflammatory changes extend to the   left adnexa/ovary, though       the ovary remains grossly normal caliber.  Urinary bladder is unremarkable       Peritoneum/Retroperitoneum: Persistent thick walled mixed complex fluid and       gas collection in the sigmoid mesocolon which is increased in size from prior       measuring 5.2 x 4.5 cm.  Previously measured 3.5 cm x 3.6 cm.       Persistent adjacent morphologically rounded lymph nodes in       the pericolonic fat.  No significant ascites.       Unchanged enlarged lymph nodes in the [facility] hepatis.  Aortoiliac       atherosclerosis and tortuosity without aneurysm.       Bones/Soft Tissues: No acute abnormality.  Supraumbilical fat containing       ventral hernia without inflammatory changes.  Degenerative changes without       acute or aggressive osseous lesion.           Impression   Recurrent or residual abnormal wall thickening in the mid sigmoid colon       which is not substantially changed from prior exam compatible with chronic   diverticulitis.       Persistent peridiverticular abscess in the adjacent sigmoid mesocolon which   is increased in size as detailed above.       Cholelithiasis       Stable cirrhotic liver       Marked cardiomegaly, unchanged. Principal Problem:    Diverticulitis  Active Problems:    Dilated cardiomyopathy ([address]    Essential hypertension    ICD (implantable cardioverter-defibrillator) battery depletion    Morbid obesity due to excess calories ([address]    AICD (automatic cardioverter/defibrillator) present    MAURO (acute kidney injury) ([address]    Nausea & vomiting  Resolved Problems:    * No resolved hospital problems. *       GI Impression:    1. Complicated diverticulitis    Plan and Recommendations:    1. Will start pt on Zosyn since fluid collection has increased in size despite oral antibiotic treatment  2. Full liquid diet  3.  Cont supportive care with antiemetics, pain medications, IVF per primary  4. We will consult IR to evaluate for possible drainage of abscess  5. N.p.o. at midnight (ok for AM cardiac meds if ok with IR-except Xarelto)  6. Please hold Xarelto Monday morning (patient already received Sunday a.m. dose)  7. Will follow    This plan was formulated in collaboration with Dr. Emily Wilson MD    Thank you for allowing me to participate in the care of your patient. Please feel free to contact me with any questions or concerns. [facility]    This note was created with the assistance of a speech-recognition program.  Although the intention is to generate a document that actually reflects the content of the visit, no guarantees can be provided that every mistake has been identified and corrected by editing.

## 2022-04-10 NOTE — PLAN OF CARE
Patient claims that flexeril does not work for her pain. Will order oxy since it worked for her last time. Flavio Rao MD  Internal medicine, PGY-1  [address]   [unfilled] 5:49 AM Attending Physician Statement  I have discussed the care of Tai Maharaj, including pertinent history and exam findings,  with the resident. I have seen and examined the patient and the key elements of all parts of the encounter have been performed by me. I agree with the assessment, plan and orders as documented by the resident with additions . Treatment plan Discussed with nursing staff in detail , all questions answered . Electronically signed by Paola Becerra MD on   4/10/22 at 9:00 AM EDT    Please note that this chart was generated using voice recognition Dragon dictation software. Although every effort was made to ensure the accuracy of this automated transcription, some errors in transcription may have occurred.

## 2022-04-11 LAB
ABSOLUTE EOS #: 0.12 K/UL (ref 0–0.44)
ABSOLUTE IMMATURE GRANULOCYTE: 0.04 K/UL (ref 0–0.3)
ABSOLUTE LYMPH #: 1.64 K/UL (ref 1.1–3.7)
ABSOLUTE MONO #: 1.15 K/UL (ref 0.1–1.2)
ANION GAP SERPL CALCULATED.3IONS-SCNC: 13 MMOL/L (ref 9–17)
BASOPHILS # BLD: 1 % (ref 0–2)
BASOPHILS ABSOLUTE: 0.07 K/UL (ref 0–0.2)
BUN BLDV-MCNC: 16 MG/DL (ref 8–23)
CALCIUM SERPL-MCNC: 9.1 MG/DL (ref 8.6–10.4)
CHLORIDE BLD-SCNC: 96 MMOL/L (ref 98–107)
CO2: 21 MMOL/L (ref 20–31)
CREAT SERPL-MCNC: 1.32 MG/DL (ref 0.5–0.9)
EOSINOPHILS RELATIVE PERCENT: 1 % (ref 1–4)
GFR AFRICAN AMERICAN: 49 ML/MIN
GFR NON-AFRICAN AMERICAN: 40 ML/MIN
GFR SERPL CREATININE-BSD FRML MDRD: ABNORMAL ML/MIN/{1.73_M2}
GLUCOSE BLD-MCNC: 109 MG/DL (ref 70–99)
HCT VFR BLD CALC: 34.7 % (ref 36.3–47.1)
HEMOGLOBIN: 12.4 G/DL (ref 11.9–15.1)
IMMATURE GRANULOCYTES: 0 %
INR BLD: 1.2
LYMPHOCYTES # BLD: 15 % (ref 24–43)
MCH RBC QN AUTO: 30.8 PG (ref 25.2–33.5)
MCHC RBC AUTO-ENTMCNC: 35.7 G/DL (ref 28.4–34.8)
MCV RBC AUTO: 86.3 FL (ref 82.6–102.9)
MONOCYTES # BLD: 10 % (ref 3–12)
NRBC AUTOMATED: 0 PER 100 WBC
PARTIAL THROMBOPLASTIN TIME: 40.8 SEC (ref 20.5–30.5)
PDW BLD-RTO: 13.4 % (ref 11.8–14.4)
PLATELET # BLD: 284 K/UL (ref 138–453)
PMV BLD AUTO: 11.1 FL (ref 8.1–13.5)
POTASSIUM SERPL-SCNC: 3.7 MMOL/L (ref 3.7–5.3)
PROTHROMBIN TIME: 12.4 SEC (ref 9.1–12.3)
RBC # BLD: 4.02 M/UL (ref 3.95–5.11)
SEG NEUTROPHILS: 73 % (ref 36–65)
SEGMENTED NEUTROPHILS ABSOLUTE COUNT: 8.24 K/UL (ref 1.5–8.1)
SODIUM BLD-SCNC: 130 MMOL/L (ref 135–144)
WBC # BLD: 11.3 K/UL (ref 3.5–11.3)

## 2022-04-11 PROCEDURE — 6370000000 HC RX 637 (ALT 250 FOR IP): Performed by: STUDENT IN AN ORGANIZED HEALTH CARE EDUCATION/TRAINING PROGRAM

## 2022-04-11 PROCEDURE — 99232 SBSQ HOSP IP/OBS MODERATE 35: CPT | Performed by: INTERNAL MEDICINE

## 2022-04-11 PROCEDURE — 6360000002 HC RX W HCPCS: Performed by: NURSE PRACTITIONER

## 2022-04-11 PROCEDURE — 2580000003 HC RX 258: Performed by: NURSE PRACTITIONER

## 2022-04-11 PROCEDURE — 1200000000 HC SEMI PRIVATE

## 2022-04-11 PROCEDURE — 85610 PROTHROMBIN TIME: CPT

## 2022-04-11 PROCEDURE — 85025 COMPLETE CBC W/AUTO DIFF WBC: CPT

## 2022-04-11 PROCEDURE — 2580000003 HC RX 258: Performed by: INTERNAL MEDICINE

## 2022-04-11 PROCEDURE — 36415 COLL VENOUS BLD VENIPUNCTURE: CPT

## 2022-04-11 PROCEDURE — APPSS60 APP SPLIT SHARED TIME 46-60 MINUTES: Performed by: NURSE PRACTITIONER

## 2022-04-11 PROCEDURE — 85730 THROMBOPLASTIN TIME PARTIAL: CPT

## 2022-04-11 PROCEDURE — 99222 1ST HOSP IP/OBS MODERATE 55: CPT | Performed by: INTERNAL MEDICINE

## 2022-04-11 PROCEDURE — 80048 BASIC METABOLIC PNL TOTAL CA: CPT

## 2022-04-11 PROCEDURE — 6360000002 HC RX W HCPCS: Performed by: INTERNAL MEDICINE

## 2022-04-11 PROCEDURE — 6370000000 HC RX 637 (ALT 250 FOR IP): Performed by: INTERNAL MEDICINE

## 2022-04-11 RX ORDER — ONDANSETRON 4 MG/1
4 TABLET, ORALLY DISINTEGRATING ORAL EVERY 8 HOURS PRN
Qty: 42 TABLET | Refills: 0 | Status: SHIPPED | OUTPATIENT
Start: 2022-04-11 | End: 2022-04-25

## 2022-04-11 RX ORDER — POLYETHYLENE GLYCOL 3350 17 G/17G
17 POWDER, FOR SOLUTION ORAL DAILY
Status: DISCONTINUED | OUTPATIENT
Start: 2022-04-11 | End: 2022-04-13 | Stop reason: HOSPADM

## 2022-04-11 RX ORDER — POTASSIUM CHLORIDE 20 MEQ/1
40 TABLET, EXTENDED RELEASE ORAL ONCE
Status: DISCONTINUED | OUTPATIENT
Start: 2022-04-11 | End: 2022-04-13 | Stop reason: HOSPADM

## 2022-04-11 RX ADMIN — ERTAPENEM 1000 MG: 1 INJECTION INTRAMUSCULAR; INTRAVENOUS at 18:13

## 2022-04-11 RX ADMIN — OXYCODONE HYDROCHLORIDE 5 MG: 5 TABLET ORAL at 10:20

## 2022-04-11 RX ADMIN — POLYETHYLENE GLYCOL 3350 17 G: 17 POWDER, FOR SOLUTION ORAL at 20:23

## 2022-04-11 RX ADMIN — ALLOPURINOL 100 MG: 100 TABLET ORAL at 10:21

## 2022-04-11 RX ADMIN — METOPROLOL TARTRATE 25 MG: 25 TABLET ORAL at 20:23

## 2022-04-11 RX ADMIN — SACUBITRIL AND VALSARTAN 0.5 TABLET: 24; 26 TABLET, FILM COATED ORAL at 10:20

## 2022-04-11 RX ADMIN — METOPROLOL TARTRATE 25 MG: 25 TABLET ORAL at 10:21

## 2022-04-11 RX ADMIN — PIPERACILLIN AND TAZOBACTAM 3375 MG: 3; .375 INJECTION, POWDER, FOR SOLUTION INTRAVENOUS at 07:51

## 2022-04-11 RX ADMIN — SACUBITRIL AND VALSARTAN 0.5 TABLET: 24; 26 TABLET, FILM COATED ORAL at 20:23

## 2022-04-11 RX ADMIN — SPIRONOLACTONE 12.5 MG: 25 TABLET ORAL at 10:21

## 2022-04-11 RX ADMIN — OXYCODONE HYDROCHLORIDE 5 MG: 5 TABLET ORAL at 16:52

## 2022-04-11 RX ADMIN — LEVOTHYROXINE SODIUM 25 MCG: 25 TABLET ORAL at 10:23

## 2022-04-11 ASSESSMENT — PAIN DESCRIPTION - PAIN TYPE
TYPE: ACUTE PAIN
TYPE: ACUTE PAIN

## 2022-04-11 ASSESSMENT — PAIN DESCRIPTION - ONSET
ONSET: ON-GOING
ONSET: ON-GOING

## 2022-04-11 ASSESSMENT — PAIN DESCRIPTION - PROGRESSION

## 2022-04-11 ASSESSMENT — PAIN SCALES - GENERAL
PAINLEVEL_OUTOF10: 6
PAINLEVEL_OUTOF10: 5
PAINLEVEL_OUTOF10: 6
PAINLEVEL_OUTOF10: 5

## 2022-04-11 ASSESSMENT — PAIN DESCRIPTION - LOCATION
LOCATION: BACK
LOCATION: ABDOMEN

## 2022-04-11 ASSESSMENT — PAIN DESCRIPTION - DESCRIPTORS
DESCRIPTORS: ACHING;DISCOMFORT;CONSTANT
DESCRIPTORS: DISCOMFORT

## 2022-04-11 ASSESSMENT — PAIN DESCRIPTION - ORIENTATION: ORIENTATION: MID

## 2022-04-11 ASSESSMENT — PAIN DESCRIPTION - FREQUENCY: FREQUENCY: CONTINUOUS

## 2022-04-11 NOTE — PLAN OF CARE
Nutrition Problem #1: Inadequate oral intake  Intervention: Food and/or Nutrient Delivery: Continue Current Diet,Start Oral Nutrition Supplement (Provide strawberry Ensure oral supplements x 2 per day.)  Nutritional Goals: Meet at least 75% of estimated nutrition needs.

## 2022-04-11 NOTE — PROGRESS NOTES
Comprehensive Nutrition Assessment    Type and Reason for Visit:  Initial,Consult (Diet Education - Low Residual/Low Fiber diet)    Nutrition Recommendations/Plan: Continue current diet. Will provide strawberry Ensure Enlive oral supplements x 2 per day. Encourage/monitor PO intakes as tolerated. Will follow-up to provide additional diet education. Monitor labs, weights, and plan of care. Nutrition Assessment:  Consulted for diet education for low residual/low fiber diet. Pt admitted with c/o abdominal pain and for diverticulitis along with a sigmoid abscess present. PMH includes: CHF, dilated cardiomyopathy, HTN, mitral regurg, with AICD; h/o diverticulitis with sigmoid abscess. Pt reports she was NPO earlier but has been started on a diet and waiting for her meal tray. Pt states she is hungry and has not eaten since Thursday. Pt would like to try strawberry Ensure supplements. Provided handouts on a Low Fiber/Low Residual diet. Discussed foods pt should be avoiding while following this diet. Pt very receptive to education. Will follow-up for additional questions and to monitor PO intakes. Labs reviewed; Na 130 mmol/L. Meds reviewed. Malnutrition Assessment:  Malnutrition Status:   At risk for malnutrition     Context:  Acute Illness     Findings of the 6 clinical characteristics of malnutrition:  Energy Intake:  Mild decrease in energy intake x past 4 days per pt  Weight Loss:  No significant weight loss     Body Fat Loss:  No significant body fat loss   Muscle Mass Loss:  No significant muscle mass loss  Fluid Accumulation:  No significant fluid accumulation   Strength:  Not Performed    Estimated Daily Nutrient Needs:  Energy (kcal):  MSJ x 1.1-1.2 = 9582-4235 kcals/day; Weight Used for Energy Requirements:  Current     Protein (g):  1.2-1.5 gm/kg = 70-90 gm pro/day; Weight Used for Protein Requirements:  Ideal        Fluid (ml/day):  per MD    Nutrition Related Findings:  labs/meds reviewed. Wounds:  None       Current Nutrition Therapies:    ADULT DIET; Regular  ADULT ORAL NUTRITION SUPPLEMENT; Breakfast, Dinner; Standard High Calorie/High Protein Oral Supplement    Anthropometric Measures:  · Height: 5' 6\" (167.6 cm)  · Current Body Weight: 223 lb 8 oz (101.4 kg)   · Admission Body Weight: 227 lb (103 kg)    · Usual Body Weight: 224 lb (101.6 kg)     · Ideal Body Weight: 130 lbs; % Ideal Body Weight 171.9 %   · BMI: 36.1  · BMI Categories: Obese Class 2 (BMI 35.0 -39.9)       Nutrition Diagnosis:   · Inadequate oral intake related to altered GI function as evidenced by poor intake prior to admission (recent start of oral diet; need for ONS)    Nutrition Interventions:   Food and/or Nutrient Delivery:  Continue Current Diet,Start Oral Nutrition Supplement (Provide strawberry Ensure oral supplements x 2 per day.)  Nutrition Education/Counseling:  Education completed (Provided handouts and education on a low residual/low fiber diet. Contact information provided.)   Coordination of Nutrition Care:  Continue to monitor while inpatient    Goals:  Meet at least 75% of estimated nutrition needs. Nutrition Monitoring and Evaluation:   Behavioral-Environmental Outcomes:  None Identified   Food/Nutrient Intake Outcomes:  Food and Nutrient Intake,Supplement Intake  Physical Signs/Symptoms Outcomes:  Biochemical Data,GI Status,Fluid Status or Edema,Hemodynamic Status,Nutrition Focused Physical Findings,Skin,Weight     Discharge Planning:     Too soon to determine     Electronically signed by Robert Avina RD, LD on 4/11/22 at 3:24 PM EDT    Contact: [phone]

## 2022-04-11 NOTE — PROGRESS NOTES
CT reviewed by IR attending Dr. Gita Torres. At this time there is no safe access site due to bowel and bone. IR suggests rescanning at a later time to see if the abscess has grown and there is a new window for percutaneous access.

## 2022-04-11 NOTE — CONSULTS
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 Stop date 4-20-22  · Patient has failed attempt at oral antimicrobial treatment  · Zosyn risks causing CHF in someone with a LVEF of !7 %  · Monitor renal function  · Drainage of the abdominal abscess for source control    Medical Decision Making/Summary/Discussion:4/11/2022     ·   Infection Control Recommendations   · [place] Precautions    Antimicrobial Stewardship Recommendations     · Simplification of therapy  · Targeted therapy  · IV to oral conversion  · PK dosing    Coordination of Outpatient Care:   · Estimated Length of IV antimicrobials:  · Patient will need Midline Catheter Insertion:   · Patient will need PICC line Insertion:  · Patient will need: Home IV , [place],  SNF,  LTAC: TBD  · Patient will need outpatient wound care:    Chief complaint/reason for consultation:   · Antibiotic recommendations for diverticulitis with abdominal abscess-Unable to place drain per IR      History of Present Illness:   Keila Menchaca is a 78y.o.-year-old female who was initially admitted on 4/9/2022. Patient seen at the request of Dr. Bill Leonard:    This patient, with a complicated medical history including diverticulitis and sigmoid colonic abscess, presented to [facility] ED on 4/9/2022 with complaints of left lower quadrant abdominal pain with associated nausea, vomiting and diarrhea.     She saw Dr. Charly Newman, with GI, at his office on 4/5/2022 for follow-up from 3/29/2022 CT colonography that revealed abscess of the sigmoid colon with diverticulosis of the large intestine. According to Dr. Franky Foote note, he opted for conservative treatment, with a CT colonoscopy versus colonoscopy, as she was asymptomatic and has a significant cardiac history. He did mention in his note that she may be at high risk for sigmoid colectomy    Upon admission to [address] [place] [address], she was initiated on IV Cipro and Flagyl. This was changed to IV Zosyn per GI on 4/10/2022. IR was consulted for intra-abdominal abscess drainage, but unfortunately as there is no safe access site due to bowel and bone, IR suggest rescanning at a later date to see if the abscess has increased in size and there would be a new area for percutaneous access. Imaging:    Impression CT abdomen/pelvis with IV contrast 4/10/2022   Recurrent or residual abnormal wall thickening in the mid sigmoid colon       which is not substantially changed from prior exam compatible with chronic   diverticulitis.       Persistent peridiverticular abscess in the adjacent sigmoid mesocolon which   is increased in size as detailed above.       Cholelithiasis       Stable cirrhotic liver       Marked cardiomegaly, unchanged. Infectious disease was consulted for antimicrobial management      CURRENT EVALUATION 4/11/2022     Afebrile  VS stable    The patient was up to the chair at the time of evaluation. She is alert and oriented and experiencing abdominal pain. Discussed with RN      Labs, X rays reviewed: 4/11/2022    BUN:16  Cr:1.32    WBC:11.3  Hb:12.4  Plat: 284    CRP: 183.1    Cultures:  Urine:  ·   Blood:  ·   Sputum :  ·   Wound:  ·     Discussed with patient, RN, IM. I have personally reviewed the past medical history, past surgical history, medications, social history, and family history, and I have updated the database accordingly.   Past Medical History:     Past Medical History:   Diagnosis Date    Abnormal Pap smear     Acute on chronic systolic congestive heart failure (HCC)  AICD (automatic cardioverter/defibrillator) present     MAURO (acute kidney injury) ([facility] 03/04/2017    Anemia     Arthritis     Cardiomyopathy ([facility] 12/20/2012    robotic replacement of 2 left ventricular leads/replacement of ICD generator    Cardiomyopathy, nonischemic ([facility] 12/20/2012    Chronic combined systolic and diastolic congestive heart failure (HCC)     Ejection fraction < 50%     HTN (hypertension)     Hypothyroidism     Ischemic cardiomyopathy     Left bundle branch block     Mitral regurgitation     Morbid obesity due to excess calories ([facility] 01/15/2017    Pulmonary hypertension ([facility]     Sudden onset of severe headache        Past Surgical  History:     Past Surgical History:   Procedure Laterality Date    COLONOSCOPY      HERNIA REPAIR      OTHER SURGICAL HISTORY  12/20/2012    robotic placement of 2 left ventricular leads/replacement of ICD generator    PACEMAKER PLACEMENT      medtronic Viva XT CRT -D [id number] Serial : [id number]    TUBAL LIGATION         Medications:      potassium chloride  40 mEq Oral Once    piperacillin-tazobactam  3,375 mg IntraVENous Q8H    acetaminophen  650 mg Oral Once    allopurinol  100 mg Oral Daily    levothyroxine  25 mcg Oral Daily    lidocaine  1 patch Topical Daily    metoprolol tartrate  25 mg Oral BID    [Held by provider] rivaroxaban  20 mg Oral Daily    sacubitril-valsartan  0.5 tablet Oral BID    spironolactone  12.5 mg Oral Daily    sodium chloride flush  5-40 mL IntraVENous 2 times per day       Social History:     Social History     Socioeconomic History    Marital status:      Spouse name: Not on file    Number of children: 2    Years of education: Not on file    Highest education level: Not on file   Occupational History    Not on file   Tobacco Use    Smoking status: Never Smoker    Smokeless tobacco: Never Used   Vaping Use    Vaping Use: Never used   Substance and Sexual Activity    Alcohol use:  No Alcohol/week: 0.0 standard drinks    Drug use: Not Currently    Sexual activity: Never   Other Topics Concern    Not on file   Social History Narrative    Not on file     Social Determinants of Health     Financial Resource Strain:     Difficulty of Paying Living Expenses: Not on file   Food Insecurity:     Worried About Running Out of Food in the Last Year: Not on file    Ninfa of Food in the Last Year: Not on file   Transportation Needs:     Lack of Transportation (Medical): Not on file    Lack of Transportation (Non-Medical):  Not on file   Physical Activity:     Days of Exercise per Week: Not on file    Minutes of Exercise per Session: Not on file   Stress:     Feeling of Stress : Not on file   Social Connections:     Frequency of Communication with Friends and Family: Not on file    Frequency of Social Gatherings with Friends and Family: Not on file    Attends [employer] Services: Not on file    Active Member of Clubs or Organizations: Not on file    Attends Club or Organization Meetings: Not on file    Marital Status: Not on file   Intimate Partner Violence:     Fear of Current or Ex-Partner: Not on file    Emotionally Abused: Not on file    Physically Abused: Not on file    Sexually Abused: Not on file   Housing Stability:     Unable to Pay for Housing in the Last Year: Not on file    Number of Places Lived in the Last Year: Not on file    Unstable Housing in the Last Year: Not on file       Family History:     Family History   Problem Relation Age of Onset    Cancer Other         ovarian    High Blood Pressure Mother     Other Mother         copd    Arthritis Neg Hx     Asthma Neg Hx     Birth Defects Neg Hx     Depression Neg Hx     Diabetes Neg Hx     Early Death Neg Hx     Hearing Loss Neg Hx     Heart Disease Neg Hx     High Cholesterol Neg Hx     Kidney Disease Neg Hx     Learning Disabilities Neg Hx     Mental Retardation Neg Hx     Mental Illness Neg Hx     Cardiovascular: Regular rate and rhythm without murmurs, rubs, or gallops. Abdomen: Soft, tender. Bowel sounds normal. No organomegaly. Abdominal tenderness on pressure. No rebound or guarding. All four Extremities: No cyanosis, clubbing, edema, or effusions. Neurologic: No gross sensory or motor deficits. Skin: Warm and dry with good turgor. No signs of peripheral arterial or venous insufficiency. No ulcerations. No open wounds. Medical Decision Making -Laboratory:   I have independently reviewed/ordered the following labs:    CBC with Differential:   Recent Labs     04/10/22  0650 04/11/22  0608   WBC 8.6 11.3   HGB 11.4* 12.4   HCT 32.1* 34.7*    284   LYMPHOPCT 12* 15*   MONOPCT 12 10     BMP:   Recent Labs     04/09/22  0935 04/09/22  0935 04/10/22  0650 04/11/22  0608   *   < > 132* 130*   K 3.5*   < > 3.5* 3.7   CL 96*   < > 100 96*   CO2 23   < > 22 21   BUN 15   < > 14 16   CREATININE 1.16*   < > 0.99* 1.32*   MG 2.0  --  1.9  --     < > = values in this interval not displayed. Hepatic Function Panel:   Recent Labs     04/09/22  0935   PROT 7.9   LABALBU 3.8   BILITOT 1.09   ALKPHOS 260*   ALT 17   AST 22     No results for input(s): RPR in the last 72 hours. No results for input(s): HIV in the last 72 hours. No results for input(s): BC in the last 72 hours.   Lab Results   Component Value Date    MUCUS 1+ 08/23/2015    PH 7.44 12/20/2012    RBC 4.02 04/11/2022    RBC 4.45 04/03/2012    TRICHOMONAS NOT REPORTED 08/23/2015    WBC 11.3 04/11/2022    YEAST NOT REPORTED 08/23/2015    TURBIDITY Clear 12/04/2021     Lab Results   Component Value Date    CREATININE 1.32 04/11/2022    GLUCOSE 109 04/11/2022    GLUCOSE 100 04/03/2012       Medical Decision Making-Imaging:     Narrative   EXAMINATION:   CT OF THE ABDOMEN AND PELVIS WITH CONTRAST 4/10/2022 7:41 am       TECHNIQUE:   CT of the abdomen and pelvis was performed with the administration of   intravenous contrast. Multiplanar reformatted images are provided for review. Dose modulation, iterative reconstruction, and/or weight based adjustment of   the mA/kV was utilized to reduce the radiation dose to as low as reasonably   achievable.       COMPARISON:   01/27/2022       HISTORY:   ORDERING SYSTEM PROVIDED HISTORY: rule out diverticulitits, h/o sigmoid   abscess   TECHNOLOGIST PROVIDED HISTORY:   rule out diverticulitits, h/o sigmoid abscess       Reason for Exam: abdominal pain       FINDINGS:   Lower Chest: No acute abnormality in the lung bases with similar scarring and       atelectasis.  Unchanged cardiomegaly with streak artifact from an indwelling       ICD.  Coronary artery calcifications.       Organs: Unchanged macrolobulated contour of the liver with unchanged       heterogeneous attenuation.  Cholelithiasis without       pericholecystic inflammatory change.  Spleen is normal in size and       attenuation with a small splenule inferiorly.  Atrophic pancreas.  Adrenal       glands are normal caliber.  Kidneys enhance symmetrically without       hydronephrosis.  Ureters are normal caliber.       GI/Bowel:  There is persistent abnormal wall thickening of the mid sigmoid       colon with associated extensive diverticula.  Moderate persistent       inflammatory changes and fat stranding in the adjacent pericolonic fat.  No       bowel obstruction.  Normal appendix.  Incidental duodenal diverticulum       without surrounding inflammatory change.       Pelvis: Diminutive postmenopausal uterus.  Inflammatory changes extend to the   left adnexa/ovary, though       the ovary remains grossly normal caliber.  Urinary bladder is unremarkable       Peritoneum/Retroperitoneum: Persistent thick walled mixed complex fluid and       gas collection in the sigmoid mesocolon which is increased in size from prior       measuring 5.2 x 4.5 cm.  Previously measured 3.5 cm x 3.6 cm.       Persistent adjacent morphologically rounded lymph nodes in     the pericolonic fat.  No significant ascites.       Unchanged enlarged lymph nodes in the [facility] hepatis.  Aortoiliac       atherosclerosis and tortuosity without aneurysm.       Bones/Soft Tissues: No acute abnormality.  Supraumbilical fat containing       ventral hernia without inflammatory changes.  Degenerative changes without       acute or aggressive osseous lesion.           Impression   Recurrent or residual abnormal wall thickening in the mid sigmoid colon       which is not substantially changed from prior exam compatible with chronic   diverticulitis.       Persistent peridiverticular abscess in the adjacent sigmoid mesocolon which   is increased in size as detailed above.       Cholelithiasis       Stable cirrhotic liver       Marked cardiomegaly, unchanged.             Medical Decision Atmtyc-Fmpynewz-Anntg:       Medical Decision Making-Other:     Note:  · Labs, medications, radiologic studies were reviewed with personal review of films  · Large amounts of data were reviewed  · Discussed with nursing Staff, Discharge planner  · Infection Control and Prevention measures reviewed  · All prior entries were reviewed  · Administer medications as ordered  · Prognosis: Guarded  · Discharge planning reviewed  · Follow up as outpatient. Thank you for allowing us to participate in the care of this patient. Please call with questions. NELLA Faria - CNP     ATTESTATION:    I have discussed the case, including pertinent history and exam findings with the APRN. I have evaluated the  History, physical findings and pictures of the patient and the key elements of the encounter have been performed by me. I have reviewed the laboratory data, other diagnostic studies and discussed them with the APRN. I have updated the medical record where necessary. I agree with the assessment, plan and orders as documented by the APRN.     Karen David MD.      Pager: [phone] - Office: [phone]

## 2022-04-11 NOTE — PROGRESS NOTES
Pt called to let us know she will not be in for cardiac rehab tues April 12 because she is having a procedure done. Will reschedule. Pt will let us know if she will be able to return on thurs April 14.

## 2022-04-11 NOTE — PROGRESS NOTES
[facility]  Internal Medicine Teaching Residency Program  Inpatient Daily Progress Note  ______________________________________________________________________________    Patient: Kaia Quiroz  YOB: 1955   ABK:[id number]    Acct: [de-identified]     Room: [id number]  Admit date: 4/9/2022  Today's date: 04/11/22  Number of days in the hospital: 2    SUBJECTIVE   Admitting Diagnosis: Diverticulitis  CC: abdominal pain   NAEON. Pain has resovled. CT showed worsening sigmoid abscess. Will go for drainage with IR.       ROS:  Constitutional:  negative for chills, fevers, sweats  Respiratory:  negative for cough, dyspnea on exertion, hemoptysis, shortness of breath, wheezing  Cardiovascular:  negative for chest pain, chest pressure/discomfort, lower extremity edema, palpitations  Gastrointestinal:  Abdominal pain with n/v  Neurological:  negative for dizziness, headache    BRIEF HISTORY     The patient is a pleasant 78 y.o. female PMH diverticulitis, nonischemic cardiomyopathy last EF 17% s/p AICD placement, HTN  presents with a chief complaint of abdominal pain that started 2 days ago. Patient reports that she has been having lower abdominal pain and n/v. Of note patient had a recent CT colonoscopy without active disease but did reveal pancolonic diverticulosis 1 week ago. She had an appointment with GI a week ago where she was given abx. She reports that since then she has been having pain much like her previous episodes of diverticulitis. She also reports n/v with food. WBC is wnl,  LFTs are normal except for elevated ALP at 260. Small bump in creatinine and low potassium. Patient requesting to see GI this admission. Former smoker, occasional alcohol, no illegal drugs. On exam patient reports Lower abdominal pain during palpation and RUQ tenderness.  Aguayo's sign is positive.        OBJECTIVE     Vital Signs:  BP 94/63   Pulse 70   Temp 98.7 °F (37.1 °C) (Oral)   Resp 16   Ht 5' 6\" (1.676 m)   Wt 223 lb 8 oz (101.4 kg)   SpO2 93%   BMI 36.07 kg/m²     Temp (24hrs), Av.8 °F (37.1 °C), Min:98.7 °F (37.1 °C), Max:98.9 °F (37.2 °C)    No intake/output data recorded. Physical Exam:  Physical Exam  Constitutional:       General: She is not in acute distress. Appearance: Normal appearance. She is obese. She is not ill-appearing, toxic-appearing or diaphoretic. HENT:      Mouth/Throat:      Mouth: Mucous membranes are moist.      Pharynx: Oropharynx is clear. Eyes:      General: No scleral icterus. Cardiovascular:      Rate and Rhythm: Normal rate and regular rhythm. Pulses: Normal pulses. Heart sounds: Normal heart sounds. No murmur heard. No friction rub. No gallop. Pulmonary:      Effort: Pulmonary effort is normal.      Breath sounds: Normal breath sounds. Abdominal:      General: Abdomen is flat. Bowel sounds are normal. There is no distension. Palpations: Abdomen is soft. There is no mass. Tenderness: There is abdominal tenderness. There is no guarding or rebound. Hernia: No hernia is present. Musculoskeletal:         General: No swelling, tenderness, deformity or signs of injury. Right lower leg: No edema. Left lower leg: No edema. Skin:     General: Skin is warm and dry. Coloration: Skin is not jaundiced or pale. Findings: No bruising. Neurological:      General: No focal deficit present. Mental Status: She is alert and oriented to person, place, and time. Cranial Nerves: No cranial nerve deficit. Motor: No weakness. Psychiatric:         Mood and Affect: Mood normal.         Behavior: Behavior normal.         Thought Content:  Thought content normal.         Judgment: Judgment normal.           Medications:  Scheduled Medications:    potassium chloride  40 mEq Oral Once    piperacillin-tazobactam  3,375 mg IntraVENous Q8H    acetaminophen  650 mg Oral Once    allopurinol  100 mg Oral Daily    levothyroxine  25 mcg Oral Daily    lidocaine  1 patch Topical Daily    metoprolol tartrate  25 mg Oral BID    [Held by provider] rivaroxaban  20 mg Oral Daily    sacubitril-valsartan  0.5 tablet Oral BID    spironolactone  12.5 mg Oral Daily    sodium chloride flush  5-40 mL IntraVENous 2 times per day     Continuous Infusions:    sodium chloride      sodium chloride 75 mL/hr at 04/09/22 2012     PRN Medicationsmagnesium hydroxide, 30 mL, Daily PRN  diphenhydrAMINE, 25 mg, Q6H PRN  sodium chloride flush, 5-40 mL, PRN  sodium chloride, , PRN  ondansetron, 4 mg, Q8H PRN   Or  ondansetron, 4 mg, Q6H PRN  polyethylene glycol, 17 g, Daily PRN  acetaminophen, 650 mg, Q6H PRN   Or  acetaminophen, 650 mg, Q6H PRN  oxyCODONE, 5 mg, Q6H PRN        Diagnostic Labs:  CBC:   Recent Labs     04/09/22  0935 04/10/22  0650 04/11/22  0608   WBC 11.0 8.6 11.3   RBC 4.36 3.70* 4.02   HGB 13.3 11.4* 12.4   HCT 37.7 32.1* 34.7*   MCV 86.5 86.8 86.3   RDW 13.6 13.5 13.4    222 284     BMP:   Recent Labs     04/09/22  0935 04/10/22  0650 04/11/22  0608   * 132* 130*   K 3.5* 3.5* 3.7   CL 96* 100 96*   CO2 23 22 21   BUN 15 14 16   CREATININE 1.16* 0.99* 1.32*     BNP: No results for input(s): BNP in the last 72 hours. PT/INR: No results for input(s): PROTIME, INR in the last 72 hours. APTT: No results for input(s): APTT in the last 72 hours. CARDIAC ENZYMES: No results for input(s): CKMB, CKMBINDEX, TROPONINI in the last 72 hours.     Invalid input(s): CKTOTAL;3  FASTING LIPID PANEL:  Lab Results   Component Value Date    CHOL 180 05/23/2020    HDL 42 05/23/2020    TRIG 79 05/23/2020     LIVER PROFILE:   Recent Labs     04/09/22  0935   AST 22   ALT 17   BILITOT 1.09   ALKPHOS 260*      MICROBIOLOGY:   Lab Results   Component Value Date/Time    CULTURE PATIENT DISCHARGED BEFORE SAMPLE COLLECTED 12/06/2021 10:30 AM       Imaging:    XR ABDOMEN (KUB) (SINGLE AP VIEW)    Result Date: 4/9/2022  No evidence of bowel obstruction. US GALLBLADDER RUQ    Result Date: 4/9/2022  1. Cirrhosis, without discrete intrahepatic nodule or mass. 2. Cholelithiasis, without sonographic evidence of cholecystitis. 3. Unremarkable sonographic appearance of the common bile duct, right kidney, and pancreas. ASSESSMENT & PLAN     Assessment and Plan:    Principal Problem:    Diverticulitis  Active Problems:    Dilated cardiomyopathy ([address]    Essential hypertension    ICD (implantable cardioverter-defibrillator) battery depletion    Morbid obesity due to excess calories ([address]    AICD (automatic cardioverter/defibrillator) present    MAURO (acute kidney injury) ([address]    Nausea & vomiting    Diverticulitis of large intestine with abscess without bleeding    Diverticulitis of colon    Lower abdominal pain  Resolved Problems:    * No resolved hospital problems. *    Diverticulitis  Abscess   - continue cipro and flagyl IV  - pain and nausea control  - GI consulted  -  NPO  - CT AP showed worsening sigmoid colon abscess, will go for IR drainage today        Nausea vomiting  - positive gore's sign on exam  - RUQ US is negative     MAURO  - Cr initally  1.16 mildly elevated is currently 1.3   - suspect this is due to IV constrast. Due to low EF will hold off IVF and monitor   - avoid nephrotoxic meds     Nonischemic cardiomyopathy  - last EF was 17%  - s/p AICD placement  - avoid IVF     HTN  - resume lopressor,entresto, aldectone home dose     Morbid obesity  - BMI of 36.6  - recommend weight loss. Jeancarlos Guo MD  Internal Medicine Resident  [facility]  4/11/2022 9:22 AM Attending Physician Statement  I have discussed the care of Barrera Cabrera, including pertinent history and exam findings,  with the resident. I have seen and examined the patient and the key elements of all parts of the encounter have been performed by me.   I agree with the assessment, plan and orders as documented by the resident with additions . To proceed with abscess drainage per IR patient doing well soft abdomen    Treatment plan Discussed with nursing staff in detail , all questions answered . Electronically signed by Lobo Tejeda MD on   4/11/22 at 11:10 AM EDT    Please note that this chart was generated using voice recognition Dragon dictation software. Although every effort was made to ensure the accuracy of this automated transcription, some errors in transcription may have occurred.

## 2022-04-11 NOTE — PROGRESS NOTES
Congestive Heart Failure Education note:      Discussed 2000mg/day sodium restricted diet; patient verbalized understanding. Moderate daily exercise encouraged as tolerated. Discussed rest breaks as needed; patient verbalized understanding. Patient instructed to weigh self at the same time of each day, using same clothes and same scale; reinforced teaching to monitor for 3-5 lb weight increase over 1-2 days notify physician if charge noted. Patient verbalized understanding. Patient instructed to limit fluid intake to 2 liters per day. Patient verbalized understanding. Risks of smoking discussed with the patient if applicable; patient strongly discouraged to smoke. Patient verbalized understanding. Signs and symptoms of CHF discussed with patient, such as feeling more tired than normal, feeling short of breath, coughing that increases when you lie down, sudden weight gain, swelling of your feet, legs or belly. Patient verbalized understanding to notify physician office if these symptoms occur. Compliance with plan of care and further disease process causes discussed with patient, patient encouraged to keep all follow up appointments. Patient verbalized understanding.       Patient is seen in CHF clinic not seen since October 2021 states wants to come back we will call her when she's discharged

## 2022-04-11 NOTE — PROGRESS NOTES
THE [facility] AT [facility]   Progress Note    Nenita Dhillon is a 78 y.o. female patient. Hospitalization Day:2      Chief consult reason:     Diverticulitis-failed OP treatment    Subjective:  Patient seen and examined no acute events overnight. Patient was evaluated by IR today and abscesses not able to be safely accessed for drainage  Patient tolerating diet, pain controlled with Devorah  No nausea or vomiting no rectal bleeding  No fevers or chills    VITALS:  BP 94/63   Pulse 70   Temp 98.7 °F (37.1 °C) (Oral)   Resp 16   Ht 5' 6\" (1.676 m)   Wt 223 lb 8 oz (101.4 kg)   SpO2 93%   BMI 36.07 kg/m²   TEMPERATURE:  Current - Temp: 98.7 °F (37.1 °C); Max - Temp  Av.8 °F (37.1 °C)  Min: 98.7 °F (37.1 °C)  Max: 98.9 °F (37.2 °C)    Physical Assessment:  General appearance:  alert, cooperative and mild pain distress  Mental Status:  oriented to person, place and time and normal affect  Lungs:  clear to auscultation bilaterally, normal effort  Heart:  regular rate and rhythm, no murmur  Abdomen:  soft, nontender, nondistended, normal bowel sounds, no masses, hepatomegaly, splenomegaly  Extremities:  no edema, redness, tenderness in the calves  Skin:  no gross lesions, rashes, induration    Data Review:    Labs and Imaging:     CBC:  Recent Labs     22  0935 04/10/22  0650 22  0608   WBC 11.0 8.6 11.3   HGB 13.3 11.4* 12.4   MCV 86.5 86.8 86.3   RDW 13.6 13.5 13.4    222 284       ANEMIA STUDIES:  No results for input(s): LABIRON, TIBC, FERRITIN, [id number], FOLATE, OCCULTBLD in the last 72 hours.     BMP:  Recent Labs     22  0935 04/10/22  0650 22  0608   * 132* 130*   K 3.5* 3.5* 3.7   CL 96* 100 96*   CO2 23 22 21   BUN 15 14 16   CREATININE 1.16* 0.99* 1.32*   GLUCOSE 120* 99 109*   CALCIUM 9.5 8.7 9.1       LFTS:  Recent Labs     22  0935   ALKPHOS 260*   ALT 17   AST 22   BILITOT 1.09   LABALBU 3.8       Amylase/Lipase and Ammonia:  No results for input(s): AMYLASE, LIPASE, AMMONIA in the last 72 hours. Acute Hepatitis Panel:  No results found for: HEPBSAG, HEPCAB, HEPBIGM, HEPAIGM    HCV Genotype:  No results found for: HEPATITISCGENOTYPE    HCV Quantitative:  No results found for: HCVQNT    LIVER WORK UP:    AFP  No results found for: AFP    Alpha 1 antitrypsin   No results found for: A1A    MARY  Lab Results   Component Value Date    MARY NEGATIVE 01/14/2013       AMA  No results found for: Anitra Giovannaius    ASMA  No results found for: SMOOTHMUSCAB    PT/INR  Recent Labs     04/11/22  0908   PROTIME 12.4*   INR 1.2       Cancer Markers:  CEA:  No results for input(s): CEA in the last 72 hours. Ca 125:  No results for input(s):  in the last 72 hours. Ca 19-9:   Invalid input(s):   AFP: No results for input(s): AFP in the last 72 hours. Lactic acid:Invalid input(s): LACTIC ACID    Radiology Review:    4/10/2022 CT abdomen and pelvis with IV contrast  FINDINGS:   Lower Chest: No acute abnormality in the lung bases with similar scarring and       atelectasis.  Unchanged cardiomegaly with streak artifact from an indwelling       ICD.  Coronary artery calcifications.       Organs: Unchanged macrolobulated contour of the liver with unchanged       heterogeneous attenuation.  Cholelithiasis without       pericholecystic inflammatory change.  Spleen is normal in size and       attenuation with a small splenule inferiorly.  Atrophic pancreas.  Adrenal       glands are normal caliber.  Kidneys enhance symmetrically without       hydronephrosis.  Ureters are normal caliber.       GI/Bowel:  There is persistent abnormal wall thickening of the mid sigmoid       colon with associated extensive diverticula.  Moderate persistent       inflammatory changes and fat stranding in the adjacent pericolonic fat.  No       bowel obstruction.  Normal appendix.  Incidental duodenal diverticulum       without surrounding inflammatory change.       Pelvis: Diminutive postmenopausal uterus.  Inflammatory changes extend to the   left adnexa/ovary, though       the ovary remains grossly normal caliber.  Urinary bladder is unremarkable       Peritoneum/Retroperitoneum: Persistent thick walled mixed complex fluid and       gas collection in the sigmoid mesocolon which is increased in size from prior       measuring 5.2 x 4.5 cm.  Previously measured 3.5 cm x 3.6 cm.       Persistent adjacent morphologically rounded lymph nodes in       the pericolonic fat.  No significant ascites.       Unchanged enlarged lymph nodes in the mercy hepatis.  Aortoiliac       atherosclerosis and tortuosity without aneurysm.       Bones/Soft Tissues: No acute abnormality.  Supraumbilical fat containing       ventral hernia without inflammatory changes.  Degenerative changes without       acute or aggressive osseous lesion.           Impression   Recurrent or residual abnormal wall thickening in the mid sigmoid colon       which is not substantially changed from prior exam compatible with chronic   diverticulitis.       Persistent peridiverticular abscess in the adjacent sigmoid mesocolon which   is increased in size as detailed above.       Cholelithiasis       Stable cirrhotic liver       Marked cardiomegaly, unchanged. Principal Problem:    Diverticulitis  Active Problems:    Dilated cardiomyopathy ([address]    Essential hypertension    ICD (implantable cardioverter-defibrillator) battery depletion    Morbid obesity due to excess calories ([address]    AICD (automatic cardioverter/defibrillator) present    MAURO (acute kidney injury) ([address]    Nausea & vomiting    Diverticulitis of large intestine with abscess without bleeding    Diverticulitis of colon    Lower abdominal pain  Resolved Problems:    * No resolved hospital problems. *       GI Impression:    1. Complicated diverticulitis-sigmoid abscess unable to be safely drained per IR    Plan and Recommendations:    1.  Recommend consult for ID for antibiotic recommendations  2. Diet as tolerated  3. Cont supportive care with antiemetics, pain medications, IVF per primary  4. Patient will need to follow-up in the office with Dr. Gertrude Coughlin in 2 to 3 weeks for ongoing GI care. No further recommendations at this time we will sign off. This plan was formulated in collaboration with Dr. Kenrick Garcia MD    Thank you for allowing me to participate in the care of your patient. Please feel free to contact me with any questions or concerns. 2 [facility]    This note was created with the assistance of a speech-recognition program.  Although the intention is to generate a document that actually reflects the content of the visit, no guarantees can be provided that every mistake has been identified and corrected by editing.

## 2022-04-12 ENCOUNTER — HOSPITAL ENCOUNTER (OUTPATIENT)
Dept: CARDIAC REHAB | Age: 67
Setting detail: THERAPIES SERIES
End: 2022-04-12
Payer: MEDICARE

## 2022-04-12 LAB
ABSOLUTE EOS #: 0.17 K/UL (ref 0–0.44)
ABSOLUTE IMMATURE GRANULOCYTE: <0.03 K/UL (ref 0–0.3)
ABSOLUTE LYMPH #: 1.03 K/UL (ref 1.1–3.7)
ABSOLUTE MONO #: 0.94 K/UL (ref 0.1–1.2)
ANION GAP SERPL CALCULATED.3IONS-SCNC: 12 MMOL/L (ref 9–17)
BASOPHILS # BLD: 1 % (ref 0–2)
BASOPHILS ABSOLUTE: 0.05 K/UL (ref 0–0.2)
BUN BLDV-MCNC: 12 MG/DL (ref 8–23)
CALCIUM SERPL-MCNC: 8.6 MG/DL (ref 8.6–10.4)
CHLORIDE BLD-SCNC: 102 MMOL/L (ref 98–107)
CO2: 19 MMOL/L (ref 20–31)
CREAT SERPL-MCNC: 0.83 MG/DL (ref 0.5–0.9)
EOSINOPHILS RELATIVE PERCENT: 2 % (ref 1–4)
GFR AFRICAN AMERICAN: >60 ML/MIN
GFR NON-AFRICAN AMERICAN: >60 ML/MIN
GFR SERPL CREATININE-BSD FRML MDRD: ABNORMAL ML/MIN/{1.73_M2}
GLUCOSE BLD-MCNC: 96 MG/DL (ref 70–99)
HCT VFR BLD CALC: 32.8 % (ref 36.3–47.1)
HEMOGLOBIN: 11.7 G/DL (ref 11.9–15.1)
IMMATURE GRANULOCYTES: 0 %
LYMPHOCYTES # BLD: 14 % (ref 24–43)
MCH RBC QN AUTO: 31.1 PG (ref 25.2–33.5)
MCHC RBC AUTO-ENTMCNC: 35.7 G/DL (ref 28.4–34.8)
MCV RBC AUTO: 87.2 FL (ref 82.6–102.9)
MONOCYTES # BLD: 13 % (ref 3–12)
NRBC AUTOMATED: 0 PER 100 WBC
PDW BLD-RTO: 13.6 % (ref 11.8–14.4)
PLATELET # BLD: 255 K/UL (ref 138–453)
PMV BLD AUTO: 11.5 FL (ref 8.1–13.5)
POTASSIUM SERPL-SCNC: 4.3 MMOL/L (ref 3.7–5.3)
RBC # BLD: 3.76 M/UL (ref 3.95–5.11)
SEG NEUTROPHILS: 70 % (ref 36–65)
SEGMENTED NEUTROPHILS ABSOLUTE COUNT: 5.19 K/UL (ref 1.5–8.1)
SODIUM BLD-SCNC: 133 MMOL/L (ref 135–144)
WBC # BLD: 7.4 K/UL (ref 3.5–11.3)

## 2022-04-12 PROCEDURE — 6360000002 HC RX W HCPCS: Performed by: INTERNAL MEDICINE

## 2022-04-12 PROCEDURE — 1200000000 HC SEMI PRIVATE

## 2022-04-12 PROCEDURE — 85025 COMPLETE CBC W/AUTO DIFF WBC: CPT

## 2022-04-12 PROCEDURE — 2580000003 HC RX 258: Performed by: STUDENT IN AN ORGANIZED HEALTH CARE EDUCATION/TRAINING PROGRAM

## 2022-04-12 PROCEDURE — 6370000000 HC RX 637 (ALT 250 FOR IP): Performed by: STUDENT IN AN ORGANIZED HEALTH CARE EDUCATION/TRAINING PROGRAM

## 2022-04-12 PROCEDURE — 2580000003 HC RX 258: Performed by: INTERNAL MEDICINE

## 2022-04-12 PROCEDURE — 6370000000 HC RX 637 (ALT 250 FOR IP): Performed by: INTERNAL MEDICINE

## 2022-04-12 PROCEDURE — 76937 US GUIDE VASCULAR ACCESS: CPT

## 2022-04-12 PROCEDURE — 80048 BASIC METABOLIC PNL TOTAL CA: CPT

## 2022-04-12 PROCEDURE — 99232 SBSQ HOSP IP/OBS MODERATE 35: CPT | Performed by: INTERNAL MEDICINE

## 2022-04-12 PROCEDURE — APPSS30 APP SPLIT SHARED TIME 16-30 MINUTES: Performed by: NURSE PRACTITIONER

## 2022-04-12 PROCEDURE — 36415 COLL VENOUS BLD VENIPUNCTURE: CPT

## 2022-04-12 RX ORDER — OXYCODONE HYDROCHLORIDE 5 MG/1
5 TABLET ORAL EVERY 8 HOURS PRN
Qty: 21 TABLET | Refills: 0 | Status: SHIPPED | OUTPATIENT
Start: 2022-04-12 | End: 2022-04-19

## 2022-04-12 RX ADMIN — OXYCODONE HYDROCHLORIDE 5 MG: 5 TABLET ORAL at 12:28

## 2022-04-12 RX ADMIN — LEVOTHYROXINE SODIUM 25 MCG: 25 TABLET ORAL at 09:33

## 2022-04-12 RX ADMIN — METOPROLOL TARTRATE 25 MG: 25 TABLET ORAL at 21:06

## 2022-04-12 RX ADMIN — OXYCODONE HYDROCHLORIDE 5 MG: 5 TABLET ORAL at 00:10

## 2022-04-12 RX ADMIN — RIVAROXABAN 20 MG: 20 TABLET, FILM COATED ORAL at 09:19

## 2022-04-12 RX ADMIN — POLYETHYLENE GLYCOL 3350 17 G: 17 POWDER, FOR SOLUTION ORAL at 09:20

## 2022-04-12 RX ADMIN — SODIUM CHLORIDE: 9 INJECTION, SOLUTION INTRAVENOUS at 22:50

## 2022-04-12 RX ADMIN — ALLOPURINOL 100 MG: 100 TABLET ORAL at 09:20

## 2022-04-12 RX ADMIN — ERTAPENEM 1000 MG: 1 INJECTION INTRAMUSCULAR; INTRAVENOUS at 17:48

## 2022-04-12 RX ADMIN — METOPROLOL TARTRATE 25 MG: 25 TABLET ORAL at 09:19

## 2022-04-12 RX ADMIN — SODIUM CHLORIDE: 9 INJECTION, SOLUTION INTRAVENOUS at 06:45

## 2022-04-12 RX ADMIN — OXYCODONE HYDROCHLORIDE 5 MG: 5 TABLET ORAL at 18:51

## 2022-04-12 RX ADMIN — OXYCODONE HYDROCHLORIDE 5 MG: 5 TABLET ORAL at 06:44

## 2022-04-12 RX ADMIN — SACUBITRIL AND VALSARTAN 0.5 TABLET: 24; 26 TABLET, FILM COATED ORAL at 09:19

## 2022-04-12 RX ADMIN — SPIRONOLACTONE 12.5 MG: 25 TABLET ORAL at 09:18

## 2022-04-12 RX ADMIN — SACUBITRIL AND VALSARTAN 0.5 TABLET: 24; 26 TABLET, FILM COATED ORAL at 21:06

## 2022-04-12 ASSESSMENT — PAIN DESCRIPTION - PROGRESSION

## 2022-04-12 ASSESSMENT — PAIN DESCRIPTION - DESCRIPTORS: DESCRIPTORS: ACHING;DISCOMFORT

## 2022-04-12 ASSESSMENT — PAIN DESCRIPTION - ORIENTATION: ORIENTATION: LEFT;LOWER

## 2022-04-12 ASSESSMENT — PAIN DESCRIPTION - FREQUENCY: FREQUENCY: CONTINUOUS

## 2022-04-12 ASSESSMENT — PAIN SCALES - GENERAL
PAINLEVEL_OUTOF10: 6
PAINLEVEL_OUTOF10: 3
PAINLEVEL_OUTOF10: 4
PAINLEVEL_OUTOF10: 6
PAINLEVEL_OUTOF10: 3
PAINLEVEL_OUTOF10: 4

## 2022-04-12 ASSESSMENT — PAIN DESCRIPTION - PAIN TYPE: TYPE: ACUTE PAIN

## 2022-04-12 ASSESSMENT — PAIN DESCRIPTION - ONSET: ONSET: ON-GOING

## 2022-04-12 ASSESSMENT — PAIN DESCRIPTION - LOCATION: LOCATION: BACK

## 2022-04-12 NOTE — PLAN OF CARE
Problem: Pain:  Goal: Pain level will decrease  Description: Pain level will decrease  Outcome: Ongoing  Goal: Control of acute pain  Description: Control of acute pain  Outcome: Ongoing  Goal: Control of chronic pain  Description: Control of chronic pain  Outcome: Ongoing     Problem: SAFETY  Goal: Free from accidental physical injury  Outcome: Met This Shift     Problem: PAIN  Goal: Patient's pain/discomfort is manageable  Outcome: Ongoing     Problem: SKIN INTEGRITY  Goal: Skin integrity is maintained or improved  Outcome: Ongoing     Problem: Falls - Risk of:  Goal: Will remain free from falls  Description: Will remain free from falls  Outcome: Met This Shift  Goal: Absence of physical injury  Description: Absence of physical injury  Outcome: Met This Shift     Problem: Nutrition  Goal: Optimal nutrition therapy  4/12/2022 0452 by Chad Sinclair RN  Outcome: Ongoing  4/11/2022 1526 by Adalgisa Killian RD, LD  Outcome: Ongoing  Note: Nutrition Problem #1: Inadequate oral intake  Intervention: Food and/or Nutrient Delivery: Continue Current Diet,Start Oral Nutrition Supplement (Provide strawberry Ensure oral supplements x 2 per day.)  Nutritional Goals: Meet at least 75% of estimated nutrition needs.

## 2022-04-12 NOTE — PROCEDURES
Product type Bard 3 Fr single lumen PowerMidline  History/Labs/Allergies Reviewed  Placed By: Merari Carter. Kaylah Lee RN  Assisted By: SAVANAH  Time out Performed using Two Identifiers  Lot # [id number]  Expiration date 12/31/2022  Catheter size 3 Sudanese  Trimmed at 11 cm  Total length inserted 10 cm  External catheter length 1 cm  Location Left cephalic vein  Number of attempts 1  Estimated blood loss 1 ml  Placement verified by- positive blood return & flushes easily  Special equipment used- ultrasound & micro-introducer technique   Catheter secured with statlock  Dressing applied- Tegaderm CHG  Lidocaine administered intradermally conc.1% 2 mL  Midline education:   [ x ] Discussed with patient/Family or POA prior to procedure. Risks and Benefits along with reason for procedure including IV antibiotics, risks for bleeding, infection and blood clots were discussed and teaching was reinforced with an education handout on Midline insertion. [facility] FAQ Catheter Associated Blood Stream Infections and [address] [facility] [address] REV. 7/13 Nursing and Booklet left at bedside or in chart. Patient (Family or POA) acknowledged understanding of information taught and agreed to procedure. [  ] Was not discussed with patient/family or POA due to pts medical status at time of procedure. pts family or POA not available to discuss Midline education.  [facility] FAQ Catheter Associated Blood Stream Infections and [address] [facility] REV. 7/13 Nursing and Booklet left at bedside or in chart    Nory Head RN

## 2022-04-12 NOTE — DISCHARGE INSTR - COC
Continuity of Care Form    Patient Name: Miles Wheeler   :    MRN:  [id number]    Admit date:  2022  Discharge date:  2022    Code Status Order: Full Code   Advance Directives:      Admitting Physician:  Mallory Phillips MD  PCP: Devang Hinojosa MD    Discharging Nurse: [address] [place] [address] Unit/Room#: 2291/1360-46  Discharging Unit Phone Number: [phone]    Emergency Contact:   Extended Emergency Contact Information  Primary Emergency Contact: MindyRagini  Address: NOT AVAILABLE   [address] Phone: [phone]  Work Phone: [phone]  Mobile Phone: [phone]  Relation: Child    Past Surgical History:  Past Surgical History:   Procedure Laterality Date    COLONOSCOPY      HERNIA REPAIR      INSERT MIDLINE CATHETER  2022         OTHER SURGICAL HISTORY  2012    robotic placement of 2 left ventricular leads/replacement of ICD generator    PACEMAKER PLACEMENT      medtronic Viva XT CRT -D [id number] Serial : [id number]    TUBAL LIGATION         Immunization History:   Immunization History   Administered Date(s) Administered    COVID-19, Pfizer Purple top, DILUTE for use, 12+ yrs, 30mcg/0.3mL dose 2021, 2021    Influenza, Quadv, 6 mo and older, IM (Fluzone, Flulaval) 2018    Influenza, Quadv, IM, (6 mo and older Fluzone, Flulaval, Fluarix and 3 yrs and older Afluria) 2016    Pneumococcal Conjugate 13-valent (Dayna Contreras) 2019       Active Problems:  Patient Active Problem List   Diagnosis Code    Dilated cardiomyopathy ([facility] I42.0    Pre-diabetes R73.03    Essential hypertension I10    PAH (pulmonary artery hypertension) ([facility] 34 on Echo  I27.21    Mitral regurgitation I34.0    Chronic headache R51.9, G89.29    Morbid obesity due to excess calories ([facility]CC) E66.01    AICD (automatic cardioverter/defibrillator) present Z95.810    Syncope and collapse secondary to VTAC R55    MAURO (acute kidney injury) ([facility] N17.9    ICD (implantable cardioverter-defibrillator) battery depletion Z45.02    Acute on chronic systolic CHF (congestive heart failure) ([facility]) I50.23    Gout M10.9    Dizziness R42    Benign paroxysmal positional vertigo H81.10    Chronic heart failure with reduced ejection fraction and diastolic dysfunction ([facility]) I50.42    Hypotension (arterial) I95.9    NSVT (nonsustained ventricular tachycardia) ([facility]) I47.2    Chest pain R07.9    Hypothyroidism E03.9    Chronic combined systolic (congestive) and diastolic (congestive) heart failure ([facility]) I50.42    Acute diverticulitis K57.92    Abscess of sigmoid colon K63.0    Diverticulitis K57.92    Nausea & vomiting R11.2    Diverticulitis of large intestine with abscess without bleeding K57.20    Diverticulitis of colon K57.32    Lower abdominal pain R10.30       Isolation/Infection:   Isolation            No Isolation          Patient Infection Status       Infection Onset Added Last Indicated Last Indicated By Review Planned Expiration Resolved Resolved By    None active    Resolved    COVID-19 21 COVID-19   21     COVID-19 (Rule Out) 21 COVID-19 (Ordered)   21 Rule-Out Test Resulted            Nurse Assessment:  Last Vital Signs: /68   Pulse 70   Temp 98.7 °F (37.1 °C) (Oral)   Resp 14   Ht 5' 6\" (1.676 m)   Wt 223 lb 8 oz (101.4 kg)   SpO2 97%   BMI 36.07 kg/m²     Last documented pain score (0-10 scale): Pain Level: 6  Last Weight:   Wt Readings from Last 1 Encounters:   04/10/22 223 lb 8 oz (101.4 kg)     Mental Status:  oriented, alert, and thought processes intact    IV Access:  - Midline placed 2022 Left upper arm    Nursing Mobility/ADLs:  Walking   Independent  Transfer  Independent  Bathing  Independent  Dressing  Independent  Toileting  Independent  Feeding  [address]  Independent  Med Delivery   whole    Wound Care Documentation and Therapy:        Elimination:  Continence: Bowel: Yes  Bladder: Yes  Urinary Catheter: None   Colostomy/Ileostomy/Ileal Conduit: No       Date of Last BM: 4/12/2022  No intake or output data in the 24 hours ending 04/12/22 1545  No intake/output data recorded. Safety Concerns:   None    Impairments/Disabilities:    None    Nutrition Therapy:  Current Nutrition Therapy:   - Oral Diet:  Cardiac    Routes of Feeding: Oral  Liquids: No Restrictions  Daily Fluid Restriction: no  Last Modified Barium Swallow with Video (Video Swallowing Test): not done, not ordered    Treatments at the Time of Hospital Discharge:   Respiratory Treatments: none  Oxygen Therapy:  is not on home oxygen therapy. Ventilator:  - No ventilator support    Rehab Therapies: ***  Weight Bearing Status/Restrictions: No weight bearing restrictions  Other Medical Equipment (for information only, NOT a DME order):  cane  Other Treatments: ***    Patient's personal belongings (please select all that are sent with patient):  None    RN SIGNATURE:  Electronically signed by Janet Baumann on 4/12/22 at 4:19 PM EDT    CASE MANAGEMENT/SOCIAL WORK SECTION    Inpatient Status Date: ***    Readmission Risk Assessment Score:  Readmission Risk              Risk of Unplanned Readmission:  16           Discharging to Facility/ Agency:       [address] [place] [address],[address] Details  [address] [facility],[address] [place] [address]       Phone: [phone]       Fax: [phone]                 / signature: Electronically signed by Conchita Phelps RN on 4/12/22 at 3:47 PM EDT    PHYSICIAN SECTION    Prognosis: Fair    Condition at Discharge: Stable    Rehab Potential (if transferring to Rehab): Fair    Recommended Labs or Other Treatments After Discharge: Ertapenem 1 gm IV q 24 hr. Stop date 4-20-22 through midline.  Follow up with Gastroenterology    Physician Certification: I certify the above information and transfer of Herrera Patricio  is necessary for the continuing treatment of the diagnosis listed and that she requires Home Care for less 30 days.      Update Admission H&P: No change in H&P    PHYSICIAN SIGNATURE:  {Esignature:[id number]}

## 2022-04-12 NOTE — PROGRESS NOTES
for follow-up from 3/29/2022 CT colonography that revealed abscess of the sigmoid colon with diverticulosis of the large intestine. According to Dr. Ramone Cruz note, he opted for conservative treatment, with a CT colonoscopy versus colonoscopy, as she was asymptomatic and has a significant cardiac history. He did mention in his note that she may be at high risk for sigmoid colectomy    Upon admission to [address] [place] [address], she was initiated on IV Cipro and Flagyl. This was changed to IV Zosyn per GI on 4/10/2022. IR was consulted for intra-abdominal abscess drainage, but unfortunately as there is no safe access site due to bowel and bone, IR suggest rescanning at a later date to see if the abscess has increased in size and there would be a new area for percutaneous access. Imaging:    Impression CT abdomen/pelvis with IV contrast 4/10/2022   Recurrent or residual abnormal wall thickening in the mid sigmoid colon       which is not substantially changed from prior exam compatible with chronic   diverticulitis.       Persistent peridiverticular abscess in the adjacent sigmoid mesocolon which   is increased in size as detailed above.       Cholelithiasis       Stable cirrhotic liver       Marked cardiomegaly, unchanged. Infectious disease was consulted for antimicrobial management      CURRENT EVALUATION 4/12/2022     Afebrile  VS stable    The patient remains alert and oriented on room air. She is alert and oriented and experiencing abdominal pain but it has greatly improved. .     Leukocytosis has resolved    Discharge planning is underway. She is to continue Ertapenem as outpatient until 4-20-22  Will follow up in 2 weeks in the office. Labs, X rays reviewed: 4/12/2022    BUN:16-->12  Cr:1.32-->0.83    WBC:11.3-->7.4  Hb:12.4-->11.7  Plat: 284-->255    CRP: 183.1    Cultures:  Urine:  ·   Blood:  ·   Sputum :  ·   Wound:  ·     Discussed with patient, RN, IM.     I have personally reviewed the past medical history, past surgical history, medications, social history, and family history, and I have updated the database accordingly.   Past Medical History:     Past Medical History:   Diagnosis Date    Abnormal Pap smear     Acute on chronic systolic congestive heart failure (HCC)     AICD (automatic cardioverter/defibrillator) present     MAURO (acute kidney injury) ([facility] 03/04/2017    Anemia     Arthritis     Cardiomyopathy ([facility] 12/20/2012    robotic replacement of 2 left ventricular leads/replacement of ICD generator    Cardiomyopathy, nonischemic ([facility] 12/20/2012    Chronic combined systolic and diastolic congestive heart failure (HCC)     Ejection fraction < 50%     HTN (hypertension)     Hypothyroidism     Ischemic cardiomyopathy     Left bundle branch block     Mitral regurgitation     Morbid obesity due to excess calories ([facility] 01/15/2017    [facility]     Sudden onset of severe headache        Past Surgical  History:     Past Surgical History:   Procedure Laterality Date    COLONOSCOPY      HERNIA REPAIR      OTHER SURGICAL HISTORY  12/20/2012    robotic placement of 2 left ventricular leads/replacement of ICD generator    PACEMAKER PLACEMENT      medtronic Viva XT CRT -D [id number] Serial : [id number]    TUBAL LIGATION         Medications:      potassium chloride  40 mEq Oral Once    ertapenem (INVanz) IVPB  1,000 mg IntraVENous Q24H    polyethylene glycol  17 g Oral Daily    acetaminophen  650 mg Oral Once    allopurinol  100 mg Oral Daily    levothyroxine  25 mcg Oral Daily    lidocaine  1 patch Topical Daily    metoprolol tartrate  25 mg Oral BID    rivaroxaban  20 mg Oral Daily    sacubitril-valsartan  0.5 tablet Oral BID    spironolactone  12.5 mg Oral Daily    sodium chloride flush  5-40 mL IntraVENous 2 times per day       Social History:     Social History     Socioeconomic History    Marital status:      Spouse name: Not on file    Number of children: 2    Years of education: Not on file    Highest education level: Not on file   Occupational History    Not on file   Tobacco Use    Smoking status: Never Smoker    Smokeless tobacco: Never Used   Vaping Use    Vaping Use: Never used   Substance and Sexual Activity    Alcohol use: No     Alcohol/week: 0.0 standard drinks    Drug use: Not Currently    Sexual activity: Never   Other Topics Concern    Not on file   Social History Narrative    Not on file     Social Determinants of Health     Financial Resource Strain:     Difficulty of Paying Living Expenses: Not on file   Food Insecurity:     Worried About Running Out of Food in the Last Year: Not on file    Ninfa of Food in the Last Year: Not on file   Transportation Needs:     Lack of Transportation (Medical): Not on file    Lack of Transportation (Non-Medical):  Not on file   Physical Activity:     Days of Exercise per Week: Not on file    Minutes of Exercise per Session: Not on file   Stress:     Feeling of Stress : Not on file   Social Connections:     Frequency of Communication with Friends and Family: Not on file    Frequency of Social Gatherings with Friends and Family: Not on file    Attends Orthodoxy Services: Not on file    Active Member of [address] [place] [address] [employer] or Organizations: Not on file    Attends Club or Organization Meetings: Not on file    Marital Status: Not on file   Intimate Partner Violence:     Fear of Current or Ex-Partner: Not on file    Emotionally Abused: Not on file    Physically Abused: Not on file    Sexually Abused: Not on file   Housing Stability:     Unable to Pay for Housing in the Last Year: Not on file    Number of [place] in the Last Year: Not on file    Unstable Housing in the Last Year: Not on file       Family History:     Family History   Problem Relation Age of Onset    Cancer Other         ovarian    High Blood Pressure Mother     Other Mother         copd    Arthritis Neg Hx     Asthma Neg Hx     Birth Defects Neg Hx     Depression Neg Hx     Diabetes Neg Hx     Early Death Neg Hx     Hearing Loss Neg Hx     Heart Disease Neg Hx     High Cholesterol Neg Hx     Kidney Disease Neg Hx     Learning Disabilities Neg Hx     Mental Retardation Neg Hx     Mental Illness Neg Hx     Miscarriages / Stillbirths Neg Hx     Stroke Neg Hx     Substance Abuse Neg Hx     Vision Loss Neg Hx     Breast Cancer Neg Hx     Colon Cancer Neg Hx     Eclampsia Neg Hx     Hypertension Neg Hx     Ovarian Cancer Neg Hx      Labor Neg Hx     Spont Abortions Neg Hx         Allergies:   Codeine and Morphine     Review of Systems:   Constitutional: No fevers or chills. No systemic other than abdominal pain  Head: No headaches  Eyes: No double vision or blurry vision. No conjunctival inflammation. ENT: No sore throat or runny nose. . No hearing loss, tinnitus or vertigo. Cardiovascular: No chest pain or palpitations. No shortness of breath. No BLANCHARD  Lung: No shortness of breath or cough. No sputum production  Abdomen: No nausea, vomiting, diarrhea. Positive for abdominal pain. Genitourinary: No increased urinary frequency, or dysuria. No hematuria. No suprapubic or CVA pain  Musculoskeletal: No muscle aches or pains. No joint effusions, swelling or deformities  Hematologic: No bleeding or bruising. Neurologic: No headache, weakness, numbness, or tingling. Integument: No rash, no ulcers. Psychiatric: No depression. Endocrine: No polyuria, no polydipsia, no polyphagia. Physical Examination :     Patient Vitals for the past 8 hrs:   BP Temp Temp src Pulse Resp SpO2   22 0736 104/68 98.7 °F (37.1 °C) Oral 70 14 97 %     General Appearance: Awake, alert, and in no apparent distress  Head:  Normocephalic, no trauma  Eyes: Pupils equal, round, reactive to light and accommodation; extraocular movements intact; sclera anicteric; conjunctivae pink. No embolic phenomena.   ENT: Oropharynx clear, without erythema, exudate, or thrush. No tenderness of sinuses. Mouth/throat: mucosa pink and moist. No lesions. Dentition in good repair. Neck:Supple, without lymphadenopathy. Thyroid normal, No bruits. Pulmonary/Chest: Clear to auscultation, without wheezes, rales, or rhonchi. No dullness to percussion. Cardiovascular: Regular rate and rhythm without murmurs, rubs, or gallops. Abdomen: Soft, tender. Bowel sounds normal. No organomegaly. Abdominal tenderness on pressure. No rebound or guarding. All four Extremities: No cyanosis, clubbing, edema, or effusions. Neurologic: No gross sensory or motor deficits. Skin: Warm and dry with good turgor. No signs of peripheral arterial or venous insufficiency. No ulcerations. No open wounds. Medical Decision Making -Laboratory:   I have independently reviewed/ordered the following labs:    CBC with Differential:   Recent Labs     04/11/22  0608 04/12/22  0651   WBC 11.3 7.4   HGB 12.4 11.7*   HCT 34.7* 32.8*    255   LYMPHOPCT 15* 14*   MONOPCT 10 13*     BMP:   Recent Labs     04/10/22  0650 04/10/22  0650 04/11/22  0608 04/12/22  0651   *   < > 130* 133*   K 3.5*   < > 3.7 4.3      < > 96* 102   CO2 22   < > 21 19*   BUN 14   < > 16 12   CREATININE 0.99*   < > 1.32* 0.83   MG 1.9  --   --   --     < > = values in this interval not displayed. Hepatic Function Panel:   No results for input(s): PROT, LABALBU, BILIDIR, IBILI, BILITOT, ALKPHOS, ALT, AST in the last 72 hours. No results for input(s): RPR in the last 72 hours. No results for input(s): HIV in the last 72 hours. No results for input(s): BC in the last 72 hours.   Lab Results   Component Value Date    MUCUS 1+ 08/23/2015    PH 7.44 12/20/2012    RBC 3.76 04/12/2022    RBC 4.45 04/03/2012    TRICHOMONAS NOT REPORTED 08/23/2015    WBC 7.4 04/12/2022    YEAST NOT REPORTED 08/23/2015    TURBIDITY Clear 12/04/2021     Lab Results   Component Value Date    CREATININE 0.83 04/12/2022    GLUCOSE 96 04/12/2022    GLUCOSE 100 04/03/2012       Medical Decision Making-Imaging:     Narrative   EXAMINATION:   CT OF THE ABDOMEN AND PELVIS WITH CONTRAST 4/10/2022 7:41 am       TECHNIQUE:   CT of the abdomen and pelvis was performed with the administration of   intravenous contrast. Multiplanar reformatted images are provided for review. Dose modulation, iterative reconstruction, and/or weight based adjustment of   the mA/kV was utilized to reduce the radiation dose to as low as reasonably   achievable.       COMPARISON:   01/27/2022       HISTORY:   ORDERING SYSTEM PROVIDED HISTORY: rule out diverticulitits, h/o sigmoid   abscess   TECHNOLOGIST PROVIDED HISTORY:   rule out diverticulitits, h/o sigmoid abscess       Reason for Exam: abdominal pain       FINDINGS:   Lower Chest: No acute abnormality in the lung bases with similar scarring and       atelectasis.  Unchanged cardiomegaly with streak artifact from an indwelling       ICD.  Coronary artery calcifications.       Organs: Unchanged macrolobulated contour of the liver with unchanged       heterogeneous attenuation.  Cholelithiasis without       pericholecystic inflammatory change.  Spleen is normal in size and       attenuation with a small splenule inferiorly.  Atrophic pancreas.  Adrenal       glands are normal caliber.  Kidneys enhance symmetrically without       hydronephrosis.  Ureters are normal caliber.       GI/Bowel:  There is persistent abnormal wall thickening of the mid sigmoid       colon with associated extensive diverticula.  Moderate persistent       inflammatory changes and fat stranding in the adjacent pericolonic fat.  No       bowel obstruction.  Normal appendix.  Incidental duodenal diverticulum       without surrounding inflammatory change.       Pelvis: Diminutive postmenopausal uterus.  Inflammatory changes extend to the   left adnexa/ovary, though       the ovary remains grossly normal caliber.  Urinary bladder is unremarkable       Peritoneum/Retroperitoneum: Persistent thick walled mixed complex fluid and       gas collection in the sigmoid mesocolon which is increased in size from prior       measuring 5.2 x 4.5 cm.  Previously measured 3.5 cm x 3.6 cm.       Persistent adjacent morphologically rounded lymph nodes in       the pericolonic fat.  No significant ascites.       Unchanged enlarged lymph nodes in the mercy hepatis.  Aortoiliac       atherosclerosis and tortuosity without aneurysm.       Bones/Soft Tissues: No acute abnormality.  Supraumbilical fat containing       ventral hernia without inflammatory changes.  Degenerative changes without       acute or aggressive osseous lesion.           Impression   Recurrent or residual abnormal wall thickening in the mid sigmoid colon       which is not substantially changed from prior exam compatible with chronic   diverticulitis.       Persistent peridiverticular abscess in the adjacent sigmoid mesocolon which   is increased in size as detailed above.       Cholelithiasis       Stable cirrhotic liver       Marked cardiomegaly, unchanged.             Medical Decision Fngthy-[place]:       Medical Decision Making-Other:     Note:  · Labs, medications, radiologic studies were reviewed with personal review of films  · Large amounts of data were reviewed  · Discussed with nursing Staff, Discharge planner  · Infection Control and Prevention measures reviewed  · All prior entries were reviewed  · Administer medications as ordered  · Prognosis: Guarded  · Discharge planning reviewed  · Follow up as outpatient. Thank you for allowing us to participate in the care of this patient. Please call with questions. NELLA Soni - CNP     ATTESTATION:    I have discussed the case, including pertinent history and exam findings with the APRN.  I have evaluated the  History, physical findings and pictures of the patient and the key elements of the encounter have been performed by me. I have reviewed the laboratory data, other diagnostic studies and discussed them with the APRN. I have updated the medical record where necessary. I agree with the assessment, plan and orders as documented by the APRN.     Monet Muhammad MD.        Pager: [phone] - Office: [phone]

## 2022-04-12 NOTE — PROGRESS NOTES
[facility]  Internal Medicine Teaching Residency Program  Inpatient Daily Progress Note  ______________________________________________________________________________    Patient: Mynor Mitchell  YOB: 1955   BBI:[id number]    Acct: [de-identified]     Room: [facility]-01  Admit date: 4/9/2022  Today's date: 04/12/22  Number of days in the hospital: 3    SUBJECTIVE   Admitting Diagnosis: Diverticulitis  CC: abdominal pain     Patient once again having abdominal pain. IR cannot drain due to lack of safe window. ID consulted will place patient on invanz for 10 days. Will need midline and possible DC today. ROS:  Constitutional:  negative for chills, fevers, sweats  Respiratory:  negative for cough, dyspnea on exertion, hemoptysis, shortness of breath, wheezing  Cardiovascular:  negative for chest pain, chest pressure/discomfort, lower extremity edema, palpitations  Gastrointestinal:  Abdominal pain with n/v  Neurological:  negative for dizziness, headache    BRIEF HISTORY     The patient is a pleasant 78 y.o. female PMH diverticulitis, nonischemic cardiomyopathy last EF 17% s/p AICD placement, HTN  presents with a chief complaint of abdominal pain that started 2 days ago. Patient reports that she has been having lower abdominal pain and n/v. Of note patient had a recent CT colonoscopy without active disease but did reveal pancolonic diverticulosis 1 week ago. She had an appointment with GI a week ago where she was given abx. She reports that since then she has been having pain much like her previous episodes of diverticulitis. She also reports n/v with food. WBC is wnl,  LFTs are normal except for elevated ALP at 260. Small bump in creatinine and low potassium. Patient requesting to see GI this admission. Former smoker, occasional alcohol, no illegal drugs. On exam patient reports Lower abdominal pain during palpation and RUQ tenderness.  Aguayo's sign is positive.        OBJECTIVE     Vital Signs:  /68   Pulse 70   Temp 98.7 °F (37.1 °C) (Oral)   Resp 14   Ht 5' 6\" (1.676 m)   Wt 223 lb 8 oz (101.4 kg)   SpO2 97%   BMI 36.07 kg/m²     Temp (24hrs), Av.7 °F (37.1 °C), Min:98.6 °F (37 °C), Max:98.7 °F (37.1 °C)    No intake/output data recorded. Physical Exam:  Physical Exam  Constitutional:       General: She is not in acute distress. Appearance: Normal appearance. She is obese. She is not ill-appearing, toxic-appearing or diaphoretic. HENT:      Mouth/Throat:      Mouth: Mucous membranes are moist.      Pharynx: Oropharynx is clear. Eyes:      General: No scleral icterus. Cardiovascular:      Rate and Rhythm: Normal rate and regular rhythm. Pulses: Normal pulses. Heart sounds: Normal heart sounds. No murmur heard. No friction rub. No gallop. Pulmonary:      Effort: Pulmonary effort is normal.      Breath sounds: Normal breath sounds. Abdominal:      General: Abdomen is flat. Bowel sounds are normal. There is no distension. Palpations: Abdomen is soft. There is no mass. Tenderness: There is abdominal tenderness. There is no guarding or rebound. Hernia: No hernia is present. Musculoskeletal:         General: No swelling, tenderness, deformity or signs of injury. Right lower leg: No edema. Left lower leg: No edema. Skin:     General: Skin is warm and dry. Coloration: Skin is not jaundiced or pale. Findings: No bruising. Neurological:      General: No focal deficit present. Mental Status: She is alert and oriented to person, place, and time. Cranial Nerves: No cranial nerve deficit. Motor: No weakness. Psychiatric:         Mood and Affect: Mood normal.         Behavior: Behavior normal.         Thought Content:  Thought content normal.         Judgment: Judgment normal.           Medications:  Scheduled Medications:    potassium chloride  40 mEq Oral Once    ertapenem Ollie Gaitan) IVPB  1,000 mg IntraVENous Q24H    polyethylene glycol  17 g Oral Daily    acetaminophen  650 mg Oral Once    allopurinol  100 mg Oral Daily    levothyroxine  25 mcg Oral Daily    lidocaine  1 patch Topical Daily    metoprolol tartrate  25 mg Oral BID    rivaroxaban  20 mg Oral Daily    sacubitril-valsartan  0.5 tablet Oral BID    spironolactone  12.5 mg Oral Daily    sodium chloride flush  5-40 mL IntraVENous 2 times per day     Continuous Infusions:    sodium chloride      sodium chloride 75 mL/hr at 04/12/22 0645     PRN Medicationsmagnesium hydroxide, 30 mL, Daily PRN  diphenhydrAMINE, 25 mg, Q6H PRN  sodium chloride flush, 5-40 mL, PRN  sodium chloride, , PRN  ondansetron, 4 mg, Q8H PRN   Or  ondansetron, 4 mg, Q6H PRN  polyethylene glycol, 17 g, Daily PRN  acetaminophen, 650 mg, Q6H PRN   Or  acetaminophen, 650 mg, Q6H PRN  oxyCODONE, 5 mg, Q6H PRN        Diagnostic Labs:  CBC:   Recent Labs     04/10/22  0650 04/11/22  0608 04/12/22  0651   WBC 8.6 11.3 7.4   RBC 3.70* 4.02 3.76*   HGB 11.4* 12.4 11.7*   HCT 32.1* 34.7* 32.8*   MCV 86.8 86.3 87.2   RDW 13.5 13.4 13.6    284 255     BMP:   Recent Labs     04/10/22  0650 04/11/22  0608 04/12/22  0651   * 130* 133*   K 3.5* 3.7 4.3    96* 102   CO2 22 21 19*   BUN 14 16 12   CREATININE 0.99* 1.32* 0.83     BNP: No results for input(s): BNP in the last 72 hours. PT/INR:   Recent Labs     04/11/22  0908   PROTIME 12.4*   INR 1.2     APTT:   Recent Labs     04/11/22  0908   APTT 40.8*     CARDIAC ENZYMES: No results for input(s): CKMB, CKMBINDEX, TROPONINI in the last 72 hours.     Invalid input(s): CKTOTAL;3  FASTING LIPID PANEL:  Lab Results   Component Value Date    CHOL 180 05/23/2020    HDL 42 05/23/2020    TRIG 79 05/23/2020     LIVER PROFILE:   Recent Labs     04/09/22  0935   AST 22   ALT 17   BILITOT 1.09   ALKPHOS 260*      MICROBIOLOGY:   Lab Results   Component Value Date/Time    CULTURE PATIENT DISCHARGED BEFORE SAMPLE COLLECTED 12/06/2021 10:30 AM       Imaging:    XR ABDOMEN (KUB) (SINGLE AP VIEW)    Result Date: 4/9/2022  No evidence of bowel obstruction. US GALLBLADDER RUQ    Result Date: 4/9/2022  1. Cirrhosis, without discrete intrahepatic nodule or mass. 2. Cholelithiasis, without sonographic evidence of cholecystitis. 3. Unremarkable sonographic appearance of the common bile duct, right kidney, and pancreas. ASSESSMENT & PLAN     Assessment and Plan:    Principal Problem:    Diverticulitis  Active Problems:    Dilated cardiomyopathy ([address]    Essential hypertension    ICD (implantable cardioverter-defibrillator) battery depletion    Morbid obesity due to excess calories ([address]    AICD (automatic cardioverter/defibrillator) present    MAURO (acute kidney injury) ([address]    Nausea & vomiting    Diverticulitis of large intestine with abscess without bleeding    Diverticulitis of colon    Lower abdominal pain  Resolved Problems:    * No resolved hospital problems. *    Diverticulitis  Abscess   - continue cipro and flagyl IV  - pain and nausea control  - GI consulted  - IR cannot draing due to lack of safe window  - will have midline placed and start on invanz until 4/20   - possible DC today.      Nausea vomiting  - positive gore's sign on exam  - RUQ US is negative     MAURO  - Cr initally  1.16 mildly elevated is currently 1.3   - suspect this is due to IV constrast. Due to low EF will hold off IVF and monitor   - avoid nephrotoxic meds     Nonischemic cardiomyopathy  - last EF was 17%  - s/p AICD placement  - avoid IVF     HTN  - resume lopressor,entresto, aldectone home dose     Morbid obesity  - BMI of 36.6  - recommend weight loss. Hayde Davis MD  Internal Medicine Resident  [address] [facility]  4/12/2022 8:56 AM   Attending Physician Statement  I have discussed the care of Cricket Lozyoa, including pertinent history and exam findings,  with the resident.  I have seen and examined the patient and the key elements of all parts of the encounter have been performed by me. I agree with the assessment, plan and orders as documented by the resident with additions . Treatment plan Discussed with nursing staff in detail , all questions answered . Electronically signed by Debbie Ponce MD on   4/12/22 at 1:11 PM EDT    Please note that this chart was generated using voice recognition Dragon dictation software. Although every effort was made to ensure the accuracy of this automated transcription, some errors in transcription may have occurred.

## 2022-04-12 NOTE — FLOWSHEET NOTE
SPIRITUAL CARE DEPARTMENT - [facility]  PROGRESS NOTE    Shift date: 4/12/2022  Shift day: Tuesday   Shift # 1    Room # 0910/2473-53   Name: Kaia Quiroz            Age: 78 y.o. Gender: female          Rastafarian: [address] [facility]:     Referral: Routine Visit    Admit Date & Time: 4/9/2022  9:01 AM    PATIENT/EVENT DESCRIPTION:  Kaia Quiroz is a 78 y.o. female   Patient requested a large print Bible. SPIRITUAL ASSESSMENT/INTERVENTION:  Assessment: Patient had just finished a serious conversation with nurses and someone on the phone.  was unaware of the topic. At time of visit,  and his wife were present. Patient was somber. She accepted bible but was not interested in visiting with  at time. Intervention:  provided a supportive presence through active listening.  provided a large print new testament and assured her of  support if needed in the future. Outcome: Patient expressed her appreciation for support and bible. SPIRITUAL CARE FOLLOW-UP PLAN:  Chaplains will remain available to offer spiritual and emotional support as needed.       Electronically signed by Anabel Pablo on 4/12/2022 at 10:01 AM.  [facility]  [phone]           04/12/22 1000   Encounter Summary   Services provided to: Patient  Katarzyna Miller)   Referral/Consult From: Patient   Continue Visiting   (4/12/22)   Complexity of Encounter Moderate   Length of Encounter 15 minutes   Spiritual/Moravian   Type Spiritual support   Assessment Approachable;Coping   Intervention Active listening;Provided reading materials/devotional materials   Outcome Expressed gratitude;Expressed feelings/needs/concerns;Receptive

## 2022-04-12 NOTE — PROGRESS NOTES
Comprehensive Nutrition Assessment    Type and Reason for Visit:  Reassess    Nutrition Recommendations/Plan: Continue current diet with Ensure Enlive oral supplements x 2 per day. Encourage/monitor PO intakes as tolerated. Will monitor labs, weights, and plan of care. Nutrition Assessment:  Followed-up with patient for additional diet questions. Pt denies any questions about a Low Fiber diet from education provided yesterday. Observed lunch tray which pt had eaten a small amount of her meal.  Pt states she did not try the Ensure supplement yet but she would try it later today. Malnutrition Assessment:  Malnutrition Status: At risk for malnutrition (Comment)    Context:  Acute Illness     Findings of the 6 clinical characteristics of malnutrition:  Energy Intake:  Mild decrease in energy intake (Comment) (x past 4 days per pt)  Weight Loss:  No significant weight loss     Body Fat Loss:  No significant body fat loss     Muscle Mass Loss:  No significant muscle mass loss    Fluid Accumulation:  No significant fluid accumulation     Strength:  Not Performed    Estimated Daily Nutrient Needs:  Energy (kcal):  MSJ x 1.1-1.2 = 0818-9640 kcals/day; Weight Used for Energy Requirements:  Current     Protein (g):  1.2-1.5 gm/kg = 70-90 gm pro/day; Weight Used for Protein Requirements:  Ideal        Fluid (ml/day):  per MD; Method Used for Fluid Requirements:  Other (Comment)      Nutrition Related Findings:  labs reviewed: Na 133 mmol/L. Meds reviewed. Last BM 4/11. Wounds:  None       Current Nutrition Therapies:    ADULT DIET;  Regular  ADULT ORAL NUTRITION SUPPLEMENT; Breakfast, Dinner; Standard High Calorie/High Protein Oral Supplement    Anthropometric Measures:  · Height: 5' 6\" (167.6 cm)  · Current Body Weight: 223 lb 8 oz (101.4 kg)   · Admission Body Weight: 227 lb (103 kg)    · Usual Body Weight: 224 lb (101.6 kg)     · Ideal Body Weight: 130 lbs; % Ideal Body Weight 171.9 %   · BMI: 36.1  · BMI Categories: Obese Class 2 (BMI 35.0 -39.9)       Nutrition Diagnosis:   · Inadequate oral intake related to altered GI function as evidenced by poor intake prior to admission,intake 0-25% (need for ONS; improving PO intakes)    Nutrition Interventions:   Food and/or Nutrient Delivery:  Continue Current Diet,Start Oral Nutrition Supplement  Nutrition Education/Counseling:  Education completed (Provided handouts and education on a low residual/low fiber diet. Contact information provided.)   Coordination of Nutrition Care:  Continue to monitor while inpatient    Goals:  Meet at least 75% of estimated nutrition needs. Nutrition Monitoring and Evaluation:   Behavioral-Environmental Outcomes:  None Identified   Food/Nutrient Intake Outcomes:  Food and Nutrient Intake,Supplement Intake  Physical Signs/Symptoms Outcomes:  Biochemical Data,GI Status,Fluid Status or Edema,Hemodynamic Status,Nutrition Focused Physical Findings,Skin,Weight     Discharge Planning:     Too soon to determine     Electronically signed by Jacob Brand RD, LD on 4/12/22 at 2:50 PM EDT    Contact: [phone]

## 2022-04-12 NOTE — PROGRESS NOTES
Physical Therapy        Physical Therapy Cancel Note      DATE: 2022    NAME: Keila Menchaca  MRN: [id number]   : 1955      Patient not seen this date for Physical Therapy due to:    Patient independent with functional mobility. Will defer PT evaluation at this time. Please reorder PT if future needs arise.        Electronically signed by Kaylin Vasquez PT on 2022 at 11:40 AM

## 2022-04-12 NOTE — PROGRESS NOTES
Occupational [address]  Occupational Therapy Not Seen Note    DATE: 2022    NAME: Torin Gates  MRN: [id number]   : 1955      Patient not seen this date for Occupational Therapy due to:    Surgery/Procedure: Midline Cath being placed at bedside    Electronically signed by SANDY Keith on 2022 at 4:42 PM

## 2022-04-12 NOTE — CARE COORDINATION
Transitional Planning    09:48 AM Contacted Khushbu at [place] to check benefits. Sent Referral to [address] for IV antibiotics. 12:30 PM: Khushbu called from [place] and Stated that the patient will have a $15 Copay per Dose, she will be billed for services, updated patient and she is agreeable. 1441: Fax failed, hand faxed referral to gaby at [address]. 1450: Faxed IV Antibiotic script and midline information to Khushbu at [facility].    1520: Received phone call from Gaby at [address], will accept patient. 1525: Josse New at [facility] and updated her that [address] agreed to accept patient. 1530: Updated patient that her Home care and infusion company have accepted. Patient verbalized understanding    [phone]: Patient changed her mind and would like referral sent to [facility], referral sent to [facility] for IV ATB Admin.

## 2022-04-13 VITALS
DIASTOLIC BLOOD PRESSURE: 61 MMHG | TEMPERATURE: 97.7 F | WEIGHT: 223.5 LBS | OXYGEN SATURATION: 100 % | BODY MASS INDEX: 35.92 KG/M2 | HEART RATE: 68 BPM | SYSTOLIC BLOOD PRESSURE: 94 MMHG | HEIGHT: 66 IN | RESPIRATION RATE: 17 BRPM

## 2022-04-13 LAB
ABSOLUTE EOS #: 0.11 K/UL (ref 0–0.44)
ABSOLUTE IMMATURE GRANULOCYTE: <0.03 K/UL (ref 0–0.3)
ABSOLUTE LYMPH #: 0.98 K/UL (ref 1.1–3.7)
ABSOLUTE MONO #: 0.82 K/UL (ref 0.1–1.2)
ANION GAP SERPL CALCULATED.3IONS-SCNC: 11 MMOL/L (ref 9–17)
BASOPHILS # BLD: 1 % (ref 0–2)
BASOPHILS ABSOLUTE: 0.04 K/UL (ref 0–0.2)
BUN BLDV-MCNC: 10 MG/DL (ref 8–23)
CALCIUM SERPL-MCNC: 8.4 MG/DL (ref 8.6–10.4)
CHLORIDE BLD-SCNC: 102 MMOL/L (ref 98–107)
CO2: 20 MMOL/L (ref 20–31)
CREAT SERPL-MCNC: 0.88 MG/DL (ref 0.5–0.9)
EOSINOPHILS RELATIVE PERCENT: 2 % (ref 1–4)
GFR AFRICAN AMERICAN: >60 ML/MIN
GFR NON-AFRICAN AMERICAN: >60 ML/MIN
GFR SERPL CREATININE-BSD FRML MDRD: ABNORMAL ML/MIN/{1.73_M2}
GLUCOSE BLD-MCNC: 105 MG/DL (ref 70–99)
HCT VFR BLD CALC: 31.1 % (ref 36.3–47.1)
HEMOGLOBIN: 11.3 G/DL (ref 11.9–15.1)
IMMATURE GRANULOCYTES: 0 %
LYMPHOCYTES # BLD: 14 % (ref 24–43)
MCH RBC QN AUTO: 31.2 PG (ref 25.2–33.5)
MCHC RBC AUTO-ENTMCNC: 36.3 G/DL (ref 28.4–34.8)
MCV RBC AUTO: 85.9 FL (ref 82.6–102.9)
MONOCYTES # BLD: 12 % (ref 3–12)
NRBC AUTOMATED: 0 PER 100 WBC
PDW BLD-RTO: 13.7 % (ref 11.8–14.4)
PLATELET # BLD: 284 K/UL (ref 138–453)
PMV BLD AUTO: 11.1 FL (ref 8.1–13.5)
POTASSIUM SERPL-SCNC: 3.6 MMOL/L (ref 3.7–5.3)
RBC # BLD: 3.62 M/UL (ref 3.95–5.11)
SEG NEUTROPHILS: 71 % (ref 36–65)
SEGMENTED NEUTROPHILS ABSOLUTE COUNT: 5.13 K/UL (ref 1.5–8.1)
SODIUM BLD-SCNC: 133 MMOL/L (ref 135–144)
WBC # BLD: 7.1 K/UL (ref 3.5–11.3)

## 2022-04-13 PROCEDURE — 85025 COMPLETE CBC W/AUTO DIFF WBC: CPT

## 2022-04-13 PROCEDURE — 6360000002 HC RX W HCPCS: Performed by: INTERNAL MEDICINE

## 2022-04-13 PROCEDURE — 36415 COLL VENOUS BLD VENIPUNCTURE: CPT

## 2022-04-13 PROCEDURE — 80048 BASIC METABOLIC PNL TOTAL CA: CPT

## 2022-04-13 PROCEDURE — 99238 HOSP IP/OBS DSCHRG MGMT 30/<: CPT | Performed by: INTERNAL MEDICINE

## 2022-04-13 PROCEDURE — 2580000003 HC RX 258: Performed by: INTERNAL MEDICINE

## 2022-04-13 PROCEDURE — 6370000000 HC RX 637 (ALT 250 FOR IP): Performed by: STUDENT IN AN ORGANIZED HEALTH CARE EDUCATION/TRAINING PROGRAM

## 2022-04-13 PROCEDURE — APPSS30 APP SPLIT SHARED TIME 16-30 MINUTES: Performed by: NURSE PRACTITIONER

## 2022-04-13 PROCEDURE — 99232 SBSQ HOSP IP/OBS MODERATE 35: CPT | Performed by: INTERNAL MEDICINE

## 2022-04-13 RX ADMIN — ALLOPURINOL 100 MG: 100 TABLET ORAL at 09:29

## 2022-04-13 RX ADMIN — ACETAMINOPHEN 650 MG: 325 TABLET ORAL at 02:14

## 2022-04-13 RX ADMIN — RIVAROXABAN 20 MG: 20 TABLET, FILM COATED ORAL at 09:29

## 2022-04-13 RX ADMIN — SPIRONOLACTONE 12.5 MG: 25 TABLET ORAL at 09:29

## 2022-04-13 RX ADMIN — SACUBITRIL AND VALSARTAN 0.5 TABLET: 24; 26 TABLET, FILM COATED ORAL at 09:29

## 2022-04-13 RX ADMIN — LEVOTHYROXINE SODIUM 25 MCG: 25 TABLET ORAL at 06:37

## 2022-04-13 RX ADMIN — ERTAPENEM 1000 MG: 1 INJECTION INTRAMUSCULAR; INTRAVENOUS at 13:57

## 2022-04-13 ASSESSMENT — PAIN DESCRIPTION - LOCATION: LOCATION: ABDOMEN;BACK

## 2022-04-13 ASSESSMENT — PAIN DESCRIPTION - PROGRESSION
CLINICAL_PROGRESSION: GRADUALLY IMPROVING
CLINICAL_PROGRESSION: GRADUALLY IMPROVING
CLINICAL_PROGRESSION: NOT CHANGED
CLINICAL_PROGRESSION: GRADUALLY IMPROVING

## 2022-04-13 ASSESSMENT — PAIN DESCRIPTION - ORIENTATION: ORIENTATION: LOWER

## 2022-04-13 ASSESSMENT — PAIN SCALES - GENERAL
PAINLEVEL_OUTOF10: 2
PAINLEVEL_OUTOF10: 2

## 2022-04-13 ASSESSMENT — PAIN DESCRIPTION - FREQUENCY: FREQUENCY: CONTINUOUS

## 2022-04-13 ASSESSMENT — PAIN DESCRIPTION - ONSET: ONSET: ON-GOING

## 2022-04-13 ASSESSMENT — PAIN DESCRIPTION - DESCRIPTORS: DESCRIPTORS: ACHING;DISCOMFORT

## 2022-04-13 ASSESSMENT — PAIN DESCRIPTION - PAIN TYPE: TYPE: ACUTE PAIN

## 2022-04-13 NOTE — PROGRESS NOTES
[facility]  Internal Medicine Teaching Residency Program  Inpatient Daily Progress Note  ______________________________________________________________________________    Patient: Herrera Patricio  YOB: 1955   DIANEK:[id number]    Acct: [de-identified]     Room: [address] [place] [address]  Admit date: 4/9/2022  Today's date: 04/13/22  Number of days in the hospital: 4    SUBJECTIVE   Admitting Diagnosis: Diverticulitis  CC: abdominal pain     Abdominal pain is improved from yesterday. Abdomen is nontender. Pain is better controlled. Will DC today. ROS:  Constitutional:  negative for chills, fevers, sweats  Respiratory:  negative for cough, dyspnea on exertion, hemoptysis, shortness of breath, wheezing  Cardiovascular:  negative for chest pain, chest pressure/discomfort, lower extremity edema, palpitations  Gastrointestinal:  Abdominal pain with n/v  Neurological:  negative for dizziness, headache    BRIEF HISTORY     The patient is a pleasant 78 y.o. female PMH diverticulitis, nonischemic cardiomyopathy last EF 17% s/p AICD placement, HTN  presents with a chief complaint of abdominal pain that started 2 days ago. Patient reports that she has been having lower abdominal pain and n/v. Of note patient had a recent CT colonoscopy without active disease but did reveal pancolonic diverticulosis 1 week ago. She had an appointment with GI a week ago where she was given abx. She reports that since then she has been having pain much like her previous episodes of diverticulitis. She also reports n/v with food. WBC is wnl,  LFTs are normal except for elevated ALP at 260. Small bump in creatinine and low potassium. Patient requesting to see GI this admission. Former smoker, occasional alcohol, no illegal drugs. On exam patient reports Lower abdominal pain during palpation and RUQ tenderness.  Aguayo's sign is positive.        OBJECTIVE     Vital Signs:  BP 94/61   Pulse 68   Temp 97.7 °F (36.5 °C) (Oral)   Resp 17   Ht 5' 6\" (1.676 m)   Wt 223 lb 8 oz (101.4 kg)   SpO2 100%   BMI 36.07 kg/m²     Temp (24hrs), Av °F (36.7 °C), Min:97.7 °F (36.5 °C), Max:98.3 °F (36.8 °C)    No intake/output data recorded. Physical Exam:  Physical Exam  Constitutional:       General: She is not in acute distress. Appearance: Normal appearance. She is obese. She is not ill-appearing, toxic-appearing or diaphoretic. HENT:      Mouth/Throat:      Mouth: Mucous membranes are moist.      Pharynx: Oropharynx is clear. Eyes:      General: No scleral icterus. Cardiovascular:      Rate and Rhythm: Normal rate and regular rhythm. Pulses: Normal pulses. Heart sounds: Normal heart sounds. No murmur heard. No friction rub. No gallop. Pulmonary:      Effort: Pulmonary effort is normal.      Breath sounds: Normal breath sounds. Abdominal:      General: Abdomen is flat. Bowel sounds are normal. There is no distension. Palpations: Abdomen is soft. There is no mass. Tenderness: There is no abdominal tenderness. There is no guarding or rebound. Hernia: No hernia is present. Musculoskeletal:         General: No swelling, tenderness, deformity or signs of injury. Right lower leg: No edema. Left lower leg: No edema. Skin:     General: Skin is warm and dry. Coloration: Skin is not jaundiced or pale. Findings: No bruising. Neurological:      General: No focal deficit present. Mental Status: She is alert and oriented to person, place, and time. Cranial Nerves: No cranial nerve deficit. Motor: No weakness. Psychiatric:         Mood and Affect: Mood normal.         Behavior: Behavior normal.         Thought Content:  Thought content normal.         Judgment: Judgment normal.           Medications:  Scheduled Medications:    potassium chloride  40 mEq Oral Once    ertapenem (INVanz) IVPB  1,000 mg IntraVENous Q24H    polyethylene glycol  17 g Oral Daily    acetaminophen  650 mg Oral Once    allopurinol  100 mg Oral Daily    levothyroxine  25 mcg Oral Daily    lidocaine  1 patch Topical Daily    metoprolol tartrate  25 mg Oral BID    rivaroxaban  20 mg Oral Daily    sacubitril-valsartan  0.5 tablet Oral BID    spironolactone  12.5 mg Oral Daily    sodium chloride flush  5-40 mL IntraVENous 2 times per day     Continuous Infusions:    sodium chloride      sodium chloride 75 mL/hr at 04/12/22 2250     PRN Medicationsmagnesium hydroxide, 30 mL, Daily PRN  diphenhydrAMINE, 25 mg, Q6H PRN  sodium chloride flush, 5-40 mL, PRN  sodium chloride, , PRN  ondansetron, 4 mg, Q8H PRN   Or  ondansetron, 4 mg, Q6H PRN  polyethylene glycol, 17 g, Daily PRN  acetaminophen, 650 mg, Q6H PRN   Or  acetaminophen, 650 mg, Q6H PRN  oxyCODONE, 5 mg, Q6H PRN        Diagnostic Labs:  CBC:   Recent Labs     04/11/22  0608 04/12/22  0651 04/13/22  0434   WBC 11.3 7.4 7.1   RBC 4.02 3.76* 3.62*   HGB 12.4 11.7* 11.3*   HCT 34.7* 32.8* 31.1*   MCV 86.3 87.2 85.9   RDW 13.4 13.6 13.7    255 284     BMP:   Recent Labs     04/11/22  0608 04/12/22  0651 04/13/22  0434   * 133* 133*   K 3.7 4.3 3.6*   CL 96* 102 102   CO2 21 19* 20   BUN 16 12 10   CREATININE 1.32* 0.83 0.88     BNP: No results for input(s): BNP in the last 72 hours. PT/INR:   Recent Labs     04/11/22  0908   PROTIME 12.4*   INR 1.2     APTT:   Recent Labs     04/11/22  0908   APTT 40.8*     CARDIAC ENZYMES: No results for input(s): CKMB, CKMBINDEX, TROPONINI in the last 72 hours. Invalid input(s): CKTOTAL;3  FASTING LIPID PANEL:  Lab Results   Component Value Date    CHOL 180 05/23/2020    HDL 42 05/23/2020    TRIG 79 05/23/2020     LIVER PROFILE:   No results for input(s): AST, ALT, ALB, BILIDIR, BILITOT, ALKPHOS in the last 72 hours.    MICROBIOLOGY:   Lab Results   Component Value Date/Time    CULTURE PATIENT DISCHARGED BEFORE SAMPLE COLLECTED 12/06/2021 10:30 AM       Imaging:    XR ABDOMEN (KUB) (SINGLE AP VIEW)    Result Date: 4/9/2022  No evidence of bowel obstruction. US GALLBLADDER RUQ    Result Date: 4/9/2022  1. Cirrhosis, without discrete intrahepatic nodule or mass. 2. Cholelithiasis, without sonographic evidence of cholecystitis. 3. Unremarkable sonographic appearance of the common bile duct, right kidney, and pancreas. ASSESSMENT & PLAN     Assessment and Plan:    Principal Problem:    Diverticulitis  Active Problems:    Dilated cardiomyopathy ([address]    Essential hypertension    ICD (implantable cardioverter-defibrillator) battery depletion    Morbid obesity due to excess calories ([address]    AICD (automatic cardioverter/defibrillator) present    MAURO (acute kidney injury) ([address]    Nausea & vomiting    Diverticulitis of large intestine with abscess without bleeding    Diverticulitis of colon    Lower abdominal pain  Resolved Problems:    * No resolved hospital problems. *    Diverticulitis  Abscess   - continue cipro and flagyl IV  - pain and nausea control  - GI consulted  - IR cannot draing due to lack of safe window  - will have midline placed and start on invanz until 4/20   - possible DC today.      Nausea vomiting  - positive gore's sign on exam  - RUQ US is negative     MAURO  - Cr initally  1.16 mildly elevated is currently 1.3   - suspect this is due to IV constrast. Due to low EF will hold off IVF and monitor   - avoid nephrotoxic meds     Nonischemic cardiomyopathy  - last EF was 17%  - s/p AICD placement  - avoid IVF     HTN  - resume lopressor,entresto, aldectone home dose     Morbid obesity  - BMI of 36.6  - recommend weight loss. Sarah Buchanan MD  Internal Medicine Resident  [facility]  4/13/2022 9:18 AM Attending Physician Statement  I have discussed the care of Cande Champion, including pertinent history and exam findings,  with the resident.  I have seen and examined the patient and the key elements of all parts of the encounter have been performed by me. I agree with the assessment, plan and orders as documented by the resident with additions . Treatment plan Discussed with nursing staff in detail , all questions answered . Electronically signed by Mallory Phillips MD on   4/13/22 at 8:46 PM EDT    Please note that this chart was generated using voice recognition Dragon dictation software. Although every effort was made to ensure the accuracy of this automated transcription, some errors in transcription may have occurred. Hay Walker

## 2022-04-13 NOTE — CARE COORDINATION
Transitional Plannin: Lia from [facility] called and stated they can take patient and administer IV ATB.    0800: Updated patient that [facility] accepted. Waiting on DC.    0830: Contacted Khushbu at [facility] and updated her that [facility] will be the [address] agency administering ATB. States they will deliver ATB and supplies 22 in the morning. 1015: Updated Patient that [facility] will be out tomorrow to admit to [address] [place] [address] will be delivering supplies tomorrow() morning. Client can be discharged today following IV ATB administration, Midline dressing change.

## 2022-04-13 NOTE — PROGRESS NOTES
Occupational [address]  Occupational Therapy Not Seen Note    DATE: 2022    NAME: Nenita Dhillon  MRN: [id number]   : 1955      Patient not seen this date for Occupational Therapy due to:    Patient independent with ADLs and functional tasks with no acute OT needs; pt and RN report pt up ambulating in room/hallways and performing ADL tasks independently. Pt reports no safety concerns for returning home at discharge. Will defer OT evaluation at this time. Please reorder OT if future needs arise.        Electronically signed by Dayana Cuevas OT on 2022 at 8:41 AM

## 2022-04-13 NOTE — DISCHARGE INSTR - DIET

## 2022-04-13 NOTE — CARE COORDINATION
Discharge 1 [facility] Case Management Department  Written by: Conchita Phelps RN    Patient Name: Herrera Patricio  Attending Provider: Latasha Domínguez MD  Admit Date: 2022  9:01 AM  MRN: [id number]  Account: [de-identified]                     : 1955  Discharge Date: 2022      Disposition: home with [facility] and Sarah Rachel RN

## 2022-04-13 NOTE — PROGRESS NOTES
CLINICAL PHARMACY NOTE: MEDS TO BEDS    Total # of Prescriptions Filled: 2   The following medications were delivered to the patient:  · Zofran  · Oxycodone    Additional Documentation:

## 2022-04-13 NOTE — PROGRESS NOTES
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 Stop date 4-20-22  · Midline placed 4/12/22  · Patient has failed attempt at oral antimicrobial treatment  · Zosyn risks causing CHF in someone with a LVEF of 17 %  · Monitor renal function  · Drainage of the abdominal abscess for source control when feasible  · Office f/up in 2 weeks with Dr Taz Mccann for infection. Please call [phone] for appointment    Medical Decision Making/Summary/Discussion:4/13/2022     ·   Infection Control Recommendations   · [place] Precautions    Antimicrobial Stewardship Recommendations     · Simplification of therapy  · Targeted therapy  · PK dosing    Coordination of Outpatient Care:   · Estimated Length of IV antimicrobials:  · Patient will need Midline Catheter Insertion:   · Patient will need PICC line Insertion:  · Patient will need: Home IV , [place],  SNF,  LTAC: TBD  · Patient will need outpatient wound care:    Chief complaint/reason for consultation:   · Antibiotic recommendations for diverticulitis with abdominal abscess-Unable to place drain per IR      History of Present Illness:   Mary Rizvi is a 78y.o.-year-old female who was initially admitted on 4/9/2022. Patient seen at the request of Dr. Chinyere Schmitz:    This patient, with a complicated medical history including diverticulitis and sigmoid colonic abscess, presented to [facility] ED on 4/9/2022 with complaints of left lower quadrant abdominal pain with associated nausea, vomiting and diarrhea.     She saw Dr. Bella Anne, with GI, at his office on 4/5/2022 for follow-up from 3/29/2022 CT colonography that revealed abscess of the sigmoid colon with diverticulosis of the large intestine. According to Dr. Schmidt Shown note, he opted for conservative treatment, with a CT colonoscopy versus colonoscopy, as she was asymptomatic and has a significant cardiac history. He did mention in his note that she may be at high risk for sigmoid colectomy    Upon admission to [facility], she was initiated on IV Cipro and Flagyl. This was changed to IV Zosyn per GI on 4/10/2022. IR was consulted for intra-abdominal abscess drainage, but unfortunately as there is no safe access site due to bowel and bone, IR suggest rescanning at a later date to see if the abscess has increased in size and there would be a new area for percutaneous access. Imaging:    Impression CT abdomen/pelvis with IV contrast 4/10/2022   Recurrent or residual abnormal wall thickening in the mid sigmoid colon       which is not substantially changed from prior exam compatible with chronic   diverticulitis.       Persistent peridiverticular abscess in the adjacent sigmoid mesocolon which   is increased in size as detailed above.       Cholelithiasis       Stable cirrhotic liver       Marked cardiomegaly, unchanged. Infectious disease was consulted for antimicrobial management      CURRENT EVALUATION 4/13/2022     Afebrile  VS stable    The patient remains alert and oriented on room air. Her abdominal pain continues to improve    Leukocytosis has resolved    She is likely to be discharged today and is to continue Ertapenem as outpatient until 4-20-22    Will follow up in 2 weeks in the office. Labs, X rays reviewed: 4/13/2022    BUN:16-->12-->10  Cr:1.32-->0.83-->0.88     WBC:11.3-->7.4-->7.1  Hb:12.4-->11.7-->11.1  Plat: 284-->255-->284    CRP: 183.1    Cultures:  Urine:  ·   Blood:  ·   Sputum :  ·   Wound:  ·     Discussed with patient, RN, IM.     I have personally reviewed the past medical history, past surgical history, medications, social history, and family history, and I have updated the database accordingly.   Past Medical History:     Past Medical History:   Diagnosis Date    Abnormal Pap smear     Acute on chronic systolic congestive heart failure (HCC)     AICD (automatic cardioverter/defibrillator) present     MAURO (acute kidney injury) ([facility] 03/04/2017    Anemia     Arthritis     Cardiomyopathy ([facility] 12/20/2012    robotic replacement of 2 left ventricular leads/replacement of ICD generator    Cardiomyopathy, nonischemic ([facility] 12/20/2012    Chronic combined systolic and diastolic congestive heart failure (HCC)     Ejection fraction < 50%     HTN (hypertension)     Hypothyroidism     Ischemic cardiomyopathy     Left bundle branch block     Mitral regurgitation     Morbid obesity due to excess calories [facility] 01/15/2017    Pulmonary hypertension ([facility]     Sudden onset of severe headache        Past Surgical  History:     Past Surgical History:   Procedure Laterality Date    COLONOSCOPY      HERNIA REPAIR      INSERT MIDLINE CATHETER  4/12/2022         OTHER SURGICAL HISTORY  12/20/2012    robotic placement of 2 left ventricular leads/replacement of ICD generator    PACEMAKER PLACEMENT      medtronic Viva XT CRT -D [id number] Serial : [id number]    TUBAL LIGATION         Medications:      potassium chloride  40 mEq Oral Once    ertapenem (INVanz) IVPB  1,000 mg IntraVENous Q24H    polyethylene glycol  17 g Oral Daily    acetaminophen  650 mg Oral Once    allopurinol  100 mg Oral Daily    levothyroxine  25 mcg Oral Daily    lidocaine  1 patch Topical Daily    metoprolol tartrate  25 mg Oral BID    rivaroxaban  20 mg Oral Daily    sacubitril-valsartan  0.5 tablet Oral BID    spironolactone  12.5 mg Oral Daily    sodium chloride flush  5-40 mL IntraVENous 2 times per day       Social History:     Social History     Socioeconomic History    Marital status:      Spouse name: Not on file    Number of children: 2    Years of education: Not on file    Highest education level: Not on file   Occupational History    Not on file   Tobacco Use    Smoking status: Never Smoker    Smokeless tobacco: Never Used   Vaping Use    Vaping Use: Never used   Substance and Sexual Activity    Alcohol use: No     Alcohol/week: 0.0 standard drinks    Drug use: Not Currently    Sexual activity: Never   Other Topics Concern    Not on file   Social History Narrative    Not on file     Social Determinants of Health     Financial Resource Strain:     Difficulty of Paying Living Expenses: Not on file   Food Insecurity:     Worried About Running Out of Food in the Last Year: Not on file    [place] of Food in the Last Year: Not on file   Transportation Needs:     Lack of Transportation (Medical): Not on file    Lack of Transportation (Non-Medical):  Not on file   Physical Activity:     Days of Exercise per Week: Not on file    Minutes of Exercise per Session: Not on file   Stress:     Feeling of Stress : Not on file   Social Connections:     Frequency of Communication with Friends and Family: Not on file    Frequency of Social Gatherings with Friends and Family: Not on file    Attends Yazdanism Services: Not on file    Active Member of [address] [place] [address] [employer] or Organizations: Not on file    Attends Club or Organization Meetings: Not on file    Marital Status: Not on file   Intimate Partner Violence:     Fear of Current or Ex-Partner: Not on file    Emotionally Abused: Not on file    Physically Abused: Not on file    Sexually Abused: Not on file   Housing Stability:     Unable to Pay for Housing in the Last Year: Not on file    Number of [place] in the Last Year: Not on file    Unstable Housing in the Last Year: Not on file       Family History:     Family History   Problem Relation Age of Onset    Cancer Other         ovarian    High Blood Pressure Mother    Artatiana Salazartz Other Mother         copd    Arthritis Neg Hx     Asthma Neg Hx     Birth Defects Neg Hx     Depression Neg Hx     Diabetes Neg Hx     Early Death Neg Hx     Hearing Loss Neg Hx     Heart Disease Neg Hx     High Cholesterol Neg Hx     Kidney Disease Neg Hx     Learning Disabilities Neg Hx     Mental Retardation Neg Hx     Mental Illness Neg Hx     Miscarriages / Stillbirths Neg Hx     Stroke Neg Hx     Substance Abuse Neg Hx     Vision Loss Neg Hx     Breast Cancer Neg Hx     Colon Cancer Neg Hx     Eclampsia Neg Hx     Hypertension Neg Hx     Ovarian Cancer Neg Hx      Labor Neg Hx     Spont Abortions Neg Hx         Allergies:   Codeine and Morphine     Review of Systems:   Constitutional: No fevers or chills. No systemic other than abdominal pain  Head: No headaches  Eyes: No double vision or blurry vision. No conjunctival inflammation. ENT: No sore throat or runny nose. . No hearing loss, tinnitus or vertigo. Cardiovascular: No chest pain or palpitations. No shortness of breath. No BLANCHARD  Lung: No shortness of breath or cough. No sputum production  Abdomen: No nausea, vomiting, diarrhea. Positive for abdominal pain. Genitourinary: No increased urinary frequency, or dysuria. No hematuria. No suprapubic or CVA pain  Musculoskeletal: No muscle aches or pains. No joint effusions, swelling or deformities  Hematologic: No bleeding or bruising. Neurologic: No headache, weakness, numbness, or tingling. Integument: No rash, no ulcers. Psychiatric: No depression. Endocrine: No polyuria, no polydipsia, no polyphagia.     Physical Examination :     Patient Vitals for the past 8 hrs:   BP Temp Temp src Pulse Resp SpO2   22 0757 94/61 97.7 °F (36.5 °C) Oral 68 17 100 %     General Appearance: Awake, alert, and in no apparent distress  Head:  Normocephalic, no trauma  Eyes: Pupils equal, round, reactive to light and accommodation; extraocular movements intact; sclera anicteric; conjunctivae pink. No embolic phenomena. ENT: Oropharynx clear, without erythema, exudate, or thrush. No tenderness of sinuses. Mouth/throat: mucosa pink and moist. No lesions. Dentition in good repair. Neck:Supple, without lymphadenopathy. Thyroid normal, No bruits. Pulmonary/Chest: Clear to auscultation, without wheezes, rales, or rhonchi. No dullness to percussion. Cardiovascular: Regular rate and rhythm without murmurs, rubs, or gallops. Abdomen: Soft, tender. Bowel sounds normal. No organomegaly. Abdominal tenderness on pressure. No rebound or guarding. All four Extremities: No cyanosis, clubbing, edema, or effusions. Neurologic: No gross sensory or motor deficits. Skin: Warm and dry with good turgor. No signs of peripheral arterial or venous insufficiency. No ulcerations. No open wounds. Medical Decision Making -Laboratory:   I have independently reviewed/ordered the following labs:    CBC with Differential:   Recent Labs     04/12/22  0651 04/13/22  0434   WBC 7.4 7.1   HGB 11.7* 11.3*   HCT 32.8* 31.1*    284   LYMPHOPCT 14* 14*   MONOPCT 13* 12     BMP:   Recent Labs     04/12/22  0651 04/13/22  0434   * 133*   K 4.3 3.6*    102   CO2 19* 20   BUN 12 10   CREATININE 0.83 0.88     Hepatic Function Panel:   No results for input(s): PROT, LABALBU, BILIDIR, IBILI, BILITOT, ALKPHOS, ALT, AST in the last 72 hours. No results for input(s): RPR in the last 72 hours. No results for input(s): HIV in the last 72 hours. No results for input(s): BC in the last 72 hours.   Lab Results   Component Value Date    MUCUS 1+ 08/23/2015    PH 7.44 12/20/2012    RBC 3.62 04/13/2022    RBC 4.45 04/03/2012    TRICHOMONAS NOT REPORTED 08/23/2015    WBC 7.1 04/13/2022    YEAST NOT REPORTED 08/23/2015    TURBIDITY Clear 12/04/2021     Lab Results   Component Value Date    CREATININE 0.88 04/13/2022    GLUCOSE 105 04/13/2022    GLUCOSE 100 04/03/2012       Medical Decision Making-Imaging:     Narrative EXAMINATION:   CT OF THE ABDOMEN AND PELVIS WITH CONTRAST 4/10/2022 7:41 am       TECHNIQUE:   CT of the abdomen and pelvis was performed with the administration of   intravenous contrast. Multiplanar reformatted images are provided for review. Dose modulation, iterative reconstruction, and/or weight based adjustment of   the mA/kV was utilized to reduce the radiation dose to as low as reasonably   achievable.       COMPARISON:   01/27/2022       HISTORY:   ORDERING SYSTEM PROVIDED HISTORY: rule out diverticulitits, h/o sigmoid   abscess   TECHNOLOGIST PROVIDED HISTORY:   rule out diverticulitits, h/o sigmoid abscess       Reason for Exam: abdominal pain       FINDINGS:   Lower Chest: No acute abnormality in the lung bases with similar scarring and       atelectasis.  Unchanged cardiomegaly with streak artifact from an indwelling       ICD.  Coronary artery calcifications.       Organs: Unchanged macrolobulated contour of the liver with unchanged       heterogeneous attenuation.  Cholelithiasis without       pericholecystic inflammatory change.  Spleen is normal in size and       attenuation with a small splenule inferiorly.  Atrophic pancreas.  Adrenal       glands are normal caliber.  Kidneys enhance symmetrically without       hydronephrosis.  Ureters are normal caliber.       GI/Bowel:  There is persistent abnormal wall thickening of the mid sigmoid       colon with associated extensive diverticula.  Moderate persistent       inflammatory changes and fat stranding in the adjacent pericolonic fat.  No       bowel obstruction.  Normal appendix.  Incidental duodenal diverticulum       without surrounding inflammatory change.       Pelvis: Diminutive postmenopausal uterus.  Inflammatory changes extend to the   left adnexa/ovary, though       the ovary remains grossly normal caliber.  Urinary bladder is unremarkable       Peritoneum/Retroperitoneum: Persistent thick walled mixed complex fluid and       gas collection in the sigmoid mesocolon which is increased in size from prior       measuring 5.2 x 4.5 cm.  Previously measured 3.5 cm x 3.6 cm.       Persistent adjacent morphologically rounded lymph nodes in       the pericolonic fat.  No significant ascites.       Unchanged enlarged lymph nodes in the [facility].  Aortoiliac       atherosclerosis and tortuosity without aneurysm.       Bones/Soft Tissues: No acute abnormality.  Supraumbilical fat containing       ventral hernia without inflammatory changes.  Degenerative changes without       acute or aggressive osseous lesion.           Impression   Recurrent or residual abnormal wall thickening in the mid sigmoid colon       which is not substantially changed from prior exam compatible with chronic   diverticulitis.       Persistent peridiverticular abscess in the adjacent sigmoid mesocolon which   is increased in size as detailed above.       Cholelithiasis       Stable cirrhotic liver       Marked cardiomegaly, unchanged.             Medical Decision Rvnxax-Yiqmtdro-Rfuhi:       Medical Decision Making-Other:     Note:  · Labs, medications, radiologic studies were reviewed with personal review of films  · Large amounts of data were reviewed  · Discussed with nursing Staff, Discharge planner  · Infection Control and Prevention measures reviewed  · All prior entries were reviewed  · Administer medications as ordered  · Prognosis: Guarded  · Discharge planning reviewed  · Follow up as outpatient. Thank you for allowing us to participate in the care of this patient. Please call with questions. Antonieta Sanderson, APRN - CNP     ATTESTATION:    I have discussed the case, including pertinent history and exam findings with the APRN. I have evaluated the  History, physical findings and pictures of the patient and the key elements of the encounter have been performed by me.  I have reviewed the laboratory data, other diagnostic studies and discussed them with the APRN. I have updated the medical record where necessary. I agree with the assessment, plan and orders as documented by the APRN.     Ford Ayala MD.        Pager: [phone] - Office: [phone]

## 2022-04-14 ENCOUNTER — HOSPITAL ENCOUNTER (OUTPATIENT)
Dept: CARDIAC REHAB | Age: 67
Setting detail: THERAPIES SERIES
End: 2022-04-14
Payer: MEDICARE

## 2022-04-14 ENCOUNTER — CARE COORDINATION (OUTPATIENT)
Dept: CASE MANAGEMENT | Age: 67
End: 2022-04-14

## 2022-04-14 NOTE — CARE COORDINATION
[facility] Transitions Initial Follow Up Call    Call within 2 business days of discharge: Yes    Patient: Cande hCampion Patient : 1955   MRN: [id number]  Reason for Admission: Diverticulitis of colon, Sigmoid abscess  Discharge Date: 22 RARS: Readmission Risk Score: 16.6 ( )      Last Discharge [facility]       Complaint Diagnosis Description Type Department Provider    22 Abdominal Pain; Medication Refill Diverticulitis of colon . .. ED to Hosp-Admission (Discharged) (ADMITTED) Angeles Alvarado MD; Steven Kumar... Spoke with: [address]: [facility]    Was able to contact [facility] for initial transitional outreach. She stated that she was doing fine. She said that her abdominal pain was better and was rating it a # 3, is having soft stools with no blood, no fever/chills, no nausea but her appetite is poor. She said that she is fatigued. She stated that [facility] delivered her supplies and the IV antibiotic and the nurse would be out today at around 1 pm.  She stated that she had all her medications and has not made her follow up appointments yet. She declined assistance with scheduling, citing that her had rides that she had to consider. She had no further questions/concerns or needs at this time. Will not follow for care transitions due to pt having a non [facility] provider      Non-face-to-face services provided:  Obtained and reviewed discharge summary and/or continuity of care documents  Assessment and support for treatment adherence and medication management-reviewed     Transitions of Care Initial Call    Was this an external facility discharge? No Discharge Facility: [facility]    Challenges to be reviewed by the provider   Additional needs identified to be addressed with provider: No  none             Method of communication with provider : none      Advance Care Planning:   Does patient have an Advance Directive: decision maker verified.      Was this a readmission? No  Patient stated reason for admission: abdominal pain/ N/V  Patients top risk factors for readmission: functional physical ability  medical condition-Diverticulitis/CHF    Care Transition Nurse (CTN) contacted the patient by telephone to perform post hospital discharge assessment. Verified name and  with patient as identifiers. Provided introduction to self, and explanation of the CTN role. CTN reviewed discharge instructions, medical action plan and red flags with patient who verbalized understanding. Patient given an opportunity to ask questions and does not have any further questions or concerns at this time. Were discharge instructions available to patient? Yes. Reviewed appropriate site of care based on symptoms and resources available to patient including: PCP  Specialist  Home health  When to call 911. The patient agrees to contact the PCP office for questions related to their healthcare. Medication review was performed with patient, who verbalizes understanding of administration of home medications. Covid Risk Education     Educated patient about risk for severe COVID-19 due to risk factors according to CDC guidelines. CTN reviewed discharge instructions, medical action plan and red flag symptoms with the patient who verbalized understanding. Discussed COVID vaccination status: Yes and vaccinated. Education provided on COVID-19 vaccination as appropriate. Discussed exposure protocols and quarantine with CDC Guidelines. Patient was given an opportunity to verbalize any questions and concerns and agrees to contact CTN or health care provider for questions related to their healthcare. Reviewed and educated patient on any new and changed medications related to discharge diagnosis. Was patient discharged with a pulse oximeter? No       CTN provided contact information. No further follow-up call indicated based on severity of symptoms and risk factors.   Plan for next call: final call pt with non [facility] provider        Care Transitions 24 Hour Call    Do you have any ongoing symptoms?: Yes  Patient-reported symptoms: Fatigue  Do you have a copy of your discharge instructions?: Yes  Do you have all of your prescriptions and are they filled?: Yes  Have you been contacted by a [facility] Pharmacist?: No  Have you scheduled your follow up appointment?: No  Were you discharged with any Home Care or Post Acute Services: Yes  Post Acute Services:  Outpatient/Community Services, Home Health (Comment: CHF clinic/[facility])  Do you feel like you have everything you need to keep you well at home?: Yes  Care Transitions Interventions         Follow Up  Future Appointments   Date Time Provider Yann Olivares   4/19/2022 10:00 AM [facility] CARD REHAB RM 03 [facility]   4/21/2022 10:00 AM [facility] CARD REHAB RM 03 [facility]   4/26/2022 10:00 AM STC CARD REHAB RM 03 [facility]   4/28/2022 10:00 AM STC CARD REHAB RM 03 [facility]   5/3/2022 10:00 AM STC CARD REHAB RM 03 [facility]   5/5/2022 10:00 AM STC CARD REHAB RM 03 [facility]   5/10/2022 10:00 AM STC CARD REHAB RM 03 [facility]   5/12/2022 10:00 AM STC CARD REHAB RM 03 [facility]   5/17/2022 10:00 AM STC CARD REHAB RM 03 [facility]   5/19/2022 10:00 AM STC CARD REHAB RM 03 [facility]   5/24/2022 10:00 AM STC CARD REHAB RM 03 [facility]   5/26/2022 10:00 AM STC CARD REHAB RM 03 [facility]   5/31/2022 10:00 AM STC CARD REHAB RM 03 [facility]   6/2/2022 10:00 AM STC CARD REHAB RM 11 [facility]   6/7/2022 10:00 AM STC CARD REHAB RM 01 [facility]   6/9/2022 10:00 AM STC CARD REHAB RM 04 [facility]   6/13/2022 10:00 AM STC CARD REHAB RM 02 [facility]   6/14/2022 10:00 AM STC CARD REHAB RM 03 [facility]   6/16/2022 10:00 AM STC CARD REHAB RM 07 [facility]   6/21/2022 10:00 AM STC CARD REHAB RM 04 [facility]   6/23/2022 10:00 AM STC CARD REHAB RM 06 [facility]   6/28/2022 10:00 AM [facility]C CARD REHAB RM 07 [facility]   6/30/2022  9:00 AM STC CARD REHAB RM 07 [facility]   12/5/2022  1:30 PM Pina Pettit MD [facility] POLO Small RN

## 2022-04-17 NOTE — DISCHARGE SUMMARY
[facility]     Department of Internal Medicine - Staff Internal Medicine Teaching Service    INPATIENT DISCHARGE SUMMARY      Patient Identification:  Cricket Lozoya is a 78 y.o. female. :  1955  MRN: [id number]     Acct: [de-identified]   PCP: Kenn Siu MD  Admit Date:  2022  Discharge date and time: 22  Attending Provider: No att. providers found                                     [address] Problem Lists:  Principal Problem:    Diverticulitis  Active Problems:    Dilated cardiomyopathy ([address]    Essential hypertension    ICD (implantable cardioverter-defibrillator) battery depletion    Morbid obesity due to excess calories ([address]    AICD (automatic cardioverter/defibrillator) present    MAURO (acute kidney injury) ([address]    Nausea & vomiting    Diverticulitis of large intestine with abscess without bleeding    Diverticulitis of colon    Lower abdominal pain  Resolved Problems:    * No resolved hospital problems. *      HOSPITAL STAY     Brief Inpatient course:   Cricket Lozoya is a 78 y.o. female  PMH diverticulitis, nonischemic cardiomyopathy last EF 17% s/p AICD placement, HTN  who was admitted for the management of Diverticulitis, presented to the emergency department with abdominal pain . Started 2 days PTA. Of note patient had a recent CT colonoscopy without active disease but did reveal pancolonic diverticulosis. Ct imaging revealed increasing sigmoid abscess. Ir consulted to drain but able to get safe window. Was placed on IV abx with midline and DC on 22      Procedures/ Significant Interventions:      No results found.         Consults:     Consults:     Final Specialist Recommendations/Findings:   IP CONSULT TO INTERNAL MEDICINE  IP CONSULT TO CASE MANAGEMENT  IP CONSULT TO GI  IP CONSULT TO INFECTIOUS DISEASES  IP CONSULT TO DIETITIAN  PHARMACY TO DOSE MEDICATION  IP CONSULT TO IV TEAM  IP CONSULT TO HOME CARE NEEDS Any Hospital Acquired Infections: none    Discharge Functional Status:  stable    DISCHARGE PLAN     Disposition: home    Patient Instructions:   Discharge Medication List as of 4/13/2022  2:02 PM      START taking these medications    Details   oxyCODONE (ROXICODONE) 5 MG immediate release tablet Take 1 tablet by mouth every 8 hours as needed (pain) for up to 7 days. , Disp-21 tablet, R-0Print      !! ertapenem (INVANZ) infusion Infuse 1,000 mg intravenously every 24 hours for 8 days Compound per protocol., Disp-8 g, R-0Print      !! ertapenem (INVANZ) infusion Infuse 1,000 mg intravenously every 24 hours for 7 days Compound per protocol., Disp-7 g, R-0Print      ondansetron (ZOFRAN-ODT) 4 MG disintegrating tablet Take 1 tablet by mouth every 8 hours as needed for Nausea or Vomiting, Disp-42 tablet, R-0Normal       !! - Potential duplicate medications found. Please discuss with provider. CONTINUE these medications which have NOT CHANGED    Details   metoprolol succinate (TOPROL XL) 50 MG extended release tablet Historical Med      amiodarone (CORDARONE) 200 MG tablet Historical Med      diclofenac sodium (VOLTAREN) 1 % GEL APPLY 2 GRAMS TOPICALLY 4 (FOUR) TIMES A DAY., Historical Med      empagliflozin (JARDIANCE) 10 MG tablet Take 10 mg by mouth dailyHistorical Med      acetaminophen (TYLENOL) 500 MG tablet Take 2 tablets by mouth every 6 hours as needed for Pain, Disp-60 tablet, R-0Normal      potassium chloride (KLOR-CON M) 20 MEQ extended release tablet Take 1 tablet by mouth daily, Disp-30 tablet, R-0Normal      magnesium oxide (MAG-OX) 400 MG tablet Take 0.5 tablets by mouth daily, Disp-30 tablet, R-1Normal      nitroGLYCERIN (NITROSTAT) 0.4 MG SL tablet up to max of 3 total doses.  If no relief after 1 dose, call 911., Disp-25 tablet, R-3Normal      rivaroxaban (XARELTO) 20 MG TABS tablet Take 1 tablet by mouth daily, Disp-30 tablet, R-11Normal      sacubitril-valsartan (ENTRESTO) 24-26 MG per tablet Take 0.5 tablets by mouth 2 times daily, Disp-60 tablet, R-11Normal      bumetanide (BUMEX) 2 MG tablet Take 1 tablet by mouth daily, Disp-30 tablet, R-3Normal      levothyroxine (SYNTHROID) 25 MCG tablet Take 1 tablet by mouth Daily, Disp-30 tablet, R-3Normal      lidocaine (LIDODERM) 5 % Place one patch to both knees 12 hours on, 12 hours off., Disp-30 patch,R-0Normal      metoprolol tartrate (LOPRESSOR) 25 MG tablet Take 1 tablet by mouth 2 times daily, Disp-60 tablet, R-2Normal      allopurinol (ZYLOPRIM) 100 MG tablet Take 1 tablet by mouth daily, Disp-30 tablet, R-3Normal      spironolactone (ALDACTONE) 25 MG tablet Take 0.5 tablets by mouth daily, Disp-30 tablet, R-3Normal             Activity: activity as tolerated    Diet: regular diet    Follow-up:    Marcy Nieves MD  [facility]    Schedule an appointment as soon as possible for a visit      Tiago Mccallum MD  [address]  [phone]    Schedule an appointment as soon as possible for a visit in 2 weeks      Sheyla Rcahel MD  [address] [place] [address],[address] [address]  [phone]    Call in 2 weeks  infectious disease [address], [place] [address] [address]  [phone]    Schedule an appointment as soon as possible for a visit in 2 days  please f/u with surgeon for possible abscess drainage       Patient Instructions: You were diagnosed with diverticulitis with abscess  1. You were treated with antibiotics, please come to infusion center to continue treatment until 4/20  2. Please take medications as directed  3. Please follow up with your PCP, GI doctor, and infectious disease   4.  Please come to the Ed if your symptoms worsen   Note that over 30 minutes was spent in preparing discharge papers, discussing discharge with patient, medication review, etc.      Yoshi Eckert MD  Internal Medicine Resident  [facility]. [facility]  4/17/2022 1:36 PM

## 2022-04-18 ENCOUNTER — TELEPHONE (OUTPATIENT)
Dept: INFECTIOUS DISEASES | Age: 67
End: 2022-04-18

## 2022-04-19 ENCOUNTER — HOSPITAL ENCOUNTER (OUTPATIENT)
Dept: CARDIAC REHAB | Age: 67
Setting detail: THERAPIES SERIES
Discharge: HOME | End: 2022-04-19
Payer: MEDICARE

## 2022-04-19 NOTE — PROGRESS NOTES
CALLED PT. PT WAS A NO SHOW FOR CARDIAC REHAB TODAY.  [facility]. PT HAVE LAST ANTB INFUSION 4-20 AND IS TO SEE THE SURGEON WEDS 4-27. PT TO CALL US BACK AFTERWARDS. WILL TAKE PT OFF THE 21ST AND 26TH, WILL RESCHEDULE.

## 2022-04-21 ENCOUNTER — APPOINTMENT (OUTPATIENT)
Dept: CARDIAC REHAB | Age: 67
End: 2022-04-21
Payer: MEDICARE

## 2022-04-21 NOTE — TELEPHONE ENCOUNTER
Lily called regarding this matter, I informed them that yes, the line can be pulled after last dose.

## 2022-04-26 ENCOUNTER — OFFICE VISIT (OUTPATIENT)
Dept: INFECTIOUS DISEASES | Age: 67
End: 2022-04-26
Payer: MEDICARE

## 2022-04-26 ENCOUNTER — APPOINTMENT (OUTPATIENT)
Dept: CARDIAC REHAB | Age: 67
End: 2022-04-26
Payer: MEDICARE

## 2022-04-26 VITALS
HEART RATE: 74 BPM | WEIGHT: 216.6 LBS | OXYGEN SATURATION: 96 % | TEMPERATURE: 95.4 F | SYSTOLIC BLOOD PRESSURE: 99 MMHG | BODY MASS INDEX: 34.81 KG/M2 | HEIGHT: 66 IN | DIASTOLIC BLOOD PRESSURE: 74 MMHG

## 2022-04-26 DIAGNOSIS — K57.20 DIVERTICULITIS OF LARGE INTESTINE WITH ABSCESS WITHOUT BLEEDING: Primary | ICD-10-CM

## 2022-04-26 DIAGNOSIS — K63.0 ABSCESS OF SIGMOID COLON: ICD-10-CM

## 2022-04-26 DIAGNOSIS — K57.32 DIVERTICULITIS OF COLON: ICD-10-CM

## 2022-04-26 DIAGNOSIS — I42.0 DILATED CARDIOMYOPATHY (HCC): Chronic | ICD-10-CM

## 2022-04-26 DIAGNOSIS — Z95.810 AICD (AUTOMATIC CARDIOVERTER/DEFIBRILLATOR) PRESENT: ICD-10-CM

## 2022-04-26 PROCEDURE — 99214 OFFICE O/P EST MOD 30 MIN: CPT | Performed by: INTERNAL MEDICINE

## 2022-04-26 PROCEDURE — 1036F TOBACCO NON-USER: CPT | Performed by: INTERNAL MEDICINE

## 2022-04-26 PROCEDURE — G8417 CALC BMI ABV UP PARAM F/U: HCPCS | Performed by: INTERNAL MEDICINE

## 2022-04-26 PROCEDURE — G8400 PT W/DXA NO RESULTS DOC: HCPCS | Performed by: INTERNAL MEDICINE

## 2022-04-26 PROCEDURE — 4040F PNEUMOC VAC/ADMIN/RCVD: CPT | Performed by: INTERNAL MEDICINE

## 2022-04-26 PROCEDURE — 1090F PRES/ABSN URINE INCON ASSESS: CPT | Performed by: INTERNAL MEDICINE

## 2022-04-26 PROCEDURE — 1123F ACP DISCUSS/DSCN MKR DOCD: CPT | Performed by: INTERNAL MEDICINE

## 2022-04-26 PROCEDURE — G8427 DOCREV CUR MEDS BY ELIG CLIN: HCPCS | Performed by: INTERNAL MEDICINE

## 2022-04-26 PROCEDURE — 3017F COLORECTAL CA SCREEN DOC REV: CPT | Performed by: INTERNAL MEDICINE

## 2022-04-26 PROCEDURE — 1111F DSCHRG MED/CURRENT MED MERGE: CPT | Performed by: INTERNAL MEDICINE

## 2022-04-28 ENCOUNTER — HOSPITAL ENCOUNTER (OUTPATIENT)
Dept: CARDIAC REHAB | Age: 67
Setting detail: THERAPIES SERIES
Discharge: HOME | End: 2022-04-28
Payer: MEDICARE

## 2022-04-28 NOTE — PLAN OF CARE
PT TO RETURN TODAY BUT NO SHOWS. AFTER CALLING SHE STATES THE DOCTOR WANTED TO ADMIT HER YESTERDAY FOR CONTINUED INFECTION. INSTRUCTED PT TO CALL US WHEN SHE IS READY TO RETURN. PT VOICES UNDERSTANDING. WILL PLACE PT ON HOLD FOR NOW.

## 2022-04-30 ENCOUNTER — HOSPITAL ENCOUNTER (OUTPATIENT)
Dept: CT IMAGING | Age: 67
Discharge: HOME | End: 2022-05-02
Payer: MEDICARE

## 2022-04-30 DIAGNOSIS — K57.32 DIVERTICULITIS OF COLON: ICD-10-CM

## 2022-04-30 PROCEDURE — 74176 CT ABD & PELVIS W/O CONTRAST: CPT

## 2022-04-30 RX ORDER — 0.9 % SODIUM CHLORIDE 0.9 %
80 INTRAVENOUS SOLUTION INTRAVENOUS ONCE
Status: DISCONTINUED | OUTPATIENT
Start: 2022-04-30 | End: 2022-04-30

## 2022-04-30 RX ORDER — SODIUM CHLORIDE 0.9 % (FLUSH) 0.9 %
10 SYRINGE (ML) INJECTION PRN
Status: DISCONTINUED | OUTPATIENT
Start: 2022-04-30 | End: 2022-04-30

## 2022-05-03 ENCOUNTER — APPOINTMENT (OUTPATIENT)
Dept: CARDIAC REHAB | Age: 67
End: 2022-05-03
Payer: MEDICARE

## 2022-05-05 ENCOUNTER — APPOINTMENT (OUTPATIENT)
Dept: CARDIAC REHAB | Age: 67
End: 2022-05-05
Payer: MEDICARE

## 2022-05-06 ENCOUNTER — TELEPHONE (OUTPATIENT)
Dept: INFECTIOUS DISEASES | Age: 67
End: 2022-05-06

## 2022-05-06 ENCOUNTER — OFFICE VISIT (OUTPATIENT)
Dept: GASTROENTEROLOGY | Age: 67
End: 2022-05-06
Payer: MEDICARE

## 2022-05-06 VITALS
HEART RATE: 74 BPM | SYSTOLIC BLOOD PRESSURE: 123 MMHG | WEIGHT: 218 LBS | HEIGHT: 66 IN | BODY MASS INDEX: 35.03 KG/M2 | DIASTOLIC BLOOD PRESSURE: 86 MMHG

## 2022-05-06 DIAGNOSIS — K57.92 DIVERTICULITIS: Primary | ICD-10-CM

## 2022-05-06 PROCEDURE — G8400 PT W/DXA NO RESULTS DOC: HCPCS | Performed by: INTERNAL MEDICINE

## 2022-05-06 PROCEDURE — 3017F COLORECTAL CA SCREEN DOC REV: CPT | Performed by: INTERNAL MEDICINE

## 2022-05-06 PROCEDURE — 99213 OFFICE O/P EST LOW 20 MIN: CPT | Performed by: INTERNAL MEDICINE

## 2022-05-06 PROCEDURE — 1123F ACP DISCUSS/DSCN MKR DOCD: CPT | Performed by: INTERNAL MEDICINE

## 2022-05-06 PROCEDURE — G8417 CALC BMI ABV UP PARAM F/U: HCPCS | Performed by: INTERNAL MEDICINE

## 2022-05-06 PROCEDURE — 1111F DSCHRG MED/CURRENT MED MERGE: CPT | Performed by: INTERNAL MEDICINE

## 2022-05-06 PROCEDURE — G8427 DOCREV CUR MEDS BY ELIG CLIN: HCPCS | Performed by: INTERNAL MEDICINE

## 2022-05-06 PROCEDURE — 1090F PRES/ABSN URINE INCON ASSESS: CPT | Performed by: INTERNAL MEDICINE

## 2022-05-06 PROCEDURE — 4040F PNEUMOC VAC/ADMIN/RCVD: CPT | Performed by: INTERNAL MEDICINE

## 2022-05-06 PROCEDURE — 1036F TOBACCO NON-USER: CPT | Performed by: INTERNAL MEDICINE

## 2022-05-06 ASSESSMENT — ENCOUNTER SYMPTOMS
NAUSEA: 0
SHORTNESS OF BREATH: 0
CHOKING: 0
CONSTIPATION: 0
DIARRHEA: 0
COUGH: 0
ANAL BLEEDING: 0
TROUBLE SWALLOWING: 0
WHEEZING: 0
VOMITING: 0
RECTAL PAIN: 0
ABDOMINAL PAIN: 0
VOICE CHANGE: 0
BLOOD IN STOOL: 0
ABDOMINAL DISTENTION: 0

## 2022-05-06 NOTE — PROGRESS NOTES
Reason for Referral: Recurrent diverticulitis      No referring provider defined for this encounter. Chief Complaint   Patient presents with    New Patient     Would like to establish care as she lives next door to [facility]. Vs    Diverticulitis     Released from [facility]. Vs inpatient April 13           HISTORY OF PRESENT ILLNESS: Ms.Gwendolyn Jhonathan Goodman is a 75 y.o. female with a past history remarkable for CHF status post AICD placement of low ejection fraction, cardiomyopathy, chronic combined systolic and diastolic heart failure, left bundle branch block, mitral regurgitation, morbid obesity, recurrent left-sided sigmoid diverticulitis with pericolonic abscess, recent hospitalization for complex diverticular disease, status post antibiotic therapy with extended intravenous course, review CT scan revealed resolving sigmoid abscess, patient reports abnormal bowel movements, no significant abdominal pain, referred for evaluation of her diverticular disease. Smoker: Denies  Drinking history: Denies  Abdominal surgeries: None recent  Prior Colonoscopy: 2 years ago  Prior EGD: None recent  FH of GI issues: None reported      Past Medical,Family, and Social History reviewed and does contribute to the patient presentingcondition. Patient's PMH/PSH,SH,PSYCH Hx, MEDs, ALLERGIES, and ROS were all reviewed and updated in the appropriate sections.     PAST MEDICAL HISTORY:  Past Medical History:   Diagnosis Date    Abnormal Pap smear     Acute on chronic systolic congestive heart failure (HCC)     AICD (automatic cardioverter/defibrillator) present     MAURO (acute kidney injury) ([address] 03/04/2017    Anemia     Arthritis     Cardiomyopathy ([address] 12/20/2012    robotic replacement of 2 left ventricular leads/replacement of ICD generator    Cardiomyopathy, nonischemic ([address] 12/20/2012    Chronic combined systolic and diastolic congestive heart failure (HCC)     Ejection fraction < 50%     HTN (hypertension)     Hypothyroidism     Ischemic cardiomyopathy     Left bundle branch block     Mitral regurgitation     Morbid obesity due to excess calories ([facility] 01/15/2017    Pulmonary hypertension ([facility]     Sudden onset of severe headache        Past Surgical History:   Procedure Laterality Date    COLONOSCOPY      HERNIA REPAIR      INSERT MIDLINE CATHETER  4/12/2022         OTHER SURGICAL HISTORY  12/20/2012    robotic placement of 2 left ventricular leads/replacement of ICD generator    PACEMAKER PLACEMENT      medtronic Viva XT CRT -D [id number] Serial : [id number]    TUBAL LIGATION         CURRENT MEDICATIONS:    Current Outpatient Medications:     psyllium (KONSYL) 28.3 % PACK, Take 1 packet by mouth daily, Disp: , Rfl:     metoprolol succinate (TOPROL XL) 50 MG extended release tablet, , Disp: , Rfl:     amiodarone (CORDARONE) 200 MG tablet, , Disp: , Rfl:     diclofenac sodium (VOLTAREN) 1 % GEL, APPLY 2 GRAMS TOPICALLY 4 (FOUR) TIMES A DAY., Disp: , Rfl:     empagliflozin (JARDIANCE) 10 MG tablet, Take 10 mg by mouth daily, Disp: , Rfl:     acetaminophen (TYLENOL) 500 MG tablet, Take 2 tablets by mouth every 6 hours as needed for Pain, Disp: 60 tablet, Rfl: 0    potassium chloride (KLOR-CON M) 20 MEQ extended release tablet, Take 1 tablet by mouth daily, Disp: 30 tablet, Rfl: 0    magnesium oxide (MAG-OX) 400 MG tablet, Take 0.5 tablets by mouth daily, Disp: 30 tablet, Rfl: 1    nitroGLYCERIN (NITROSTAT) 0.4 MG SL tablet, up to max of 3 total doses.  If no relief after 1 dose, call 911., Disp: 25 tablet, Rfl: 3    rivaroxaban (XARELTO) 20 MG TABS tablet, Take 1 tablet by mouth daily, Disp: 30 tablet, Rfl: 11    sacubitril-valsartan (ENTRESTO) 24-26 MG per tablet, Take 0.5 tablets by mouth 2 times daily, Disp: 60 tablet, Rfl: 11    bumetanide (BUMEX) 2 MG tablet, Take 1 tablet by mouth daily, Disp: 30 tablet, Rfl: 3    levothyroxine (SYNTHROID) 25 MCG tablet, Take 1 tablet by mouth Daily, Disp: 30 tablet, Rfl: 3    lidocaine (LIDODERM) 5 %, Place one patch to both knees 12 hours on, 12 hours off., Disp: 30 patch, Rfl: 0    metoprolol tartrate (LOPRESSOR) 25 MG tablet, Take 1 tablet by mouth 2 times daily, Disp: 60 tablet, Rfl: 2    allopurinol (ZYLOPRIM) 100 MG tablet, Take 1 tablet by mouth daily, Disp: 30 tablet, Rfl: 3    spironolactone (ALDACTONE) 25 MG tablet, Take 0.5 tablets by mouth daily, Disp: 30 tablet, Rfl: 3    ALLERGIES:   Allergies   Allergen Reactions    Codeine      Hallucinations    Morphine      Hallucinations         FAMILY HISTORY:       Problem Relation Age of Onset    Cancer Other         ovarian    High Blood Pressure Mother     Other Mother         copd    Arthritis Neg Hx     Asthma Neg Hx     Birth Defects Neg Hx     Depression Neg Hx     Diabetes Neg Hx     Early Death Neg Hx     Hearing Loss Neg Hx     Heart Disease Neg Hx     High Cholesterol Neg Hx     Kidney Disease Neg Hx     Learning Disabilities Neg Hx     Mental Retardation Neg Hx     Mental Illness Neg Hx     Miscarriages / Stillbirths Neg Hx     Stroke Neg Hx     Substance Abuse Neg Hx     Vision Loss Neg Hx     Breast Cancer Neg Hx     Colon Cancer Neg Hx     Eclampsia Neg Hx     Hypertension Neg Hx     Ovarian Cancer Neg Hx      Labor Neg Hx     Spont Abortions Neg Hx          SOCIAL HISTORY:   Social History     Socioeconomic History    Marital status:      Spouse name: Not on file    Number of children: 2    Years of education: Not on file    Highest education level: Not on file   Occupational History    Not on file   Tobacco Use    Smoking status: Never Smoker    Smokeless tobacco: Never Used   Vaping Use    Vaping Use: Never used   Substance and Sexual Activity    Alcohol use: No     Alcohol/week: 0.0 standard drinks    Drug use: Not Currently    Sexual activity: Never   Other Topics Concern    Not on file   Social History Narrative    Not on file Social Determinants of Health     Financial Resource Strain:     Difficulty of Paying Living Expenses: Not on file   Food Insecurity:     Worried About Running Out of Food in the Last Year: Not on file    Ninfa of Food in the Last Year: Not on file   Transportation Needs:     Lack of Transportation (Medical): Not on file    Lack of Transportation (Non-Medical): Not on file   Physical Activity:     Days of Exercise per Week: Not on file    Minutes of Exercise per Session: Not on file   Stress:     Feeling of Stress : Not on file   Social Connections:     Frequency of Communication with Friends and Family: Not on file    Frequency of Social Gatherings with Friends and Family: Not on file    Attends Restorationism Services: Not on file    Active Member of [address] [place] [address] [employer] or Organizations: Not on file    Attends Club or Organization Meetings: Not on file    Marital Status: Not on file   Intimate Partner Violence:     Fear of Current or Ex-Partner: Not on file    Emotionally Abused: Not on file    Physically Abused: Not on file    Sexually Abused: Not on file   Housing Stability:     Unable to Pay for Housing in the Last Year: Not on file    Number of [place] in the Last Year: Not on file    Unstable Housing in the Last Year: Not on file         REVIEW OF SYSTEMS: A 12-point review of systems was obtained and pertinent positives and negatives were listed below. REVIEW OF SYSTEMS:     Constitutional: No fever, no chills, no lethargy, no weakness. HEENT:  No headache, otalgia, itchy eyes, nasal discharge or sore throat. Cardiac:  No chest pain, dyspnea, orthopnea or PND. Chest:   No cough, phlegm or wheezing. Abdomen:      Detailed by MA   Neuro:  No focal weakness, abnormal movements or seizure like activity. Skin:   No rashes, no itching. :   No hematuria, no pyuria, no dysuria, no flank pain. Extremities:  No swelling or joint pains.   ROS was otherwise negative    Review of Systems Constitutional: Negative for fatigue and unexpected weight change. HENT: Negative for trouble swallowing and voice change. Eyes: Negative for visual disturbance. Respiratory: Negative for cough, choking, shortness of breath and wheezing. Cardiovascular: Negative for chest pain, palpitations and leg swelling. Gastrointestinal: Negative for abdominal distention, abdominal pain, anal bleeding, blood in stool, constipation, diarrhea, nausea, rectal pain and vomiting. Genitourinary: Negative for difficulty urinating. Neurological: Positive for dizziness. Negative for seizures, weakness, numbness and headaches. Hematological: Does not bruise/bleed easily. Psychiatric/Behavioral: Negative for confusion and sleep disturbance. The patient is not nervous/anxious. PHYSICAL EXAMINATION: Vital signs reviewed per the nursing documentation. /86   Pulse 74   Ht 5' 6\" (1.676 m)   Wt 218 lb (98.9 kg)   BMI 35.19 kg/m²   Body mass index is 35.19 kg/m². Physical Exam    Physical Exam   Constitutional: Patient is oriented to person, place, and time. Patient appears well-developed and well-nourished. HENT:   Head: Normocephalic and atraumatic. Eyes: Pupils are equal, round, and reactive to light. EOM are normal.   Neck: Normal range of motion. Neck supple. No JVD present. No tracheal deviation present. No thyromegaly present. Cardiovascular: Normal rate, regular rhythm, normal heart sounds and intact distal pulses. Pulmonary/Chest: Effort normal and breath sounds normal. No stridor. No respiratory distress. He has no wheezes. He has no rales. He exhibits no tenderness. Abdominal: Soft. Bowel sounds are normal. He exhibits no distension and no mass. There is no tenderness. There is no rebound and no guarding. No hernia. Musculoskeletal: Normal range of motion. Lymphadenopathy:    Patient has no cervical adenopathy.    Neurological: Patient is alert and oriented to person, place, and time. Psychiatric: Patient has a normal mood and affect. Patient behavior is normal.       LABORATORY DATA: Reviewed  Lab Results   Component Value Date    WBC 7.1 04/13/2022    HGB 11.3 (L) 04/13/2022    HCT 31.1 (L) 04/13/2022    MCV 85.9 04/13/2022     04/13/2022     (L) 04/13/2022    K 3.6 (L) 04/13/2022     04/13/2022    CO2 20 04/13/2022    BUN 10 04/13/2022    CREATININE 0.88 04/13/2022    LABALBU 3.8 04/09/2022    BILITOT 1.09 04/09/2022    ALKPHOS 260 (H) 04/09/2022    AST 22 04/09/2022    ALT 17 04/09/2022    INR 1.2 04/11/2022         Lab Results   Component Value Date    RBC 3.62 (L) 04/13/2022    HGB 11.3 (L) 04/13/2022    MCV 85.9 04/13/2022    MCH 31.2 04/13/2022    MCHC 36.3 (H) 04/13/2022    RDW 13.7 04/13/2022    MPV 11.1 04/13/2022    BASOPCT 1 04/13/2022    LYMPHSABS 0.98 (L) 04/13/2022    MONOSABS 0.82 04/13/2022    NEUTROABS 5.13 04/13/2022    EOSABS 0.11 04/13/2022    BASOSABS 0.04 04/13/2022         DIAGNOSTIC TESTING:     CT ABDOMEN PELVIS WO CONTRAST Additional Contrast? Oral    Result Date: 5/4/2022  EXAMINATION: CT OF THE ABDOMEN AND PELVIS WITHOUT CONTRAST, 4/30/2022 9:37 am TECHNIQUE: CT of the abdomen and pelvis was performed without the administration of intravenous contrast. Multiplanar reformatted images are provided for review. Dose modulation, iterative reconstruction, and/or weight based adjustment of the mA/kV was utilized to reduce the radiation dose to as low as reasonably achievable. COMPARISON: 04/10/2022 CT with contrast HISTORY: ORDERING SYSTEM PROVIDED HISTORY:  Diverticulitis of colon TECHNOLOGIST PROVIDED HISTORY: Diverticulitis of colon Reason for Exam:  Diverticulitis of colon. Additional signs and symptoms:  Pt states f/u diverticulitis and abscess s/p antibiotics and recent hospital admission x2 weeks ago. No complaints at the time of scan.  FINDINGS: Lower Chest: Limited images of the lung bases demonstrate cardiomegaly with coronary artery atherosclerotic calcifications and pacer wires. No acute process in the visualized lung bases. Organs: Cholelithiasis without CT evidence of acute cholecystitis. Macrolobulated contour of the liver is nonspecific. No acute abnormality of the liver, spleen, pancreas or adrenal glands. No evidence of stones in the kidneys, ureters or bladder. No evidence of hydronephrosis. No evidence of perinephric or periureteral stranding. GI/Bowel: Stomach and duodenal sweep demonstrate no acute abnormality. The appendix is visualized and is unremarkable. No evidence of acute appendicitis. Again demonstrated is wall thickening with pericolonic fat stranding and inflammatory changes surrounding the sigmoid colon suggesting acute diverticulitis. Adjacent to the sigmoid colon is a collection of air and fluid measuring approximately 25 x 28 mm in size previously measuring approximately 49 x 42 mm suggesting decreasing size of a diverticular abscess. There are, however, a few foci of extraluminal free air adjacent to the sigmoid colon on today's examination. Pelvis: Bladder is grossly unremarkable. Uterus and adnexa are grossly unremarkable. Peritoneum/Retroperitoneum: No evidence of ascites. No retroperitoneal lymphadenopathy. Aorta is normal in caliber. Mild atherosclerotic calcifications. Bones/Soft Tissues: Age related degenerative changes of the visualized osseous structures without focal destructive lesion. Redemonstration of acute diverticulitis of the sigmoid colon with decreased inflammatory changes since prior. Decreased size of the diverticular abscess now measuring 25 x 28 mm previously measuring 49 x 42 mm in size. However, there is persistent foci of air within the collection and in the surrounding soft tissues consistent with persistent microperforation. Otherwise stable examination. Cholelithiasis without evidence of acute cholecystitis.      XR ABDOMEN (KUB) (SINGLE AP VIEW)    Result Date: 4/9/2022  EXAMINATION: ONE SUPINE XRAY VIEW(S) OF THE ABDOMEN 4/9/2022 9:37 am COMPARISON:  CT of 01/27/2022 HISTORY: ORDERING SYSTEM PROVIDED HISTORY: LLQ abdominal pain TECHNOLOGIST PROVIDED HISTORY: LLQ abdominal pain Reason for Exam: supine FINDINGS: There are multiple diverticula in the left colon. Nonspecific bowel gas pattern without evidence of obstruction. No abnormal calcifications. No acute osseous abnormality. No evidence of bowel obstruction. CT ABDOMEN PELVIS W IV CONTRAST Additional Contrast? Radiologist Recommendation    Result Date: 4/10/2022  EXAMINATION: CT OF THE ABDOMEN AND PELVIS WITH CONTRAST 4/10/2022 7:41 am TECHNIQUE: CT of the abdomen and pelvis was performed with the administration of intravenous contrast. Multiplanar reformatted images are provided for review. Dose modulation, iterative reconstruction, and/or weight based adjustment of the mA/kV was utilized to reduce the radiation dose to as low as reasonably achievable. COMPARISON: 01/27/2022 HISTORY: ORDERING SYSTEM PROVIDED HISTORY: rule out diverticulitits, h/o sigmoid abscess TECHNOLOGIST PROVIDED HISTORY: rule out diverticulitits, h/o sigmoid abscess Reason for Exam: abdominal pain FINDINGS: Lower Chest: No acute abnormality in the lung bases with similar scarring and atelectasis. Unchanged cardiomegaly with streak artifact from an indwelling ICD. Coronary artery calcifications. Organs: Unchanged macrolobulated contour of the liver with unchanged heterogeneous attenuation. Cholelithiasis without pericholecystic inflammatory change. Spleen is normal in size and attenuation with a small splenule inferiorly. Atrophic pancreas. Adrenal glands are normal caliber. Kidneys enhance symmetrically without hydronephrosis. Ureters are normal caliber. GI/Bowel: There is persistent abnormal wall thickening of the mid sigmoid colon with associated extensive diverticula.   Moderate persistent inflammatory changes and fat stranding in the adjacent pericolonic fat. No bowel obstruction. Normal appendix. Incidental duodenal diverticulum without surrounding inflammatory change. Pelvis: Diminutive postmenopausal uterus. Inflammatory changes extend to the left adnexa/ovary, though the ovary remains grossly normal caliber. Urinary bladder is unremarkable Peritoneum/Retroperitoneum: Persistent thick walled mixed complex fluid and gas collection in the sigmoid mesocolon which is increased in size from prior measuring 5.2 x 4.5 cm. Previously measured 3.5 cm x 3.6 cm. Persistent adjacent morphologically rounded lymph nodes in the pericolonic fat. No significant ascites. Unchanged enlarged lymph nodes in the mercy hepatis. Aortoiliac atherosclerosis and tortuosity without aneurysm. Bones/Soft Tissues: No acute abnormality. Supraumbilical fat containing ventral hernia without inflammatory changes. Degenerative changes without acute or aggressive osseous lesion. Recurrent or residual abnormal wall thickening in the mid sigmoid colon which is not substantially changed from prior exam compatible with chronic diverticulitis. Persistent peridiverticular abscess in the adjacent sigmoid mesocolon which is increased in size as detailed above. Cholelithiasis Stable cirrhotic liver Marked cardiomegaly, unchanged. US GALLBLADDER RUQ    Result Date: 4/9/2022  EXAMINATION: RIGHT UPPER QUADRANT ULTRASOUND 4/9/2022 2:33 pm COMPARISON: Abdominal CT 01/27/2022 HISTORY: ORDERING SYSTEM PROVIDED HISTORY: RUQ pain TECHNOLOGIST PROVIDED HISTORY: RUQ pain FINDINGS: LIVER:  The liver is lobular in contour and coarsened in echotexture, consistent with cirrhosis. However, no discrete intrahepatic nodule or mass is evident. There is no evidence of intrahepatic biliary ductal dilatation. There is normal hepatopetal flow within the portal vein.  BILIARY SYSTEM:  There are gallstones within the gallbladder, though no evidence of wall thickening or pericholecystic fluid. There was a negative sonographic Aguayo's sign. Common bile duct is within normal limits measuring 4.1 mm. RIGHT KIDNEY: The right kidney is grossly unremarkable without evidence of hydronephrosis. The right kidney measures 9.3 x 4.2 x 4.3 cm. PANCREAS:  Visualized portions of the pancreas are unremarkable. OTHER: No evidence of right upper quadrant ascites. 1. Cirrhosis, without discrete intrahepatic nodule or mass. 2. Cholelithiasis, without sonographic evidence of cholecystitis. 3. Unremarkable sonographic appearance of the common bile duct, right kidney, and pancreas. IMPRESSION: Ambrosio Ortiz is a 75 y.o. female with a past history remarkable for CHF status post AICD placement of low ejection fraction, cardiomyopathy, chronic combined systolic and diastolic heart failure, left bundle branch block, mitral regurgitation, morbid obesity, recurrent left-sided sigmoid diverticulitis with pericolonic abscess, recent hospitalization for complex diverticular disease, status post antibiotic therapy with extended intravenous course, review CT scan revealed resolving sigmoid abscess, patient reports abnormal bowel movements, no significant abdominal pain, referred for evaluation of her diverticular disease. Assessment  1. Diverticulitis        Annemarie was seen today for new patient and diverticulitis. Diagnoses and all orders for this visit:    Diverticulitis-a complex recurrent sigmoid diverticulitis with pericolonic abscess, requiring recurrent doses of antibiotics. Recommend follow-up with infectious disease with regards to antibiotic prophylaxis given the recurrent nature. At the very least, the patient should be eval by colorectal surgery to evaluate for colorectal resection of the involved segment. Patient appears to be at moderate to high risk from a cardiac standpoint given her cardiac history. Approach and cardiac risk to be discussed with colorectal surgery. Currently, the patient from a GI standpoint is doing well. Normal appetite, normal bowel movements. No bright blood per rectum. No blood in the stool.  -     Anuja Posadas MD, Colorectal Surgery, [place]        RTC: Follow-up in 3 months. Additional comments: Thank you for allowing me to participate in the care of Ms. Frances Crockett. For any further questions please do not hesitate to contact me. I have reviewed and agree with the MA/LPN ROS please refer to their documentation from today's encounter on a separate note. Bennett Lew MD, MPH   Board Certified in Gastroenterology  Board Certified in [address] [place] [address] #: [phone]          this note is created with the assistance of a speech recognition program.  While intending to generate a document that actually reflects the content of the visit, the document can still have some errors including those of syntax and sound a like substitutions which may escape proof reading. It such instances, actual meaning can be extrapolated by contextual diversion.

## 2022-05-10 ENCOUNTER — HOSPITAL ENCOUNTER (OUTPATIENT)
Dept: CARDIAC REHAB | Age: 67
Setting detail: THERAPIES SERIES
Discharge: HOME | End: 2022-05-10
Payer: MEDICARE

## 2022-05-10 NOTE — PROGRESS NOTES
PT NO CALL NO SHOW FOR CARDIAC REHAB TODAY, MESSAGE LEFT WITH CALL BACK NUMBER FOR PT TO CALL US BACK. WILL RESCHEDULE.

## 2022-05-12 ENCOUNTER — HOSPITAL ENCOUNTER (OUTPATIENT)
Dept: CARDIAC REHAB | Age: 67
Setting detail: THERAPIES SERIES
Discharge: HOME | End: 2022-05-12
Payer: MEDICARE

## 2022-05-12 NOTE — PROGRESS NOTES
PT NO CALL NO SHOW FOR CARDIAC REHAB TODAY. PHONED PT AND VARGAS STATED SHE IS OUT OF TOWN AND HAS DOCTORS APPOINTMENTS BUT SHOULD BE BACK NEXT WEEK TUES MAY 17. WILL RESCHEDULE.

## 2022-05-17 ENCOUNTER — HOSPITAL ENCOUNTER (OUTPATIENT)
Dept: CARDIAC REHAB | Age: 67
Setting detail: THERAPIES SERIES
Discharge: HOME | End: 2022-05-17
Payer: MEDICARE

## 2022-05-17 VITALS — WEIGHT: 221.5 LBS | BODY MASS INDEX: 35.75 KG/M2

## 2022-05-17 PROCEDURE — 93798 PHYS/QHP OP CAR RHAB W/ECG: CPT

## 2022-05-17 NOTE — PLAN OF CARE
[facility] Based Cardiac Rehabilitation  Individual Treatment Plan    Patient Name:  Castillo Santana  :  1955  Age:  75 y.o. Diagnosis: CHF  Date of Event: 22   Date Entered Program: 22  Physician:  DR Jerry Hopkins, DR Brendan Beltran 104  History:   Past Medical History:   Diagnosis Date    Abnormal Pap smear     Acute on chronic systolic congestive heart failure (HCC)     AICD (automatic cardioverter/defibrillator) present     [facility]URO (acute kidney injury) ([facility] 2017    Anemia     Arthritis     Cardiomyopathy ([address] 2012    robotic replacement of 2 left ventricular leads/replacement of ICD generator    Cardiomyopathy, nonischemic ([facility] [address] 2012    Chronic combined systolic and diastolic congestive heart failure ([address]     Ejection fraction < 50%     HTN (hypertension)     Hypothyroidism     Ischemic cardiomyopathy     Left bundle branch block     Mitral regurgitation     Morbid obesity due to excess calories ([address] 01/15/2017    Pulmonary hypertension ([address]     Sudden onset of severe headache      Allergies: Allergies   Allergen Reactions    Codeine      Hallucinations    Morphine      Hallucinations       Patient Goal: INCREASE STRENGTH AND IMPROVE DIET  PROGRESSING:PT CURRENTLY NOT EXERCISING AT HOME DUE TO KNEE PROBLEMS, PT IS FOLLOWING A CARDIAC DIET AND DOES SEE THE CHF CLINIC AT [facility].     Amount of Minutes per piece  Week  Warm Up  Leg Arm  Leg  Arm  Leg  Arm  Duration   1 4 7 3 7 3 7 - 31   2 4 7 3 7 3 7 3 34   3 4 8 3 8 3 8 3 37   4 4 8 4 8 4 8 4 40   5 4 9 4 9 4 9 4 43   6 4 9 5 9 5 9 5 46   Max 7 4 10 5 10 5 10 5 49        Exercise Prescription:    Type of exercise:  Legs- Treadmill, Airdyne, Nustep  Arms- Free weights, Airdyne, Ergometer, Nustep                               Frequency:  2-3 times per week         Plan of Progresssion:  Amount and duration will increase every       2-3 visits          Phase 2 Cardiac Rehabilitation  Individualized Cardiac Treatment Plan    Individual Cardiac Treatment Plan - EXERCISE  EXERCISE  REASSESSMENT   Re-Assessment   Stages of Change    []Contemplation   []Action   [x]Relapse   EXERCISE ASSESSMENT   Home Exercise   []Yes    [x]No  Type:  Aerobics   []  Bicycling  []  Cardiovascular  []  Gardening  []  Hiking  []  House Cleaning   []  Run/Jog  []  Swim  []  Walk  [x] NOT ABLE AT THIS TIME DUE TO KNEE PROBLEMS  Yard Work  []  Other  []     Frequency:  Daily  []  Q2 days  []  Q3 days  []  Biweekly  []  Irregularly  []  Other  []     Duration:   Seconds []  Minutes []  Hours []  Other []   EXERCISE PLAN   Interventions*  Education:   [x]Self Pulse   [x]Medication HR/BP   [x]Exercise Safety   [x]S/S to report   [x]RPE scale   [x]Equip. [place]. [x]Warmup/cool down   [x]Hand    [x]Home exercise encouraged   Target Goal:  Follow individual exercise Rx, aerobic exercise, 30+minutes 5x/week   Exercise Prescription  (per physician & CR staff)  Met Level: 1.7       Individual Cardiac Treatment Plan - EDUCATION  Reassessment  Learning Barriers   Speech  []  Hearing  []  Vision  []  Cognitive  []  Ready to Learn [x]     Tobacco Use  []Y  [x]N  []Quit    HTN [x]Y  []N      Diabetic []Y  [x]N     Pre-cardiac test:_100___     Intervention/Education   []Referal to smoking cessation  []Encouraged smoking cessation   [x]CAD   [x]Risk factors   [x]Med Compliance   [x]Cardiac A/P   [x]Angina S/S   [x]NTG use   [x]Sexual activity    Target Goal:  Increase knowledge of risk factors     Individual Cardiac Treatment Plan - NUTRITION  NUTRITION  REASSESSMENT      Stages of Change    []Contemplation   [x]Action   []Relapse   NUTRITION ASSESSMENT   Weight Management  Weight: 221.1 LB   Height:  Ht Readings from Last 1 Encounters:   05/06/22 5' 6\" (1.676 m)          BMI:  Body mass index is 35.75 kg/m².    Date:  Lipids:  Cholesterol (mg/dL)   Date Value   05/23/2020 180     HDL (mg/dL)   Date Value   05/23/2020 42     LDL Cholesterol (mg/dL) Date Value   05/23/2020 122     Chol/HDL Ratio (no units)   Date Value   05/23/2020 4.3     Triglycerides (mg/dL)   Date Value   05/23/2020 79     VLDL (mg/dL)   Date Value   05/23/2020 NOT REPORTED      Cholesterol Drug Therapy:   []Y  [x]N  Why:    Diabetic:   []Y  []N  Education Needed    []Y  []N   Goal: Maintain Heart Healthy Weight. Professional Referral  Please check if needed. []Dietician Consult    [x]Nurse/Patient Ed   []Wt. Management Referral   []Other:   Goal: Understand Lipid Results; Target:  LDL below 70, HDL above 40          Individual Cardiac Treatment Plan - PYSCHOSOCIAL  PSYCHOSOCIAL  REASSESSMENT      Psychosocial test:  PHQ 9 score:___0__   Intervention/  Education  If score 5 or above:   []Psych consult   []Physician notified  If pt declined, Why?    [x]Relaxation technique   [x]S/S of depression   [x]Coping techniques   Target goal:  Decreased stress levels

## 2022-05-19 ENCOUNTER — HOSPITAL ENCOUNTER (OUTPATIENT)
Dept: CARDIAC REHAB | Age: 67
Setting detail: THERAPIES SERIES
Discharge: HOME | End: 2022-05-19
Payer: MEDICARE

## 2022-05-19 VITALS — WEIGHT: 220.13 LBS | BODY MASS INDEX: 35.53 KG/M2

## 2022-05-19 PROCEDURE — 93798 PHYS/QHP OP CAR RHAB W/ECG: CPT

## 2022-05-19 RX ORDER — RIVAROXABAN 20 MG/1
TABLET, FILM COATED ORAL
Qty: 28 TABLET | Refills: 0 | OUTPATIENT
Start: 2022-05-19

## 2022-05-24 ENCOUNTER — HOSPITAL ENCOUNTER (OUTPATIENT)
Dept: CARDIAC REHAB | Age: 67
Setting detail: THERAPIES SERIES
Discharge: HOME | End: 2022-05-24
Payer: MEDICARE

## 2022-05-24 VITALS — WEIGHT: 219.38 LBS | BODY MASS INDEX: 35.41 KG/M2

## 2022-05-24 PROCEDURE — 93798 PHYS/QHP OP CAR RHAB W/ECG: CPT

## 2022-05-26 ENCOUNTER — HOSPITAL ENCOUNTER (OUTPATIENT)
Dept: CARDIAC REHAB | Age: 67
Setting detail: THERAPIES SERIES
Discharge: HOME | End: 2022-05-26
Payer: MEDICARE

## 2022-05-26 VITALS — WEIGHT: 217.9 LBS | BODY MASS INDEX: 35.17 KG/M2

## 2022-05-26 PROCEDURE — 93798 PHYS/QHP OP CAR RHAB W/ECG: CPT

## 2022-05-26 RX ORDER — METOPROLOL SUCCINATE 50 MG/1
50 TABLET, EXTENDED RELEASE ORAL DAILY
COMMUNITY

## 2022-05-26 NOTE — PROGRESS NOTES
Pt educated on importance of taking meds at same time daily, pt states takes meds at different times on different days depending on what she is doing for the day. Also suggested to patient to separate bumex and amiodarone from other BP meds to decrease risk of BP dropping. Pt states she gets meds in 2 packs from pharmacy and has a hard time distinguishing which pill is which. Will call pharmacy to see if they can help patient facilitate a better schedule.

## 2022-05-31 ENCOUNTER — HOSPITAL ENCOUNTER (OUTPATIENT)
Dept: CARDIAC REHAB | Age: 67
Setting detail: THERAPIES SERIES
Discharge: HOME | End: 2022-05-31
Payer: MEDICARE

## 2022-05-31 VITALS — BODY MASS INDEX: 34.98 KG/M2 | WEIGHT: 216.7 LBS

## 2022-05-31 PROCEDURE — 93798 PHYS/QHP OP CAR RHAB W/ECG: CPT

## 2022-06-02 ENCOUNTER — HOSPITAL ENCOUNTER (OUTPATIENT)
Dept: CARDIAC REHAB | Age: 67
Setting detail: THERAPIES SERIES
Discharge: HOME | End: 2022-06-02
Payer: MEDICARE

## 2022-06-02 VITALS — BODY MASS INDEX: 35.46 KG/M2 | WEIGHT: 219.7 LBS

## 2022-06-02 PROCEDURE — 93798 PHYS/QHP OP CAR RHAB W/ECG: CPT

## 2022-06-02 ASSESSMENT — EXERCISE STRESS TEST
PEAK_BP: 104/58
PEAK_RPE: 13
PEAK_METS: 2
PEAK_HR: 97

## 2022-06-02 NOTE — PROGRESS NOTES
PT STATES HAS INCREASED EDEMA, DID NOT TAKE MEDS YESTERDAY OR YET TODAY, PT ENCOURAGED TO TAKE MEDS AT SAME TIME DAILY, ENCOURAGED TO F/U WITH CHF CLINIC.

## 2022-06-06 ENCOUNTER — TELEPHONE (OUTPATIENT)
Dept: GASTROENTEROLOGY | Age: 67
End: 2022-06-06

## 2022-06-06 NOTE — TELEPHONE ENCOUNTER
No medical evidence to support use. Patient would be using at their own risk. Informed patient, patient voiced understanding. Patient stated she would do more research.

## 2022-06-07 ENCOUNTER — HOSPITAL ENCOUNTER (OUTPATIENT)
Dept: CARDIAC REHAB | Age: 67
Setting detail: THERAPIES SERIES
Discharge: HOME | End: 2022-06-07
Payer: MEDICARE

## 2022-06-07 VITALS — BODY MASS INDEX: 34.94 KG/M2 | WEIGHT: 216.5 LBS

## 2022-06-07 PROCEDURE — 93798 PHYS/QHP OP CAR RHAB W/ECG: CPT

## 2022-06-07 ASSESSMENT — EXERCISE STRESS TEST
PEAK_BP: 102/72
PEAK_RPE: 13
PEAK_METS: 1.8
PEAK_HR: 99

## 2022-06-09 ENCOUNTER — HOSPITAL ENCOUNTER (OUTPATIENT)
Dept: CARDIAC REHAB | Age: 67
Setting detail: THERAPIES SERIES
Discharge: HOME | End: 2022-06-09
Payer: MEDICARE

## 2022-06-09 VITALS — WEIGHT: 219.7 LBS | BODY MASS INDEX: 35.46 KG/M2

## 2022-06-09 PROCEDURE — 93798 PHYS/QHP OP CAR RHAB W/ECG: CPT

## 2022-06-09 ASSESSMENT — EXERCISE STRESS TEST
PEAK_METS: 1.9
PEAK_HR: 94
PEAK_RPE: 11
PEAK_BP: 106/70

## 2022-06-13 ENCOUNTER — HOSPITAL ENCOUNTER (OUTPATIENT)
Dept: CARDIAC REHAB | Age: 67
Setting detail: THERAPIES SERIES
Discharge: HOME | End: 2022-06-13
Payer: MEDICARE

## 2022-06-14 ENCOUNTER — HOSPITAL ENCOUNTER (OUTPATIENT)
Dept: CARDIAC REHAB | Age: 67
Setting detail: THERAPIES SERIES
Discharge: HOME | End: 2022-06-14
Payer: MEDICARE

## 2022-06-14 VITALS — WEIGHT: 220.2 LBS | BODY MASS INDEX: 35.54 KG/M2

## 2022-06-14 PROCEDURE — 93798 PHYS/QHP OP CAR RHAB W/ECG: CPT

## 2022-06-14 ASSESSMENT — EXERCISE STRESS TEST
PEAK_HR: 79
PEAK_RPE: 13
PEAK_METS: 2
PEAK_BP: 98/64

## 2022-06-14 NOTE — PLAN OF CARE
[facility] Based Cardiac Rehabilitation  Individual Treatment Plan    Patient Name:  Torin Gates  :  1955  Age:  75 y.o. Diagnosis: CHF  Date of Event: 21  Date Entered Program: 22  Physician:  DR Shahzad Barrera, DR DANIEL DONIS  History:   Past Medical History:   Diagnosis Date    Abnormal Pap smear     Acute on chronic systolic congestive heart failure (HCC)     AICD (automatic cardioverter/defibrillator) present     MAURO (acute kidney injury) ([address] 2017    Anemia     Arthritis     Cardiomyopathy ([address] 2012    robotic replacement of 2 left ventricular leads/replacement of ICD generator    Cardiomyopathy, nonischemic ([facility] [address] 2012    Chronic combined systolic and diastolic congestive heart failure (HCC)     Ejection fraction < 50%     HTN (hypertension)     Hypothyroidism     Ischemic cardiomyopathy     Left bundle branch block     Mitral regurgitation     Morbid obesity due to excess calories ([address] 01/15/2017    Pulmonary hypertension ([facility]     Sudden onset of severe headache      Allergies: Allergies   Allergen Reactions    Codeine      Hallucinations    Morphine      Hallucinations       Patient Goal: INCREASE STRENGTH, IMPROVE DIET  PROGRESS: PT STATES SHE FEELS STRONGER WITH CARDIAC REHAB. PT WEIGHT STABLE.   Amount of Minutes per piece  Week  Warm Up  Leg Arm  Leg  Arm  Leg  Arm  Duration   1 4 7 3 7 3 7 - 31   2 4 7 3 7 3 7 3 34   3 4 8 3 8 3 8 3 37   4 4 8 4 8 4 8 4 40   5 4 9 4 9 4 9 4 43   6 4 9 5 9 5 9 5 46   Max 7 4 10 5 10 5 10 5 49        Exercise Prescription:    Type of exercise:  Legs- Treadmill, Airdyne, Nustep  Arms- Free weights, Airdyne, Ergometer, Nustep                               Frequency:  2-3 times per week         Plan of Progresssion:  Amount and duration will increase every       2-3 visits          Phase 2 Cardiac Rehabilitation  Individualized Cardiac Treatment Plan    Individual Cardiac Treatment Plan - EXERCISE  EXERCISE  REASSESSMENT   Re-Assessment   Stages of Change    []Contemplation   [x]Action   []Relapse   EXERCISE ASSESSMENT   Home Exercise   [x]Yes    []No  Type:  Aerobics   []  Bicycling  []  Cardiovascular  []  Gardening  []  Hiking  []  House Cleaning   []  Run/Jog  []  Swim  []  Walk  [x]  Yard Work  []  Other  []     Frequency:  Daily  []  Q2 days  [x]  Q3 days  []  Biweekly  []  Irregularly  []  Other  []     Duration:   Seconds []  Minutes [x] 30+  Hours []  Other []   EXERCISE PLAN   Interventions*  Education:   [x]Self Pulse   [x]Medication HR/BP   [x]Exercise Safety   [x]S/S to report   [x]RPE scale   [x]Equip. [place]. [x]Warmup/cool down   [x]Hand    [x]Home exercise encouraged   Target Goal:  Follow individual exercise Rx, aerobic exercise, 30+minutes 5x/week   Exercise Prescription  (per physician & CR staff)  Met Level: 2.0       Individual Cardiac Treatment Plan - EDUCATION  Reassessment  Learning Barriers   Speech  []  Hearing  []  Vision  []  Cognitive  []  Ready to Learn [x]     Tobacco Use  []Y  [x]N  []Quit    HTN [x]Y  []N      Diabetic []Y  [x]N     Pre-cardiac test:__100___     Intervention/Education   []Referal to smoking cessation  []Encouraged smoking cessation   [x]CAD   [x]Risk factors   [x]Med Compliance   [x]Cardiac A/P   [x]Angina S/S   [x]NTG use   [x]Sexual activity    Target Goal:  Increase knowledge of risk factors     Individual Cardiac Treatment Plan - NUTRITION  NUTRITION  REASSESSMENT      Stages of Change    []Contemplation   [x]Action   []Relapse   NUTRITION ASSESSMENT   Weight Management  Weight: 220.2 LB     Height:  Ht Readings from Last 1 Encounters:   05/06/22 5' 6\" (1.676 m)          BMI:  Body mass index is 35.54 kg/m².    Date:  Lipids:  Cholesterol (mg/dL)   Date Value   05/23/2020 180     HDL (mg/dL)   Date Value   05/23/2020 42     LDL Cholesterol (mg/dL)   Date Value   05/23/2020 122     Chol/HDL Ratio (no units)   Date Value   05/23/2020 4.3 Triglycerides (mg/dL)   Date Value   05/23/2020 79     VLDL (mg/dL)   Date Value   05/23/2020 NOT REPORTED      Cholesterol Drug Therapy:   []Y  [x]N  Why:  PT REFUSES   Diabetic:   []Y  [x]N  Education Needed    []Y  [x]N   Goal: Maintain Heart Healthy Weight. Professional Referral  Please check if needed. []Dietician Consult    [x]Nurse/Patient Ed   []Wt. Management Referral   []Other:   Goal: Understand Lipid Results; Target:  LDL below 70, HDL above 40          Individual Cardiac Treatment Plan - PYSCHOSOCIAL  PSYCHOSOCIAL  REASSESSMENT      Psychosocial test:  PHQ 9 score:__0___   Intervention/  Education  If score 5 or above:   []Psych consult   []Physician notified  If pt declined, Why?    [x]Relaxation technique   [x]S/S of depression   [x]Coping techniques   Target goal:  Decreased stress levels

## 2022-06-14 NOTE — PROGRESS NOTES
Pt up 4lbs in 1 week, pt states skipped taking bumex x2 days. States she was too busy to have to go to the bathroom that often. Educated patient on importance of taking medications as prescribed and that skipping bumex could lead to decrease in health and possible hospitalization. Pt verbalizes understanding. LCTA and 2+ non-pitting edema noted in bilat LE.

## 2022-06-15 ENCOUNTER — APPOINTMENT (OUTPATIENT)
Dept: CARDIAC REHAB | Age: 67
End: 2022-06-15
Payer: MEDICARE

## 2022-06-16 ENCOUNTER — HOSPITAL ENCOUNTER (OUTPATIENT)
Dept: CARDIAC REHAB | Age: 67
Setting detail: THERAPIES SERIES
Discharge: HOME | End: 2022-06-16
Payer: MEDICARE

## 2022-06-16 VITALS — WEIGHT: 219.7 LBS | BODY MASS INDEX: 35.46 KG/M2

## 2022-06-16 PROCEDURE — 93798 PHYS/QHP OP CAR RHAB W/ECG: CPT

## 2022-06-16 ASSESSMENT — EXERCISE STRESS TEST
PEAK_BP: 102/64
PEAK_HR: 87
PEAK_METS: 1.9
PEAK_RPE: 13

## 2022-06-21 ENCOUNTER — HOSPITAL ENCOUNTER (OUTPATIENT)
Dept: CARDIAC REHAB | Age: 67
Setting detail: THERAPIES SERIES
Discharge: HOME | End: 2022-06-21
Payer: MEDICARE

## 2022-06-21 VITALS — BODY MASS INDEX: 35.11 KG/M2 | WEIGHT: 217.5 LBS

## 2022-06-21 PROCEDURE — 93798 PHYS/QHP OP CAR RHAB W/ECG: CPT

## 2022-06-21 ASSESSMENT — EXERCISE STRESS TEST
PEAK_RPE: 13
PEAK_HR: 96
PEAK_BP: 110/78
PEAK_METS: 2.1

## 2022-06-23 ENCOUNTER — HOSPITAL ENCOUNTER (OUTPATIENT)
Dept: CARDIAC REHAB | Age: 67
Setting detail: THERAPIES SERIES
Discharge: HOME | End: 2022-06-23
Payer: MEDICARE

## 2022-06-23 VITALS — BODY MASS INDEX: 35.09 KG/M2 | WEIGHT: 217.4 LBS

## 2022-06-23 PROCEDURE — 93798 PHYS/QHP OP CAR RHAB W/ECG: CPT

## 2022-06-23 ASSESSMENT — EXERCISE STRESS TEST
PEAK_RPE: 13
PEAK_BP: 116/64
PEAK_METS: 1.9
PEAK_HR: 104

## 2022-06-28 ENCOUNTER — HOSPITAL ENCOUNTER (OUTPATIENT)
Dept: CARDIAC REHAB | Age: 67
Setting detail: THERAPIES SERIES
Discharge: HOME | End: 2022-06-28
Payer: MEDICARE

## 2022-06-28 VITALS — WEIGHT: 219.4 LBS | BODY MASS INDEX: 35.41 KG/M2

## 2022-06-28 PROCEDURE — 93798 PHYS/QHP OP CAR RHAB W/ECG: CPT

## 2022-06-28 ASSESSMENT — EXERCISE STRESS TEST
PEAK_RPE: 12
PEAK_BP: 106/70
PEAK_METS: 2
PEAK_HR: 72

## 2022-06-28 NOTE — PROGRESS NOTES
Pt up 2 pounds in 5 days, pt unable to go on treadmill tody, states too fatigued, pt admits to not following fluid and sodium restrictions, pt encouraged to f/u with CHF clinic, has not been seen in CHF clinic since October. Educated patient on importance of following fluid and sodium restrictions as well as taking meds as prescribed, positive understanding verbalized.

## 2022-06-30 ENCOUNTER — HOSPITAL ENCOUNTER (OUTPATIENT)
Dept: CARDIAC REHAB | Age: 67
Setting detail: THERAPIES SERIES
Discharge: HOME | End: 2022-06-30
Payer: MEDICARE

## 2022-06-30 NOTE — PROGRESS NOTES
PC FROM PT TO CANCEL TODAY'S REHAB APPT. PT HAS BEEN HAVING INCREASED SOB SINCE VISIT LAST WEEK WITH CARDIOLOGIST. PER PT \"EVER SINCE THEY TWEAKED MY PACEMAKER I'VE BEEN SOB. \" PT HAS F/U WITH CARDIOLOGIST TODAY TO ADDRESS.  REHAB APPT R/S.

## 2022-07-05 ENCOUNTER — HOSPITAL ENCOUNTER (OUTPATIENT)
Age: 67
Discharge: HOME | End: 2022-07-05
Payer: MEDICARE

## 2022-07-05 ENCOUNTER — HOSPITAL ENCOUNTER (OUTPATIENT)
Dept: CARDIAC REHAB | Age: 67
Setting detail: THERAPIES SERIES
Discharge: HOME | End: 2022-07-05
Payer: MEDICARE

## 2022-07-05 ENCOUNTER — HOSPITAL ENCOUNTER (OUTPATIENT)
Dept: OTHER | Age: 67
Discharge: HOME | End: 2022-07-05
Payer: MEDICARE

## 2022-07-05 VITALS
RESPIRATION RATE: 20 BRPM | SYSTOLIC BLOOD PRESSURE: 108 MMHG | WEIGHT: 212 LBS | OXYGEN SATURATION: 96 % | BODY MASS INDEX: 34.22 KG/M2 | HEART RATE: 78 BPM | DIASTOLIC BLOOD PRESSURE: 74 MMHG

## 2022-07-05 LAB
ALT SERPL-CCNC: 41 U/L (ref 5–33)
ANION GAP SERPL CALCULATED.3IONS-SCNC: 14 MMOL/L (ref 9–17)
AST SERPL-CCNC: 54 U/L
BUN BLDV-MCNC: 23 MG/DL (ref 8–23)
CHLORIDE BLD-SCNC: 103 MMOL/L (ref 98–107)
CO2: 23 MMOL/L (ref 20–31)
CREAT SERPL-MCNC: 1.3 MG/DL (ref 0.5–0.9)
GFR AFRICAN AMERICAN: 49 ML/MIN
GFR NON-AFRICAN AMERICAN: 41 ML/MIN
GFR SERPL CREATININE-BSD FRML MDRD: ABNORMAL ML/MIN/{1.73_M2}
POTASSIUM SERPL-SCNC: 3.9 MMOL/L (ref 3.7–5.3)
SODIUM BLD-SCNC: 140 MMOL/L (ref 135–144)
TSH SERPL DL<=0.05 MIU/L-ACNC: 34.46 UIU/ML (ref 0.3–5)

## 2022-07-05 PROCEDURE — 84460 ALANINE AMINO (ALT) (SGPT): CPT

## 2022-07-05 PROCEDURE — 84443 ASSAY THYROID STIM HORMONE: CPT

## 2022-07-05 PROCEDURE — 84520 ASSAY OF UREA NITROGEN: CPT

## 2022-07-05 PROCEDURE — 84450 TRANSFERASE (AST) (SGOT): CPT

## 2022-07-05 PROCEDURE — 99212 OFFICE O/P EST SF 10 MIN: CPT

## 2022-07-05 PROCEDURE — 82565 ASSAY OF CREATININE: CPT

## 2022-07-05 PROCEDURE — 80051 ELECTROLYTE PANEL: CPT

## 2022-07-05 NOTE — PROGRESS NOTES
Verbally reviewed medication list with patient; patient verbalized understanding. Discussed 2000mg/day sodium restricted diet; patient verbalized understanding. Moderate daily exercise encouraged as tolerated. Discussed rest breaks as needed; patient verbalized understanding. Patient instructed to weigh self at the same time of each day, using same clothes and same scale; reinforced teaching to monitor for 3-5 lb weight increase over 1-2 days, and to notify the CHF clinic at [phone] or physician office if weight change noted. Patient verbalized understanding. Risks of smoking discussed with the patient if applicable; patient strongly discouraged to smoke. Patient verbalized understanding. Signs and symptoms of CHF discussed with patient, such as feeling more tired than normal, feeling short of breath, coughing that increases when you lie down, sudden weight gain, swelling of your feet, legs or belly. Patient verbalized understanding to notify the CHF clinic at [phone] or physician office if these symptoms occur. Compliance with plan of care and further disease process causes discussed with patient, patient encouraged to keep all follow up appointments. Patient verbalized understanding.

## 2022-07-05 NOTE — PROGRESS NOTES
Date:  2022  Time:  10:39 AM    CHF Clinic at [address] [facility]    Office: [phone] Fax: [phone]    Re:  Summer García   Patient : 1955    Vital Signs: /74   Pulse 78   Resp 20   Wt 212 lb (96.2 kg)   SpO2 96%   BMI 34.22 kg/m²                       O2 Device: None (Room air)                           No results for input(s): CBC, HGB, HCT, WBC, PLATELET, NA, K, CL, CO2, BUN, CREATININE, GLUCOSE, BNP, INR in the last 72 hours. Respiratory:    Assessment  Charting Type: Reassessment    Breath Sounds  Right Upper Lobe: Clear  Right Middle Lobe: Clear  Right Lower Lobe: Clear  Left Upper Lobe: Clear  Left Lower Lobe: Clear    Cough/Sputum  Cough: Dry  Frequency: Occasional         Peripheral Vascular  RLE Edema: None  LLE Edema: None      Complaints: None at this time    Physician Orders Lab orders from Dr. Alvarez Line : Patient here for scheduled visit. Has not been seen in clinic since 10/21. Weight down 24 lbs from last visit. Denies any increase in dyspnea with exertion or chest pain. No lower leg, ankle or pedal edema noted. Says she had leg swelling 2 weeks ago because she was going over 2L/24 hour fluid restriction. Since cutting back her weight and swelling are down. Opti-Vol fluid index shows no fluid accumulation. Saw Cardiology last month and is due to have her AICD changed on 08/15/22. Medication list reviewed and updated. Reinforced low sodium diet and fluid restrictions with patient. No s/s acute chf at this time. Next CHF Clinic visit 22.     Electronically signed by Roseline Cruz RN on 2022 at 10:39 AM

## 2022-07-07 ENCOUNTER — HOSPITAL ENCOUNTER (OUTPATIENT)
Dept: CARDIAC REHAB | Age: 67
Setting detail: THERAPIES SERIES
Discharge: HOME | End: 2022-07-07
Payer: MEDICARE

## 2022-07-07 VITALS — WEIGHT: 212 LBS | BODY MASS INDEX: 34.22 KG/M2

## 2022-07-07 PROCEDURE — 93798 PHYS/QHP OP CAR RHAB W/ECG: CPT

## 2022-07-07 ASSESSMENT — EXERCISE STRESS TEST
PEAK_HR: 95
PEAK_BP: 100/68
PEAK_RPE: 13
PEAK_METS: 2.1

## 2022-07-12 ENCOUNTER — HOSPITAL ENCOUNTER (OUTPATIENT)
Dept: CARDIAC REHAB | Age: 67
Setting detail: THERAPIES SERIES
Discharge: HOME | End: 2022-07-12
Payer: MEDICARE

## 2022-07-12 VITALS — BODY MASS INDEX: 34.44 KG/M2 | WEIGHT: 213.4 LBS

## 2022-07-12 PROCEDURE — 93798 PHYS/QHP OP CAR RHAB W/ECG: CPT

## 2022-07-12 ASSESSMENT — EXERCISE STRESS TEST
PEAK_RPE: 13
PEAK_HR: 81
PEAK_BP: 100/76
PEAK_METS: 2.1

## 2022-07-12 NOTE — PLAN OF CARE
[facility] Based [facility]  Individual Treatment Plan    Patient Name:  Jose Pelletier  :  1955  Age:  75 y.o. Diagnosis: CHF  Date of Event: 21   Date Entered Program: 22  Physician:  DR Nithya Bobby, DR DANIEL DONIS  History:   Past Medical History:   Diagnosis Date    Abnormal Pap smear     Acute on chronic systolic congestive heart failure ([address]     AICD (automatic cardioverter/defibrillator) present     MAURO (acute kidney injury) ([address] 2017    Anemia     Arthritis     Cardiomyopathy ([address] 2012    robotic replacement of 2 left ventricular leads/replacement of ICD generator    Cardiomyopathy, nonischemic ([address] 2012    Chronic combined systolic and diastolic congestive heart failure (HCC)     Ejection fraction < 50%     HTN (hypertension)     Hypothyroidism     Ischemic cardiomyopathy     Left bundle branch block     Mitral regurgitation     Morbid obesity due to excess calories ([address] 01/15/2017    Pulmonary hypertension ([address]     Sudden onset of severe headache      Allergies: Allergies   Allergen Reactions    Codeine      Hallucinations    Morphine      Hallucinations       Patient Goal: INCREASE STRENGTH AND IMPROVE DIET. PROGRESS: PT STATES SHE FEELS STRONGER WITH CARDIAC REHAB AND IS FOLLOWING A CARDIAC DIET AND HAS RECENTLY SEEN THE [facility] CHF CLINIC.      Amount of Minutes per piece  Week  Warm Up  Leg Arm  Leg  Arm  Leg  Arm  Duration   1 4 7 3 7 3 7 - 31   2 4 7 3 7 3 7 3 34   3 4 8 3 8 3 8 3 37   4 4 8 4 8 4 8 4 40   5 4 9 4 9 4 9 4 43   6 4 9 5 9 5 9 5 46   Max 7 4 10 5 10 5 10 5 49        Exercise Prescription:    Type of exercise:  Legs- Treadmill, Airdyne, Nustep  Arms- Free weights, Airdyne, Ergometer, Nustep                               Frequency:  2-3 times per week         Plan of Progresssion:  Amount and duration will increase every       2-3 visits          Phase 2 Cardiac Rehabilitation  Individualized Cardiac Treatment Plan    Individual Cardiac Treatment Plan - EXERCISE  EXERCISE  REASSESSMENT   Re-Assessment   Stages of Change    []Contemplation   [x]Action   []Relapse   EXERCISE ASSESSMENT   Home Exercise  DAILY EXERCISE ENC. [x]Yes    []No  Type:  Aerobics   []  Bicycling  []  Cardiovascular  []  Gardening  []  Hiking  []  House Cleaning   []  Run/Jog  []  Swim  []  Walk  [x]  Yard Work  []  Other  []     Frequency:  Daily  []  Q2 days  []  Q3 days  []  Biweekly  []  Irregularly  [x]  Other  []     Duration:   Seconds []  Minutes [x]  20+  Hours []  Other []   EXERCISE PLAN   Interventions*  Education:   [x]Self Pulse   [x]Medication HR/BP   [x]Exercise Safety   [x]S/S to report   [x]RPE scale   [x]Equip. [place]. [x]Warmup/cool down   [x]Hand    [x]Home exercise encouraged   Target Goal:  Follow individual exercise Rx, aerobic exercise, 30+minutes 5x/week   Exercise Prescription  (per physician & CR staff)  Met Level:2.1       Individual Cardiac Treatment Plan - EDUCATION  Reassessment  Learning Barriers   Speech  []  Hearing  []  Vision  []  Cognitive  []  Ready to Learn [x]     Tobacco Use  []Y  [x]N  []Quit    HTN [x]Y  []N      Diabetic []Y  [x]N     Pre-cardiac test:__100___     Intervention/Education   []Referal to smoking cessation  []Encouraged smoking cessation   [x]CAD   [x]Risk factors   [x]Med Compliance   [x]Cardiac A/P   [x]Angina S/S   [x]NTG use   [x]Sexual activity    Target Goal:  Increase knowledge of risk factors     Individual Cardiac Treatment Plan - NUTRITION  NUTRITION  REASSESSMENT      Stages of Change    []Contemplation   [x]Action   []Relapse   NUTRITION ASSESSMENT   Weight Management  Weight: 213.4 LB   Height:  Ht Readings from Last 1 Encounters:   05/06/22 5' 6\" (1.676 m)          BMI:  Body mass index is 34.44 kg/m².    Date:  Lipids:  Cholesterol (mg/dL)   Date Value   05/23/2020 180     HDL (mg/dL)   Date Value   05/23/2020 42     LDL Cholesterol (mg/dL)   Date Value 05/23/2020 122     Chol/HDL Ratio (no units)   Date Value   05/23/2020 4.3     Triglycerides (mg/dL)   Date Value   05/23/2020 79     VLDL (mg/dL)   Date Value   05/23/2020 NOT REPORTED      Cholesterol Drug Therapy:   []Y  [x]N  Why:   Diabetic:   []Y  [x]N  Education Needed    []Y  [x]N   Goal: Maintain Heart Healthy Weight. Professional Referral  Please check if needed. []Dietician Consult    [x]Nurse/Patient Ed   []Wt. Management Referral   []Other:   Goal: Understand Lipid Results; Target:  LDL below 70, HDL above 40          Individual Cardiac Treatment Plan - PYSCHOSOCIAL  PSYCHOSOCIAL  REASSESSMENT      Psychosocial test:  PHQ 9 score:____0_   Intervention/  Education  If score 5 or above:   []Psych consult   []Physician notified  If pt declined, Why?    [x]Relaxation technique   [x]S/S of depression   [x]Coping techniques   Target goal:  Decreased stress levels

## 2022-07-14 ENCOUNTER — HOSPITAL ENCOUNTER (OUTPATIENT)
Dept: CARDIAC REHAB | Age: 67
Setting detail: THERAPIES SERIES
Discharge: HOME | End: 2022-07-14
Payer: MEDICARE

## 2022-07-14 NOTE — PROGRESS NOTES
Please note that patient called today stating she would not be able to attend her cardiac rehab session as she is caring for her ill mother. Writer rescheduled this session.     Electronically signed by Ti Wilson RN on 7/14/2022 at 7:53 AM

## 2022-07-19 ENCOUNTER — HOSPITAL ENCOUNTER (EMERGENCY)
Age: 67
Discharge: HOME | End: 2022-07-19
Attending: EMERGENCY MEDICINE
Payer: MEDICARE

## 2022-07-19 ENCOUNTER — APPOINTMENT (OUTPATIENT)
Dept: GENERAL RADIOLOGY | Age: 67
End: 2022-07-19
Payer: MEDICARE

## 2022-07-19 ENCOUNTER — HOSPITAL ENCOUNTER (OUTPATIENT)
Age: 67
Discharge: HOME | End: 2022-07-19
Payer: MEDICARE

## 2022-07-19 ENCOUNTER — HOSPITAL ENCOUNTER (OUTPATIENT)
Dept: CARDIAC REHAB | Age: 67
Setting detail: THERAPIES SERIES
Discharge: HOME | End: 2022-07-19
Payer: MEDICARE

## 2022-07-19 VITALS — BODY MASS INDEX: 34.23 KG/M2 | WEIGHT: 212.1 LBS

## 2022-07-19 VITALS
DIASTOLIC BLOOD PRESSURE: 85 MMHG | HEART RATE: 86 BPM | RESPIRATION RATE: 14 BRPM | SYSTOLIC BLOOD PRESSURE: 118 MMHG | TEMPERATURE: 97.6 F | OXYGEN SATURATION: 100 %

## 2022-07-19 DIAGNOSIS — M25.511 ACUTE PAIN OF RIGHT SHOULDER: Primary | ICD-10-CM

## 2022-07-19 LAB
ALT SERPL-CCNC: 26 U/L (ref 5–33)
ANION GAP SERPL CALCULATED.3IONS-SCNC: 15 MMOL/L (ref 9–17)
AST SERPL-CCNC: 37 U/L
BUN BLDV-MCNC: 25 MG/DL (ref 8–23)
CALCIUM SERPL-MCNC: 9.4 MG/DL (ref 8.6–10.4)
CHLORIDE BLD-SCNC: 101 MMOL/L (ref 98–107)
CO2: 21 MMOL/L (ref 20–31)
CREAT SERPL-MCNC: 1.29 MG/DL (ref 0.5–0.9)
GFR AFRICAN AMERICAN: 50 ML/MIN
GFR NON-AFRICAN AMERICAN: 41 ML/MIN
GFR SERPL CREATININE-BSD FRML MDRD: ABNORMAL ML/MIN/{1.73_M2}
GLUCOSE BLD-MCNC: 112 MG/DL (ref 70–99)
MAGNESIUM: 2 MG/DL (ref 1.6–2.6)
POTASSIUM SERPL-SCNC: 3.7 MMOL/L (ref 3.7–5.3)
SODIUM BLD-SCNC: 137 MMOL/L (ref 135–144)
TSH SERPL DL<=0.05 MIU/L-ACNC: 34.59 UIU/ML (ref 0.3–5)

## 2022-07-19 PROCEDURE — 99283 EMERGENCY DEPT VISIT LOW MDM: CPT

## 2022-07-19 PROCEDURE — 83735 ASSAY OF MAGNESIUM: CPT

## 2022-07-19 PROCEDURE — 73030 X-RAY EXAM OF SHOULDER: CPT

## 2022-07-19 PROCEDURE — 84450 TRANSFERASE (AST) (SGOT): CPT

## 2022-07-19 PROCEDURE — 84443 ASSAY THYROID STIM HORMONE: CPT

## 2022-07-19 PROCEDURE — 36415 COLL VENOUS BLD VENIPUNCTURE: CPT

## 2022-07-19 PROCEDURE — 84460 ALANINE AMINO (ALT) (SGPT): CPT

## 2022-07-19 PROCEDURE — 80048 BASIC METABOLIC PNL TOTAL CA: CPT

## 2022-07-19 PROCEDURE — 93798 PHYS/QHP OP CAR RHAB W/ECG: CPT

## 2022-07-19 RX ORDER — SACUBITRIL AND VALSARTAN 24; 26 MG/1; MG/1
TABLET, FILM COATED ORAL
Qty: 28 TABLET | Refills: 0 | OUTPATIENT
Start: 2022-07-19

## 2022-07-19 RX ORDER — HYDROCODONE BITARTRATE AND ACETAMINOPHEN 5; 325 MG/1; MG/1
1 TABLET ORAL EVERY 6 HOURS PRN
Qty: 10 TABLET | Refills: 0 | Status: SHIPPED | OUTPATIENT
Start: 2022-07-19 | End: 2022-07-22

## 2022-07-19 ASSESSMENT — EXERCISE STRESS TEST
PEAK_HR: 82
PEAK_BP: 104/68
PEAK_METS: 2.3
PEAK_RPE: 13

## 2022-07-19 NOTE — PROGRESS NOTES
Pt reports she's having a lot of trouble with her right shoulder this morning, stating \"I'm going to the ER after this. \" Per pt she woke up around 0400 this morning and her right shoulder was numb down to just above her elbow. Per pt this isn't new, started back in April but feels like it is getting worse. Pt has an appt with orthopedic md next week but doesn't feel she should wait. Writer confirmed pt felt up to participating in cardiac rehab today, pt states \"Yes, I am going to try. \" Pt is concerned that sx's are worsening from the cane she's been using so, she is not using her cane today. Pt will be having her Pacemaker replaced 08/15/22.

## 2022-07-19 NOTE — PROGRESS NOTES
pt cut her cardiac rehab visit short today, d/t feeling like her pacemaker was giving her \"extra beats\" and just overall not feeling well. Writer confirmed on monitor pacemaker was functioning appropriately, no change from when she arrived. Pt becomes tearful, stating \"I just have a lot going on. I'm not trying to be a baby but I just can't today. \" Emotional support and reassurance provided. Pt voices appreciation for support.

## 2022-07-20 NOTE — ED PROVIDER NOTES
[address]  EMERGENCY DEPARTMENT ENCOUNTER      Pt Name: Ricardo Florez  MRN: [id number]  [place] 1955  Date of evaluation: 7/19/22      CHIEF COMPLAINT       Chief Complaint   Patient presents with    Shoulder Pain     HISTORY OF PRESENT ILLNESS   75 y.o. female presents with c/o non traumatic r shoulder pain. Symptoms started several weeks ago. Pt reports that she has chronic problems with her knees, she's been walking with a cane in her right arm, and thinks this may have provoked her shoulder pain. Pain is described as throbbing  in character, moderate to severe in severity. The pain is located primarily in the r shoulder with no radiation. The pain has been constnat in course worsened with movement. The patient tried tylenol prior to arrival with no relief of symptoms. REVIEW OF SYSTEMS     Review of Systems   Constitutional: Negative for fever   PULM: No sob, cough  CVS: No cp  Musculoskeletal: Positive for arthralgia. Skin: Negative for rash or wound. Neurological: Negative for weakness or numbness.      PAST MEDICAL HISTORY     Past Medical History:   Diagnosis Date    Abnormal Pap smear     Acute on chronic systolic congestive heart failure (HCC)     AICD (automatic cardioverter/defibrillator) present     MAURO (acute kidney injury) ([address] 03/04/2017    Anemia     Arthritis     Cardiomyopathy ([address] 12/20/2012    robotic replacement of 2 left ventricular leads/replacement of ICD generator    Cardiomyopathy, nonischemic ([address] 12/20/2012    Chronic combined systolic and diastolic congestive heart failure (HCC)     Ejection fraction < 50%     HTN (hypertension)     Hypothyroidism     Ischemic cardiomyopathy     Left bundle branch block     Mitral regurgitation     Morbid obesity due to excess calories ([address] 01/15/2017    Pulmonary hypertension ([address]     Sudden onset of severe headache        SURGICAL HISTORY       Past Surgical History:   Procedure Laterality Date    COLONOSCOPY daily, Disp-30 tablet, R-3Normal      spironolactone (ALDACTONE) 25 MG tablet Take 0.5 tablets by mouth daily, Disp-30 tablet, R-3Normal             ALLERGIES     is allergic to codeine and morphine. FAMILY HISTORY     She indicated that her mother is alive. She indicated that the status of her father is unknown. She indicated that her other is alive. She indicated that the status of her neg hx is unknown. SOCIAL HISTORY      reports that she has never smoked. She has never used smokeless tobacco. She reports that she does not currently use drugs. She reports that she does not drink alcohol. PHYSICAL EXAM     INITIAL VITALS: /85   Pulse 86   Temp 97.6 °F (36.4 °C) (Oral)   Resp 14   SpO2 100%   Gen: Appears nad  Head: Normocephalic, atraumatic  Eye: Pupils equal, no conjunctivitis  Cardiovascular: Regular rate and rhythm no murmurs  Lungs: Respirations are even and unlabored. Skin: No rash or wound. No erythema. There is no warmth to touch over the r. shoulder. MSK: There is no obvious deformity. There is no effusion to the r. shoulder. There is no laxity at the wrist. There is  tenderness over the ac joint. ROM is reduced secondary to pain, she is unable to abduct above 30degrees. Neuro: The patient is alert and oriented x 3. The patient has appropriate sensation over the median, radial and ulnar dermatomes. Hand  strength is appropriate. Strength testing in the r. shoulder is limited secondary to pain. Extremities: Radial pulses are appropriate. Capillary refill is appropriate. MEDICAL DECISION MAKING:     MDM    75 y.o. female presenting with non traumatic r. Shoulder pain. Pt does not appear to have any septic arthritis. Xr shows chronic degenerative changes. No fracture or dislocation. D/w pt treatment plan, warning precautions for prompt ED return and importance of close OP FU.     DIAGNOSTIC RESULTS   RADIOLOGY:All plain film, CT, MRI, and formal ultrasound images (except ED bedside ultrasound) are read by the radiologist and the images and interpretations are directly viewed by the emergency physician. XR SHOULDER RIGHT (MIN 2 VIEWS)   Final Result   Moderate degenerative change in the glenohumeral joint. No acute osseous   abnormality or malalignment identified. LABS: All lab results were reviewed by myself, and all abnormals are listed below. Labs Reviewed - No data to display    EMERGENCY DEPARTMENT COURSE:   Vitals:    Vitals:    07/19/22 0959   BP: 118/85   Pulse: 86   Resp: 14   Temp: 97.6 °F (36.4 °C)   TempSrc: Oral   SpO2: 100%       The patient was given the following medications while in the emergency department:  Orders Placed This Encounter   Medications    HYDROcodone-acetaminophen (NORCO) 5-325 MG per tablet     Sig: Take 1 tablet by mouth every 6 hours as needed for Pain for up to 3 days. Dispense:  10 tablet     Refill:  0     -------------------------  CRITICAL CARE:   CONSULTS: None  PROCEDURES: Procedures     FINAL IMPRESSION      1. Acute pain of right shoulder          DISPOSITION/PLAN   DISPOSITION Decision To Discharge 07/19/2022 11:45:31 AM      PATIENT REFERRED TO:  Malcolm Hussein MD  [address] [place] [phone]    In 2 days      Your Orthopedic Doctor    On 7/25/2022      DISCHARGE MEDICATIONS:  Discharge Medication List as of 7/19/2022 11:47 AM        START taking these medications    Details   HYDROcodone-acetaminophen (NORCO) 5-325 MG per tablet Take 1 tablet by mouth every 6 hours as needed for Pain for up to 3 days. , Disp-10 tablet, R-0Print               Rick Baxter MD  Attending Emergency Physician        Rick Baxter MD  07/19/22 [id number]

## 2022-07-21 ENCOUNTER — HOSPITAL ENCOUNTER (OUTPATIENT)
Dept: CARDIAC REHAB | Age: 67
Setting detail: THERAPIES SERIES
Discharge: HOME | End: 2022-07-21
Payer: MEDICARE

## 2022-07-21 VITALS — BODY MASS INDEX: 34.41 KG/M2 | WEIGHT: 213.2 LBS

## 2022-07-21 PROCEDURE — 93798 PHYS/QHP OP CAR RHAB W/ECG: CPT

## 2022-07-21 ASSESSMENT — EXERCISE STRESS TEST
PEAK_RPE: 13
PEAK_BP: 118/70
PEAK_METS: 2.2
PEAK_HR: 75

## 2022-07-21 NOTE — PROGRESS NOTES
Pt updates writer regarding right shoulder. Per pt she went to the ER 7/19 when she left cardiac rehab to have her shoulder evaluated. Per pt she was diagnosed with arthritis and given rx for Norco that has been helping with pt's pain. Pt is feeling \"good\" today. Pt has follow up with orthopedic MD next Monday 7/28/22.

## 2022-07-25 ENCOUNTER — OFFICE VISIT (OUTPATIENT)
Dept: ORTHOPEDIC SURGERY | Age: 67
End: 2022-07-25
Payer: MEDICARE

## 2022-07-25 VITALS — BODY MASS INDEX: 34.23 KG/M2 | HEIGHT: 66 IN | WEIGHT: 213 LBS

## 2022-07-25 DIAGNOSIS — M75.101 TEAR OF RIGHT ROTATOR CUFF, UNSPECIFIED TEAR EXTENT, UNSPECIFIED WHETHER TRAUMATIC: ICD-10-CM

## 2022-07-25 DIAGNOSIS — M19.011 PRIMARY OSTEOARTHRITIS, RIGHT SHOULDER: Primary | ICD-10-CM

## 2022-07-25 PROCEDURE — 99214 OFFICE O/P EST MOD 30 MIN: CPT | Performed by: ORTHOPAEDIC SURGERY

## 2022-07-25 PROCEDURE — G8417 CALC BMI ABV UP PARAM F/U: HCPCS | Performed by: ORTHOPAEDIC SURGERY

## 2022-07-25 PROCEDURE — G8427 DOCREV CUR MEDS BY ELIG CLIN: HCPCS | Performed by: ORTHOPAEDIC SURGERY

## 2022-07-25 PROCEDURE — 1090F PRES/ABSN URINE INCON ASSESS: CPT | Performed by: ORTHOPAEDIC SURGERY

## 2022-07-25 RX ORDER — SACUBITRIL AND VALSARTAN 24; 26 MG/1; MG/1
TABLET, FILM COATED ORAL
Qty: 28 TABLET | Refills: 0 | OUTPATIENT
Start: 2022-07-25

## 2022-07-25 RX ORDER — METHYLPREDNISOLONE 4 MG/1
TABLET ORAL
Qty: 1 KIT | Refills: 0 | Status: SHIPPED | OUTPATIENT
Start: 2022-07-25 | End: 2022-07-31

## 2022-07-25 ASSESSMENT — ENCOUNTER SYMPTOMS
ROS SKIN COMMENTS: NEGATIVE FOR RASH
EYE DISCHARGE: 0
ABDOMINAL PAIN: 0
SHORTNESS OF BREATH: 0

## 2022-07-25 NOTE — PROGRESS NOTES
[address] [facility]  [address] [place] [address] Drive [id number]  Dept: [phone]    Ambulatory Orthopedic Consult      CHIEF COMPLAINT:    Chief Complaint   Patient presents with    Shoulder Pain     RIGHT - [facility] ER 07/19/2022        HISTORY OF PRESENT ILLNESS:      The patient is a 75 y.o. female who is being seen at the request of  Lobito Peace MD for consultation and evaluation of right shoulder pain. The patient denies any significant trauma. She notes increasing pain to the shoulder which is failed to improve despite a home exercise program.  She has an upcoming pacemaker surgery and does not want any other surgery as she does not feel safe for elective surgery secondary to her multiple significant medical comorbidities. .      Past Medical History:    Past Medical History:   Diagnosis Date    Abnormal Pap smear     Acute on chronic systolic congestive heart failure (HCC)     AICD (automatic cardioverter/defibrillator) present     MAURO (acute kidney injury) ([address] 03/04/2017    Anemia     Arthritis     Cardiomyopathy ([address] 12/20/2012    robotic replacement of 2 left ventricular leads/replacement of ICD generator    Cardiomyopathy, nonischemic ([address] 12/20/2012    Chronic combined systolic and diastolic congestive heart failure (HCC)     Ejection fraction < 50%     HTN (hypertension)     Hypothyroidism     Ischemic cardiomyopathy     Left bundle branch block     Mitral regurgitation     Morbid obesity due to excess calories ([address] 01/15/2017    Pulmonary hypertension ([address]     Sudden onset of severe headache        Past Surgical History:    Past Surgical History:   Procedure Laterality Date    COLONOSCOPY      HERNIA REPAIR      INSERT MIDLINE CATHETER  4/12/2022         OTHER SURGICAL HISTORY  12/20/2012    robotic placement of 2 left ventricular leads/replacement of ICD generator    PACEMAKER PLACEMENT      medtronic Viva XT CRT -D [id number] Serial : [id number]    TUBAL LIGATION         Current Medications:   Current Outpatient Medications   Medication Sig Dispense Refill    methylPREDNISolone (MEDROL DOSEPACK) 4 MG tablet Take by mouth. 1 kit 0    metoprolol succinate (TOPROL XL) 50 MG extended release tablet Take 50 mg by mouth daily      psyllium (KONSYL) 28.3 % PACK Take 1 packet by mouth daily      amiodarone (CORDARONE) 200 MG tablet       diclofenac sodium (VOLTAREN) 1 % GEL APPLY 2 GRAMS TOPICALLY 4 (FOUR) TIMES A DAY. empagliflozin (JARDIANCE) 10 MG tablet Take 10 mg by mouth daily      acetaminophen (TYLENOL) 500 MG tablet Take 2 tablets by mouth every 6 hours as needed for Pain 60 tablet 0    potassium chloride (KLOR-CON M) 20 MEQ extended release tablet Take 1 tablet by mouth daily 30 tablet 0    magnesium oxide (MAG-OX) 400 MG tablet Take 0.5 tablets by mouth daily 30 tablet 1    nitroGLYCERIN (NITROSTAT) 0.4 MG SL tablet up to max of 3 total doses. If no relief after 1 dose, call 911. 25 tablet 3    rivaroxaban (XARELTO) 20 MG TABS tablet Take 1 tablet by mouth daily 30 tablet 11    sacubitril-valsartan (ENTRESTO) 24-26 MG per tablet Take 0.5 tablets by mouth 2 times daily 60 tablet 11    bumetanide (BUMEX) 2 MG tablet Take 1 tablet by mouth daily 30 tablet 3    levothyroxine (SYNTHROID) 25 MCG tablet Take 1 tablet by mouth Daily 30 tablet 3    lidocaine (LIDODERM) 5 % Place one patch to both knees 12 hours on, 12 hours off. (Patient not taking: Reported on 7/5/2022) 30 patch 0    allopurinol (ZYLOPRIM) 100 MG tablet Take 1 tablet by mouth daily 30 tablet 3    spironolactone (ALDACTONE) 25 MG tablet Take 0.5 tablets by mouth daily 30 tablet 3     No current facility-administered medications for this visit.        Allergies:    Codeine and Morphine    Social History:   Social History     Socioeconomic History    Marital status:      Spouse name: Not on file    Number of children: 2    Years of education: Not on file weakness. Psychiatric/Behavioral:  Negative for confusion. I have reviewed the CC, HPI, ROS, PMH, FHX, Social History, and if not present in this note, I have reviewed in the patient's chart. I agree with the documentation provided by other staff and have reviewed their documentation prior to providing my signature indicating agreement. PHYSICAL EXAM:  Ht 5' 6\" (1.676 m)   Wt 213 lb (96.6 kg)   BMI 34.38 kg/m²  Body mass index is 34.38 kg/m². Physical Exam  Gen: alert and oriented to person and place. Psych:  Appropriate affect; Appropriate knowledge base; Appropriate mood; No hallucinations; Head: normocephalic, atraumatic   Chest: symmetric chest excursion; nonlabored respiratory effort. Pelvis: stable; no obvious pelvis deformity  Ortho Exam  Extremity: No significant outward deformity of the right shoulder is appreciated. No significant shoulder girdle bilateral muscle atrophy is noted. Forward elevation actively is to 60 degrees and AB duction is 60 degrees. Decreased external rotation strength is noted. Motor, sensory, vascular examination right upper extremity is grossly intact without focal deficits. No significant limitations in range of motion of the cervical spine or right elbow is appreciated. Radiology:     XR SHOULDER RIGHT (MIN 2 VIEWS)    Result Date: 7/19/2022  EXAMINATION: THREE XRAY VIEWS OF THE RIGHT SHOULDER 7/19/2022 10:32 am COMPARISON: None. HISTORY: ORDERING SYSTEM PROVIDED HISTORY: pain TECHNOLOGIST PROVIDED HISTORY: pain Reason for Exam: right shoulder pain x 1 month, limited range of motion FINDINGS: No acute osseous abnormality or malalignment identified. Moderate degenerative change in the glenohumeral joint. The visualized lung fields reveal no acute findings. Partially visualized AICD. Moderate degenerative change in the glenohumeral joint. No acute osseous abnormality or malalignment identified.          ASSESSMENT:     1. Primary osteoarthritis, right shoulder    2. Tear of right rotator cuff, unspecified tear extent, unspecified whether traumatic           PLAN:       The patient has quite advanced arthritis to the right shoulder most likely rotator cuff arthropathy as she has significant decrease her rotator cuff muscle strength. We have talked about operative versus nonoperative treatment and she would like to treat nonoperatively. We are going to get her involved in physical therapy for range of motion restoration and a Medrol Dosepak has been written. The patient is also continue her cardiac rehab. I plan to see her back in approximately 8 weeks or sooner as necessary. No follow-ups on file. Orders Placed This Encounter   Medications    methylPREDNISolone (MEDROL DOSEPACK) 4 MG tablet     Sig: Take by mouth. Dispense:  1 kit     Refill:  0       Orders Placed This Encounter   Procedures    [address]     Referral Priority:   Routine     Referral Type:   Eval and Treat     Referral Reason:   Specialty Services Required     Requested Specialty:   Physical Therapist     Number of Visits Requested:   1       This note is created with the assistance of a speech recognition program.  While intending to generate a document that actually reflects the content of the visit, the document can still have some errors including those of syntax and sound a like substitutions which may escape proof reading.   In such instances, actual meaning can be extrapolated by contextual diversion      Electronically signed by Afia York DO, FAOAO on 7/25/2022 at 4:47 PM

## 2022-07-25 NOTE — PROGRESS NOTES
PT CALLED TO LET US KNOW SHE HAS A TORN RIGHT ROTATOR CUFF AND THE DOCTOR RECOMMENDS HER TO DO PHYSICAL THERAPY. CURRENTLY HAS NEW PRESCRIPTION FOR PAIN MEDS SHE IS SUPPOSED TO TAKE. PT STATES SHE WILL BE HERE TUES 7/26/22.

## 2022-07-26 ENCOUNTER — HOSPITAL ENCOUNTER (OUTPATIENT)
Dept: CARDIAC REHAB | Age: 67
Setting detail: THERAPIES SERIES
Discharge: HOME | End: 2022-07-26
Payer: MEDICARE

## 2022-07-28 ENCOUNTER — HOSPITAL ENCOUNTER (OUTPATIENT)
Dept: CARDIAC REHAB | Age: 67
Setting detail: THERAPIES SERIES
Discharge: HOME | End: 2022-07-28
Payer: MEDICARE

## 2022-08-02 ENCOUNTER — HOSPITAL ENCOUNTER (OUTPATIENT)
Dept: CARDIAC REHAB | Age: 67
Setting detail: THERAPIES SERIES
Discharge: HOME | End: 2022-08-02
Payer: MEDICARE

## 2022-08-02 VITALS — WEIGHT: 210.4 LBS | BODY MASS INDEX: 33.96 KG/M2

## 2022-08-02 PROCEDURE — 93798 PHYS/QHP OP CAR RHAB W/ECG: CPT

## 2022-08-02 ASSESSMENT — EXERCISE STRESS TEST
PEAK_HR: 90
PEAK_METS: 2.2
PEAK_RPE: 13
PEAK_BP: 102/80

## 2022-08-04 ENCOUNTER — HOSPITAL ENCOUNTER (OUTPATIENT)
Dept: CARDIAC REHAB | Age: 67
Setting detail: THERAPIES SERIES
Discharge: HOME | End: 2022-08-04
Payer: MEDICARE

## 2022-08-04 VITALS — BODY MASS INDEX: 33.48 KG/M2 | WEIGHT: 207.4 LBS

## 2022-08-04 PROCEDURE — 93798 PHYS/QHP OP CAR RHAB W/ECG: CPT

## 2022-08-04 ASSESSMENT — EXERCISE STRESS TEST
PEAK_BP: 112/70
PEAK_METS: 2.4
PEAK_RPE: 13
PEAK_HR: 120

## 2022-08-04 NOTE — PROGRESS NOTES
Reviewed discharge paperwork including home exercise outline-copy provided to pt. Pt given post-test to comlpete, confidential survey and a tshirt was given. Explained Phase 3 cardiac rehab in detail.

## 2022-08-05 ENCOUNTER — OFFICE VISIT (OUTPATIENT)
Dept: INTERNAL MEDICINE | Age: 67
End: 2022-08-05
Payer: MEDICARE

## 2022-08-05 VITALS
DIASTOLIC BLOOD PRESSURE: 71 MMHG | TEMPERATURE: 97.7 F | WEIGHT: 211 LBS | SYSTOLIC BLOOD PRESSURE: 105 MMHG | BODY MASS INDEX: 33.91 KG/M2 | HEIGHT: 66 IN | OXYGEN SATURATION: 98 % | HEART RATE: 77 BPM

## 2022-08-05 DIAGNOSIS — Z45.02 ICD (IMPLANTABLE CARDIOVERTER-DEFIBRILLATOR) BATTERY DEPLETION: ICD-10-CM

## 2022-08-05 DIAGNOSIS — K57.32 DIVERTICULITIS OF COLON: Primary | ICD-10-CM

## 2022-08-05 DIAGNOSIS — I10 ESSENTIAL HYPERTENSION: ICD-10-CM

## 2022-08-05 DIAGNOSIS — M25.511 CHRONIC RIGHT SHOULDER PAIN: ICD-10-CM

## 2022-08-05 DIAGNOSIS — G89.29 CHRONIC RIGHT SHOULDER PAIN: ICD-10-CM

## 2022-08-05 DIAGNOSIS — Z78.0 POSTMENOPAUSE: ICD-10-CM

## 2022-08-05 DIAGNOSIS — E03.9 HYPOTHYROIDISM, UNSPECIFIED TYPE: ICD-10-CM

## 2022-08-05 DIAGNOSIS — Z91.81 AT HIGH RISK FOR FALLS: ICD-10-CM

## 2022-08-05 DIAGNOSIS — I50.42 CHRONIC COMBINED SYSTOLIC (CONGESTIVE) AND DIASTOLIC (CONGESTIVE) HEART FAILURE (HCC): ICD-10-CM

## 2022-08-05 PROCEDURE — 99214 OFFICE O/P EST MOD 30 MIN: CPT | Performed by: STUDENT IN AN ORGANIZED HEALTH CARE EDUCATION/TRAINING PROGRAM

## 2022-08-05 PROCEDURE — 3017F COLORECTAL CA SCREEN DOC REV: CPT | Performed by: STUDENT IN AN ORGANIZED HEALTH CARE EDUCATION/TRAINING PROGRAM

## 2022-08-05 PROCEDURE — 1123F ACP DISCUSS/DSCN MKR DOCD: CPT | Performed by: STUDENT IN AN ORGANIZED HEALTH CARE EDUCATION/TRAINING PROGRAM

## 2022-08-05 PROCEDURE — G8417 CALC BMI ABV UP PARAM F/U: HCPCS | Performed by: STUDENT IN AN ORGANIZED HEALTH CARE EDUCATION/TRAINING PROGRAM

## 2022-08-05 PROCEDURE — 1036F TOBACCO NON-USER: CPT | Performed by: STUDENT IN AN ORGANIZED HEALTH CARE EDUCATION/TRAINING PROGRAM

## 2022-08-05 PROCEDURE — 99211 OFF/OP EST MAY X REQ PHY/QHP: CPT | Performed by: INTERNAL MEDICINE

## 2022-08-05 PROCEDURE — 1090F PRES/ABSN URINE INCON ASSESS: CPT | Performed by: STUDENT IN AN ORGANIZED HEALTH CARE EDUCATION/TRAINING PROGRAM

## 2022-08-05 PROCEDURE — G8427 DOCREV CUR MEDS BY ELIG CLIN: HCPCS | Performed by: STUDENT IN AN ORGANIZED HEALTH CARE EDUCATION/TRAINING PROGRAM

## 2022-08-05 PROCEDURE — G8400 PT W/DXA NO RESULTS DOC: HCPCS | Performed by: STUDENT IN AN ORGANIZED HEALTH CARE EDUCATION/TRAINING PROGRAM

## 2022-08-05 RX ORDER — ACETAMINOPHEN 500 MG
500 TABLET ORAL EVERY 6 HOURS PRN
Qty: 60 TABLET | Refills: 0 | Status: SHIPPED | OUTPATIENT
Start: 2022-08-05

## 2022-08-05 SDOH — ECONOMIC STABILITY: FOOD INSECURITY: WITHIN THE PAST 12 MONTHS, THE FOOD YOU BOUGHT JUST DIDN'T LAST AND YOU DIDN'T HAVE MONEY TO GET MORE.: NEVER TRUE

## 2022-08-05 SDOH — ECONOMIC STABILITY: FOOD INSECURITY: WITHIN THE PAST 12 MONTHS, YOU WORRIED THAT YOUR FOOD WOULD RUN OUT BEFORE YOU GOT MONEY TO BUY MORE.: NEVER TRUE

## 2022-08-05 ASSESSMENT — PATIENT HEALTH QUESTIONNAIRE - PHQ9
SUM OF ALL RESPONSES TO PHQ QUESTIONS 1-9: 0
1. LITTLE INTEREST OR PLEASURE IN DOING THINGS: 0
2. FEELING DOWN, DEPRESSED OR HOPELESS: 0
SUM OF ALL RESPONSES TO PHQ QUESTIONS 1-9: 0
SUM OF ALL RESPONSES TO PHQ9 QUESTIONS 1 & 2: 0

## 2022-08-05 ASSESSMENT — SOCIAL DETERMINANTS OF HEALTH (SDOH): HOW HARD IS IT FOR YOU TO PAY FOR THE VERY BASICS LIKE FOOD, HOUSING, MEDICAL CARE, AND HEATING?: NOT HARD AT ALL

## 2022-08-05 NOTE — PROGRESS NOTES
[facility] [address] [address]  Dept: [phone]  Dept Fax: [phone]    Office Progress/Follow Up Note  Date of patient's visit: 8/5/2022  Patient's Name:  Aria Yeh YOB: 1955            Patient Care Team:  Robi Hicks DO as PCP - Jessica Belle MD as Referring Physician (Cardiology)  Rosa Cross DO as Referring Physician (Cardiology)  Lobito Peace MD as Referring Physician (Internal Medicine)  Ora Dakin, MD as Consulting Physician (Gastroenterology)  ________________________________________________________________________      Reason for Visit: Routine outpatient follow up  ________________________________________________________________________  Chief Complaint:  Established New Doctor (Trans from Dr Remington Goodman)    ________________________________________________________________________  History of Presenting Illness:  Aria Yeh is a 75 y.o. With past medical history of recurrent (x2) diverticulitis requiring hospitalization in April treated with medical management with IV antibiotics and outpatient follow-up with infectious disease, osteoarthritis and right rotator cuff status post ED visit last month and following with orthopedic surgery treated with prednisone taper and therapy however patient declined therapy, A. fib on amiodarone and Xarelto, CHF with ejection fraction of 17% status post AICD placement, hypertension, gout, hypothyroidism. Patient presents today for routine follow-up. She states she has an appointment with cardiology on 8/15/2022 for AICD battery replacement and she is being evaluated for a mitral clip. She reports adherence to cardiac rehab. She states her right shoulder feels better today and she has increased range of motion after her prednisone taper and that her knees feel better as well.   She also notes she was supposed to have a GI appointment today however it was canceled. She stated that at her last GI appointment colorectal surgery evaluation was recommended however patient does not want to pursue colectomy as she is scared of her high surgical risks and of ostomy. Of note during her ED visit for shoulder pain her TSH was quite elevated and she was noted to have an MAURO. She states her cardiologist increased her levothyroxine to 25 twice daily 2 weeks ago. ROS: Today patient declines headache, fever, chills, diarrhea, nausea, vomiting, lower extremity edema, chest pain, melena, bloody stools, urinary frequency or discomfort, change in hair quality, hot or cold intolerance. She admits to feeling constipated, decreased appetite secondary to fearful of her diverticulitis resulting in 20 pounds weight loss since the beginning of this year, shortness of breath with exertion which is at her baseline and she is able to climb 8 stairs however is short of breath after. Pertinent screening:  Colon cancer: Last colonoscopy 2010. Patient due.   Referred to colorectal surgery and follows with GI  Osteoporosis: Due for DEXA scan  Breast cancer: Due next year  Depression screening: Negative today      Active Ambulatory Problems     Diagnosis Date Noted    Dilated cardiomyopathy ([address] 12/20/2012    Pre-diabetes 08/08/2014    Essential hypertension 08/08/2014    PAH (pulmonary artery hypertension) ([address] 34 on Echo  08/08/2014    Mitral regurgitation 01/14/2017    Chronic headache 01/14/2017    Morbid obesity due to excess calories ([address] 01/15/2017    AICD (automatic cardioverter/defibrillator) present 03/04/2017    Syncope and collapse secondary to [facility] 03/04/2017    MAURO (acute kidney injury) ([address] 03/04/2017    ICD (implantable cardioverter-defibrillator) battery depletion 06/03/2019    Acute on chronic systolic CHF (congestive heart failure) ([address] 09/05/2019    Gout 09/05/2019    Dizziness 09/05/2019    Benign paroxysmal positional vertigo 09/06/2019    Chronic heart failure with reduced ejection fraction and diastolic dysfunction (HCC) 05/22/2020    Hypotension (arterial) 05/23/2020    NSVT (nonsustained ventricular tachycardia) ([facility] 05/23/2020    Chest pain 06/26/2021    Hypothyroidism     Chronic combined systolic (congestive) and diastolic (congestive) heart failure ([address] 07/14/2021    Acute diverticulitis 12/04/2021    Abscess of sigmoid colon 12/04/2021    Diverticulitis 04/09/2022    Nausea & vomiting 04/09/2022    Diverticulitis of large intestine with abscess without bleeding     Diverticulitis of colon     Lower abdominal pain      Resolved Ambulatory Problems     Diagnosis Date Noted    Hypokalemia 07/24/2012    Leg cramp 07/24/2012    Acute on chronic systolic congestive heart failure ([address] 02/01/2013    Back pain 02/01/2013    Headache 08/08/2014    Hypertensive urgency 03/06/2015    V tach ([address]     Hypomagnesemia      Past Medical History:   Diagnosis Date    Abnormal Pap smear     Anemia     Arthritis     Cardiomyopathy ([address] 12/20/2012    Cardiomyopathy, nonischemic ([address] 12/20/2012    Chronic combined systolic and diastolic congestive heart failure (HCC)     Ejection fraction < 50%     HTN (hypertension)     Ischemic cardiomyopathy     Left bundle branch block     Pulmonary hypertension ([address]     Sudden onset of severe headache         Health Maintenance Due   Topic Date Due    Annual Wellness Visit (AWV)  Never done    Hepatitis C screen  Never done    DTaP/Tdap/Td vaccine (1 - Tdap) Never done    Shingles vaccine (1 of 2) Never done    DEXA (modify frequency per FRAX score)  Never done    Pneumococcal 65+ years Vaccine (2 - PPSV23 or PCV20) 02/14/2020    COVID-19 Vaccine (3 - Booster for Pfizer series) 10/26/2021       Allergies   Allergen Reactions    Codeine      Hallucinations    Morphine      Hallucinations           Current Outpatient Medications   Medication Sig Dispense Refill    acetaminophen (TYLENOL) 500 MG tablet Take 1 tablet by mouth every 6 hours as needed for Pain 60 tablet 0    metoprolol succinate (TOPROL XL) 50 MG extended release tablet Take 50 mg by mouth daily      amiodarone (CORDARONE) 200 MG tablet       empagliflozin (JARDIANCE) 10 MG tablet Take 10 mg by mouth daily      potassium chloride (KLOR-CON M) 20 MEQ extended release tablet Take 1 tablet by mouth daily 30 tablet 0    rivaroxaban (XARELTO) 20 MG TABS tablet Take 1 tablet by mouth daily 30 tablet 11    sacubitril-valsartan (ENTRESTO) 24-26 MG per tablet Take 0.5 tablets by mouth 2 times daily 60 tablet 11    bumetanide (BUMEX) 2 MG tablet Take 1 tablet by mouth daily 30 tablet 3    levothyroxine (SYNTHROID) 25 MCG tablet Take 1 tablet by mouth Daily 30 tablet 3    allopurinol (ZYLOPRIM) 100 MG tablet Take 1 tablet by mouth daily 30 tablet 3    spironolactone (ALDACTONE) 25 MG tablet Take 0.5 tablets by mouth daily 30 tablet 3    psyllium (KONSYL) 28.3 % PACK Take 1 packet by mouth daily (Patient not taking: Reported on 8/5/2022)      diclofenac sodium (VOLTAREN) 1 % GEL APPLY 2 GRAMS TOPICALLY 4 (FOUR) TIMES A DAY. (Patient not taking: Reported on 8/5/2022)      magnesium oxide (MAG-OX) 400 MG tablet Take 0.5 tablets by mouth daily (Patient not taking: Reported on 8/5/2022) 30 tablet 1    nitroGLYCERIN (NITROSTAT) 0.4 MG SL tablet up to max of 3 total doses. If no relief after 1 dose, call 911. (Patient not taking: Reported on 8/5/2022) 25 tablet 3    lidocaine (LIDODERM) 5 % Place one patch to both knees 12 hours on, 12 hours off. (Patient not taking: No sig reported) 30 patch 0     No current facility-administered medications for this visit.        Social History     Tobacco Use    Smoking status: Never    Smokeless tobacco: Never   Vaping Use    Vaping Use: Never used   Substance Use Topics    Alcohol use: No     Alcohol/week: 0.0 standard drinks    Drug use: Not Currently       Family History   Problem Relation Age of Onset    Cancer Other         ovarian    High Blood Pressure Mother     Other Mother         copd    Arthritis Neg Hx     Asthma Neg Hx     Birth Defects Neg Hx     Depression Neg Hx     Diabetes Neg Hx     Early Death Neg Hx     Hearing Loss Neg Hx     Heart Disease Neg Hx     High Cholesterol Neg Hx     Kidney Disease Neg Hx     Learning Disabilities Neg Hx     Mental Retardation Neg Hx     Mental Illness Neg Hx     Miscarriages / Stillbirths Neg Hx     Stroke Neg Hx     Substance Abuse Neg Hx     Vision Loss Neg Hx     Breast Cancer Neg Hx     Colon Cancer Neg Hx     Eclampsia Neg Hx     Hypertension Neg Hx     Ovarian Cancer Neg Hx      Labor Neg Hx     Spont Abortions Neg Hx       ________________________________________________________________________    ________________________________________________________________________  Physical Exam:  Vitals:    22 0838   BP: 105/71   Pulse: 77   Temp: 97.7 °F (36.5 °C)   TempSrc: Infrared   SpO2: 98%   Weight: 211 lb (95.7 kg)   Height: 5' 6\" (1.676 m)     BP Readings from Last 3 Encounters:   22 105/71   22 118/85   22 108/74      Physical Exam:  General:  Pleasant and cooperative. No apparent distress. HEENT:  Normocephalic, atraumatic, mucus membranes moist.   Neck:  Trachea midline, no obvious JVD. Chest:  Clear to auscultation bilaterally. No wheezes, rales, or rhonchi. CV:  Regular rate and rhythm. No murmur, no gallops, no rubs  Abdomen:  Abdomen is soft, non-tender, non-distended, no rebound or guarding. Extremities:  No lower extremity edema or cyanosis, peripheral pulses intact  Neurological:  AAOx3. No focal deficits.   Skin:  Warm and dry.    ________________________________________________________________________  Diagnostic findings:  CBC:  Lab Results   Component Value Date/Time    WBC 7.1 2022 04:34 AM    HGB 11.3 2022 04:34 AM     2022 04:34 AM     2012 05:31 PM       BMP:    Lab Results   Component Value Date/Time     2022 09:39 AM    K 3.7 07/19/2022 09:39 AM     07/19/2022 09:39 AM    CO2 21 07/19/2022 09:39 AM    BUN 25 07/19/2022 09:39 AM    CREATININE 1.29 07/19/2022 09:39 AM    GLUCOSE 112 07/19/2022 09:39 AM    GLUCOSE 100 04/03/2012 05:31 PM       HEMOGLOBIN A1C:   Lab Results   Component Value Date/Time    LABA1C 5.6 05/23/2020 10:55 AM       FASTING LIPID PANEL:  Lab Results   Component Value Date    CHOL 180 05/23/2020    HDL 42 05/23/2020    TRIG 79 05/23/2020     ________________________________________________________________________  Assessment and Plan:  Annemarie was seen today for established new doctor. Diagnoses and all orders for this visit:    Diverticulitis of colon  -Was seen by GI and referred to colorectal surgery for evaluation of colectomy in the setting of recurrent diverticulitis. Reinforced with patient the importance of this referral and she was agreeable to hear surgical opinion however she is quite hesitant to pursue surgical intervention as she is afraid of her comorbid conditions and high risk.  -She is due for colonoscopy    Chronic combined systolic (congestive) and diastolic (congestive) heart failure ([address]  ICD (implantable cardioverter-defibrillator) battery depletion  -Follows with Dr. Perez Cleaves cardiology. EF on last echocardiogram is 17.  -Due for AICD battery change on 8/15/2022  -Continue Entresto, spironolactone, Bumex, metoprolol, Jardiance    Essential hypertension  -Basic Metabolic Panel; Future  -Continue Entresto, spironolactone, Bumex, metoprolol    Hypothyroidism, unspecified type  - TSH With Reflex Ft4; Future  -Patient instructed to take her levothyroxine separately from other medications and on an empty stomach. She is also instructed that she may take 50 mcg in the morning instead of 25 twice a day. At high risk for falls  -Patient ambulates with a cane     Postmenopause     -DEXA BONE DENSITY 2 SITES;  Future    Chronic right shoulder pain  - acetaminophen (TYLENOL) 500 MG tablet; Take 1 tablet by mouth every 6 hours as needed for Pain  -Patient states improved range of motion since prednisone taper with orthopedic surgery. She did however decline therapy at this time    ________________________________________________________________________  Follow up and instructions:  Return in about 4 weeks (around 9/2/2022). Annemarie received counseling on the following healthy behaviors: nutrition, exercise, and medication adherence    Discussed use, benefit, and side effects of prescribed medications. Barriers to medication compliance addressed. All patient questions answered. Pt voiced understanding. Patient given educational materials - see patient instructions    Devora Inman DO      Department of Internal Medicine  [facility], [place]         8/5/2022, 11:05 AM      This note is created with the assistance of a speech-recognition program. While intending to generate a document that actually reflects the content of the visit, the document can still have some mistakes which may not have been identified and corrected by editing. On the basis of positive falls risk screening, assessment and plan is as follows: in-office gait and balance testing performed using The Timed Up and Go Test was negative for increased falls risk- no further intervention is currently indicated.

## 2022-08-05 NOTE — PROGRESS NOTES
Attending Physician Statement  I have discussed the care of Emeli Bhagat, including pertinent history and exam findings,  with the resident. I have seen and examined the patient and the key elements of all parts of the encounter have been performed by me. I agree with the assessment, plan and orders as documented by the resident.   (GC Modifier)

## 2022-08-09 ENCOUNTER — HOSPITAL ENCOUNTER (OUTPATIENT)
Dept: CARDIAC REHAB | Age: 67
Setting detail: THERAPIES SERIES
Discharge: HOME | End: 2022-08-09
Payer: MEDICARE

## 2022-08-11 ENCOUNTER — HOSPITAL ENCOUNTER (OUTPATIENT)
Dept: WOMENS IMAGING | Age: 67
Discharge: HOME | End: 2022-08-13
Payer: MEDICARE

## 2022-08-11 ENCOUNTER — HOSPITAL ENCOUNTER (OUTPATIENT)
Dept: CARDIAC REHAB | Age: 67
Setting detail: THERAPIES SERIES
Discharge: HOME | End: 2022-08-11
Payer: MEDICARE

## 2022-08-11 DIAGNOSIS — Z78.0 POSTMENOPAUSE: ICD-10-CM

## 2022-08-11 PROCEDURE — 77080 DXA BONE DENSITY AXIAL: CPT

## 2022-08-12 ENCOUNTER — HOSPITAL ENCOUNTER (OUTPATIENT)
Dept: OTHER | Age: 67
Discharge: HOME | End: 2022-08-12
Payer: MEDICARE

## 2022-08-12 VITALS
DIASTOLIC BLOOD PRESSURE: 80 MMHG | BODY MASS INDEX: 33.25 KG/M2 | RESPIRATION RATE: 16 BRPM | SYSTOLIC BLOOD PRESSURE: 118 MMHG | HEART RATE: 95 BPM | WEIGHT: 206 LBS | OXYGEN SATURATION: 96 %

## 2022-08-12 PROCEDURE — 99212 OFFICE O/P EST SF 10 MIN: CPT

## 2022-08-12 NOTE — PROGRESS NOTES
Date:  2022  Time:  9:02 AM    CHF Clinic at [facility]    Office: [phone] Fax: [phone]    Re:  Yonis Albright   Patient : 1955    Vital Signs: /80   Pulse 95   Resp 16   Wt 206 lb (93.4 kg)   SpO2 96%   BMI 33.25 kg/m²                                                   No results for input(s): CBC, HGB, HCT, WBC, PLATELET, NA, K, CL, CO2, BUN, CREATININE, GLUCOSE, BNP, INR in the last 72 hours. Respiratory:    Assessment  Charting Type: Reassessment    Breath Sounds  Right Upper Lobe: Clear  Right Middle Lobe: Clear  Right Lower Lobe: Clear  Left Upper Lobe: Clear  Left Lower Lobe: Clear              Peripheral Vascular  RLE Edema: None  LLE Edema: None      Complaints: No new complaints     Physician Orders No new orders     Comment : Patient having AICD revision on Monday 8/15/2022 with Monik Cava did last Optivol reading today shows no signs of fluid overload. Discussed in detail low sodium diet 2000mg per day and 64 ounces of fluid per day. We will see in 5 weeks per patient request. 2022.     Electronically signed by Kuldip Squires RN on 2022 at 9:02 AM

## 2022-08-15 ENCOUNTER — ANESTHESIA EVENT (OUTPATIENT)
Dept: CARDIAC CATH/INVASIVE PROCEDURES | Age: 67
End: 2022-08-15

## 2022-08-15 ENCOUNTER — HOSPITAL ENCOUNTER (OUTPATIENT)
Dept: CARDIAC CATH/INVASIVE PROCEDURES | Age: 67
Discharge: HOME | End: 2022-08-15
Payer: MEDICARE

## 2022-08-15 ENCOUNTER — ANESTHESIA (OUTPATIENT)
Dept: CARDIAC CATH/INVASIVE PROCEDURES | Age: 67
End: 2022-08-15

## 2022-08-15 VITALS
SYSTOLIC BLOOD PRESSURE: 93 MMHG | TEMPERATURE: 98.2 F | DIASTOLIC BLOOD PRESSURE: 64 MMHG | HEIGHT: 66 IN | RESPIRATION RATE: 17 BRPM | OXYGEN SATURATION: 100 % | WEIGHT: 205 LBS | HEART RATE: 70 BPM | BODY MASS INDEX: 32.95 KG/M2

## 2022-08-15 LAB
GFR NON-AFRICAN AMERICAN: 37 ML/MIN
GFR SERPL CREATININE-BSD FRML MDRD: 45 ML/MIN
GFR SERPL CREATININE-BSD FRML MDRD: ABNORMAL ML/MIN/{1.73_M2}
GLUCOSE BLD-MCNC: 104 MG/DL (ref 74–100)
PLATELET # BLD: 231 K/UL (ref 138–453)
POC BUN: 24 MG/DL (ref 8–26)
POC CHLORIDE: 108 MMOL/L (ref 98–107)
POC CREATININE: 1.41 MG/DL (ref 0.51–1.19)
POC HEMATOCRIT: 44 % (ref 36–46)
POC HEMOGLOBIN: 15.1 G/DL (ref 12–16)
POC IONIZED CALCIUM: 1.13 MMOL/L (ref 1.15–1.33)
POC POTASSIUM: 3.8 MMOL/L (ref 3.5–4.5)
POC SODIUM: 142 MMOL/L (ref 138–146)
SARS-COV-2, RAPID: NOT DETECTED
SPECIMEN DESCRIPTION: NORMAL

## 2022-08-15 PROCEDURE — 3700000001 HC ADD 15 MINUTES (ANESTHESIA)

## 2022-08-15 PROCEDURE — 6360000002 HC RX W HCPCS

## 2022-08-15 PROCEDURE — 7100000000 HC PACU RECOVERY - FIRST 15 MIN

## 2022-08-15 PROCEDURE — 84295 ASSAY OF SERUM SODIUM: CPT

## 2022-08-15 PROCEDURE — 85014 HEMATOCRIT: CPT

## 2022-08-15 PROCEDURE — 93005 ELECTROCARDIOGRAM TRACING: CPT | Performed by: INTERNAL MEDICINE

## 2022-08-15 PROCEDURE — 84132 ASSAY OF SERUM POTASSIUM: CPT

## 2022-08-15 PROCEDURE — 3700000000 HC ANESTHESIA ATTENDED CARE

## 2022-08-15 PROCEDURE — 82435 ASSAY OF BLOOD CHLORIDE: CPT

## 2022-08-15 PROCEDURE — C1882 AICD, OTHER THAN SING/DUAL: HCPCS

## 2022-08-15 PROCEDURE — 6370000000 HC RX 637 (ALT 250 FOR IP)

## 2022-08-15 PROCEDURE — 87635 SARS-COV-2 COVID-19 AMP PRB: CPT

## 2022-08-15 PROCEDURE — 82947 ASSAY GLUCOSE BLOOD QUANT: CPT

## 2022-08-15 PROCEDURE — C1889 IMPLANT/INSERT DEVICE, NOC: HCPCS

## 2022-08-15 PROCEDURE — 82330 ASSAY OF CALCIUM: CPT

## 2022-08-15 PROCEDURE — 2720000010 HC SURG SUPPLY STERILE

## 2022-08-15 PROCEDURE — 7100000001 HC PACU RECOVERY - ADDTL 15 MIN

## 2022-08-15 PROCEDURE — 2709999900 HC NON-CHARGEABLE SUPPLY

## 2022-08-15 PROCEDURE — 2580000003 HC RX 258

## 2022-08-15 PROCEDURE — 33264 RMVL & RPLCMT DFB GEN MLT LD: CPT

## 2022-08-15 PROCEDURE — 2500000003 HC RX 250 WO HCPCS

## 2022-08-15 PROCEDURE — 84520 ASSAY OF UREA NITROGEN: CPT

## 2022-08-15 PROCEDURE — 85049 AUTOMATED PLATELET COUNT: CPT

## 2022-08-15 PROCEDURE — 82565 ASSAY OF CREATININE: CPT

## 2022-08-15 RX ORDER — SODIUM CHLORIDE 9 MG/ML
INJECTION, SOLUTION INTRAVENOUS PRN
Status: DISCONTINUED | OUTPATIENT
Start: 2022-08-15 | End: 2022-08-16 | Stop reason: HOSPADM

## 2022-08-15 RX ORDER — FENTANYL CITRATE 50 UG/ML
INJECTION, SOLUTION INTRAMUSCULAR; INTRAVENOUS PRN
Status: DISCONTINUED | OUTPATIENT
Start: 2022-08-15 | End: 2022-08-15 | Stop reason: SDUPTHER

## 2022-08-15 RX ORDER — ONDANSETRON 2 MG/ML
4 INJECTION INTRAMUSCULAR; INTRAVENOUS EVERY 6 HOURS PRN
Status: DISCONTINUED | OUTPATIENT
Start: 2022-08-15 | End: 2022-08-16 | Stop reason: HOSPADM

## 2022-08-15 RX ORDER — SODIUM CHLORIDE 0.9 % (FLUSH) 0.9 %
5-40 SYRINGE (ML) INJECTION PRN
Status: DISCONTINUED | OUTPATIENT
Start: 2022-08-15 | End: 2022-08-16 | Stop reason: HOSPADM

## 2022-08-15 RX ORDER — VANCOMYCIN HYDROCHLORIDE 1 G/200ML
1000 INJECTION, SOLUTION INTRAVENOUS ONCE
Status: DISCONTINUED | OUTPATIENT
Start: 2022-08-15 | End: 2022-08-16 | Stop reason: HOSPADM

## 2022-08-15 RX ORDER — SODIUM CHLORIDE 9 MG/ML
INJECTION, SOLUTION INTRAVENOUS CONTINUOUS
Status: DISCONTINUED | OUTPATIENT
Start: 2022-08-15 | End: 2022-08-16 | Stop reason: HOSPADM

## 2022-08-15 RX ORDER — ONDANSETRON 2 MG/ML
INJECTION INTRAMUSCULAR; INTRAVENOUS PRN
Status: DISCONTINUED | OUTPATIENT
Start: 2022-08-15 | End: 2022-08-15 | Stop reason: SDUPTHER

## 2022-08-15 RX ORDER — SODIUM CHLORIDE 0.9 % (FLUSH) 0.9 %
5-40 SYRINGE (ML) INJECTION EVERY 12 HOURS SCHEDULED
Status: DISCONTINUED | OUTPATIENT
Start: 2022-08-15 | End: 2022-08-16 | Stop reason: HOSPADM

## 2022-08-15 RX ORDER — PROPOFOL 10 MG/ML
INJECTION, EMULSION INTRAVENOUS CONTINUOUS PRN
Status: DISCONTINUED | OUTPATIENT
Start: 2022-08-15 | End: 2022-08-15 | Stop reason: SDUPTHER

## 2022-08-15 RX ORDER — SODIUM CHLORIDE, SODIUM LACTATE, POTASSIUM CHLORIDE, CALCIUM CHLORIDE 600; 310; 30; 20 MG/100ML; MG/100ML; MG/100ML; MG/100ML
INJECTION, SOLUTION INTRAVENOUS CONTINUOUS PRN
Status: DISCONTINUED | OUTPATIENT
Start: 2022-08-15 | End: 2022-08-15 | Stop reason: SDUPTHER

## 2022-08-15 RX ORDER — ONDANSETRON 2 MG/ML
4 INJECTION INTRAMUSCULAR; INTRAVENOUS
Status: ACTIVE | OUTPATIENT
Start: 2022-08-15 | End: 2022-08-15

## 2022-08-15 RX ORDER — KETAMINE HYDROCHLORIDE 10 MG/ML
INJECTION, SOLUTION INTRAMUSCULAR; INTRAVENOUS PRN
Status: DISCONTINUED | OUTPATIENT
Start: 2022-08-15 | End: 2022-08-15 | Stop reason: SDUPTHER

## 2022-08-15 RX ORDER — MIDAZOLAM HYDROCHLORIDE 1 MG/ML
INJECTION INTRAMUSCULAR; INTRAVENOUS PRN
Status: DISCONTINUED | OUTPATIENT
Start: 2022-08-15 | End: 2022-08-15 | Stop reason: SDUPTHER

## 2022-08-15 RX ORDER — PROPOFOL 10 MG/ML
INJECTION, EMULSION INTRAVENOUS PRN
Status: DISCONTINUED | OUTPATIENT
Start: 2022-08-15 | End: 2022-08-15 | Stop reason: SDUPTHER

## 2022-08-15 RX ORDER — GLYCOPYRROLATE 0.2 MG/ML
INJECTION INTRAMUSCULAR; INTRAVENOUS PRN
Status: DISCONTINUED | OUTPATIENT
Start: 2022-08-15 | End: 2022-08-15 | Stop reason: SDUPTHER

## 2022-08-15 RX ORDER — PHENYLEPHRINE HCL IN 0.9% NACL 1 MG/10 ML
SYRINGE (ML) INTRAVENOUS PRN
Status: DISCONTINUED | OUTPATIENT
Start: 2022-08-15 | End: 2022-08-15 | Stop reason: SDUPTHER

## 2022-08-15 RX ORDER — ACETAMINOPHEN 325 MG/1
650 TABLET ORAL EVERY 4 HOURS PRN
Status: DISCONTINUED | OUTPATIENT
Start: 2022-08-15 | End: 2022-08-16 | Stop reason: HOSPADM

## 2022-08-15 RX ADMIN — Medication 100 MCG: at 12:14

## 2022-08-15 RX ADMIN — PROPOFOL 20 MG: 10 INJECTION, EMULSION INTRAVENOUS at 11:31

## 2022-08-15 RX ADMIN — FENTANYL CITRATE 25 MCG: 50 INJECTION, SOLUTION INTRAMUSCULAR; INTRAVENOUS at 12:28

## 2022-08-15 RX ADMIN — SODIUM CHLORIDE, SODIUM LACTATE, POTASSIUM CHLORIDE, CALCIUM CHLORIDE: 600; 310; 30; 20 INJECTION, SOLUTION INTRAVENOUS at 11:28

## 2022-08-15 RX ADMIN — Medication 100 MCG: at 12:18

## 2022-08-15 RX ADMIN — Medication 100 MCG: at 12:39

## 2022-08-15 RX ADMIN — SODIUM CHLORIDE: 9 INJECTION, SOLUTION INTRAVENOUS at 09:50

## 2022-08-15 RX ADMIN — PROPOFOL 20 MG: 10 INJECTION, EMULSION INTRAVENOUS at 12:10

## 2022-08-15 RX ADMIN — Medication 100 MCG: at 11:58

## 2022-08-15 RX ADMIN — MIDAZOLAM HYDROCHLORIDE 2 MG: 1 INJECTION INTRAMUSCULAR; INTRAVENOUS at 11:29

## 2022-08-15 RX ADMIN — FENTANYL CITRATE 50 MCG: 50 INJECTION, SOLUTION INTRAMUSCULAR; INTRAVENOUS at 12:45

## 2022-08-15 RX ADMIN — ONDANSETRON 4 MG: 2 INJECTION INTRAMUSCULAR; INTRAVENOUS at 13:47

## 2022-08-15 RX ADMIN — Medication 100 MCG: at 12:11

## 2022-08-15 RX ADMIN — KETAMINE HYDROCHLORIDE 15 MG: 10 INJECTION, SOLUTION INTRAMUSCULAR; INTRAVENOUS at 12:07

## 2022-08-15 RX ADMIN — PROPOFOL 20 MG: 10 INJECTION, EMULSION INTRAVENOUS at 12:20

## 2022-08-15 RX ADMIN — PROPOFOL 50 MCG/KG/MIN: 10 INJECTION, EMULSION INTRAVENOUS at 11:31

## 2022-08-15 RX ADMIN — KETAMINE HYDROCHLORIDE 25 MG: 10 INJECTION, SOLUTION INTRAMUSCULAR; INTRAVENOUS at 11:32

## 2022-08-15 RX ADMIN — Medication 100 MCG: at 12:22

## 2022-08-15 RX ADMIN — Medication 150 MCG: at 13:14

## 2022-08-15 RX ADMIN — Medication 100 MCG: at 11:49

## 2022-08-15 RX ADMIN — Medication 100 MCG: at 11:57

## 2022-08-15 RX ADMIN — Medication 100 MCG: at 12:52

## 2022-08-15 RX ADMIN — GLYCOPYRROLATE 0.2 MG: 0.2 INJECTION INTRAMUSCULAR; INTRAVENOUS at 11:35

## 2022-08-15 RX ADMIN — FENTANYL CITRATE 25 MCG: 50 INJECTION, SOLUTION INTRAMUSCULAR; INTRAVENOUS at 12:30

## 2022-08-15 RX ADMIN — Medication 100 MCG: at 12:25

## 2022-08-15 RX ADMIN — ACETAMINOPHEN 650 MG: 325 TABLET ORAL at 14:30

## 2022-08-15 RX ADMIN — Medication 100 MCG: at 12:01

## 2022-08-15 RX ADMIN — Medication 100 MCG: at 12:56

## 2022-08-15 RX ADMIN — Medication 150 MCG: at 13:03

## 2022-08-15 RX ADMIN — Medication 100 MCG: at 12:05

## 2022-08-15 ASSESSMENT — PAIN DESCRIPTION - ORIENTATION: ORIENTATION: RIGHT

## 2022-08-15 ASSESSMENT — PAIN SCALES - GENERAL
PAINLEVEL_OUTOF10: 7
PAINLEVEL_OUTOF10: 3

## 2022-08-15 ASSESSMENT — PAIN DESCRIPTION - PAIN TYPE: TYPE: CHRONIC PAIN

## 2022-08-15 ASSESSMENT — PAIN DESCRIPTION - FREQUENCY: FREQUENCY: CONTINUOUS

## 2022-08-15 ASSESSMENT — PAIN DESCRIPTION - DESCRIPTORS: DESCRIPTORS: DULL

## 2022-08-15 ASSESSMENT — PAIN DESCRIPTION - ONSET: ONSET: AWAKENED FROM SLEEP

## 2022-08-15 ASSESSMENT — PAIN DESCRIPTION - LOCATION
LOCATION: SHOULDER
LOCATION: HEAD

## 2022-08-15 ASSESSMENT — PAIN - FUNCTIONAL ASSESSMENT: PAIN_FUNCTIONAL_ASSESSMENT: PREVENTS OR INTERFERES SOME ACTIVE ACTIVITIES AND ADLS

## 2022-08-15 NOTE — PROGRESS NOTES
Received post procedure to [facility] to room 7. Assessment obtained. Restrictions reviewed with patient. Post procedure pathway initiated. lT Chest site soft ,dressing dry and intact dry and intact. No hematoma noted. Family at side. Patient without complaints. Family requesting to talk to Dr Sukhwinder Coe . Writer informed them that he would be in soon to talk to them. Daughter requested to have writer email him now that they want to talk to him.  Dr Sukhwinder Coe informed per Gayathri RAMEY

## 2022-08-15 NOTE — PROGRESS NOTES
Patient admitted, consent signed and questions answered. Patient ready for procedure. Call light to reach with side rails up 2 of 2. Family at bedside with patient. History and physical completed. 2% Chlorhexidine cloths used to prep site .

## 2022-08-15 NOTE — H&P
PLEASE SEE UPDATED H/P FROM [facility]    This 75 y.o woman with stable non-ischemic cardiomyopathy and CRT-D in-situ is found to be approaching ICD battery depletion. She comes today for elective ICD revision.

## 2022-08-15 NOTE — ANESTHESIA POSTPROCEDURE EVALUATION
Department of Anesthesiology  Postprocedure Note    Patient: Alvin Pelletier  MRN: [id number]  YOB: 1955  Date of evaluation: 8/15/2022      Procedure Summary     Date: 08/15/22 Room / Location: [facility] Cath Lab    Anesthesia Start: 0016 Anesthesia Stop: 4033    Procedure: CATH LAB WITH ANESTHESIA Diagnosis: ICD (implantable cardioverter-defibrillator) battery depletion    Scheduled Providers:  Responsible Provider: Princess Anna MD    Anesthesia Type: MAC ASA Status: 4          Anesthesia Type: No value filed.     Donovan Phase I:      Donovan Phase II:        Anesthesia Post Evaluation    Patient location during evaluation: PACU  Patient participation: complete - patient participated  Level of consciousness: awake and alert  Pain score: 2  Airway patency: patent  Nausea & Vomiting: no nausea and no vomiting  Complications: no  Cardiovascular status: hemodynamically stable  Respiratory status: acceptable  Hydration status: stable

## 2022-08-15 NOTE — BRIEF OP NOTE
Brief Postoperative Note      Patient: Brian Francis  YOB: 1955  MRN: [id number]    Date of Procedure: 8/15/2022    Pre-Op Diagnosis:  1)  ICD battery depletion  2)  Cardiomyopathy    Post-Op Diagnosis:  Same       PROCEDURE:  1)  Revision of ICD, CRT-D   2)  Intraoperative lead testing    Surgeon:  Dr. Faith Welsh    Assistant:  None    Anesthesia:  Conscious sedation ( per Anesthesia Service)    Estimated Blood Loss (mL):  10 mL    Complications:  None    Specimens:  None    Implants:  Medtronic Cobalt XT HF MRI     FINDINGS: The chronic Medtronic [id number] atrial lead, [id number] RV lead and the 4965-50 lead appeared in excellent condition, with stable impedances and thresholds. PLAN:  Discharge home after 6 pm; f/u one week. Pt will hold Xarelto for the next 5 days.     Electronically signed by Carmelo Bryant MD on 8/15/2022 at 2:03 PM

## 2022-08-15 NOTE — DISCHARGE INSTRUCTIONS
DISCHARGE INSTRUCTIONS FOR ICD/PACEMAKER REPLACEMENTS/DR DINERO        -REMOVE DRESSING THE FOLLOWING DAY AND LEAVE CLEAN, DRY AND OPEN TO AIR     -NO SHOWERING OR BATHS FOR 7 DAYS  Sponge Bath only    -HOLD ASPRIN FOR NEXT  hold xarelto for next 5 days resume Sat Aug 20 th 2022    -NO DRIVING FOR 24 HOURS    -NO HEAVY LIFTING WITH AFFECTED ARM FOR 2 - 3 DAYS     -AVOID ACTIVITIES THAT MAY RESULT IN HIGH IMPACT OR STRESS AT INCISIONAL SITE     -AVOID ANYTHING THAT WILL RUB AGAINST THE INCISION     -WATCH FOR SIGNS OF INFECTION WHICH INCLUDE: REDNESS / [place] / DRAINAGE FROM INCISION / INCREASE SWELLING / TEMPERATURE BY MOUTH 101 OR GREATER     -IF YOU ARE ON BLOOD THINNER CHECK WITH YOUR DOCTOR WHEN TO RESUME MEDICATION     -CARRY DEVICE ID AND HOME MEDICATION LIST WITH YOU AT ALL TIMES. -ANY QUESTIONS OR CONCERNS PLEASE CONTACT YOUR DOCTOR AND/OR SEEK HELP                 SEDATION / ANALGESIA INFORMATION / HOME GOING ADVICE  You have received the sedation/analgesia medication during your visit    Sedation/analgesia is used during short medical procedures under controlled supervision. The medication will produce a strong relaxation. You will be able to hear, speak and follow instructions, but your memory and alertness will be decreased. You will be able to swallow and breathe on your own. During sedation/analgesia your blood pressure, heart and breathing will be watched closely. After the procedure, you may not remember what was said or done. You may have the following effects from the medication. \" Drowsiness, dizziness, sleepiness or confusion. \" Difficulty remembering or delayed reaction times. \" Loss of fine muscle control or difficulty with your balance especially while walking. \" Difficulty focusing or blurred vision. You may not be aware of slight changes in your behavior and/or your reaction time because of the medication used during the procedure.  Therefore you should follow these instructions. \" Have someone responsible help you with your care. \" Do not drive for 24 hours. \" Do not operate equipment for 24 hours (lawnmowers, power tools, kitchen accessories, stove). \" Do not drink any alcoholic beverages for a minimum of 24 hours. \" Do not make important personal, legal or business decisions for 24 hours. \" You may experience dizziness or lightheadedness. Move slowly and carefully, do not make sudden position changes. \" Drink extra amounts of fluids today. \" Increase your diet as tolerated (unless you have received specific instructions from your doctor). \" If you feel nauseated, continue with liquids until the nausea is gone. \" Notify your physician if you have not urinated within 8 hours after the procedure. \" Resume your medications unless otherwise instructed. Learning About ICD Shocks  What are ICDs and ICD shocks? An implantable cardioverter-defibrillator (ICD) is a device placed under your skin and connected to your heart with wires. It is always checking your heart. If it detects a life-threatening rapid heart rhythm, it tries to slow the rhythm to get it back to normal. If the dangerous rhythm does not stop, the ICD sends an electric shock to the heart to restore a normal rhythm. The device then goes back to its watchful mode. The idea of living with an ICD and getting shocked worries some people. The shock can be uncomfortable. It may feel like you are being kicked in the chest. For many people, getting a shock can cause anxiety and depression. It's normal to be worried about living with an ICD. After all, you don't know when a shock might occur, and a shock could be a reminder that your heart is not as healthy as it could be. But an ICD is an important part of your treatment. It can save your life. If you take a few simple steps, you can feel better about having an ICD. How can you get over your fears about the ICD?   Know your ICD treatment  Learn how the ICD works, what it does, and how it keeps you safe. This can help reduce any anxiety you may feel. Keep your regular doctor appointments. Your doctor:  Sets both the rate at which a shock will occur and the level of shock needed to restore your heart to a normal rate. Checks to see whether the ICD has given you any shocks since your last visit. This helps your doctor know if your medicines need to be adjusted. Checks the ICD battery and replaces it as needed. Talk with others who have an ICD. Ask them if they have been shocked and what it was like. Ask them how they cope with it. Talking with others can help you feel better. Always carry your ICD identity card, a list of all the medicines you are taking, and your doctor's name and phone number. This will help you get the best possible treatment if you get a shock and need help. Make an action plan  Talk to your doctor about making an action plan for what to do if you get shocked. Here is an example:  After one shock:  Call 911 or other emergency services right away if you feel bad or have symptoms like chest pain. Call your doctor soon if you feel fine right away after the shock. Your doctor may want to talk about the shock and schedule a follow-up visit. If you get a second shock in a 24-hour period, call your doctor right away. Call even if you feel fine right away. Stay calm after a shock  Follow the action plan you made with your doctor. Do some breathing exercises. They may help you relax. Sit or lie in a comfortable position. Put one hand on your belly just below your ribs and the other hand on your chest.  Take a deep breath in through your nose, and let your belly push your hand out. Your chest should not move. Breathe out through pursed lips as if you were whistling. Feel the hand on your belly go in, and use it to push all the air out. Breathe in and out like this until you feel more relaxed. Keep a good attitude.  When you've had a shock, you may question how healthy you are or worry about getting another shock. But try to focus on the positive things in your life, like loving relationships, pleasant activities, or good friends. Don't make changes in what you do. You may want to avoid an action because you think it caused the shock. But a shock can occur at any time, and you can't prevent shocks by your actions alone. Don't stop doing things you enjoy to try to avoid a shock. Follow-up care is a key part of your treatment and safety. Be sure to make and go to all appointments, and call your doctor if you are having problems. It's also a good idea to know your test results and keep a list of the medicines you take. Where can you learn more? Go to https://[employer].Expedit.us. org and sign in to your [employer] account. Enter A268 in the Fontself box to learn more about \"Learning About ICD Shocks. \"     If you do not have an account, please click on the \"Sign Up Now\" link. Current as of: January 27, 2016  Content Version: 11.2  © 8846-3788 [employer]. Care instructions adapted under license by [facility] ([facility]). If you have questions about a medical condition or this instruction, always ask your healthcare professional. [address] any warranty or liability for your use of this information.

## 2022-08-15 NOTE — ANESTHESIA PRE PROCEDURE
Department of Anesthesiology  Preprocedure Note       Name:  Inga Levy   Age:  75 y.o.  :  1955                                          MRN:  [id number]         Date:  8/15/2022      Surgeon: * No surgeons listed *    Procedure: * No procedures listed *    Medications prior to admission:   Prior to Admission medications    Medication Sig Start Date End Date Taking? Authorizing Provider   acetaminophen (TYLENOL) 500 MG tablet Take 1 tablet by mouth every 6 hours as needed for Pain 22   Juanita Masterson MD   metoprolol succinate (TOPROL XL) 50 MG extended release tablet Take 50 mg by mouth daily    Historical Provider, MD   psyllium (KONSYL) 28.3 % PACK Take 1 packet by mouth daily    Historical Provider, MD   amiodarone (CORDARONE) 200 MG tablet  21   Historical Provider, MD   diclofenac sodium (VOLTAREN) 1 % GEL APPLY 2 GRAMS TOPICALLY 4 (FOUR) TIMES A DAY. Patient not taking: No sig reported 21   Historical Provider, MD   empagliflozin (JARDIANCE) 10 MG tablet Take 10 mg by mouth daily 22   Historical Provider, MD   potassium chloride (KLOR-CON M) 20 MEQ extended release tablet Take 1 tablet by mouth daily 21   Fernando Huang MD   magnesium oxide (MAG-OX) 400 MG tablet Take 0.5 tablets by mouth daily 21   Lizz Blanco MD   nitroGLYCERIN (NITROSTAT) 0.4 MG SL tablet up to max of 3 total doses.  If no relief after 1 dose, call 911. 21   Candida Stone MD   rivaroxaban [place] Henri) 20 MG TABS tablet Take 1 tablet by mouth daily 21   Candida Stone MD   sacubitril-valsartan (ENTRESTO) 24-26 MG per tablet Take 0.5 tablets by mouth 2 times daily 21   Candida Stone MD   bumetanide (BUMEX) 2 MG tablet Take 1 tablet by mouth daily 21   Candida Stone MD   levothyroxine (SYNTHROID) 25 MCG tablet Take 1 tablet by mouth Daily 21   Candida Stone MD   lidocaine (LIDODERM) 5 % Place one patch to both knees 12 hours on, 12 hours off. 20   Zahra Mesha Leticia Patch, DO   allopurinol (ZYLOPRIM) 100 MG tablet Take 1 tablet by mouth daily 2/18/19   Michelle Lujan MD   spironolactone (ALDACTONE) 25 MG tablet Take 0.5 tablets by mouth daily 2/18/19   Michelle Lujan MD       Current medications:    Current Outpatient Medications   Medication Sig Dispense Refill    acetaminophen (TYLENOL) 500 MG tablet Take 1 tablet by mouth every 6 hours as needed for Pain 60 tablet 0    metoprolol succinate (TOPROL XL) 50 MG extended release tablet Take 50 mg by mouth daily      psyllium (KONSYL) 28.3 % PACK Take 1 packet by mouth daily      amiodarone (CORDARONE) 200 MG tablet       diclofenac sodium (VOLTAREN) 1 % GEL APPLY 2 GRAMS TOPICALLY 4 (FOUR) TIMES A DAY. (Patient not taking: No sig reported)      empagliflozin (JARDIANCE) 10 MG tablet Take 10 mg by mouth daily      potassium chloride (KLOR-CON M) 20 MEQ extended release tablet Take 1 tablet by mouth daily 30 tablet 0    magnesium oxide (MAG-OX) 400 MG tablet Take 0.5 tablets by mouth daily 30 tablet 1    nitroGLYCERIN (NITROSTAT) 0.4 MG SL tablet up to max of 3 total doses. If no relief after 1 dose, call 911. 25 tablet 3    rivaroxaban (XARELTO) 20 MG TABS tablet Take 1 tablet by mouth daily 30 tablet 11    sacubitril-valsartan (ENTRESTO) 24-26 MG per tablet Take 0.5 tablets by mouth 2 times daily 60 tablet 11    bumetanide (BUMEX) 2 MG tablet Take 1 tablet by mouth daily 30 tablet 3    levothyroxine (SYNTHROID) 25 MCG tablet Take 1 tablet by mouth Daily 30 tablet 3    lidocaine (LIDODERM) 5 % Place one patch to both knees 12 hours on, 12 hours off.  30 patch 0    allopurinol (ZYLOPRIM) 100 MG tablet Take 1 tablet by mouth daily 30 tablet 3    spironolactone (ALDACTONE) 25 MG tablet Take 0.5 tablets by mouth daily 30 tablet 3     Current Facility-Administered Medications   Medication Dose Route Frequency Provider Last Rate Last Admin    0.9 % sodium chloride infusion   IntraVENous Continuous [facility] Aubrey Laughlin MD 75 mL/hr at 08/15/22 0950 New Bag at 08/15/22 0950    vancomycin (VANCOCIN) 1000 mg in dextrose 5% 200 mL IVPB  1,000 mg IntraVENous Once Kang Antonio MD        sodium chloride flush 0.9 % injection 5-40 mL  5-40 mL IntraVENous 2 times per day Elsy Prabhakar MD        sodium chloride flush 0.9 % injection 5-40 mL  5-40 mL IntraVENous PRN Neal Marie MD        0.9 % sodium chloride infusion   IntraVENous PRN Neal Marie MD        ondansetron [facility]) injection 4 mg  4 mg IntraVENous Once PRN Neal Marie MD        sodium chloride flush 0.9 % injection 5-40 mL  5-40 mL IntraVENous 2 times per day Kang Antonio MD        sodium chloride flush 0.9 % injection 5-40 mL  5-40 mL IntraVENous PRN Kang Antonio MD        0.9 % sodium chloride infusion   IntraVENous PRN Kang Antonio MD        acetaminophen (TYLENOL) tablet 650 mg  650 mg Oral Q4H PRN Kang Antonio MD   650 mg at 08/15/22 1430    ondansetron (ZOFRAN) injection 4 mg  4 mg IntraVENous Q6H PRN Kang Antonio MD           Allergies:     Allergies   Allergen Reactions    Codeine      Hallucinations    Morphine      Hallucinations         Problem List:    Patient Active Problem List   Diagnosis Code    [facility] I42.0    Pre-diabetes R73.03    Essential hypertension I10    PAH (pulmonary artery hypertension) ([facility] 34 on Echo  I27.21    Mitral regurgitation I34.0    Chronic headache R51.9, G89.29    Morbid obesity due to excess calories ([facility]) E66.01    AICD (automatic cardioverter/defibrillator) present Z95.810    Syncope and collapse secondary to VTAC R55    MAURO (acute kidney injury) ([facility] N17.9    ICD (implantable cardioverter-defibrillator) battery depletion Z45.02    Acute on chronic systolic CHF (congestive heart failure) ([facility]) I50.23    Gout M10.9    Dizziness R42    Benign paroxysmal positional vertigo H81.10    Chronic heart failure with reduced ejection fraction and diastolic dysfunction (HCC) I50.42    Hypotension (arterial) I95.9    NSVT (nonsustained ventricular tachycardia) (HCC) I47.2    Chest pain R07.9    Hypothyroidism E03.9    Chronic combined systolic (congestive) and diastolic (congestive) heart failure (HCC) I50.42    Acute diverticulitis K57.92    Abscess of sigmoid colon K63.0    Diverticulitis K57.92    Nausea & vomiting R11.2    Diverticulitis of large intestine with abscess without bleeding K57.20    Diverticulitis of colon K57.32    Lower abdominal pain R10.30       Past Medical History:        Diagnosis Date    Abnormal Pap smear     Acute on chronic systolic congestive heart failure (HCC)     AICD (automatic cardioverter/defibrillator) present     MAURO (acute kidney injury) ([facility] 03/04/2017    Anemia     Arthritis     Cardiomyopathy ([facility] 12/20/2012    robotic replacement of 2 left ventricular leads/replacement of ICD generator    Cardiomyopathy, nonischemic ([facility] 12/20/2012    Chronic combined systolic and diastolic congestive heart failure (HCC)     Ejection fraction < 50%     HTN (hypertension)     Hypothyroidism     Ischemic cardiomyopathy     Left bundle branch block     Mitral regurgitation     Morbid obesity due to excess calories ([facility] 01/15/2017    Pulmonary hypertension ([facility]     Sudden onset of severe headache     Torn rotator cuff     RIGHT SHOULDER       Past Surgical History:        Procedure Laterality Date    COLONOSCOPY      HERNIA REPAIR      INSERT MIDLINE CATHETER  04/12/2022         OTHER SURGICAL HISTORY  12/20/2012    robotic placement of 2 left ventricular leads/replacement of ICD generator    OTHER SURGICAL HISTORY Left     ICD REPLACEMENT    PACEMAKER PLACEMENT      medtronic Viva XT CRT -D [id number] Serial : [id number]    TUBAL LIGATION         Social History:    Social History     Tobacco Use    Smoking status: Never    Smokeless tobacco: Never   Substance Use Topics    Alcohol use:  No Alcohol/week: 0.0 standard drinks                                Counseling given: Not Answered      Vital Signs (Current):   Vitals:    08/15/22 1400 08/15/22 1415 08/15/22 1418 08/15/22 1431   BP: 92/62 (!) 77/51 100/76 95/69   Pulse: 81 72 70 70   Resp: 17 19 17 17   Temp:       TempSrc:       SpO2:       Weight:       Height:                                                  BP Readings from Last 3 Encounters:   08/15/22 95/69   08/12/22 118/80   08/05/22 105/71       NPO Status: Time of last liquid consumption: 2200                        Time of last solid consumption: 2100                                                      BMI:   Wt Readings from Last 3 Encounters:   08/15/22 205 lb (93 kg)   08/12/22 206 lb (93.4 kg)   08/05/22 211 lb (95.7 kg)     Body mass index is 33.09 kg/m².     CBC:   Lab Results   Component Value Date/Time    WBC 7.1 04/13/2022 04:34 AM    RBC 3.62 04/13/2022 04:34 AM    RBC 4.45 04/03/2012 05:31 PM    HGB 11.3 04/13/2022 04:34 AM    HCT 31.1 04/13/2022 04:34 AM    MCV 85.9 04/13/2022 04:34 AM    RDW 13.7 04/13/2022 04:34 AM     08/15/2022 09:19 AM     04/03/2012 05:31 PM       CMP:   Lab Results   Component Value Date/Time     07/19/2022 09:39 AM    K 3.7 07/19/2022 09:39 AM     07/19/2022 09:39 AM    CO2 21 07/19/2022 09:39 AM    BUN 25 07/19/2022 09:39 AM    CREATININE 1.41 08/15/2022 09:16 AM    CREATININE 1.29 07/19/2022 09:39 AM    GFRAA 50 07/19/2022 09:39 AM    LABGLOM 37 08/15/2022 09:16 AM    GLUCOSE 112 07/19/2022 09:39 AM    GLUCOSE 100 04/03/2012 05:31 PM    PROT 7.9 04/09/2022 09:35 AM    CALCIUM 9.4 07/19/2022 09:39 AM    BILITOT 1.09 04/09/2022 09:35 AM    ALKPHOS 260 04/09/2022 09:35 AM    AST 37 07/19/2022 09:39 AM    ALT 26 07/19/2022 09:39 AM       POC Tests:   Recent Labs     08/15/22  0916   POCGLU 104*   POCNA 142   POCK 3.8   POCCL 108*   POCBUN 24   POCHEMO 15.1   POCHCT 44       Coags:   Lab Results   Component Value Date/Time

## 2022-08-16 ENCOUNTER — APPOINTMENT (OUTPATIENT)
Dept: CARDIAC REHAB | Age: 67
End: 2022-08-16
Payer: MEDICARE

## 2022-08-16 LAB
EKG ATRIAL RATE: 146 BPM
EKG Q-T INTERVAL: 130 MS
EKG QRS DURATION: 134 MS
EKG QTC CALCULATION (BAZETT): 202 MS
EKG R AXIS: 73 DEGREES
EKG T AXIS: 0 DEGREES
EKG VENTRICULAR RATE: 146 BPM

## 2022-08-18 ENCOUNTER — APPOINTMENT (OUTPATIENT)
Dept: CARDIAC REHAB | Age: 67
End: 2022-08-18
Payer: MEDICARE

## 2022-08-18 LAB
EKG ATRIAL RATE: 81 BPM
EKG P AXIS: 38 DEGREES
EKG P-R INTERVAL: 130 MS
EKG Q-T INTERVAL: 520 MS
EKG QRS DURATION: 198 MS
EKG QTC CALCULATION (BAZETT): 604 MS
EKG R AXIS: -165 DEGREES
EKG T AXIS: 73 DEGREES
EKG VENTRICULAR RATE: 81 BPM

## 2022-08-19 ENCOUNTER — TELEPHONE (OUTPATIENT)
Dept: INTERNAL MEDICINE | Age: 67
End: 2022-08-19

## 2022-08-19 NOTE — TELEPHONE ENCOUNTER
Phone call from Alicia Fontanez at [facility] asking for a verbal order for home care with aid service. They received a referral from Cardiology for [address] [place] [address] Raffi Marcos Mcnairlisa after her pacemaker placement. They called the Cardiology office and they were told that they would not give the order for aid service that it needed to go through the patient's PCP.

## 2022-08-22 NOTE — TELEPHONE ENCOUNTER
PC to [facility] [place]--- verbal given that we will follow for home care

## 2022-08-23 ENCOUNTER — APPOINTMENT (OUTPATIENT)
Dept: CARDIAC REHAB | Age: 67
End: 2022-08-23
Payer: MEDICARE

## 2022-08-25 ENCOUNTER — APPOINTMENT (OUTPATIENT)
Dept: CARDIAC REHAB | Age: 67
End: 2022-08-25
Payer: MEDICARE

## 2022-08-26 ENCOUNTER — OFFICE VISIT (OUTPATIENT)
Dept: GASTROENTEROLOGY | Age: 67
End: 2022-08-26
Payer: MEDICARE

## 2022-08-26 ENCOUNTER — TELEPHONE (OUTPATIENT)
Dept: INTERNAL MEDICINE | Age: 67
End: 2022-08-26

## 2022-08-26 VITALS
WEIGHT: 208 LBS | SYSTOLIC BLOOD PRESSURE: 104 MMHG | HEART RATE: 71 BPM | DIASTOLIC BLOOD PRESSURE: 78 MMHG | HEIGHT: 66 IN | OXYGEN SATURATION: 100 % | BODY MASS INDEX: 33.43 KG/M2

## 2022-08-26 DIAGNOSIS — K57.92 DIVERTICULITIS: Primary | ICD-10-CM

## 2022-08-26 PROCEDURE — 3017F COLORECTAL CA SCREEN DOC REV: CPT | Performed by: INTERNAL MEDICINE

## 2022-08-26 PROCEDURE — G8427 DOCREV CUR MEDS BY ELIG CLIN: HCPCS | Performed by: INTERNAL MEDICINE

## 2022-08-26 PROCEDURE — 99213 OFFICE O/P EST LOW 20 MIN: CPT | Performed by: INTERNAL MEDICINE

## 2022-08-26 PROCEDURE — G8417 CALC BMI ABV UP PARAM F/U: HCPCS | Performed by: INTERNAL MEDICINE

## 2022-08-26 PROCEDURE — G8399 PT W/DXA RESULTS DOCUMENT: HCPCS | Performed by: INTERNAL MEDICINE

## 2022-08-26 PROCEDURE — 1123F ACP DISCUSS/DSCN MKR DOCD: CPT | Performed by: INTERNAL MEDICINE

## 2022-08-26 PROCEDURE — 1036F TOBACCO NON-USER: CPT | Performed by: INTERNAL MEDICINE

## 2022-08-26 PROCEDURE — 1090F PRES/ABSN URINE INCON ASSESS: CPT | Performed by: INTERNAL MEDICINE

## 2022-08-26 NOTE — PROGRESS NOTES
GI FOLLOW UP    INTERVAL HISTORY:     Prior history of diverticulitis    Chief Complaint   Patient presents with    Diverticulitis       1. Diverticulitis          HISTORY OF PRESENT ILLNESS: Ms.Gwendolyn Franki Burger is a 75 y.o. female with a past history remarkable for CHF status post AICD placement of low ejection fraction, cardiomyopathy, chronic combined systolic and diastolic heart failure, left bundle branch block, mitral regurgitation, morbid obesity, recurrent left-sided sigmoid diverticulitis with pericolonic abscess, recent hospitalization for complex diverticular disease, status post antibiotic therapy with extended intravenous course, review CT scan revealed resolving sigmoid abscess, patient reports abnormal bowel movements, no significant abdominal pain, referred for evaluation of her diverticular disease. Smoker: Denies  Drinking history: Denies  Abdominal surgeries: None recent  Prior Colonoscopy: 2 years ago  Prior EGD: None recent  FH of GI issues: None reported       Past Medical,Family, and Social History reviewed and does contribute to the patient presenting condition. Patient's PMH/PSH,SH,PSYCH Hx, MEDs, ALLERGIES, and ROS were all reviewed and updated in the appropriate sections.     PAST MEDICAL HISTORY:  Past Medical History:   Diagnosis Date    Abnormal Pap smear     Acute on chronic systolic congestive heart failure (HCC)     AICD (automatic cardioverter/defibrillator) present     MAURO (acute kidney injury) ([facility] [place] [address] 03/04/2017    Anemia     Arthritis     Cardiomyopathy ([address] 12/20/2012    robotic replacement of 2 left ventricular leads/replacement of ICD generator    Cardiomyopathy, nonischemic ([facility] [place] [address] 12/20/2012    Chronic combined systolic and diastolic congestive heart failure (HCC)     Ejection fraction < 50%     HTN (hypertension)     Hypothyroidism     Ischemic cardiomyopathy     Left bundle branch block     Mitral regurgitation     Morbid obesity due to excess calories ([facility] [address] 01/15/2017    Pulmonary hypertension ([facility] [address]     Sudden onset of severe headache     Torn rotator cuff     RIGHT SHOULDER       Past Surgical History:   Procedure Laterality Date    COLONOSCOPY      HERNIA REPAIR      INSERT MIDLINE CATHETER  04/12/2022         OTHER SURGICAL HISTORY  12/20/2012    robotic placement of 2 left ventricular leads/replacement of ICD generator    OTHER SURGICAL HISTORY Left     ICD REPLACEMENT    PACEMAKER PLACEMENT      medtronic Viva XT CRT -D [id number] Serial : [id number]    TUBAL LIGATION         CURRENT MEDICATIONS:    Current Outpatient Medications:     acetaminophen (TYLENOL) 500 MG tablet, Take 1 tablet by mouth every 6 hours as needed for Pain, Disp: 60 tablet, Rfl: 0    metoprolol succinate (TOPROL XL) 50 MG extended release tablet, Take 50 mg by mouth daily, Disp: , Rfl:     psyllium (KONSYL) 28.3 % PACK, Take 1 packet by mouth daily, Disp: , Rfl:     amiodarone (CORDARONE) 200 MG tablet, , Disp: , Rfl:     empagliflozin (JARDIANCE) 10 MG tablet, Take 10 mg by mouth daily, Disp: , Rfl:     potassium chloride (KLOR-CON M) 20 MEQ extended release tablet, Take 1 tablet by mouth daily, Disp: 30 tablet, Rfl: 0    magnesium oxide (MAG-OX) 400 MG tablet, Take 0.5 tablets by mouth daily, Disp: 30 tablet, Rfl: 1    nitroGLYCERIN (NITROSTAT) 0.4 MG SL tablet, up to max of 3 total doses.  If no relief after 1 dose, call 911., Disp: 25 tablet, Rfl: 3    rivaroxaban (XARELTO) 20 MG TABS tablet, Take 1 tablet by mouth daily, Disp: 30 tablet, Rfl: 11    sacubitril-valsartan (ENTRESTO) 24-26 MG per tablet, Take 0.5 tablets by mouth 2 times daily, Disp: 60 tablet, Rfl: 11    bumetanide (BUMEX) 2 MG tablet, Take 1 tablet by mouth daily, Disp: 30 tablet, Rfl: 3    levothyroxine (SYNTHROID) 25 MCG tablet, Take 1 tablet by mouth Daily, Disp: 30 tablet, Rfl: 3    lidocaine (LIDODERM) 5 %, Place one patch to both knees 12 hours on, 12 hours off., Disp: 30 patch, Rfl: 0    allopurinol (ZYLOPRIM) 100 MG tablet, Take 1 tablet by mouth daily, Disp: 30 tablet, Rfl: 3    spironolactone (ALDACTONE) 25 MG tablet, Take 0.5 tablets by mouth daily, Disp: 30 tablet, Rfl: 3    diclofenac sodium (VOLTAREN) 1 % GEL, APPLY 2 GRAMS TOPICALLY 4 (FOUR) TIMES A DAY.  (Patient not taking: No sig reported), Disp: , Rfl:     ALLERGIES:   Allergies   Allergen Reactions    Codeine      Hallucinations    Morphine      Hallucinations         FAMILY HISTORY:       Problem Relation Age of Onset    Cancer Other         ovarian    High Blood Pressure Mother     Other Mother         copd    Arthritis Neg Hx     Asthma Neg Hx     Birth Defects Neg Hx     Depression Neg Hx     Diabetes Neg Hx     Early Death Neg Hx     Hearing Loss Neg Hx     Heart Disease Neg Hx     High Cholesterol Neg Hx     Kidney Disease Neg Hx     Learning Disabilities Neg Hx     Mental Retardation Neg Hx     Mental Illness Neg Hx     Miscarriages / Stillbirths Neg Hx     Stroke Neg Hx     Substance Abuse Neg Hx     Vision Loss Neg Hx     Breast Cancer Neg Hx     Colon Cancer Neg Hx     Eclampsia Neg Hx     Hypertension Neg Hx     Ovarian Cancer Neg Hx      Labor Neg Hx     Spont Abortions Neg Hx          SOCIAL HISTORY:   Social History     Socioeconomic History    Marital status:      Spouse name: Not on file    Number of children: 2    Years of education: Not on file    Highest education level: Not on file   Occupational History    Not on file   Tobacco Use    Smoking status: Never    Smokeless tobacco: Never   Vaping Use    Vaping Use: Never used   Substance and Sexual Activity    Alcohol use: No     Alcohol/week: 0.0 standard drinks    Drug use: Not Currently    Sexual activity: Never   Other Topics Concern    Not on file   Social History Narrative    Not on file     Social Determinants of Health     Financial Resource Strain: Low Risk Difficulty of Paying Living Expenses: Not hard at all   Food Insecurity: No Food Insecurity    Worried About Running Out of Food in the Last Year: Never true    Ran Out of Food in the Last Year: Never true   Transportation Needs: Not on file   Physical Activity: Not on file   Stress: Not on file   Social Connections: Not on file   Intimate Partner Violence: Not on file   Housing Stability: Not on file       REVIEW OF SYSTEMS: A 12-point review of systems was obtained and pertinent positives and negatives were listed below. REVIEW OF SYSTEMS:     Constitutional: No fever, no chills, no lethargy, no weakness. HEENT:  No headache, otalgia, itchy eyes, nasal discharge or sore throat. Cardiac:  No chest pain, dyspnea, orthopnea or PND. Chest:   No cough, phlegm or wheezing. Abdomen:      Detailed by MA   Neuro:  No focal weakness, abnormal movements or seizure like activity. Skin:   No rashes, no itching. :   No hematuria, no pyuria, no dysuria, no flank pain. Extremities:  No swelling or joint pains. ROS was otherwise negative    Review of Systems    PHYSICAL EXAMINATION: Vital signs reviewed per the nursing documentation. /78 (Site: Left Upper Arm, Position: Sitting, Cuff Size: Medium Adult)   Pulse 71   Ht 5' 6\" (1.676 m)   Wt 208 lb (94.3 kg)   SpO2 100%   BMI 33.57 kg/m²   Body mass index is 33.57 kg/m². Physical Exam    Physical Exam   Constitutional: Patient is oriented to person, place, and time. Patient appears well-developed and well-nourished. HENT:   Head: Normocephalic and atraumatic. Eyes: Pupils are equal, round, and reactive to light. EOM are normal.   Neck: Normal range of motion. Neck supple. No JVD present. No tracheal deviation present. No thyromegaly present. Cardiovascular: Normal rate, regular rhythm, normal heart sounds and intact distal pulses. Pulmonary/Chest: Effort normal and breath sounds normal. No stridor. No respiratory distress. He has no wheezes.  He has no rales. He exhibits no tenderness. Abdominal: Soft. Bowel sounds are normal. He exhibits no distension and no mass. There is no tenderness. There is no rebound and no guarding. No hernia. Musculoskeletal: Normal range of motion. Lymphadenopathy:    Patient has no cervical adenopathy. Neurological: Patient is alert and oriented to person, place, and time. Psychiatric: Patient has a normal mood and affect. Patient behavior is normal.       LABORATORY DATA: Reviewed  Lab Results   Component Value Date    WBC 7.1 04/13/2022    HGB 11.3 (L) 04/13/2022    HCT 31.1 (L) 04/13/2022    MCV 85.9 04/13/2022     08/15/2022     07/19/2022    K 3.7 07/19/2022     07/19/2022    CO2 21 07/19/2022    BUN 25 (H) 07/19/2022    CREATININE 1.41 (H) 08/15/2022    LABALBU 3.8 04/09/2022    BILITOT 1.09 04/09/2022    ALKPHOS 260 (H) 04/09/2022    AST 37 (H) 07/19/2022    ALT 26 07/19/2022    INR 1.2 04/11/2022         Lab Results   Component Value Date    RBC 3.62 (L) 04/13/2022    HGB 11.3 (L) 04/13/2022    MCV 85.9 04/13/2022    MCH 31.2 04/13/2022    MCHC 36.3 (H) 04/13/2022    RDW 13.7 04/13/2022    MPV 11.1 04/13/2022    BASOPCT 1 04/13/2022    LYMPHSABS 0.98 (L) 04/13/2022    MONOSABS 0.82 04/13/2022    NEUTROABS 5.13 04/13/2022    EOSABS 0.11 04/13/2022    BASOSABS 0.04 04/13/2022         DIAGNOSTIC TESTING:     DEXA BONE DENSITY AXIAL SKELETON    Result Date: 8/11/2022  EXAMINATION: BONE DENSITOMETRY 8/11/2022 7:23 am TECHNIQUE: A bone density dual x-ray absorptiometry (DXA) scan was performed of the lumbar spine and left hip on a [employer] system. COMPARISON: None.  HISTORY: ORDERING SYSTEM PROVIDED HISTORY: Postmenopause Gender: F Age: 76 y/o FINDINGS: LUMBAR SPINE: L1-L4 BMD: 1.116 g/cm2 T-score: -0.6 Z-score: -0.6 LEFT TOTAL HIP: BMD: 0.844 g/cm2 T-score: -1.3 Z-score: -1.6 LEFT FEMORAL NECK: BMD: 0.822 g/cm2 T-score: -1.6 Z-score: -1.5 FRAX: 10-year fracture risk is performed using the [employer] FRAX calculator based on patient-reported risk factors. Major osteoporotic fracture: 4.1% Hip fracture: 0.5% Other situations known to alter the reliability of the FRAX score should be considered when making treatment decisions, including chronic glucocorticoid use and past treatments. Further guidance on treatment can be found at the [facility]'s website bonesource.org.     Osteopenia by WHO criteria. RECOMMENDATIONS: 1. All patients should optimize their calcium and vitamin D intake. 2. Consider FDA-approved medical therapies in postmenopausal women and men aged 48 years and older, based on the following: - A hip or vertebral (clinical or morphometric) fracture - T-score less than or equal to -2.5 at the femoral neck or spine after appropriate evaluation to exclude secondary causes - Low bone density (T-score between -1.0 and -2.5 at the femoral neck or spine) and a 10-year probability of a hip fracture greater than or equal to 3% or a 10-year probability of a major osteoporosis-related fracture greater than or equal to 20% based on FRAX calculation. - Clinician judgment and/or patient preferences may indicate treatment for people with 10-year fracture probabilities above or below these levels - Further guidance on treatment can be found at the National [employer]opor[employer] Foundation's website [phone]. 3. Patients with diagnosis of osteoporosis or at high risk for fracture should have regular bone mineral density tests. For patients eligible for Medicare, routine testing is allowed once every 2 years. The testing frequency can be increased to one year for patients who have rapidly progressing disease, those who are receiving or discontinuing medical therapy to restore bone mass or have additional risk factors.  Template code:  [id number]_[id number]              IMPRESSION: Ms.Gwendolyn Jacob Moscoso is a 75 y.o. female with a past history remarkable for CHF status post AICD by contextual diversion.

## 2022-08-30 ENCOUNTER — APPOINTMENT (OUTPATIENT)
Dept: CARDIAC REHAB | Age: 67
End: 2022-08-30
Payer: MEDICARE

## 2022-09-02 ENCOUNTER — TELEPHONE (OUTPATIENT)
Dept: GASTROENTEROLOGY | Age: 67
End: 2022-09-02

## 2022-09-02 NOTE — TELEPHONE ENCOUNTER
Patient called c/o pain after visit last week. States pain is similar to last diverticulitis flare up. She is scheduled for CT of abdomen 9/13/22. States she is following diet and requesting recommendation on how to treat and manage.   Please advise

## 2022-09-07 NOTE — PROGRESS NOTES
Pt called to reschedule appointment for tomorrow due to colonoscopy. Payment Option: Option 1: Bill Medicare, await for decision on payment.

## 2022-09-08 RX ORDER — CIPROFLOXACIN 500 MG/1
500 TABLET, FILM COATED ORAL 2 TIMES DAILY
Qty: 20 TABLET | Refills: 0 | Status: ON HOLD | OUTPATIENT
Start: 2022-09-08 | End: 2022-09-18 | Stop reason: HOSPADM

## 2022-09-08 RX ORDER — METRONIDAZOLE 500 MG/1
500 TABLET ORAL 3 TIMES DAILY
Qty: 30 TABLET | Refills: 0 | Status: ON HOLD | OUTPATIENT
Start: 2022-09-08 | End: 2022-09-18 | Stop reason: HOSPADM

## 2022-09-12 ENCOUNTER — HOSPITAL ENCOUNTER (OUTPATIENT)
Age: 67
Discharge: HOME | DRG: 392 | End: 2022-09-12
Payer: MEDICARE

## 2022-09-12 ENCOUNTER — HOSPITAL ENCOUNTER (OUTPATIENT)
Dept: CT IMAGING | Age: 67
Discharge: HOME | DRG: 392 | End: 2022-09-14
Payer: MEDICARE

## 2022-09-12 DIAGNOSIS — I10 ESSENTIAL HYPERTENSION: ICD-10-CM

## 2022-09-12 DIAGNOSIS — K57.92 DIVERTICULITIS: ICD-10-CM

## 2022-09-12 DIAGNOSIS — E03.9 HYPOTHYROIDISM, UNSPECIFIED TYPE: ICD-10-CM

## 2022-09-12 LAB
ANION GAP SERPL CALCULATED.3IONS-SCNC: 17 MMOL/L (ref 9–17)
BUN BLDV-MCNC: 18 MG/DL (ref 8–23)
CALCIUM SERPL-MCNC: 9.8 MG/DL (ref 8.6–10.4)
CHLORIDE BLD-SCNC: 103 MMOL/L (ref 98–107)
CO2: 20 MMOL/L (ref 20–31)
CREAT SERPL-MCNC: 1.16 MG/DL (ref 0.5–0.9)
GFR AFRICAN AMERICAN: 56 ML/MIN
GFR NON-AFRICAN AMERICAN: 46 ML/MIN
GFR SERPL CREATININE-BSD FRML MDRD: ABNORMAL ML/MIN/{1.73_M2}
GLUCOSE BLD-MCNC: 105 MG/DL (ref 70–99)
POTASSIUM SERPL-SCNC: 4.6 MMOL/L (ref 3.7–5.3)
SODIUM BLD-SCNC: 140 MMOL/L (ref 135–144)
THYROXINE, FREE: 0.85 NG/DL (ref 0.93–1.7)
TSH SERPL DL<=0.05 MIU/L-ACNC: 21.2 UIU/ML (ref 0.3–5)

## 2022-09-12 PROCEDURE — 74176 CT ABD & PELVIS W/O CONTRAST: CPT

## 2022-09-12 PROCEDURE — 80048 BASIC METABOLIC PNL TOTAL CA: CPT

## 2022-09-12 PROCEDURE — 84443 ASSAY THYROID STIM HORMONE: CPT

## 2022-09-12 PROCEDURE — 36415 COLL VENOUS BLD VENIPUNCTURE: CPT

## 2022-09-12 PROCEDURE — 84439 ASSAY OF FREE THYROXINE: CPT

## 2022-09-13 DIAGNOSIS — E03.9 HYPOTHYROIDISM, UNSPECIFIED TYPE: Primary | ICD-10-CM

## 2022-09-13 RX ORDER — LEVOTHYROXINE SODIUM 0.03 MG/1
75 TABLET ORAL DAILY
Qty: 90 TABLET | Refills: 2 | Status: ON HOLD | OUTPATIENT
Start: 2022-09-13 | End: 2022-10-08 | Stop reason: HOSPADM

## 2022-09-13 NOTE — PROGRESS NOTES
In response to high TSH and low free T4 will increase levothyroxine to 75. Medication was in chart incorrectly. Patient was taking 50 mcg daily. I called patient and left message for her that she should take 75 daily. Will order repeat tsh in one month. Please attempt to reach her and inform her of the medication change/repeat lab draw and confirm her understanding.

## 2022-09-14 ENCOUNTER — HOSPITAL ENCOUNTER (INPATIENT)
Age: 67
LOS: 4 days | Discharge: HOME HEALTH | DRG: 392 | End: 2022-09-19
Attending: EMERGENCY MEDICINE | Admitting: FAMILY MEDICINE
Payer: MEDICARE

## 2022-09-14 ENCOUNTER — APPOINTMENT (OUTPATIENT)
Dept: GENERAL RADIOLOGY | Age: 67
DRG: 392 | End: 2022-09-14
Payer: MEDICARE

## 2022-09-14 DIAGNOSIS — K57.20 COLONIC DIVERTICULAR ABSCESS: Primary | ICD-10-CM

## 2022-09-14 DIAGNOSIS — K57.20 DIVERTICULITIS OF LARGE INTESTINE WITH ABSCESS WITHOUT BLEEDING: ICD-10-CM

## 2022-09-14 LAB
ABSOLUTE EOS #: 0.06 K/UL (ref 0–0.44)
ABSOLUTE IMMATURE GRANULOCYTE: <0.03 K/UL (ref 0–0.3)
ABSOLUTE LYMPH #: 1.12 K/UL (ref 1.1–3.7)
ABSOLUTE MONO #: 0.85 K/UL (ref 0.1–1.2)
ACETAMINOPHEN LEVEL: <5 UG/ML (ref 10–30)
ALBUMIN SERPL-MCNC: 4.3 G/DL (ref 3.5–5.2)
ALBUMIN/GLOBULIN RATIO: 1.1 (ref 1–2.5)
ALP BLD-CCNC: 226 U/L (ref 35–104)
ALT SERPL-CCNC: 18 U/L (ref 5–33)
ANION GAP SERPL CALCULATED.3IONS-SCNC: 17 MMOL/L (ref 9–17)
AST SERPL-CCNC: 30 U/L
BASOPHILS # BLD: 1 % (ref 0–2)
BASOPHILS ABSOLUTE: 0.05 K/UL (ref 0–0.2)
BILIRUB SERPL-MCNC: 0.7 MG/DL (ref 0.3–1.2)
BUN BLDV-MCNC: 16 MG/DL (ref 8–23)
CALCIUM SERPL-MCNC: 9.2 MG/DL (ref 8.6–10.4)
CHLORIDE BLD-SCNC: 98 MMOL/L (ref 98–107)
CO2: 20 MMOL/L (ref 20–31)
CREAT SERPL-MCNC: 1.2 MG/DL (ref 0.5–0.9)
EOSINOPHILS RELATIVE PERCENT: 1 % (ref 1–4)
ETHANOL PERCENT: <0.01 %
ETHANOL: <10 MG/DL
GFR AFRICAN AMERICAN: 54 ML/MIN
GFR NON-AFRICAN AMERICAN: 45 ML/MIN
GFR SERPL CREATININE-BSD FRML MDRD: ABNORMAL ML/MIN/{1.73_M2}
GLUCOSE BLD-MCNC: 97 MG/DL (ref 70–99)
HCT VFR BLD CALC: 39.2 % (ref 36.3–47.1)
HEMOGLOBIN: 14.6 G/DL (ref 11.9–15.1)
IMMATURE GRANULOCYTES: 0 %
LIPASE: 18 U/L (ref 13–60)
LYMPHOCYTES # BLD: 13 % (ref 24–43)
MCH RBC QN AUTO: 30.7 PG (ref 25.2–33.5)
MCHC RBC AUTO-ENTMCNC: 37.2 G/DL (ref 28.4–34.8)
MCV RBC AUTO: 82.4 FL (ref 82.6–102.9)
MONOCYTES # BLD: 10 % (ref 3–12)
NRBC AUTOMATED: 0 PER 100 WBC
PDW BLD-RTO: 13.5 % (ref 11.8–14.4)
PLATELET # BLD: 280 K/UL (ref 138–453)
PMV BLD AUTO: 10.3 FL (ref 8.1–13.5)
POTASSIUM SERPL-SCNC: 3.6 MMOL/L (ref 3.7–5.3)
PRO-BNP: 2644 PG/ML
RBC # BLD: 4.76 M/UL (ref 3.95–5.11)
RBC # BLD: ABNORMAL 10*6/UL
SALICYLATE LEVEL: <1 MG/DL (ref 3–10)
SEG NEUTROPHILS: 75 % (ref 36–65)
SEGMENTED NEUTROPHILS ABSOLUTE COUNT: 6.7 K/UL (ref 1.5–8.1)
SODIUM BLD-SCNC: 135 MMOL/L (ref 135–144)
THYROXINE, FREE: 0.95 NG/DL (ref 0.93–1.7)
TOTAL PROTEIN: 8.1 G/DL (ref 6.4–8.3)
TOXIC TRICYCLIC SC,BLOOD: NEGATIVE
TROPONIN, HIGH SENSITIVITY: 23 NG/L (ref 0–14)
TSH SERPL DL<=0.05 MIU/L-ACNC: 21.57 UIU/ML (ref 0.3–5)
WBC # BLD: 8.8 K/UL (ref 3.5–11.3)

## 2022-09-14 PROCEDURE — 80053 COMPREHEN METABOLIC PANEL: CPT

## 2022-09-14 PROCEDURE — 6360000002 HC RX W HCPCS: Performed by: STUDENT IN AN ORGANIZED HEALTH CARE EDUCATION/TRAINING PROGRAM

## 2022-09-14 PROCEDURE — 83690 ASSAY OF LIPASE: CPT

## 2022-09-14 PROCEDURE — 2580000003 HC RX 258: Performed by: STUDENT IN AN ORGANIZED HEALTH CARE EDUCATION/TRAINING PROGRAM

## 2022-09-14 PROCEDURE — 96376 TX/PRO/DX INJ SAME DRUG ADON: CPT

## 2022-09-14 PROCEDURE — 99285 EMERGENCY DEPT VISIT HI MDM: CPT

## 2022-09-14 PROCEDURE — 71045 X-RAY EXAM CHEST 1 VIEW: CPT

## 2022-09-14 PROCEDURE — 85652 RBC SED RATE AUTOMATED: CPT

## 2022-09-14 PROCEDURE — 84484 ASSAY OF TROPONIN QUANT: CPT

## 2022-09-14 PROCEDURE — 80179 DRUG ASSAY SALICYLATE: CPT

## 2022-09-14 PROCEDURE — 85025 COMPLETE CBC W/AUTO DIFF WBC: CPT

## 2022-09-14 PROCEDURE — 84439 ASSAY OF FREE THYROXINE: CPT

## 2022-09-14 PROCEDURE — 84443 ASSAY THYROID STIM HORMONE: CPT

## 2022-09-14 PROCEDURE — 96365 THER/PROPH/DIAG IV INF INIT: CPT

## 2022-09-14 PROCEDURE — 80307 DRUG TEST PRSMV CHEM ANLYZR: CPT

## 2022-09-14 PROCEDURE — 80143 DRUG ASSAY ACETAMINOPHEN: CPT

## 2022-09-14 PROCEDURE — 83880 ASSAY OF NATRIURETIC PEPTIDE: CPT

## 2022-09-14 PROCEDURE — G0480 DRUG TEST DEF 1-7 CLASSES: HCPCS

## 2022-09-14 PROCEDURE — 96375 TX/PRO/DX INJ NEW DRUG ADDON: CPT

## 2022-09-14 RX ORDER — FENTANYL CITRATE 50 UG/ML
50 INJECTION, SOLUTION INTRAMUSCULAR; INTRAVENOUS ONCE
Status: COMPLETED | OUTPATIENT
Start: 2022-09-14 | End: 2022-09-14

## 2022-09-14 RX ORDER — FENTANYL CITRATE 50 UG/ML
50 INJECTION, SOLUTION INTRAMUSCULAR; INTRAVENOUS ONCE
Status: COMPLETED | OUTPATIENT
Start: 2022-09-14 | End: 2022-09-15

## 2022-09-14 RX ADMIN — FENTANYL CITRATE 50 MCG: 50 INJECTION, SOLUTION INTRAMUSCULAR; INTRAVENOUS at 20:54

## 2022-09-14 RX ADMIN — PIPERACILLIN AND TAZOBACTAM 4500 MG: 4; .5 INJECTION, POWDER, LYOPHILIZED, FOR SOLUTION INTRAVENOUS; PARENTERAL at 23:03

## 2022-09-14 ASSESSMENT — PAIN DESCRIPTION - LOCATION: LOCATION: ABDOMEN

## 2022-09-14 ASSESSMENT — PAIN SCALES - GENERAL: PAINLEVEL_OUTOF10: 5

## 2022-09-14 ASSESSMENT — PAIN DESCRIPTION - DESCRIPTORS: DESCRIPTORS: ACHING;STABBING

## 2022-09-14 NOTE — ED PROVIDER NOTES
[facility]     Emergency Department     Faculty Attestation    I performed a history and physical examination of the patient and discussed management with the resident. I reviewed the resident´s note and agree with the documented findings and plan of care. Any areas of disagreement are noted on the chart. I was personally present for the key portions of any procedures. I have documented in the chart those procedures where I was not present during the key portions. I have reviewed the emergency nurses triage note. I agree with the chief complaint, past medical history, past surgical history, allergies, medications, social and family history as documented unless otherwise noted below. For Physician Assistant/ Nurse Practitioner cases/documentation I have personally evaluated this patient and have completed at least one if not all key elements of the E/M (history, physical exam, and MDM). Additional findings are as noted. CAT scan report from 2 days ago  Interval enlargement of the 4.1 cm deep left pelvic wall abscess which is   seen extending to the mid sigmoid colon consistent with fistulous tract and   contained/walled-off abscess. No acute inflammatory changes to indicate diverticulitis at this time       Left lower quadrant guarding, no CVA tenderness.      Ansley Choudhary MD  09/14/22 [id number]

## 2022-09-14 NOTE — PROGRESS NOTES
PC to patient - Informed patient of test results. Patient verbalized understanding. Patient states that she only has Levothyroxine 25 mcg. Patient states she does not have Levothyroxine 50 mcg.

## 2022-09-14 NOTE — ED PROVIDER NOTES
[facility] ED  Emergency Department Encounter  Emergency Medicine Resident     Pt Name: Shy Desai  MRN: [id number]  [place] 1955  Date of evaluation: 9/14/22  PCP:  Nicci Razo DO    CHIEF COMPLAINT       Chief Complaint   Patient presents with    Abdominal Pain    Diverticulitis       HISTORY OFPRESENT ILLNESS  (Location/Symptom, Timing/Onset, Context/Setting, Quality, Duration, Modifying Factors,Severity.)      Shy Desai is a 75 y.o. female who presents with abdominal pain. Patient states she was recently seen by her physician for concerns of diverticulitis. She underwent a CT on Monday and states she was started on antibiotics for possible diverticulitis. She has a history of abscess from diverticulitis. She has had continued pain despite taking the antibiotics for several days. Patient also presents with concerns of lower extremity swelling and bouts of confusion. States she has picked up her phone multiple times and forgot what she was doing. This is new for her, no history of stroke. She denies any weakness, numbness, tingling. No change in speech. No difficulty ambulating. Patient follows with GI and her PCP frequently. She also had her pacemaker replaced in early August and states this one \"feels different\". She has had increased pain in this region.   No fevers, chills, chest pain, shortness of breath, palpitations    PAST MEDICAL / SURGICAL / SOCIAL / FAMILY HISTORY      has a past medical history of Abnormal Pap smear, Acute on chronic systolic congestive heart failure ([address] AICD (automatic cardioverter/defibrillator) present, MAURO (acute kidney injury) ([address] Anemia, Arthritis, Cardiomyopathy ([address] Cardiomyopathy, nonischemic ([address] Chronic combined systolic and diastolic congestive heart failure ([address] Ejection fraction < 50%, HTN (hypertension), Hypothyroidism, Ischemic cardiomyopathy, Left bundle branch block, Mitral regurgitation, Morbid obesity due to excess calories ([facility] [address] Pulmonary hypertension ([facility] [address] [facility] Sudden onset of severe headache, and Torn rotator cuff.     has a past surgical history that includes other surgical history (12/20/2012); Tubal ligation; hernia repair; Colonoscopy; pacemaker placement; Insert Midline Catheter (04/12/2022); and other surgical history (Left).      Social History     Socioeconomic History    Marital status:      Spouse name: Not on file    Number of children: 2    Years of education: Not on file    Highest education level: Not on file   Occupational History    Not on file   Tobacco Use    Smoking status: Never    Smokeless tobacco: Never   Vaping Use    Vaping Use: Never used   Substance and Sexual Activity    Alcohol use: No     Alcohol/week: 0.0 standard drinks    Drug use: Not Currently    Sexual activity: Never   Other Topics Concern    Not on file   Social History Narrative    Not on file     Social Determinants of Health     Financial Resource Strain: Low Risk     Difficulty of Paying Living Expenses: Not hard at all   Food Insecurity: No Food Insecurity    Worried About Running Out of Food in the Last Year: Never true    Ran Out of Food in the Last Year: Never true   Transportation Needs: Not on file   Physical Activity: Not on file   Stress: Not on file   Social Connections: Not on file   Intimate Partner Violence: Not on file   Housing Stability: Not on file       Family History   Problem Relation Age of Onset    Cancer Other         ovarian    High Blood Pressure Mother     Other Mother         copd    Arthritis Neg Hx     Asthma Neg Hx     Birth Defects Neg Hx     Depression Neg Hx     Diabetes Neg Hx     Early Death Neg Hx     Hearing Loss Neg Hx     Heart Disease Neg Hx     High Cholesterol Neg Hx     Kidney Disease Neg Hx     Learning Disabilities Neg Hx     Mental Retardation Neg Hx     Mental Illness Neg Hx     Miscarriages / Stillbirths Neg Hx     Stroke Neg Hx     Substance Abuse Neg Hx Vision Loss Neg Hx     Breast Cancer Neg Hx     Colon Cancer Neg Hx     Eclampsia Neg Hx     Hypertension Neg Hx     Ovarian Cancer Neg Hx      Labor Neg Hx     Spont Abortions Neg Hx         Allergies:  Codeine and Morphine    Home Medications:  Prior to Admission medications    Medication Sig Start Date End Date Taking? Authorizing Provider   levothyroxine (SYNTHROID) 25 MCG tablet Take 3 tablets by mouth Daily 22   Prudencio Xie DO   ciprofloxacin (CIPRO) 500 MG tablet Take 1 tablet by mouth 2 times daily for 10 days 22  Kira Dukes MD   metroNIDAZOLE (FLAGYL) 500 MG tablet Take 1 tablet by mouth 3 times daily for 10 days 22  Kira Dukes MD   acetaminophen (TYLENOL) 500 MG tablet Take 1 tablet by mouth every 6 hours as needed for Pain 22   Corie Lovell MD   metoprolol succinate (TOPROL XL) 50 MG extended release tablet Take 50 mg by mouth daily    Historical Provider, MD   psyllium (KONSYL) 28.3 % PACK Take 1 packet by mouth daily    Historical Provider, MD   amiodarone (CORDARONE) 200 MG tablet  21   Historical Provider, MD   diclofenac sodium (VOLTAREN) 1 % GEL APPLY 2 GRAMS TOPICALLY 4 (FOUR) TIMES A DAY. Patient not taking: No sig reported 21   Historical Provider, MD   empagliflozin (JARDIANCE) 10 MG tablet Take 10 mg by mouth daily 22   Historical Provider, MD   potassium chloride (KLOR-CON M) 20 MEQ extended release tablet Take 1 tablet by mouth daily 21   Vanita Yee MD   magnesium oxide (MAG-OX) 400 MG tablet Take 0.5 tablets by mouth daily 21   Ana Maria Charles MD   nitroGLYCERIN (NITROSTAT) 0.4 MG SL tablet up to max of 3 total doses.  If no relief after 1 dose, call 911. 21   Elton Sheriff MD   rivaroxaban Yaquelin Oka) 20 MG TABS tablet Take 1 tablet by mouth daily 21   Elton Sheriff MD   sacubitril-valsartan (ENTRESTO) 24-26 MG per tablet Take 0.5 tablets by mouth 2 times daily 21   Elton Sheriff MD bumetanide (BUMEX) 2 MG tablet Take 1 tablet by mouth daily 6/29/21   Bennett Alejandra MD   lidocaine (LIDODERM) 5 % Place one patch to both knees 12 hours on, 12 hours off. 11/23/20   Shaquille Morgan DO   allopurinol (ZYLOPRIM) 100 MG tablet Take 1 tablet by mouth daily 2/18/19   Ezra Mckeon MD   spironolactone (ALDACTONE) 25 MG tablet Take 0.5 tablets by mouth daily 2/18/19   Ezra Mckeon MD       REVIEW OFSYSTEMS    (2-9 systems for level 4, 10 or more for level 5)      Review of Systems   Constitutional:  Negative for chills and fever. HENT:  Negative for congestion and rhinorrhea. Eyes:  Negative for visual disturbance. Respiratory:  Negative for cough and shortness of breath. Cardiovascular:  Negative for chest pain. Gastrointestinal:  Positive for abdominal pain. Negative for diarrhea, nausea and vomiting. Genitourinary:  Negative for dysuria. Musculoskeletal:  Negative for back pain and neck pain. Skin:  Negative for rash. Neurological:  Negative for weakness, numbness and headaches. Confusion       PHYSICAL EXAM   (up to 7 for level 4, 8 or more forlevel 5)      INITIAL VITALS:   ED Triage Vitals [09/14/22 1545]   BP Temp Temp Source Heart Rate Resp SpO2 Height Weight   125/84 97.3 °F (36.3 °C) Oral 87 15 97 % -- --       Physical Exam  Constitutional:       General: She is not in acute distress. Appearance: Normal appearance. She is normal weight. She is not ill-appearing, toxic-appearing or diaphoretic. HENT:      Head: Normocephalic and atraumatic. Nose: Nose normal.      Mouth/Throat:      Mouth: Mucous membranes are moist.      Pharynx: Oropharynx is clear. No oropharyngeal exudate or posterior oropharyngeal erythema. Eyes:      Extraocular Movements: Extraocular movements intact. Pupils: Pupils are equal, round, and reactive to light. Cardiovascular:      Rate and Rhythm: Normal rate and regular rhythm.       Heart sounds: Normal heart sounds. No murmur heard. Comments: Pacemaker insertion scar in place with Steri-Strips still in place. Tenderness palpation over this region. No surrounding erythema or warmth. Drainage from the incision site  Pulmonary:      Effort: Pulmonary effort is normal. No respiratory distress. Breath sounds: Normal breath sounds. No wheezing, rhonchi or rales. Abdominal:      General: There is no distension. Palpations: Abdomen is soft. Tenderness: There is abdominal tenderness in the periumbilical area, left upper quadrant and left lower quadrant. There is no guarding or rebound. Musculoskeletal:         General: Normal range of motion. Cervical back: Normal range of motion and neck supple. Comments: 2+ pitting edema bilateral lower extremities, no associated erythema or warmth   Skin:     General: Skin is warm and dry. Neurological:      General: No focal deficit present. Mental Status: She is alert and oriented to person, place, and time. Motor: No weakness.    Psychiatric:         Mood and Affect: Mood normal.       DIFFERENTIAL  DIAGNOSIS     PLAN (LABS / IMAGING / EKG):  Orders Placed This Encounter   Procedures    XR CHEST PORTABLE    CBC with Auto Differential    CMP    Urinalysis with Reflex to Culture    TOX SCR, BLD, ED    Lipase    TSH with Reflex    Brain Natriuretic Peptide    Troponin    T4, Free    Sedimentation Rate    C-Reactive Protein    Procalcitonin    Diet NPO Exceptions are: Sips of Clear Liquids    Inpatient consult to General Surgery    Inpatient consult to Hospitalist    Inpatient consult to Infectious Diseases    ADMIT TO INPATIENT       MEDICATIONS ORDERED:  Orders Placed This Encounter   Medications    fentaNYL (SUBLIMAZE) injection 50 mcg    piperacillin-tazobactam (ZOSYN) 4,500 mg in dextrose 5 % 100 mL IVPB (mini-bag)     Order Specific Question:   Antimicrobial Indications     Answer:   Intra-Abdominal Infection    fentaNYL (SUBLIMAZE) injection 50 mcg    pantoprazole (PROTONIX) 80 mg in sodium chloride 0.9 % 50 mL bolus    ketorolac (TORADOL) injection 30 mg    piperacillin-tazobactam (ZOSYN) 3,375 mg in dextrose 5 % 50 mL IVPB extended infusion (mini-bag)     Order Specific Question:   Antimicrobial Indications     Answer:   Intra-Abdominal Infection       DDX: CHF, diverticulitis, intra-abdominal abscess, UTI, viral illness, other    Initial MDM/Plan/ED COURSE:    75 y.o. female who presents with persistent abdominal pain with history of diverticulitis, also presenting with lower extremity swelling and confusion. Vitals appear normal on arrival.  She is afebrile. Heart and lung exam unremarkable with exception of recent pacemaker placement and Steri-Strips still in place. Abdomen is soft but tender palpation along the left side, no rebound or guarding. She does have bilateral pitting edema in the lower extremities that is much worse than her baseline despite taking her diuretics by report. We will obtain a broad work-up regarding these complaints. On further review of the patient's chart, patient was found to have a 4.1 cm abscess likely from diverticula in the sigmoid colon. The CT was performed 2 days ago and electronic note from her physician stated that she should come to the ED for ID, general surgery and or IR. We will plan to consult general surgery for further recommendations. Initial labs returning, patient does not have a leukocytosis but this is likely due to her antibiotic use this week. ED Course as of 09/15/22 0529   Wed Sep 14, 2022   2012 Troponin, High Sensitivity(!): 23 [JS]   2012 Pro-BNP(!): 2,644 [JS]   2012 TSH(!): 21.57  Similar to baseline   [JS]      ED Course User Index  [JS] Eloise Schwab, DO      Surgery was consulted. They state the patient is not a good surgical candidate given her blood thinner use. They recommend IR management and internal medicine admission.   [facility] was contacted and will admit the patient. Zosyn given in the emergency department for infection control. DIAGNOSTIC RESULTS / EMERGENCYDEPARTMENT COURSE / MDM     LABS:  Labs Reviewed   CBC WITH AUTO DIFFERENTIAL - Abnormal; Notable for the following components:       Result Value    MCV 82.4 (*)     MCHC 37.2 (*)     Seg Neutrophils 75 (*)     Lymphocytes 13 (*)     All other components within normal limits   COMPREHENSIVE METABOLIC PANEL - Abnormal; Notable for the following components:    Creatinine 1.20 (*)     Potassium 3.6 (*)     Alkaline Phosphatase 226 (*)     GFR Non- 45 (*)     GFR  54 (*)     All other components within normal limits   TOX SCR, BLD, ED - Abnormal; Notable for the following components:    Acetaminophen Level <5 (*)     Salicylate Lvl <1 (*)     All other components within normal limits   TSH WITH REFLEX - Abnormal; Notable for the following components:    TSH 21.57 (*)     All other components within normal limits   BRAIN NATRIURETIC PEPTIDE - Abnormal; Notable for the following components:    Pro-BNP 2,644 (*)     All other components within normal limits   TROPONIN - Abnormal; Notable for the following components:    Troponin, High Sensitivity 23 (*)     All other components within normal limits   LIPASE   T4, FREE   URINALYSIS WITH REFLEX TO CULTURE   SEDIMENTATION RATE   C-REACTIVE PROTEIN   PROCALCITONIN           XR CHEST PORTABLE    Result Date: 9/14/2022  EXAMINATION: ONE XRAY VIEW OF THE CHEST 9/14/2022 7:44 pm COMPARISON: 06/26/2021 HISTORY: ORDERING SYSTEM PROVIDED HISTORY: PACEMAKER CHANGE, CONFUSION TECHNOLOGIST PROVIDED HISTORY: PACEMAKER CHANGE, CONFUSION Reason for Exam: upr,new pacemaker aug 15,ams FINDINGS: The heart is enlarged but is similar in size to the prior study. No definite pleural effusion or pneumothorax is seen. No focal lung consolidation is identified. There is a left-sided cardiac device with multiple leads. Similar cardiomegaly.   No acute cardiopulmonary abnormality is identified. EKG      All EKG's are interpreted by the Emergency Department Physicianwho either signs or Co-signs this chart in the absence of a cardiologist.      PROCEDURES:  None    CONSULTS:  IP CONSULT TO GENERAL SURGERY  IP CONSULT TO HOSPITALIST  IP CONSULT TO INFECTIOUS DISEASES    CRITICAL CARE:  Please see attending note    FINAL IMPRESSION      1. Colonic diverticular abscess          DISPOSITION / PLAN     DISPOSITION Admitted 09/15/2022 02:18:30 AM      PATIENT REFERRED TO:  No follow-up provider specified.     DISCHARGE MEDICATIONS:  New Prescriptions    No medications on file       Tod Necessary, DO  Emergency Medicine Resident    (Please note that portions of this note were completed with a voice recognition program.Efforts were made to edit the dictations but occasionally words are mis-transcribed.)       Tod Necessary, DO  Resident  09/15/22 7627

## 2022-09-15 PROBLEM — K57.20 COLONIC DIVERTICULAR ABSCESS: Status: ACTIVE | Noted: 2022-09-15

## 2022-09-15 LAB
ABO/RH: NORMAL
ANTIBODY SCREEN: NEGATIVE
ARM BAND NUMBER: NORMAL
BILIRUBIN URINE: ABNORMAL
C-REACTIVE PROTEIN: 17.2 MG/L (ref 0–5)
CASTS UA: ABNORMAL /LPF (ref 0–8)
CHP ED QC CHECK: YES
COLOR: ABNORMAL
EPITHELIAL CELLS UA: ABNORMAL /HPF (ref 0–5)
EXPIRATION DATE: NORMAL
GLUCOSE BLD-MCNC: 122 MG/DL (ref 65–105)
GLUCOSE BLD-MCNC: 96 MG/DL
GLUCOSE BLD-MCNC: 96 MG/DL (ref 65–105)
GLUCOSE URINE: ABNORMAL
INR BLD: 1.1
KETONES, URINE: ABNORMAL
LEUKOCYTE ESTERASE, URINE: ABNORMAL
NITRITE, URINE: POSITIVE
PH UA: 5 (ref 5–8)
PROCALCITONIN: 0.09 NG/ML
PROTEIN UA: ABNORMAL
PROTHROMBIN TIME: 11.8 SEC (ref 9.1–12.3)
RBC UA: ABNORMAL /HPF (ref 0–4)
SEDIMENTATION RATE, ERYTHROCYTE: 73 MM/HR (ref 0–30)
SPECIFIC GRAVITY UA: 1.03 (ref 1–1.03)
TURBIDITY: CLEAR
URINE HGB: NEGATIVE
UROBILINOGEN, URINE: NORMAL
WBC UA: ABNORMAL /HPF (ref 0–5)
YEAST: ABNORMAL

## 2022-09-15 PROCEDURE — 86900 BLOOD TYPING SEROLOGIC ABO: CPT

## 2022-09-15 PROCEDURE — 2580000003 HC RX 258: Performed by: INTERNAL MEDICINE

## 2022-09-15 PROCEDURE — 81001 URINALYSIS AUTO W/SCOPE: CPT

## 2022-09-15 PROCEDURE — 86140 C-REACTIVE PROTEIN: CPT

## 2022-09-15 PROCEDURE — 82947 ASSAY GLUCOSE BLOOD QUANT: CPT

## 2022-09-15 PROCEDURE — 6360000002 HC RX W HCPCS: Performed by: STUDENT IN AN ORGANIZED HEALTH CARE EDUCATION/TRAINING PROGRAM

## 2022-09-15 PROCEDURE — 2580000003 HC RX 258: Performed by: NURSE PRACTITIONER

## 2022-09-15 PROCEDURE — 1200000000 HC SEMI PRIVATE

## 2022-09-15 PROCEDURE — 6360000002 HC RX W HCPCS: Performed by: INTERNAL MEDICINE

## 2022-09-15 PROCEDURE — 86850 RBC ANTIBODY SCREEN: CPT

## 2022-09-15 PROCEDURE — 84145 PROCALCITONIN (PCT): CPT

## 2022-09-15 PROCEDURE — C9113 INJ PANTOPRAZOLE SODIUM, VIA: HCPCS | Performed by: INTERNAL MEDICINE

## 2022-09-15 PROCEDURE — 85610 PROTHROMBIN TIME: CPT

## 2022-09-15 PROCEDURE — 6370000000 HC RX 637 (ALT 250 FOR IP): Performed by: NURSE PRACTITIONER

## 2022-09-15 PROCEDURE — 86901 BLOOD TYPING SEROLOGIC RH(D): CPT

## 2022-09-15 PROCEDURE — 99222 1ST HOSP IP/OBS MODERATE 55: CPT | Performed by: INTERNAL MEDICINE

## 2022-09-15 PROCEDURE — 2500000003 HC RX 250 WO HCPCS: Performed by: INTERNAL MEDICINE

## 2022-09-15 PROCEDURE — 6370000000 HC RX 637 (ALT 250 FOR IP): Performed by: FAMILY MEDICINE

## 2022-09-15 RX ORDER — FAMOTIDINE 10 MG/ML
20 INJECTION, SOLUTION INTRAVENOUS 2 TIMES DAILY
Status: DISCONTINUED | OUTPATIENT
Start: 2022-09-15 | End: 2022-09-19 | Stop reason: HOSPADM

## 2022-09-15 RX ORDER — ONDANSETRON 4 MG/1
4 TABLET, ORALLY DISINTEGRATING ORAL EVERY 8 HOURS PRN
Status: DISCONTINUED | OUTPATIENT
Start: 2022-09-15 | End: 2022-09-19 | Stop reason: HOSPADM

## 2022-09-15 RX ORDER — ONDANSETRON 2 MG/ML
4 INJECTION INTRAMUSCULAR; INTRAVENOUS EVERY 6 HOURS PRN
Status: DISCONTINUED | OUTPATIENT
Start: 2022-09-15 | End: 2022-09-19 | Stop reason: HOSPADM

## 2022-09-15 RX ORDER — MORPHINE SULFATE 2 MG/ML
1 INJECTION, SOLUTION INTRAMUSCULAR; INTRAVENOUS EVERY 6 HOURS PRN
Status: DISCONTINUED | OUTPATIENT
Start: 2022-09-15 | End: 2022-09-19 | Stop reason: HOSPADM

## 2022-09-15 RX ORDER — OXYCODONE HYDROCHLORIDE AND ACETAMINOPHEN 5; 325 MG/1; MG/1
1 TABLET ORAL EVERY 4 HOURS PRN
Status: DISCONTINUED | OUTPATIENT
Start: 2022-09-15 | End: 2022-09-19 | Stop reason: HOSPADM

## 2022-09-15 RX ORDER — DEXTROSE MONOHYDRATE 100 MG/ML
INJECTION, SOLUTION INTRAVENOUS CONTINUOUS PRN
Status: DISCONTINUED | OUTPATIENT
Start: 2022-09-15 | End: 2022-09-19 | Stop reason: HOSPADM

## 2022-09-15 RX ORDER — KETOROLAC TROMETHAMINE 30 MG/ML
30 INJECTION, SOLUTION INTRAMUSCULAR; INTRAVENOUS ONCE
Status: COMPLETED | OUTPATIENT
Start: 2022-09-15 | End: 2022-09-15

## 2022-09-15 RX ORDER — SODIUM CHLORIDE 0.9 % (FLUSH) 0.9 %
10 SYRINGE (ML) INJECTION PRN
Status: DISCONTINUED | OUTPATIENT
Start: 2022-09-15 | End: 2022-09-19 | Stop reason: HOSPADM

## 2022-09-15 RX ORDER — SODIUM CHLORIDE 9 MG/ML
INJECTION, SOLUTION INTRAVENOUS CONTINUOUS
Status: DISCONTINUED | OUTPATIENT
Start: 2022-09-15 | End: 2022-09-16

## 2022-09-15 RX ORDER — SODIUM CHLORIDE 9 MG/ML
INJECTION, SOLUTION INTRAVENOUS PRN
Status: DISCONTINUED | OUTPATIENT
Start: 2022-09-15 | End: 2022-09-19 | Stop reason: HOSPADM

## 2022-09-15 RX ORDER — MAGNESIUM SULFATE 1 G/100ML
1000 INJECTION INTRAVENOUS PRN
Status: DISCONTINUED | OUTPATIENT
Start: 2022-09-15 | End: 2022-09-19 | Stop reason: HOSPADM

## 2022-09-15 RX ORDER — ACETAMINOPHEN 650 MG/1
650 SUPPOSITORY RECTAL EVERY 6 HOURS PRN
Status: DISCONTINUED | OUTPATIENT
Start: 2022-09-15 | End: 2022-09-19 | Stop reason: HOSPADM

## 2022-09-15 RX ORDER — POTASSIUM CHLORIDE 7.45 MG/ML
10 INJECTION INTRAVENOUS PRN
Status: DISCONTINUED | OUTPATIENT
Start: 2022-09-15 | End: 2022-09-17

## 2022-09-15 RX ORDER — ACETAMINOPHEN 325 MG/1
650 TABLET ORAL EVERY 6 HOURS PRN
Status: DISCONTINUED | OUTPATIENT
Start: 2022-09-15 | End: 2022-09-15

## 2022-09-15 RX ORDER — SODIUM CHLORIDE 0.9 % (FLUSH) 0.9 %
5-40 SYRINGE (ML) INJECTION EVERY 12 HOURS SCHEDULED
Status: DISCONTINUED | OUTPATIENT
Start: 2022-09-15 | End: 2022-09-19 | Stop reason: HOSPADM

## 2022-09-15 RX ADMIN — SODIUM CHLORIDE, PRESERVATIVE FREE 10 ML: 5 INJECTION INTRAVENOUS at 09:45

## 2022-09-15 RX ADMIN — ACETAMINOPHEN 650 MG: 325 TABLET ORAL at 15:55

## 2022-09-15 RX ADMIN — PIPERACILLIN AND TAZOBACTAM 3375 MG: 3; .375 INJECTION, POWDER, LYOPHILIZED, FOR SOLUTION INTRAVENOUS at 07:58

## 2022-09-15 RX ADMIN — SODIUM CHLORIDE: 9 INJECTION, SOLUTION INTRAVENOUS at 07:56

## 2022-09-15 RX ADMIN — FENTANYL CITRATE 50 MCG: 50 INJECTION, SOLUTION INTRAMUSCULAR; INTRAVENOUS at 00:16

## 2022-09-15 RX ADMIN — KETOROLAC TROMETHAMINE 30 MG: 30 INJECTION, SOLUTION INTRAMUSCULAR; INTRAVENOUS at 09:42

## 2022-09-15 RX ADMIN — FAMOTIDINE 20 MG: 10 INJECTION INTRAVENOUS at 09:42

## 2022-09-15 RX ADMIN — PIPERACILLIN AND TAZOBACTAM 3375 MG: 3; .375 INJECTION, POWDER, LYOPHILIZED, FOR SOLUTION INTRAVENOUS at 23:05

## 2022-09-15 RX ADMIN — SODIUM CHLORIDE 80 MG: 9 INJECTION, SOLUTION INTRAVENOUS at 05:10

## 2022-09-15 RX ADMIN — OXYCODONE HYDROCHLORIDE AND ACETAMINOPHEN 1 TABLET: 5; 325 TABLET ORAL at 18:14

## 2022-09-15 RX ADMIN — PIPERACILLIN AND TAZOBACTAM 3375 MG: 3; .375 INJECTION, POWDER, LYOPHILIZED, FOR SOLUTION INTRAVENOUS at 15:58

## 2022-09-15 ASSESSMENT — PAIN DESCRIPTION - LOCATION
LOCATION: BACK
LOCATION: ABDOMEN

## 2022-09-15 ASSESSMENT — ENCOUNTER SYMPTOMS
NAUSEA: 0
ABDOMINAL PAIN: 1
BACK PAIN: 0
RHINORRHEA: 0
COUGH: 0
DIARRHEA: 0
VOMITING: 0
SHORTNESS OF BREATH: 0

## 2022-09-15 ASSESSMENT — PAIN SCALES - GENERAL
PAINLEVEL_OUTOF10: 6
PAINLEVEL_OUTOF10: 6
PAINLEVEL_OUTOF10: 5
PAINLEVEL_OUTOF10: 6

## 2022-09-15 ASSESSMENT — PAIN DESCRIPTION - DESCRIPTORS: DESCRIPTORS: PRESSURE

## 2022-09-15 NOTE — CARE COORDINATION
09/15/22 1119   Service Assessment   Patient Orientation Alert and Oriented   Cognition Alert   History Provided By Patient   Primary Caregiver Self   Accompanied By/Relationship daughter Kerry Samayoa   PCP Verified by CM Yes   Last Visit to PCP Within last 3 months   Prior Functional Level Independent in ADLs/IADLs   Current Functional Level Independent in ADLs/IADLs   Can patient return to prior living arrangement Yes   Ability to make needs known: Good   Community Resources ECF/Home Care  ([facility], [facility]   Social/Functional History   Lives With Parent   Type of [facility] One level   [address]   Discharge Planning   Type of [facility] Parent   Patient expects to be discharged to: House   Condition of Participation: Discharge Planning   The Plan for Transition of Care is related to the following treatment goals: increased comfort level   Home with mother, referral to [address] [place] [address]

## 2022-09-15 NOTE — PROGRESS NOTES
CT of Abd/Pelvis reviewed by IR attending. Pelvic abscess is not amendable to percutaneous drainage due to no safe route. IR recommends continued abx therapy and repeat CT at a later time to assess for safe access route has developed if clinically indicated.

## 2022-09-15 NOTE — ED NOTES
The following labs labeled with pt sticker and tubed to lab:     [x] Blue     [] [place] Burns   [] on ice  [x] Green/yellow  [] Green/black [] on ice  [] Yellow  [] Red  [x] Pink      [] COVID-19 swab    [] Rapid  [] PCR  [] Flu Swab  [] Strep Swab  [] Peds Viral Panel     [x] Urine Sample  [] Pelvic Cultures  [] Blood Cultures   [] Wound Cultures        Ewa Schmidt RN  09/15/22 3346

## 2022-09-15 NOTE — CONSULTS
General Surgery   History and Physical      PATIENT NAME: Bhavesh Coles   YOB: 1955    ADMISSION DATE: 9/14/2022  6:57 PM      TODAY'S DATE: 9/15/2022    CHIEF COMPLAINT:  Pain in the left lower abdomen w past history of diverticulitis and left pelvic abscess      HISTORY OF PRESENT ILLNESS:  The patient is a 75 y.o. female with significant cardiac history of cardiomyopathy, CHF, AICD, with low ejection fraction of 17% on echo, hypertension, pulmonary hypertension, hypothyroidism who presents with complaint of pain in the left lower abdomen for past 4 to 5 days it was insidious in onset, gradually progressing in severity, nonradiating on nonpreferred, not associate with fever or chills, no aggravating or relieving factors, not associated with diarrhea or constipation. Patient was started on Cipro and Flagyl by PCP on Monday. Patient still complains of pain being persistent. There is no complaint of chest pain, shortness of breath, yellowing of skin/eye/burning micturition, blood in stools. Patient had been diagnosed with diverticulitis multiple times and had left pelvic abscess with a sinus tract communicating with the mid sigmoid colon. She had undergone a colonoscopy about 10 years back which gave the result of diverticulitis.     Patient takes Xarelto at home    Past Medical History:        Diagnosis Date    Abnormal Pap smear     Acute on chronic systolic congestive heart failure (HCC)     AICD (automatic cardioverter/defibrillator) present     MAURO (acute kidney injury) ([address] 03/04/2017    Anemia     Arthritis     Cardiomyopathy ([address] 12/20/2012    robotic replacement of 2 left ventricular leads/replacement of ICD generator    Cardiomyopathy, nonischemic ([address] 12/20/2012    Chronic combined systolic and diastolic congestive heart failure (HCC)     Ejection fraction < 50%     HTN (hypertension)     Hypothyroidism     Ischemic cardiomyopathy     Left bundle branch block     Mitral Hx     Birth Defects Neg Hx     Depression Neg Hx     Diabetes Neg Hx     Early Death Neg Hx     Hearing Loss Neg Hx     Heart Disease Neg Hx     High Cholesterol Neg Hx     Kidney Disease Neg Hx     Learning Disabilities Neg Hx     Mental Retardation Neg Hx     Mental Illness Neg Hx     Miscarriages / Stillbirths Neg Hx     Stroke Neg Hx     Substance Abuse Neg Hx     Vision Loss Neg Hx     Breast Cancer Neg Hx     Colon Cancer Neg Hx     Eclampsia Neg Hx     Hypertension Neg Hx     Ovarian Cancer Neg Hx      Labor Neg Hx     Spont Abortions Neg Hx        REVIEW OF SYSTEMS:    General: Denies fever, chills, weight loss. Reports normal energy levels  HEENT: Denies sore throat, sinus problems, allergic rhinosinusitis  Cardiovascular: Denies chest pain, palpitations, orthopnea  Pulmonary: Denies cough, shortness of breath  GI: See HPI   : Denies polyuria, dysuria, hematuria  Endocrine: Denies diabetes, thyroid problems.   Hem/Onc: Denies hx of bleeding disorders, Denies hx of cancer   Neurological: Denies h/o CVA, TIA  Skin: Denies rashes, ulcers  Musculoskeletal: Denies muscle, joint, back pain      PHYSICAL EXAM:    VITALS:  /76   Pulse 81   Temp 97.9 °F (36.6 °C) (Oral)   Resp 16   Ht 5' 6\" (1.676 m)   Wt 205 lb (93 kg)   SpO2 100%   BMI 33.09 kg/m²   INTAKE/OUTPUT:   No intake or output data in the 24 hours ending 09/15/22 0507    CONSTITUTIONAL: awake, alert, cooperative, no apparent distress  HEAD: atraumatic, normocephalic  EYES: sclera clear, pupils equal and reactive to light  ENT: ears are symmetric, nares patent   HEENT: moist mucous membranes  NECK: Supple, symmetrical, trachea midline  LUNGS: no respiratory distress, no audible wheezing  CARDIOVASCULAR: +S1/S2  ABDOMEN: soft, mild tenderness in the left lower quadrant, nondistended, no guarding, no rebound tenderness   MUSCULOSKELETAL: full range of motion noted  NEUROLOGIC: Awake, alert, oriented to name, place and time  EXTREMITIES: peripheral pulses normal, no pedal edema, no calf tenderness  SKIN: normal coloration and turgor      CBC with Differential:    Lab Results   Component Value Date/Time    WBC 8.8 09/14/2022 07:17 PM    RBC 4.76 09/14/2022 07:17 PM    RBC 4.45 04/03/2012 05:31 PM    HGB 14.6 09/14/2022 07:17 PM    HCT 39.2 09/14/2022 07:17 PM     09/14/2022 07:17 PM     04/03/2012 05:31 PM    MCV 82.4 09/14/2022 07:17 PM    MCH 30.7 09/14/2022 07:17 PM    MCHC 37.2 09/14/2022 07:17 PM    RDW 13.5 09/14/2022 07:17 PM    LYMPHOPCT 13 09/14/2022 07:17 PM    MONOPCT 10 09/14/2022 07:17 PM    BASOPCT 1 09/14/2022 07:17 PM    MONOSABS 0.85 09/14/2022 07:17 PM    LYMPHSABS 1.12 09/14/2022 07:17 PM    EOSABS 0.06 09/14/2022 07:17 PM    BASOSABS 0.05 09/14/2022 07:17 PM    DIFFTYPE NOT REPORTED 01/27/2022 02:30 PM     CMP:    Lab Results   Component Value Date/Time     09/14/2022 07:17 PM    K 3.6 09/14/2022 07:17 PM    CL 98 09/14/2022 07:17 PM    CO2 20 09/14/2022 07:17 PM    BUN 16 09/14/2022 07:17 PM    CREATININE 1.20 09/14/2022 07:17 PM    GFRAA 54 09/14/2022 07:17 PM    LABGLOM 45 09/14/2022 07:17 PM    GLUCOSE 97 09/14/2022 07:17 PM    GLUCOSE 100 04/03/2012 05:31 PM    PROT 8.1 09/14/2022 07:17 PM    LABALBU 4.3 09/14/2022 07:17 PM    CALCIUM 9.2 09/14/2022 07:17 PM    BILITOT 0.7 09/14/2022 07:17 PM    ALKPHOS 226 09/14/2022 07:17 PM    AST 30 09/14/2022 07:17 PM    ALT 18 09/14/2022 07:17 PM       Pertinent Radiology:     CT 9/12/2022 -   Interval enlargement of the 4.1 cm deep left pelvic wall abscess which is   seen extending to the mid sigmoid colon consistent with fistulous tract and   contained/walled-off abscess. No acute inflammatory changes to indicate diverticulitis at this time. ASSESSMENT     Principal Problem:    Colonic diverticular abscess  Resolved Problems:    * No resolved hospital problems.  *      PLAN as discussed with Dr. eSvero Pacheco    Patient is a poor surgical candidate in view of her cardiac history.    Recommend IV antibiotics and IR guided drainage of the abscess  General surgery team will follow  Cautious use of IV fluids      ------------------------------------------------------------------  Cathryn Muro MD  PGY-2  Department [address]  9/15/2022 at 5:07 AM

## 2022-09-15 NOTE — PLAN OF CARE
Patient evaluated by interventional radiology for potential drainage of pelvic abscess, unfortunately the abscess is in a location not safe to attempt percutaneous drainage. At this time, recommendation is course of antibiotics duration 10-14d and reassessment of abscess size/location afterwards or sooner if symptoms worsening. Patient poor operative candidate due to heart issues, surgery would be last resort. Patient ok for diet from surgery standpoint. Surgery to sign off at this time, please contact us with questions or concerns.

## 2022-09-15 NOTE — PROGRESS NOTES
SPIRITUAL CARE DEPARTMENT - [facility]  PROGRESS NOTE    Shift date: 09/15/22  Shift day: Thursday   Shift # 2    Room # 7602/0979-41   Name: Gregg Jacobs                Catholic: Sue Pedro   Place of [employer]:     Referral:  Patient request    Admit Date & Time: 9/14/2022  6:57 PM    Assessment:  Gregg Jacobs is a 75 y.o. female     Intervention:  Writer introduced self and title as . Patient welcomed  presence and engaged in conversation about her esperanza and shared important Bible passages. Writer offered space for patient  to express feelings, needs, and concerns and provided a ministry presence.  brought Bible, prayer card, and prayed with patient. Outcome:  Patient expressed gratitude for visit. Plan:  Chaplains will remain available to offer spiritual and emotional support as needed.       Electronically signed by Demario Gamboa on 9/15/2022 at 5:03 PM.  [facility]  [phone]

## 2022-09-15 NOTE — ED NOTES
The following labs labeled with pt sticker and tubed to lab:     [x] Blue     [x] Lavender   [] on ice  [x] Green/yellow  [] Green/black [] on ice  [] Yellow  [] Red  [] Pink      [] COVID-19 swab    [] Rapid  [] PCR  [] Flu Swab  [] Strep Swab  [] Peds Viral Panel     [x] Urine Sample  [] Pelvic Cultures  [] Blood Cultures   [] Wound Cultures        Cheryl Ayala RN  09/15/22 1222

## 2022-09-15 NOTE — PROGRESS NOTES
SPIRITUAL CARE DEPARTMENT - [facility] [address]  PROGRESS NOTE    Shift date: 09/15/22  Shift day: Wednesday   Shift # 2    Room # 07/07   Name: Kacy Ledesma                [employer]: [address] [facility]:     Referral: Routine Visit    Admit Date & Time: 9/14/2022  6:57 PM    Assessment:  Kacy Ledesma is a 75 y.o. female     Intervention:  Writer introduced self and title as . Patient appeared to welcome  presence and engaged in conversation. Patient leans on her strong esperanza and is involved in her esperanza community. Writer offered space for patient  to express feelings, needs, and concerns and provided a ministry presence.  offered prayer for patient which was accepted. Patient offered prayer for  which was accepted. Outcome:  Patient expressed gratitude for  visit. Plan:  Chaplains will remain available to offer spiritual and emotional support as needed.       Electronically signed by Pamela Guthrie on 9/15/2022 at 12:19 AM.  Hugo Ernst  [phone]

## 2022-09-15 NOTE — ED NOTES
Pt appears in NAD- pt denies needs at this time, will continue to monitor.      Zuri Faust RN  09/15/22 5724

## 2022-09-15 NOTE — ED PROVIDER NOTES
Faculty Sign-Out Attestation  Handoff taken on the following patient from prior Attending Physician: Mariaa Davis    I was available and discussed any additional care issues that arose and coordinated the management plans with the resident(s) caring for the patient during my duty period. Any areas of disagreement with residents documentation of care or procedures are noted on the chart. I was personally present for the key portions of any/all procedures during my duty period. I have documented in the chart those procedures where I was not present during the key portions.     Abdominal pain with abscess on ct, discussing with surgery, & plan to admit    Juvenal Tena,   Attending Physician       Juvenal Tena,   09/15/22 0020    Admitting,      Juvenal Tena,   09/15/22 7559

## 2022-09-15 NOTE — H&P
[facility]  Office: [address], DO, Tai Blood, DO, Maikol Zoraidat, DO, kSip Lion Blood, DO, Kylee Vanegas MD, Fran Belle MD, Mona Lewis MD, Kyaw Coppola MD,  Virgil Bennett MD, Felipa Redding MD, Naomi Zhou, DO, Miguel Nugent MD,  Prema Dill MD, Nemo Garcia MD, Jay Muñoz DO, Mercedes Zaldivar MD, Lindy Ni MD, Rhiannon Solomon MD, Triny Walter MD, Anderson De Los Santos MD, Bradley Buck MD, Telma Mullins DO, Trent Landin MD, Anabelle Soto MD, Kimberly Apple, CNP,  Benita Alvarado CNP, Alexa Waite, CNP, Swathi Morgan, CNP,  Fozia Jackson, DNP, Ronald Dhaliwal, CNP, iGssel Couch, CNP, Harley Borges, CNP, Saravanan Ramirez, CNP, Smith Talavera, CNP, Marion Potter PA-C, Glenis Andre, CNS, Hayden Burgess, DNP, Jazlyn Rodriguez, CNP, Janine Olivarez, CNP, Mireya Taylor, CNP         [address] [facility]    HISTORY AND PHYSICAL EXAMINATION            Date:   9/15/2022  Patient name:  Denice Killian  Date of admission:  9/14/2022  6:57 PM  MRN:   [id number]  Account:  [de-identified]  YOB: 1955  PCP:    Vijay Olmedo DO  Room:   07/07  Code Status:    Prior    Chief Complaint:   Abdominal pain worsening after intially resolved   Abdo      History of Present Illness:     Denice Killian is a 75 y.o. AAF with underlying cardiovascular disease status post AICD for EF 15% who was seen last month in the hospital for abdominal pain, at that time she was managed with Cipro and Flagyl for diverticulitis as well as elective pacemaker replacement. She improved discharged home but returned to hospital today with concern for recurrent symptoms after intially  improving. She reported nausea, loose stools, abdominal cramping, fatigue.  While being worked up in ED, CT abomen Interval enlargement of the 4.1 cm deep left pelvic wall abscess which is  seen extending to the mid sigmoid colon consistent with fistulous tract and contained/walled-off abscess. Patient was started on abx, surgery consulted for possible I and D and fistula tract mgt. Past Medical History:     Past Medical History:   Diagnosis Date    Abnormal Pap smear     Acute on chronic systolic congestive heart failure (HCC)     AICD (automatic cardioverter/defibrillator) present     MAURO (acute kidney injury) ([facility] 03/04/2017    Anemia     Arthritis     Cardiomyopathy ([facility] 12/20/2012    robotic replacement of 2 left ventricular leads/replacement of ICD generator    Cardiomyopathy, nonischemic ([facility] 12/20/2012    Chronic combined systolic and diastolic congestive heart failure (HCC)     Ejection fraction < 50%     HTN (hypertension)     Hypothyroidism     Ischemic cardiomyopathy     Left bundle branch block     Mitral regurgitation     Morbid obesity due to excess calories ([facility] 01/15/2017    Pulmonary hypertension ([facility]     Sudden onset of severe headache     Torn rotator cuff     RIGHT SHOULDER        Past Surgical History:     Past Surgical History:   Procedure Laterality Date    COLONOSCOPY      HERNIA REPAIR      INSERT MIDLINE CATHETER  04/12/2022         OTHER SURGICAL HISTORY  12/20/2012    robotic placement of 2 left ventricular leads/replacement of ICD generator    OTHER SURGICAL HISTORY Left     ICD REPLACEMENT    PACEMAKER PLACEMENT      medtronic Viva XT CRT -D [id number] Serial : [id number]    TUBAL LIGATION          Medications Prior to Admission:     Prior to Admission medications    Medication Sig Start Date End Date Taking?  Authorizing Provider   levothyroxine (SYNTHROID) 25 MCG tablet Take 3 tablets by mouth Daily 9/13/22   Josue Jimenez DO   ciprofloxacin (CIPRO) 500 MG tablet Take 1 tablet by mouth 2 times daily for 10 days 9/8/22 9/18/22  Julia Gonzalez MD   metroNIDAZOLE (FLAGYL) 500 MG tablet Take 1 tablet by mouth 3 times daily for 10 days 9/8/22 9/18/22  Julia Gonzalez MD   acetaminophen (TYLENOL) 500 MG tablet Take 1 tablet by mouth every 6 hours as needed for Pain 8/5/22   Marvin Rodriguez MD   metoprolol succinate (TOPROL XL) 50 MG extended release tablet Take 50 mg by mouth daily    Historical Provider, MD   psyllium (KONSYL) 28.3 % PACK Take 1 packet by mouth daily    Historical Provider, MD   amiodarone (CORDARONE) 200 MG tablet  12/1/21   Historical Provider, MD   diclofenac sodium (VOLTAREN) 1 % GEL APPLY 2 GRAMS TOPICALLY 4 (FOUR) TIMES A DAY. Patient not taking: No sig reported 11/29/21   Historical Provider, MD   empagliflozin (JARDIANCE) 10 MG tablet Take 10 mg by mouth daily 1/4/22   Historical Provider, MD   potassium chloride (KLOR-CON M) 20 MEQ extended release tablet Take 1 tablet by mouth daily 12/8/21   Karis Rodriguez MD   magnesium oxide (MAG-OX) 400 MG tablet Take 0.5 tablets by mouth daily 12/8/21   Sarah Nichols MD   nitroGLYCERIN (NITROSTAT) 0.4 MG SL tablet up to max of 3 total doses. If no relief after 1 dose, call 911. 6/29/21   Geneva Norman MD   rivaroxaban Praveen Cerise) 20 MG TABS tablet Take 1 tablet by mouth daily 6/29/21   Geneva Norman MD   sacubitril-valsartan (ENTRESTO) 24-26 MG per tablet Take 0.5 tablets by mouth 2 times daily 6/29/21   Geneva Norman MD   bumetanide (BUMEX) 2 MG tablet Take 1 tablet by mouth daily 6/29/21   Geneva Norman MD   lidocaine (LIDODERM) 5 % Place one patch to both knees 12 hours on, 12 hours off. 11/23/20   Kimberly Gonzalez DO   allopurinol (ZYLOPRIM) 100 MG tablet Take 1 tablet by mouth daily 2/18/19   Yasmin Gagnon MD   spironolactone (ALDACTONE) 25 MG tablet Take 0.5 tablets by mouth daily 2/18/19   Yasmin Gagnon MD        Allergies:     Codeine and Morphine    Social History:     Tobacco:    reports that she has never smoked. She has never used smokeless tobacco.  Alcohol:      reports no history of alcohol use. Drug Use:  reports that she does not currently use drugs.     Family History:     Family History   Problem Relation Age of Onset    Cancer Other         ovarian    High Blood Pressure Mother     Other Mother         copd    Arthritis Neg Hx     Asthma Neg Hx     Birth Defects Neg Hx     Depression Neg Hx     Diabetes Neg Hx     Early Death Neg Hx     Hearing Loss Neg Hx     Heart Disease Neg Hx     High Cholesterol Neg Hx     Kidney Disease Neg Hx     Learning Disabilities Neg Hx     Mental Retardation Neg Hx     Mental Illness Neg Hx     Miscarriages / Stillbirths Neg Hx     Stroke Neg Hx     Substance Abuse Neg Hx     Vision Loss Neg Hx     Breast Cancer Neg Hx     Colon Cancer Neg Hx     Eclampsia Neg Hx     Hypertension Neg Hx     Ovarian Cancer Neg Hx      Labor Neg Hx     Spont Abortions Neg Hx        Review of Systems:     Positive and Negative as described in HPI. ROS as per reported above     Physical Exam:   /76   Pulse 81   Temp 97.9 °F (36.6 °C) (Oral)   Resp 16   Ht 5' 6\" (1.676 m)   Wt 205 lb (93 kg)   SpO2 100%   BMI 33.09 kg/m²   Temp (24hrs), Av.6 °F (36.4 °C), Min:97.3 °F (36.3 °C), Max:97.9 °F (36.6 °C)    No results for input(s): POCGLU in the last 72 hours.   No intake or output data in the 24 hours ending 09/15/22 0519    General Appearance: alert, well appearing, and moderate distress   Mental status: oriented to person, place, and time  Head: normocephalic, atraumatic  Eye: no icterus, redness, pupils equal and reactive, extraocular eye movements intact, conjunctiva clear  Ear: normal external ear, no discharge, hearing intact  Nose: no drainage noted  Mouth: mucous membranes moist  Neck: supple, no carotid bruits, thyroid not palpable  Lungs: Bilateral equal air entry, clear to ausculation, no wheezing, rales or rhonchi, normal effort  Cardiovascular: normal rate, regular rhythm, no murmur, gallop, rub  Abdomen: Neurologic: There are no new focal motor or sensory deficits, normal muscle tone and bulk, no abnormal sensation, normal speech, cranial nerves II through XII grossly intact  Skin: No gross lesions, rashes, bruising or bleeding on exposed skin area  Extremities: peripheral pulses palpable, no pedal edema or calf pain with palpation  Psych: normal affect    Investigations:      Laboratory Testing:  Recent Results (from the past 24 hour(s))   CBC with Auto Differential    Collection Time: 09/14/22  7:17 PM   Result Value Ref Range    WBC 8.8 3.5 - 11.3 k/uL    RBC 4.76 3.95 - 5.11 m/uL    Hemoglobin 14.6 11.9 - 15.1 g/dL    Hematocrit 39.2 36.3 - 47.1 %    MCV 82.4 (L) 82.6 - 102.9 fL    MCH 30.7 25.2 - 33.5 pg    MCHC 37.2 (H) 28.4 - 34.8 g/dL    RDW 13.5 11.8 - 14.4 %    Platelets 182 304 - 358 k/uL    MPV 10.3 8.1 - 13.5 fL    NRBC Automated 0.0 0.0 per 100 WBC    Seg Neutrophils 75 (H) 36 - 65 %    Lymphocytes 13 (L) 24 - 43 %    Monocytes 10 3 - 12 %    Eosinophils % 1 1 - 4 %    Basophils 1 0 - 2 %    Immature Granulocytes 0 0 %    Segs Absolute 6.70 1.50 - 8.10 k/uL    Absolute Lymph # 1.12 1.10 - 3.70 k/uL    Absolute Mono # 0.85 0.10 - 1.20 k/uL    Absolute Eos # 0.06 0.00 - 0.44 k/uL    Basophils Absolute 0.05 0.00 - 0.20 k/uL    Absolute Immature Granulocyte <0.03 0.00 - 0.30 k/uL    RBC Morphology MICROCYTOSIS PRESENT    CMP    Collection Time: 09/14/22  7:17 PM   Result Value Ref Range    Glucose 97 70 - 99 mg/dL    BUN 16 8 - 23 mg/dL    Creatinine 1.20 (H) 0.50 - 0.90 mg/dL    Calcium 9.2 8.6 - 10.4 mg/dL    Sodium 135 135 - 144 mmol/L    Potassium 3.6 (L) 3.7 - 5.3 mmol/L    Chloride 98 98 - 107 mmol/L    CO2 20 20 - 31 mmol/L    Anion Gap 17 9 - 17 mmol/L    Alkaline Phosphatase 226 (H) 35 - 104 U/L    ALT 18 5 - 33 U/L    AST 30 <32 U/L    Total Bilirubin 0.7 0.3 - 1.2 mg/dL    Total Protein 8.1 6.4 - 8.3 g/dL    Albumin 4.3 3.5 - 5.2 g/dL    Albumin/Globulin Ratio 1.1 1.0 - 2.5    GFR Non- 45 (L) >60 mL/min    GFR  54 (L) >60 mL/min    GFR Comment         TOX SCR, BLD, ED    Collection Time: 09/14/22  7:17 PM   Result Value Ref Range    Acetaminophen Level <5 (L) 10 - 30 ug/mL    Ethanol <10 <10 mg/dL    Ethanol percent <9.986 <0.736 %    Salicylate Lvl <1 (L) 3 - 10 mg/dL    Toxic Tricyclic Sc,Blood NEGATIVE NEGATIVE   Lipase    Collection Time: 09/14/22  7:17 PM   Result Value Ref Range    Lipase 18 13 - 60 U/L   TSH with Reflex    Collection Time: 09/14/22  7:17 PM   Result Value Ref Range    TSH 21.57 (H) 0.30 - 5.00 uIU/mL   Brain Natriuretic Peptide    Collection Time: 09/14/22  7:17 PM   Result Value Ref Range    Pro-BNP 2,644 (H) <300 pg/mL   Troponin    Collection Time: 09/14/22  7:17 PM   Result Value Ref Range    Troponin, High Sensitivity 23 (H) 0 - 14 ng/L   T4, Free    Collection Time: 09/14/22  7:17 PM   Result Value Ref Range    Thyroxine, Free 0.95 0.93 - 1.70 ng/dL       Imaging/Diagnostics:  CT ABDOMEN PELVIS WO CONTRAST Additional Contrast? None    Result Date: 9/13/2022  Interval enlargement of the 4.1 cm deep left pelvic wall abscess which is seen extending to the mid sigmoid colon consistent with fistulous tract and contained/walled-off abscess. No acute inflammatory changes to indicate diverticulitis at this time. XR CHEST PORTABLE    Result Date: 9/14/2022  Similar cardiomegaly. No acute cardiopulmonary abnormality is identified. Assessment :      Hospital Problems             Last Modified POA    * (Principal) Colonic diverticular abscess 9/15/2022 Yes     #Colonic abscess  -Likely sequelae from her recent diarrhea reticulitis infection last month. There is a concern for possible fistula formation. Patient does not appear toxic at this time. -Afebrile, hemodynamically stable with heart rate in the 80s  -CT confirming 4cm abcess with interval enlargement.   -no leukocytosis, lactic elelvation, tachycardia. Non toxic but in discomfort and weak. plan  -Patient may need a fistulogram to confirm fistula formation.  -obtain UA, cultures, procalcitonin, emperic abx, ID and surgery consult .  May need surgical clearance. Shes also on xeralto which will need to be held or changed to heparin for the time being.   -bowl rest, npo , serial abdominal exams, inflammatory markers    #HFrEF w/ severe MR  -EF 17%. Status post AICD. Troponin 23 without any chest pain. -proBNP 2644 in setting of a creatinine 1.2  -Currently on DAPT, bumex 2mg daily   -CXR un changed from baseline. No signs of CHF exacerbation. Monitor closely  -resume home meds, monitor carefuly, tele      #paroxysmal A fib  -Currently stable with HR in 70s-80s and stable BP. Recent thyroid work up unrevealing.   -on Xarelto 20 mg daily, amiodarone 200 mg, metoprolol 50 mg daily  -Patient will likely need her Xarelto held if surgical invention warranted.  -resume home meds, monitor on tele, replace Mg and K until goal  -Monitor on tele, as per above     #Hypothyroidism  -[facility], T3-T4 within normal range.   Resume home Synthroid

## 2022-09-16 ENCOUNTER — HOSPITAL ENCOUNTER (OUTPATIENT)
Dept: OTHER | Age: 67
Discharge: HOME | End: 2022-09-16

## 2022-09-16 PROBLEM — E66.09 CLASS 1 OBESITY DUE TO EXCESS CALORIES WITH BODY MASS INDEX (BMI) OF 33.0 TO 33.9 IN ADULT: Status: ACTIVE | Noted: 2017-01-15

## 2022-09-16 LAB
ANION GAP SERPL CALCULATED.3IONS-SCNC: 13 MMOL/L (ref 9–17)
BUN BLDV-MCNC: 20 MG/DL (ref 8–23)
CALCIUM SERPL-MCNC: 8.5 MG/DL (ref 8.6–10.4)
CHLORIDE BLD-SCNC: 104 MMOL/L (ref 98–107)
CO2: 19 MMOL/L (ref 20–31)
CREAT SERPL-MCNC: 1.25 MG/DL (ref 0.5–0.9)
GFR AFRICAN AMERICAN: 52 ML/MIN
GFR NON-AFRICAN AMERICAN: 43 ML/MIN
GFR SERPL CREATININE-BSD FRML MDRD: ABNORMAL ML/MIN/{1.73_M2}
GLUCOSE BLD-MCNC: 130 MG/DL (ref 70–99)
HCT VFR BLD CALC: 32.3 % (ref 36.3–47.1)
HEMOGLOBIN: 11.6 G/DL (ref 11.9–15.1)
INR BLD: 1.1
MAGNESIUM: 2.2 MG/DL (ref 1.6–2.6)
MCH RBC QN AUTO: 30 PG (ref 25.2–33.5)
MCHC RBC AUTO-ENTMCNC: 35.9 G/DL (ref 28.4–34.8)
MCV RBC AUTO: 83.5 FL (ref 82.6–102.9)
NRBC AUTOMATED: 0 PER 100 WBC
PDW BLD-RTO: 13.6 % (ref 11.8–14.4)
PLATELET # BLD: 207 K/UL (ref 138–453)
PMV BLD AUTO: 11.1 FL (ref 8.1–13.5)
POTASSIUM SERPL-SCNC: 3.3 MMOL/L (ref 3.7–5.3)
PROTHROMBIN TIME: 11.8 SEC (ref 9.1–12.3)
RBC # BLD: 3.87 M/UL (ref 3.95–5.11)
SODIUM BLD-SCNC: 136 MMOL/L (ref 135–144)
WBC # BLD: 5.9 K/UL (ref 3.5–11.3)

## 2022-09-16 PROCEDURE — 99232 SBSQ HOSP IP/OBS MODERATE 35: CPT | Performed by: FAMILY MEDICINE

## 2022-09-16 PROCEDURE — 36415 COLL VENOUS BLD VENIPUNCTURE: CPT

## 2022-09-16 PROCEDURE — 2580000003 HC RX 258: Performed by: INTERNAL MEDICINE

## 2022-09-16 PROCEDURE — 6360000002 HC RX W HCPCS: Performed by: INTERNAL MEDICINE

## 2022-09-16 PROCEDURE — 97530 THERAPEUTIC ACTIVITIES: CPT

## 2022-09-16 PROCEDURE — 83735 ASSAY OF MAGNESIUM: CPT

## 2022-09-16 PROCEDURE — 97535 SELF CARE MNGMENT TRAINING: CPT

## 2022-09-16 PROCEDURE — 6370000000 HC RX 637 (ALT 250 FOR IP): Performed by: FAMILY MEDICINE

## 2022-09-16 PROCEDURE — 97162 PT EVAL MOD COMPLEX 30 MIN: CPT

## 2022-09-16 PROCEDURE — 80048 BASIC METABOLIC PNL TOTAL CA: CPT

## 2022-09-16 PROCEDURE — 1200000000 HC SEMI PRIVATE

## 2022-09-16 PROCEDURE — 85610 PROTHROMBIN TIME: CPT

## 2022-09-16 PROCEDURE — 2500000003 HC RX 250 WO HCPCS: Performed by: INTERNAL MEDICINE

## 2022-09-16 PROCEDURE — 85027 COMPLETE CBC AUTOMATED: CPT

## 2022-09-16 PROCEDURE — 97165 OT EVAL LOW COMPLEX 30 MIN: CPT

## 2022-09-16 RX ORDER — AMIODARONE HYDROCHLORIDE 200 MG/1
200 TABLET ORAL DAILY
Status: DISCONTINUED | OUTPATIENT
Start: 2022-09-16 | End: 2022-09-19 | Stop reason: HOSPADM

## 2022-09-16 RX ORDER — POTASSIUM CHLORIDE 20 MEQ/1
40 TABLET, EXTENDED RELEASE ORAL ONCE
Status: COMPLETED | OUTPATIENT
Start: 2022-09-16 | End: 2022-09-16

## 2022-09-16 RX ORDER — BUMETANIDE 1 MG/1
2 TABLET ORAL DAILY
Status: CANCELLED | OUTPATIENT
Start: 2022-09-16

## 2022-09-16 RX ORDER — LEVOTHYROXINE SODIUM 0.07 MG/1
75 TABLET ORAL DAILY
Status: DISCONTINUED | OUTPATIENT
Start: 2022-09-16 | End: 2022-09-19 | Stop reason: HOSPADM

## 2022-09-16 RX ORDER — METOPROLOL SUCCINATE 25 MG/1
25 TABLET, EXTENDED RELEASE ORAL DAILY
Status: DISCONTINUED | OUTPATIENT
Start: 2022-09-16 | End: 2022-09-19 | Stop reason: HOSPADM

## 2022-09-16 RX ORDER — BUMETANIDE 1 MG/1
1 TABLET ORAL DAILY
Status: DISCONTINUED | OUTPATIENT
Start: 2022-09-16 | End: 2022-09-19 | Stop reason: HOSPADM

## 2022-09-16 RX ORDER — ALLOPURINOL 100 MG/1
100 TABLET ORAL DAILY
Status: DISCONTINUED | OUTPATIENT
Start: 2022-09-16 | End: 2022-09-19 | Stop reason: HOSPADM

## 2022-09-16 RX ADMIN — METOPROLOL SUCCINATE 25 MG: 25 TABLET, FILM COATED, EXTENDED RELEASE ORAL at 14:03

## 2022-09-16 RX ADMIN — FAMOTIDINE 20 MG: 10 INJECTION INTRAVENOUS at 21:18

## 2022-09-16 RX ADMIN — BUMETANIDE 1 MG: 1 TABLET ORAL at 14:03

## 2022-09-16 RX ADMIN — AMIODARONE HYDROCHLORIDE 200 MG: 200 TABLET ORAL at 14:03

## 2022-09-16 RX ADMIN — PIPERACILLIN AND TAZOBACTAM 3375 MG: 3; .375 INJECTION, POWDER, LYOPHILIZED, FOR SOLUTION INTRAVENOUS at 23:59

## 2022-09-16 RX ADMIN — OXYCODONE HYDROCHLORIDE AND ACETAMINOPHEN 1 TABLET: 5; 325 TABLET ORAL at 09:59

## 2022-09-16 RX ADMIN — LEVOTHYROXINE SODIUM 75 MCG: 75 TABLET ORAL at 09:59

## 2022-09-16 RX ADMIN — POTASSIUM BICARBONATE 40 MEQ: 782 TABLET, EFFERVESCENT ORAL at 14:03

## 2022-09-16 RX ADMIN — PIPERACILLIN AND TAZOBACTAM 3375 MG: 3; .375 INJECTION, POWDER, LYOPHILIZED, FOR SOLUTION INTRAVENOUS at 07:44

## 2022-09-16 RX ADMIN — FAMOTIDINE 20 MG: 10 INJECTION INTRAVENOUS at 07:41

## 2022-09-16 RX ADMIN — POTASSIUM CHLORIDE 40 MEQ: 1500 TABLET, EXTENDED RELEASE ORAL at 14:05

## 2022-09-16 RX ADMIN — PIPERACILLIN AND TAZOBACTAM 3375 MG: 3; .375 INJECTION, POWDER, LYOPHILIZED, FOR SOLUTION INTRAVENOUS at 16:08

## 2022-09-16 RX ADMIN — OXYCODONE HYDROCHLORIDE AND ACETAMINOPHEN 1 TABLET: 5; 325 TABLET ORAL at 16:26

## 2022-09-16 RX ADMIN — OXYCODONE HYDROCHLORIDE AND ACETAMINOPHEN 1 TABLET: 5; 325 TABLET ORAL at 04:18

## 2022-09-16 ASSESSMENT — ENCOUNTER SYMPTOMS
COUGH: 0
ABDOMINAL PAIN: 1
BLOOD IN STOOL: 0
WHEEZING: 0
VOMITING: 0
DIARRHEA: 0
CHEST TIGHTNESS: 0
CONSTIPATION: 0
NAUSEA: 0
SHORTNESS OF BREATH: 0

## 2022-09-16 ASSESSMENT — PAIN DESCRIPTION - DESCRIPTORS: DESCRIPTORS: DISCOMFORT

## 2022-09-16 ASSESSMENT — PAIN SCALES - GENERAL
PAINLEVEL_OUTOF10: 4
PAINLEVEL_OUTOF10: 5
PAINLEVEL_OUTOF10: 6

## 2022-09-16 ASSESSMENT — PAIN DESCRIPTION - LOCATION
LOCATION: ABDOMEN
LOCATION: KNEE;ABDOMEN

## 2022-09-16 ASSESSMENT — PAIN DESCRIPTION - ORIENTATION: ORIENTATION: MID

## 2022-09-16 NOTE — PROGRESS NOTES
[facility]  Office: [address], DO, Cali Tan, DO, Bita Tamera, DO, Chanagreg Dobbsevelyn Thomas, DO, Yogi Arenas MD, Jess Carty MD, Sheila Caicedo MD, Lynne Orellana MD,  Anabella Rosen MD, Mone Buchanan MD, Kenneth Thayer DO, María Correia MD,  Yusuf Coffey MD, Vinay Nergon MD, Jerman Pierce DO, Felipe Crawford MD, Eliseo Lopez MD, Briana Key MD, Charu Carrasco MD, Salinas Booth MD, Shwetha Kelley MD, Mae Hartman DO, Tania Hawkins MD, Lefty Rm MD, Ruperto Pena CNP,  Waqar Gaming, CNP, Stephanie Ontiveros, CNP, China Hall, CNP,  Manuela Montejo, [facility], Tyron Lopez, CNP, Mellissa Luna, CNP, Kaity Rosa, CNP, Mary Coronado, CNP, Valorie Fuchs, CNP, Pepe Richard PA-C, Alma Lee, CNS, Michael Hernandez, [facility], Jayjay Catalan, CNP, Tomasz Wong, [facility], [facility], [facility]    Progress Note    9/16/2022    4:26 PM    Name:   Mary Beth Vazquez  MRN:     [id number]     Acct:      [de-identified]   Room:   [address] [place] [address] Day:  1  Admit Date:  9/14/2022  6:57 PM    PCP:   Nette Meehan DO  Code Status:  Full Code    Subjective:     C/C:   Chief Complaint   Patient presents with    Abdominal Pain    Diverticulitis     Interval History Status: improved. Patient seen and examined at bedside, no acute events overnight. Pain is better  K is low  Patient denies any chest pain, shortness of breath, chills, fevers, nausea or vomiting. Patient vitals, labs and all providers notes were reviewed,from overnight shift and morning updates were noted and discussed with the nurse      Brief History:     Per chart  Mary Beth Vazquez is a 75 y.o.  AAF with underlying cardiovascular disease status post AICD for EF 15% who was seen last month in the hospital for abdominal pain, at that time she was managed with Cipro and Flagyl for diverticulitis as well as elective pacemaker replacement. She improved discharged home but returned to hospital today with concern for recurrent symptoms after intially  improving. She reported nausea, loose stools, abdominal cramping, fatigue. While being worked up in ED, CT abomen Interval enlargement of the 4.1 cm deep left pelvic wall abscess which is  seen extending to the mid sigmoid colon consistent with fistulous tract and contained/walled-off abscess. Patient was started on abx, surgery consulted for possible I and D and fistula tract mgt. Review of Systems:     Review of Systems   Constitutional:  Positive for activity change and appetite change. Negative for chills, diaphoresis and fever. HENT:  Negative for congestion. Eyes:  Negative for visual disturbance. Respiratory:  Negative for cough, chest tightness, shortness of breath and wheezing. Cardiovascular:  Negative for chest pain, palpitations and leg swelling. Gastrointestinal:  Positive for abdominal pain. Negative for blood in stool, constipation, diarrhea, nausea and vomiting. Genitourinary:  Negative for difficulty urinating. Neurological:  Negative for dizziness, weakness, light-headedness, numbness and headaches. All other systems reviewed and are negative. Medications: Allergies:     Allergies   Allergen Reactions    Codeine      Hallucinations    Morphine      Hallucinations         Current Meds:   Scheduled Meds:    levothyroxine  75 mcg Oral Daily    allopurinol  100 mg Oral Daily    amiodarone  200 mg Oral Daily    metoprolol succinate  25 mg Oral Daily    bumetanide  1 mg Oral Daily    potassium bicarb-citric acid  40 mEq Oral Daily    sodium chloride flush  5-40 mL IntraVENous 2 times per day    piperacillin-tazobactam  3,375 mg IntraVENous Q8H    famotidine (PEPCID) injection  20 mg IntraVENous BID     Continuous Infusions:    sodium chloride      dextrose       PRN Meds: sodium chloride flush, sodium chloride, magnesium sulfate, ondansetron **OR** ondansetron, [DISCONTINUED] acetaminophen **OR** acetaminophen, potassium chloride, morphine, glucose, dextrose bolus **OR** dextrose bolus, glucagon (rDNA), dextrose, oxyCODONE-acetaminophen    Data:     Past Medical History:   has a past medical history of Abnormal Pap smear, Acute on chronic systolic congestive heart failure ([facility] [address] AICD (automatic cardioverter/defibrillator) present, MAURO (acute kidney injury) ([facility] [address] Anemia, Arthritis, Cardiomyopathy ([facility] [address] Cardiomyopathy, nonischemic ([facility] [address] Chronic combined systolic and diastolic congestive heart failure ([facility] [address] Ejection fraction < 50%, HTN (hypertension), Hypothyroidism, Ischemic cardiomyopathy, Left bundle branch block, Mitral regurgitation, Morbid obesity due to excess calories ([facility] [address] Pulmonary hypertension ([facility] [address] Sudden onset of severe headache, and Torn rotator cuff. Social History:   reports that she has never smoked. She has never used smokeless tobacco. She reports that she does not currently use drugs. She reports that she does not drink alcohol.      Family History:   Family History   Problem Relation Age of Onset    Cancer Other         ovarian    High Blood Pressure Mother     Other Mother         copd    Arthritis Neg Hx     Asthma Neg Hx     Birth Defects Neg Hx     Depression Neg Hx     Diabetes Neg Hx     Early Death Neg Hx     Hearing Loss Neg Hx     Heart Disease Neg Hx     High Cholesterol Neg Hx     Kidney Disease Neg Hx     Learning Disabilities Neg Hx     Mental Retardation Neg Hx     Mental Illness Neg Hx     Miscarriages / Stillbirths Neg Hx     Stroke Neg Hx     Substance Abuse Neg Hx     Vision Loss Neg Hx     Breast Cancer Neg Hx     Colon Cancer Neg Hx     Eclampsia Neg Hx     Hypertension Neg Hx     Ovarian Cancer Neg Hx      Labor Neg Hx     Spont Abortions Neg Hx        Vitals:  /63   Pulse 73   Temp 97.9 °F (36.6 °C) (Oral)   Resp 15   Ht 5' 6\" (1.676 m)   Wt 205 lb (93 kg)   SpO2 98%   BMI 33.09 kg/m²   Temp (24hrs), Av.8 °F (36.6 °C), Min:97.7 °F (36.5 °C), Max:97.9 °F (36.6 °C)    Recent Labs     09/15/22  1111 09/15/22  1900   POCGLU 96 122*       I/O (24Hr):   No intake or output data in the 24 hours ending 22 1626    Labs:  Hematology:  Recent Labs     09/14/22  1917 09/15/22  0737 22  0348   WBC 8.8  --  5.9   RBC 4.76  --  3.87*   HGB 14.6  --  11.6*   HCT 39.2  --  32.3*   MCV 82.4*  --  83.5   MCH 30.7  --  30.0   MCHC 37.2*  --  35.9*   RDW 13.5  --  13.6     --  207   MPV 10.3  --  11.1   SEDRATE 73*  --   --    CRP  --  17.2*  --    INR  --  1.1 1.1     Chemistry:  Recent Labs     09/14/22  1917 09/15/22  1126 22  0348     --  136   K 3.6*  --  3.3*   CL 98  --  104   CO2 20  --  19*   GLUCOSE 97 96 130*   BUN 16  --  20   CREATININE 1.20*  --  1.25*   MG  --   --  2.2   ANIONGAP 17  --  13   LABGLOM 45*  --  43*   GFRAA 54*  --  52*   CALCIUM 9.2  --  8.5*   PROBNP 2,644*  --   --    TROPHS 23*  --   --      Recent Labs     09/14/22  1917 09/15/22  1111 09/15/22  1900   PROT 8.1  --   --    LABALBU 4.3  --   --    TSH 21.57*  --   --    AST 30  --   --    ALT 18  --   --    ALKPHOS 226*  --   --    BILITOT 0.7  --   --    LIPASE 18  --   --    POCGLU  --  96 122*     ABG:  Lab Results   Component Value Date/Time    PH 7.44 2012 02:45 PM    PCO2 33 2012 02:45 PM    PO2 416 2012 02:45 PM    HCO3 22.8 2012 02:45 PM    O2SAT 100 2012 02:45 PM    FIO2 NOT REPORTED 2012 02:45 PM     Lab Results   Component Value Date/Time    SPECIAL NOT REPORTED 2021 10:30 AM    SPECIAL NOT REPORTED 2021 10:30 AM     Lab Results   Component Value Date/Time    CULTURE PATIENT DISCHARGED BEFORE SAMPLE COLLECTED 2021 10:30 AM       Radiology:  CT ABDOMEN PELVIS WO CONTRAST Additional Contrast? None    Result Date: 2022  Interval enlargement of the 4.1 cm deep left pelvic wall abscess which is seen extending to the mid sigmoid colon consistent with fistulous tract and contained/walled-off abscess. No acute inflammatory changes to indicate diverticulitis at this time. XR CHEST PORTABLE    Result Date: 9/15/2022  Similar cardiomegaly. No acute cardiopulmonary abnormality is identified. Physical Examination:        Physical Exam  Vitals and nursing note reviewed. Constitutional:       General: She is not in acute distress. HENT:      Head: Normocephalic and atraumatic. Eyes:      Conjunctiva/sclera: Conjunctivae normal.      Pupils: Pupils are equal, round, and reactive to light. Cardiovascular:      Rate and Rhythm: Normal rate and regular rhythm. Heart sounds: No murmur heard. Pulmonary:      Effort: Pulmonary effort is normal. No accessory muscle usage or respiratory distress. Breath sounds: No stridor. No decreased breath sounds, wheezing, rhonchi or rales. Abdominal:      General: Bowel sounds are normal. There is no distension. Palpations: Abdomen is soft. Abdomen is not rigid. Tenderness: There is abdominal tenderness in the left lower quadrant. There is no guarding. Musculoskeletal:         General: No tenderness. Right lower leg: Edema present. Left lower leg: Edema present. Skin:     General: Skin is warm and dry. Findings: No erythema, lesion or rash. Neurological:      Mental Status: She is alert and oriented to person, place, and time. Cranial Nerves: No cranial nerve deficit. Motor: No seizure activity. Psychiatric:         Speech: Speech normal.         Behavior: Behavior normal. Behavior is cooperative.        Assessment:        Hospital Problems             Last Modified POA    * (Principal) Colonic diverticular abscess 9/16/2022 Yes    Dilated cardiomyopathy ([address] (Chronic) 9/16/2022 Yes    PAH (pulmonary artery hypertension) ([address] 34 on Echo  (Chronic) 9/16/2022 Yes    Hypokalemia 9/16/2022 Yes    AICD (automatic cardioverter/defibrillator) present 9/16/2022 Yes    Class 1 obesity due to excess calories with body mass index (BMI) of 33.0 to 33.9 in adult 9/16/2022 Yes       Plan:           Colonic abscess   -CT confirming 4cm abcess with interval enlargement.   -Likely sequelae from her recent diarrhea reticulitis infection last month. There is a concern for possible fistula formation.    -Continues to be afebrile, hemodynamically stable with no leukocytosis   - IR culd not place the drain given location  - Continue IV Abx  - Per surgery , finish 14 days then reasses as she is a poor surgicalcandidate     HFrEF w/ severe MR  -EF 17%. Status post AICD ( battery recently replaced)   Troponin 23 without any chest pain. -proBNP 2644 in setting of a creatinine 1.2  -Currently on DAPT, bumex 2mg daily   -CXR un changed from baseline. No signs of CHF exacerbation. Monitor closely  -resume home meds, monitor carefuly, tele        Paroxysmal A fib  -Currently stable with HR in 70s-80s and stable BP. Recent thyroid work up unrevealing.   -on Xarelto 20 mg daily, amiodarone 200 mg, metoprolol 50 mg daily  -Patient will likely need her Xarelto held if surgical invention warranted.  -resume home meds, monitor on tele, replace Mg and K until goal  -Monitor on tele, as per above      Hypothyroidism  -CHI, T3-T4 within normal range. Resume home Synthroid    Hypokalemia.   Replace, resume home p.o. dose    Continue appropriate home medications    Disussed with the patient and nurse    To discharge in the next 24 hours    Kennedy Gibbs MD  9/16/2022  4:26 PM

## 2022-09-16 NOTE — DISCHARGE INSTR - COC
Continuity of Care Form    Patient Name: Jake Rowe   :    MRN:  [id number]    Admit date:  2022  Discharge date:  2022     Code Status Order: Full Code   Advance Directives:     Admitting Physician:  Renold Galeazzi, MD  PCP: Prudencio Xie DO    Discharging Nurse:  Maurice Hanley, [address] [place] [address] Unit/Room#: 6634/5074-45  Discharging Unit Phone Number: [phone]    Emergency Contact:   Extended Emergency Contact Information  Primary Emergency Contact: GuillenRagini  Address: NOT AVAILABLE   [address] Phone: [phone]  Work Phone: [phone]  Mobile Phone: [phone]  Relation: Child    Past Surgical History:  Past Surgical History:   Procedure Laterality Date    COLONOSCOPY      HERNIA REPAIR      INSERT MIDLINE CATHETER  2022         OTHER SURGICAL HISTORY  2012    robotic placement of 2 left ventricular leads/replacement of ICD generator    OTHER SURGICAL HISTORY Left     ICD REPLACEMENT    PACEMAKER PLACEMENT      medtronic Viva XT CRT Charmaine Murray [id number] Serial : [id number]    TUBAL LIGATION         Immunization History:   Immunization History   Administered Date(s) Administered    COVID-19, PFIZER PURPLE top, DILUTE for use, (age 15 y+), 30mcg/0.3mL 2021, 2021    Influenza, AFLURIA (age 1 yrs+), FLUZONE, (age 10 mo+), MDV, 0.5mL 2016    Influenza, Quadv, 6 mo and older, IM (Fluzone, Flulaval) 2018    Pneumococcal Conjugate 13-valent (Lourena Gloss) 2019       Active Problems:  Patient Active Problem List   Diagnosis Code    Hypokalemia E87.6    Dilated cardiomyopathy ([facility] [address] I42.0    Pre-diabetes R73.03    Essential hypertension I10    PAH (pulmonary artery hypertension) ([facility] [address] 34 on Echo  I27.21    Mitral regurgitation I34.0    Chronic headache R51.9, G89.29    Class 1 obesity due to excess calories with body mass index (BMI) of 33.0 to 33.9 in adult E66.09, M38.77    AICD (automatic cardioverter/defibrillator) present Z95.810 Syncope and collapse secondary to [facility] R55    MAURO (acute kidney injury) ([facility] [address] N17.9    ICD (implantable cardioverter-defibrillator) battery depletion Z45.02    Acute on chronic systolic CHF (congestive heart failure) ([facility]) I50.23    Gout M10.9    Dizziness R42    Benign paroxysmal positional vertigo H81.10    Chronic heart failure with reduced ejection fraction and diastolic dysfunction ([facility]) I50.42    Hypotension (arterial) I95.9    NSVT (nonsustained ventricular tachycardia) ([facility]) I47.2    Chest pain R07.9    Hypothyroidism E03.9    Chronic combined systolic (congestive) and diastolic (congestive) heart failure ([facility]) I50.42    Acute diverticulitis K57.92    Abscess of sigmoid colon K63.0    Diverticulitis K57.92    Nausea & vomiting R11.2    Diverticulitis of large intestine with abscess without bleeding K57.20    Diverticulitis of colon K57.32    Lower abdominal pain R10.30    Colonic diverticular abscess K57.20       Isolation/Infection:   Isolation            No Isolation          Patient Infection Status       Infection Onset Added Last Indicated Last Indicated By Review Planned Expiration Resolved Resolved By    None active    Resolved    COVID-19 21 COVID-19   21     COVID-19 (Rule Out) 21 COVID-19 (Ordered)   21 Rule-Out Test Resulted            Nurse Assessment:  Last Vital Signs: /75   Pulse 80   Temp 98.6 °F (37 °C) (Oral)   Resp 16   Ht 5' 6\" (1.676 m)   Wt 205 lb (93 kg)   SpO2 98%   BMI 33.09 kg/m²     Last documented pain score (0-10 scale): Pain Level: 6  Last Weight:   Wt Readings from Last 1 Encounters:   22 205 lb (93 kg)     Mental Status:  oriented and alert    IV Access:  - None    Nursing Mobility/ADLs:  Walking   Independent  Transfer  Independent  Bathing  Independent  Dressing  Independent  [address]  Independent  Med Delivery   whole    [address] [facility] Documentation and Therapy:  Incision 08/15/22 Sternum Left;Upper (Active)   Dressing Status Clean;Dry; Intact 09/16/22 0800   Dressing/Treatment Surgical glue;Steri-strips 09/16/22 0800   Drainage Amount None 08/15/22 1628   Number of days: 32       Incision 06/03/19 (Active)   Number of days: 1201        Elimination:  Continence: Bowel: Yes  Bladder: Yes  Urinary Catheter: None   Colostomy/Ileostomy/Ileal Conduit: No       Date of Last BM: 9/19/2022   No intake or output data in the 24 hours ending 09/16/22 1701  No intake/output data recorded. Safety Concerns: At Risk for Falls    Impairments/Disabilities:      None    Nutrition Therapy:  Current Nutrition Therapy:   - Oral Diet:  General    Routes of Feeding: Oral  Liquids: No Restrictions  Daily Fluid Restriction: yes - amount 2000 ml  Last Modified Barium Swallow with Video (Video Swallowing Test): not done    Treatments at the Time of Hospital Discharge:   Respiratory Treatments: NA  Oxygen Therapy:  is not on home oxygen therapy.   Ventilator:    - No ventilator support    Rehab Therapies: Physical Therapy and Occupational Therapy  Weight Bearing Status/Restrictions: No weight bearing restrictions  Other Medical Equipment (for information only, NOT a DME order):  walker, bath bench, and bedside commode  Other Treatments: NA    Patient's personal belongings (please select all that are sent with patient):  None    RN SIGNATURE:  Electronically signed by Sarah David RN on 9/19/22 at 11:31 AM EDT    CASE MANAGEMENT/SOCIAL WORK SECTION    Inpatient Status Date: ***    Readmission Risk Assessment Score:  Readmission Risk              Risk of Unplanned Readmission:  22           Discharging to Facility/ Agency   Name:  [address]  Address:  Phone:  Fax:    Dialysis Facility (if applicable)   Name:  Address:  Dialysis Schedule:  Phone:  Fax:    / signature: {Esignature:[id number]}    PHYSICIAN SECTION    Prognosis: Good    Condition at Discharge: Stable    Rehab Potential (if transferring to Rehab): Fair    Recommended Labs or Other Treatments After Discharge:     Physician Certification: I certify the above information and transfer of Celia Damon  is necessary for the continuing treatment of the diagnosis listed and that she requires Home Care for greater 30 days.      Update Admission H&P: No change in H&P    PHYSICIAN SIGNATURE:  Electronically signed by Darylene Sparks, MD on 9/19/22 at 11:38 AM EDT

## 2022-09-16 NOTE — PROGRESS NOTES
Physical Therapy  Facility/Department: [facility]  Physical Therapy Initial Assessment    Name: Ana Garcia  : 1955  MRN: [id number]  Date of Service: 2022  Chief Complaint   Patient presents with    Abdominal Pain    Diverticulitis      Discharge Recommendations:  Continue to assess pending progress     Patient Diagnosis(es): The encounter diagnosis was Colonic diverticular abscess. Past Medical History:  has a past medical history of Abnormal Pap smear, Acute on chronic systolic congestive heart failure ([address] AICD (automatic cardioverter/defibrillator) present, MAURO (acute kidney injury) ([address] Anemia, Arthritis, Cardiomyopathy ([address] Cardiomyopathy, nonischemic ([address] Chronic combined systolic and diastolic congestive heart failure ([address] Ejection fraction < 50%, HTN (hypertension), Hypothyroidism, Ischemic cardiomyopathy, Left bundle branch block, Mitral regurgitation, Morbid obesity due to excess calories ([address] Pulmonary hypertension ([address] Sudden onset of severe headache, and Torn rotator cuff. Past Surgical History:  has a past surgical history that includes other surgical history (2012); Tubal ligation; hernia repair; Colonoscopy; pacemaker placement; Insert Midline Catheter (2022); and other surgical history (Left). Assessment   Body Structures, Functions, Activity Limitations Requiring Skilled Therapeutic Intervention: Decreased functional mobility ; Decreased strength;Decreased endurance  Assessment: The pt ambulated 50 ft without a device x CGA. She was fairly steady but walked with an increased YAMIL. Will continue with gait training and progress as tolerated.   Therapy Prognosis: Good  Decision Making: Medium Complexity  Requires PT Follow-Up: Yes  Activity Tolerance  Activity Tolerance: Patient limited by fatigue;Patient limited by endurance     Plan   Plan  Plan:  (5-6x wk)  Current Treatment Recommendations: Strengthening, Functional mobility training, Transfer training, Endurance training, Stair training, Gait training, Safety education & training  Safety Devices  Type of Devices: Left in bed, Call light within reach, Nurse notified  Restraints  Restraints Initially in Place: No     Restrictions  Restrictions/Precautions  Required Braces or Orthoses?: No  Implants present? : Pacemaker  Position Activity Restriction  Other position/activity restrictions: up with assistance     Subjective   Pain: Pt reports pain at abscess site 4/10  General  Patient assessed for rehabilitation services?: Yes  Response To Previous Treatment: Not applicable  Family / Caregiver Present: No  Follows Commands: Within Functional Limits  Subjective  Subjective: Pt reports 2/10 pain in her knees         Social/Functional History  Social/Functional History  Lives With: Parent (mother)  Type of Home: House (Pt resides in lower level duplex)  Home Layout: One level  Home Access: Stairs to enter with rails  Entrance Stairs - Number of Steps: 3 steps to enter  Entrance Stairs - Rails: Both  Bathroom Shower/Tub: Tub/Shower unit, Curtain  Bathroom Toilet: Standard  Bathroom Equipment: Tub transfer bench, Hand-held shower, Grab bars around toilet  Home Equipment: [place], Grab [employer]  Has the patient had two or more falls in the past year or any fall with injury in the past year?: No  Receives Help From: Family  ADL Assistance: [address]: Independent  Homemaking Responsibilities: Yes  Meal Prep Responsibility: Primary  Laundry Responsibility: Primary (pt takes laundry to [facility] [employer])  Cleaning Responsibility: Primary  Bill Paying/Finance Responsibility: Primary  Shopping Responsibility: Primary  [address]: Primary (caring for mother)  Ambulation Assistance: Independent  Transfer Assistance: Independent  Active : No  Occupation: On disability  Type of Occupation: HHA  Leisure & Hobbies: watching tv  Additional Comments: pts brother from [place] in town helping care for mother.  Pt had home OT/PT in 2 x week, RN just finished for vitals and wound management  Vision/Hearing  Vision  Vision: Within Functional Limits  Hearing  Hearing: Within functional limits    Cognition   Orientation  Overall Orientation Status: Within Functional Limits  Cognition  Overall Cognitive Status: WFL     Objective   Heart Rate: 73  Heart Rate Source: Monitor  BP: 114/63  BP Location: Left upper arm  BP Method: Automatic  Patient Position: Sitting  MAP (Calculated): 80  Resp: 15  SpO2: 98 %  O2 Device: None (Room air)     AROM RLE (degrees)  RLE AROM: WNL  AROM LLE (degrees)  LLE AROM : WNL  AROM RUE (degrees)  RUE AROM : WNL  AROM LUE (degrees)  LUE AROM : WNL  Strength RLE  Strength RLE: WNL  Strength LLE  Strength LLE: WNL  Strength RUE  Strength RUE: WFL  Strength LUE  Strength LUE: WFL        Bed Mobility Training  Bed Mobility Training: Yes  Overall Level of Assistance: Independent  Interventions: Demonstration  Rolling: Independent  Supine to Sit: Independent  Sit to Supine: Independent  Scooting: Independent  Balance  Sitting: Intact  Transfer Training  Transfer Training: Yes  Overall Level of Assistance: Supervision  Interventions: Demonstration  Sit to Stand: Supervision  Stand to Sit: Supervision  Bed mobility  Supine to Sit: Stand by assistance  Sit to Supine: Stand by assistance  Scooting: Stand by assistance  Transfers  Sit to Stand: Contact guard assistance  Stand to sit: Contact guard assistance  Ambulation  Surface: level tile  Device: No Device  Assistance: Contact guard assistance  Distance: amb 50 ft without a device x CGA     Balance  Posture: Good  Sitting - Static: Good  Sitting - Dynamic: Fair  Standing - Static: Good  Standing - Dynamic: Fair     OutComes Score       AM-PAC Score  AM-PAC Inpatient Mobility Raw Score : 21 (09/16/22 1504)  AM-PAC Inpatient T-Scale Score : 50.25 (09/16/22 1504)  Mobility Inpatient CMS 0-100% Score: 28.97 (09/16/22 1504)  Mobility Inpatient CMS G-Code Modifier : CJ (09/16/22 1504)     Tinneti Score     Goals  Short Term Goals  Time Frame for Short term goals: 10 visits  Short term goal 1: transfers with SBA  Short term goal 2: amb 150 ft without a device x SBA  Short term goal 3: ascend/descend 4 steps with SBA  Short term goal 4: 20 min exercise program x SBA  Patient Goals   Patient goals : Return home     Education  Patient Education  Education Given To: Patient  Education Provided: Role of Therapy;Plan of Care  Education Method: Verbal  Barriers to Learning: None  Education Outcome: Verbalized understanding    Therapy Time   Individual Concurrent Group Co-treatment   Time In 5132         Time Out 1345         Minutes 30             1 of [address] [facility], PT

## 2022-09-16 NOTE — PROGRESS NOTES
Occupational Therapy  Facility/Department: [facility]  Occupational Therapy Initial Assessment    Name: Lashon Walter  : 1955  MRN: [id number]  Date of Service: 2022    Discharge Recommendations:Further therapy recommended at discharge. Copied from Emergency Medicine: Lashon Walter is a 75 y.o. female who presents with abdominal pain. Patient states she was recently seen by her physician for concerns of diverticulitis. She underwent a CT on Monday and states she was started on antibiotics for possible diverticulitis. She has a history of abscess from diverticulitis. She has had continued pain despite taking the antibiotics for several days. Patient also presents with concerns of lower extremity swelling and bouts of confusion. States she has picked up her phone multiple times and forgot what she was doing. This is new for her, no history of stroke. She denies any weakness, numbness, tingling. No change in speech. No difficulty ambulating. Patient follows with GI and her PCP frequently. She also had her pacemaker replaced in early August and states this one \"feels different\". She has had increased pain in this region. No fevers, chills, chest pain, shortness of breath, palpitations    Patient Diagnosis(es): The encounter diagnosis was Colonic diverticular abscess.     Past Medical History:  has a past medical history of Abnormal Pap smear, Acute on chronic systolic congestive heart failure ([address] AICD (automatic cardioverter/defibrillator) present, MAURO (acute kidney injury) ([address] Anemia, Arthritis, Cardiomyopathy ([address] Cardiomyopathy, nonischemic ([address] Chronic combined systolic and diastolic congestive heart failure ([address] Ejection fraction < 50%, HTN (hypertension), Hypothyroidism, Ischemic cardiomyopathy, Left bundle branch block, Mitral regurgitation, Morbid obesity due to excess calories ([address] Pulmonary hypertension ([address] Sudden onset of severe headache, and Torn rotator cuff. Past Surgical History:  has a past surgical history that includes other surgical history (12/20/2012); Tubal ligation; hernia repair; Colonoscopy; pacemaker placement; Insert Midline Catheter (04/12/2022); and other surgical history (Left). Treatment Diagnosis: Colonic Divertiular Abscess    Assessment   Pt lying supine in bed upon entrance to room. Pt completed bed mobility with I. Pt sat at eob 25 minutes with good unsupported sitting balance. Sit to stand with I. Pt completed dynamic mob in room with supervision assistance. Please refer to below assist levels for adl completion. Pt ed on OT POC, safety awareness tech, proper hand placement for transfers, with good return. Pt retired seated edge of bed with call light and phone in reach. All needs met upon exit.    Treatment Diagnosis: Colonic Divertiular Abscess  Prognosis: Good  Decision Making: Low Complexity  REQUIRES OT FOLLOW-UP: Yes  Activity Tolerance  Activity Tolerance: Patient Tolerated treatment well        Plan   Plan  Times per Week: 1-2 Tx Sessions     Restrictions  Restrictions/Precautions  Required Braces or Orthoses?: No  Implants present? : Pacemaker  Position Activity Restriction  Other position/activity restrictions: up with assistance    Subjective   General  Patient assessed for rehabilitation services?: Yes  Family / Caregiver Present: No  Pt reports pain at abscess site 4/10    Social/Functional History  Social/Functional History  Lives With: Parent (mother)  Type of Home: House (Pt resides in lower level duplex)  Home Layout: One level  Home Access: Stairs to enter with rails  Entrance Stairs - Number of Steps: 3 steps to enter  Entrance Stairs - Rails: Both  Bathroom Shower/Tub: Tub/Shower unit, Curtain  Bathroom Toilet: Standard  Bathroom Equipment: Tub transfer bench, Hand-held shower, Grab bars around toilet  [facility] [address] [address], Grab bars  Has the patient had two or more falls in the past year or any fall with injury in the past year?: No  Receives Help From: Family (supportive brother)  ADL Assistance: Independent  Homemaking Assistance: Independent  Homemaking Responsibilities: Yes  Meal Prep Responsibility: Primary  Laundry Responsibility: Primary (pt takes laundry to [employer])  Cleaning Responsibility: Primary  Bill Paying/Finance Responsibility: Primary  Shopping Responsibility: Primary  Dependent Care Responsibility: Primary (caring for mother)  Ambulation Assistance: Independent  Transfer Assistance: Independent  Active : No (pt has a license, but no car)  Occupation: On disability  Type of Occupation: HHA  Leisure & Hobbies: watching tv  Additional Comments: pts brother from [place] in [facility] helping care for mother. Pt had home OT/PT in 2 x week, RN just finished for vitals and wound management     Objective   SpO2: 98 %  O2 Device: None (Room air)       Safety Devices  Type of Devices: Left in bed;Call light within reach  Restraints  Restraints Initially in Place: No  Bed Mobility Training  Bed Mobility Training: Yes  Overall Level of Assistance: Independent  Interventions: Demonstration  Rolling: Independent  Supine to Sit: Independent  Sit to Supine: Independent  Scooting: Independent  Balance  Sitting: Intact  Transfer Training  Transfer Training: Yes  Overall Level of Assistance: Supervision  Interventions: Demonstration  Sit to Stand: Supervision  Stand to Sit: Supervision     AROM: Within functional limits  PROM: Within functional limits  Strength:  Within functional limits  Coordination: Within functional limits  Tone: Normal  Sensation: Intact (pt denies numbness/tingling B hands)  ADL  Feeding: Independent;Setup  Grooming: Independent;Setup  UE Bathing: Independent;Setup  LE Bathing: Supervision (for safety only)  UE Dressing: Independent;Setup  LE Dressing: Setup;Supervision (for safety only)  Toileting: Independent;Setup     Bed mobility  Rolling to Right: Independent  Supine to Sit: Independent  Sit to Supine: Independent  Scooting: Independent  Transfers  Sit to stand: Supervision  Stand to sit: Supervision  Transfer Comments: for safety only progressing to I. pt with good balance and steady on feet. Vision - Basic Assessment  Prior Vision: No visual deficits  Patient Visual Report: No visual complaint reported.   Vision  Vision: Within Functional Limits  Hearing  Hearing: Within functional limits  Cognition  Overall Cognitive Status: WFL  Orientation  Overall Orientation Status: Within Functional Limits     LUE PROM (degrees)  LUE PROM: WFL  LUE AROM (degrees)  LUE AROM : WFL  Left Hand PROM (degrees)  Left Hand PROM: WFL  Left Hand AROM (degrees)  Left Hand AROM: WFL  RUE PROM (degrees)  RUE PROM: WFL  RUE AROM (degrees)  RUE AROM : WFL  Right Hand PROM (degrees)  Right Hand PROM: WFL  Right Hand AROM (degrees)  Right Hand AROM: WFL     Hand Dominance  Hand Dominance: Right     AM-PAC Score  AM-PAC Inpatient Daily Activity Raw Score: 24 (09/16/22 1438)  AM-PAC Inpatient ADL T-Scale Score : 57.54 (09/16/22 1438)  ADL Inpatient CMS 0-100% Score: 0 (09/16/22 1438)  ADL Inpatient CMS G-Code Modifier : CH (09/16/22 1438)      Goals  Short Term Goals  Time Frame for Short term goals: Pt will, by Discharge:  Short Term Goal 1: Dem I with adl performance with AE as needed  Short Term Goal 2: Dem I with functional mobility for household distance  Short Term Goal 3: Dem a 12 min dynamic task with I to increase activity tolerance for daily occupations       Therapy Time   Individual Concurrent Group Co-treatment   Time In 1109         Time Out 1136         Minutes 27         Timed Code Treatment Minutes: 23 Minutes       JOSE Bal/ASTER

## 2022-09-16 NOTE — CARE COORDINATION
Transitional Planning:  Call to [facility] they sates the number is not taking messages at this time. Referred to another number [phone]. Number called. Reached answering service. They will be in tomorrow until 5p. Spoke with pt who states she is current with [facility]. She does not have a teachable caregiver or way to get to infusion center should the plan be IV abx at discharge.

## 2022-09-16 NOTE — PLAN OF CARE
Problem: Pain  Goal: Verbalizes/displays adequate comfort level or baseline comfort level  9/16/2022 0249 by Mireille Velazquez RN  Outcome: Progressing  9/15/2022 1818 by Lauri Valerio RN  Outcome: Progressing     Problem: Safety - Adult  Goal: Free from fall injury  9/16/2022 0249 by Mireille Velazquez RN  Outcome: Progressing  9/15/2022 1818 by Lauri Valerio RN  Outcome: Progressing     Problem: ABCDS Injury Assessment  Goal: Absence of physical injury  9/16/2022 0249 by Mireille Velazquez RN  Outcome: Progressing  9/15/2022 1818 by Lauri Valerio RN  Outcome: Progressing

## 2022-09-17 LAB
ANION GAP SERPL CALCULATED.3IONS-SCNC: 12 MMOL/L (ref 9–17)
BUN BLDV-MCNC: 15 MG/DL (ref 8–23)
C-REACTIVE PROTEIN: 9.3 MG/L (ref 0–5)
CALCIUM SERPL-MCNC: 8.8 MG/DL (ref 8.6–10.4)
CHLORIDE BLD-SCNC: 102 MMOL/L (ref 98–107)
CO2: 22 MMOL/L (ref 20–31)
CREAT SERPL-MCNC: 1.08 MG/DL (ref 0.5–0.9)
GFR AFRICAN AMERICAN: >60 ML/MIN
GFR NON-AFRICAN AMERICAN: 50 ML/MIN
GFR SERPL CREATININE-BSD FRML MDRD: ABNORMAL ML/MIN/{1.73_M2}
GLUCOSE BLD-MCNC: 102 MG/DL (ref 70–99)
HCT VFR BLD CALC: 34.5 % (ref 36.3–47.1)
HEMOGLOBIN: 12.8 G/DL (ref 11.9–15.1)
MCH RBC QN AUTO: 31.2 PG (ref 25.2–33.5)
MCHC RBC AUTO-ENTMCNC: 37.1 G/DL (ref 28.4–34.8)
MCV RBC AUTO: 84.1 FL (ref 82.6–102.9)
NRBC AUTOMATED: 0 PER 100 WBC
PDW BLD-RTO: 13.7 % (ref 11.8–14.4)
PLATELET # BLD: 257 K/UL (ref 138–453)
PMV BLD AUTO: 10.5 FL (ref 8.1–13.5)
POTASSIUM SERPL-SCNC: 3.7 MMOL/L (ref 3.7–5.3)
RBC # BLD: 4.1 M/UL (ref 3.95–5.11)
SODIUM BLD-SCNC: 136 MMOL/L (ref 135–144)
WBC # BLD: 5.8 K/UL (ref 3.5–11.3)

## 2022-09-17 PROCEDURE — 2580000003 HC RX 258: Performed by: INTERNAL MEDICINE

## 2022-09-17 PROCEDURE — 80048 BASIC METABOLIC PNL TOTAL CA: CPT

## 2022-09-17 PROCEDURE — 85027 COMPLETE CBC AUTOMATED: CPT

## 2022-09-17 PROCEDURE — 2500000003 HC RX 250 WO HCPCS: Performed by: INTERNAL MEDICINE

## 2022-09-17 PROCEDURE — 99232 SBSQ HOSP IP/OBS MODERATE 35: CPT | Performed by: FAMILY MEDICINE

## 2022-09-17 PROCEDURE — 1200000000 HC SEMI PRIVATE

## 2022-09-17 PROCEDURE — 86140 C-REACTIVE PROTEIN: CPT

## 2022-09-17 PROCEDURE — 6360000002 HC RX W HCPCS: Performed by: INTERNAL MEDICINE

## 2022-09-17 PROCEDURE — 2580000003 HC RX 258: Performed by: NURSE PRACTITIONER

## 2022-09-17 PROCEDURE — 36415 COLL VENOUS BLD VENIPUNCTURE: CPT

## 2022-09-17 PROCEDURE — 6370000000 HC RX 637 (ALT 250 FOR IP): Performed by: FAMILY MEDICINE

## 2022-09-17 RX ORDER — POTASSIUM CHLORIDE 20 MEQ/1
40 TABLET, EXTENDED RELEASE ORAL PRN
Status: DISCONTINUED | OUTPATIENT
Start: 2022-09-17 | End: 2022-09-19 | Stop reason: HOSPADM

## 2022-09-17 RX ORDER — POTASSIUM CHLORIDE 7.45 MG/ML
10 INJECTION INTRAVENOUS PRN
Status: DISCONTINUED | OUTPATIENT
Start: 2022-09-17 | End: 2022-09-19 | Stop reason: HOSPADM

## 2022-09-17 RX ADMIN — AMIODARONE HYDROCHLORIDE 200 MG: 200 TABLET ORAL at 08:55

## 2022-09-17 RX ADMIN — PIPERACILLIN AND TAZOBACTAM 3375 MG: 3; .375 INJECTION, POWDER, LYOPHILIZED, FOR SOLUTION INTRAVENOUS at 08:17

## 2022-09-17 RX ADMIN — PIPERACILLIN AND TAZOBACTAM 3375 MG: 3; .375 INJECTION, POWDER, LYOPHILIZED, FOR SOLUTION INTRAVENOUS at 15:10

## 2022-09-17 RX ADMIN — LEVOTHYROXINE SODIUM 75 MCG: 75 TABLET ORAL at 06:33

## 2022-09-17 RX ADMIN — POTASSIUM BICARBONATE 40 MEQ: 782 TABLET, EFFERVESCENT ORAL at 08:55

## 2022-09-17 RX ADMIN — MORPHINE SULFATE 1 MG: 2 INJECTION, SOLUTION INTRAMUSCULAR; INTRAVENOUS at 12:50

## 2022-09-17 RX ADMIN — FAMOTIDINE 20 MG: 10 INJECTION INTRAVENOUS at 08:55

## 2022-09-17 RX ADMIN — ALLOPURINOL 100 MG: 100 TABLET ORAL at 08:55

## 2022-09-17 RX ADMIN — OXYCODONE HYDROCHLORIDE AND ACETAMINOPHEN 1 TABLET: 5; 325 TABLET ORAL at 20:24

## 2022-09-17 RX ADMIN — OXYCODONE HYDROCHLORIDE AND ACETAMINOPHEN 1 TABLET: 5; 325 TABLET ORAL at 10:55

## 2022-09-17 RX ADMIN — PIPERACILLIN AND TAZOBACTAM 3375 MG: 3; .375 INJECTION, POWDER, LYOPHILIZED, FOR SOLUTION INTRAVENOUS at 23:24

## 2022-09-17 RX ADMIN — SODIUM CHLORIDE, PRESERVATIVE FREE 10 ML: 5 INJECTION INTRAVENOUS at 20:18

## 2022-09-17 RX ADMIN — FAMOTIDINE 20 MG: 10 INJECTION INTRAVENOUS at 20:28

## 2022-09-17 RX ADMIN — SODIUM CHLORIDE, PRESERVATIVE FREE 10 ML: 5 INJECTION INTRAVENOUS at 08:55

## 2022-09-17 RX ADMIN — BUMETANIDE 1 MG: 1 TABLET ORAL at 08:55

## 2022-09-17 RX ADMIN — OXYCODONE HYDROCHLORIDE AND ACETAMINOPHEN 1 TABLET: 5; 325 TABLET ORAL at 00:01

## 2022-09-17 ASSESSMENT — ENCOUNTER SYMPTOMS
CONSTIPATION: 0
NAUSEA: 0
COUGH: 0
VOMITING: 0
ABDOMINAL PAIN: 1
BLOOD IN STOOL: 0
DIARRHEA: 0
CHEST TIGHTNESS: 0
SHORTNESS OF BREATH: 0
WHEEZING: 0

## 2022-09-17 ASSESSMENT — PAIN DESCRIPTION - LOCATION
LOCATION: ABDOMEN
LOCATION: BACK

## 2022-09-17 ASSESSMENT — PAIN SCALES - GENERAL
PAINLEVEL_OUTOF10: 5
PAINLEVEL_OUTOF10: 6
PAINLEVEL_OUTOF10: 7

## 2022-09-17 ASSESSMENT — PAIN DESCRIPTION - DESCRIPTORS: DESCRIPTORS: ACHING;THROBBING

## 2022-09-17 ASSESSMENT — PAIN DESCRIPTION - ORIENTATION: ORIENTATION: MID

## 2022-09-17 ASSESSMENT — PAIN - FUNCTIONAL ASSESSMENT: PAIN_FUNCTIONAL_ASSESSMENT: ACTIVITIES ARE NOT PREVENTED

## 2022-09-17 NOTE — PROGRESS NOTES
[facility]  Office: [address], DO, Jeremiah Hedrick, DO, Aftab West, DO, Ruel Thomas, DO, Musa Sarkar MD, Mukund Maya MD, Ree Reveles MD, Priscila Bagley MD,  Edgar Martinez MD, Mary Lucas MD, Kerry Gaona, DO, Sonia Valentin MD,  Dre Mendoza MD, Marie Cisneros MD, Nette Mauro DO, Kamille Orantes MD, Dustin Gonzalez MD, Jess Man MD, Brian Albright MD, Gretel Martínez MD, Everett Delarosa MD, Noemy Bravo DO, Ishan Altamirano MD, Marilyn Castro MD, Ben Abraham, CNP,  Yenni Cherry, CNP, Severiano Baptist, CNP, Barbie Ivy, CNP,  Jess Gonzalez, DNP, Bita Siegel, CNP, Maureen Bassett, CNP, Shalini Fernandes, CNP, Nicki Youngblood, CNP, Rachell Schwab, CNP, Marissa Lopez PA-C, Lynn Olsen, CNS, Sumaya Noguera, [facility], Emanuel Hartman, CNP, Alyssa Call, CNP, Al Tamayo, Formerly named [facility]    Progress Note    9/17/2022    3:55 PM    Name:   Leandro Hendrix  MRN:     [id number]     Acct:      [de-identified]   Room:   [address] [place] [address] Day:  2  Admit Date:  9/14/2022  6:57 PM    PCP:   Tee Peterson DO  Code Status:  Full Code    Subjective:     C/C:   Chief Complaint   Patient presents with    Abdominal Pain    Diverticulitis     Interval History Status: improved. Patient seen and examined at bedside, no acute events overnight. Pain is better  K is low  Patient denies any chest pain, shortness of breath, chills, fevers, nausea or vomiting. Patient vitals, labs and all providers notes were reviewed,from overnight shift and morning updates were noted and discussed with the nurse      Brief History:     Per chart  Leandro Hendrix is a 75 y.o.  AAF with underlying cardiovascular disease status post AICD for EF 15% who was seen last month in the hospital for abdominal pain, at that time she was managed with Cipro and Flagyl for diverticulitis as well as elective pacemaker replacement. She improved discharged home but returned to hospital today with concern for recurrent symptoms after intially  improving. She reported nausea, loose stools, abdominal cramping, fatigue. While being worked up in ED, CT abomen Interval enlargement of the 4.1 cm deep left pelvic wall abscess which is  seen extending to the mid sigmoid colon consistent with fistulous tract and contained/walled-off abscess. Patient was started on abx, surgery consulted for possible I and D and fistula tract mgt. Review of Systems:     Review of Systems   Constitutional:  Positive for activity change and appetite change. Negative for chills, diaphoresis and fever. HENT:  Negative for congestion. Eyes:  Negative for visual disturbance. Respiratory:  Negative for cough, chest tightness, shortness of breath and wheezing. Cardiovascular:  Negative for chest pain, palpitations and leg swelling. Gastrointestinal:  Positive for abdominal pain. Negative for blood in stool, constipation, diarrhea, nausea and vomiting. Genitourinary:  Negative for difficulty urinating. Neurological:  Negative for dizziness, weakness, light-headedness, numbness and headaches. All other systems reviewed and are negative. Medications: Allergies:     Allergies   Allergen Reactions    Codeine      Hallucinations    Morphine      Hallucinations         Current Meds:   Scheduled Meds:    levothyroxine  75 mcg Oral Daily    allopurinol  100 mg Oral Daily    amiodarone  200 mg Oral Daily    [Held by provider] metoprolol succinate  25 mg Oral Daily    bumetanide  1 mg Oral Daily    potassium bicarb-citric acid  40 mEq Oral Daily    sodium chloride flush  5-40 mL IntraVENous 2 times per day    piperacillin-tazobactam  3,375 mg IntraVENous Q8H    famotidine (PEPCID) injection  20 mg IntraVENous BID     Continuous Infusions:    sodium chloride      dextrose       PRN Meds: potassium chloride **OR** potassium alternative oral replacement **OR** potassium chloride, sodium chloride flush, sodium chloride, magnesium sulfate, ondansetron **OR** ondansetron, [DISCONTINUED] acetaminophen **OR** acetaminophen, morphine, glucose, dextrose bolus **OR** dextrose bolus, glucagon (rDNA), dextrose, oxyCODONE-acetaminophen    Data:     Past Medical History:   has a past medical history of Abnormal Pap smear, Acute on chronic systolic congestive heart failure ([facility] [address] AICD (automatic cardioverter/defibrillator) present, MAURO (acute kidney injury) ([facility] [address] Anemia, Arthritis, Cardiomyopathy ([facility] [address] Cardiomyopathy, nonischemic ([facility] [address] Chronic combined systolic and diastolic congestive heart failure ([facility] [address] Ejection fraction < 50%, HTN (hypertension), Hypothyroidism, Ischemic cardiomyopathy, Left bundle branch block, Mitral regurgitation, Morbid obesity due to excess calories ([facility] [address] Pulmonary hypertension ([facility] [address] Sudden onset of severe headache, and Torn rotator cuff. Social History:   reports that she has never smoked. She has never used smokeless tobacco. She reports that she does not currently use drugs. She reports that she does not drink alcohol.      Family History:   Family History   Problem Relation Age of Onset    Cancer Other         ovarian    High Blood Pressure Mother     Other Mother         copd    Arthritis Neg Hx     Asthma Neg Hx     Birth Defects Neg Hx     Depression Neg Hx     Diabetes Neg Hx     Early Death Neg Hx     Hearing Loss Neg Hx     Heart Disease Neg Hx     High Cholesterol Neg Hx     Kidney Disease Neg Hx     Learning Disabilities Neg Hx     Mental Retardation Neg Hx     Mental Illness Neg Hx     Miscarriages / Stillbirths Neg Hx     Stroke Neg Hx     Substance Abuse Neg Hx     Vision Loss Neg Hx     Breast Cancer Neg Hx     Colon Cancer Neg Hx     Eclampsia Neg Hx     Hypertension Neg Hx     Ovarian Cancer Neg Hx      Labor Neg Hx     Spont Abortions Neg Hx        Vitals:  BP 95/65   Pulse 71   Temp 98.2 °F (36.8 °C) (Oral)   Resp 17   Ht 5' 6\" (1.676 m)   Wt 205 lb (93 kg)   SpO2 100%   BMI 33.09 kg/m²   Temp (24hrs), Av.5 °F (36.9 °C), Min:98.2 °F (36.8 °C), Max:98.8 °F (37.1 °C)    Recent Labs     09/15/22  1111 09/15/22  1900   POCGLU 96 122*         I/O (24Hr):   No intake or output data in the 24 hours ending 22 1555    Labs:  Hematology:  Recent Labs     09/14/22  1917 09/15/22  0737 22   WBC 8.8  --  5.9 5.8   RBC 4.76  --  3.87* 4.10   HGB 14.6  --  11.6* 12.8   HCT 39.2  --  32.3* 34.5*   MCV 82.4*  --  83.5 84.1   MCH 30.7  --  30.0 31.2   MCHC 37.2*  --  35.9* 37.1*   RDW 13.5  --  13.6 13.7     --  207 257   MPV 10.3  --  11.1 10.5   SEDRATE 73*  --   --   --    CRP  --  17.2*  --  9.3*   INR  --  1.1 1.1  --        Chemistry:  Recent Labs     09/14/22  1917 09/15/22  1126 22     --  136 136   K 3.6*  --  3.3* 3.7   CL 98  --  104 102   CO2 20  --  19* 22   GLUCOSE 97 96 130* 102*   BUN 16  --  20 15   CREATININE 1.20*  --  1.25* 1.08*   MG  --   --  2.2  --    ANIONGAP 17  --  13 12   LABGLOM 45*  --  43* 50*   GFRAA 54*  --  52* >60   CALCIUM 9.2  --  8.5* 8.8   PROBNP 2,644*  --   --   --    TROPHS 23*  --   --   --        Recent Labs     09/14/22  1917 09/15/22  1111 09/15/22  1900   PROT 8.1  --   --    LABALBU 4.3  --   --    TSH 21.57*  --   --    AST 30  --   --    ALT 18  --   --    ALKPHOS 226*  --   --    BILITOT 0.7  --   --    LIPASE 18  --   --    POCGLU  --  96 122*       ABG:  Lab Results   Component Value Date/Time    PH 7.44 2012 02:45 PM    PCO2 33 2012 02:45 PM    PO2 416 2012 02:45 PM    HCO3 22.8 2012 02:45 PM    O2SAT 100 2012 02:45 PM    FIO2 NOT REPORTED 2012 02:45 PM     Lab Results   Component Value Date/Time    SPECIAL NOT REPORTED 2021 10:30 AM    SPECIAL NOT REPORTED 2021 10:30 AM     Lab Results   Component Value Date/Time    CULTURE PATIENT DISCHARGED BEFORE SAMPLE COLLECTED 12/06/2021 10:30 AM       Radiology:  CT ABDOMEN PELVIS WO CONTRAST Additional Contrast? None    Result Date: 9/13/2022  Interval enlargement of the 4.1 cm deep left pelvic wall abscess which is seen extending to the mid sigmoid colon consistent with fistulous tract and contained/walled-off abscess. No acute inflammatory changes to indicate diverticulitis at this time. XR CHEST PORTABLE    Result Date: 9/15/2022  Similar cardiomegaly. No acute cardiopulmonary abnormality is identified. Physical Examination:        Physical Exam  Vitals and nursing note reviewed. Constitutional:       General: She is not in acute distress. HENT:      Head: Normocephalic and atraumatic. Eyes:      Conjunctiva/sclera: Conjunctivae normal.      Pupils: Pupils are equal, round, and reactive to light. Cardiovascular:      Rate and Rhythm: Normal rate and regular rhythm. Heart sounds: No murmur heard. Pulmonary:      Effort: Pulmonary effort is normal. No accessory muscle usage or respiratory distress. Breath sounds: No stridor. No decreased breath sounds, wheezing, rhonchi or rales. Abdominal:      General: Bowel sounds are normal. There is no distension. Palpations: Abdomen is soft. Abdomen is not rigid. Tenderness: There is abdominal tenderness in the left lower quadrant. There is no guarding. Musculoskeletal:         General: No tenderness. Right lower leg: Edema present. Left lower leg: Edema present. Skin:     General: Skin is warm and dry. Findings: No erythema, lesion or rash. Neurological:      Mental Status: She is alert and oriented to person, place, and time. Cranial Nerves: No cranial nerve deficit. Motor: No seizure activity. Psychiatric:         Speech: Speech normal.         Behavior: Behavior normal. Behavior is cooperative.        Assessment:        Hospital Problems             Last Modified POA    * (Principal) Colonic diverticular abscess 9/16/2022 Yes    Dilated cardiomyopathy ([facility] [address] (Chronic) 9/16/2022 Yes    PAH (pulmonary artery hypertension) ([address] 34 on Echo  (Chronic) 9/16/2022 Yes    Hypokalemia 9/16/2022 Yes    AICD (automatic cardioverter/defibrillator) present 9/16/2022 Yes    Class 1 obesity due to excess calories with body mass index (BMI) of 33.0 to 33.9 in adult 9/16/2022 Yes     Plan:           Colonic abscess   -CT confirming 4cm abcess with interval enlargement.   -Likely sequelae from her recent diarrhea reticulitis infection last month. There is a concern for possible fistula formation.    -Continues to be afebrile, hemodynamically stable with no leukocytosis   - IR culd not place the drain given location  - Continue IV Abx  - Per surgery , finish 14 days then reasses as she is a poor surgicalcandidate   -Patient's pain is worse today, her CRP is actually trending down. HFrEF w/ severe MR  -EF 17%. Status post AICD ( battery recently replaced)   Troponin 23 without any chest pain. -proBNP 2644 in setting of a creatinine 1.2  -Currently on DAPT, bumex 2mg daily   -CXR un changed from baseline. No signs of CHF exacerbation. Monitor closely  -resume home meds, monitor carefuly, tele        Paroxysmal A fib  -Currently stable with HR in 70s-80s and stable BP. Recent thyroid work up unrevealing.   -on Xarelto 20 mg daily, amiodarone 200 mg, metoprolol 50 mg daily  -Patient will likely need her Xarelto held if surgical invention warranted.  -resume home meds, monitor on tele, replace Mg and K until goal  -Monitor on tele, as per above     Hypotension: We will hold Toprol for now    Hypothyroidism  -[facility], T3-T4 within normal range. Resume home Synthroid    Hypokalemia.   Replaced, resumed home p.o. dose    Continue appropriate home medications    Disussed with the patient and nurse    To discharge hopefully in the next 24 hours    Davi Fleming MD  9/17/2022  3:55 PM

## 2022-09-17 NOTE — PLAN OF CARE
Problem: Pain  Goal: Verbalizes/displays adequate comfort level or baseline comfort level  9/17/2022 0515 by Jae Branham RN  Outcome: Progressing  9/16/2022 1709 by Cyndee Farrell RN  Outcome: Progressing     Problem: Safety - Adult  Goal: Free from fall injury  9/17/2022 0515 by Jae Branham RN  Outcome: Progressing  9/16/2022 1709 by Cyndee Farrell RN  Outcome: Progressing     Problem: ABCDS Injury Assessment  Goal: Absence of physical injury  9/17/2022 0515 by Jae Branham RN  Outcome: Progressing  9/16/2022 1709 by Cyndee Farrell RN  Outcome: Progressing     Problem: Chronic Conditions and Co-morbidities  Goal: Patient's chronic conditions and co-morbidity symptoms are monitored and maintained or improved  9/17/2022 0515 by Jae Branham RN  Outcome: Progressing  9/16/2022 1709 by Cyndee Farrell RN  Outcome: Progressing

## 2022-09-17 NOTE — DISCHARGE INSTRUCTIONS
Heart Failure: Care Instructions  Your Care Instructions     Heart failure occurs when your heart does not pump as much blood as the body needs. Failure does not mean that the heart has stopped pumping but rather that it is not pumping as well as it should. Over time, this causes fluid buildup in your lungs and other parts of your body. Fluid buildup can cause shortness of breath, fatigue, swollen ankles, and other problems. By taking medicines regularly, reducing sodium (salt) in your diet, checking your weight every day, and making lifestyle changes, you can feel better and live longer. Follow-up care is a key part of your treatment and safety. Be sure to make and go to all appointments, and call your doctor if you are having problems. It's also a good idea to know your test results and keep a list of the medicines you take. How can you care for yourself at home? Medicines    Be safe with medicines. Take your medicines exactly as prescribed. Call your doctor if you think you are having a problem with your medicine. Do not take any vitamins, over-the-counter medicine, or herbal products without talking to your doctor first. Kailyn Arias not take ibuprofen (Advil or Motrin) and naproxen (Aleve) without talking to your doctor first. They could make your heart failure worse. You may take some of the following medicine. Angiotensin-converting enzyme inhibitors (ACEIs) or angiotensin II receptor blockers (ARBs) reduce the heart's workload, lower blood pressure, and reduce swelling. Taking an ACEI or ARB may lower your chance of needing to be hospitalized. Beta-blockers can slow heart rate, decrease blood pressure, and improve your condition. Taking a beta-blocker may lower your chance of needing to be hospitalized. Diuretics, also called water pills, reduce swelling. You will get more details on the specific medicines your doctor prescribes. Diet    Your doctor may suggest that you limit sodium.  Your doctor can tell you how much sodium is right for you. An example is less than 3,000 mg a day. This includes all the salt you eat in cooking or in packaged foods. People get most of their sodium from processed foods. Fast food and restaurant meals also tend to be very high in sodium. Ask your doctor how much liquid you can drink each day. You may have to limit liquids. Weight    Weigh yourself without clothing at the same time each day. Record your weight. Call your doctor if you have a sudden weight gain, such as more than 2 to 3 pounds in a day or 5 pounds in a week. (Your doctor may suggest a different range of weight gain.) A sudden weight gain may mean that your heart failure is getting worse. Activity level    Start light exercise (if your doctor says it is okay). Even if you can only do a small amount, exercise will help you get stronger, have more energy, and manage your weight and your stress. Walking is an easy way to get exercise. Start out by walking a little more than you did before. Bit by bit, increase the amount you walk. When you exercise, watch for signs that your heart is working too hard. You are pushing yourself too hard if you cannot talk while you are exercising. If you become short of breath or dizzy or have chest pain, stop, sit down, and rest.     If you feel \"wiped out\" the day after you exercise, walk slower or for a shorter distance until you can work up to a better pace. Get enough rest at night. Sleeping with 1 or 2 pillows under your upper body and head may help you breathe easier. Lifestyle changes    Do not smoke. Smoking can make a heart condition worse. If you need help quitting, talk to your doctor about stop-smoking programs and medicines. These can increase your chances of quitting for good. Quitting smoking may be the most important step you can take to protect your heart. Limit alcohol to 2 drinks a day for men and 1 drink a day for women.  Too much alcohol can cause health problems. Avoid getting sick from colds and the flu. Get a pneumococcal vaccine shot. If you have had one before, ask your doctor whether you need another dose. Get a flu shot each year. If you must be around people with colds or the flu, wash your hands often. When should you call for help? Call 911  if you have symptoms of sudden heart failure such as: You have severe trouble breathing. You cough up pink, foamy mucus. You have a new irregular or rapid heartbeat. Call your doctor now or seek immediate medical care if:    You have new or increased shortness of breath. You are dizzy or lightheaded, or you feel like you may faint. You have sudden weight gain, such as more than 2 to 3 pounds in a day or 5 pounds in a week. (Your doctor may suggest a different range of weight gain.)     You have increased swelling in your legs, ankles, or feet. You are suddenly so tired or weak that you cannot do your usual activities. Watch closely for changes in your health, and be sure to contact your doctor if you develop new symptoms. Where can you learn more? Go to https://[employer]. org and sign in to your [employer] account. Enter V810 in the [employer] box to learn more about \"Heart Failure: Care Instructions. \"     If you do not have an account, please click on the \"Sign Up Now\" link. Current as of: January 10, 2022               Content Version: 13.4  © 2006-2022 [employer]. Care instructions adapted under license by [facility] ([facility]). If you have questions about a medical condition or this instruction, always ask your healthcare professional. [place] [address] any warranty or liability for your use of this information.

## 2022-09-17 NOTE — PLAN OF CARE
Problem: Pain  Goal: Verbalizes/displays adequate comfort level or baseline comfort level  9/17/2022 1826 by Jaida Mahajan RN  Outcome: Progressing  9/17/2022 0515 by Adithya Linda RN  Outcome: Progressing     Problem: Safety - Adult  Goal: Free from fall injury  9/17/2022 1826 by Jaida Mahajan RN  Outcome: Progressing  9/17/2022 0515 by Adithya Linda RN  Outcome: Progressing     Problem: ABCDS Injury Assessment  Goal: Absence of physical injury  9/17/2022 1826 by Jaida Mahajan RN  Outcome: Progressing  9/17/2022 0515 by Adithya Linda RN  Outcome: Progressing     Problem: Chronic Conditions and Co-morbidities  Goal: Patient's chronic conditions and co-morbidity symptoms are monitored and maintained or improved  9/17/2022 1826 by Jaida Mahajan RN  Outcome: Progressing  9/17/2022 0515 by Adithya Linda RN  Outcome: Progressing

## 2022-09-18 LAB
HCT VFR BLD CALC: 32.7 % (ref 36.3–47.1)
HEMOGLOBIN: 12 G/DL (ref 11.9–15.1)
MCH RBC QN AUTO: 30.6 PG (ref 25.2–33.5)
MCHC RBC AUTO-ENTMCNC: 36.7 G/DL (ref 28.4–34.8)
MCV RBC AUTO: 83.4 FL (ref 82.6–102.9)
NRBC AUTOMATED: 0 PER 100 WBC
PDW BLD-RTO: 13.6 % (ref 11.8–14.4)
PLATELET # BLD: 243 K/UL (ref 138–453)
PMV BLD AUTO: 11 FL (ref 8.1–13.5)
RBC # BLD: 3.92 M/UL (ref 3.95–5.11)
WBC # BLD: 6.1 K/UL (ref 3.5–11.3)

## 2022-09-18 PROCEDURE — 6370000000 HC RX 637 (ALT 250 FOR IP): Performed by: NURSE PRACTITIONER

## 2022-09-18 PROCEDURE — 6360000002 HC RX W HCPCS: Performed by: INTERNAL MEDICINE

## 2022-09-18 PROCEDURE — 2500000003 HC RX 250 WO HCPCS: Performed by: INTERNAL MEDICINE

## 2022-09-18 PROCEDURE — 2580000003 HC RX 258: Performed by: INTERNAL MEDICINE

## 2022-09-18 PROCEDURE — 85027 COMPLETE CBC AUTOMATED: CPT

## 2022-09-18 PROCEDURE — 99232 SBSQ HOSP IP/OBS MODERATE 35: CPT | Performed by: FAMILY MEDICINE

## 2022-09-18 PROCEDURE — 1200000000 HC SEMI PRIVATE

## 2022-09-18 PROCEDURE — 36415 COLL VENOUS BLD VENIPUNCTURE: CPT

## 2022-09-18 PROCEDURE — 6370000000 HC RX 637 (ALT 250 FOR IP): Performed by: FAMILY MEDICINE

## 2022-09-18 PROCEDURE — 2580000003 HC RX 258: Performed by: NURSE PRACTITIONER

## 2022-09-18 RX ORDER — CIPROFLOXACIN 500 MG/1
500 TABLET, FILM COATED ORAL 2 TIMES DAILY
Qty: 8 TABLET | Refills: 0 | Status: SHIPPED | OUTPATIENT
Start: 2022-09-18 | End: 2022-09-19 | Stop reason: HOSPADM

## 2022-09-18 RX ORDER — METRONIDAZOLE 500 MG/1
500 TABLET ORAL 3 TIMES DAILY
Qty: 12 TABLET | Refills: 0 | Status: SHIPPED | OUTPATIENT
Start: 2022-09-18 | End: 2022-09-19 | Stop reason: HOSPADM

## 2022-09-18 RX ORDER — OXYCODONE HYDROCHLORIDE AND ACETAMINOPHEN 5; 325 MG/1; MG/1
1 TABLET ORAL EVERY 8 HOURS PRN
Qty: 9 TABLET | Refills: 0 | Status: SHIPPED | OUTPATIENT
Start: 2022-09-18 | End: 2022-09-21

## 2022-09-18 RX ADMIN — BUMETANIDE 1 MG: 1 TABLET ORAL at 09:11

## 2022-09-18 RX ADMIN — FAMOTIDINE 20 MG: 10 INJECTION INTRAVENOUS at 21:34

## 2022-09-18 RX ADMIN — ALLOPURINOL 100 MG: 100 TABLET ORAL at 09:11

## 2022-09-18 RX ADMIN — SODIUM CHLORIDE, PRESERVATIVE FREE 10 ML: 5 INJECTION INTRAVENOUS at 21:33

## 2022-09-18 RX ADMIN — PIPERACILLIN AND TAZOBACTAM 3375 MG: 3; .375 INJECTION, POWDER, LYOPHILIZED, FOR SOLUTION INTRAVENOUS at 17:09

## 2022-09-18 RX ADMIN — LEVOTHYROXINE SODIUM 75 MCG: 75 TABLET ORAL at 06:35

## 2022-09-18 RX ADMIN — POTASSIUM CHLORIDE 40 MEQ: 1500 TABLET, EXTENDED RELEASE ORAL at 12:02

## 2022-09-18 RX ADMIN — OXYCODONE HYDROCHLORIDE AND ACETAMINOPHEN 1 TABLET: 5; 325 TABLET ORAL at 23:46

## 2022-09-18 RX ADMIN — AMIODARONE HYDROCHLORIDE 200 MG: 200 TABLET ORAL at 09:11

## 2022-09-18 RX ADMIN — OXYCODONE HYDROCHLORIDE AND ACETAMINOPHEN 1 TABLET: 5; 325 TABLET ORAL at 13:34

## 2022-09-18 ASSESSMENT — ENCOUNTER SYMPTOMS
DIARRHEA: 0
SHORTNESS OF BREATH: 0
NAUSEA: 0
VOMITING: 0
BLOOD IN STOOL: 0
COUGH: 0
CONSTIPATION: 0
WHEEZING: 0
CHEST TIGHTNESS: 0
ABDOMINAL PAIN: 1

## 2022-09-18 ASSESSMENT — PAIN SCALES - GENERAL
PAINLEVEL_OUTOF10: 4
PAINLEVEL_OUTOF10: 0
PAINLEVEL_OUTOF10: 5

## 2022-09-18 ASSESSMENT — PAIN DESCRIPTION - LOCATION
LOCATION: BACK
LOCATION: BACK

## 2022-09-18 ASSESSMENT — PAIN DESCRIPTION - DESCRIPTORS
DESCRIPTORS: ACHING;DISCOMFORT;SHOOTING
DESCRIPTORS: ACHING;DISCOMFORT

## 2022-09-18 ASSESSMENT — PAIN DESCRIPTION - ORIENTATION
ORIENTATION: LOWER
ORIENTATION: LOWER

## 2022-09-18 ASSESSMENT — PAIN - FUNCTIONAL ASSESSMENT
PAIN_FUNCTIONAL_ASSESSMENT: PREVENTS OR INTERFERES SOME ACTIVE ACTIVITIES AND ADLS
PAIN_FUNCTIONAL_ASSESSMENT: PREVENTS OR INTERFERES SOME ACTIVE ACTIVITIES AND ADLS

## 2022-09-18 NOTE — PROGRESS NOTES
[facility]  Office: [address], DO, Xenia Cerrato, DO, Annalee Walker, DO, Lisa Thomas, DO, Indigo Escobar MD, Nikolai Carrasco MD, Ghassan Azul MD, Elton Lopez MD,  Byron Guadarrama MD, Amara Álvarez MD, Britni Manjarrez, DO, Alma Moore MD,  Phill Figueroa MD, Apryl Prasad MD, Daniella Morris, DO, Dwight Crawford MD, Darling Delgadillo MD, Prateek Neri MD, Geovanna Mcconnell MD, Dex Thomas MD, Janell Lee MD, Kaylynn Souza DO, Chikis Pisano MD, Amish Quesada MD, Sravanthi Britt, CNP,  Alexandria Saab, CNP, Florence Leonardo, CNP, Daniel Magallon, CNP,  Alma Moreno, DNP, Fernie Vazquez, CNP, Julia Sanders, CNP, Ervin Luna, CNP, Morris Sheldon, CNP, Susan Layne, CNP, Miguel Ángel Abraham PA-C, Neel Moody, CNS, Ritu Cline, DNP, Ethan Mcdonald, CNP, Frank Myers, CNP, Samia Faith, [address] [place] [address]    Progress Note    9/18/2022    2:18 PM    Name:   Stefani Alvarado  MRN:     [id number]     Acct:      [de-identified]   Room:   [address] [place] [address] Day:  3  Admit Date:  9/14/2022  6:57 PM    PCP:   Ric Trotter DO  Code Status:  Full Code    Subjective:     C/C:   Chief Complaint   Patient presents with    Abdominal Pain    Diverticulitis     Interval History Status: improved. Patient seen and examined at bedside, no acute events overnight. Pain is still the same   BP soft, holding some meds   K  better   Patient denies any chest pain, shortness of breath, chills, fevers, nausea or vomiting. Patient vitals, labs and all providers notes were reviewed,from overnight shift and morning updates were noted and discussed with the nurse      Brief History:     Per chart  Stefani Alvarado is a 75 y.o.  AAF with underlying cardiovascular disease status post AICD for EF 15% who was seen last month in the hospital for abdominal pain, at that time she was managed with Cipro and Flagyl for diverticulitis as well as elective pacemaker replacement. She improved discharged home but returned to hospital today with concern for recurrent symptoms after intially  improving. She reported nausea, loose stools, abdominal cramping, fatigue. While being worked up in ED, CT abomen Interval enlargement of the 4.1 cm deep left pelvic wall abscess which is  seen extending to the mid sigmoid colon consistent with fistulous tract and contained/walled-off abscess. Patient was started on abx, surgery consulted for possible I and D and fistula tract mgt. Review of Systems:     Review of Systems   Constitutional:  Positive for activity change and appetite change. Negative for chills, diaphoresis and fever. HENT:  Negative for congestion. Eyes:  Negative for visual disturbance. Respiratory:  Negative for cough, chest tightness, shortness of breath and wheezing. Cardiovascular:  Negative for chest pain, palpitations and leg swelling. Gastrointestinal:  Positive for abdominal pain. Negative for blood in stool, constipation, diarrhea, nausea and vomiting. Genitourinary:  Negative for difficulty urinating. Neurological:  Negative for dizziness, weakness, light-headedness, numbness and headaches. All other systems reviewed and are negative. Medications: Allergies:     Allergies   Allergen Reactions    Codeine      Hallucinations    Morphine      Hallucinations         Current Meds:   Scheduled Meds:    levothyroxine  75 mcg Oral Daily    allopurinol  100 mg Oral Daily    amiodarone  200 mg Oral Daily    [Held by provider] metoprolol succinate  25 mg Oral Daily    bumetanide  1 mg Oral Daily    potassium bicarb-citric acid  40 mEq Oral Daily    sodium chloride flush  5-40 mL IntraVENous 2 times per day    piperacillin-tazobactam  3,375 mg IntraVENous Q8H    famotidine (PEPCID) injection  20 mg IntraVENous BID     Continuous Infusions:    sodium chloride      dextrose       PRN Meds: potassium chloride **OR** potassium alternative oral replacement **OR** potassium chloride, sodium chloride flush, sodium chloride, magnesium sulfate, ondansetron **OR** ondansetron, [DISCONTINUED] acetaminophen **OR** acetaminophen, morphine, glucose, dextrose bolus **OR** dextrose bolus, glucagon (rDNA), dextrose, oxyCODONE-acetaminophen    Data:     Past Medical History:   has a past medical history of Abnormal Pap smear, Acute on chronic systolic congestive heart failure ([facility] [address] AICD (automatic cardioverter/defibrillator) present, MAURO (acute kidney injury) ([facility] [address] [facility] Anemia, Arthritis, Cardiomyopathy ([facility] [address] Cardiomyopathy, nonischemic ([facility] [address] [facility] Chronic combined systolic and diastolic congestive heart failure ([facility] [address] [facility] Ejection fraction < 50%, HTN (hypertension), Hypothyroidism, Ischemic cardiomyopathy, Left bundle branch block, Mitral regurgitation, Morbid obesity due to excess calories ([facility] [address] [facility] Pulmonary hypertension ([facility] [address] [facility] Sudden onset of severe headache, and Torn rotator cuff. Social History:   reports that she has never smoked. She has never used smokeless tobacco. She reports that she does not currently use drugs. She reports that she does not drink alcohol.      Family History:   Family History   Problem Relation Age of Onset    Cancer Other         ovarian    High Blood Pressure Mother     Other Mother         copd    Arthritis Neg Hx     Asthma Neg Hx     Birth Defects Neg Hx     Depression Neg Hx     Diabetes Neg Hx     Early Death Neg Hx     Hearing Loss Neg Hx     Heart Disease Neg Hx     High Cholesterol Neg Hx     Kidney Disease Neg Hx     Learning Disabilities Neg Hx     Mental Retardation Neg Hx     Mental Illness Neg Hx     Miscarriages / Stillbirths Neg Hx     Stroke Neg Hx     Substance Abuse Neg Hx     Vision Loss Neg Hx     Breast Cancer Neg Hx     Colon Cancer Neg Hx     Eclampsia Neg Hx     Hypertension Neg Hx     Ovarian Cancer Neg Hx      Labor Neg Hx     Spont Abortions Neg Hx        Vitals:  BP 114/76   Pulse 73   Temp 98.3 °F (36.8 °C) (Oral)   Resp 18   Ht 5' 6\" (1.676 m)   Wt 205 lb (93 kg)   SpO2 99%   BMI 33.09 kg/m²   Temp (24hrs), Av.2 °F (36.8 °C), Min:98 °F (36.7 °C), Max:98.3 °F (36.8 °C)    Recent Labs     09/15/22  1900   POCGLU 122*         I/O (24Hr): Intake/Output Summary (Last 24 hours) at 2022 1418  Last data filed at 2022 0644  Gross per 24 hour   Intake 841.52 ml   Output --   Net 841.52 ml       Labs:  Hematology:  Recent Labs     22  0922  0612   WBC 5.9 5.8 6.1   RBC 3.87* 4.10 3.92*   HGB 11.6* 12.8 12.0   HCT 32.3* 34.5* 32.7*   MCV 83.5 84.1 83.4   MCH 30.0 31.2 30.6   MCHC 35.9* 37.1* 36.7*   RDW 13.6 13.7 13.6    257 243   MPV 11.1 10.5 11.0   CRP  --  9.3*  --    INR 1.1  --   --        Chemistry:  Recent Labs     22    136   K 3.3* 3.7    102   CO2 19* 22   GLUCOSE 130* 102*   BUN 20 15   CREATININE 1.25* 1.08*   MG 2.2  --    ANIONGAP 13 12   LABGLOM 43* 50*   GFRAA 52* >60   CALCIUM 8.5* 8.8       Recent Labs     09/15/22  1900   POCGLU 122*       ABG:  Lab Results   Component Value Date/Time    PH 7.44 2012 02:45 PM    PCO2 33 2012 02:45 PM    PO2 416 2012 02:45 PM    HCO3 22.8 2012 02:45 PM    O2SAT 100 2012 02:45 PM    FIO2 NOT REPORTED 2012 02:45 PM     Lab Results   Component Value Date/Time    SPECIAL NOT REPORTED 2021 10:30 AM    SPECIAL NOT REPORTED 2021 10:30 AM     Lab Results   Component Value Date/Time    CULTURE PATIENT DISCHARGED BEFORE SAMPLE COLLECTED 2021 10:30 AM       Radiology:  CT ABDOMEN PELVIS WO CONTRAST Additional Contrast? None    Result Date: 2022  Interval enlargement of the 4.1 cm deep left pelvic wall abscess which is seen extending to the mid sigmoid colon consistent with fistulous tract and contained/walled-off abscess.  No acute inflammatory changes to indicate diverticulitis at this Colonic abscess   -CT confirming 4cm abcess with interval enlargement.   -Likely sequelae from her recent diarrhea reticulitis infection last month. There is a concern for possible fistula formation.    -Continues to be afebrile, hemodynamically stable with no leukocytosis   - IR culd not place the drain given location  - Continue IV Abx  - Per surgery , finish 14 days then reasses as she is a poor surgicalcandidate   -Patient's pain is about the  same after 4 days of IV antibiotics, she did fail Cipro and Flagyl as an outpatient, I discussed with surgery and they will reevaluate the patient today. HFrEF w/ severe MR  -EF 17%. Status post AICD ( battery recently replaced)   Troponin 23 without any chest pain. -proBNP 2644 in setting of a creatinine 1.2  -Currently on DAPT, bumex 2mg daily   -CXR un changed from baseline. No signs of CHF exacerbation. Monitor closely  -resume home meds, monitor carefuly, tele     Paroxysmal A fib  -Currently stable with HR in 70s-80s and stable BP. Recent thyroid work up unrevealing.   -on Xarelto 20 mg daily, amiodarone 200 mg, metoprolol 50 mg daily  -Patient will likely need her Xarelto held if surgical invention warranted.  -resume home meds, monitor on tele, replace Mg and K until goal  -Monitor on tele, as per above     Hypotension: We will hold Toprol for now    Hypothyroidism  -CHI, T3-T4 within normal range. Resume home Synthroid    Hypokalemia. Replaced, resumed home p.o. dose    Continue appropriate home medications    Disussed with the patient and nurse    -Patient's pain is about the  same after 4 days of IV antibiotics, she did fail Cipro and Flagyl as an outpatient, I discussed with surgery and they will reevaluate the patient today.       Yobany Villarreal MD  9/18/2022  2:18 PM

## 2022-09-18 NOTE — PLAN OF CARE
Problem: Pain  Goal: Verbalizes/displays adequate comfort level or baseline comfort level  9/18/2022 0412 by Eloisa Troy RN  Outcome: Progressing  9/17/2022 1826 by Zhao Holley RN  Outcome: Progressing     Problem: Safety - Adult  Goal: Free from fall injury  9/18/2022 0412 by Eloisa Troy RN  Outcome: Progressing  9/17/2022 1826 by Zhao Holley RN  Outcome: Progressing     Problem: ABCDS Injury Assessment  Goal: Absence of physical injury  9/18/2022 0412 by Eloisa Troy RN  Outcome: Progressing  9/17/2022 1826 by Zhao Holley RN  Outcome: Progressing     Problem: Chronic Conditions and Co-morbidities  Goal: Patient's chronic conditions and co-morbidity symptoms are monitored and maintained or improved  9/18/2022 0412 by Eloisa Troy RN  Outcome: Progressing  9/17/2022 1826 by Zhao Holley RN  Outcome: Progressing

## 2022-09-18 NOTE — PLAN OF CARE
Problem: Pain  Goal: Verbalizes/displays adequate comfort level or baseline comfort level  9/18/2022 1746 by Weston Sweeney RN  Outcome: Progressing  9/18/2022 0412 by Maite Rinaldi RN  Outcome: Progressing     Problem: Safety - Adult  Goal: Free from fall injury  9/18/2022 1746 by Weston Sweeney RN  Outcome: Progressing  Flowsheets (Taken 9/18/2022 0802)  Free From Fall Injury: Based on caregiver fall risk screen, instruct family/caregiver to ask for assistance with transferring infant if caregiver noted to have fall risk factors  9/18/2022 0412 by Maite Rinaldi RN  Outcome: Progressing     Problem: ABCDS Injury Assessment  Goal: Absence of physical injury  9/18/2022 1746 by Weston Sweeney RN  Outcome: Progressing  Flowsheets (Taken 9/18/2022 0802)  Absence of Physical Injury: Implement safety measures based on patient assessment  9/18/2022 0412 by Maite Rinaldi RN  Outcome: Progressing     Problem: Chronic Conditions and Co-morbidities  Goal: Patient's chronic conditions and co-morbidity symptoms are monitored and maintained or improved  9/18/2022 1746 by Weston Sweeney RN  Outcome: Progressing  9/18/2022 0412 by Maite Rinaldi RN  Outcome: Progressing     Problem: Skin/Tissue Integrity  Goal: Absence of new skin breakdown  Description: 1. Monitor for areas of redness and/or skin breakdown  2. Assess vascular access sites hourly  3. Every 4-6 hours minimum:  Change oxygen saturation probe site  4. Every 4-6 hours:  If on nasal continuous positive airway pressure, respiratory therapy assess nares and determine need for appliance change or resting period.   Outcome: Progressing

## 2022-09-18 NOTE — PROGRESS NOTES
This patient has had improvement in abdominal tenderness on examination. She has been tolerating PO diet with no adverse effects. WBC count continues to be within normal limits. At this time, there is no urgent surgical intervention indicated. We would recommend that the patient continue with antibiotics, and can be transitioned to PO Augmentin 875-125 BID for 14 days on discharge. At this time, we will sign off. Please reach out to the trauma service if any further questions or concerns.

## 2022-09-19 ENCOUNTER — TELEPHONE (OUTPATIENT)
Dept: INTERNAL MEDICINE | Age: 67
End: 2022-09-19

## 2022-09-19 VITALS
HEART RATE: 72 BPM | OXYGEN SATURATION: 100 % | DIASTOLIC BLOOD PRESSURE: 80 MMHG | WEIGHT: 205 LBS | TEMPERATURE: 97.9 F | HEIGHT: 66 IN | BODY MASS INDEX: 32.95 KG/M2 | SYSTOLIC BLOOD PRESSURE: 116 MMHG | RESPIRATION RATE: 16 BRPM

## 2022-09-19 PROCEDURE — 6370000000 HC RX 637 (ALT 250 FOR IP): Performed by: FAMILY MEDICINE

## 2022-09-19 PROCEDURE — 6360000002 HC RX W HCPCS: Performed by: INTERNAL MEDICINE

## 2022-09-19 PROCEDURE — 2580000003 HC RX 258: Performed by: INTERNAL MEDICINE

## 2022-09-19 PROCEDURE — 6370000000 HC RX 637 (ALT 250 FOR IP): Performed by: NURSE PRACTITIONER

## 2022-09-19 PROCEDURE — 99239 HOSP IP/OBS DSCHRG MGMT >30: CPT | Performed by: FAMILY MEDICINE

## 2022-09-19 PROCEDURE — 2500000003 HC RX 250 WO HCPCS: Performed by: INTERNAL MEDICINE

## 2022-09-19 PROCEDURE — 2580000003 HC RX 258: Performed by: NURSE PRACTITIONER

## 2022-09-19 RX ORDER — AMOXICILLIN AND CLAVULANATE POTASSIUM 875; 125 MG/1; MG/1
1 TABLET, FILM COATED ORAL 2 TIMES DAILY
Qty: 28 TABLET | Refills: 0 | Status: SHIPPED | OUTPATIENT
Start: 2022-09-19 | End: 2022-10-03

## 2022-09-19 RX ADMIN — PIPERACILLIN AND TAZOBACTAM 3375 MG: 3; .375 INJECTION, POWDER, LYOPHILIZED, FOR SOLUTION INTRAVENOUS at 08:15

## 2022-09-19 RX ADMIN — POTASSIUM CHLORIDE 40 MEQ: 1500 TABLET, EXTENDED RELEASE ORAL at 08:05

## 2022-09-19 RX ADMIN — SODIUM CHLORIDE, PRESERVATIVE FREE 10 ML: 5 INJECTION INTRAVENOUS at 08:06

## 2022-09-19 RX ADMIN — LEVOTHYROXINE SODIUM 75 MCG: 75 TABLET ORAL at 06:36

## 2022-09-19 RX ADMIN — BUMETANIDE 1 MG: 1 TABLET ORAL at 08:05

## 2022-09-19 RX ADMIN — FAMOTIDINE 20 MG: 10 INJECTION INTRAVENOUS at 08:05

## 2022-09-19 RX ADMIN — PIPERACILLIN AND TAZOBACTAM 3375 MG: 3; .375 INJECTION, POWDER, LYOPHILIZED, FOR SOLUTION INTRAVENOUS at 01:53

## 2022-09-19 RX ADMIN — OXYCODONE HYDROCHLORIDE AND ACETAMINOPHEN 1 TABLET: 5; 325 TABLET ORAL at 08:05

## 2022-09-19 RX ADMIN — ALLOPURINOL 100 MG: 100 TABLET ORAL at 08:06

## 2022-09-19 RX ADMIN — AMIODARONE HYDROCHLORIDE 200 MG: 200 TABLET ORAL at 08:06

## 2022-09-19 ASSESSMENT — PAIN DESCRIPTION - ORIENTATION: ORIENTATION: LOWER

## 2022-09-19 ASSESSMENT — PAIN DESCRIPTION - DESCRIPTORS: DESCRIPTORS: ACHING;DULL

## 2022-09-19 ASSESSMENT — PAIN - FUNCTIONAL ASSESSMENT: PAIN_FUNCTIONAL_ASSESSMENT: ACTIVITIES ARE NOT PREVENTED

## 2022-09-19 ASSESSMENT — PAIN DESCRIPTION - LOCATION: LOCATION: BACK

## 2022-09-19 ASSESSMENT — PAIN SCALES - GENERAL: PAINLEVEL_OUTOF10: 4

## 2022-09-19 NOTE — CARE COORDINATION
[address] to see if patient is current with their agency. Left voicemail asking for a return call.    Everett Irizarry aware of discharge asking for Vale order and AVS.  Requested documentation sent

## 2022-09-19 NOTE — PLAN OF CARE
Problem: Pain  Goal: Verbalizes/displays adequate comfort level or baseline comfort level  9/19/2022 0730 by Nithya Wong RN  Outcome: Progressing  9/18/2022 1746 by Dandy Duffy RN  Outcome: Progressing     Problem: Safety - Adult  Goal: Free from fall injury  9/19/2022 0730 by Nithya Wong RN  Outcome: Progressing  Flowsheets (Taken 9/19/2022 0726 by Dandy Duffy RN)  Free From Fall Injury: Based on caregiver fall risk screen, instruct family/caregiver to ask for assistance with transferring infant if caregiver noted to have fall risk factors  9/18/2022 1746 by Dandy Duffy RN  Outcome: Progressing  Flowsheets (Taken 9/18/2022 0802)  Free From Fall Injury: Based on caregiver fall risk screen, instruct family/caregiver to ask for assistance with transferring infant if caregiver noted to have fall risk factors     Problem: ABCDS Injury Assessment  Goal: Absence of physical injury  9/19/2022 0730 by Nithya Wong RN  Outcome: Progressing  Flowsheets (Taken 9/19/2022 0726 by Dandy Duffy RN)  Absence of Physical Injury: Implement safety measures based on patient assessment  9/18/2022 1746 by Dandy Duffy RN  Outcome: Progressing  Flowsheets (Taken 9/18/2022 0802)  Absence of Physical Injury: Implement safety measures based on patient assessment     Problem: Chronic Conditions and Co-morbidities  Goal: Patient's chronic conditions and co-morbidity symptoms are monitored and maintained or improved  9/19/2022 0730 by Nithya Wong RN  Outcome: Progressing  9/18/2022 1746 by Dandy Duffy RN  Outcome: Progressing     Problem: Skin/Tissue Integrity  Goal: Absence of new skin breakdown  Description: 1. Monitor for areas of redness and/or skin breakdown  2. Assess vascular access sites hourly  3. Every 4-6 hours minimum:  Change oxygen saturation probe site  4.   Every 4-6 hours:  If on nasal continuous positive airway pressure, respiratory therapy assess nares and determine need for appliance change or resting period.   9/19/2022 0730 by Chioma Whaley RN  Outcome: Progressing  9/18/2022 1746 by Andrea Khalil RN  Outcome: Progressing

## 2022-09-19 NOTE — PLAN OF CARE
Problem: Pain  Goal: Verbalizes/displays adequate comfort level or baseline comfort level  9/19/2022 1017 by Martha Caruso RN  Outcome: Completed  9/19/2022 0730 by Marco Mcadams RN  Outcome: Progressing     Problem: Safety - Adult  Goal: Free from fall injury  9/19/2022 1017 by Martha Caruso RN  Outcome: Completed  9/19/2022 0730 by Marco Mcadams RN  Outcome: Progressing  Flowsheets (Taken 9/19/2022 0726 by Martha Caruso RN)  Free From Fall Injury: Based on caregiver fall risk screen, instruct family/caregiver to ask for assistance with transferring infant if caregiver noted to have fall risk factors     Problem: ABCDS Injury Assessment  Goal: Absence of physical injury  9/19/2022 1017 by Martha Caruso RN  Outcome: Completed  9/19/2022 0730 by Marco Mcadams RN  Outcome: Progressing  Flowsheets (Taken 9/19/2022 0726 by Martha Caruso RN)  Absence of Physical Injury: Implement safety measures based on patient assessment     Problem: Chronic Conditions and Co-morbidities  Goal: Patient's chronic conditions and co-morbidity symptoms are monitored and maintained or improved  9/19/2022 1017 by Martha Caruso RN  Outcome: Completed  9/19/2022 0730 by Marco Mcadams RN  Outcome: Progressing     Problem: Skin/Tissue Integrity  Goal: Absence of new skin breakdown  Description: 1. Monitor for areas of redness and/or skin breakdown  2. Assess vascular access sites hourly  3. Every 4-6 hours minimum:  Change oxygen saturation probe site  4. Every 4-6 hours:  If on nasal continuous positive airway pressure, respiratory therapy assess nares and determine need for appliance change or resting period.   9/19/2022 1017 by Martha Caruso RN  Outcome: Completed  9/19/2022 0730 by Marco Mcadams RN  Outcome: Progressing

## 2022-09-19 NOTE — PROGRESS NOTES
CLINICAL PHARMACY NOTE: MEDS TO BEDS    Total # of Prescriptions Filled: 2   The following medications were delivered to the patient:  Augmentin 875  Percocet 5-325    Additional Documentation:

## 2022-09-19 NOTE — TELEPHONE ENCOUNTER
[facility] calling asking for verbal to restart patients home healthcare I informed them we are not allowed to give verbals she insisted to talk to someone else [phone] thiago nurse pt care coordinator , she has been seen prior to hospital stay.

## 2022-09-19 NOTE — TELEPHONE ENCOUNTER
Pc said she has been on 2 mg of bumex, pt now discharged now only tacking  1 mg of bumex , pt needs new script please advise

## 2022-09-20 ENCOUNTER — CARE COORDINATION (OUTPATIENT)
Dept: CASE MANAGEMENT | Age: 67
End: 2022-09-20

## 2022-09-20 DIAGNOSIS — I48.0 PAROXYSMAL ATRIAL FIBRILLATION (HCC): Primary | ICD-10-CM

## 2022-09-20 DIAGNOSIS — K57.20 COLONIC DIVERTICULAR ABSCESS: Primary | ICD-10-CM

## 2022-09-20 PROCEDURE — 1111F DSCHRG MED/CURRENT MED MERGE: CPT | Performed by: STUDENT IN AN ORGANIZED HEALTH CARE EDUCATION/TRAINING PROGRAM

## 2022-09-20 NOTE — CARE COORDINATION
appropriate site of care based on symptoms and resources available to patient including: PCP  Specialist. The patient agrees to contact the PCP office for questions related to their healthcare. Medication reconciliation was performed with patient, who verbalizes understanding of administration of home medications. Advised obtaining a 90-day supply of all daily and as-needed medications. Was patient discharged with a pulse oximeter? no    LPN CC provided contact information. Plan for follow-up call in 5-7 days based on severity of symptoms and risk factors. Plan for next call: symptom management-abdominal discomfort      Care Transitions 24 Hour Call    Schedule Follow Up Appointment with PCP: Declined  Do you have a copy of your discharge instructions?: Yes  Do you have all of your prescriptions and are they filled?: Yes  Have you been contacted by a [facility] Pharmacist?: No  Have you scheduled your follow up appointment?: Yes  How are you going to get to your appointment?: Car - drive self  Post Acute Services:  Outpatient/Community Services, Home Health (Comment: CHF clinic/[place])  Do you feel like you have everything you need to keep you well at home?: Yes  Care Transitions Interventions         Follow Up  Future Appointments   Date Time Provider Yann Olivares   9/30/2022  9:30 AM Yolis Stoddard DO [facility] IVETTE Kay   11/11/2022  2:30 PM MD [facility] Lisa Morelos At [address] [place] [address]

## 2022-09-23 NOTE — DISCHARGE SUMMARY
[facility]  Office: [address], DO, Sherron Pham, DO, Tony Robert, DO, Bella Chaudhari Blood, DO, Hailee Lamb MD, Sundar Stanford MD, Pamela Tanner MD, Toribio Griffin MD,  Eliud Marinelli MD, Mike Mcrae MD, Kyra Murillo DO, Bethany Parisi MD,  Jesus John MD, Anitha Shell MD, Radha Bell DO, Ida Reveles MD, Jyotsna Rose MD, Avel James MD, Modesta Martinez MD, Stew Martinez MD, Jorge Schmidt MD, Archana Og, DO, Mark Coronado MD, Jayant Balbuena MD, Rishi Santillan CNP,  Loc Lara CNP, Tabatha Botello, [facility], Tabatha Gonzalez, [facility],  Storm Linares, [facility], Bj Darling, CNP, Marisol Melchor, CNP, Paresh Hopper, CNP, Vishnu Smith, CNP, Power Sharp, CNP, Aleta Bee PA-C, Karlene Ruiz, [facility], Carmen Abad, [facility], Oleksandr Muñoz, CNP, Chen Parra, CNP, Jermaine Pickens, [facility]    Discharge Summary     Patient ID: Nubia Whitney  :  1955   MRN: [id number]     ACCOUNT:  [de-identified]   Patient's PCP: Pravin Pitt DO  Admit Date: 2022   Discharge Date: 2022  Length of Stay: 4  Code Status:  Prior  Admitting Physician: Toribio Griffin MD  Discharge Physician: Toribio Griffin MD     Active Discharge Diagnoses:     Hospital Problem Lists:  Principal Problem:    Colonic diverticular abscess  Active Problems:    Dilated cardiomyopathy ([facility] [address]    PAH (pulmonary artery hypertension) ([facility] [address] 34 on Echo     Hypokalemia    AICD (automatic cardioverter/defibrillator) present    Class 1 obesity due to excess calories with body mass index (BMI) of 33.0 to 33.9 in adult  Resolved Problems:    * No resolved hospital problems. *      Admission Condition:  poor     Discharged Condition: fair    Hospital Stay:     HPI:    Per chart  Nubia Whitney is a 75 y.o.  AAF with underlying cardiovascular disease status post AICD for EF 15% who was seen last month in the hospital for abdominal pain, at that time she was managed with Cipro and Flagyl for diverticulitis as well as elective pacemaker replacement. She improved discharged home but returned to hospital today with concern for recurrent symptoms after intially  improving. She reported nausea, loose stools, abdominal cramping, fatigue. While being worked up in ED, CT abomen Interval enlargement of the 4.1 cm deep left pelvic wall abscess which is  seen extending to the mid sigmoid colon consistent with fistulous tract and contained/walled-off abscess. Patient was started on abx, surgery consulted for possible I and D and fistula tract mgt. During the admission patient was managed as follow    Colonic abscess   -CT confirming 4cm abcess with interval enlargement.   -Likely sequelae from her recent diarrhea reticulitis infection last month. There is a concern for possible fistula formation.    -Continues to be afebrile, hemodynamically stable with no leukocytosis   - IR culd not place the drain given location  - Continue IV Abx  - Per surgery , finish 14 days then reasses as she is a poor surgicalcandidate   -Patient's pain significantly improved prir to DC, seen again by surgery who recommended no change in plan and DC on PO Augmentin       HFrEF w/ severe MR  -EF 17%. Status post AICD ( battery recently replaced)   Troponin 23 without any chest pain. -proBNP 2644 in setting of a creatinine 1.2  -Currently on DAPT, bumex 2mg daily   -CXR un changed from baseline. No signs of CHF exacerbation. Monitor closely  -resume home meds, monitor carefuly, tele     Paroxysmal A fib  -Currently stable with HR in 70s-80s and stable BP.  Recent thyroid work up unrevealing.   -on Xarelto 20 mg daily, amiodarone 200 mg, metoprolol 50 mg daily  -Patient will likely need her Xarelto held if surgical invention warranted.  -resume home meds, monitor on tele, replace Mg and K until goal  -Monitor on tele, as per above      Hypotension: We will hold Toprol for now     Hypothyroidism  -[facility], T3-T4 within normal range. Resume home Synthroid     Hypokalemia.   Replaced, resumed home p.o. dose     Continue appropriate home medications     Discharged in stable condition, to F/U with surgery as OP     Med rec done  Scripts added   Home care order added   TIM signed  30+ minutes spent    Significant therapeutic interventions: as above    Significant Diagnostic Studies:   Labs / Micro:  CBC:   Lab Results   Component Value Date/Time    WBC 6.1 09/18/2022 06:12 AM    RBC 3.92 09/18/2022 06:12 AM    RBC 4.45 04/03/2012 05:31 PM    HGB 12.0 09/18/2022 06:12 AM    HCT 32.7 09/18/2022 06:12 AM    MCV 83.4 09/18/2022 06:12 AM    MCH 30.6 09/18/2022 06:12 AM    MCHC 36.7 09/18/2022 06:12 AM    RDW 13.6 09/18/2022 06:12 AM     09/18/2022 06:12 AM     04/03/2012 05:31 PM     BMP:    Lab Results   Component Value Date/Time    GLUCOSE 102 09/17/2022 09:22 AM    GLUCOSE 100 04/03/2012 05:31 PM     09/17/2022 09:22 AM    K 3.7 09/17/2022 09:22 AM     09/17/2022 09:22 AM    CO2 22 09/17/2022 09:22 AM    ANIONGAP 12 09/17/2022 09:22 AM    BUN 15 09/17/2022 09:22 AM    CREATININE 1.08 09/17/2022 09:22 AM    BUNCRER NOT REPORTED 01/27/2022 02:30 PM    CALCIUM 8.8 09/17/2022 09:22 AM    LABGLOM 50 09/17/2022 09:22 AM    GFRAA >60 09/17/2022 09:22 AM    GFR      09/17/2022 09:22 AM     HFP:    Lab Results   Component Value Date/Time    PROT 8.1 09/14/2022 07:17 PM     CMP:    Lab Results   Component Value Date/Time    GLUCOSE 102 09/17/2022 09:22 AM    GLUCOSE 100 04/03/2012 05:31 PM     09/17/2022 09:22 AM    K 3.7 09/17/2022 09:22 AM     09/17/2022 09:22 AM    CO2 22 09/17/2022 09:22 AM    BUN 15 09/17/2022 09:22 AM    CREATININE 1.08 09/17/2022 09:22 AM    ANIONGAP 12 09/17/2022 09:22 AM    ALKPHOS 226 09/14/2022 07:17 PM    ALT 18 09/14/2022 07:17 PM    AST 30 09/14/2022 07:17 PM    BILITOT 0.7 09/14/2022 07:17 PM    LABALBU 4.3 09/14/2022 07:17 PM    ALBUMIN 1.1 09/14/2022 07:17 PM    LABGLOM 50 09/17/2022 09:22 AM    GFRAA >60 09/17/2022 09:22 AM    GFR      09/17/2022 09:22 AM    PROT 8.1 09/14/2022 07:17 PM    CALCIUM 8.8 09/17/2022 09:22 AM     PT/INR:    Lab Results   Component Value Date/Time    PROTIME 11.8 09/16/2022 03:48 AM    INR 1.1 09/16/2022 03:48 AM     PTT:   Lab Results   Component Value Date/Time    APTT 40.8 04/11/2022 09:08 AM     FLP:    Lab Results   Component Value Date/Time    CHOL 180 05/23/2020 03:36 AM    TRIG 79 05/23/2020 03:36 AM    HDL 42 05/23/2020 03:36 AM     U/A:    Lab Results   Component Value Date/Time    COLORU Dark Yellow 09/15/2022 07:44 AM    TURBIDITY Clear 09/15/2022 07:44 AM    SPECGRAV 1.034 09/15/2022 07:44 AM    HGBUR NEGATIVE 09/15/2022 07:44 AM    PHUR 5.0 09/15/2022 07:44 AM    PROTEINU TRACE 09/15/2022 07:44 AM    GLUCOSEU 3+ 09/15/2022 07:44 AM    KETUA TRACE 09/15/2022 07:44 AM    BILIRUBINUR NEGATIVE  Verified by ictotest. 09/15/2022 07:44 AM    UROBILINOGEN Normal 09/15/2022 07:44 AM    NITRU POSITIVE 09/15/2022 07:44 AM    LEUKOCYTESUR TRACE 09/15/2022 07:44 AM     TSH:    Lab Results   Component Value Date/Time    TSH 21.57 09/14/2022 07:17 PM        Radiology:  No results found. Consultations:    Consults:     Final Specialist Recommendations/Findings:   IP CONSULT TO GENERAL SURGERY  IP CONSULT TO HOSPITALIST  IP CONSULT TO INFECTIOUS DISEASES      The patient was seen and examined on day of discharge     Physical Exam  Vitals and nursing note reviewed. Constitutional:       General: She is not in acute distress. HENT:      Head: Normocephalic and atraumatic. Eyes:      Conjunctiva/sclera: Conjunctivae normal.      Pupils: Pupils are equal, round, and reactive to light. Cardiovascular:      Rate and Rhythm: Normal rate and regular rhythm. Heart sounds: No murmur heard.   Pulmonary:      Effort: Pulmonary effort is normal. No accessory muscle usage or respiratory distress. Breath sounds: No stridor. No decreased breath sounds, wheezing, rhonchi or rales. Abdominal:      General: Bowel sounds are normal. There is no distension. Palpations: Abdomen is soft. Abdomen is not rigid. Tenderness: There is abdominal tenderness in the left lower quadrant. There is no guarding. Musculoskeletal:         General: No tenderness. Right lower leg: Edema present. Left lower leg: Edema present. Skin:     General: Skin is warm and dry. Findings: No erythema, lesion or rash. Neurological:      Mental Status: She is alert and oriented to person, place, and time. Cranial Nerves: No cranial nerve deficit. Motor: No seizure activity.    Discharge plan:     Disposition: Home    Physician Follow Up:   [address] [place] [address]    Requiring Further Evaluation/Follow Up POST HOSPITALIZATION/Incidental Findings:     Diet: cardiac diet    Activity: As tolerated    Instructions to Patient:     Discharge Medications:      Medication List        START taking these medications      amoxicillin-clavulanate 875-125 MG per tablet  Commonly known as: AUGMENTIN  Take 1 tablet by mouth 2 times daily for 14 days            CONTINUE taking these medications      acetaminophen 500 MG tablet  Commonly known as: TYLENOL  Take 1 tablet by mouth every 6 hours as needed for Pain     allopurinol 100 MG tablet  Commonly known as: ZYLOPRIM  Take 1 tablet by mouth daily     amiodarone 200 MG tablet  Commonly known as: CORDARONE     bumetanide 2 MG tablet  Commonly known as: BUMEX  Take 1 tablet by mouth daily     Jardiance 10 MG tablet  Generic drug: empagliflozin     levothyroxine 25 MCG tablet  Commonly known as: SYNTHROID  Take 3 tablets by mouth Daily     lidocaine 5 %  Commonly known as: LIDODERM  Place one patch to both knees 12 hours on, 12 hours off.     metoprolol succinate 50 MG extended release tablet  Commonly known as: TOPROL XL     nitroGLYCERIN 0.4 MG SL tablet  Commonly known as: NITROSTAT  up to max of 3 total doses. If no relief after 1 dose, call 911. potassium chloride 20 MEQ extended release tablet  Commonly known as: KLOR-CON M  Take 1 tablet by mouth daily     rivaroxaban 20 MG Tabs tablet  Commonly known as: XARELTO  Take 1 tablet by mouth daily     sacubitril-valsartan 24-26 MG per tablet  Commonly known as: ENTRESTO  Take 0.5 tablets by mouth 2 times daily     spironolactone 25 MG tablet  Commonly known as: ALDACTONE  Take 0.5 tablets by mouth daily            STOP taking these medications      ciprofloxacin 500 MG tablet  Commonly known as: CIPRO     diclofenac sodium 1 % Gel  Commonly known as: VOLTAREN     metroNIDAZOLE 500 MG tablet  Commonly known as: FLAGYL     psyllium 28.3 % Pack  Commonly known as: Daliajael Stratton your doctor about these medications      magnesium oxide 400 MG tablet  Commonly known as: MAG-OX  Take 0.5 tablets by mouth daily     oxyCODONE-acetaminophen 5-325 MG per tablet  Commonly known as: PERCOCET  Take 1 tablet by mouth every 8 hours as needed for Pain for up to 3 days. Ask about: Should I take this medication? Where to Get Your Medications        These medications were sent to [facility] [address] [facility], [address], [address] [place] [address]      Phone: [phone]   amoxicillin-clavulanate 875-125 MG per tablet  oxyCODONE-acetaminophen 5-325 MG per tablet         No discharge procedures on file. Time Spent on discharge is  35 mins in patient examination, evaluation, counseling as well as medication reconciliation, prescriptions for required medications, discharge plan and follow up. Electronically signed by   Jamari Castro MD  9/23/2022  6:42 PM      Thank you Dr. Maddie Montemayor DO for the opportunity to be involved in this patient's care.

## 2022-09-24 NOTE — TELEPHONE ENCOUNTER
Afib on xarelto appropriate for refill. Marta Ha MD  Internal Medicine Resident, PGY- [facility];  [place], [place]  9/23/2022, 8:01 PM
Please advise.
Benign paroxysmal positional vertigo     Chronic heart failure with reduced ejection fraction and diastolic dysfunction (HCC)     Hypotension (arterial)     NSVT (nonsustained ventricular tachycardia) (HCC)     Chest pain     Hypothyroidism     Chronic combined systolic (congestive) and diastolic (congestive) heart failure (HCC)     Acute diverticulitis     Abscess of sigmoid colon     Diverticulitis     Nausea & vomiting     Diverticulitis of large intestine with abscess without bleeding     Diverticulitis of colon     Lower abdominal pain     Colonic diverticular abscess

## 2022-09-27 ENCOUNTER — CARE COORDINATION (OUTPATIENT)
Dept: CASE MANAGEMENT | Age: 67
End: 2022-09-27

## 2022-09-27 NOTE — RESULT ENCOUNTER NOTE
Patient was called and let her know dr Lacy Christian would be talking with her about TSH results and levothyroxine and medication increase, patient stated she understood.

## 2022-09-27 NOTE — CARE COORDINATION
[facility] Transitions Follow Up Call    2022    Patient: Peg Caceres  Patient : 1955   MRN: [id number]  Reason for Admission:    Discharge Date: 22 RARS: Readmission Risk Score: 17.3         Spoke with: Annemarie she states things are going okay, she has very little abdominal pain, she is eating and drinking without nausea. Normal bowel and bladder elimination. CTN gave Dr Ajith Quijano 's office contact to patient again, no current concerns  Care Transitions Follow Up Call    Needs to be reviewed by the provider   Additional needs identified to be addressed with provider: No  none             Method of communication with provider : none      LPN Care Coordinator contacted the patient by telephone to follow up after admission on . Verified name and  with patient as identifiers. Addressed changes since last contact: none  Discussed follow-up appointments. If no appointment was previously scheduled, appointment scheduling offered: Yes. Is follow up appointment scheduled within 7 days of discharge? Yes. Advance Care Planning:   Does patient have an Advance Directive: not on file. LPN CC reviewed discharge instructions, medical action plan and red flags with patient and discussed any barriers to care and/or understanding of plan of care after discharge. Discussed appropriate site of care based on symptoms and resources available to patient including: PCP  Specialist. The patient agrees to contact the PCP office for questions related to their healthcare. Patients top risk factors for readmission: lack of knowledge about disease  Interventions to address risk factors: Obtained and reviewed discharge summary and/or continuity of care documents          LPN CC provided contact information for future needs. Plan for follow-up call in 5-7 days based on severity of symptoms and risk factors.   Plan for next call: symptom management-abdominal discmfort       Care Transitions Subsequent and Final Call    Subsequent and Final Calls  Do you have any ongoing symptoms?: Yes  Onset of Patient-reported symptoms: In the past 7 days  Patient-reported symptoms: Abdominal Pain  Interventions for patient-reported symptoms: Other  Have your medications changed?: No  Do you have any questions related to your medications?: No  Do you currently have any active services?: No  Are you currently active with any services?: Outpatient/Community Services, Home Health  Do you have any needs or concerns that I can assist you with?: No  Identified Barriers: Lack of Education  Care Transitions Interventions  Other Interventions:              Follow Up  Future Appointments   Date Time Provider Yann Olivares   9/30/2022  9:30 AM Ольга Valdivia DO [facility] IVETTE Briceño   11/11/2022  2:30 PM Sonny Quiroga MD [facility] [address] [place] [address]

## 2022-09-30 ENCOUNTER — OFFICE VISIT (OUTPATIENT)
Dept: INTERNAL MEDICINE | Age: 67
End: 2022-09-30

## 2022-09-30 VITALS
TEMPERATURE: 97 F | WEIGHT: 208 LBS | HEART RATE: 74 BPM | OXYGEN SATURATION: 99 % | SYSTOLIC BLOOD PRESSURE: 97 MMHG | BODY MASS INDEX: 33.43 KG/M2 | HEIGHT: 66 IN | DIASTOLIC BLOOD PRESSURE: 74 MMHG

## 2022-09-30 DIAGNOSIS — Z09 HOSPITAL DISCHARGE FOLLOW-UP: Primary | ICD-10-CM

## 2022-09-30 DIAGNOSIS — K63.0 ABSCESS OF SIGMOID COLON: ICD-10-CM

## 2022-09-30 DIAGNOSIS — I10 ESSENTIAL HYPERTENSION: ICD-10-CM

## 2022-09-30 DIAGNOSIS — K57.32 DIVERTICULITIS OF COLON: ICD-10-CM

## 2022-09-30 DIAGNOSIS — Z23 NEEDS FLU SHOT: ICD-10-CM

## 2022-09-30 DIAGNOSIS — Z45.02 ICD (IMPLANTABLE CARDIOVERTER-DEFIBRILLATOR) BATTERY DEPLETION: ICD-10-CM

## 2022-09-30 DIAGNOSIS — I50.42 CHRONIC COMBINED SYSTOLIC (CONGESTIVE) AND DIASTOLIC (CONGESTIVE) HEART FAILURE (HCC): ICD-10-CM

## 2022-09-30 DIAGNOSIS — E03.9 HYPOTHYROIDISM, UNSPECIFIED TYPE: ICD-10-CM

## 2022-09-30 PROBLEM — I95.9 HYPOTENSION (ARTERIAL): Status: RESOLVED | Noted: 2020-05-23 | Resolved: 2022-09-30

## 2022-09-30 PROBLEM — N17.9 AKI (ACUTE KIDNEY INJURY) (HCC): Status: RESOLVED | Noted: 2017-03-04 | Resolved: 2022-09-30

## 2022-09-30 PROBLEM — I50.23 ACUTE ON CHRONIC SYSTOLIC CHF (CONGESTIVE HEART FAILURE) (HCC): Status: RESOLVED | Noted: 2019-09-05 | Resolved: 2022-09-30

## 2022-09-30 PROCEDURE — 90686 IIV4 VACC NO PRSV 0.5 ML IM: CPT | Performed by: STUDENT IN AN ORGANIZED HEALTH CARE EDUCATION/TRAINING PROGRAM

## 2022-09-30 PROCEDURE — 99496 TRANSJ CARE MGMT HIGH F2F 7D: CPT | Performed by: STUDENT IN AN ORGANIZED HEALTH CARE EDUCATION/TRAINING PROGRAM

## 2022-09-30 PROCEDURE — 1111F DSCHRG MED/CURRENT MED MERGE: CPT | Performed by: STUDENT IN AN ORGANIZED HEALTH CARE EDUCATION/TRAINING PROGRAM

## 2022-09-30 RX ORDER — BUMETANIDE 2 MG/1
1 TABLET ORAL DAILY
Qty: 30 TABLET | Refills: 3 | Status: SHIPPED | OUTPATIENT
Start: 2022-09-30

## 2022-09-30 ASSESSMENT — ENCOUNTER SYMPTOMS
SHORTNESS OF BREATH: 1
CHEST TIGHTNESS: 0

## 2022-09-30 NOTE — PROGRESS NOTES
Post-Discharge Transitional Care Follow Up      Jose Pelletier   YOB: 1955    Date of Office Visit:  9/30/2022  Date of Hospital Admission: 9/14/22  Date of Hospital Discharge: 9/19/22  Readmission Risk Score (high >=14%. Medium >=10%):Readmission Risk Score: 17.3      Care management risk score Rising risk (score 2-5) and Complex Care (Scores >=6): No Risk Score On File     Non face to face  following discharge, date last encounter closed (first attempt may have been earlier): 09/20/2022     Call initiated 2 business days of discharge: Yes     Hospital discharge follow-up  -     [address] [facility], [address] Additional Contrast? Radiologist Recommendation; Future  Needs flu shot  -     ND IMMUNIZ ADMIN,1 SINGLE/COMB VAC/TOXOID  Diverticulitis of colon  -     [facility] - Anuja Rasheed MD, Colorectal Surgery, Mon  -     CT ABDOMEN PELVIS W WO CONTRAST Additional Contrast? Radiologist Recommendation; Future  -Patient understanding of plan to get CT abdomen pelvis approximately 1 week after completion of her antibiotics and was referred to colorectal surgery. She was also advised to maintain her follow-up with GI next week  Hypothyroidism, unspecified type  -We will check TSH at the end of the month to evaluate titration of levothyroxine  -Currently denies hypothyroidism symptoms  Chronic combined systolic (congestive) and diastolic (congestive) heart failure (HCC)  -     bumetanide (BUMEX) 2 MG tablet; Take 0.5 tablets by mouth daily, Disp-30 tablet, R-3Normal  -Patient had been taking 1 mg Bumex daily on her own. She denies any edema. No edema on exam and lungs are clear. We will continue 1 mg Bumex daily. She was instructed to take 2 mg if she notes weight gain of 2 to 3 pounds in 1 day or notice increased lower extremity edema.   She will call our office with any questions or worsening shortness of breath  Essential hypertension  -Patient following with cardiology for her hypertension. She has been instructed not to take blood pressure medications except metoprolol if her systolic blood pressure is less than 110. She will continue to follow with cardiology  ICD (implantable cardioverter-defibrillator) battery depletion  -Following with cardiology. Recently had battery exchanged and leads are in \"excellent condition\"  Abscess of sigmoid colon  -     [facility] - Anuja Rasheed MD, Colorectal Surgery, [address] Additional Contrast? Radiologist Recommendation; Future      Medical Decision Making: high complexity  Return in 3 months (on 12/30/2022) for Hypothyrodism, Coordination of Care. On this date 9/30/2022 I have spent 60+ minutes reviewing previous notes, test results and face to face with the patient discussing the diagnosis and importance of compliance with the treatment plan as well as documenting on the day of the visit. Subjective:   53-year-old female with past medical history recurrent diverticulitis x3 most recent admission on 9/14/2022 for increasing left pelvic wall abscess treated conservatively with antibiotics and due for reevaluation, HFrEF with ejection fraction 17% status post AICD, A. fib on amiodarone and Xarelto, hypertension, gout, hypothyroidism currently uptitrating levothyroxine dose, osteoarthritis right rotator cuff following with orthopedics who presents for hospital follow-up    Inpatient course: Discharge summary reviewed- see chart. Interval history/Current status: Patient is feeling much better. Her abdominal pain has resolved, she denies fever, chills, she is still taking her Augmentin as prescribed and will finish it on Monday. She was discharged on Bumex 2 mg daily however has only been taking 1 and denies any shortness of breath over her baseline or lower extremity edema.   She has a follow-up with her gastroenterologist on October 11. Her blood pressure was low normal today and she follows with cardiology for blood pressure management we have instructed her to take her blood pressure prior to taking her antihypertensive medications and to withhold antihypertensives if blood systolic pressure less than 110 patient is understanding of this plan. She has been taking 75 mcg of levothyroxine and denies hypothyroidism symptoms. Patient would like to talk about medical management now that out of hospital.    Patient Active Problem List   Diagnosis    Dilated cardiomyopathy ([facility] [address]    Pre-diabetes    Essential hypertension    PAH (pulmonary artery hypertension) ([facility] [address] 34 on Echo     Mitral regurgitation    Chronic headache    Class 1 obesity due to excess calories with body mass index (BMI) of 33.0 to 33.9 in adult    AICD (automatic cardioverter/defibrillator) present    Syncope and collapse secondary to [facility]    ICD (implantable cardioverter-defibrillator) battery depletion    Gout    Dizziness    Benign paroxysmal positional vertigo    Chronic heart failure with reduced ejection fraction and diastolic dysfunction (HCC)    NSVT (nonsustained ventricular tachycardia) (HCC)    Chest pain    Hypothyroidism    Chronic combined systolic (congestive) and diastolic (congestive) heart failure ([facility] [address]    Acute diverticulitis    Abscess of sigmoid colon    Diverticulitis    Nausea & vomiting    Diverticulitis of large intestine with abscess without bleeding    Diverticulitis of colon    Lower abdominal pain    Colonic diverticular abscess       Medications listed as ordered at the time of discharge from hospital     Medication List            Accurate as of September 30, 2022 11:20 AM. If you have any questions, ask your nurse or doctor.                 CHANGE how you take these medications      bumetanide 2 MG tablet  Commonly known as: BUMEX  Take 0.5 tablets by mouth daily  What changed: how much to take  Changed by: Sirena Parra tablet by mouth daily 30 tablet 2    amoxicillin-clavulanate (AUGMENTIN) 875-125 MG per tablet Take 1 tablet by mouth 2 times daily for 14 days 28 tablet 0    levothyroxine (SYNTHROID) 25 MCG tablet Take 3 tablets by mouth Daily 90 tablet 2    acetaminophen (TYLENOL) 500 MG tablet Take 1 tablet by mouth every 6 hours as needed for Pain 60 tablet 0    metoprolol succinate (TOPROL XL) 50 MG extended release tablet Take 50 mg by mouth daily      amiodarone (CORDARONE) 200 MG tablet       empagliflozin (JARDIANCE) 10 MG tablet Take 10 mg by mouth daily      potassium chloride (KLOR-CON M) 20 MEQ extended release tablet Take 1 tablet by mouth daily 30 tablet 0    magnesium oxide (MAG-OX) 400 MG tablet Take 0.5 tablets by mouth daily 30 tablet 1    nitroGLYCERIN (NITROSTAT) 0.4 MG SL tablet up to max of 3 total doses. If no relief after 1 dose, call 911. 25 tablet 3    sacubitril-valsartan (ENTRESTO) 24-26 MG per tablet Take 0.5 tablets by mouth 2 times daily 60 tablet 11    allopurinol (ZYLOPRIM) 100 MG tablet Take 1 tablet by mouth daily 30 tablet 3    spironolactone (ALDACTONE) 25 MG tablet Take 0.5 tablets by mouth daily 30 tablet 3        Medications patient taking as of now reconciled against medications ordered at time of hospital discharge: Yes    Review of Systems   Constitutional:  Negative for activity change, chills and fever. Respiratory:  Positive for shortness of breath. Negative for chest tightness. Cardiovascular:  Negative for chest pain and leg swelling. Objective:    BP 97/74 (Site: Left Upper Arm, Position: Sitting, Cuff Size: Large Adult)   Pulse 74   Temp 97 °F (36.1 °C)   Ht 5' 6\" (1.676 m)   Wt 208 lb (94.3 kg)   SpO2 99%   BMI 33.57 kg/m²   Physical Exam  Constitutional:       General: She is not in acute distress. Appearance: She is not ill-appearing. HENT:      Head: Normocephalic and atraumatic.       Nose: Nose normal.      Mouth/Throat:      Mouth: Mucous membranes are moist.   Eyes:      Extraocular Movements: Extraocular movements intact. Cardiovascular:      Rate and Rhythm: Normal rate and regular rhythm. Heart sounds: No murmur heard. No gallop. Pulmonary:      Effort: Pulmonary effort is normal.      Breath sounds: Normal breath sounds. Abdominal:      General: There is no distension. Palpations: Abdomen is soft. Tenderness: There is no abdominal tenderness. There is no guarding or rebound. Musculoskeletal:      Cervical back: Normal range of motion. Skin:     General: Skin is warm and dry. Coloration: Skin is not jaundiced. Neurological:      General: No focal deficit present. Psychiatric:         Mood and Affect: Mood normal.       An electronic signature was used to authenticate this note.   --Lewis Bolaños MD

## 2022-09-30 NOTE — PROGRESS NOTES
Abscess of sigmoid colon    Diverticulitis    Nausea & vomiting    Diverticulitis of large intestine with abscess without bleeding    Diverticulitis of colon    Lower abdominal pain    Colonic diverticular abscess       Medications listed as ordered at the time of discharge from hospital     Medication List            Accurate as of September 30, 2022  9:43 AM. If you have any questions, ask your nurse or doctor. CONTINUE taking these medications      acetaminophen 500 MG tablet  Commonly known as: TYLENOL  Take 1 tablet by mouth every 6 hours as needed for Pain     allopurinol 100 MG tablet  Commonly known as: ZYLOPRIM  Take 1 tablet by mouth daily     amiodarone 200 MG tablet  Commonly known as: CORDARONE     amoxicillin-clavulanate 875-125 MG per tablet  Commonly known as: AUGMENTIN  Take 1 tablet by mouth 2 times daily for 14 days     bumetanide 2 MG tablet  Commonly known as: BUMEX  Take 1 tablet by mouth daily     Jardiance 10 MG tablet  Generic drug: empagliflozin     levothyroxine 25 MCG tablet  Commonly known as: SYNTHROID  Take 3 tablets by mouth Daily     magnesium oxide 400 MG tablet  Commonly known as: MAG-OX  Take 0.5 tablets by mouth daily     metoprolol succinate 50 MG extended release tablet  Commonly known as: TOPROL XL     nitroGLYCERIN 0.4 MG SL tablet  Commonly known as: NITROSTAT  up to max of 3 total doses. If no relief after 1 dose, call 911.      potassium chloride 20 MEQ extended release tablet  Commonly known as: KLOR-CON M  Take 1 tablet by mouth daily     rivaroxaban 20 MG Tabs tablet  Commonly known as: XARELTO  Take 1 tablet by mouth daily Take 1 tablet by mouth daily     sacubitril-valsartan 24-26 MG per tablet  Commonly known as: ENTRESTO  Take 0.5 tablets by mouth 2 times daily     spironolactone 25 MG tablet  Commonly known as: ALDACTONE  Take 0.5 tablets by mouth daily               Medications marked \"taking\" at this time  Outpatient Medications Marked as Taking for the 9/30/22 encounter (Office Visit) with Joyce Carmichael, DO   Medication Sig Dispense Refill    rivaroxaban (XARELTO) 20 MG TABS tablet Take 1 tablet by mouth daily Take 1 tablet by mouth daily 30 tablet 2    amoxicillin-clavulanate (AUGMENTIN) 875-125 MG per tablet Take 1 tablet by mouth 2 times daily for 14 days 28 tablet 0    levothyroxine (SYNTHROID) 25 MCG tablet Take 3 tablets by mouth Daily 90 tablet 2    acetaminophen (TYLENOL) 500 MG tablet Take 1 tablet by mouth every 6 hours as needed for Pain 60 tablet 0    metoprolol succinate (TOPROL XL) 50 MG extended release tablet Take 50 mg by mouth daily      amiodarone (CORDARONE) 200 MG tablet       empagliflozin (JARDIANCE) 10 MG tablet Take 10 mg by mouth daily      potassium chloride (KLOR-CON M) 20 MEQ extended release tablet Take 1 tablet by mouth daily 30 tablet 0    magnesium oxide (MAG-OX) 400 MG tablet Take 0.5 tablets by mouth daily 30 tablet 1    nitroGLYCERIN (NITROSTAT) 0.4 MG SL tablet up to max of 3 total doses. If no relief after 1 dose, call 911. 25 tablet 3    sacubitril-valsartan (ENTRESTO) 24-26 MG per tablet Take 0.5 tablets by mouth 2 times daily 60 tablet 11    bumetanide (BUMEX) 2 MG tablet Take 1 tablet by mouth daily 30 tablet 3    allopurinol (ZYLOPRIM) 100 MG tablet Take 1 tablet by mouth daily 30 tablet 3    spironolactone (ALDACTONE) 25 MG tablet Take 0.5 tablets by mouth daily 30 tablet 3        Medications patient taking as of now reconciled against medications ordered at time of hospital discharge: {YES / SALGUERO:[id number]}    Review of Systems    Objective:    BP 97/74 (Site: Left Upper Arm, Position: Sitting, Cuff Size: Large Adult)   Pulse 74   Temp 97 °F (36.1 °C)   Ht 5' 6\" (1.676 m)   Wt 208 lb (94.3 kg)   SpO2 99%   BMI 33.57 kg/m²   Physical Exam    An electronic signature was used to authenticate this note.   --Selena Rivero MD

## 2022-10-04 ENCOUNTER — CARE COORDINATION (OUTPATIENT)
Dept: CASE MANAGEMENT | Age: 67
End: 2022-10-04

## 2022-10-04 NOTE — CARE COORDINATION
[address] Care Transitions Follow Up Call    N Care Coordinator contacted the patient by telephone to follow up after admission on . Verified name and  with patient as identifiers. Patient: Ronaldo Fraire  Patient : 1955   MRN: [id number]  Reason for Admission: Colonic diverticular abscess   Discharge Date: 22 RARS: Readmission Risk Score: 17.3      Needs to be reviewed by the provider   Additional needs identified to be addressed with provider: No  none             Method of communication with provider: none. Annemarie states she is doing fine she has had no issues and no complaints is agreeable to this being final call    Addressed changes since last contact:  none  Discussed follow-up appointments. If no appointment was previously scheduled, appointment scheduling offered: Yes. Is follow up appointment scheduled within 7 days of discharge? No.    Follow Up  Future Appointments   Date Time Provider Yann Olivares   2022  2:30 PM Tien Schroeder MD [facility]   2022  8:30 AM River Ngo DO  [facility] Coordinator reviewed discharge instructions with patient and discussed any barriers to care and/or understanding of plan of care after discharge. Discussed appropriate site of care based on symptoms and resources available to patient including: PCP  Specialist. The patient agrees to contact the PCP office for questions related to their healthcare. Advance Care Planning:   not on file.      Patients top risk factors for readmission: lack of knowledge about disease  Interventions to address risk factors: Obtained and reviewed discharge summary and/or continuity of care documents    Offered patient enrollment in the Remote Patient Monitoring (RPM) program for in-home monitoring: NA.     Care Transitions Subsequent and Final Call    Subsequent and Final Calls  Do you have any ongoing symptoms?: No  Have your medications changed?: No  Do you have any questions related to your medications?: No  Do you currently have any active services?: No  Are you currently active with any services?: Outpatient/Community Services, Home Health  Do you have any needs or concerns that I can assist you with?: No  Identified Barriers: Other  Care Transitions Interventions  Other Interventions:             LPN Care Coordinator provided contact information for future needs. No further follow-up call indicated based on severity of symptoms and risk factors.       Marium Cárdenas LPN

## 2022-10-07 ENCOUNTER — APPOINTMENT (OUTPATIENT)
Dept: CT IMAGING | Age: 67
DRG: 391 | End: 2022-10-07
Payer: MEDICARE

## 2022-10-07 ENCOUNTER — TELEPHONE (OUTPATIENT)
Dept: INTERNAL MEDICINE | Age: 67
End: 2022-10-07

## 2022-10-07 ENCOUNTER — HOSPITAL ENCOUNTER (INPATIENT)
Age: 67
LOS: 1 days | Discharge: HOME | DRG: 391 | End: 2022-10-08
Attending: EMERGENCY MEDICINE | Admitting: STUDENT IN AN ORGANIZED HEALTH CARE EDUCATION/TRAINING PROGRAM
Payer: MEDICARE

## 2022-10-07 DIAGNOSIS — K57.20 DIVERTICULITIS OF LARGE INTESTINE WITH ABSCESS WITHOUT BLEEDING: ICD-10-CM

## 2022-10-07 DIAGNOSIS — K65.1 INTRA-ABDOMINAL ABSCESS (HCC): ICD-10-CM

## 2022-10-07 DIAGNOSIS — R10.30 LOWER ABDOMINAL PAIN: ICD-10-CM

## 2022-10-07 PROBLEM — I48.91 ATRIAL FIBRILLATION (HCC): Status: ACTIVE | Noted: 2022-10-07

## 2022-10-07 PROBLEM — R94.31 QT PROLONGATION: Status: ACTIVE | Noted: 2022-10-07

## 2022-10-07 PROBLEM — K74.60 CIRRHOSIS (HCC): Status: ACTIVE | Noted: 2022-10-07

## 2022-10-07 LAB
ABSOLUTE EOS #: 0.14 K/UL (ref 0–0.44)
ABSOLUTE IMMATURE GRANULOCYTE: <0.03 K/UL (ref 0–0.3)
ABSOLUTE LYMPH #: 0.92 K/UL (ref 1.1–3.7)
ABSOLUTE MONO #: 0.69 K/UL (ref 0.1–1.2)
ALBUMIN SERPL-MCNC: 3.5 G/DL (ref 3.5–5.2)
ALBUMIN/GLOBULIN RATIO: 1 (ref 1–2.5)
ALP BLD-CCNC: 180 U/L (ref 35–104)
ALT SERPL-CCNC: 12 U/L (ref 5–33)
ANION GAP SERPL CALCULATED.3IONS-SCNC: 12 MMOL/L (ref 9–17)
AST SERPL-CCNC: 21 U/L
BASOPHILS # BLD: 1 % (ref 0–2)
BASOPHILS ABSOLUTE: 0.03 K/UL (ref 0–0.2)
BILIRUB SERPL-MCNC: 1 MG/DL (ref 0.3–1.2)
BUN BLDV-MCNC: 11 MG/DL (ref 8–23)
CALCIUM SERPL-MCNC: 8.8 MG/DL (ref 8.6–10.4)
CHLORIDE BLD-SCNC: 104 MMOL/L (ref 98–107)
CO2: 23 MMOL/L (ref 20–31)
CREAT SERPL-MCNC: 0.93 MG/DL (ref 0.5–0.9)
EOSINOPHILS RELATIVE PERCENT: 2 % (ref 1–4)
GFR SERPL CREATININE-BSD FRML MDRD: >60 ML/MIN/1.73M2
GLUCOSE BLD-MCNC: 93 MG/DL (ref 70–99)
HCT VFR BLD CALC: 34 % (ref 36.3–47.1)
HEMOGLOBIN: 11.6 G/DL (ref 11.9–15.1)
IMMATURE GRANULOCYTES: 0 %
LACTIC ACID, WHOLE BLOOD: 1.5 MMOL/L (ref 0.7–2.1)
LIPASE: 13 U/L (ref 13–60)
LYMPHOCYTES # BLD: 14 % (ref 24–43)
MAGNESIUM: 1.9 MG/DL (ref 1.6–2.6)
MCH RBC QN AUTO: 29.8 PG (ref 25.2–33.5)
MCHC RBC AUTO-ENTMCNC: 34.1 G/DL (ref 28.4–34.8)
MCV RBC AUTO: 87.4 FL (ref 82.6–102.9)
MONOCYTES # BLD: 11 % (ref 3–12)
NRBC AUTOMATED: 0 PER 100 WBC
PDW BLD-RTO: 15.1 % (ref 11.8–14.4)
PLATELET # BLD: 205 K/UL (ref 138–453)
PMV BLD AUTO: 11.9 FL (ref 8.1–13.5)
POTASSIUM SERPL-SCNC: 3.2 MMOL/L (ref 3.7–5.3)
RBC # BLD: 3.89 M/UL (ref 3.95–5.11)
RBC # BLD: ABNORMAL 10*6/UL
SEG NEUTROPHILS: 72 % (ref 36–65)
SEGMENTED NEUTROPHILS ABSOLUTE COUNT: 4.71 K/UL (ref 1.5–8.1)
SODIUM BLD-SCNC: 139 MMOL/L (ref 135–144)
TOTAL PROTEIN: 7 G/DL (ref 6.4–8.3)
WBC # BLD: 6.5 K/UL (ref 3.5–11.3)

## 2022-10-07 PROCEDURE — G0378 HOSPITAL OBSERVATION PER HR: HCPCS

## 2022-10-07 PROCEDURE — 99222 1ST HOSP IP/OBS MODERATE 55: CPT | Performed by: STUDENT IN AN ORGANIZED HEALTH CARE EDUCATION/TRAINING PROGRAM

## 2022-10-07 PROCEDURE — 6360000002 HC RX W HCPCS

## 2022-10-07 PROCEDURE — 1200000000 HC SEMI PRIVATE

## 2022-10-07 PROCEDURE — 83735 ASSAY OF MAGNESIUM: CPT

## 2022-10-07 PROCEDURE — 6370000000 HC RX 637 (ALT 250 FOR IP)

## 2022-10-07 PROCEDURE — 96375 TX/PRO/DX INJ NEW DRUG ADDON: CPT

## 2022-10-07 PROCEDURE — 80053 COMPREHEN METABOLIC PANEL: CPT

## 2022-10-07 PROCEDURE — 85025 COMPLETE CBC W/AUTO DIFF WBC: CPT

## 2022-10-07 PROCEDURE — 2580000003 HC RX 258: Performed by: STUDENT IN AN ORGANIZED HEALTH CARE EDUCATION/TRAINING PROGRAM

## 2022-10-07 PROCEDURE — 96365 THER/PROPH/DIAG IV INF INIT: CPT

## 2022-10-07 PROCEDURE — 83605 ASSAY OF LACTIC ACID: CPT

## 2022-10-07 PROCEDURE — 6360000002 HC RX W HCPCS: Performed by: STUDENT IN AN ORGANIZED HEALTH CARE EDUCATION/TRAINING PROGRAM

## 2022-10-07 PROCEDURE — 6360000004 HC RX CONTRAST MEDICATION: Performed by: STUDENT IN AN ORGANIZED HEALTH CARE EDUCATION/TRAINING PROGRAM

## 2022-10-07 PROCEDURE — 96374 THER/PROPH/DIAG INJ IV PUSH: CPT

## 2022-10-07 PROCEDURE — 83690 ASSAY OF LIPASE: CPT

## 2022-10-07 PROCEDURE — 99285 EMERGENCY DEPT VISIT HI MDM: CPT

## 2022-10-07 PROCEDURE — 2580000003 HC RX 258

## 2022-10-07 PROCEDURE — 93005 ELECTROCARDIOGRAM TRACING: CPT | Performed by: STUDENT IN AN ORGANIZED HEALTH CARE EDUCATION/TRAINING PROGRAM

## 2022-10-07 PROCEDURE — 96367 TX/PROPH/DG ADDL SEQ IV INF: CPT

## 2022-10-07 PROCEDURE — 96366 THER/PROPH/DIAG IV INF ADDON: CPT

## 2022-10-07 PROCEDURE — 74177 CT ABD & PELVIS W/CONTRAST: CPT

## 2022-10-07 PROCEDURE — 96376 TX/PRO/DX INJ SAME DRUG ADON: CPT

## 2022-10-07 RX ORDER — MAGNESIUM SULFATE IN WATER 40 MG/ML
2000 INJECTION, SOLUTION INTRAVENOUS ONCE
Status: COMPLETED | OUTPATIENT
Start: 2022-10-07 | End: 2022-10-07

## 2022-10-07 RX ORDER — ONDANSETRON 4 MG/1
4 TABLET, ORALLY DISINTEGRATING ORAL EVERY 8 HOURS PRN
Status: DISCONTINUED | OUTPATIENT
Start: 2022-10-07 | End: 2022-10-08 | Stop reason: HOSPADM

## 2022-10-07 RX ORDER — SODIUM CHLORIDE 9 MG/ML
INJECTION, SOLUTION INTRAVENOUS PRN
Status: DISCONTINUED | OUTPATIENT
Start: 2022-10-07 | End: 2022-10-08 | Stop reason: HOSPADM

## 2022-10-07 RX ORDER — LANOLIN ALCOHOL/MO/W.PET/CERES
200 CREAM (GRAM) TOPICAL DAILY
Status: DISCONTINUED | OUTPATIENT
Start: 2022-10-08 | End: 2022-10-08 | Stop reason: HOSPADM

## 2022-10-07 RX ORDER — LEVOTHYROXINE SODIUM 0.07 MG/1
75 TABLET ORAL DAILY
Status: DISCONTINUED | OUTPATIENT
Start: 2022-10-08 | End: 2022-10-08 | Stop reason: HOSPADM

## 2022-10-07 RX ORDER — ACETAMINOPHEN 650 MG/1
650 SUPPOSITORY RECTAL EVERY 6 HOURS PRN
Status: DISCONTINUED | OUTPATIENT
Start: 2022-10-07 | End: 2022-10-08 | Stop reason: HOSPADM

## 2022-10-07 RX ORDER — ALLOPURINOL 100 MG/1
100 TABLET ORAL DAILY
Status: DISCONTINUED | OUTPATIENT
Start: 2022-10-08 | End: 2022-10-08 | Stop reason: HOSPADM

## 2022-10-07 RX ORDER — POLYETHYLENE GLYCOL 3350 17 G/17G
17 POWDER, FOR SOLUTION ORAL DAILY PRN
Status: DISCONTINUED | OUTPATIENT
Start: 2022-10-07 | End: 2022-10-08 | Stop reason: HOSPADM

## 2022-10-07 RX ORDER — ONDANSETRON 2 MG/ML
4 INJECTION INTRAMUSCULAR; INTRAVENOUS EVERY 6 HOURS PRN
Status: DISCONTINUED | OUTPATIENT
Start: 2022-10-07 | End: 2022-10-08 | Stop reason: HOSPADM

## 2022-10-07 RX ORDER — FENTANYL CITRATE 50 UG/ML
50 INJECTION, SOLUTION INTRAMUSCULAR; INTRAVENOUS ONCE
Status: COMPLETED | OUTPATIENT
Start: 2022-10-07 | End: 2022-10-07

## 2022-10-07 RX ORDER — 0.9 % SODIUM CHLORIDE 0.9 %
1000 INTRAVENOUS SOLUTION INTRAVENOUS ONCE
Status: COMPLETED | OUTPATIENT
Start: 2022-10-07 | End: 2022-10-07

## 2022-10-07 RX ORDER — SODIUM CHLORIDE 0.9 % (FLUSH) 0.9 %
5-40 SYRINGE (ML) INJECTION EVERY 12 HOURS SCHEDULED
Status: DISCONTINUED | OUTPATIENT
Start: 2022-10-07 | End: 2022-10-08 | Stop reason: HOSPADM

## 2022-10-07 RX ORDER — SODIUM CHLORIDE 0.9 % (FLUSH) 0.9 %
5-40 SYRINGE (ML) INJECTION PRN
Status: DISCONTINUED | OUTPATIENT
Start: 2022-10-07 | End: 2022-10-08 | Stop reason: HOSPADM

## 2022-10-07 RX ORDER — AMIODARONE HYDROCHLORIDE 200 MG/1
200 TABLET ORAL DAILY
Status: DISCONTINUED | OUTPATIENT
Start: 2022-10-08 | End: 2022-10-08 | Stop reason: HOSPADM

## 2022-10-07 RX ORDER — METOPROLOL SUCCINATE 50 MG/1
50 TABLET, EXTENDED RELEASE ORAL DAILY
Status: DISCONTINUED | OUTPATIENT
Start: 2022-10-08 | End: 2022-10-08 | Stop reason: HOSPADM

## 2022-10-07 RX ORDER — BUMETANIDE 1 MG/1
1 TABLET ORAL DAILY
Status: DISCONTINUED | OUTPATIENT
Start: 2022-10-08 | End: 2022-10-08 | Stop reason: HOSPADM

## 2022-10-07 RX ORDER — POTASSIUM CHLORIDE 20 MEQ/1
40 TABLET, EXTENDED RELEASE ORAL ONCE
Status: COMPLETED | OUTPATIENT
Start: 2022-10-07 | End: 2022-10-07

## 2022-10-07 RX ORDER — SODIUM CHLORIDE 9 MG/ML
INJECTION, SOLUTION INTRAVENOUS CONTINUOUS
Status: DISCONTINUED | OUTPATIENT
Start: 2022-10-07 | End: 2022-10-07

## 2022-10-07 RX ORDER — FENTANYL CITRATE 50 UG/ML
25 INJECTION, SOLUTION INTRAMUSCULAR; INTRAVENOUS
Status: DISCONTINUED | OUTPATIENT
Start: 2022-10-07 | End: 2022-10-08 | Stop reason: HOSPADM

## 2022-10-07 RX ORDER — SPIRONOLACTONE 25 MG/1
12.5 TABLET ORAL DAILY
Status: DISCONTINUED | OUTPATIENT
Start: 2022-10-08 | End: 2022-10-08 | Stop reason: HOSPADM

## 2022-10-07 RX ORDER — POTASSIUM CHLORIDE 7.45 MG/ML
10 INJECTION INTRAVENOUS
Status: DISPENSED | OUTPATIENT
Start: 2022-10-07 | End: 2022-10-07

## 2022-10-07 RX ORDER — NITROGLYCERIN 0.4 MG/1
0.4 TABLET SUBLINGUAL EVERY 5 MIN PRN
Status: DISCONTINUED | OUTPATIENT
Start: 2022-10-07 | End: 2022-10-08 | Stop reason: HOSPADM

## 2022-10-07 RX ORDER — ACETAMINOPHEN 325 MG/1
650 TABLET ORAL EVERY 6 HOURS PRN
Status: DISCONTINUED | OUTPATIENT
Start: 2022-10-07 | End: 2022-10-08 | Stop reason: HOSPADM

## 2022-10-07 RX ORDER — SODIUM CHLORIDE 0.9 % (FLUSH) 0.9 %
3 SYRINGE (ML) INJECTION EVERY 8 HOURS
Status: DISCONTINUED | OUTPATIENT
Start: 2022-10-07 | End: 2022-10-08 | Stop reason: HOSPADM

## 2022-10-07 RX ADMIN — PIPERACILLIN AND TAZOBACTAM 4500 MG: 4; .5 INJECTION, POWDER, FOR SOLUTION INTRAVENOUS at 15:53

## 2022-10-07 RX ADMIN — MAGNESIUM SULFATE HEPTAHYDRATE 2000 MG: 40 INJECTION, SOLUTION INTRAVENOUS at 17:17

## 2022-10-07 RX ADMIN — IOPAMIDOL 75 ML: 755 INJECTION, SOLUTION INTRAVENOUS at 14:04

## 2022-10-07 RX ADMIN — FENTANYL CITRATE 25 MCG: 50 INJECTION, SOLUTION INTRAMUSCULAR; INTRAVENOUS at 21:11

## 2022-10-07 RX ADMIN — POTASSIUM CHLORIDE 40 MEQ: 1500 TABLET, EXTENDED RELEASE ORAL at 17:17

## 2022-10-07 RX ADMIN — FENTANYL CITRATE 50 MCG: 50 INJECTION, SOLUTION INTRAMUSCULAR; INTRAVENOUS at 15:52

## 2022-10-07 RX ADMIN — PIPERACILLIN AND TAZOBACTAM 3375 MG: 3; .375 INJECTION, POWDER, FOR SOLUTION INTRAVENOUS at 22:54

## 2022-10-07 RX ADMIN — RIVAROXABAN 20 MG: 20 TABLET, FILM COATED ORAL at 22:50

## 2022-10-07 RX ADMIN — SODIUM CHLORIDE 1000 ML: 9 INJECTION, SOLUTION INTRAVENOUS at 14:45

## 2022-10-07 ASSESSMENT — PAIN DESCRIPTION - LOCATION
LOCATION: ABDOMEN
LOCATION: ABDOMEN
LOCATION: ABDOMEN;BACK

## 2022-10-07 ASSESSMENT — ENCOUNTER SYMPTOMS
VOMITING: 1
COUGH: 0
ANAL BLEEDING: 1
CONSTIPATION: 1
BLOOD IN STOOL: 1
SHORTNESS OF BREATH: 0
EYE PAIN: 0
NAUSEA: 1
ABDOMINAL PAIN: 1
BACK PAIN: 0
SINUS PRESSURE: 0
RECTAL PAIN: 1

## 2022-10-07 ASSESSMENT — PAIN SCALES - GENERAL
PAINLEVEL_OUTOF10: 4
PAINLEVEL_OUTOF10: 7
PAINLEVEL_OUTOF10: 4
PAINLEVEL_OUTOF10: 6

## 2022-10-07 NOTE — ED NOTES
Ct unable to use previous IV d/t infiltration upon flushing. Dr. Carly Chadwick at bedside for new ultrasound IV.       Sandeep Pearson RN  10/07/22 [id number]

## 2022-10-07 NOTE — PROGRESS NOTES
SENIOR NOTE    CC: Abdominal pain and blood per rectum with noticed after wipes. PMH - recent intraabdominal abscess at sigmoid colon, completed antibiotic course with Augmentin 3 days ago. PMH - a fib     EKG interpretation: Paced rhythm rate of 71 wide QRS at 194 prolonged QTC at 569. There is left axis deviation. No acute ST or T changes given pacing. No acute findings     CT 9/12/2022  Interval enlargement of the 4.1 cm deep left pelvic wall abscess which is   seen extending to the mid sigmoid colon consistent with fistulous tract and   contained/walled-off abscess. CT A/P yesterday  Mild interval decrease in size of the peridiverticular abscess now measuring 3.4 x 3.3 cm. Cirrhotic liver. Pericolonic diverticular abscess - assessed by general surgery, per gen surg decreasing in size. Will keep on zosyn while in patient and switch to po antibiotics on discharge to finish 10 day course. Okay for regular diet if tolerating. Hypokalemia due to diarrhea/vomiting - replace. F/u on mg in morning. Rectal bleeding likely secondary to constipation - dd of hemorrhoids - hb stable. Monitor qd hb. Cirrhosis of live r- out patient f/u with GI. Hx of gout - resumed allopurinol  Hx of a fib - resumed amiodarone 200 mg qd, toprol xl 50 mg qd and xarelto  Hx of hypothyroidism - resumed synthroid 75 mg qd  Pre-diabetes - monitor blood glucose. Hx of HFrEF - EF of 17% with global hypokinesis and G3DD s/p ICD/pacing and severe MR - resumed entresto and bb.  Will hold of on aldactone, likely resume in am.   Prolonged qtc - check Mg in am.     Dvt prophylaxis- resumed xarelto

## 2022-10-07 NOTE — TELEPHONE ENCOUNTER
FYI: Pt is calling in the office to let the office know that she is heading to [facility] Emergency Room, pt states she having flair up from her diverticulitis, symptom is abdominal pain, rectal bleeding and vomiting, pt states she can't keep nothing down.

## 2022-10-07 NOTE — ED NOTES
Pt IV infiltrated. Sandrita Bedoya RN at bedside for ultrasound IV. IV antibiotics and pain medication to be given once IV established.       Joshua Diggs RN  10/07/22 [id number]

## 2022-10-07 NOTE — PLAN OF CARE
Attending Supervising Physicians Attestation Statement  I have seen and examined Lang Marrow and the draper elements of all parts of the encounter have been performed by me. I agree with the assessment, plan and orders as documented by the Advanced Practice Provider. I discussed the findings and plans with the resident physician and agree as documented in their note . In addition to the pertinent positives and negatives as stated within HPI and the review of systems as documented in the notes, all other systems were reviewed when able to and are reported negative. Additional Comments: Abdominal abscess improved in size. IV antibiotics today, abdominal exam overall stable. Monitor PO intake advance as tolerated.     Electronically signed by Alba Gonsalez MD on 10/7/2022 at 7:49 PM

## 2022-10-07 NOTE — CONSULTS
General Surgery  Consult    PATIENT NAME: Olivia Velasquez  AGE: 75 y.o. MEDICAL RECORD NO. [id number]  DATE: 10/7/2022  SURGEON: Dr. Michel Carry: Josue Jimenez DO    Patient evaluated at the request of  Dr. Dina Melton  Reason for evaluation: Diverticulitis    Patient information was obtained from patient. History/Exam limitations: none. Patient presented to the Emergency Department by private vehicle. IMPRESSION:     Patient Active Problem List   Diagnosis    Dilated cardiomyopathy ([facility]    Pre-diabetes    Essential hypertension    PAH (pulmonary artery hypertension) ([facility] 34 on Echo     Mitral regurgitation    Chronic headache    Class 1 obesity due to excess calories with body mass index (BMI) of 33.0 to 33.9 in adult    AICD (automatic cardioverter/defibrillator) present    Syncope and collapse secondary to [facility]    ICD (implantable cardioverter-defibrillator) battery depletion    Gout    Dizziness    Benign paroxysmal positional vertigo    Chronic heart failure with reduced ejection fraction and diastolic dysfunction (HCC)    NSVT (nonsustained ventricular tachycardia)    Chest pain    Hypothyroidism    Chronic combined systolic (congestive) and diastolic (congestive) heart failure (HCC)    Acute diverticulitis    Abscess of sigmoid colon    Diverticulitis    Nausea & vomiting    Diverticulitis of large intestine with abscess without bleeding    Diverticulitis of colon    Lower abdominal pain    Colonic diverticular abscess     Edgar Kekaha female who presents with recurrent diverticulitis pericolonic abscess that continues to improve on antibiotics. PLAN:   Overall patient is improving from her last hospital visit. She has a known pericolonic diverticular abscess which has decreased in size. At this time we recommend continuation of of p.o. antibiotics for additional 10 days. P.o. challenge in the emergency department.   Patient is appropriate for discharge from a general surgery standpoint. If patient is admitted we will follow as consult with IV abx      HISTORY:   History of Chief Complaint:    Elise Sinclair is a 75 y.o. female who presents with nausea, emesis and blood in her bowel movements over the last 2 days. Patient was recently admitted with diverticulitis found to have a pericolonic abscess. She was treated with medical management and sent home on p.o. antibiotics. The patient completed her Augmentin course on 10/4/2022. This morning the patient noticed some blood-tinged mucus in her stool. She was also nauseous and had several episodes of emesis this morning. Patient denies fevers or chills. She states her abdominal pain is is not severe in nature and is actually an improvement compared to her prior hospital admission. Medical history includes chronic systolic congestive heart failure with an ejection fraction of 17% seen on her echocardiogram last year, AICD, pulmonary hypertension, mitral regurg, ischemic cardiomyopathy, hypothyroid. Colonoscopy was 10 years ago which showed diverticulitis. Previous surgical history of tubal ligation and hernia repair. Past Medical History   has a past medical history of Abnormal Pap smear, Acute on chronic systolic congestive heart failure ([address] AICD (automatic cardioverter/defibrillator) present, MAURO (acute kidney injury) ([address] MAURO (acute kidney injury) ([address] Anemia, Arthritis, Cardiomyopathy ([address] Cardiomyopathy, nonischemic ([address] Chronic combined systolic and diastolic congestive heart failure ([address] Ejection fraction < 50%, HTN (hypertension), Hypothyroidism, Ischemic cardiomyopathy, Left bundle branch block, Mitral regurgitation, Morbid obesity due to excess calories ([address] Pulmonary hypertension ([address] Sudden onset of severe headache, and Torn rotator cuff. Past Surgical History   has a past surgical history that includes other surgical history (12/20/2012);  Tubal ligation; hernia repair; Colonoscopy; pacemaker placement; Insert Midline Catheter (04/12/2022); and other surgical history (Left). Medications  Prior to Admission medications    Medication Sig Start Date End Date Taking? Authorizing Provider   bumetanide (BUMEX) 2 MG tablet Take 0.5 tablets by mouth daily 9/30/22   Alanna Huizar MD   rivaroxaban (XARELTO) 20 MG TABS tablet Take 1 tablet by mouth daily Take 1 tablet by mouth daily 9/23/22   Stephanie Mcadams MD   levothyroxine (SYNTHROID) 25 MCG tablet Take 3 tablets by mouth Daily 9/13/22   Oralia Echeverria DO   acetaminophen (TYLENOL) 500 MG tablet Take 1 tablet by mouth every 6 hours as needed for Pain 8/5/22   Rico Samuel MD   metoprolol succinate (TOPROL XL) 50 MG extended release tablet Take 50 mg by mouth daily    Historical Provider, MD   amiodarone (CORDARONE) 200 MG tablet  12/1/21   Historical Provider, MD   empagliflozin (JARDIANCE) 10 MG tablet Take 10 mg by mouth daily 1/4/22   Historical Provider, MD   potassium chloride (KLOR-CON M) 20 MEQ extended release tablet Take 1 tablet by mouth daily 12/8/21   Paco Rodríguez MD   magnesium oxide (MAG-OX) 400 MG tablet Take 0.5 tablets by mouth daily 12/8/21   Kamryn Kilgore MD   nitroGLYCERIN (NITROSTAT) 0.4 MG SL tablet up to max of 3 total doses. If no relief after 1 dose, call 911. 6/29/21   Amirah Ferguson MD   sacubitril-valsartan (ENTRESTO) 24-26 MG per tablet Take 0.5 tablets by mouth 2 times daily 6/29/21   Amirah Ferguson MD   allopurinol (ZYLOPRIM) 100 MG tablet Take 1 tablet by mouth daily 2/18/19   Sundar Sage MD   spironolactone (ALDACTONE) 25 MG tablet Take 0.5 tablets by mouth daily 2/18/19   Sundar Sage MD    Scheduled Meds:   sodium chloride flush  3 mL IntraVENous Q8H    sodium chloride  1,000 mL IntraVENous Once     Continuous Infusions:  PRN Meds:. Allergies  is allergic to codeine and morphine.   Family History  family history includes Cancer in an other family member; High Blood Pressure in her mother; Other in her mother. Social History   reports that she has never smoked. She has never used smokeless tobacco.   reports no history of alcohol use. reports that she does not currently use drugs. Review of Systems  General Denies any fever or chills  HEENT  Denies any diplopia, tinnitus or vertigo  Resp Denies any shortness of breath, cough or wheezing  Cardiac Denies any chest pain, palpitations, claudication or edema  GI positive for bandar pain and bright red blood per rectum.  Denies any frequency, urgency, hesitancy or incontinence  Heme Denies bruising or bleeding easily  Endocrine Denies any history of diabetes or thyroid disease  Neuro Denies any focal motor or sensory deficits    PHYSICAL:   VITALS:  oral temperature is 97.3 °F (36.3 °C). Her blood pressure is 112/73 and her pulse is 70. Her respiration is 18 and oxygen saturation is 97%. CONSTITUTIONAL: Alert and oriented times 3, no acute distress and cooperative to examination. HEENT: Head is normocephalic, atraumatic. EOMI, PERRLA  NECK: Soft, trachea midline and straight  LUNGS: Chest expands equally bilaterally upon respiration, no accessory muscle used. Ausculation reveals no wheezes, rales or rhonchi.   CARDIOVASCULAR: Regular rate and rhythm  ABDOMEN: Soft, mildly tender to palpation left lower quadrant, umbilical hernia reducible, previous surgical scars correlate with surgical history, no rebound tenderness nonperitoneal  NEUROLOGIC: There are no focalizing motor or sensory deficits  EXTREMITIES: no cyanosis, clubbing or edema    LABS:     Recent Labs     10/07/22  1151   WBC 6.5   HGB 11.6*   HCT 34.0*         K 3.2*      CO2 23   BUN 11   CREATININE 0.93*   CALCIUM 8.8   AST 21   ALT 12   BILITOT 1.0     Recent Labs     10/07/22  1151   ALKPHOS 180*   ALT 12   AST 21   BILITOT 1.0   LABALBU 3.5   LIPASE 13     CBC with Differential:    Lab Results   Component Value Date/Time    WBC 6.5 10/07/2022 11:51 AM    RBC 3.89 10/07/2022 11:51 AM    RBC 4.45 04/03/2012 05:31 PM    HGB 11.6 10/07/2022 11:51 AM    HCT 34.0 10/07/2022 11:51 AM     10/07/2022 11:51 AM     04/03/2012 05:31 PM    MCV 87.4 10/07/2022 11:51 AM    MCH 29.8 10/07/2022 11:51 AM    MCHC 34.1 10/07/2022 11:51 AM    RDW 15.1 10/07/2022 11:51 AM    LYMPHOPCT 14 10/07/2022 11:51 AM    MONOPCT 11 10/07/2022 11:51 AM    BASOPCT 1 10/07/2022 11:51 AM    MONOSABS 0.69 10/07/2022 11:51 AM    LYMPHSABS 0.92 10/07/2022 11:51 AM    EOSABS 0.14 10/07/2022 11:51 AM    BASOSABS 0.03 10/07/2022 11:51 AM    DIFFTYPE NOT REPORTED 01/27/2022 02:30 PM     CMP:    Lab Results   Component Value Date/Time     10/07/2022 11:51 AM    K 3.2 10/07/2022 11:51 AM     10/07/2022 11:51 AM    CO2 23 10/07/2022 11:51 AM    BUN 11 10/07/2022 11:51 AM    CREATININE 0.93 10/07/2022 11:51 AM    GFRAA >60 09/17/2022 09:22 AM    LABGLOM >60 10/07/2022 11:51 AM    GLUCOSE 93 10/07/2022 11:51 AM    GLUCOSE 100 04/03/2012 05:31 PM    PROT 7.0 10/07/2022 11:51 AM    LABALBU 3.5 10/07/2022 11:51 AM    CALCIUM 8.8 10/07/2022 11:51 AM    BILITOT 1.0 10/07/2022 11:51 AM    ALKPHOS 180 10/07/2022 11:51 AM    AST 21 10/07/2022 11:51 AM    ALT 12 10/07/2022 11:51 AM         RADIOLOGY:     CT ABDOMEN PELVIS W IV CONTRAST Additional Contrast? None   Final Result   Mild interval decrease in size of the peridiverticular abscess now measuring   3.4 x 3.3 cm. Cirrhotic liver. Cholelithiasis. No other significant changes are seen               Thank you for the interesting evaluation. Further recommendations to follow.     Sridevi Solo MD  10/7/2022, 3:08 PM

## 2022-10-07 NOTE — ED PROVIDER NOTES
[facility]     Emergency Department     Faculty Attestation    I performed a history and physical examination of the patient and discussed management with the resident. I reviewed the residents note and agree with the documented findings and plan of care. Any areas of disagreement are noted on the chart. I was personally present for the key portions of any procedures. I have documented in the chart those procedures where I was not present during the key portions. I have reviewed the emergency nurses triage note. I agree with the chief complaint, past medical history, past surgical history, allergies, medications, social and family history as documented unless otherwise noted below. For Physician Assistant/ Nurse Practitioner cases/documentation I have personally evaluated this patient and have completed at least one if not all key elements of the E/M (history, physical exam, and MDM). Additional findings are as noted. Patient here with abdominal pain. Just finished a prolonged course of antibiotics for diverticular abscess see CT below. Finished antibiotics 3 days ago. Similar pain nausea vomiting some blood in her stools as well. Exam nontoxic well-appearing afebrile. Abdomen soft focal left lower tenderness. Will check labs plan for CT, probable admit    EKG interpretation: Paced rhythm rate of 71 wide QRS at 194 prolonged QTC at 569. There is left axis deviation. No acute ST or T changes given pacing. No acute findings    CT 9/12/2022  Interval enlargement of the 4.1 cm deep left pelvic wall abscess which is   seen extending to the mid sigmoid colon consistent with fistulous tract and   contained/walled-off abscess.        Critical Care     none    Dougie Torrez MD, Kari Bullard  Attending Emergency  Physician           Dougie Torrez MD  10/07/22 1711

## 2022-10-07 NOTE — H&P
[facility]     Department of Internal Medicine - Staff Internal Medicine Teaching Service          ADMISSION NOTE/HISTORY AND PHYSICAL EXAMINATION   Date: 10/7/2022  Patient Name: Jake Rowe  Date of admission: 10/7/2022 10:51 AM  YOB: 1955  PCP: Prudencio Xie DO  History Obtained From:  patient, electronic medical record    CHIEF COMPLAINT     Chief complaint: Abdominal pain, rectal bleeding    HISTORY OF PRESENTING ILLNESS     The patient is a pleasant 75 y.o. female presents with a chief complaint of abdominal pain for the past 2 days, she also reported that she has episodes of stool mixed with blood and rectal bleeding. He has been dealing with intra-abdominal abscess which has fistulized approximately to the level of sigmoid colon, completed her p.o. Augmentin course on 10/4/2022. She also complains of nausea and episodes of vomiting this morning, and diarrhea, she denies any fevers or chills, she rates her abdominal pain is 5 out of 10 and is non-radiating. Past medical history significant for chronic systolic CHF with EF of 58%, s/p AICD placement, A. Fib, severe mitral regurgitation, pulmonary hypertension, hypothyroid, hypertension, hypothyroidism. CT scan performed at the ED shows decrease in the size of diverticular abscess, cirrhotic liver and cholelithiasis. General surgery was consulted, they recommended no surgical intervention and recommended treat medically with IV antibiotics.     On my examination at the ED, patient is alert and oriented, in no acute distress, denies any chest pain, shortness of breath    Review of Systems:  General ROS: Completed and except as mentioned above were negative   HEENT ROS: Completed and except as mentioned above were negative   Allergy and Immunology ROS:  Completed and except as mentioned above were negative  Hematological and Lymphatic ROS:  Completed and except as mentioned above were negative  Respiratory ROS:  Completed and except as mentioned above were negative  Cardiovascular ROS:  Completed and except as mentioned above were negative  Gastrointestinal ROS: Completed and except as mentioned above were negative  Genito-Urinary ROS:  Completed and except as mentioned above were negative  Musculoskeletal ROS:  Completed and except as mentioned above were negative  Neurological ROS:  Completed and except as mentioned above were negative  Skin & Dermatological ROS:  Completed and except as mentioned above were negative  Psychological ROS:  Completed and except as mentioned above were negative    PAST MEDICAL HISTORY     Past Medical History:   Diagnosis Date    Abnormal Pap smear     Acute on chronic systolic congestive heart failure (HCC)     AICD (automatic cardioverter/defibrillator) present     MAURO (acute kidney injury) ([facility] [address] 03/04/2017    MAURO (acute kidney injury) ([facility] [address] 3/4/2017    Anemia     Arthritis     Cardiomyopathy ([facility] [address] 12/20/2012    robotic replacement of 2 left ventricular leads/replacement of ICD generator    Cardiomyopathy, nonischemic ([facility] [address] 12/20/2012    Chronic combined systolic and diastolic congestive heart failure (HCC)     Ejection fraction < 50%     HTN (hypertension)     Hypothyroidism     Ischemic cardiomyopathy     Left bundle branch block     Mitral regurgitation     Morbid obesity due to excess calories ([facility] [address] 01/15/2017    Pulmonary hypertension ([facility]     Sudden onset of severe headache     Torn rotator cuff     RIGHT SHOULDER       PAST SURGICAL HISTORY     Past Surgical History:   Procedure Laterality Date    COLONOSCOPY      HERNIA REPAIR      INSERT MIDLINE CATHETER  04/12/2022         OTHER SURGICAL HISTORY  12/20/2012    robotic placement of 2 left ventricular leads/replacement of ICD generator    OTHER SURGICAL HISTORY Left     ICD REPLACEMENT    PACEMAKER PLACEMENT      medtronic Viva XT CRT -D [id number] Serial : [id number]    TUBAL LIGATION         ALLERGIES Codeine and Morphine    MEDICATIONS PRIOR TO ADMISSION     Prior to Admission medications    Medication Sig Start Date End Date Taking? Authorizing Provider   bumetanide (BUMEX) 2 MG tablet Take 0.5 tablets by mouth daily 9/30/22   Jose Antonio Bearden MD   rivaroxaban (XARELTO) 20 MG TABS tablet Take 1 tablet by mouth daily Take 1 tablet by mouth daily 9/23/22   Jihan Gamino MD   levothyroxine (SYNTHROID) 25 MCG tablet Take 3 tablets by mouth Daily 9/13/22   Viri Bowen DO   acetaminophen (TYLENOL) 500 MG tablet Take 1 tablet by mouth every 6 hours as needed for Pain 8/5/22   Delbert Bernard MD   metoprolol succinate (TOPROL XL) 50 MG extended release tablet Take 50 mg by mouth daily    Historical Provider, MD   amiodarone (CORDARONE) 200 MG tablet  12/1/21   Historical Provider, MD   empagliflozin (JARDIANCE) 10 MG tablet Take 10 mg by mouth daily 1/4/22   Historical Provider, MD   potassium chloride (KLOR-CON M) 20 MEQ extended release tablet Take 1 tablet by mouth daily 12/8/21   Sommer Askew MD   magnesium oxide (MAG-OX) 400 MG tablet Take 0.5 tablets by mouth daily 12/8/21   Baby East Jewett, MD   nitroGLYCERIN (NITROSTAT) 0.4 MG SL tablet up to max of 3 total doses.  If no relief after 1 dose, call 911. 6/29/21   Montse Moody MD   sacubitril-valsartan (ENTRESTO) 24-26 MG per tablet Take 0.5 tablets by mouth 2 times daily 6/29/21   Montse Moody MD   allopurinol (ZYLOPRIM) 100 MG tablet Take 1 tablet by mouth daily 2/18/19   Aaliyah Lang MD   spironolactone (ALDACTONE) 25 MG tablet Take 0.5 tablets by mouth daily 2/18/19   Aaliyah Lang MD       SOCIAL HISTORY     Tobacco: Denies use  Alcohol: Denies use  Illicits: Denies use    FAMILY HISTORY     Family History   Problem Relation Age of Onset    Cancer Other         ovarian    High Blood Pressure Mother     Other Mother         copd    Arthritis Neg Hx     Asthma Neg Hx     Birth Defects Neg Hx     Depression Neg Hx     Diabetes Neg Hx Early Death Neg Hx     Hearing Loss Neg Hx     Heart Disease Neg Hx     High Cholesterol Neg Hx     Kidney Disease Neg Hx     Learning Disabilities Neg Hx     Mental Retardation Neg Hx     Mental Illness Neg Hx     Miscarriages / Stillbirths Neg Hx     Stroke Neg Hx     Substance Abuse Neg Hx     Vision Loss Neg Hx     Breast Cancer Neg Hx     Colon Cancer Neg Hx     Eclampsia Neg Hx     Hypertension Neg Hx     Ovarian Cancer Neg Hx      Labor Neg Hx     Spont Abortions Neg Hx        PHYSICAL EXAM     Vitals: /73   Pulse 70   Temp 97.3 °F (36.3 °C) (Oral)   Resp 18   SpO2 97%   Tmax: Temp (24hrs), Av.3 °F (36.3 °C), Min:97.3 °F (36.3 °C), Max:97.3 °F (36.3 °C)    Last Body weight:   Wt Readings from Last 3 Encounters:   22 208 lb (94.3 kg)   22 205 lb (93 kg)   22 208 lb (94.3 kg)     Body Mass Index : There is no height or weight on file to calculate BMI. PHYSICAL EXAMINATION:  Physical Exam  Constitutional:       General: She is awake. She is not in acute distress. Appearance: She is obese. HENT:      Head: Normocephalic and atraumatic. Cardiovascular:      Rate and Rhythm: Normal rate and regular rhythm. Heart sounds: Normal heart sounds. Pulmonary:      Effort: Pulmonary effort is normal. No tachypnea, accessory muscle usage or respiratory distress. Breath sounds: Normal breath sounds. No decreased breath sounds, wheezing or rhonchi. Abdominal:      General: Abdomen is flat. There is no distension. Palpations: Abdomen is soft. Tenderness: There is abdominal tenderness in the left upper quadrant and left lower quadrant. Neurological:      General: No focal deficit present. Mental Status: She is alert and oriented to person, place, and time.    Psychiatric:         Attention and Perception: Attention and perception normal.         Mood and Affect: Mood and affect normal.         Speech: Speech normal.         Behavior: Behavior normal. Behavior is cooperative. Thought Content:  Thought content normal.             INVESTIGATIONS     Laboratory Testing:     Recent Results (from the past 24 hour(s))   CBC with Diff    Collection Time: 10/07/22 11:51 AM   Result Value Ref Range    WBC 6.5 3.5 - 11.3 k/uL    RBC 3.89 (L) 3.95 - 5.11 m/uL    Hemoglobin 11.6 (L) 11.9 - 15.1 g/dL    Hematocrit 34.0 (L) 36.3 - 47.1 %    MCV 87.4 82.6 - 102.9 fL    MCH 29.8 25.2 - 33.5 pg    MCHC 34.1 28.4 - 34.8 g/dL    RDW 15.1 (H) 11.8 - 14.4 %    Platelets 142 339 - 324 k/uL    MPV 11.9 8.1 - 13.5 fL    NRBC Automated 0.0 0.0 per 100 WBC    Seg Neutrophils 72 (H) 36 - 65 %    Lymphocytes 14 (L) 24 - 43 %    Monocytes 11 3 - 12 %    Eosinophils % 2 1 - 4 %    Basophils 1 0 - 2 %    Immature Granulocytes 0 0 %    Segs Absolute 4.71 1.50 - 8.10 k/uL    Absolute Lymph # 0.92 (L) 1.10 - 3.70 k/uL    Absolute Mono # 0.69 0.10 - 1.20 k/uL    Absolute Eos # 0.14 0.00 - 0.44 k/uL    Basophils Absolute 0.03 0.00 - 0.20 k/uL    Absolute Immature Granulocyte <0.03 0.00 - 0.30 k/uL    RBC Morphology ANISOCYTOSIS PRESENT    CMP    Collection Time: 10/07/22 11:51 AM   Result Value Ref Range    Glucose 93 70 - 99 mg/dL    BUN 11 8 - 23 mg/dL    Creatinine 0.93 (H) 0.50 - 0.90 mg/dL    Est, Glom Filt Rate >60 >60 mL/min/1.73m2    Calcium 8.8 8.6 - 10.4 mg/dL    Sodium 139 135 - 144 mmol/L    Potassium 3.2 (L) 3.7 - 5.3 mmol/L    Chloride 104 98 - 107 mmol/L    CO2 23 20 - 31 mmol/L    Anion Gap 12 9 - 17 mmol/L    Alkaline Phosphatase 180 (H) 35 - 104 U/L    ALT 12 5 - 33 U/L    AST 21 <32 U/L    Total Bilirubin 1.0 0.3 - 1.2 mg/dL    Total Protein 7.0 6.4 - 8.3 g/dL    Albumin 3.5 3.5 - 5.2 g/dL    Albumin/Globulin Ratio 1.0 1.0 - 2.5   Lipase    Collection Time: 10/07/22 11:51 AM   Result Value Ref Range    Lipase 13 13 - 60 U/L   Lactic Acid (Select if patient is over 65 to rule out mesenteric ischemia)    Collection Time: 10/07/22 11:51 AM   Result Value Ref Range Lactic Acid, Whole Blood 1.5 0.7 - 2.1 mmol/L       Imaging:   CT ABDOMEN PELVIS W IV CONTRAST Additional Contrast? None    Result Date: 10/7/2022  Mild interval decrease in size of the peridiverticular abscess now measuring 3.4 x 3.3 cm. Cirrhotic liver. Cholelithiasis. No other significant changes are seen       ASSESSMENT & PLAN     ASSESSMENT / PLAN:     Principal Problem:    Intra-abdominal abscess (HCC)  Active Problems:    Dilated cardiomyopathy (HCC)    Pre-diabetes    Essential hypertension    PAH (pulmonary artery hypertension) ([address] 34 on Echo     Class 1 obesity due to excess calories with body mass index (BMI) of 33.0 to 33.9 in adult    AICD (automatic cardioverter/defibrillator) present    Chronic combined systolic (congestive) and diastolic (congestive) heart failure ([address]  Resolved Problems:    * No resolved hospital problems. *       Intra-abdominal abscess  -Continue IV Zosyn  -CT scan shows decreasing size of abscess  -General surgery consulted, recommended no surgical intervention    Chronic systolic CHF  -AICD in place, EF 17%  -Continue Entresto    Atrial fibrillation  -Continue Toprol-XL, and p.o. amiodarone 200  -Continue anticoagulation with Xarelto. Hypertension  -Resume home meds as needed    Hypokalemia  -K 3.2  -Replace as needed    Prolonged Qtc  -Magnesium oxide 200 mg daily  -Recheck magnesium tomorrow    Liver cirrhosis. Outpatient follow-up    Hypothyroidism. Synthroid 75 mg    Gout. Resume allopurinol      DVT ppx: Xarelto  GI ppx: Not indicated    PT/OT/SW consulted  Discharge Planning:  consulted    Al Kaba MD  Internal Medicine Resident, PGY-1  [facility];  [place], [address] [place] [address] Drive  10/7/2022, 6:19 PM

## 2022-10-07 NOTE — ED PROVIDER NOTES
[facility] HANDOFF       Handoff taken on the following patient from prior Attending Physician: Dr. Alexx Munoz  Pt Name: Randal Vasquez  PCP:  Corry Lai DO    Attestation  I was available and discussed any additional care issues that arose and coordinated the management plans with the resident(s) caring for the patient during my duty period. Any areas of disagreement with resident's documentation of care or procedures are noted on the chart. I was personally present for the key portions of any/all procedures during my duty period. I have documented in the chart those procedures where I was not present during the key portions. CHIEF COMPLAINT       Chief Complaint   Patient presents with    Rectal Bleeding    Abdominal Pain         CURRENT MEDICATIONS     Previous Medications  Previous Medications    ACETAMINOPHEN (TYLENOL) 500 MG TABLET    Take 1 tablet by mouth every 6 hours as needed for Pain    ALLOPURINOL (ZYLOPRIM) 100 MG TABLET    Take 1 tablet by mouth daily    AMIODARONE (CORDARONE) 200 MG TABLET        BUMETANIDE (BUMEX) 2 MG TABLET    Take 0.5 tablets by mouth daily    EMPAGLIFLOZIN (JARDIANCE) 10 MG TABLET    Take 10 mg by mouth daily    LEVOTHYROXINE (SYNTHROID) 25 MCG TABLET    Take 3 tablets by mouth Daily    MAGNESIUM OXIDE (MAG-OX) 400 MG TABLET    Take 0.5 tablets by mouth daily    METOPROLOL SUCCINATE (TOPROL XL) 50 MG EXTENDED RELEASE TABLET    Take 50 mg by mouth daily    NITROGLYCERIN (NITROSTAT) 0.4 MG SL TABLET    up to max of 3 total doses. If no relief after 1 dose, call 911.     POTASSIUM CHLORIDE (KLOR-CON M) 20 MEQ EXTENDED RELEASE TABLET    Take 1 tablet by mouth daily    RIVAROXABAN (XARELTO) 20 MG TABS TABLET    Take 1 tablet by mouth daily Take 1 tablet by mouth daily    SACUBITRIL-VALSARTAN (ENTRESTO) 24-26 MG PER TABLET    Take 0.5 tablets by mouth 2 times daily    SPIRONOLACTONE (ALDACTONE) 25 MG TABLET    Take 0.5 tablets by mouth daily Encounter Medications  Orders Placed This Encounter   Medications    sodium chloride flush 0.9 % injection 3 mL    0.9 % sodium chloride bolus    iopamidol (ISOVUE-370) 76 % injection 75 mL    piperacillin-tazobactam (ZOSYN) 4,500 mg in dextrose 5 % 100 mL IVPB (mini-bag)     Order Specific Question:   Antimicrobial Indications     Answer:   Intra-Abdominal Infection    fentaNYL (SUBLIMAZE) injection 50 mcg       ALLERGIES     is allergic to codeine and morphine. RECENT VITALS:   Temp: 97.3 °F (36.3 °C),  Heart Rate: 70, Resp: 18, BP: 112/73    RADIOLOGY:   CT ABDOMEN PELVIS W IV CONTRAST Additional Contrast? None   Final Result   Mild interval decrease in size of the peridiverticular abscess now measuring   3.4 x 3.3 cm. Cirrhotic liver. Cholelithiasis. No other significant changes are seen             LABS:  Labs Reviewed   CBC WITH AUTO DIFFERENTIAL - Abnormal; Notable for the following components:       Result Value    RBC 3.89 (*)     Hemoglobin 11.6 (*)     Hematocrit 34.0 (*)     RDW 15.1 (*)     Seg Neutrophils 72 (*)     Lymphocytes 14 (*)     Absolute Lymph # 0.92 (*)     All other components within normal limits   COMPREHENSIVE METABOLIC PANEL - Abnormal; Notable for the following components:    Creatinine 0.93 (*)     Potassium 3.2 (*)     Alkaline Phosphatase 180 (*)     All other components within normal limits   LIPASE   LACTIC ACID   C-REACTIVE PROTEIN           PLAN/ TASKS OUTSTANDING       Pt with abdominal abscess secondary to perfed diverticulitis. Pt was on abx for several weeks. Pt finished abx and now has worsening symptoms.      (Please note that portions of this note were completed with a voice recognition program.  Efforts were made to edit the dictations but occasionally words are mis-transcribed.)    John Kline MD, MD,   Attending Emergency Physician       John Kline MD  10/07/22 [id number]

## 2022-10-07 NOTE — ED NOTES
Writer attempted to conduct ACP, patient provided some answers but stated she'll let her daughter figure out one of the answers & chose to discuss at a later date.       DEION Bermudez  10/07/22 2902

## 2022-10-07 NOTE — ED PROVIDER NOTES
[facility] ED  Emergency Department Encounter  EmergencyMedicineResident       Pt Name: Mary Beth Vazquez  MRN: [id number]  [place] 1955  Date of evaluation: 10/7/22  PCP: Nette Meehan DO    CHIEF COMPLAINT       Chief Complaint   Patient presents with    Rectal Bleeding    Abdominal Pain       HISTORY OF PRESENT ILLNESS  (Location/Symptom, Timing/Onset, Context/Setting, Quality, Duration, Modifying Factors, Severity.)      Mary Beth Vazquez is a 75 y.o. female who presents today for evaluation of abdominal pain. The patient has been dealing with a intra-abdominal abscess that fistulized is approximately the level of the sigmoid colon. Being followed by surgery. She completed a course of antibiotics as an outpatient. Her pain had subsided momentarily but has now came back. It is associated with nausea and decreased intake of p.o. fluids. Patient also notes that for the last 2 days she has had blood mixed in with her stool and rectal bleeding. She gives a history of being constipated for the preceding days. She believes that that may be the cause that she was straining to use the bathroom. Nothing has been attempted for relief to this point.     PAST MEDICAL / SURGICAL /SOCIAL / FAMILY HISTORY      has a past medical history of Abnormal Pap smear, Acute on chronic systolic congestive heart failure ([address] AICD (automatic cardioverter/defibrillator) present, MAURO (acute kidney injury) ([address] MAURO (acute kidney injury) ([address] Anemia, Arthritis, Cardiomyopathy ([address] Cardiomyopathy, nonischemic ([address] Chronic combined systolic and diastolic congestive heart failure ([address] Ejection fraction < 50%, HTN (hypertension), Hypothyroidism, Ischemic cardiomyopathy, Left bundle branch block, Mitral regurgitation, Morbid obesity due to excess calories ([address] Pulmonary hypertension ([address] Sudden onset of severe headache, and Torn rotator cuff.       has a past surgical history that includes other surgical history (2012); Tubal ligation; hernia repair; Colonoscopy; pacemaker placement; Insert Midline Catheter (2022); and other surgical history (Left).       Social History     Socioeconomic History    Marital status:      Spouse name: Not on file    Number of children: 2    Years of education: Not on file    Highest education level: Not on file   Occupational History    Not on file   Tobacco Use    Smoking status: Never    Smokeless tobacco: Never   Vaping Use    Vaping Use: Never used   Substance and Sexual Activity    Alcohol use: No     Alcohol/week: 0.0 standard drinks    Drug use: Not Currently    Sexual activity: Never   Other Topics Concern    Not on file   Social History Narrative    Not on file     Social Determinants of Health     Financial Resource Strain: Low Risk     Difficulty of Paying Living Expenses: Not hard at all   Food Insecurity: No Food Insecurity    Worried About Running Out of Food in the Last Year: Never true    Ran Out of Food in the Last Year: Never true   Transportation Needs: Not on file   Physical Activity: Not on file   Stress: Not on file   Social Connections: Not on file   Intimate Partner Violence: Not on file   Housing Stability: Not on file       Family History   Problem Relation Age of Onset    Cancer Other         ovarian    High Blood Pressure Mother     Other Mother         copd    Arthritis Neg Hx     Asthma Neg Hx     Birth Defects Neg Hx     Depression Neg Hx     Diabetes Neg Hx     Early Death Neg Hx     Hearing Loss Neg Hx     Heart Disease Neg Hx     High Cholesterol Neg Hx     Kidney Disease Neg Hx     Learning Disabilities Neg Hx     Mental Retardation Neg Hx     Mental Illness Neg Hx     Miscarriages / Stillbirths Neg Hx     Stroke Neg Hx     Substance Abuse Neg Hx     Vision Loss Neg Hx     Breast Cancer Neg Hx     Colon Cancer Neg Hx     Eclampsia Neg Hx     Hypertension Neg Hx     Ovarian Cancer Neg Hx      Labor Neg Hx     Spont Abortions Neg Hx        Allergies:  Codeine and Morphine    Home Medications:  Prior to Admission medications    Medication Sig Start Date End Date Taking? Authorizing Provider   bumetanide (BUMEX) 2 MG tablet Take 0.5 tablets by mouth daily 9/30/22   Pema Driver MD   rivaroxaban (XARELTO) 20 MG TABS tablet Take 1 tablet by mouth daily Take 1 tablet by mouth daily 9/23/22   Jony Herring MD   levothyroxine (SYNTHROID) 25 MCG tablet Take 3 tablets by mouth Daily 9/13/22   Ric Trotter DO   acetaminophen (TYLENOL) 500 MG tablet Take 1 tablet by mouth every 6 hours as needed for Pain 8/5/22   Prateek Neri MD   metoprolol succinate (TOPROL XL) 50 MG extended release tablet Take 50 mg by mouth daily    Historical Provider, MD   amiodarone (CORDARONE) 200 MG tablet  12/1/21   Historical Provider, MD   empagliflozin (JARDIANCE) 10 MG tablet Take 10 mg by mouth daily 1/4/22   Historical Provider, MD   potassium chloride (KLOR-CON M) 20 MEQ extended release tablet Take 1 tablet by mouth daily 12/8/21   Margaret Mckeon MD   magnesium oxide (MAG-OX) 400 MG tablet Take 0.5 tablets by mouth daily 12/8/21   Chino Cruz MD   nitroGLYCERIN (NITROSTAT) 0.4 MG SL tablet up to max of 3 total doses. If no relief after 1 dose, call 911. 6/29/21   Dayanara Escoto MD   sacubitril-valsartan (ENTRESTO) 24-26 MG per tablet Take 0.5 tablets by mouth 2 times daily 6/29/21   Dayanara Escoto MD   allopurinol (ZYLOPRIM) 100 MG tablet Take 1 tablet by mouth daily 2/18/19   Sean Oseguera MD   spironolactone (ALDACTONE) 25 MG tablet Take 0.5 tablets by mouth daily 2/18/19   Sean Oseguera MD       REVIEW OF SYSTEMS    (2-9 systems for level 4, 10 or more forlevel 5)      Review of Systems   Constitutional:  Positive for activity change and appetite change. Negative for fever and unexpected weight change. HENT:  Negative for sinus pressure. Eyes:  Negative for pain and visual disturbance.    Respiratory:  Negative for cough and shortness of breath. Cardiovascular:  Negative for chest pain. Gastrointestinal:  Positive for abdominal pain, anal bleeding, blood in stool, constipation, nausea, rectal pain and vomiting. Genitourinary:  Negative for difficulty urinating, dysuria and hematuria. Musculoskeletal:  Negative for back pain and myalgias. Neurological:  Negative for dizziness, light-headedness and headaches. PHYSICAL EXAM   (up to 7 for level 4, 8 or more forlevel 5)      ED TRIAGE VITALS BP: 126/86, Temp: 97.3 °F (36.3 °C), Heart Rate: 81, Resp: 18, SpO2: 100 %    Vitals:    10/07/22 1201 10/07/22 1232 10/07/22 1245 10/07/22 1331   BP: 112/74 106/76 108/75 112/73   Pulse: 70 70 73 70   Resp: 16 17 23 18   Temp:       TempSrc:       SpO2:  97% 98% 97%         Physical Exam  Vitals and nursing note reviewed. Constitutional:       General: She is not in acute distress. Appearance: Normal appearance. She is obese. She is not ill-appearing. HENT:      Head: Normocephalic and atraumatic. Nose: Nose normal.      Mouth/Throat:      Mouth: Mucous membranes are moist.   Eyes:      Extraocular Movements: Extraocular movements intact. Pupils: Pupils are equal, round, and reactive to light. Cardiovascular:      Rate and Rhythm: Normal rate and regular rhythm. Pulses: Normal pulses. Heart sounds: Normal heart sounds. Pulmonary:      Effort: Pulmonary effort is normal.      Breath sounds: Normal breath sounds. Abdominal:      General: Abdomen is flat. Palpations: Abdomen is soft. Tenderness: There is abdominal tenderness in the left lower quadrant. There is no guarding. Musculoskeletal:         General: Normal range of motion. Cervical back: Normal range of motion. Skin:     General: Skin is warm and dry. Capillary Refill: Capillary refill takes less than 2 seconds. Neurological:      General: No focal deficit present.       Mental Status: She is alert and oriented to person, place, and time. Psychiatric:         Mood and Affect: Mood normal.         Behavior: Behavior normal.         DIFFERENTIAL  DIAGNOSIS     PLAN (LABS / IMAGING / EKG):  Orders Placed This Encounter   Procedures    CT ABDOMEN PELVIS W IV CONTRAST Additional Contrast? None    CBC with Diff    CMP    Lipase    Lactic Acid (Select if patient is over 65 to rule out mesenteric ischemia)    C-Reactive Protein    Diet NPO    Inpatient consult to General Surgery    Inpatient consult to Internal Medicine    Saline lock IV       MEDICATIONS ORDERED:  ED Medication Orders (From admission, onward)      Start Ordered     Status Ordering Provider    10/07/22 1530 10/07/22 1518  piperacillin-tazobactam (ZOSYN) 4,500 mg in dextrose 5 % 100 mL IVPB (mini-bag)  ONCE        Question:  Antimicrobial Indications  Answer:  Intra-Abdominal Infection    Acknowledged CHARLES HARTLEY    10/07/22 1530 10/07/22 1518  fentaNYL (SUBLIMAZE) injection 50 mcg  ONCE         Acknowledged CHARLES HARTLEY    10/07/22 1355 10/07/22 1355  iopamidol (ISOVUE-370) 76 % injection 75 mL  IMG ONCE PRN         Last MAR action: Given - by Chriss Shaw on 10/07/22 at [address] 51CHARLES    10/07/22 1115 10/07/22 1103  sodium chloride flush 0.9 % injection 3 mL  EVERY 8 HOURS        See Hyperspace for full Linked Orders Report. Acknowledged CHARLES HARTLEY    10/07/22 1115 10/07/22 1103  0.9 % sodium chloride bolus  ONCE         Last MAR action: New Bag - by CHUCK GALVAN on 10/07/22 at 1445 CHARLES HARTLEY              DIAGNOSTIC RESULTS / EMERGENCY DEPARTMENT COURSE / MDM     IMPRESSION & INITIAL PLAN:  This is a 57-year-old female presenting respiratory for evaluation of left lower quadrant abdominal pain. Is been present now for quite some time dating back to a CT that showed a left lower quadrant abscess and fistulas to the level of the sigmoid colon. She was treated antibiotics sent home on p.o. antibiotics.   She completed the course with some improvement. However she states that over the last 2 days she has been dealing with constipation, rectal bleeding and 10 out of 10 pain in the left lower quadrant. The CT shows that the abscess has not significantly gotten smaller in size. She continued to have abdominal pain. The surgery team was consulted on the case.   Plan admit to the medicine service for IV antibiotics and continued evaluation by the general surgery team.    LABS:  Results for orders placed or performed during the hospital encounter of 10/07/22   CBC with Diff   Result Value Ref Range    WBC 6.5 3.5 - 11.3 k/uL    RBC 3.89 (L) 3.95 - 5.11 m/uL    Hemoglobin 11.6 (L) 11.9 - 15.1 g/dL    Hematocrit 34.0 (L) 36.3 - 47.1 %    MCV 87.4 82.6 - 102.9 fL    MCH 29.8 25.2 - 33.5 pg    MCHC 34.1 28.4 - 34.8 g/dL    RDW 15.1 (H) 11.8 - 14.4 %    Platelets 609 793 - 319 k/uL    MPV 11.9 8.1 - 13.5 fL    NRBC Automated 0.0 0.0 per 100 WBC    Seg Neutrophils 72 (H) 36 - 65 %    Lymphocytes 14 (L) 24 - 43 %    Monocytes 11 3 - 12 %    Eosinophils % 2 1 - 4 %    Basophils 1 0 - 2 %    Immature Granulocytes 0 0 %    Segs Absolute 4.71 1.50 - 8.10 k/uL    Absolute Lymph # 0.92 (L) 1.10 - 3.70 k/uL    Absolute Mono # 0.69 0.10 - 1.20 k/uL    Absolute Eos # 0.14 0.00 - 0.44 k/uL    Basophils Absolute 0.03 0.00 - 0.20 k/uL    Absolute Immature Granulocyte <0.03 0.00 - 0.30 k/uL    RBC Morphology ANISOCYTOSIS PRESENT    CMP   Result Value Ref Range    Glucose 93 70 - 99 mg/dL    BUN 11 8 - 23 mg/dL    Creatinine 0.93 (H) 0.50 - 0.90 mg/dL    Est, Glom Filt Rate >60 >60 mL/min/1.73m2    Calcium 8.8 8.6 - 10.4 mg/dL    Sodium 139 135 - 144 mmol/L    Potassium 3.2 (L) 3.7 - 5.3 mmol/L    Chloride 104 98 - 107 mmol/L    CO2 23 20 - 31 mmol/L    Anion Gap 12 9 - 17 mmol/L    Alkaline Phosphatase 180 (H) 35 - 104 U/L    ALT 12 5 - 33 U/L    AST 21 <32 U/L    Total Bilirubin 1.0 0.3 - 1.2 mg/dL    Total Protein 7.0 6.4 - 8.3 g/dL    Albumin 3.5 3.5 - 5.2 g/dL Albumin/Globulin Ratio 1.0 1.0 - 2.5   Lipase   Result Value Ref Range    Lipase 13 13 - 60 U/L   Lactic Acid (Select if patient is over 65 to rule out mesenteric ischemia)   Result Value Ref Range    Lactic Acid, Whole Blood 1.5 0.7 - 2.1 mmol/L       RADIOLOGY:  CT ABDOMEN PELVIS W IV CONTRAST Additional Contrast? None   Final Result   Mild interval decrease in size of the peridiverticular abscess now measuring   3.4 x 3.3 cm. Cirrhotic liver. Cholelithiasis. No other significant changes are seen             ED Course as of 10/07/22 1536   Fri Oct 07, 2022   1319 Hemoglobin Quant(!): 11.6 [TJ]   1319 Lactic Acid, Whole Blood: 1.5 [TJ]      ED Course User Index  [TJ] Tita Phelps MD    CONSULTS:  IP CONSULT TO GENERAL SURGERY  IP CONSULT TO INTERNAL MEDICINE    CRITICAL CARE:  See attending physician note    FINAL IMPRESSION      1. Intra-abdominal abscess ([address]          DISPOSITION / PLAN     DISPOSITION Decision To Admit 10/07/2022 03:19:17 PM      PATIENT REFERRED TO:  No follow-up provider specified.     DISCHARGE MEDICATIONS:  New Prescriptions    No medications on file     Modified Medications    No medications on file        Tita Phelps MD  Emergency Medicine Resident    (Please note that portions of this note were completed with a voice recognition program.  Efforts were made to edit the dictations but occasionally words are mis-transcribed.)        Tita Phelps MD  Resident  10/07/22 2879

## 2022-10-07 NOTE — ED TRIAGE NOTES
Pt brought to Ed by daughter. Pt states that yesterday morning they noted a light pink color to her stool which advanced to a dark red color yesterday evening. Pt does state that they have a hx of diverticulosis. Pt is also taking blood thinners daily. Pt denies dizziness or SOB. Pt is ambulatory without assistance. Pt alert and oriented.

## 2022-10-08 VITALS
HEART RATE: 71 BPM | TEMPERATURE: 98.3 F | OXYGEN SATURATION: 97 % | SYSTOLIC BLOOD PRESSURE: 104 MMHG | RESPIRATION RATE: 16 BRPM | DIASTOLIC BLOOD PRESSURE: 78 MMHG

## 2022-10-08 LAB
ABSOLUTE EOS #: 0.16 K/UL (ref 0–0.44)
ABSOLUTE IMMATURE GRANULOCYTE: <0.03 K/UL (ref 0–0.3)
ABSOLUTE LYMPH #: 1.18 K/UL (ref 1.1–3.7)
ABSOLUTE MONO #: 0.73 K/UL (ref 0.1–1.2)
ANION GAP SERPL CALCULATED.3IONS-SCNC: 13 MMOL/L (ref 9–17)
BASOPHILS # BLD: 0 % (ref 0–2)
BASOPHILS ABSOLUTE: <0.03 K/UL (ref 0–0.2)
BUN BLDV-MCNC: 8 MG/DL (ref 8–23)
CALCIUM SERPL-MCNC: 8.3 MG/DL (ref 8.6–10.4)
CHLORIDE BLD-SCNC: 108 MMOL/L (ref 98–107)
CO2: 15 MMOL/L (ref 20–31)
CREAT SERPL-MCNC: 0.87 MG/DL (ref 0.5–0.9)
EOSINOPHILS RELATIVE PERCENT: 3 % (ref 1–4)
GFR SERPL CREATININE-BSD FRML MDRD: >60 ML/MIN/1.73M2
GLUCOSE BLD-MCNC: 95 MG/DL (ref 70–99)
HCT VFR BLD CALC: 31.6 % (ref 36.3–47.1)
HEMOGLOBIN: 11.5 G/DL (ref 11.9–15.1)
IMMATURE GRANULOCYTES: 0 %
LYMPHOCYTES # BLD: 20 % (ref 24–43)
MAGNESIUM: 2.4 MG/DL (ref 1.6–2.6)
MCH RBC QN AUTO: 30.7 PG (ref 25.2–33.5)
MCHC RBC AUTO-ENTMCNC: 36.4 G/DL (ref 28.4–34.8)
MCV RBC AUTO: 84.5 FL (ref 82.6–102.9)
MONOCYTES # BLD: 12 % (ref 3–12)
NRBC AUTOMATED: 0 PER 100 WBC
PDW BLD-RTO: 15 % (ref 11.8–14.4)
PLATELET # BLD: 182 K/UL (ref 138–453)
PMV BLD AUTO: 12 FL (ref 8.1–13.5)
POTASSIUM SERPL-SCNC: 3.8 MMOL/L (ref 3.7–5.3)
PROCALCITONIN: 0.06 NG/ML
RBC # BLD: 3.74 M/UL (ref 3.95–5.11)
RBC # BLD: ABNORMAL 10*6/UL
SEG NEUTROPHILS: 65 % (ref 36–65)
SEGMENTED NEUTROPHILS ABSOLUTE COUNT: 3.96 K/UL (ref 1.5–8.1)
SODIUM BLD-SCNC: 136 MMOL/L (ref 135–144)
WBC # BLD: 6.1 K/UL (ref 3.5–11.3)

## 2022-10-08 PROCEDURE — 2580000003 HC RX 258

## 2022-10-08 PROCEDURE — 97530 THERAPEUTIC ACTIVITIES: CPT

## 2022-10-08 PROCEDURE — 83735 ASSAY OF MAGNESIUM: CPT

## 2022-10-08 PROCEDURE — 6370000000 HC RX 637 (ALT 250 FOR IP)

## 2022-10-08 PROCEDURE — 6360000002 HC RX W HCPCS

## 2022-10-08 PROCEDURE — G0378 HOSPITAL OBSERVATION PER HR: HCPCS

## 2022-10-08 PROCEDURE — 97161 PT EVAL LOW COMPLEX 20 MIN: CPT

## 2022-10-08 PROCEDURE — 85025 COMPLETE CBC W/AUTO DIFF WBC: CPT

## 2022-10-08 PROCEDURE — 97165 OT EVAL LOW COMPLEX 30 MIN: CPT

## 2022-10-08 PROCEDURE — 6370000000 HC RX 637 (ALT 250 FOR IP): Performed by: STUDENT IN AN ORGANIZED HEALTH CARE EDUCATION/TRAINING PROGRAM

## 2022-10-08 PROCEDURE — 84145 PROCALCITONIN (PCT): CPT

## 2022-10-08 PROCEDURE — 36415 COLL VENOUS BLD VENIPUNCTURE: CPT

## 2022-10-08 PROCEDURE — 80048 BASIC METABOLIC PNL TOTAL CA: CPT

## 2022-10-08 PROCEDURE — 96376 TX/PRO/DX INJ SAME DRUG ADON: CPT

## 2022-10-08 PROCEDURE — 96366 THER/PROPH/DIAG IV INF ADDON: CPT

## 2022-10-08 PROCEDURE — 99232 SBSQ HOSP IP/OBS MODERATE 35: CPT | Performed by: STUDENT IN AN ORGANIZED HEALTH CARE EDUCATION/TRAINING PROGRAM

## 2022-10-08 PROCEDURE — 97535 SELF CARE MNGMENT TRAINING: CPT

## 2022-10-08 RX ORDER — LIDOCAINE 4 G/G
1 PATCH TOPICAL DAILY
Status: DISCONTINUED | OUTPATIENT
Start: 2022-10-08 | End: 2022-10-08 | Stop reason: HOSPADM

## 2022-10-08 RX ORDER — LEVOTHYROXINE SODIUM 0.07 MG/1
75 TABLET ORAL DAILY
Qty: 30 TABLET | Refills: 3 | Status: SHIPPED | OUTPATIENT
Start: 2022-10-09 | End: 2022-10-27 | Stop reason: SDUPTHER

## 2022-10-08 RX ORDER — AMOXICILLIN AND CLAVULANATE POTASSIUM 875; 125 MG/1; MG/1
1 TABLET, FILM COATED ORAL 2 TIMES DAILY
Qty: 20 TABLET | Refills: 0 | Status: SHIPPED | OUTPATIENT
Start: 2022-10-08 | End: 2022-10-18

## 2022-10-08 RX ORDER — OXYCODONE HYDROCHLORIDE AND ACETAMINOPHEN 5; 325 MG/1; MG/1
1 TABLET ORAL EVERY 8 HOURS PRN
Qty: 10 TABLET | Refills: 0 | Status: SHIPPED | OUTPATIENT
Start: 2022-10-08 | End: 2022-10-11

## 2022-10-08 RX ORDER — LIDOCAINE 4 G/G
1 PATCH TOPICAL DAILY
Qty: 7 EACH | Refills: 0 | Status: SHIPPED | OUTPATIENT
Start: 2022-10-09 | End: 2022-10-16

## 2022-10-08 RX ADMIN — BUMETANIDE 1 MG: 1 TABLET ORAL at 08:06

## 2022-10-08 RX ADMIN — PIPERACILLIN AND TAZOBACTAM 3375 MG: 3; .375 INJECTION, POWDER, FOR SOLUTION INTRAVENOUS at 06:40

## 2022-10-08 RX ADMIN — SODIUM CHLORIDE, PRESERVATIVE FREE 10 ML: 5 INJECTION INTRAVENOUS at 08:14

## 2022-10-08 RX ADMIN — FENTANYL CITRATE 25 MCG: 50 INJECTION, SOLUTION INTRAMUSCULAR; INTRAVENOUS at 05:33

## 2022-10-08 RX ADMIN — FENTANYL CITRATE 25 MCG: 50 INJECTION, SOLUTION INTRAMUSCULAR; INTRAVENOUS at 01:15

## 2022-10-08 RX ADMIN — MAGNESIUM GLUCONATE 500 MG ORAL TABLET 200 MG: 500 TABLET ORAL at 08:06

## 2022-10-08 RX ADMIN — SPIRONOLACTONE 12.5 MG: 25 TABLET ORAL at 08:06

## 2022-10-08 RX ADMIN — LEVOTHYROXINE SODIUM 75 MCG: 75 TABLET ORAL at 05:23

## 2022-10-08 RX ADMIN — ALLOPURINOL 100 MG: 100 TABLET ORAL at 08:06

## 2022-10-08 RX ADMIN — METOPROLOL SUCCINATE 50 MG: 50 TABLET, FILM COATED, EXTENDED RELEASE ORAL at 08:07

## 2022-10-08 RX ADMIN — SACUBITRIL AND VALSARTAN 0.5 TABLET: 24; 26 TABLET, FILM COATED ORAL at 08:14

## 2022-10-08 RX ADMIN — AMIODARONE HYDROCHLORIDE 200 MG: 200 TABLET ORAL at 08:07

## 2022-10-08 ASSESSMENT — PAIN SCALES - GENERAL
PAINLEVEL_OUTOF10: 7
PAINLEVEL_OUTOF10: 8

## 2022-10-08 ASSESSMENT — PAIN DESCRIPTION - LOCATION
LOCATION: ABDOMEN;BACK
LOCATION: BACK

## 2022-10-08 NOTE — PROGRESS NOTES
[facility]  Internal Medicine Teaching Residency Program  Inpatient Daily Progress Note  ______________________________________________________________________________    Patient: Kacy Ledesma  YOB: 1955   AAR:[id number]    Acct: [de-identified]     Room: [address] [place] [address]  Admit date: 10/7/2022  Today's date: 10/08/22  Number of days in the hospital: 1    SUBJECTIVE   Admitting Diagnosis: Intra-abdominal abscess ([address]  CC: Abdominal pain    Pt examined at bedside. Chart & results reviewed. No acute events overnight  Afebrile, hemodynamically stable, saturating well on room air  Abdominal pain much improved, complaining of back pain today provided lidocaine patch. She reports that she was able to tolerate p.o.      ROS:  Constitutional:  negative for chills, fevers, sweats  Respiratory:  negative for cough, dyspnea on exertion, hemoptysis, shortness of breath, wheezing  Cardiovascular:  negative for chest pain, chest pressure/discomfort, lower extremity edema, palpitations  Gastrointestinal:  negative for abdominal pain, constipation, diarrhea, nausea, vomiting  Neurological:  negative for dizziness, headache  BRIEF HISTORY     The patient is a pleasant 75 y.o. female presents with a chief complaint of abdominal pain for the past 2 days, she also reported that she has episodes of stool mixed with blood and rectal bleeding. He has been dealing with intra-abdominal abscess which has fistulized approximately to the level of sigmoid colon, completed her p.o. Augmentin course on 10/4/2022. She also complains of nausea and episodes of vomiting this morning, and diarrhea, she denies any fevers or chills, she rates her abdominal pain is 5 out of 10 and is non-radiating.      Past medical history significant for chronic systolic CHF with EF of 59%, s/p AICD placement, A. Fib, severe mitral regurgitation, pulmonary hypertension, hypothyroid, hypertension, hypothyroidism. CT scan performed at the ED shows decrease in the size of diverticular abscess, cirrhotic liver and cholelithiasis. General surgery was consulted, they recommended no surgical intervention and recommended treat medically with IV antibiotics. OBJECTIVE     Vital Signs:  /87   Pulse 71   Temp 98.2 °F (36.8 °C) (Temporal)   Resp 18   SpO2 98%     Temp (24hrs), Av.9 °F (36.6 °C), Min:97.3 °F (36.3 °C), Max:98.2 °F (36.8 °C)    No intake/output data recorded. Physical Exam:  Constitutional: This is a well developed, well nourished, 30-34.9 - Obesity Grade I 75y.o. year old female who is alert, oriented, cooperative and in no apparent distress. Head:normocephalic and atraumatic. Respiratory:   Breath sounds bilaterally were clear to auscultation. There were no wheezes, rhonchi or rales. There is no intercostal retraction or use of accessory muscles. Cardiovascular: Regular without murmur, clicks, gallops or rubs. Abdomen: Slightly rounded and soft without organomegaly. No rebound, rigidity or guarding was appreciated. Mild tenderness left lower quadrant  Musculoskeletal: No gross muscle weakness. Extremities:  No lower extremity edema, ulcerations, tenderness, varicosities or erythema. Muscle size, tone and strength are normal.  No involuntary movements are noted. Skin:  Warm and dry. Good color, turgor and pigmentation. No lesions or scars.   No cyanosis or clubbing  Neurological/Psychiatric: The patient's general behavior, level of consciousness, thought content and emotional status is normal.        Medications:  Scheduled Medications:    sodium chloride flush  3 mL IntraVENous Q8H    allopurinol  100 mg Oral Daily    levothyroxine  75 mcg Oral Daily    rivaroxaban  20 mg Oral Dinner    metoprolol succinate  50 mg Oral Daily    sacubitril-valsartan  0.5 tablet Oral BID    sodium chloride flush  5-40 mL IntraVENous 2 times per day    amiodarone  200 mg Oral Daily    magnesium oxide  200 mg Oral Daily    piperacillin-tazobactam  3,375 mg IntraVENous Q8H    bumetanide  1 mg Oral Daily    spironolactone  12.5 mg Oral Daily     Continuous Infusions:    sodium chloride       PRN Medicationssodium chloride flush, 5-40 mL, PRN  sodium chloride, , PRN  ondansetron, 4 mg, Q8H PRN   Or  ondansetron, 4 mg, Q6H PRN  polyethylene glycol, 17 g, Daily PRN  acetaminophen, 650 mg, Q6H PRN   Or  acetaminophen, 650 mg, Q6H PRN  nitroGLYCERIN, 0.4 mg, Q5 Min PRN  fentanNYL, 25 mcg, Q1H PRN        Diagnostic Labs:  CBC:   Recent Labs     10/07/22  1151   WBC 6.5   RBC 3.89*   HGB 11.6*   HCT 34.0*   MCV 87.4   RDW 15.1*        BMP:   Recent Labs     10/07/22  1151      K 3.2*      CO2 23   BUN 11   CREATININE 0.93*     BNP: No results for input(s): BNP in the last 72 hours. PT/INR: No results for input(s): PROTIME, INR in the last 72 hours. APTT: No results for input(s): APTT in the last 72 hours. CARDIAC ENZYMES: No results for input(s): CKMB, CKMBINDEX, TROPONINI in the last 72 hours. Invalid input(s): CKTOTAL;3  FASTING LIPID PANEL:  Lab Results   Component Value Date    CHOL 180 05/23/2020    HDL 42 05/23/2020    TRIG 79 05/23/2020     LIVER PROFILE:   Recent Labs     10/07/22  1151   AST 21   ALT 12   BILITOT 1.0   ALKPHOS 180*      MICROBIOLOGY:   Lab Results   Component Value Date/Time    CULTURE PATIENT DISCHARGED BEFORE SAMPLE COLLECTED 12/06/2021 10:30 AM       Imaging:    CT ABDOMEN PELVIS W IV CONTRAST Additional Contrast? None    Result Date: 10/7/2022  Mild interval decrease in size of the peridiverticular abscess now measuring 3.4 x 3.3 cm. Cirrhotic liver. Cholelithiasis.   No other significant changes are seen       ASSESSMENT & PLAN     ASSESSMENT / PLAN:     Principal Problem:    Intra-abdominal abscess (HCC)  Active Problems:    Dilated cardiomyopathy (HCC)    Pre-diabetes    Essential hypertension    PAH (pulmonary artery hypertension) ([address] 34 on Echo     Cirrhosis ([address]    Atrial fibrillation (HCC)    QT prolongation    Hypokalemia    Mitral regurgitation    Class 1 obesity due to excess calories with body mass index (BMI) of 33.0 to 33.9 in adult    AICD (automatic cardioverter/defibrillator) present    Hypothyroidism    Chronic combined systolic (congestive) and diastolic (congestive) heart failure ([address]  Resolved Problems:    * No resolved hospital problems. *     Intra-abdominal abscess  -Continue IV Zosyn  -Discharge on PO antibiotics  -Pain control with percocet  -CT scan shows decreasing size of abscess  -General surgery consulted, recommended no surgical intervention     Chronic systolic CHF  -AICD in place, EF 17%  -Continue Entresto  -Continue Aldactone and Bumex     Atrial fibrillation  -Continue Toprol-XL, and p.o. amiodarone 200  -Continue anticoagulation with Xarelto     Hypertension  -Resume home meds as needed     Hypokalemia  -K 3.8  -Replace as needed     Prolonged Qtc  -Magnesium oxide 200 mg daily  -Recheck magnesium tomorrow     Liver cirrhosis. Outpatient follow-up     Hypothyroidism. Synthroid 75 mg     Gout. Resume allopurinol    DVT ppx : Xarelto  GI ppx: Not indicated    PT/OT: Consulted  Discharge Planning / SW:  consulted    Zane Souza MD  Internal Medicine Resident, PGY-1  [place];  [place], [place]  10/8/2022, 5:38 AM

## 2022-10-08 NOTE — CARE COORDINATION
10/08/22 1043   Service Assessment   Patient Orientation Alert and Oriented;Person;Place;Situation   Cognition Alert   History Provided By Patient   Primary [address]   PCP Verified by CM Yes   Last Visit to PCP Within last 3 months   Prior Functional Level Independent in ADLs/IADLs   Current Functional Level Independent in ADLs/IADLs   Can patient return to prior living arrangement Yes   Ability to make needs known: Good   Family able to assist with home care needs: Yes   Financial Resources Medicare;Medicaid   Social/Functional History   Lives With Parent   Type of [facility] One level   Home Access Stairs to enter without rails   Entrance Stairs - Number of Steps [address] Help From [address] Responsibilities Yes   Ambulation Assistance Independent   Transfer Assistance Independent   Active  No   Mode of Transportation Family   Discharge Planning   Type of Residence [address] [place] [address]   Current Services Prior To Admission Durable Medical Equipment   Current DME Prior to [address]   DME Ordered? No   Potential Assistance Purchasing Medications No   Type of Home Care Services None   Patient expects to be discharged to: Apartment   One/Two Story Residence One story   History of falls? 0   Services At/After Discharge   [employer] Information Provided? No   Mode of Transport at Discharge Other (see comment)  (family)   Condition of Participation: Discharge Planning   The Plan for Transition of Care is related to the following treatment goals: comfort       Met with patient to discuss transitional planning. Plan is home independently. Patient agreeable to plan. Patient has ride home.

## 2022-10-08 NOTE — DISCHARGE INSTR - DIET

## 2022-10-08 NOTE — PROGRESS NOTES
SPIRITUAL CARE DEPARTMENT - [address] [facility] [address]  PROGRESS NOTE    Shift date: 10/08/22  Shift day: Friday   Shift # 2    Room # 7171/3649-23   Name: Bowen Chun                [employer]:    Place of Church:     Referral: Nurse Referral    Admit Date & Time: 10/7/2022 10:51 AM    Assessment:  Bowen Chun is a 75 y.o. female       Intervention:  Writer introduced self and title as . Patient welcomed  presence and engaged in conversation about her health and its impact. Writer offered space for patient  to express feelings, needs, and concerns and provided a ministry presence. Patient has strong esperanza and leans on her esperanza for strength.  facilitated prayer per patient request.    Outcome:  Patient expressed gratitude for visit. Plan:  Chaplains will remain available to offer spiritual and emotional support as needed.       Electronically signed by Codie Hamilton on 10/8/2022 at 12:15 AM.  Hugo Ernst  [phone]

## 2022-10-08 NOTE — PROGRESS NOTES
Occupational Therapy  Facility/Department: [facility] 2C ORTHO/MED SURG  Occupational Therapy Initial Assessment    Name: Caitlyn Nielsen  : 1955  MRN: [id number]  Date of Service: 10/8/2022    Discharge Recommendations:  Patient would benefit from continued therapy after discharge      Copied from Emergency Medicine:  Caitlyn Nielsen is a 75 y.o. female who presents today for evaluation of abdominal pain. The patient has been dealing with a intra-abdominal abscess that fistulized is approximately the level of the sigmoid colon. Being followed by surgery. She completed a course of antibiotics as an outpatient. Her pain had subsided momentarily but has now came back. It is associated with nausea and decreased intake of p.o. fluids. Patient also notes that for the last 2 days she has had blood mixed in with her stool and rectal bleeding. She gives a history of being constipated for the preceding days. She believes that that may be the cause that she was straining to use the bathroom. Nothing has been attempted for relief to this point. Patient Diagnosis(es): The primary encounter diagnosis was Intra-abdominal abscess ([address] A diagnosis of Diverticulitis of large intestine with abscess without bleeding was also pertinent to this visit.     Past Medical History:  has a past medical history of Abnormal Pap smear, Acute on chronic systolic congestive heart failure ([address] AICD (automatic cardioverter/defibrillator) present, MAURO (acute kidney injury) ([address] MAURO (acute kidney injury) ([address] Anemia, Arthritis, Atrial fibrillation ([address] Cardiomyopathy ([address] Cardiomyopathy, nonischemic ([address] Chronic combined systolic and diastolic congestive heart failure ([address] Cirrhosis ([address] Ejection fraction < 50%, HTN (hypertension), Hypothyroidism, Ischemic cardiomyopathy, Left bundle branch block, Mitral regurgitation, Morbid obesity due to excess calories ([address] Pulmonary hypertension ([address] Sudden onset of severe headache, and Torn rotator cuff. Past Surgical History:  has a past surgical history that includes other surgical history (12/20/2012); Tubal ligation; hernia repair; Colonoscopy; pacemaker placement; Insert Midline Catheter (04/12/2022); and other surgical history (Left). Treatment Diagnosis: Intra Abdominal Abscess    Assessment   Pt seated in recliner upon entrance to room. Pt sat at edge of chair 10 minutes with good unsupported sitting balance. Sit to stand with stand by assistance. Pt completed dynamic mob in room with stand by assistance with use of r walker. Please refer to below assist levels for adl completion. Pt ed on OT POC, safety awareness tech,with good return. Pt retired seated in [address] with call light and phone in reach. All needs met upon exit. Performance deficits / Impairments: Decreased high-level IADLs;Decreased safe awareness;Decreased functional mobility ; Decreased ADL status; Decreased endurance  Treatment Diagnosis: Intra Abdominal Abscess  Prognosis: Good  Decision Making: Low Complexity  REQUIRES OT FOLLOW-UP: Yes  Activity Tolerance  Activity Tolerance: Patient Tolerated treatment well        Plan   Occupational Therapy Plan  Times Per Week: 2-3 x week     Restrictions  Restrictions/Precautions  Restrictions/Precautions: Up as Tolerated  Required Braces or Orthoses?: No    Subjective   General  Patient assessed for rehabilitation services?: Yes  Family / Caregiver Present: No  Pt reports pain at abscess site 4/10    Social/Functional History  Social/Functional History  Lives With:  (pts mother has dementia and is her primary caregiver)  Type of Home:  (Duplex)  Home Layout: One level  Home Access: Stairs to enter without rails  Entrance Stairs - Number of Steps: 3 steps to enter  Entrance Stairs - Rails: Right  Bathroom Shower/Tub: Tub/Shower unit, Curtain  Bathroom Toilet: Standard  Bathroom Equipment: Shower chair, Grab bars around toilet, Grab bars in shower, Hand-held shower  Bathroom Accessibility: Accessible  Home Equipment:  (pt reports home OT recommended a rollator for pt for increased balance)  Receives Help From: Family (pts brother came from [place] to assist pt and mother, not sure how long brother will be staying)  ADL Assistance: Independent  Homemaking Assistance: Independent  Homemaking Responsibilities:  (pts brother has been assisting with all home management since staying with pt)  Ambulation Assistance: Independent  Transfer Assistance: Independent  Active : No  Mode of Transportation: Family  Education: family drives-sibling, niece, friend  Occupation: Retired  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 IADL Comments: OT and PT coming to home 2x/wk. no aide. RN checks vitals. Pt does own meds.   Additional Comments: Pt rides in scooter at store    Objective   SpO2: 97 %  O2 Device: None (Room air)       Safety Devices  Type of Devices: Left in chair;Call light within reach  Restraints  Restraints Initially in Place: No  Bed Mobility Training  Bed Mobility Training: No  Scooting: Independent (to edge of chair)  Balance  Sitting: Intact  Transfer Training  Transfer Training: Yes  Overall Level of Assistance: Supervision  Interventions: Demonstration  Sit to Stand: Stand-by assistance  Stand to Sit: Supervision  Gait  Overall Level of Assistance: Stand-by assistance  Interventions: Demonstration  Toilet Transfers  Toilet - Technique: Ambulating  Equipment Used: Standard toilet  Toilet Transfer: Supervision  AROM:  (R shoulder with prior limitations from rotator cuff tear, shoulder flexion 0-50)  PROM:  (R shoulder with prior limitations from rotator cuff tear)  Strength:  (R shoulder flezion/extension 3-/5; elbow flexion/extension 4-/5; wrist flexion/extension 4/5)  Coordination: Generally decreased, functional  Tone: Normal  Sensation: Intact (denies numbness/tingling B hands)  ADL  Feeding: Independent;Setup  Grooming: Independent;Setup  UE Bathing: Minimal assistance;Setup; Increased time to complete (assist for back)  LE Bathing: Contact guard assistance (for safety with functional reach)  UE Dressing: Minimal assistance (pt able to don/doff BUE's through hosp gown, assist to tie gown in back)  LE Dressing: Contact guard assistance;Setup; Increased time to complete (for safety with functional reach)  Toileting: Supervision     Bed mobility  Bed Mobility Comments: pt seated in recliner at beginning and end of session  Transfers  Sit to stand: Stand by assistance (with use of r walker)  Stand to sit: Supervision  Vision - Basic Assessment  Patient Visual Report: No visual complaint reported.   Vision  Vision: Impaired  Vision Exceptions: Wears glasses for reading (glasses present in hospital)  Hearing  Hearing: Within functional limits  Cognition  Overall Cognitive Status: WFL  Orientation  Overall Orientation Status: Within Functional Limits   Education Given To: Patient  Education Provided: Role of Therapy;Plan of Care  Education Method: Demonstration;Verbal  Education Outcome: Verbalized understanding  LUE PROM (degrees)  LUE PROM: WFL  LUE AROM (degrees)  LUE AROM : WFL  Left Hand PROM (degrees)  Left Hand PROM: WFL  Left Hand AROM (degrees)  Left Hand AROM: WFL  RUE PROM (degrees)  RUE PROM: Exceptions  RUE General PROM: 0-90 shoulder flexion; elbow and wrist WFL's  RUE AROM (degrees)  RUE AROM : Exceptions  RUE General AROM: R shoulder with prior limitations from rotator cuff tear, shoulder flexion 0-50  Right Hand PROM (degrees)  Right Hand PROM: WFL  Right Hand AROM (degrees)  Right Hand AROM: WFL     Hand Dominance  Hand Dominance: Right     AM-PAC Score  AM-[facility] Inpatient Daily Activity Raw Score: 20 (10/08/22 1226)  AM-PAC Inpatient ADL T-Scale Score : 42.03 (10/08/22 1226)  ADL Inpatient CMS 0-100% Score: 38.32 (10/08/22 1226)  ADL Inpatient CMS G-Code Modifier : Tramaine Dhillon (10/08/22 1226)    Goals  Short Term Goals  Time Frame for Short Term Goals: Pt will, by discharge:  Short Term Goal 1: Dem I with adl performance  Short Term Goal 2: Dem I with all transfers/mobility with LRD for daily occupations  Short Term Goal 3: Dem a 12 min dynamic task with independence to increase activity tolerance for IADLS  Short Term Goal 4: Dem good safety awareness tech for all transfers/mobility       Therapy Time   Individual Concurrent Group Co-treatment   Time In 0918         Time Out 0942         Minutes 24         Timed Code Treatment Minutes: 10 Minutes     Co eval with PT    JUSTIN ZAYAS OTR/L

## 2022-10-08 NOTE — PLAN OF CARE
Problem: Discharge Planning  Goal: Discharge to home or other facility with appropriate resources  Outcome: Progressing     Problem: Pain  Goal: Verbalizes/displays adequate comfort level or baseline comfort level  Outcome: Progressing  Flowsheets (Taken 10/7/2022 1830 by Francine Carson RN)  Verbalizes/displays adequate comfort level or baseline comfort level:   Encourage patient to monitor pain and request assistance   Assess pain using appropriate pain scale

## 2022-10-08 NOTE — PROGRESS NOTES
Physical Therapy  Facility/Department: [address] ORTHO/MED SURG  Physical Therapy Initial Assessment    Name: Jake Rowe  : 1955  MRN: [id number]  Date of Service: 10/8/2022    Chief Complaint   Patient presents with    Rectal Bleeding    Abdominal Pain       Discharge Recommendations:    No further therapy required at discharge. PT Equipment Recommendations  Equipment Needed: No      Patient Diagnosis(es): Diagnoses of Intra-abdominal abscess ([address] Diverticulitis of large intestine with abscess without bleeding, and Lower abdominal pain were pertinent to this visit. Past Medical History:  has a past medical history of Abnormal Pap smear, Acute on chronic systolic congestive heart failure ([address] AICD (automatic cardioverter/defibrillator) present, MAURO (acute kidney injury) ([address] MAURO (acute kidney injury) ([address] Anemia, Arthritis, Atrial fibrillation ([address] Cardiomyopathy ([address] Cardiomyopathy, nonischemic ([address] Chronic combined systolic and diastolic congestive heart failure ([address] Cirrhosis ([address] Ejection fraction < 50%, HTN (hypertension), Hypothyroidism, Ischemic cardiomyopathy, Left bundle branch block, Mitral regurgitation, Morbid obesity due to excess calories ([address] Pulmonary hypertension ([address] Sudden onset of severe headache, and Torn rotator cuff. Past Surgical History:  has a past surgical history that includes other surgical history (2012); Tubal ligation; hernia repair; Colonoscopy; pacemaker placement; Insert Midline Catheter (2022); and other surgical history (Left). Assessment   Body Structures, Functions, Activity Limitations Requiring Skilled Therapeutic Intervention: Decreased strength  Assessment: PT grossly CGa to SBA for mobility, amb 250' no AD CGA. Pt would benefit from continued acute PT to address deficits.   Therapy Prognosis: Good  Decision Making: Medium Complexity  Requires PT Follow-Up: Yes  Activity Tolerance  Activity Tolerance: Patient tolerated treatment well     Plan   Physcial Therapy Plan  General Plan: 3-5 times per week  Safety Devices  Type of Devices: Call light within reach, Gait belt, Nurse notified, Left in chair, Patient at risk for falls  Restraints  Restraints Initially in Place: No     Restrictions  Restrictions/Precautions  Restrictions/Precautions: Up as Tolerated, General Precautions  Required Braces or Orthoses?: No     Subjective   Pain: Pt reports pain at abscess site 4/10  General  Chart Reviewed: Yes  Patient assessed for rehabilitation services?: Yes  Response To Previous Treatment: Not applicable  Family / Caregiver Present: No  Follows Commands: Within Functional Limits  General Comment  Comments: RN and pt agreeable to PT. Pt alert in chair upon arrival. OT co-eval  Subjective  Subjective: Pt c/o 3-4/10 abdomen, R shld and knees. Pt denies any numbness or tingling.          Social/Functional History  Social/Functional History  Lives With:  (pts mother has dementia and is her primary caregiver)  Type of Home:  (Duplex)  Home Layout: One level  Home Access: Stairs to enter without rails  Entrance Stairs - Number of Steps: 3 steps to enter  Entrance Stairs - Rails: Right  Bathroom Shower/Tub: Tub/Shower unit, Curtain  Bathroom Toilet: Standard  Bathroom Equipment: Shower chair, Grab bars around toilet, Grab bars in shower, Hand-held shower  Bathroom Accessibility: Accessible  Home Equipment:  (pt reports home OT recommended a rollator for pt for increased balance)  Receives Help From: Family (pts brother came from [place] to assist pt and mother, not sure how long brother will be staying)  ADL Assistance: [facility]: Independent  Homemaking Responsibilities:  (pts brother has been assisting with all home management since staying with pt)  Ambulation Assistance: Independent  Transfer Assistance: Independent  Active : No  Mode of Transportation: Family  Education: family drives-sibling, niece, friend  Occupation: Retired  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 IADL Comments: OT and PT coming to home 2x/wk. no aide. RN checks vitals. Pt does own meds.   Additional Comments: Pt rides in scooter at store  [address]: Impaired  Vision Exceptions: Wears glasses for reading  Hearing  Hearing: Within functional limits    Cognition   Orientation  Overall Orientation Status: Within Functional Limits  Cognition  Overall Cognitive Status: WFL     Objective       AROM RLE (degrees)  RLE AROM: WFL  AROM LLE (degrees)  LLE AROM : WFL  AROM RUE (degrees)  RUE General AROM: shld notably limited, see OT  AROM LUE (degrees)  LUE General AROM: limited overhead, see OT  Strength RLE  Strength RLE: WFL  Comment: 4/5 grossly  Strength LLE  Strength LLE: WFL  Comment: 4/5 grossly  Strength RUE  Comment: see OT  Strength LUE  Comment: see OT        Bed Mobility Training  Bed Mobility Training: No  Scooting: Independent (to edge of chair)  Balance  Sitting: Intact  Transfer Training  Transfer Training: Yes  Overall Level of Assistance: Supervision  Interventions: Demonstration  Sit to Stand: Stand-by assistance  Stand to Sit: Supervision  Gait  Overall Level of Assistance: Stand-by assistance  Interventions: Demonstration  Bed mobility  Bed Mobility Comments: in chair upon arrival and exit  Transfers  Sit to Stand: Stand by assistance  Stand to Sit: Stand by assistance  Ambulation  Surface: Level tile  Device: No Device  Assistance: Contact guard assistance  Quality of Gait: minorly slowed david, no LOb or buckling, reciprocal,  Distance: 250'  More Ambulation?: No  Stairs/Curb  Stairs?: No     Balance  Posture: Fair  Sitting - Static: Good  Sitting - Dynamic: Good;-  Standing - Static: Fair;+  Standing - Dynamic: Fair  Comments: no AD used         AM-PAC Score  AM-PAC Inpatient Mobility Raw Score : 21 (10/08/22 1501)  AM-PAC Inpatient T-Scale Score : 50.25 (10/08/22 1501)  Mobility Inpatient CMS 0-100% Score: 28.97 (10/08/22 1501)  Mobility Inpatient CMS G-Code Modifier : CJ (10/08/22 1501)        Goals  Short Term Goals  Time Frame for Short Term Goals: 10 visits  Short Term Goal 1: Pt will be Anival bed mobility  Short Term Goal 2: Pt will be Anival transfers  Short Term Goal 3: Pt will be Anival amb 250'  Short Term Goal 4: Pt will navigate 4 steps Anival r rail.        Education  Patient Education  Education Given To: Patient  Education Provided: Role of Therapy;Plan of Care  Education Method: Demonstration;Verbal  Barriers to Learning: None  Education Outcome: Demonstrated understanding;Verbalized understanding      Therapy Time   Individual Concurrent Group Co-treatment   Time In 0919         Time Out 0942         Minutes 23         Timed Code Treatment Minutes: 9 Minutes       Deborah Lundberg, PT

## 2022-10-08 NOTE — PROGRESS NOTES
PROGRESS NOTE      PATIENT NAME: Lizbeth Quijano  MEDICAL RECORD NO. [id number]  DATE: 10/8/2022  SURGEON: Hitesh Crowley  PRIMARY CARE PHYSICIAN: Veronika Singletary DO    HD: # 1    ASSESSMENT    Patient Active Problem List   Diagnosis    Hypokalemia    Dilated cardiomyopathy ([address]    Pre-diabetes    Essential hypertension    PAH (pulmonary artery hypertension) ([address] 34 on Echo     Mitral regurgitation    Chronic headache    Class 1 obesity due to excess calories with body mass index (BMI) of 33.0 to 33.9 in adult    AICD (automatic cardioverter/defibrillator) present    Syncope and collapse secondary to [facility]    ICD (implantable cardioverter-defibrillator) battery depletion    Gout    Dizziness    Benign paroxysmal positional vertigo    Chronic heart failure with reduced ejection fraction and diastolic dysfunction (HCC)    NSVT (nonsustained ventricular tachycardia)    Chest pain    Hypothyroidism    Chronic combined systolic (congestive) and diastolic (congestive) heart failure (HCC)    Acute diverticulitis    Abscess of sigmoid colon    Diverticulitis    Nausea & vomiting    Diverticulitis of large intestine with abscess without bleeding    Diverticulitis of colon    Lower abdominal pain    Colonic diverticular abscess    Intra-abdominal abscess (HCC)    Cirrhosis ([address]    Atrial fibrillation ([address]    QT prolongation       MEDICAL DECISION MAKING AND PLAN    Patient is tolerating a diet. Recommend 10 additional days of p.o. Augmentin. Follow-up with primary care doctor. Patient will need colonoscopy in 6 to 8 weeks. SUBJECTIVE    Lizbeth Quijano was seen and evaluated at bedside no acute vents overnight. Patient states her abdominal pain is was completely resolved. She is now complaining of right lower back pain.   Denies nausea or vomiting tolerated regular diet      OBJECTIVE  VITALS: Temp: Temp: 98.3 °F (36.8 °C)Temp  Av.2 °F (36.8 °C)  Min: 98.2 °F (36.8 °C)  Max: 98.3 °F (46.5 °C) BP Systolic (44IGK), Av , Min:104 , CONNIE:657   Diastolic (84EZB), WPF:67, Min:78, Max:90   Pulse Pulse  Av  Min: 71  Max: 71 Resp Resp  Av.8  Min: 16  Max: 18 Pulse ox SpO2  Av %  Min: 97 %  Max: 99 %    CONSTITUTIONAL: awake, alert, cooperative, no apparent distress  HEAD: atraumatic, normocephalic  EYES: sclera clear, pupils equal and reactive to light  ENT: ears are symmetric, nares patent   HEENT: moist mucous membranes  NECK: Supple, symmetrical, trachea midline  LUNGS: no respiratory distress, no audible wheezing  CARDIOVASCULAR: +S1/S2  ABDOMEN: Soft, minimally tender to left lower quadrant, nondistended, right flank pain with deep palpation  MUSCULOSKELETAL: full range of motion noted  NEUROLOGIC: Awake, alert, oriented to name, place and time  EXTREMITIES: peripheral pulses normal, no pedal edema, no calf tenderness  SKIN: normal coloration and turgor    No intake/output data recorded. Drain/tube output:  No intake/output data recorded. LAB:  CBC:   Recent Labs     10/07/22  1151 10/08/22  0521   WBC 6.5 6.1   HGB 11.6* 11.5*   HCT 34.0* 31.6*   MCV 87.4 84.5    182     BMP:   Recent Labs     10/07/22  1151 10/08/22  0521    136   K 3.2* 3.8    108*   CO2 23 15*   BUN 11 8   CREATININE 0.93* 0.87   GLUCOSE 93 95     COAGS:   Recent Labs     10/07/22  1151   PROT 7.0       RADIOLOGY:  CT ABDOMEN PELVIS W IV CONTRAST Additional Contrast? None   Final Result   Mild interval decrease in size of the peridiverticular abscess now measuring   3.4 x 3.3 cm. Cirrhotic liver. Cholelithiasis.   No other significant changes are seen         CT ABDOMEN PELVIS W WO CONTRAST Additional Contrast? Oral    (Results Pending)           Malini Hill MD  10/8/22, 3:50 PM

## 2022-10-08 NOTE — DISCHARGE INSTRUCTIONS
Start taking Augmentin for 10 more days. Get a CT scan abdomen pelvis in 2 weeks to look for progression of abscess.    Lidocaine patch as needed for back pain   Stool softener for constipation

## 2022-10-08 NOTE — PLAN OF CARE
Problem: Discharge Planning  Goal: Discharge to home or other facility with appropriate resources  10/8/2022 0956 by Marely Krueger RN  Outcome: Adequate for Discharge  10/8/2022 0353 by Burke Prader, RN  Outcome: Progressing

## 2022-10-09 NOTE — PROGRESS NOTES
CLINICAL PHARMACY NOTE: MEDS TO BEDS    Total # of Prescriptions Filled: 3   The following medications were delivered to the patient:  Augmentin 875-125   Levothyroxine 75  Percocet 5-325     Additional Documentation:     Refused lidocaine patch, and delivered percocet on second delivery     Shannon delivered

## 2022-10-10 ENCOUNTER — CARE COORDINATION (OUTPATIENT)
Dept: CASE MANAGEMENT | Age: 67
End: 2022-10-10

## 2022-10-10 ENCOUNTER — TELEPHONE (OUTPATIENT)
Dept: INTERNAL MEDICINE | Age: 67
End: 2022-10-10

## 2022-10-10 DIAGNOSIS — K65.1 INTRA-ABDOMINAL ABSCESS (HCC): Primary | ICD-10-CM

## 2022-10-10 LAB
EKG ATRIAL RATE: 71 BPM
EKG P AXIS: 20 DEGREES
EKG P-R INTERVAL: 176 MS
EKG Q-T INTERVAL: 524 MS
EKG QRS DURATION: 194 MS
EKG QTC CALCULATION (BAZETT): 569 MS
EKG R AXIS: -130 DEGREES
EKG T AXIS: 90 DEGREES
EKG VENTRICULAR RATE: 71 BPM

## 2022-10-10 PROCEDURE — 93010 ELECTROCARDIOGRAM REPORT: CPT | Performed by: INTERNAL MEDICINE

## 2022-10-10 PROCEDURE — 1111F DSCHRG MED/CURRENT MED MERGE: CPT | Performed by: STUDENT IN AN ORGANIZED HEALTH CARE EDUCATION/TRAINING PROGRAM

## 2022-10-10 NOTE — TELEPHONE ENCOUNTER
----- Message from Mi Clark RN sent at 10/10/2022  1:03 PM EDT -----  Pt will need 7 day hospital follow up . Pt was discharge from [facility] on 10/8/22 for Intra abdominal abscess and rectal bleeding.   Thanks

## 2022-10-10 NOTE — CARE COORDINATION
[facility] Care Transitions Initial Follow Up Call    Call within 2 business days of discharge: Yes    Care Transition Nurse contacted the patient by telephone to perform post hospital discharge assessment. Verified name and  with patient as identifiers. Provided introduction to self, and explanation of the Care Transition Nurse role. Patient: Ronaldo Fraire Patient : 1955   MRN: [id number]  Reason for Admission: Intra-abdominal abscess /Rectal bleeding  Discharge Date: 10/8/22 RARS: Readmission Risk Score: 19.9      Last Discharge  [address]       Date Complaint Diagnosis Description Type Department Provider    10/7/22 Rectal Bleeding; Abdominal Pain Intra-abdominal abscess ([facility] . .. ED to Hosp-Admission (Discharged) (ADMITTED) TEJAL 2C Brynn Arenas MD; Marie Gunn... Was this an external facility discharge? No Discharge Facility: [facility]    Challenges to be reviewed by the provider   Additional needs identified to be addressed with provider: Yes  7 day hospital follow up               Method of communication with provider: staff message. Was able to contact Annemarie for initial transitional outreach. She stated that she was doing \"good\". She denied any further abdominal pain, N/V, rectal bleeding or fever/chills. She stated that she had all her medications and has [facility] with nursing and PT/OT. She will be calling her PCP for follow up. Staff message sent for hospital follow up sent to PCP office. Call place to [facility] to find out when next visit for nursing will be. Was told that  pt's case was currently on hold due to hospitalization. Cristina Washburn said that they sent message to provider for [facility] and will see pt Wednesday. Annemarie said that she had everything, but would like a rolator. She has contacted the [facility]. Encouraged her to make follow up and possible PCP could order and send to her insurance. VU  No other concerns at this time.     Care Transition Nurse reviewed discharge instructions and medical action plan with patient who verbalized understanding. The patient was given an opportunity to ask questions and does not have any further questions or concerns at this time. Were discharge instructions available to patient? Yes. Reviewed appropriate site of care based on symptoms and resources available to patient including: PCP  Specialist  When to call 911. The patient agrees to contact the PCP office for questions related to their healthcare. Advance Care Planning:   Does patient have an Advance Directive: patient declined education. Medication reconciliation was performed with patient, who verbalizes understanding of administration of home medications. Medications reviewed, 1111F entered: yes    Was patient discharged with a pulse oximeter? no    Non-face-to-face services provided:  Obtained and reviewed discharge summary and/or continuity of care documents  Communication with home health agencies or other community services the patient is currently [address] 10/12  Assessment and support for treatment adherence and medication management-reviewed AVS    Offered patient enrollment in the Remote Patient Monitoring (RPM) program for in-home monitoring: Patient is not eligible for RPM program.    Care Transitions 24 Hour Call    Do you have a copy of your discharge instructions?: Yes  Do you have all of your prescriptions and are they filled?: Yes  Have you been contacted by a [facility] Pharmacist?: No  Have you scheduled your follow up appointment?: No  Post Acute Services:  Outpatient/Community Services, Home Health (Comment: CHF clinic/[facility])  Do you feel like you have everything you need to keep you well at home?: Yes  Care Transitions Interventions         Follow Up  Future Appointments   Date Time Provider Yann Olivares   10/13/2022  2:30 PM STV CT ROOM 1 [facility] CT SCAN [facility] Radiolog   11/11/2022  2:30 PM Wilmer Siddiqui MD [facility]   12/16/2022  8:30 AM Wayne Caldwell DO [address] [place] [address] Transition Nurse provided contact information. Plan for follow-up call in 3-5 days based on severity of symptoms and risk factors. Plan for next call: symptom management-follow up on PCP appt.  S/s of abdominal abscess/infection    Rudy Bautista RN

## 2022-10-12 ENCOUNTER — CARE COORDINATION (OUTPATIENT)
Dept: CASE MANAGEMENT | Age: 67
End: 2022-10-12

## 2022-10-12 NOTE — CARE COORDINATION
[facility] Care Transitions Follow Up Call    Care Transition Nurse contacted the patient by telephone to follow up after admission on 10/7/22. Verified name and  with patient as identifiers. Patient: Danielle Gipson  Patient : 1955   MRN: [id number]  Reason for Admission: Intra-abdominal abscess /Rectal bleeding  Discharge Date: 10/8/22 RARS: Readmission Risk Score: 19.9      Needs to be reviewed by the provider   Additional needs identified to be addressed with provider: No  none             Method of communication with provider: none. Was able to contact [facility] for transitional outreach. She stated that she was doing \"fine\". She denied any abdominal pain, N/V, diarrhea or rectal bleeding. She then said that she had a nurse visiting her mother and to call back later today. Will call back as requested. Annemarie was called back. She said that she continues to take the antibiotic, and her strength is slowly returning. She did say that she has not had a BM since 10/6 and will be getting MOM. Encouraged her to get something mild and to try prune juice. She had no questions or concerns. Addressed changes since last contact:    Discussed follow-up appointments. Follow Up  Future Appointments   Date Time Provider Yann Olivares   10/13/2022  2:30 PM ST CT ROOM 1 [facility] CT SCAN [facility] Radiolog   10/19/2022  2:00 PM [facility]   2022  2:30 PM [facility]   [facility]  8:30 AM [facility]     [facility] follow up appointment(s):     Care Transition Nurse reviewed medical action plan with patient and discussed any barriers to care and/or understanding of plan of care after discharge. Discussed appropriate site of care based on symptoms and resources available to patient including: PCP  Specialist  When to call 911. The patient agrees to contact the PCP office for questions related to their healthcare.      Patients top risk factors for readmission: functional physical ability and medical condition-Diverticulitis/CHF/Cirrhosis  Interventions to address risk factors: Obtained and reviewed discharge summary and/or continuity of care documents    Offered patient enrollment in the Remote Patient Monitoring (RPM) program for in-home monitoring: Patient is not eligible for RPM program.     Care Transitions Subsequent and Final Call    Subsequent and Final Calls  Do you have any ongoing symptoms?: No  Have your medications changed?: No  Do you have any questions related to your medications?: No  Do you currently have any active services?: Yes  Are you currently active with any services?: Outpatient/Community Services  Do you have any needs or concerns that I can assist you with?: No  Care Transitions Interventions  Other Interventions:             Care Transition Nurse provided contact information for future needs. Plan for follow-up call in 5-7 days based on severity of symptoms and risk factors.   Plan for next call: symptom management-follow up on abdominal pain/divertiuclitis    Veronica Florentino RN

## 2022-10-13 NOTE — DISCHARGE SUMMARY
[address]     Department of Internal Medicine - Staff Internal Medicine Teaching Service    INPATIENT DISCHARGE SUMMARY      Patient Identification:  Nubia Whitney is a 75 y.o. female. :  1955  MRN: [id number]     Acct: [de-identified]   PCP: Pravin Pitt DO  Admit Date:  10/7/2022  Discharge date and time: 10/8/2022  2:26 PM   Attending Provider: No att. providers found                                     [address] Problem Lists:  Principal Problem:    Intra-abdominal abscess ([address]  Active Problems:    Dilated cardiomyopathy ([address]    Pre-diabetes    Essential hypertension    PAH (pulmonary artery hypertension) ([address] 34 on Echo     Cirrhosis ([address]    Atrial fibrillation ([address]    QT prolongation    Hypokalemia    Mitral regurgitation    Class 1 obesity due to excess calories with body mass index (BMI) of 33.0 to 33.9 in adult    AICD (automatic cardioverter/defibrillator) present    Hypothyroidism    Chronic combined systolic (congestive) and diastolic (congestive) heart failure ([address]  Resolved Problems:    * No resolved hospital problems. *      HOSPITAL STAY     Brief Inpatient course:   Nubia Whitney is a 75 y.o. female who was admitted for the management of Intra-abdominal abscess [facility]), presented to the emergency department with  chief complaint of abdominal pain for, she also reported that she has episodes of stool mixed with blood and rectal bleeding. She has been dealing with intra-abdominal abscess which has fistulized approximately to the level of sigmoid colon, completed her p.o. Augmentin course on 10/4/2022. Past medical history significant for chronic systolic CHF with EF of 89%, s/p AICD placement, A. Fib, severe mitral regurgitation, pulmonary hypertension, hypothyroid, hypertension, hypothyroidism. CT scan performed at the ED shows decrease in the size of diverticular abscess, cirrhotic liver and cholelithiasis.   General surgery was consulted, they recommended no surgical intervention and recommended treat medically with IV antibiotics. She remained stable, all her medications were optimized, she was discharged once medically cleared as per instructions given below. Consults:     Consults:     Final Specialist Recommendations/Findings:   IP CONSULT TO GENERAL SURGERY  IP CONSULT TO INTERNAL MEDICINE  IP CONSULT TO CASE MANAGEMENT      Any Hospital Acquired Infections: none    Discharge Functional Status:  stable    DISCHARGE PLAN     Disposition: home    Patient Instructions:   Discharge Medication List as of 10/8/2022  1:10 PM        START taking these medications    Details   lidocaine 4 % external patch Place 1 patch onto the skin daily for 7 days, TransDERmal, DAILY Starting Sun 10/9/2022, Until Sun 10/16/2022, For 7 days, Disp-7 each, R-0, Normal      amoxicillin-clavulanate (AUGMENTIN) 875-125 MG per tablet Take 1 tablet by mouth 2 times daily for 10 days, Disp-20 tablet, R-0Normal      oxyCODONE-acetaminophen (PERCOCET) 5-325 MG per tablet Take 1 tablet by mouth every 8 hours as needed for Pain for up to 3 days. Intended supply: 7 days.  Take lowest dose possible to manage pain, Disp-10 tablet, R-0Print           CONTINUE these medications which have CHANGED    Details   levothyroxine (SYNTHROID) 75 MCG tablet Take 1 tablet by mouth Daily, Disp-30 tablet, R-3Normal           CONTINUE these medications which have NOT CHANGED    Details   bumetanide (BUMEX) 2 MG tablet Take 0.5 tablets by mouth daily, Disp-30 tablet, R-3Normal      rivaroxaban (XARELTO) 20 MG TABS tablet Take 1 tablet by mouth daily Take 1 tablet by mouth daily, Disp-30 tablet, R-2Normal      acetaminophen (TYLENOL) 500 MG tablet Take 1 tablet by mouth every 6 hours as needed for Pain, Disp-60 tablet, R-0Normal      metoprolol succinate (TOPROL XL) 50 MG extended release tablet Take 50 mg by mouth dailyHistorical Med      amiodarone (CORDARONE) 200 MG tablet Historical Med      empagliflozin (JARDIANCE) 10 MG tablet Take 10 mg by mouth dailyHistorical Med      potassium chloride (KLOR-CON M) 20 MEQ extended release tablet Take 1 tablet by mouth daily, Disp-30 tablet, R-0Normal      magnesium oxide (MAG-OX) 400 MG tablet Take 0.5 tablets by mouth daily, Disp-30 tablet, R-1Normal      nitroGLYCERIN (NITROSTAT) 0.4 MG SL tablet up to max of 3 total doses. If no relief after 1 dose, call 911., Disp-25 tablet, R-3Normal      sacubitril-valsartan (ENTRESTO) 24-26 MG per tablet Take 0.5 tablets by mouth 2 times daily, Disp-60 tablet, R-11Normal      allopurinol (ZYLOPRIM) 100 MG tablet Take 1 tablet by mouth daily, Disp-30 tablet, R-3Normal      spironolactone (ALDACTONE) 25 MG tablet Take 0.5 tablets by mouth daily, Disp-30 tablet, R-3Normal             Activity: activity as tolerated    Diet: regular diet    Follow-up:    James Childers DO  [address] [phone]    Follow up in 2 week(s)  hsopital follow up      Patient Instructions:     Start taking Augmentin for 10 more days. Get a CT scan abdomen pelvis in 2 weeks to look for progression of abscess. Lidocaine patch as needed for back pain   Stool softener for constipation        Yael Burroughs MD,   Internal Medicine Resident, PGY-1  [facility];  [place], [place]  10/13/2022, 2:02 PM

## 2022-10-14 ENCOUNTER — TELEPHONE (OUTPATIENT)
Dept: INTERNAL MEDICINE | Age: 67
End: 2022-10-14

## 2022-10-14 NOTE — TELEPHONE ENCOUNTER
Bowen Marquez from [employer] for OT order. Verbal order can be given. Pt is already in PT  2 times a week for 2 weeks then 1 time a week for 2 weeks. Verbal can be given at [phone].

## 2022-10-18 ENCOUNTER — CARE COORDINATION (OUTPATIENT)
Dept: CASE MANAGEMENT | Age: 67
End: 2022-10-18

## 2022-10-18 NOTE — CARE COORDINATION
[facility] Care Transitions Follow Up Call    Care Transition Nurse contacted the patient by telephone to follow up after admission on 10/7/22. Verified name and  with patient as identifiers. Patient: Gretta Blizzard  Patient : 1955   MRN: [id number]  Reason for Admission: Intra-abdominal abscess /Rectal bleeding  Discharge Date: 10/8/22 RARS: Readmission Risk Score: 19.9      Needs to be reviewed by the provider   Additional needs identified to be addressed with provider: No  none             Method of communication with provider: none. Was able to contact Annemarie for transitional outreach. She stated that she was doing \"great\". She denied any further abdominal pain, N/V. Fever/chills, appetite is better, no chest pain, swelling and has shortness of breath with activity. She said that she had one more day of the antibiotics and therapy was currently working with her. She is planning on visiting PCP tomorrow. No questions or concerns. Addressed changes since last contact:  none  Discussed follow-up appointments. Follow Up  Future Appointments   Date Time Provider Yann Olivares   10/19/2022  2:00 PM Jaron Phipps DO [facility]   2022  2:30 PM Lady Fredi MD [facility]   2022  8:30 AM Jaron Phipps DO [facility]     Non-[facility] follow up appointment(s):     Care Transition Nurse reviewed medical action plan and red flags with patient and discussed any barriers to care and/or understanding of plan of care after discharge. Discussed appropriate site of care based on symptoms and resources available to patient including: PCP  Specialist  Home health  When to call 911. The patient agrees to contact the PCP office for questions related to their healthcare.      Patients top risk factors for readmission: Diverticulitis/CHF/Cirrhosis  Interventions to address risk factors: Obtained and reviewed discharge summary and/or continuity of care documents       Care Transitions Subsequent and Final Call    Subsequent and Final Calls  Do you have any ongoing symptoms?: No  Have your medications changed?: No  Do you have any questions related to your medications?: No  Do you currently have any active services?: Yes  Are you currently active with any services?: Outpatient/Community Services  Do you have any needs or concerns that I can assist you with?: No  Care Transitions Interventions  Other Interventions:             Care Transition Nurse provided contact information for future needs. Plan for follow-up call in 7-10 days based on severity of symptoms and risk factors. Plan for next call: symptom management-follow up on any acute problems from diverticulitis/infections. Follow up on appointment with PCP.     Dameon Blake RN

## 2022-10-19 ENCOUNTER — OFFICE VISIT (OUTPATIENT)
Dept: INTERNAL MEDICINE | Age: 67
End: 2022-10-19
Payer: MEDICARE

## 2022-10-19 VITALS
HEART RATE: 72 BPM | WEIGHT: 209.6 LBS | BODY MASS INDEX: 33.68 KG/M2 | HEIGHT: 66 IN | DIASTOLIC BLOOD PRESSURE: 61 MMHG | SYSTOLIC BLOOD PRESSURE: 86 MMHG | TEMPERATURE: 97.2 F | OXYGEN SATURATION: 97 %

## 2022-10-19 DIAGNOSIS — K74.60 HEPATIC CIRRHOSIS, UNSPECIFIED HEPATIC CIRRHOSIS TYPE, UNSPECIFIED WHETHER ASCITES PRESENT (HCC): Primary | ICD-10-CM

## 2022-10-19 DIAGNOSIS — G89.29 CHRONIC RIGHT SHOULDER PAIN: ICD-10-CM

## 2022-10-19 DIAGNOSIS — E03.9 HYPOTHYROIDISM, UNSPECIFIED TYPE: ICD-10-CM

## 2022-10-19 DIAGNOSIS — I48.0 PAROXYSMAL ATRIAL FIBRILLATION (HCC): ICD-10-CM

## 2022-10-19 DIAGNOSIS — I42.0 DILATED CARDIOMYOPATHY (HCC): Chronic | ICD-10-CM

## 2022-10-19 DIAGNOSIS — I10 ESSENTIAL HYPERTENSION: Chronic | ICD-10-CM

## 2022-10-19 DIAGNOSIS — Z95.810 AICD (AUTOMATIC CARDIOVERTER/DEFIBRILLATOR) PRESENT: ICD-10-CM

## 2022-10-19 DIAGNOSIS — M25.511 CHRONIC RIGHT SHOULDER PAIN: ICD-10-CM

## 2022-10-19 PROCEDURE — G8482 FLU IMMUNIZE ORDER/ADMIN: HCPCS | Performed by: STUDENT IN AN ORGANIZED HEALTH CARE EDUCATION/TRAINING PROGRAM

## 2022-10-19 PROCEDURE — 1123F ACP DISCUSS/DSCN MKR DOCD: CPT | Performed by: STUDENT IN AN ORGANIZED HEALTH CARE EDUCATION/TRAINING PROGRAM

## 2022-10-19 PROCEDURE — G8399 PT W/DXA RESULTS DOCUMENT: HCPCS | Performed by: STUDENT IN AN ORGANIZED HEALTH CARE EDUCATION/TRAINING PROGRAM

## 2022-10-19 PROCEDURE — 99211 OFF/OP EST MAY X REQ PHY/QHP: CPT | Performed by: STUDENT IN AN ORGANIZED HEALTH CARE EDUCATION/TRAINING PROGRAM

## 2022-10-19 PROCEDURE — 3017F COLORECTAL CA SCREEN DOC REV: CPT | Performed by: STUDENT IN AN ORGANIZED HEALTH CARE EDUCATION/TRAINING PROGRAM

## 2022-10-19 PROCEDURE — 1036F TOBACCO NON-USER: CPT | Performed by: STUDENT IN AN ORGANIZED HEALTH CARE EDUCATION/TRAINING PROGRAM

## 2022-10-19 PROCEDURE — 1090F PRES/ABSN URINE INCON ASSESS: CPT | Performed by: STUDENT IN AN ORGANIZED HEALTH CARE EDUCATION/TRAINING PROGRAM

## 2022-10-19 PROCEDURE — 1111F DSCHRG MED/CURRENT MED MERGE: CPT | Performed by: STUDENT IN AN ORGANIZED HEALTH CARE EDUCATION/TRAINING PROGRAM

## 2022-10-19 PROCEDURE — 99214 OFFICE O/P EST MOD 30 MIN: CPT | Performed by: STUDENT IN AN ORGANIZED HEALTH CARE EDUCATION/TRAINING PROGRAM

## 2022-10-19 PROCEDURE — G8427 DOCREV CUR MEDS BY ELIG CLIN: HCPCS | Performed by: STUDENT IN AN ORGANIZED HEALTH CARE EDUCATION/TRAINING PROGRAM

## 2022-10-19 PROCEDURE — G8417 CALC BMI ABV UP PARAM F/U: HCPCS | Performed by: STUDENT IN AN ORGANIZED HEALTH CARE EDUCATION/TRAINING PROGRAM

## 2022-10-19 RX ORDER — TRAMADOL HYDROCHLORIDE 50 MG/1
50 TABLET ORAL 2 TIMES DAILY PRN
Qty: 28 TABLET | Refills: 0 | Status: SHIPPED | OUTPATIENT
Start: 2022-10-19 | End: 2022-10-19

## 2022-10-19 RX ORDER — TRAMADOL HYDROCHLORIDE 50 MG/1
50 TABLET ORAL 2 TIMES DAILY PRN
Qty: 28 TABLET | Refills: 0 | Status: SHIPPED | OUTPATIENT
Start: 2022-10-19 | End: 2022-11-02

## 2022-10-19 NOTE — PROGRESS NOTES
Post-Discharge Transitional Care  Follow Up      Jeromy Do   YOB: 1955    Date of Office Visit:  10/19/2022  Date of Hospital Admission: 10/7/22  Date of Hospital Discharge: 10/8/22  Risk of hospital readmission (high >=14%. Medium >=10%) :Readmission Risk Score: 19.9      Care management risk score Rising risk (score 2-5) and Complex Care (Scores >=6): No Risk Score On File     Non face to face  following discharge, date last encounter closed (first attempt may have been earlier): 10/10/2022    Call initiated 2 business days of discharge: Yes    ASSESSMENT/PLAN:   Hepatic cirrhosis, unspecified hepatic cirrhosis type, unspecified whether ascites present (HCC)  -     Hepatitis C Antibody; Future  -     Hepatitis B Surface Antibody; Future  -     Hepatitis B Surface Antigen; Future  -     HIV Screen; Future  -     Ferritin; Future  -     Ceruloplasmin; Future  -We will initiate cirrhosis work-up. Patient has GI appointment at the beginning of next month  Paroxysmal atrial fibrillation ([facility]  -     TSH; Future. We were uptitrating levothyroxine. Would typically wait around 6 weeks to repeat TSH however patient does not want to go to the lab more than once so we will check it now. Chronic right shoulder pain  -     traMADol (ULTRAM) 50 MG tablet; Take 1 tablet by mouth 2 times daily as needed for Pain for up to 14 days. Intended supply: 7 days. Take lowest dose possible to manage pain, Disp-28 tablet, R-0Print  -Patient has orthopedic appointment for second opinion of her right shoulder pain. She is unable to take NSAIDs and Tylenol is not working for her and she has cirrhosis so we will give Ultram for now until she is able to see orthopedics  Dilated cardiomyopathy ([facility]  AICD (automatic cardioverter/defibrillator) present  Essential hypertension  -Continue current therapy  Hypothyroidism, unspecified type  -Continue levothyroxine.  Repeat TSH as above  Elevated risk for falls  -Will order Rolator walker. Patient is currently having difficulty completing day-to-day tasks due to reduced mobility and is at high risk for falls. This would be alleviated by a Rollator walker and patient appears safe to ambulate with 1. Patient has not able to continue using cane as she is putting too much pressure on one hand and having pain leading her to become unsafe with ambulation. Patient would also benefit from having seat on a Rollator so that she may rest as needed without risk for falling and this is why standard walker is less ideal than Rollator. Medical Decision Making: high complexity  Return in about 2 months (around 12/19/2022). On this date 10/19/2022 I have spent 60 minutes reviewing previous notes, test results and face to face with the patient discussing the diagnosis and importance of compliance with the treatment plan as well as documenting on the day of the visit. Subjective:   HPI:  Follow up of Hospital problems/diagnosis(es): 50-year-old female with past medical history of recurrent diverticulitis causing multiple readmissions most recently on 37/5/3373 complicated by pelvic wall abscess, HFrEF with ejection fraction 17% status post AICD, A. fib on amiodarone and Xarelto, hypertension, gout, hypothyroidism currently uptitrating levothyroxine, osteoarthritis presenting for hospital follow-up. Patient was admitted 10/8/2022 For abdominal pain which was attributed to her intra-abdominal abscess. CT scan in the ED showed decrease in size of the diverticular abscess which has fistulized to approximately the level of the sigmoid colon, and liver cirrhosis and cholelithiasis. She was evaluated by general surgery who recommended medical treatment, repeat CT scan in 2 weeks and follow-up colonoscopy in 6 to 8 weeks. She was also recommended to follow-up with GI outpatient for liver cirrhosis. Consider infectious disease consult.     Inpatient course: Discharge summary reviewed- see chart.    Interval history/Current status: Since discharge patient states she did not complete follow-up CT scan as she has just gotten 1 and she did not feel she needed another. She has 1 day left of her Augmentin and she states her abdominal pain, bloody stool and appetite have all improved and are normal.  She has a gastroenterology appointment scheduled for early November and she plans to complete her colonoscopy in the beginning of next year. Patient Active Problem List   Diagnosis    Hypokalemia    Dilated cardiomyopathy (HCC)    Pre-diabetes    Essential hypertension    PAH (pulmonary artery hypertension) ([address] 34 on Echo     Mitral regurgitation    Chronic headache    Class 1 obesity due to excess calories with body mass index (BMI) of 33.0 to 33.9 in adult    AICD (automatic cardioverter/defibrillator) present    Syncope and collapse secondary to [facility]    ICD (implantable cardioverter-defibrillator) battery depletion    Gout    Dizziness    Benign paroxysmal positional vertigo    Chronic heart failure with reduced ejection fraction and diastolic dysfunction (HCC)    NSVT (nonsustained ventricular tachycardia)    Chest pain    Hypothyroidism    Chronic combined systolic (congestive) and diastolic (congestive) heart failure (HCC)    Acute diverticulitis    Abscess of sigmoid colon    Diverticulitis    Nausea & vomiting    Diverticulitis of large intestine with abscess without bleeding    Diverticulitis of colon    Lower abdominal pain    Colonic diverticular abscess    Intra-abdominal abscess ([address]    Cirrhosis ([address]    Atrial fibrillation (HCC)    QT prolongation       Medications listed as ordered at the time of discharge from hospital     Medication List            Accurate as of October 19, 2022  5:19 PM. If you have any questions, ask your nurse or doctor.                 START taking these medications      traMADol 50 MG tablet  Commonly known as: Ultram  Take 1 tablet by mouth 2 times daily as needed for Pain for up to 14 days. Intended supply: 7 days. Take lowest dose possible to manage pain  Started by: Veronika Singletary DO            CONTINUE taking these medications      acetaminophen 500 MG tablet  Commonly known as: TYLENOL  Take 1 tablet by mouth every 6 hours as needed for Pain     allopurinol 100 MG tablet  Commonly known as: ZYLOPRIM  Take 1 tablet by mouth daily     amiodarone 200 MG tablet  Commonly known as: CORDARONE     bumetanide 2 MG tablet  Commonly known as: BUMEX  Take 0.5 tablets by mouth daily     Jardiance 10 MG tablet  Generic drug: empagliflozin     levothyroxine 75 MCG tablet  Commonly known as: SYNTHROID  Take 1 tablet by mouth Daily     magnesium oxide 400 MG tablet  Commonly known as: MAG-OX  Take 0.5 tablets by mouth daily     metoprolol succinate 50 MG extended release tablet  Commonly known as: TOPROL XL     nitroGLYCERIN 0.4 MG SL tablet  Commonly known as: NITROSTAT  up to max of 3 total doses. If no relief after 1 dose, call 911. potassium chloride 20 MEQ extended release tablet  Commonly known as: KLOR-CON M  Take 1 tablet by mouth daily     rivaroxaban 20 MG Tabs tablet  Commonly known as: XARELTO  Take 1 tablet by mouth daily Take 1 tablet by mouth daily     sacubitril-valsartan 24-26 MG per tablet  Commonly known as: ENTRESTO  Take 0.5 tablets by mouth 2 times daily     spironolactone 25 MG tablet  Commonly known as: ALDACTONE  Take 0.5 tablets by mouth daily               Where to Get Your Medications        You can get these medications from any pharmacy    Bring a paper prescription for each of these medications  traMADol 50 MG tablet           Medications marked \"taking\" at this time  Outpatient Medications Marked as Taking for the 10/19/22 encounter (Office Visit) with Veronika Singletary DO   Medication Sig Dispense Refill    traMADol (ULTRAM) 50 MG tablet Take 1 tablet by mouth 2 times daily as needed for Pain for up to 14 days. Intended supply: 7 days.  Take lowest dose possible to manage pain 28 tablet 0    levothyroxine (SYNTHROID) 75 MCG tablet Take 1 tablet by mouth Daily 30 tablet 3    bumetanide (BUMEX) 2 MG tablet Take 0.5 tablets by mouth daily 30 tablet 3    rivaroxaban (XARELTO) 20 MG TABS tablet Take 1 tablet by mouth daily Take 1 tablet by mouth daily 30 tablet 2    acetaminophen (TYLENOL) 500 MG tablet Take 1 tablet by mouth every 6 hours as needed for Pain 60 tablet 0    metoprolol succinate (TOPROL XL) 50 MG extended release tablet Take 50 mg by mouth daily      amiodarone (CORDARONE) 200 MG tablet       empagliflozin (JARDIANCE) 10 MG tablet Take 10 mg by mouth daily      potassium chloride (KLOR-CON M) 20 MEQ extended release tablet Take 1 tablet by mouth daily 30 tablet 0    magnesium oxide (MAG-OX) 400 MG tablet Take 0.5 tablets by mouth daily 30 tablet 1    nitroGLYCERIN (NITROSTAT) 0.4 MG SL tablet up to max of 3 total doses. If no relief after 1 dose, call 911. 25 tablet 3    sacubitril-valsartan (ENTRESTO) 24-26 MG per tablet Take 0.5 tablets by mouth 2 times daily 60 tablet 11    allopurinol (ZYLOPRIM) 100 MG tablet Take 1 tablet by mouth daily 30 tablet 3    spironolactone (ALDACTONE) 25 MG tablet Take 0.5 tablets by mouth daily 30 tablet 3        Medications patient taking as of now reconciled against medications ordered at time of hospital discharge: Yes    A comprehensive review of systems was negative. Objective:    BP 86/61   Pulse 72   Temp 97.2 °F (36.2 °C)   Ht 5' 6\" (1.676 m)   Wt 209 lb 9.6 oz (95.1 kg)   SpO2 97%   BMI 33.83 kg/m²   Physical Exam:  General:  Pleasant and cooperative. No apparent distress. HEENT:  Normocephalic, atraumatic, mucus membranes moist.   Neck:  Trachea midline. Chest:  Clear to auscultation bilaterally. No wheezes, rales, or rhonchi. CV:  Regular rate and rhythm. No murmur, no gallops, no rubs  Abdomen:  Abdomen is soft, non-tender, non-distended, no rebound or guarding.   Extremities:  No lower extremity edema or cyanosis, peripheral pulses intact  Neurological:  AAOx3. No focal deficits. Skin:  Warm and dry. An electronic signature was used to authenticate this note.   --Pravin Love, DO

## 2022-10-20 NOTE — PROGRESS NOTES
Attending Physician Statement  I have discussed the care of Cecilia Regalado, including pertinent history and exam findings with the resident. I have reviewed the key elements of all parts of the encounter with the resident. I have seen and examined the patient with the resident and the key elements of all parts of the encounter have been performed by me. I agree with the assessment, and status of the problem list as documented. The plan and orders should include   Orders Placed This Encounter   Procedures    Hepatitis C Antibody    Hepatitis B Surface Antibody    Hepatitis B Surface Antigen    HIV Screen    Ferritin    Ceruloplasmin    TSH    and this was also documented by the resident. I agree with the referral to GI. The medication list was reviewed with the resident and is up to date. The return visit should be in 3 months . Diagnosis Orders   1. Hepatic cirrhosis, unspecified hepatic cirrhosis type, unspecified whether ascites present (HCC)  Hepatitis C Antibody    Hepatitis B Surface Antibody    Hepatitis B Surface Antigen    HIV Screen    Ferritin    Ceruloplasmin      2. Paroxysmal atrial fibrillation (HCC)  TSH      3. Chronic right shoulder pain  traMADol (ULTRAM) 50 MG tablet    DISCONTINUED: traMADol (ULTRAM) 50 MG tablet      4. Dilated cardiomyopathy ([address]        5. AICD (automatic cardioverter/defibrillator) present        6. Essential hypertension        7.  Hypothyroidism, unspecified type             Rafa Walker MD   Attending Physician, [address] [place] [address], Internal Medicine Residency Program  [address] [place] [address]

## 2022-10-21 ENCOUNTER — TELEPHONE (OUTPATIENT)
Dept: INTERNAL MEDICINE | Age: 67
End: 2022-10-21

## 2022-10-21 DIAGNOSIS — Z91.81 AT HIGH RISK FOR FALLS: Primary | ICD-10-CM

## 2022-10-21 NOTE — TELEPHONE ENCOUNTER
Pt is calling in the office inquiring on her rollator walker, pt was in the office on 10/19/22, pt want to know what happen.   thanks

## 2022-10-23 ENCOUNTER — APPOINTMENT (OUTPATIENT)
Dept: GENERAL RADIOLOGY | Age: 67
DRG: 291 | End: 2022-10-23
Payer: MEDICARE

## 2022-10-23 ENCOUNTER — APPOINTMENT (OUTPATIENT)
Dept: CT IMAGING | Age: 67
DRG: 291 | End: 2022-10-23
Payer: MEDICARE

## 2022-10-23 ENCOUNTER — HOSPITAL ENCOUNTER (INPATIENT)
Age: 67
LOS: 2 days | Discharge: HOME | DRG: 291 | End: 2022-10-25
Attending: EMERGENCY MEDICINE | Admitting: INTERNAL MEDICINE
Payer: MEDICARE

## 2022-10-23 DIAGNOSIS — R07.9 CHEST PAIN, UNSPECIFIED TYPE: Primary | ICD-10-CM

## 2022-10-23 DIAGNOSIS — Z95.0 PACEMAKER: ICD-10-CM

## 2022-10-23 DIAGNOSIS — R09.02 HYPOXIA: ICD-10-CM

## 2022-10-23 PROBLEM — I50.41 ACUTE COMBINED SYSTOLIC (CONGESTIVE) AND DIASTOLIC (CONGESTIVE) HEART FAILURE (HCC): Status: ACTIVE | Noted: 2022-10-23

## 2022-10-23 PROBLEM — T82.118A AICD MALFUNCTION, INITIAL ENCOUNTER: Status: ACTIVE | Noted: 2022-10-23

## 2022-10-23 PROBLEM — J96.01 ACUTE RESPIRATORY FAILURE WITH HYPOXIA (HCC): Status: ACTIVE | Noted: 2022-10-23

## 2022-10-23 LAB
ABSOLUTE EOS #: 0.03 K/UL (ref 0–0.44)
ABSOLUTE IMMATURE GRANULOCYTE: <0.03 K/UL (ref 0–0.3)
ABSOLUTE LYMPH #: 1.34 K/UL (ref 1.1–3.7)
ABSOLUTE MONO #: 1 K/UL (ref 0.1–1.2)
ADENOVIRUS PCR: NOT DETECTED
ALBUMIN SERPL-MCNC: 4 G/DL (ref 3.5–5.2)
ALBUMIN/GLOBULIN RATIO: 1 (ref 1–2.5)
ALP BLD-CCNC: 263 U/L (ref 35–104)
ALT SERPL-CCNC: 17 U/L (ref 5–33)
ANION GAP SERPL CALCULATED.3IONS-SCNC: 16 MMOL/L (ref 9–17)
AST SERPL-CCNC: 26 U/L
BASOPHILS # BLD: 1 % (ref 0–2)
BASOPHILS ABSOLUTE: 0.05 K/UL (ref 0–0.2)
BILIRUB SERPL-MCNC: 0.8 MG/DL (ref 0.3–1.2)
BORDETELLA PARAPERTUSSIS: NOT DETECTED
BORDETELLA PERTUSSIS PCR: NOT DETECTED
BUN BLDV-MCNC: 14 MG/DL (ref 8–23)
CALCIUM SERPL-MCNC: 9.7 MG/DL (ref 8.6–10.4)
CARBOXYHEMOGLOBIN: 4.5 % (ref 0–5)
CHLAMYDIA PNEUMONIAE BY PCR: NOT DETECTED
CHLORIDE BLD-SCNC: 102 MMOL/L (ref 98–107)
CO2: 21 MMOL/L (ref 20–31)
CORONAVIRUS 229E PCR: NOT DETECTED
CORONAVIRUS HKU1 PCR: NOT DETECTED
CORONAVIRUS NL63 PCR: NOT DETECTED
CORONAVIRUS OC43 PCR: NOT DETECTED
CREAT SERPL-MCNC: 1.1 MG/DL (ref 0.5–0.9)
D-DIMER QUANTITATIVE: 0.77 MG/L FEU
EOSINOPHILS RELATIVE PERCENT: 0 % (ref 1–4)
FIO2: ABNORMAL
GFR SERPL CREATININE-BSD FRML MDRD: 55 ML/MIN/1.73M2
GLUCOSE BLD-MCNC: 137 MG/DL (ref 70–99)
HCO3 VENOUS: 21.6 MMOL/L (ref 24–30)
HCT VFR BLD CALC: 39 % (ref 36.3–47.1)
HEMOGLOBIN: 13.4 G/DL (ref 11.9–15.1)
HUMAN METAPNEUMOVIRUS PCR: NOT DETECTED
IMMATURE GRANULOCYTES: 0 %
INFLUENZA A BY PCR: NOT DETECTED
INFLUENZA B BY PCR: NOT DETECTED
LEGIONELLA PNEUMOPHILIA AG, URINE: NEGATIVE
LIPASE: 10 U/L (ref 13–60)
LYMPHOCYTES # BLD: 14 % (ref 24–43)
MCH RBC QN AUTO: 29.8 PG (ref 25.2–33.5)
MCHC RBC AUTO-ENTMCNC: 34.4 G/DL (ref 28.4–34.8)
MCV RBC AUTO: 86.9 FL (ref 82.6–102.9)
MONOCYTES # BLD: 10 % (ref 3–12)
MYCOPLASMA PNEUMONIAE PCR: NOT DETECTED
NEGATIVE BASE EXCESS, VEN: 2 MMOL/L (ref 0–2)
NRBC AUTOMATED: 0 PER 100 WBC
O2 SAT, VEN: 96.5 % (ref 60–85)
PARAINFLUENZA 1 PCR: NOT DETECTED
PARAINFLUENZA 2 PCR: NOT DETECTED
PARAINFLUENZA 3 PCR: NOT DETECTED
PARAINFLUENZA 4 PCR: NOT DETECTED
PATIENT TEMP: 37
PCO2, VEN: 35.1 MM HG (ref 39–55)
PDW BLD-RTO: 15.1 % (ref 11.8–14.4)
PH VENOUS: 7.41 (ref 7.32–7.42)
PLATELET # BLD: 274 K/UL (ref 138–453)
PMV BLD AUTO: 11.9 FL (ref 8.1–13.5)
PO2, VEN: 85.4 MM HG (ref 30–50)
POTASSIUM SERPL-SCNC: 4.1 MMOL/L (ref 3.7–5.3)
PRO-BNP: 5489 PG/ML
PROCALCITONIN: 0.1 NG/ML
RBC # BLD: 4.49 M/UL (ref 3.95–5.11)
RBC # BLD: ABNORMAL 10*6/UL
RESP SYNCYTIAL VIRUS PCR: NOT DETECTED
RHINO/ENTEROVIRUS PCR: NOT DETECTED
SARS-COV-2, PCR: NOT DETECTED
SARS-COV-2, RAPID: NOT DETECTED
SEG NEUTROPHILS: 75 % (ref 36–65)
SEGMENTED NEUTROPHILS ABSOLUTE COUNT: 7.13 K/UL (ref 1.5–8.1)
SODIUM BLD-SCNC: 139 MMOL/L (ref 135–144)
SPECIMEN DESCRIPTION: NORMAL
SPECIMEN DESCRIPTION: NORMAL
TOTAL PROTEIN: 8 G/DL (ref 6.4–8.3)
TROPONIN, HIGH SENSITIVITY: 19 NG/L (ref 0–14)
TROPONIN, HIGH SENSITIVITY: 20 NG/L (ref 0–14)
TROPONIN, HIGH SENSITIVITY: 21 NG/L (ref 0–14)
WBC # BLD: 9.6 K/UL (ref 3.5–11.3)

## 2022-10-23 PROCEDURE — 99285 EMERGENCY DEPT VISIT HI MDM: CPT

## 2022-10-23 PROCEDURE — 96374 THER/PROPH/DIAG INJ IV PUSH: CPT

## 2022-10-23 PROCEDURE — 83690 ASSAY OF LIPASE: CPT

## 2022-10-23 PROCEDURE — 2500000003 HC RX 250 WO HCPCS

## 2022-10-23 PROCEDURE — 85025 COMPLETE CBC W/AUTO DIFF WBC: CPT

## 2022-10-23 PROCEDURE — 80053 COMPREHEN METABOLIC PANEL: CPT

## 2022-10-23 PROCEDURE — 93005 ELECTROCARDIOGRAM TRACING: CPT | Performed by: STUDENT IN AN ORGANIZED HEALTH CARE EDUCATION/TRAINING PROGRAM

## 2022-10-23 PROCEDURE — 87635 SARS-COV-2 COVID-19 AMP PRB: CPT

## 2022-10-23 PROCEDURE — 2580000003 HC RX 258

## 2022-10-23 PROCEDURE — 84145 PROCALCITONIN (PCT): CPT

## 2022-10-23 PROCEDURE — 94640 AIRWAY INHALATION TREATMENT: CPT

## 2022-10-23 PROCEDURE — 87449 NOS EACH ORGANISM AG IA: CPT

## 2022-10-23 PROCEDURE — 6360000004 HC RX CONTRAST MEDICATION: Performed by: STUDENT IN AN ORGANIZED HEALTH CARE EDUCATION/TRAINING PROGRAM

## 2022-10-23 PROCEDURE — 1200000000 HC SEMI PRIVATE

## 2022-10-23 PROCEDURE — 6370000000 HC RX 637 (ALT 250 FOR IP)

## 2022-10-23 PROCEDURE — 71260 CT THORAX DX C+: CPT | Performed by: STUDENT IN AN ORGANIZED HEALTH CARE EDUCATION/TRAINING PROGRAM

## 2022-10-23 PROCEDURE — 86738 MYCOPLASMA ANTIBODY: CPT

## 2022-10-23 PROCEDURE — 84484 ASSAY OF TROPONIN QUANT: CPT

## 2022-10-23 PROCEDURE — 85379 FIBRIN DEGRADATION QUANT: CPT

## 2022-10-23 PROCEDURE — 82805 BLOOD GASES W/O2 SATURATION: CPT

## 2022-10-23 PROCEDURE — 71045 X-RAY EXAM CHEST 1 VIEW: CPT

## 2022-10-23 PROCEDURE — 36415 COLL VENOUS BLD VENIPUNCTURE: CPT

## 2022-10-23 PROCEDURE — 6360000002 HC RX W HCPCS: Performed by: STUDENT IN AN ORGANIZED HEALTH CARE EDUCATION/TRAINING PROGRAM

## 2022-10-23 PROCEDURE — 83880 ASSAY OF NATRIURETIC PEPTIDE: CPT

## 2022-10-23 PROCEDURE — 6370000000 HC RX 637 (ALT 250 FOR IP): Performed by: STUDENT IN AN ORGANIZED HEALTH CARE EDUCATION/TRAINING PROGRAM

## 2022-10-23 PROCEDURE — 99222 1ST HOSP IP/OBS MODERATE 55: CPT | Performed by: INTERNAL MEDICINE

## 2022-10-23 PROCEDURE — 0202U NFCT DS 22 TRGT SARS-COV-2: CPT

## 2022-10-23 RX ORDER — OXYCODONE HYDROCHLORIDE AND ACETAMINOPHEN 5; 325 MG/1; MG/1
1 TABLET ORAL EVERY 6 HOURS PRN
Status: DISCONTINUED | OUTPATIENT
Start: 2022-10-23 | End: 2022-10-25 | Stop reason: HOSPADM

## 2022-10-23 RX ORDER — METOPROLOL SUCCINATE 50 MG/1
50 TABLET, EXTENDED RELEASE ORAL DAILY
Status: DISCONTINUED | OUTPATIENT
Start: 2022-10-23 | End: 2022-10-25 | Stop reason: HOSPADM

## 2022-10-23 RX ORDER — SODIUM CHLORIDE 0.9 % (FLUSH) 0.9 %
5-40 SYRINGE (ML) INJECTION PRN
Status: DISCONTINUED | OUTPATIENT
Start: 2022-10-23 | End: 2022-10-25 | Stop reason: HOSPADM

## 2022-10-23 RX ORDER — SODIUM CHLORIDE 9 MG/ML
INJECTION, SOLUTION INTRAVENOUS PRN
Status: DISCONTINUED | OUTPATIENT
Start: 2022-10-23 | End: 2022-10-25 | Stop reason: HOSPADM

## 2022-10-23 RX ORDER — ACETAMINOPHEN 500 MG
1000 TABLET ORAL ONCE
Status: COMPLETED | OUTPATIENT
Start: 2022-10-23 | End: 2022-10-23

## 2022-10-23 RX ORDER — ONDANSETRON 2 MG/ML
4 INJECTION INTRAMUSCULAR; INTRAVENOUS EVERY 6 HOURS PRN
Status: DISCONTINUED | OUTPATIENT
Start: 2022-10-23 | End: 2022-10-24

## 2022-10-23 RX ORDER — ACETAMINOPHEN 325 MG/1
650 TABLET ORAL EVERY 6 HOURS PRN
Status: DISCONTINUED | OUTPATIENT
Start: 2022-10-23 | End: 2022-10-25 | Stop reason: HOSPADM

## 2022-10-23 RX ORDER — BUMETANIDE 1 MG/1
1 TABLET ORAL DAILY
Status: DISCONTINUED | OUTPATIENT
Start: 2022-10-23 | End: 2022-10-24

## 2022-10-23 RX ORDER — NITROGLYCERIN 0.4 MG/1
0.4 TABLET SUBLINGUAL EVERY 5 MIN PRN
Status: DISCONTINUED | OUTPATIENT
Start: 2022-10-23 | End: 2022-10-25 | Stop reason: HOSPADM

## 2022-10-23 RX ORDER — POLYETHYLENE GLYCOL 3350 17 G/17G
17 POWDER, FOR SOLUTION ORAL DAILY PRN
Status: DISCONTINUED | OUTPATIENT
Start: 2022-10-23 | End: 2022-10-25 | Stop reason: HOSPADM

## 2022-10-23 RX ORDER — DIPHENHYDRAMINE HCL 25 MG
25 TABLET ORAL EVERY 6 HOURS PRN
Status: DISCONTINUED | OUTPATIENT
Start: 2022-10-23 | End: 2022-10-25 | Stop reason: HOSPADM

## 2022-10-23 RX ORDER — BUDESONIDE AND FORMOTEROL FUMARATE DIHYDRATE 80; 4.5 UG/1; UG/1
2 AEROSOL RESPIRATORY (INHALATION) DAILY
Status: DISCONTINUED | OUTPATIENT
Start: 2022-10-23 | End: 2022-10-25 | Stop reason: HOSPADM

## 2022-10-23 RX ORDER — FAMOTIDINE 20 MG/1
20 TABLET, FILM COATED ORAL 2 TIMES DAILY
Status: DISCONTINUED | OUTPATIENT
Start: 2022-10-23 | End: 2022-10-25 | Stop reason: HOSPADM

## 2022-10-23 RX ORDER — IPRATROPIUM BROMIDE AND ALBUTEROL SULFATE 2.5; .5 MG/3ML; MG/3ML
1 SOLUTION RESPIRATORY (INHALATION) EVERY 4 HOURS PRN
Status: DISCONTINUED | OUTPATIENT
Start: 2022-10-23 | End: 2022-10-25 | Stop reason: HOSPADM

## 2022-10-23 RX ORDER — BUMETANIDE 0.25 MG/ML
2 INJECTION, SOLUTION INTRAMUSCULAR; INTRAVENOUS ONCE
Status: COMPLETED | OUTPATIENT
Start: 2022-10-23 | End: 2022-10-23

## 2022-10-23 RX ORDER — ONDANSETRON 4 MG/1
4 TABLET, ORALLY DISINTEGRATING ORAL EVERY 8 HOURS PRN
Status: DISCONTINUED | OUTPATIENT
Start: 2022-10-23 | End: 2022-10-24

## 2022-10-23 RX ORDER — FENTANYL CITRATE 50 UG/ML
50 INJECTION, SOLUTION INTRAMUSCULAR; INTRAVENOUS ONCE
Status: COMPLETED | OUTPATIENT
Start: 2022-10-23 | End: 2022-10-23

## 2022-10-23 RX ORDER — MAGNESIUM HYDROXIDE/ALUMINUM HYDROXICE/SIMETHICONE 120; 1200; 1200 MG/30ML; MG/30ML; MG/30ML
30 SUSPENSION ORAL ONCE
Status: COMPLETED | OUTPATIENT
Start: 2022-10-23 | End: 2022-10-23

## 2022-10-23 RX ORDER — IPRATROPIUM BROMIDE AND ALBUTEROL SULFATE 2.5; .5 MG/3ML; MG/3ML
1 SOLUTION RESPIRATORY (INHALATION)
Status: DISCONTINUED | OUTPATIENT
Start: 2022-10-23 | End: 2022-10-23

## 2022-10-23 RX ORDER — LEVOTHYROXINE SODIUM 0.07 MG/1
75 TABLET ORAL DAILY
Status: DISCONTINUED | OUTPATIENT
Start: 2022-10-23 | End: 2022-10-25 | Stop reason: HOSPADM

## 2022-10-23 RX ORDER — SODIUM CHLORIDE 0.9 % (FLUSH) 0.9 %
5-40 SYRINGE (ML) INJECTION EVERY 12 HOURS SCHEDULED
Status: DISCONTINUED | OUTPATIENT
Start: 2022-10-23 | End: 2022-10-25 | Stop reason: HOSPADM

## 2022-10-23 RX ORDER — ALBUTEROL SULFATE 90 UG/1
2 AEROSOL, METERED RESPIRATORY (INHALATION) EVERY 6 HOURS PRN
Status: DISCONTINUED | OUTPATIENT
Start: 2022-10-23 | End: 2022-10-25 | Stop reason: HOSPADM

## 2022-10-23 RX ORDER — SPIRONOLACTONE 25 MG/1
12.5 TABLET ORAL DAILY
Status: DISCONTINUED | OUTPATIENT
Start: 2022-10-24 | End: 2022-10-25 | Stop reason: HOSPADM

## 2022-10-23 RX ORDER — BUMETANIDE 0.25 MG/ML
2 INJECTION, SOLUTION INTRAMUSCULAR; INTRAVENOUS ONCE
Status: DISCONTINUED | OUTPATIENT
Start: 2022-10-23 | End: 2022-10-23

## 2022-10-23 RX ORDER — AMIODARONE HYDROCHLORIDE 200 MG/1
200 TABLET ORAL DAILY
Status: DISCONTINUED | OUTPATIENT
Start: 2022-10-23 | End: 2022-10-25 | Stop reason: HOSPADM

## 2022-10-23 RX ORDER — ALLOPURINOL 100 MG/1
100 TABLET ORAL DAILY
Status: DISCONTINUED | OUTPATIENT
Start: 2022-10-23 | End: 2022-10-25 | Stop reason: HOSPADM

## 2022-10-23 RX ORDER — ACETAMINOPHEN 650 MG/1
650 SUPPOSITORY RECTAL EVERY 6 HOURS PRN
Status: DISCONTINUED | OUTPATIENT
Start: 2022-10-23 | End: 2022-10-25 | Stop reason: HOSPADM

## 2022-10-23 RX ADMIN — AMIODARONE HYDROCHLORIDE 200 MG: 200 TABLET ORAL at 12:06

## 2022-10-23 RX ADMIN — LEVOTHYROXINE SODIUM 75 MCG: 75 TABLET ORAL at 13:16

## 2022-10-23 RX ADMIN — FAMOTIDINE 20 MG: 20 TABLET, FILM COATED ORAL at 12:05

## 2022-10-23 RX ADMIN — OXYCODONE HYDROCHLORIDE AND ACETAMINOPHEN 1 TABLET: 5; 325 TABLET ORAL at 21:30

## 2022-10-23 RX ADMIN — ALUMINUM HYDROXIDE, MAGNESIUM HYDROXIDE, AND SIMETHICONE 30 ML: 200; 200; 20 SUSPENSION ORAL at 04:19

## 2022-10-23 RX ADMIN — SODIUM CHLORIDE, PRESERVATIVE FREE 10 ML: 5 INJECTION INTRAVENOUS at 12:22

## 2022-10-23 RX ADMIN — FENTANYL CITRATE 50 MCG: 50 INJECTION, SOLUTION INTRAMUSCULAR; INTRAVENOUS at 04:19

## 2022-10-23 RX ADMIN — FAMOTIDINE 20 MG: 20 TABLET, FILM COATED ORAL at 21:30

## 2022-10-23 RX ADMIN — ACETAMINOPHEN 1000 MG: 500 TABLET ORAL at 06:23

## 2022-10-23 RX ADMIN — IPRATROPIUM BROMIDE AND ALBUTEROL SULFATE 1 AMPULE: .5; 3 SOLUTION RESPIRATORY (INHALATION) at 12:31

## 2022-10-23 RX ADMIN — SODIUM CHLORIDE, PRESERVATIVE FREE 10 ML: 5 INJECTION INTRAVENOUS at 21:31

## 2022-10-23 RX ADMIN — IOPAMIDOL 75 ML: 755 INJECTION, SOLUTION INTRAVENOUS at 08:12

## 2022-10-23 RX ADMIN — ALLOPURINOL 100 MG: 100 TABLET ORAL at 12:06

## 2022-10-23 RX ADMIN — RIVAROXABAN 20 MG: 20 TABLET, FILM COATED ORAL at 12:05

## 2022-10-23 RX ADMIN — ACETAMINOPHEN 650 MG: 325 TABLET ORAL at 13:18

## 2022-10-23 RX ADMIN — BUMETANIDE 2 MG: 0.25 INJECTION INTRAMUSCULAR; INTRAVENOUS at 12:08

## 2022-10-23 ASSESSMENT — HEART SCORE: ECG: 1

## 2022-10-23 ASSESSMENT — ENCOUNTER SYMPTOMS
SHORTNESS OF BREATH: 1
CHEST TIGHTNESS: 1
COUGH: 0
RHINORRHEA: 0
COLOR CHANGE: 0
SINUS PAIN: 0
WHEEZING: 0

## 2022-10-23 ASSESSMENT — PAIN DESCRIPTION - LOCATION
LOCATION: SHOULDER
LOCATION: SHOULDER
LOCATION: HEAD

## 2022-10-23 ASSESSMENT — PAIN DESCRIPTION - ORIENTATION
ORIENTATION: RIGHT
ORIENTATION: RIGHT

## 2022-10-23 ASSESSMENT — PAIN SCALES - GENERAL
PAINLEVEL_OUTOF10: 7
PAINLEVEL_OUTOF10: 4

## 2022-10-23 NOTE — ED NOTES
The following labs were labeled with appropriate pt sticker and tubed to lab:     [x] Blue     [x] Lavender   [] on ice  [x] Green/yellow  [x] Green/black [] on ice  [] Shahida Blood  [] on ice  [] Yellow  [] Red  [] Pink  [] ABG  [] VBG    [] COVID-19 swab    [] Rapid  [] PCR  [] Flu swab  [] Peds Viral Panel     [] Urine Sample  [] Pelvic Cultures  [] Blood Cultures  [] X 2  [] STREP Cultures         Chitra Honeycutt RN  10/23/22 [id number]

## 2022-10-23 NOTE — ED NOTES
Report given to [facility] LAVELLE BADILLO. All questions answered at this time.      Manuel Sanchez, RN  10/23/22 4157

## 2022-10-23 NOTE — ED NOTES
Pt up to bedside commode and returned to bed. Provided with new warm blankets for comfort.       Seda Flores RN  10/23/22 0166

## 2022-10-23 NOTE — ED NOTES
The following labs were labeled with appropriate pt sticker and tubed to lab:     [] Blue     [] Lavender   [] on ice  [x] Green/yellow  [] Green/black [] on ice  [] [place]  [] on ice  [] Yellow  [] Red  [] Pink  [] ABG  [] VBG    [] COVID-19 swab    [] Rapid  [] PCR  [] Flu swab  [] Peds Viral Panel     [] Urine Sample  [] Pelvic Cultures  [] Blood Cultures  [] X 2  [] STREP Cultures         Matthew Flatness, RN  10/23/22 4846

## 2022-10-23 NOTE — CONSULTS
[facility]    Consult / H&P               Today's Date: 10/23/2022  Patient Name: Samia Singh  Date of admission: 10/23/2022  3:05 AM  Patient's age: 75 y.o., 1955  Admission Dx: No admission diagnoses are documented for this encounter. Reason for Consult:  Cardiac evaluation for chest pain    Requesting Physician: No admitting provider for patient encounter. CHIEF COMPLAINT: Chest pain here  Hit your  History Obtained from: Patient and electronic medical record    HISTORY OF PRESENT ILLNESS:      The patient is a 75 y.o.  female with past medical history of nonischemic cardiomyopathy, severe MR with EF 17% s/p AICD with CRT-D revision in August 2020 by Dr. Paulo Randall. History of A. fib, pulmonary hypertension, hypertension, hypothyroidism, history of diverticular abscess, liver cirrhosis who presents to the ED with chief complaint of chest pain. Patient reports she was sitting in her couch at 10 PM, experienced dull substernal chest pain 6 out of 10 intensity, radiating, associated with shortness of breath. Patient initially thought it was heartburn and took Tums which did not help, took sublingual nitro which did not relieve the pain. Patient continued to have continuous pain, presented to the ED around 3 AM overnight. Patient reports relief of her pain with Tylenol, fentanyl IV given in the ED. Patient reports worsening shortness of breath since Friday as she ran out of her Bumex refills and was not able to take it. She also noticed building up of fluid in her bilateral lower extremities. In the ED, patient interrogation is ordered, EKG done which revealed ventricular paced rhythm, non specific st-twave changes. Troponin 20> 21> 19. Requiring 4 L oxygen which is new for the patient, CT PE pending. proBNP pending. Chest x-ray with patchy infiltrates in the perihilar and bibasilar areas.   Cardiology consulted for further evaluation and management. Past Medical History:   has a past medical history of Abnormal Pap smear, Acute on chronic systolic congestive heart failure ([facility] [address] AICD (automatic cardioverter/defibrillator) present, MAURO (acute kidney injury) ([facility] [address] MAURO (acute kidney injury) ([address] Anemia, Arthritis, Atrial fibrillation ([address] Cardiomyopathy ([facility] [address] Cardiomyopathy, nonischemic ([address] Chronic combined systolic and diastolic congestive heart failure ([address] Cirrhosis ([address] Ejection fraction < 50%, HTN (hypertension), Hypothyroidism, Ischemic cardiomyopathy, Left bundle branch block, Mitral regurgitation, Morbid obesity due to excess calories ([facility] [address] Pulmonary hypertension ([facility] [address] Sudden onset of severe headache, and Torn rotator cuff. Past Surgical History:   has a past surgical history that includes other surgical history (12/20/2012); Tubal ligation; hernia repair; Colonoscopy; pacemaker placement; Insert Midline Catheter (04/12/2022); and other surgical history (Left). Home Medications:    Prior to Admission medications    Medication Sig Start Date End Date Taking? Authorizing Provider   traMADol (ULTRAM) 50 MG tablet Take 1 tablet by mouth 2 times daily as needed for Pain for up to 14 days. Intended supply: 7 days.  Take lowest dose possible to manage pain 10/19/22 11/2/22  Rita Bolton MD   levothyroxine (SYNTHROID) 75 MCG tablet Take 1 tablet by mouth Daily 10/9/22   Darleen Mercado MD   bumetanide (BUMEX) 2 MG tablet Take 0.5 tablets by mouth daily 9/30/22   Lucy Barajas MD   rivaroxaban (XARELTO) 20 MG TABS tablet Take 1 tablet by mouth daily Take 1 tablet by mouth daily 9/23/22   Gianna Hoover MD   acetaminophen (TYLENOL) 500 MG tablet Take 1 tablet by mouth every 6 hours as needed for Pain 8/5/22   Chema Del Real MD   metoprolol succinate (TOPROL XL) 50 MG extended release tablet Take 50 mg by mouth daily    Historical Provider, MD   amiodarone (CORDARONE) 200 MG tablet  12/1/21   Historical Provider, MD empagliflozin (JARDIANCE) 10 MG tablet Take 10 mg by mouth daily 1/4/22   Historical Provider, MD   potassium chloride (KLOR-CON M) 20 MEQ extended release tablet Take 1 tablet by mouth daily 12/8/21   Mike Hein MD   magnesium oxide (MAG-OX) 400 MG tablet Take 0.5 tablets by mouth daily 12/8/21   Barbara Warren MD   nitroGLYCERIN (NITROSTAT) 0.4 MG SL tablet up to max of 3 total doses. If no relief after 1 dose, call 911. 6/29/21   Bennett Alejandra MD   sacubitril-valsartan (ENTRESTO) 24-26 MG per tablet Take 0.5 tablets by mouth 2 times daily 6/29/21   Bennett Alejandra MD   allopurinol (ZYLOPRIM) 100 MG tablet Take 1 tablet by mouth daily 2/18/19   Ezra Mckeon MD   spironolactone (ALDACTONE) 25 MG tablet Take 0.5 tablets by mouth daily 2/18/19   Ezra Mckeon MD      Current Facility-Administered Medications: iopamidol (ISOVUE-370) 76 % injection 75 mL, 75 mL, IntraVENous, ONCE PRN    Allergies:  Codeine, Morphine, and Tramadol    Social History:   reports that she has never smoked. She has never used smokeless tobacco. She reports that she does not currently use drugs. She reports that she does not drink alcohol. Family History: family history includes Cancer in an other family member; High Blood Pressure in her mother; Other in her mother. No h/o sudden cardiac death. No for premature CAD    REVIEW OF SYSTEMS:    Constitutional: there has been no unanticipated weight loss. There's been No change in energy level, No change in activity level. Eyes: No visual changes or diplopia. No scleral icterus. ENT: No Headaches  Cardiovascular: No cardiac history  Respiratory: No previous pulmonary problems, No cough  Gastrointestinal: No abdominal pain. No change in bowel or bladder habits. Genitourinary: No dysuria, trouble voiding, or hematuria. Musculoskeletal:  No gait disturbance, No weakness or joint complaints. Integumentary: No rash or pruritis.   Neurological: No headache, diplopia, change in muscle strength, numbness or tingling. No change in gait, balance, coordination, mood, affect, memory, mentation, behavior. Psychiatric: No anxiety, or depression. Endocrine: No temperature intolerance. No excessive thirst, fluid intake, or urination. No tremor. Hematologic/Lymphatic: No abnormal bruising or bleeding, blood clots or swollen lymph nodes. Allergic/Immunologic: No nasal congestion or hives. PHYSICAL EXAM:      BP 99/68   Pulse 70   Temp 100.2 °F (37.9 °C) (Oral)   Resp 28   SpO2 95%    Constitutional and General Appearance: alert, cooperative, no distress and appears stated age  HEENT: PERRL, no cervical lymphadenopathy. No masses palpable. Normal oral mucosa  Respiratory:  Normal excursion and expansion without use of accessory muscles  Resp Auscultation: Decreased breath sounds bilaterally, cardiac wheezing noted bilaterally in the bases  Cardiovascular: The apical impulse is not displaced  Heart tones are crisp and normal. regular S1 and S2. Systolic murmur in the mitral area noted. Jugular venous pulsation Normal  The carotid upstroke is normal in amplitude and contour without delay or bruit  Peripheral pulses are symmetrical and full   Abdomen:   No masses or tenderness  Bowel sounds present  Extremities:   No Cyanosis or Clubbing   Lower extremity edema: 2+ bilaterally   Skin: Warm and dry  Neurological:  Alert and oriented. Moves all extremities well  No abnormalities of mood, affect, memory, mentation, or behavior are noted    DATA:    Diagnostics:      EKG:   Ventricular paced rhythm, non-specific st-t wave changes    ECHO:   previously taken 6/2021 and show EF 17%, severe MR, patient ICD leads seen in the right atrium/ventricle. Increased septal wall thickness. EF 17% estimated RVSP 49.       Labs:     CBC:   Recent Labs     10/23/22  0329   WBC 9.6   HGB 13.4   HCT 39.0        BMP:   Recent Labs     10/23/22  0329      K 4.1   CO2 21   BUN 14   CREATININE 1.10*   LABGLOM 55*   GLUCOSE 137*     FASTING LIPID PANEL:  Lab Results   Component Value Date/Time    HDL 42 05/23/2020 03:36 AM    TRIG 79 05/23/2020 03:36 AM     LIVER PROFILE:  Recent Labs     10/23/22  0329   AST 26   ALT 17   LABALBU 4.0       IMPRESSION:      Patient Active Problem List   Diagnosis    Hypokalemia    Dilated cardiomyopathy (HCC)    Pre-diabetes    Essential hypertension    PAH (pulmonary artery hypertension) ([address] 34 on Echo     Mitral regurgitation    Chronic headache    Class 1 obesity due to excess calories with body mass index (BMI) of 33.0 to 33.9 in adult    AICD (automatic cardioverter/defibrillator) present    Syncope and collapse secondary to [facility]    ICD (implantable cardioverter-defibrillator) battery depletion    Gout    Dizziness    Benign paroxysmal positional vertigo    Chronic heart failure with reduced ejection fraction and diastolic dysfunction (HCC)    NSVT (nonsustained ventricular tachycardia)    Chest pain    Hypothyroidism    Chronic combined systolic (congestive) and diastolic (congestive) heart failure (HCC)    Acute diverticulitis    Abscess of sigmoid colon    Diverticulitis    Nausea & vomiting    Diverticulitis of large intestine with abscess without bleeding    Diverticulitis of colon    Lower abdominal pain    Colonic diverticular abscess    Intra-abdominal abscess (HCC)    Cirrhosis (HCC)    Atrial fibrillation (HCC)    QT prolongation     Atypical noncardiac chest pain likely GI in origin with minimal flat troponin elevation consistent with type 2 MI due to underlying cardiomyopathy  Acute on chronic systolic congestive heart failure since she ran out of Bumex for a couple of days with volume overload  Nonischemic cardiomyopathy EF 17% s/p AICD, CRT-D revision done in August 2022 - Atrial and RV lead appeared in excellent condition, with stable impedances and threshholds at the time. Last cath 10 years ago with clean coronaries. Severe MR  Paroxysmal A.  Fib on Xarelto  MAURO  Hypertension  Hypothyroidism  History of liver cirrhosis  History of diverticular abscess, recently completed antibiotics    RECOMMENDATIONS:  Awating CT PE  Awaiting pacemaker interrogation, no shocks received per patient. Okay to resume xarellala  Will get echo  Will need IV diuresis with Bumex  Continue supplemental oxygen as tolerated  Continue GDMT with  Entresto, beta-blockers, Aldactone, Bumex, and Jardiance  No ischemic workup required at this time    Discussed with Dr. Rajwinder Dunlap MD  Cardiology Service  Internal Medicine Residency Program, PGY-3  [facility]       Attending Physician Statement  I have discussed the case of Nubia Whitney including pertinent history and exam findings with the resident. I have seen and examined the patient and the key elements of the encounter have been performed by me. I agree with the assessment, plan and orders as documented by the resident With changes made to the note.      Electronically signed by Shanna Qureshi MD on 10/23/2022 at [facility]      [phone]

## 2022-10-23 NOTE — ED NOTES
Pt A&Ox4, RR even and nonlabored, NAD. Pt updated on plan of care, all questions answered. Pt resting on cot with full cardiac monitor. Pt denies needs at this time.      Cathryn Espino RN  10/23/22 [id number]

## 2022-10-23 NOTE — ED NOTES
Pt A&Ox4, RR even and nonlabored, NAD. Pt resting on cot with full cardiac monitor on. Pt denies needs at this time.      Daniel Partida RN  10/23/22 1125

## 2022-10-23 NOTE — ED PROVIDER NOTES
[facility]  ED  Emergency Department  Emergency Medicine Resident Sign-out     Care of Gretta Blizzard was assumed from Dr. Cate Mas and is being seen for Chest Pain  . The patient's initial evaluation and plan have been discussed with the prior provider who initially evaluated the patient.      EMERGENCY DEPARTMENT COURSE / MEDICAL DECISION MAKING:       MEDICATIONS GIVEN:  Orders Placed This Encounter   Medications    aluminum & magnesium hydroxide-simethicone (MAALOX) 200-200-20 MG/5ML suspension 30 mL    fentaNYL (SUBLIMAZE) injection 50 mcg    acetaminophen (TYLENOL) tablet 1,000 mg    iopamidol (ISOVUE-370) 76 % injection 75 mL       LABS / RADIOLOGY:     Labs Reviewed   TROPONIN - Abnormal; Notable for the following components:       Result Value    Troponin, High Sensitivity 20 (*)     All other components within normal limits   TROPONIN - Abnormal; Notable for the following components:    Troponin, High Sensitivity 21 (*)     All other components within normal limits   CBC WITH AUTO DIFFERENTIAL - Abnormal; Notable for the following components:    RDW 15.1 (*)     Seg Neutrophils 75 (*)     Lymphocytes 14 (*)     Eosinophils % 0 (*)     All other components within normal limits   COMPREHENSIVE METABOLIC PANEL - Abnormal; Notable for the following components:    Glucose 137 (*)     Creatinine 1.10 (*)     Est, Glom Filt Rate 55 (*)     Alkaline Phosphatase 263 (*)     All other components within normal limits   LIPASE - Abnormal; Notable for the following components:    Lipase 10 (*)     All other components within normal limits   TROPONIN - Abnormal; Notable for the following components:    Troponin, High Sensitivity 19 (*)     All other components within normal limits   COVID-19, RAPID   D-DIMER, QUANTITATIVE   BRAIN NATRIURETIC PEPTIDE       CT ABDOMEN PELVIS W IV CONTRAST Additional Contrast? None    Result Date: 10/7/2022  EXAMINATION: CT OF THE ABDOMEN AND PELVIS WITH CONTRAST 10/7/2022 10:55 am TECHNIQUE: CT of the abdomen and pelvis was performed with the administration of intravenous contrast. Multiplanar reformatted images are provided for review. Automated exposure control, iterative reconstruction, and/or weight based adjustment of the mA/kV was utilized to reduce the radiation dose to as low as reasonably achievable. COMPARISON: 09/12/2022 HISTORY: ORDERING SYSTEM PROVIDED HISTORY: diverticular abscess TECHNOLOGIST PROVIDED HISTORY: diverticular abscess Decision Support Exception - unselect if not a suspected or confirmed emergency medical condition->Emergency Medical Condition (MA) Reason for Exam: diverticular abscess FINDINGS: Lower Chest: There is global cardiomegaly. Mild bibasilar atelectasis Organs: No urinary tract calculus or collecting system dilatation. No focal renal lesion. Relative enlargement of the lateral segment left hepatic and caudate lobes associated with surface contour nodularity of the liver consistent cirrhosis. No focal liver lesion. Cholelithiasis is redemonstrated. No biliary ductal dilatation. No focal splenic lesion. The pancreas and the adrenal glands are unremarkable. GI/Bowel: There has been slight interval decrease in size of the peridiverticular abscess now measuring 3.4 x 3.3 cm. No new fluid collection or extraluminal air. No bowel obstruction. The appendix is normal. Pelvis: The uterus and adnexa within normal limits. Urinary bladder is unremarkable. Peritoneum/Retroperitoneum: No bulky lymphadenopathy. Small amount of fluid in the cul-de-sac. Bones/Soft Tissues: No acute osseous abnormality. Stable fat containing ventral wall hernia. Vascular calcifications are seen compatible with atherosclerotic disease. Mild interval decrease in size of the peridiverticular abscess now measuring 3.4 x 3.3 cm. Cirrhotic liver. Cholelithiasis.   No other significant changes are seen     XR CHEST PORTABLE    Result Date: 10/23/2022  EXAMINATION: ONE XRAY VIEW OF THE CHEST 10/23/2022 3:30 am COMPARISON: 09/14/2022 HISTORY: ORDERING SYSTEM PROVIDED HISTORY: hypoxic; pls assess for PNA, CHF, other TECHNOLOGIST PROVIDED HISTORY: hypoxic; pls assess for PNA, CHF, other FINDINGS: Implanted cardiac device is noted. Perihilar and bibasilar infiltrates. No pneumothorax is identified. Small left-sided pleural effusion suspected. No acute osseous abnormality. Degenerative changes are seen in the spine and shoulders. Perihilar and bibasilar infiltrates which may represent multilobar pneumonia or pulmonary edema, with a small left-sided pleural effusion suspected. RECENT VITALS:     Temp: 100.2 °F (37.9 °C),  Heart Rate: 70, Resp: 28, BP: 99/68, SpO2: 95 %      This patient is a 75 y.o. Female with cardiomyopathy status post AICD, history of A. fib. Patient here due to chest pain. Patient troponin at baseline. AICD interrogated -patient on lead impendance -audiology on board. Patient is hypoxic, requiring supplemental oxygen. CT PE study ordered to rule out or embolism. Patient to be admitted to medicine           OUTSTANDING TASKS / RECOMMENDATIONS:    CT PE   F/U BNP     FINAL IMPRESSION:     1. Chest pain, unspecified type    2. Hypoxia    3. Pacemaker        DISPOSITION:         DISPOSITION:  []  Discharge   []  Transfer -    [x]  Admission -  Medicine   []  Against Medical Advice   []  Eloped   FOLLOW-UP: No follow-up provider specified.    DISCHARGE MEDICATIONS: New Prescriptions    No medications on file          Shanelle Fernandez MD  Emergency Medicine Resident  [address] Shanelle Fernandez MD  Resident  10/23/22 7500

## 2022-10-23 NOTE — ED PROVIDER NOTES
10/23/22 0310 10/23/22 0310 -- --   107/82 100.2 °F (37.9 °C) Oral 82 17 92 %          General: alert, well appearing, and appears uncomfortable, but nontoxic-appearing. HENT: normocephalic, moist mucus membranes  Eyes: pupils equal and reactive, extraocular eye movements intact   Neck: normal ROM, trachea midline   Heart:  normal rate, regular rhythm, normal S1, S2, no murmurs, rubs, clicks or gallops  Chest: Tachypnea, with mild increased work of breathing, and decreased air entry bilaterally but no wheezing or rhonchi  Abdomen: soft, nontender, nondistended, no masses or organomegaly  no pulsatile masses   Extremities: no obvious deformities, normal ROM of all 4 extremities, mild pitting edema bilaterally. Neurological: alert, oriented, normal speech, no focal findings or movement disorder noted   Skin: normal coloration and turgor, no rashes on exposed skin       MDM/Plan:   75 y.o. female w/ chest pain. Based on history and exam concern for possible ACS versus pulmonary edema versus PE. Also concern for possible gastritis. Does have significant cardiac history. .  Will obtain EKG and basic labs. Will get chest x-ray. We will also interrogate pacemaker. Patient currently on oxygen secondary to mild hypoxia and does not wear oxygen at home. We will try seeing if her shortness of breath improves with pain medications. X-ray shows possible edema versus pneumonia. Not currently coughing. Therefore get CT rule out PE to see if patient possibly has consolidation versus pulmonary emboli versus edema. No obvious infectious source at this time. Patient's pacemaker interrogation shows some concern for lead impedance. Cardiology consult. Plan for inpatient admission. HEART SCORE:  Heart Score for chest pain patients  History:  Moderately Suspicious  ECG: Non-Specifc repolarization disturbance/LBTB/PM  Patient Age: > 65 years  Risk Factors: > 3 Risk factors or history of atherosclerotic disease*  Troponin: > 1 and < 3X normal limit  Heart Score Total: 7    Score 0-3: 2.5% MACE over next 6 weeks = Discharge Home  Score 4-6: 20.3% MACE over next 6 weeks = Admit for Clinical Observation  Score 7-10: 72.7% MACE over next 6 weeks = Early Invasive Strategies   Chest Pain in the Emergency Room: A Multicenter Validation of the [address]. Harrison BE, Casper AJ, Javon DESTINI, Sara TP, [employer], Sara EG, Cali SH, [employer] [facility]. Crit Pathw Cardiol. 2010 Sep; 9(3): 164-169. \"A prospective validation of the HEART score for chest pain patients at the emergency department. \" Int J Cardiol. 2013 Oct 3;168(3):2153-8. doi: 10.1016/j.ijcard. 2013.01.255. Epub 2013 Mar 7. EKG Interpretation    Interpreted by emergency department physician    Clinical Impression: Nonspecific EKG. It was initially hard to read due to the voltage, was redone with a scaling down. And patient has paced rhythm with wide QRS. Niyah Mendez MD    Critical Care Time: There was significant risk of life threatening deterioration of patient's condition requiring my direct management. Critical care time 10 minutes, excluding any documented procedures.     Niyah Mendez MD  Attending Emergency Physician        Niyah Mendez MD  10/23/22 7171

## 2022-10-23 NOTE — ED NOTES
Give IV bumex only, pt NPO at midnight per verbal order intermed.      Monica Roach RN  10/23/22 6793

## 2022-10-23 NOTE — ED NOTES
Pt presents to the ER by EMS in stretcher and able to ambulate to ER bed. Pt c/o substernal epigastric chest pain. Pt has hx of SVT and a-fib. Pt has AICD medtronic pacemaker that pt states did not fire. Pt has 2 nitros pta by EMS with no relief. Pt has hx of diverticulitis. Pt Placed on full cardiac monitor, ekg taken, line established, labs drawn. Call light within reach. Dr. Estefania Sherman at bedside to assess pt further. Pt A&O x4.           Juan Jay, RN  10/23/22 0055

## 2022-10-23 NOTE — ED NOTES
The following labs were labeled with appropriate pt sticker and tubed to lab:     [] Blue     [] Lavender   [] on ice  [] Green/yellow  [] Green/black [] on ice  [] Arlester Core  [] on ice  [] Yellow  [] Red  [] Pink  [] ABG  [] VBG    [x] COVID-19 swab    [] Rapid  [] PCR  [] Flu swab  [] Peds Viral Panel     [] Urine Sample  [] Pelvic Cultures  [] Blood Cultures  [] X 2  [] STREP Cultures         Samantha Galvan RN  10/23/22 7243

## 2022-10-23 NOTE — ED NOTES
No signed and held orders at this time. Pt is aox4, cardiology at bedside, no distress noted, aox4, non labored RR, patient speaking in full sentences. Patient notified by cardiologist at bedside and by ed resident she would be admitted to the hospital and be boarded into the ED. Patient verbalized an understanding.         Tisha Cassidy RN  10/23/22 9511

## 2022-10-23 NOTE — ED NOTES
Pt requesting patient advocate due to not liking meal tray. Coordinator, Rubia Gómez RN, at bedside to speak to pt.      Joey Lynn RN  10/23/22 4196

## 2022-10-23 NOTE — PROGRESS NOTES
SPIRITUAL CARE DEPARTMENT - [facility]  PROGRESS NOTE    Shift date: 10/23/22    Shift day: Sunday   Shift # 1    Room # 23/23   Name: Ana Garcia                  Referral:  patient requested  visit    Admit Date & Time: 10/23/2022  3:05 AM    Assessment:  Ana Garcia is a 75 y.o. female in the hospital for concerns with her heart, as stated by patient. Upon entering the room writer observed patient sitting up on the edge of her bed; patient appeared anxious. Through conversation,  heard concerns of suffering, forgiveness, and distress; patient was tearful at times during visit. Intervention:  Writer introduced self and title as  Writer offered space for the patient  to express feelings, needs, and concerns and provided a ministry presence.  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  facilitated forgiveness, extended encouragement, prayed with the patient, and offered a blessing. Outcome:  Patient engaged in conversation, appeared to receive comfort and encouragement from the visit, communicated feelings of peace, and expressed gratitude for  presence. Plan:  Chaplains will remain available to offer spiritual and emotional support as needed. 10/23/22 1313   Encounter Summary   Service Provided For: Patient   Referral/Consult From: Patient   Support System Parent   Last Encounter  10/23/22   Complexity of Encounter Moderate   Begin Time 1250   End Time  1315   Total Time Calculated 25 min   Spiritual/Emotional needs   Type Spiritual Distress;Spiritual Support   Assessment/Intervention/Outcome   Assessment Anxious; Concerns with suffering;Tearful;Moral distress;Coping   Intervention Active listening;Confronted/Challenged; Discussed belief system/Pentecostal practices/esperanza;Empowerment; Facilitated forgiveness;Sustaining Presence/Ministry of presence;Prayer (assurance of)/[place]   Outcome Comfort;Encouraged;Engaged in conversation;Expressed Gratitude;Peace   Plan and Referrals   Plan/Referrals Continue Support (comment)     Electronically signed by Carlos Pelayo Resident, on 10/23/2022 at 1:15 PM.  Hugo Ernst  [phone]

## 2022-10-23 NOTE — ED NOTES
Pt A&Ox4, RR even and nonlabored, NAD. Pt c/o lunch tray, writer offered box lunch pt declines. Pt denies other needs at this time.      General Norton, RN  10/23/22 [id number]

## 2022-10-23 NOTE — ED PROVIDER NOTES
[facility]  ED  Emergency Department Encounter  Emergency Medicine Resident     Pt Name:Annemarie Payne  MRN: [id number]  [place] 1955  Date of evaluation: 10/23/22  PCP:  Pravin Pitt DO      CHIEF COMPLAINT       Chief Complaint   Patient presents with    Chest Pain       HISTORY OF PRESENT ILLNESS  (Location/Symptom, Timing/Onset, Context/Setting, Quality, Duration, Modifying Factors, Severity.)      Nubia Whitney is a 75 y.o. female who presents with past medical history of recurrent diverticulitis, most recently admitted on 10/2022 for left pelvic wall abscess treated conservatively with antibiotics and due for reevaluation, HFrEF (EF 17% 6/2021, s/p biventricular AICD), A. fib on amiodarone and Xarelto, hypertension presenting for assessment of chest pain. Onset of symptoms was at approximately 10 PM.  Patient describes substernal and epigastric chest pain that is nonradiating. It is persistent. She took a dose of nitroglycerin with no relief. There is associated shortness of breath. Denies fevers, chills, cough, nausea, vomiting. The patient states that on Wednesday she missed 2 of her Bumex doses but had since had her medications filled and has been taking them as prescribed.         PAST MEDICAL / SURGICAL / SOCIAL / FAMILY HISTORY      has a past medical history of Abnormal Pap smear, Acute on chronic systolic congestive heart failure (HCC), AICD (automatic cardioverter/defibrillator) present, MAURO (acute kidney injury) ([address] MAURO (acute kidney injury) ([address] Anemia, Arthritis, Atrial fibrillation ([address] Cardiomyopathy ([address] Cardiomyopathy, nonischemic ([address] Chronic combined systolic and diastolic congestive heart failure ([address] Cirrhosis ([address] Ejection fraction < 50%, HTN (hypertension), Hypothyroidism, Ischemic cardiomyopathy, Left bundle branch block, Mitral regurgitation, Morbid obesity due to excess calories ([address] Pulmonary hypertension ([address] Sudden onset of severe headache, and Torn rotator cuff.       has a past surgical history that includes other surgical history (12/20/2012); Tubal ligation; hernia repair; Colonoscopy; pacemaker placement; Insert Midline Catheter (04/12/2022); and other surgical history (Left).       Social History     Socioeconomic History    Marital status:      Spouse name: Not on file    Number of children: 2    Years of education: Not on file    Highest education level: Not on file   Occupational History    Not on file   Tobacco Use    Smoking status: Never    Smokeless tobacco: Never   Vaping Use    Vaping Use: Never used   Substance and Sexual Activity    Alcohol use: No     Alcohol/week: 0.0 standard drinks    Drug use: Not Currently    Sexual activity: Never   Other Topics Concern    Not on file   Social History Narrative    Not on file     Social Determinants of Health     Financial Resource Strain: Low Risk     Difficulty of Paying Living Expenses: Not hard at all   Food Insecurity: No Food Insecurity    Worried About Running Out of Food in the Last Year: Never true    Ran Out of Food in the Last Year: Never true   Transportation Needs: Not on file   Physical Activity: Not on file   Stress: Not on file   Social Connections: Not on file   Intimate Partner Violence: Not on file   Housing Stability: Not on file       Family History   Problem Relation Age of Onset    Cancer Other         ovarian    High Blood Pressure Mother     Other Mother         copd    Arthritis Neg Hx     Asthma Neg Hx     Birth Defects Neg Hx     Depression Neg Hx     Diabetes Neg Hx     Early Death Neg Hx     Hearing Loss Neg Hx     Heart Disease Neg Hx     High Cholesterol Neg Hx     Kidney Disease Neg Hx     Learning Disabilities Neg Hx     Mental Retardation Neg Hx     Mental Illness Neg Hx     Miscarriages / Stillbirths Neg Hx     Stroke Neg Hx     Substance Abuse Neg Hx     Vision Loss Neg Hx     Breast Cancer Neg Hx     Colon Cancer Neg Hx     Eclampsia Neg Hx     Hypertension Neg Hx     Ovarian Cancer Neg Hx      Labor Neg Hx     Spont Abortions Neg Hx        Allergies:  Codeine, Morphine, and Tramadol    Home Medications:  Prior to Admission medications    Medication Sig Start Date End Date Taking? Authorizing Provider   traMADol (ULTRAM) 50 MG tablet Take 1 tablet by mouth 2 times daily as needed for Pain for up to 14 days. Intended supply: 7 days. Take lowest dose possible to manage pain 10/19/22 11/2/22  Samara Johnson MD   levothyroxine (SYNTHROID) 75 MCG tablet Take 1 tablet by mouth Daily 10/9/22   Cherylene Corner, MD   bumetanide (BUMEX) 2 MG tablet Take 0.5 tablets by mouth daily 22   Verito Nicole MD   rivaroxaban (XARELTO) 20 MG TABS tablet Take 1 tablet by mouth daily Take 1 tablet by mouth daily 22   Víctor Vail MD   acetaminophen (TYLENOL) 500 MG tablet Take 1 tablet by mouth every 6 hours as needed for Pain 22   Dustin Tsang MD   metoprolol succinate (TOPROL XL) 50 MG extended release tablet Take 50 mg by mouth daily    Historical Provider, MD   amiodarone (CORDARONE) 200 MG tablet  21   Historical Provider, MD   empagliflozin (JARDIANCE) 10 MG tablet Take 10 mg by mouth daily 22   Historical Provider, MD   potassium chloride (KLOR-CON M) 20 MEQ extended release tablet Take 1 tablet by mouth daily 21   Cherylene Corner, MD   magnesium oxide (MAG-OX) 400 MG tablet Take 0.5 tablets by mouth daily 21   Teodora Oliva MD   nitroGLYCERIN (NITROSTAT) 0.4 MG SL tablet up to max of 3 total doses.  If no relief after 1 dose, call 911. 21   Juancarlos Goetz MD   sacubitril-valsartan (ENTRESTO) 24-26 MG per tablet Take 0.5 tablets by mouth 2 times daily 21   Juancarlos Goetz MD   allopurinol (ZYLOPRIM) 100 MG tablet Take 1 tablet by mouth daily 19   Delon Johnson MD   spironolactone (ALDACTONE) 25 MG tablet Take 0.5 tablets by mouth daily 19   Delon Johnson MD       REVIEW OF SYSTEMS (2-9 systems for level 4, 10 or more for level 5)      Review of Systems   Constitutional:  Negative for chills, diaphoresis and fever. HENT:  Negative for rhinorrhea and sinus pain. Respiratory:  Positive for chest tightness and shortness of breath. Negative for cough and wheezing. Cardiovascular:  Positive for chest pain and leg swelling. Musculoskeletal:  Negative for arthralgias. Skin:  Negative for color change. PHYSICAL EXAM   (up to 7 for level 4, 8 or more for level 5)      INITIAL VITALS:   BP 99/68   Pulse 70   Temp 100.2 °F (37.9 °C) (Oral)   Resp 28   SpO2 95%     Physical Exam  Vitals reviewed. Constitutional:       General: She is not in acute distress. Appearance: Normal appearance. She is not ill-appearing. HENT:      Head: Normocephalic and atraumatic. Cardiovascular:      Rate and Rhythm: Normal rate and regular rhythm. Heart sounds: No murmur heard. Pulmonary:      Effort: Pulmonary effort is normal.      Breath sounds: Rales present. Abdominal:      Tenderness: There is abdominal tenderness. Comments: Tender to palpation in the epigastrium   Musculoskeletal:         General: No tenderness. Comments: Symmetric bilateral lower extremity edema, nonpitting   Neurological:      Mental Status: She is alert.        DIFFERENTIAL  DIAGNOSIS     PLAN (LABS / IMAGING / EKG):  Orders Placed This Encounter   Procedures    COVID-19, Rapid    XR CHEST PORTABLE    CT CHEST PULMONARY EMBOLISM W CONTRAST    Troponin    CBC with Auto Differential    Comprehensive Metabolic Panel    Lipase    D-Dimer, Quantitative    Troponin    Brain Natriuretic Peptide    Brain Natriuretic Peptide    Inpatient consult to Cardiology    EKG 12 Lead       MEDICATIONS ORDERED:  Orders Placed This Encounter   Medications    aluminum & magnesium hydroxide-simethicone (MAALOX) 200-200-20 MG/5ML suspension 30 mL    fentaNYL (SUBLIMAZE) injection 50 mcg    acetaminophen (TYLENOL) tablet 1,000 mg    iopamidol (ISOVUE-370) 76 % injection 75 mL       DDX: MI, PE, pneumonia, CHF exacerbation, COVID-19    DIAGNOSTIC RESULTS / EMERGENCY DEPARTMENT COURSE / MDM   LAB RESULTS:  Results for orders placed or performed during the hospital encounter of 10/23/22   COVID-19, Rapid    Specimen: Nasopharyngeal Swab   Result Value Ref Range    Specimen Description . NASOPHARYNGEAL SWAB     SARS-CoV-2, Rapid Not Detected Not Detected   Troponin   Result Value Ref Range    Troponin, High Sensitivity 20 (H) 0 - 14 ng/L   Troponin   Result Value Ref Range    Troponin, High Sensitivity 21 (H) 0 - 14 ng/L   CBC with Auto Differential   Result Value Ref Range    WBC 9.6 3.5 - 11.3 k/uL    RBC 4.49 3.95 - 5.11 m/uL    Hemoglobin 13.4 11.9 - 15.1 g/dL    Hematocrit 39.0 36.3 - 47.1 %    MCV 86.9 82.6 - 102.9 fL    MCH 29.8 25.2 - 33.5 pg    MCHC 34.4 28.4 - 34.8 g/dL    RDW 15.1 (H) 11.8 - 14.4 %    Platelets 665 666 - 660 k/uL    MPV 11.9 8.1 - 13.5 fL    NRBC Automated 0.0 0.0 per 100 WBC    Seg Neutrophils 75 (H) 36 - 65 %    Lymphocytes 14 (L) 24 - 43 %    Monocytes 10 3 - 12 %    Eosinophils % 0 (L) 1 - 4 %    Basophils 1 0 - 2 %    Immature Granulocytes 0 0 %    Segs Absolute 7.13 1.50 - 8.10 k/uL    Absolute Lymph # 1.34 1.10 - 3.70 k/uL    Absolute Mono # 1.00 0.10 - 1.20 k/uL    Absolute Eos # 0.03 0.00 - 0.44 k/uL    Basophils Absolute 0.05 0.00 - 0.20 k/uL    Absolute Immature Granulocyte <0.03 0.00 - 0.30 k/uL    RBC Morphology ANISOCYTOSIS PRESENT    Comprehensive Metabolic Panel   Result Value Ref Range    Glucose 137 (H) 70 - 99 mg/dL    BUN 14 8 - 23 mg/dL    Creatinine 1.10 (H) 0.50 - 0.90 mg/dL    Est, Glom Filt Rate 55 (L) >60 mL/min/1.73m2    Calcium 9.7 8.6 - 10.4 mg/dL    Sodium 139 135 - 144 mmol/L    Potassium 4.1 3.7 - 5.3 mmol/L    Chloride 102 98 - 107 mmol/L    CO2 21 20 - 31 mmol/L    Anion Gap 16 9 - 17 mmol/L    Alkaline Phosphatase 263 (H) 35 - 104 U/L    ALT 17 5 - 33 U/L    AST 26 <32 U/L Total Bilirubin 0.8 0.3 - 1.2 mg/dL    Total Protein 8.0 6.4 - 8.3 g/dL    Albumin 4.0 3.5 - 5.2 g/dL    Albumin/Globulin Ratio 1.0 1.0 - 2.5   Lipase   Result Value Ref Range    Lipase 10 (L) 13 - 60 U/L   D-Dimer, Quantitative   Result Value Ref Range    D-Dimer, Quant 0.77 mg/L FEU   Troponin   Result Value Ref Range    Troponin, High Sensitivity 19 (H) 0 - 14 ng/L       IMPRESSION: Hypoxia; pending further workup    RADIOLOGY:  XR CHEST PORTABLE   Final Result   Perihilar and bibasilar infiltrates which may represent multilobar pneumonia   or pulmonary edema, with a small left-sided pleural effusion suspected. CT CHEST PULMONARY EMBOLISM W CONTRAST    (Results Pending)         EKG  Ventricular paced rhythm. No changes from previous ECG    All EKG's are interpreted by the Emergency Department Physician who either signs or Co-signs this chart in the absence of a cardiologist.    EMERGENCY DEPARTMENT COURSE:    Presents to the emergency department with mild hypoxia requiring up to 4 L via nasal cannula. Vital signs were otherwise unremarkable. Will obtain routine ACS work-up, CMP.     6:20 AM  Patient's troponin went from 20 to 21. No leukocytosis. No anemia. Creatinine mildly increased to 1.1. Mild baseline increase in alk phos. Lipase negative. D-dimer weakly +0.77. COVID-19 rapid antigen test negative. Chest x-ray concerning for possible pneumonia versus pulmonary edema. CT PE has been ordered for further assessment as patient has also had recent hospitalizations where her anticoagulation was held in anticipation of possible surgical intervention. 7:27 AM  This patient was reassessed. She is comfortable with no recurrence of chest pain. Remains on oxygen. Pacemaker interrogation was concerning for RV tip to RV coil lead impedance, high RV threshold. Cardiology was consulted to interpret these findings and their significance.   The patient is waiting for CT PE-unfortunately her scan has been delayed due to several trauma and code strokes    7:58 AM  Patient has been taken for CT PE. Subjectively she states she is feeling well. I have discussed with her that she will likely require admission due to new onset hypoxia. We are currently in the process of determining the cause. Cardiology did come down to the emergency department and reviewed the pacemaker report. They stated they will review with their staff for further interpretation and recommendations. Dr. Zoran Calderón kindly assumed the care of this patient. She will follow-up in the CT PE results and likely admit the patient for further management of hypoxia, and cardiology recommendations regarding the pacemaker report. No notes of EC Admission Criteria type on file. PROCEDURES:      CONSULTS:  IP CONSULT TO CARDIOLOGY    CRITICAL CARE:  None    FINAL IMPRESSION      1. Chest pain, unspecified type    2. Hypoxia    3. Pacemaker          DISPOSITION / PLAN     DISPOSITION        PATIENT REFERRED TO:  No follow-up provider specified.     DISCHARGE MEDICATIONS:  New Prescriptions    No medications on file       Clayton Brown MD  Emergency Medicine Resident    (Please note that portions of thisnote were completed with a voice recognition program.  Efforts were made to edit the dictations but occasionally words are mis-transcribed.)       Clayton Brown MD  Resident  10/23/22 [id number]

## 2022-10-23 NOTE — ED NOTES
Pt A&Ox4, RR even and nonlabored, NAD. Pt resting on cot with full cardiac monitor on. Pt denies needs at this time.      Cathryn Espino RN  10/23/22 [id number]

## 2022-10-23 NOTE — H&P
[facility]     Department of Internal Medicine - Staff Internal Medicine Teaching Service          ADMISSION NOTE/HISTORY AND PHYSICAL EXAMINATION   Date: 10/23/2022  Patient Name: Samia Singh  Date of admission: 10/23/2022  3:05 AM  YOB: 1955  PCP: Reina Dudley DO  History Obtained From:  patient, electronic medical record    CHIEF COMPLAINT     Chief complaint: Chest pain    HISTORY OF PRESENTING ILLNESS     The patient is a pleasant 75 y.o. female presents with a chief complaint of chest pain. She reports that chest pain started around 10 PM yesterday which was dull, substernal and non radiating, associated with SOB, she thought it was due to acid reflux and took antacids which did not help, she called EMS at 3 AM in the morning after the pain would not subside. Past medical history is significant for CHF s/p AICD placement EF 17%. Severe MR, Paroxysmal A Fib, hypertension, hypothyroidism, liver cirrhosis. She was also recently admitted for diverticulitis with abscess and managed conservatively with antibiotics. In the ED, patient was hypoxic and was placed on 4 L nasal cannula, Troponin  trend is 20 >21 >19. ProBNP is 5489. CXR shows patchy infiltrates in perihilar and bibasilar areas. Chest CT showed mild-to-moderate fibrotic/atelectatic changes, more in the left than right, negative for PE. Pacer interrogation showed lead impedance in the RV area. Cardiology was consulted from the ED. On examination patient was alert and oriented and reports that pain is almost gone, she thinks Maalox helped, she also received fentanyl and sublingual nitro, she was on 2 L nasal cannula, and she reportedly missed her last 2 doses of bumex. She denied any shortness of breath, abdominal pain, nausea or vomiting or any changes to her bowel and bladder habits.     Review of Systems:  General ROS: Completed and except as mentioned above were negative   HEENT ROS: Completed and except as mentioned above were negative   Allergy and Immunology ROS:  Completed and except as mentioned above were negative  Hematological and Lymphatic ROS:  Completed and except as mentioned above were negative  Respiratory ROS:  Completed and except as mentioned above were negative  Cardiovascular ROS:  Completed and except as mentioned above were negative  Gastrointestinal ROS: Completed and except as mentioned above were negative  Genito-Urinary ROS:  Completed and except as mentioned above were negative  Musculoskeletal ROS:  Completed and except as mentioned above were negative  Neurological ROS:  Completed and except as mentioned above were negative  Skin & Dermatological ROS:  Completed and except as mentioned above were negative  Psychological ROS:  Completed and except as mentioned above were negative    PAST MEDICAL HISTORY     Past Medical History:   Diagnosis Date    Abnormal Pap smear     Acute on chronic systolic congestive heart failure (HCC)     AICD (automatic cardioverter/defibrillator) present     MAURO (acute kidney injury) ([facility] [address] 03/04/2017    MAURO (acute kidney injury) ([address] 3/4/2017    Anemia     Arthritis     Atrial fibrillation ([address] 10/7/2022    Cardiomyopathy ([address] 12/20/2012    robotic replacement of 2 left ventricular leads/replacement of ICD generator    Cardiomyopathy, nonischemic ([address] 12/20/2012    Chronic combined systolic and diastolic congestive heart failure (HCC)     Cirrhosis ([address] 10/7/2022    Ejection fraction < 50%     HTN (hypertension)     Hypothyroidism     Ischemic cardiomyopathy     Left bundle branch block     Mitral regurgitation     Morbid obesity due to excess calories ([address] 01/15/2017    Pulmonary hypertension ([address]     Sudden onset of severe headache     Torn rotator cuff     RIGHT SHOULDER       PAST SURGICAL HISTORY     Past Surgical History:   Procedure Laterality Date    COLONOSCOPY      HERNIA REPAIR      INSERT MIDLINE CATHETER  04/12/2022 OTHER SURGICAL HISTORY  12/20/2012    robotic placement of 2 left ventricular leads/replacement of ICD generator    OTHER SURGICAL HISTORY Left     ICD REPLACEMENT    PACEMAKER PLACEMENT      medtronic Viva XT CRT -D [id number] Serial : [id number]    TUBAL LIGATION         ALLERGIES     Codeine, Morphine, and Tramadol    MEDICATIONS PRIOR TO ADMISSION     Prior to Admission medications    Medication Sig Start Date End Date Taking? Authorizing Provider   traMADol (ULTRAM) 50 MG tablet Take 1 tablet by mouth 2 times daily as needed for Pain for up to 14 days. Intended supply: 7 days. Take lowest dose possible to manage pain 10/19/22 11/2/22  Genevieve Montoya MD   levothyroxine (SYNTHROID) 75 MCG tablet Take 1 tablet by mouth Daily 10/9/22   Taina Luo MD   bumetanide (BUMEX) 2 MG tablet Take 0.5 tablets by mouth daily 9/30/22   Chanelle Logan MD   rivaroxaban (XARELTO) 20 MG TABS tablet Take 1 tablet by mouth daily Take 1 tablet by mouth daily 9/23/22   Eloisa Lynn MD   acetaminophen (TYLENOL) 500 MG tablet Take 1 tablet by mouth every 6 hours as needed for Pain 8/5/22   Nivia Odonnell MD   metoprolol succinate (TOPROL XL) 50 MG extended release tablet Take 50 mg by mouth daily    Historical Provider, MD   amiodarone (CORDARONE) 200 MG tablet  12/1/21   Historical Provider, MD   empagliflozin (JARDIANCE) 10 MG tablet Take 10 mg by mouth daily 1/4/22   Historical Provider, MD   potassium chloride (KLOR-CON M) 20 MEQ extended release tablet Take 1 tablet by mouth daily 12/8/21   Taina Luo MD   magnesium oxide (MAG-OX) 400 MG tablet Take 0.5 tablets by mouth daily 12/8/21   Ariane Rod MD   nitroGLYCERIN (NITROSTAT) 0.4 MG SL tablet up to max of 3 total doses.  If no relief after 1 dose, call 911. 6/29/21   Cam Huston MD   sacubitril-valsartan (ENTRESTO) 24-26 MG per tablet Take 0.5 tablets by mouth 2 times daily 6/29/21   Cam Huston MD   allopurinol (ZYLOPRIM) 100 MG tablet Take 1 tablet by mouth daily 19   Aida Galicia MD   spironolactone (ALDACTONE) 25 MG tablet Take 0.5 tablets by mouth daily 19   Aida Galicia MD       SOCIAL HISTORY     Tobacco: Denies use  Alcohol: Denies use  Illicits: Denies use    FAMILY HISTORY     Family History   Problem Relation Age of Onset    Cancer Other         ovarian    High Blood Pressure Mother     Other Mother         copd    Arthritis Neg Hx     Asthma Neg Hx     Birth Defects Neg Hx     Depression Neg Hx     Diabetes Neg Hx     Early Death Neg Hx     Hearing Loss Neg Hx     Heart Disease Neg Hx     High Cholesterol Neg Hx     Kidney Disease Neg Hx     Learning Disabilities Neg Hx     Mental Retardation Neg Hx     Mental Illness Neg Hx     Miscarriages / Stillbirths Neg Hx     Stroke Neg Hx     Substance Abuse Neg Hx     Vision Loss Neg Hx     Breast Cancer Neg Hx     Colon Cancer Neg Hx     Eclampsia Neg Hx     Hypertension Neg Hx     Ovarian Cancer Neg Hx      Labor Neg Hx     Spont Abortions Neg Hx        PHYSICAL EXAM     Vitals: BP 99/68   Pulse 70   Temp 100.2 °F (37.9 °C) (Oral)   Resp 28   SpO2 95%   Tmax: Temp (24hrs), Av.2 °F (37.9 °C), Min:100.2 °F (37.9 °C), Max:100.2 °F (37.9 °C)    Last Body weight:   Wt Readings from Last 3 Encounters:   10/19/22 209 lb 9.6 oz (95.1 kg)   22 208 lb (94.3 kg)   22 205 lb (93 kg)     Body Mass Index : There is no height or weight on file to calculate BMI. PHYSICAL EXAMINATION:  Physical Exam  Constitutional:       General: She is awake. She is not in acute distress. HENT:      Head: Normocephalic and atraumatic. Cardiovascular:      Rate and Rhythm: Normal rate and regular rhythm. Heart sounds: Normal heart sounds. Pulmonary:      Effort: Pulmonary effort is normal. No accessory muscle usage or respiratory distress. Breath sounds: No stridor. No decreased breath sounds, wheezing or rhonchi. Abdominal:      General: Abdomen is flat.       Palpations: mL/min/1.73m2    Calcium 9.7 8.6 - 10.4 mg/dL    Sodium 139 135 - 144 mmol/L    Potassium 4.1 3.7 - 5.3 mmol/L    Chloride 102 98 - 107 mmol/L    CO2 21 20 - 31 mmol/L    Anion Gap 16 9 - 17 mmol/L    Alkaline Phosphatase 263 (H) 35 - 104 U/L    ALT 17 5 - 33 U/L    AST 26 <32 U/L    Total Bilirubin 0.8 0.3 - 1.2 mg/dL    Total Protein 8.0 6.4 - 8.3 g/dL    Albumin 4.0 3.5 - 5.2 g/dL    Albumin/Globulin Ratio 1.0 1.0 - 2.5   Lipase    Collection Time: 10/23/22  3:29 AM   Result Value Ref Range    Lipase 10 (L) 13 - 60 U/L   D-Dimer, Quantitative    Collection Time: 10/23/22  3:29 AM   Result Value Ref Range    D-Dimer, Quant 0.77 mg/L FEU   COVID-19, Rapid    Collection Time: 10/23/22  4:22 AM    Specimen: Nasopharyngeal Swab   Result Value Ref Range    Specimen Description . NASOPHARYNGEAL SWAB     SARS-CoV-2, Rapid Not Detected Not Detected   Troponin    Collection Time: 10/23/22  4:47 AM   Result Value Ref Range    Troponin, High Sensitivity 21 (H) 0 - 14 ng/L   Troponin    Collection Time: 10/23/22  5:56 AM   Result Value Ref Range    Troponin, High Sensitivity 19 (H) 0 - 14 ng/L       Imaging:   XR CHEST PORTABLE    Result Date: 10/23/2022  Perihilar and bibasilar infiltrates which may represent multilobar pneumonia or pulmonary edema, with a small left-sided pleural effusion suspected. CT CHEST PULMONARY EMBOLISM W CONTRAST    Result Date: 10/23/2022  At the bases of both lungs there are mild-to-moderate fibrotic/atelectatic changes, left more than right, but infiltrates cannot be excluded. The peripheral small branches of pulmonary arteries at the posterior bases of both lungs are poorly visualized. In the rest of the pulmonary arterial system there is no definable pulmonary embolism. Evidence of mild-to-moderate cardiomegaly. There is reflux of contrast material from right atrium into inferior vena cava suggestive of right ventricular strain.  Thoracic aorta is not opacified as there is no pass of contrast material into left heart  due to timing of contrast bolus. Mild pulmonary emphysema. No pleural effusion or pneumothorax. Lobulated contour of liver suggestive of cirrhosis of liver. Cholelithiasis. ASSESSMENT & PLAN     ASSESSMENT / PLAN:     Principal Problem:    AICD malfunction, initial encounter  Active Problems:    Essential hypertension    Cirrhosis (HCC)    Atrial fibrillation (HCC)    Acute combined systolic (congestive) and diastolic (congestive) heart failure (HCC)    Gout    Hypothyroidism  Resolved Problems:    * No resolved hospital problems. *       Chest pain  - GERD vs CHF exacerbation vs AICD malfunction vs pneumonia  - Troponin wnl , pro BNP elevated  - Pacer interrogation showed impedance in the RV area  -follow-up on respiratory panel  - Cardiology consulted    Acute hypoxemic respiratory failure  -Likely due to CHF exacerbation, missed 2 doses of bumex  - Continue bumex   -Continue supplemental oxygen    A fib. resume amio, toprol and xarelto    HFrEF  -EF 17%,  s/p AICD placement, severe diastolic dysfunction, severe MR  -Resume bumex, aldactone, entresto and toprol      Hypothyroidism. Resume synthroid    Hypertension. Resume home meds    Gout. Resume allopurinol            DVT ppx: Xarelto  GI ppx: Pepcid    PT/OT/SW: Consulted  Discharge Planning:  consulted    Vipin Rojas MD  Internal Medicine Resident, PGY-1  [address] [facility];  [place], [place]  10/23/2022, 8:43 AM

## 2022-10-23 NOTE — ED NOTES
Pt A&Ox4, RR even and nonlabored, NAD. Pt complains of right shoulder pain, PRN tylenol given. Pt sitting on cot and denies other needs at this time.      Tayler Morton RN  10/23/22 7225

## 2022-10-23 NOTE — PROGRESS NOTES
SENIOR NOTE    CC: chest pain after drinking 3 ginger ale, resolved with antacid. PMH - recurrent diverticulitis, left pelvic abscess, HFrEF - ef 17% with biventricular AICD w/ CRT-D revision 08/2020 * f/u with dr Stefany Sarkar, A fib on amiodarone and xarelto, HTN. Didn't take Bumex for 2 days due to running out of refills. Pacer interrogation in ER showed -     Lead impdence in ventricular area  4 L NC    Chest pain likely secondary to GERD - improved with antacid. Trops flat. Cardiology consulted  Acute hypoxic respiratory failure secondary to CHF exacerbation due to noncompliance with bumex - on 2 L NC -  bumex 2 mg x 1. Resume home dose from tomorrow. (Not on home oxygen)  MAURO - cardiorenal - lasix  Hx of atrial fibrillation - paced rhythm due to AICD - resumed amio, toprol xl and xarelto  HFrEF 17%, G3DD, Severe MR, moderate TR, moderate Pulmonary htn - bumex 2 mg IV x 2. Resume home dose bumex from tomorrow. Resume entresto, bb, aldactone from tomorrow. Give aldactone and entresto if SBP > 120.    AICD impdence? - cardiology consulted  Known cirrhosis of liver  Hx of gout - resume allopurinol  Hx of hypothyroidism - resume synthroid    F/U on qtc of EKG, cardio recs

## 2022-10-23 NOTE — ED NOTES
Pt A&Ox4, RR even and nonlabored, NAD. Pt resting on cot with full cardiac monitor on. Pt denies needs at this time.      Mercy Sherman RN  10/23/22 [id number]

## 2022-10-23 NOTE — ACP (ADVANCE CARE PLANNING)
Advance Care Planning     Advance Care Planning Activator (Inpatient)  Conversation Note      Date of ACP Conversation: 10/23/2022     Conversation Conducted with: Patient with Decision Making Capacity    ACP Activator: Ronmirian Pinzon, [facility] Decision Maker:     Current Designated Health Care Decision Maker:     Primary Decision Maker:  [facility] [address] - [phone]    Click here to complete Healthcare Decision Makers including section of the Healthcare Decision Maker Relationship (ie \"Primary\")  Today we documented Decision Maker(s) consistent with Legal Next of Kin hierarchy. Care Preferences    Ventilation: \"If you were in your present state of health and suddenly became very ill and were unable to breathe on your own, what would your preference be about the use of a ventilator (breathing machine) if it were available to you? \"      Would the patient desire the use of ventilator (breathing machine)?: yes    \"If your health worsens and it becomes clear that your chance of recovery is unlikely, what would your preference be about the use of a ventilator (breathing machine) if it were available to you? \"     Would the patient desire the use of ventilator (breathing machine)?: Yes      Resuscitation  \"CPR works best to restart the heart when there is a sudden event, like a heart attack, in someone who is otherwise healthy. Unfortunately, CPR does not typically restart the heart for people who have serious health conditions or who are very sick. \"    \"In the event your heart stopped as a result of an underlying serious health condition, would you want attempts to be made to restart your heart (answer \"yes\" for attempt to resuscitate) or would you prefer a natural death (answer \"no\" for do not attempt to resuscitate)? \" yes       [x] Yes   [] No   Educated Patient / Grayson Grandchild regarding differences between Advance Directives and portable DNR orders.     Length of ACP Conversation in minutes: 05    Conversation Outcomes:  [x] ACP discussion completed  [] Existing advance directive reviewed with patient; no changes to patient's previously recorded wishes  [] New Advance Directive completed  [] Portable Do Not Rescitate prepared for Provider review and signature  [] POLST/POST/MOLST/MOST prepared for Provider review and signature      Follow-up plan:    [x] Schedule follow-up conversation to continue planning  [] Referred individual to Provider for additional questions/concerns   [] Advised patient/agent/surrogate to review completed ACP document and update if needed with changes in condition, patient preferences or care setting    [] This note routed to one or more involved healthcare providers        Erica Hall

## 2022-10-23 NOTE — ED NOTES
Pt provided meal tray and cranberry juice. Patient happy with meal tray, pasta and peas. Food is hot. Patient aox4, non labored RR, no distress noted.       Jess Kirk RN  10/23/22 [id number]

## 2022-10-24 PROBLEM — J15.7 MYCOPLASMA PNEUMONIA: Status: ACTIVE | Noted: 2022-10-24

## 2022-10-24 PROBLEM — R20.2 PARESTHESIA OF RIGHT ARM AND LEG: Status: ACTIVE | Noted: 2022-10-24

## 2022-10-24 LAB
ABSOLUTE EOS #: 0.09 K/UL (ref 0–0.44)
ABSOLUTE IMMATURE GRANULOCYTE: <0.03 K/UL (ref 0–0.3)
ABSOLUTE LYMPH #: 1.48 K/UL (ref 1.1–3.7)
ABSOLUTE MONO #: 0.89 K/UL (ref 0.1–1.2)
ALBUMIN SERPL-MCNC: 3.4 G/DL (ref 3.5–5.2)
ALBUMIN/GLOBULIN RATIO: 1 (ref 1–2.5)
ALP BLD-CCNC: 197 U/L (ref 35–104)
ALT SERPL-CCNC: 12 U/L (ref 5–33)
ANION GAP SERPL CALCULATED.3IONS-SCNC: 14 MMOL/L (ref 9–17)
AST SERPL-CCNC: 17 U/L
BASOPHILS # BLD: 0 % (ref 0–2)
BASOPHILS ABSOLUTE: 0.03 K/UL (ref 0–0.2)
BILIRUB SERPL-MCNC: 1.1 MG/DL (ref 0.3–1.2)
BILIRUBIN DIRECT: 0.4 MG/DL
BILIRUBIN, INDIRECT: 0.7 MG/DL (ref 0–1)
BUN BLDV-MCNC: 15 MG/DL (ref 8–23)
CALCIUM SERPL-MCNC: 9 MG/DL (ref 8.6–10.4)
CHLORIDE BLD-SCNC: 101 MMOL/L (ref 98–107)
CO2: 22 MMOL/L (ref 20–31)
CREAT SERPL-MCNC: 0.94 MG/DL (ref 0.5–0.9)
EKG ATRIAL RATE: 162 BPM
EKG Q-T INTERVAL: 118 MS
EKG QRS DURATION: 122 MS
EKG QTC CALCULATION (BAZETT): 193 MS
EKG R AXIS: 58 DEGREES
EKG T AXIS: 0 DEGREES
EKG VENTRICULAR RATE: 162 BPM
EOSINOPHILS RELATIVE PERCENT: 1 % (ref 1–4)
FOLATE: 12.2 NG/ML
GFR SERPL CREATININE-BSD FRML MDRD: >60 ML/MIN/1.73M2
GLUCOSE BLD-MCNC: 93 MG/DL (ref 70–99)
HCT VFR BLD CALC: 33.8 % (ref 36.3–47.1)
HEMOGLOBIN: 12 G/DL (ref 11.9–15.1)
IMMATURE GRANULOCYTES: 0 %
LYMPHOCYTES # BLD: 22 % (ref 24–43)
MCH RBC QN AUTO: 30.1 PG (ref 25.2–33.5)
MCHC RBC AUTO-ENTMCNC: 35.5 G/DL (ref 28.4–34.8)
MCV RBC AUTO: 84.7 FL (ref 82.6–102.9)
MONOCYTES # BLD: 13 % (ref 3–12)
MYCOPLASMA PNEUMONIAE IGM: 1.01
NRBC AUTOMATED: 0 PER 100 WBC
PDW BLD-RTO: 14.6 % (ref 11.8–14.4)
PLATELET # BLD: 215 K/UL (ref 138–453)
PMV BLD AUTO: 11.7 FL (ref 8.1–13.5)
POTASSIUM SERPL-SCNC: 3.6 MMOL/L (ref 3.7–5.3)
RBC # BLD: 3.99 M/UL (ref 3.95–5.11)
RBC # BLD: ABNORMAL 10*6/UL
SEG NEUTROPHILS: 64 % (ref 36–65)
SEGMENTED NEUTROPHILS ABSOLUTE COUNT: 4.34 K/UL (ref 1.5–8.1)
SODIUM BLD-SCNC: 137 MMOL/L (ref 135–144)
TOTAL PROTEIN: 6.9 G/DL (ref 6.4–8.3)
TSH SERPL DL<=0.05 MIU/L-ACNC: 3.37 UIU/ML (ref 0.3–5)
VITAMIN B-12: 406 PG/ML (ref 232–1245)
WBC # BLD: 6.9 K/UL (ref 3.5–11.3)

## 2022-10-24 PROCEDURE — 99232 SBSQ HOSP IP/OBS MODERATE 35: CPT | Performed by: INTERNAL MEDICINE

## 2022-10-24 PROCEDURE — 82607 VITAMIN B-12: CPT

## 2022-10-24 PROCEDURE — 6370000000 HC RX 637 (ALT 250 FOR IP)

## 2022-10-24 PROCEDURE — 2580000003 HC RX 258

## 2022-10-24 PROCEDURE — 82746 ASSAY OF FOLIC ACID SERUM: CPT

## 2022-10-24 PROCEDURE — 83036 HEMOGLOBIN GLYCOSYLATED A1C: CPT

## 2022-10-24 PROCEDURE — 80076 HEPATIC FUNCTION PANEL: CPT

## 2022-10-24 PROCEDURE — 6370000000 HC RX 637 (ALT 250 FOR IP): Performed by: STUDENT IN AN ORGANIZED HEALTH CARE EDUCATION/TRAINING PROGRAM

## 2022-10-24 PROCEDURE — 99222 1ST HOSP IP/OBS MODERATE 55: CPT | Performed by: PSYCHIATRY & NEUROLOGY

## 2022-10-24 PROCEDURE — 84443 ASSAY THYROID STIM HORMONE: CPT

## 2022-10-24 PROCEDURE — 36415 COLL VENOUS BLD VENIPUNCTURE: CPT

## 2022-10-24 PROCEDURE — 93010 ELECTROCARDIOGRAM REPORT: CPT | Performed by: INTERNAL MEDICINE

## 2022-10-24 PROCEDURE — 2700000000 HC OXYGEN THERAPY PER DAY

## 2022-10-24 PROCEDURE — 94640 AIRWAY INHALATION TREATMENT: CPT

## 2022-10-24 PROCEDURE — 1200000000 HC SEMI PRIVATE

## 2022-10-24 PROCEDURE — 85025 COMPLETE CBC W/AUTO DIFF WBC: CPT

## 2022-10-24 PROCEDURE — 80048 BASIC METABOLIC PNL TOTAL CA: CPT

## 2022-10-24 RX ORDER — BUMETANIDE 1 MG/1
1 TABLET ORAL DAILY
Status: DISCONTINUED | OUTPATIENT
Start: 2022-10-24 | End: 2022-10-25 | Stop reason: HOSPADM

## 2022-10-24 RX ORDER — LEVOFLOXACIN 750 MG/1
750 TABLET ORAL DAILY
Status: DISCONTINUED | OUTPATIENT
Start: 2022-10-24 | End: 2022-10-25 | Stop reason: HOSPADM

## 2022-10-24 RX ORDER — MIDODRINE HYDROCHLORIDE 5 MG/1
10 TABLET ORAL ONCE
Status: COMPLETED | OUTPATIENT
Start: 2022-10-24 | End: 2022-10-24

## 2022-10-24 RX ORDER — POTASSIUM CHLORIDE 20 MEQ/1
40 TABLET, EXTENDED RELEASE ORAL ONCE
Status: COMPLETED | OUTPATIENT
Start: 2022-10-24 | End: 2022-10-24

## 2022-10-24 RX ORDER — MAGNESIUM HYDROXIDE/ALUMINUM HYDROXICE/SIMETHICONE 120; 1200; 1200 MG/30ML; MG/30ML; MG/30ML
30 SUSPENSION ORAL EVERY 6 HOURS PRN
Status: DISCONTINUED | OUTPATIENT
Start: 2022-10-24 | End: 2022-10-25 | Stop reason: HOSPADM

## 2022-10-24 RX ADMIN — LEVOFLOXACIN 750 MG: 750 TABLET, FILM COATED ORAL at 17:11

## 2022-10-24 RX ADMIN — SODIUM CHLORIDE, PRESERVATIVE FREE 10 ML: 5 INJECTION INTRAVENOUS at 20:30

## 2022-10-24 RX ADMIN — DIPHENHYDRAMINE HCL 25 MG: 25 TABLET ORAL at 22:37

## 2022-10-24 RX ADMIN — DIPHENHYDRAMINE HCL 25 MG: 25 TABLET ORAL at 00:17

## 2022-10-24 RX ADMIN — BUDESONIDE AND FORMOTEROL FUMARATE DIHYDRATE 2 PUFF: 80; 4.5 AEROSOL RESPIRATORY (INHALATION) at 12:47

## 2022-10-24 RX ADMIN — AMIODARONE HYDROCHLORIDE 200 MG: 200 TABLET ORAL at 12:08

## 2022-10-24 RX ADMIN — MIDODRINE HYDROCHLORIDE 10 MG: 5 TABLET ORAL at 21:06

## 2022-10-24 RX ADMIN — RIVAROXABAN 20 MG: 20 TABLET, FILM COATED ORAL at 12:08

## 2022-10-24 RX ADMIN — LEVOTHYROXINE SODIUM 75 MCG: 75 TABLET ORAL at 10:19

## 2022-10-24 RX ADMIN — ACETAMINOPHEN 650 MG: 325 TABLET ORAL at 21:49

## 2022-10-24 RX ADMIN — BUMETANIDE 1 MG: 1 TABLET ORAL at 12:08

## 2022-10-24 RX ADMIN — ACETAMINOPHEN 650 MG: 325 TABLET ORAL at 16:30

## 2022-10-24 RX ADMIN — IPRATROPIUM BROMIDE AND ALBUTEROL SULFATE 1 AMPULE: .5; 2.5 SOLUTION RESPIRATORY (INHALATION) at 12:37

## 2022-10-24 RX ADMIN — METOPROLOL SUCCINATE 50 MG: 50 TABLET, FILM COATED, EXTENDED RELEASE ORAL at 12:08

## 2022-10-24 RX ADMIN — ALLOPURINOL 100 MG: 100 TABLET ORAL at 12:08

## 2022-10-24 RX ADMIN — POTASSIUM CHLORIDE 40 MEQ: 1500 TABLET, EXTENDED RELEASE ORAL at 12:08

## 2022-10-24 RX ADMIN — ALUMINUM HYDROXIDE, MAGNESIUM HYDROXIDE, AND SIMETHICONE 30 ML: 200; 200; 20 SUSPENSION ORAL at 14:21

## 2022-10-24 RX ADMIN — SODIUM CHLORIDE, PRESERVATIVE FREE 5 ML: 5 INJECTION INTRAVENOUS at 14:10

## 2022-10-24 RX ADMIN — FAMOTIDINE 20 MG: 20 TABLET, FILM COATED ORAL at 12:08

## 2022-10-24 RX ADMIN — SPIRONOLACTONE 12.5 MG: 25 TABLET ORAL at 12:08

## 2022-10-24 ASSESSMENT — PAIN DESCRIPTION - LOCATION: LOCATION: HEAD

## 2022-10-24 ASSESSMENT — PAIN SCALES - GENERAL
PAINLEVEL_OUTOF10: 5
PAINLEVEL_OUTOF10: 5
PAINLEVEL_OUTOF10: 4

## 2022-10-24 NOTE — PROGRESS NOTES
[facility]  Internal Medicine Teaching Residency Program  Inpatient Daily Progress Note  ______________________________________________________________________________    Patient: Elise Sinclair  YOB: 1955   [id number]    Acct: [de-identified]     Room: [facility]  Admit date: 10/23/2022  Today's date: 10/24/22  Number of days in the hospital: 1    SUBJECTIVE   Admitting Diagnosis: AICD malfunction, initial encounter  CC: Chest pain    Pt examined at bedside. Chart & results reviewed. No acute events overnight  Afebrile, hemodynamically stable, saturating well on room air  Denies any acute complaints  Denies any chest pain, SOB, abdominal pain or any nausea/vomiting  Cardiology on board      ROS:  Constitutional:  negative for chills, fevers, sweats  Respiratory:  negative for cough, dyspnea on exertion, hemoptysis, shortness of breath, wheezing  Cardiovascular:  negative for chest pain, chest pressure/discomfort, lower extremity edema, palpitations  Gastrointestinal:  negative for abdominal pain, constipation, diarrhea, nausea, vomiting  Neurological:  negative for dizziness, headache  BRIEF HISTORY     The patient is a pleasant 75 y.o. female presents with a chief complaint of chest pain. She reports that chest pain started around 10 PM yesterday which was dull, substernal and non radiating, associated with SOB, she thought it was due to acid reflux and took antacids which did not help, she called EMS at 3 AM in the morning after the pain would not subside. Past medical history is significant for CHF s/p AICD placement EF 17%. Severe MR, Paroxysmal A Fib, hypertension, hypothyroidism, liver cirrhosis. She was also recently admitted for diverticulitis with abscess and managed conservatively with antibiotics. In the ED, patient was hypoxic and was placed on 4 L nasal cannula, Troponin  trend is 20 >21 >19. ProBNP is 5489.  CXR shows patchy infiltrates in perihilar and bibasilar areas. Chest CT showed mild-to-moderate fibrotic/atelectatic changes, more in the left than right, negative for PE. Pacer interrogation showed lead impedance in the RV area. Cardiology was consulted from the ED. OBJECTIVE     Vital Signs:  /79   Pulse 76   Temp 97.5 °F (36.4 °C) (Oral)   Resp 12   Ht 5' 6\" (1.676 m)   Wt 210 lb (95.3 kg)   SpO2 98%   BMI 33.89 kg/m²     Temp (24hrs), Av.5 °F (36.4 °C), Min:97.5 °F (36.4 °C), Max:97.5 °F (36.4 °C)    In: 500   Out: -     Physical Exam:  Constitutional: This is a well developed, well nourished, 30-34.9 - Obesity Grade I 75y.o. year old female who is alert, oriented, cooperative and in no apparent distress. Head:normocephalic and atraumatic. Respiratory:  Breath sounds bilaterally were clear to auscultation. There were no wheezes, rhonchi or rales. There is no intercostal retraction or use of accessory muscles. Cardiovascular: Regular without murmur, clicks, gallops or rubs. Abdomen: Slightly rounded and soft without organomegaly. No rebound, rigidity or guarding was appreciated. Musculoskeletal: No gross muscle weakness. Extremities:  No lower extremity edema, ulcerations, tenderness, varicosities or erythema. Muscle size, tone and strength are normal.  No involuntary movements are noted. Skin:  Warm and dry. Good color, turgor and pigmentation. No lesions or scars.   No cyanosis or clubbing  Neurological/Psychiatric: The patient's general behavior, level of consciousness, thought content and emotional status is normal.        Medications:  Scheduled Medications:    potassium chloride  40 mEq Oral Once    bumetanide  1 mg Oral Daily    allopurinol  100 mg Oral Daily    amiodarone  200 mg Oral Daily    levothyroxine  75 mcg Oral Daily    rivaroxaban  20 mg Oral Daily    sacubitril-valsartan  0.5 tablet Oral BID    spironolactone  12.5 mg Oral Daily    sodium chloride flush  5-40 mL IntraVENous 2 times per day    budesonide-formoterol  2 puff Inhalation Daily    metoprolol succinate  50 mg Oral Daily    famotidine  20 mg Oral BID     Continuous Infusions:    sodium chloride       PRN MedicationsnitroGLYCERIN, 0.4 mg, Q5 Min PRN  sodium chloride flush, 5-40 mL, PRN  sodium chloride, , PRN  polyethylene glycol, 17 g, Daily PRN  acetaminophen, 650 mg, Q6H PRN   Or  acetaminophen, 650 mg, Q6H PRN  albuterol sulfate HFA, 2 puff, Q6H PRN  ipratropium-albuterol, 1 ampule, Q4H PRN  oxyCODONE-acetaminophen, 1 tablet, Q6H PRN  diphenhydrAMINE, 25 mg, Q6H PRN        Diagnostic Labs:  CBC:   Recent Labs     10/23/22  0329 10/24/22  0527   WBC 9.6 6.9   RBC 4.49 3.99   HGB 13.4 12.0   HCT 39.0 33.8*   MCV 86.9 84.7   RDW 15.1* 14.6*    215     BMP:   Recent Labs     10/23/22  0329 10/24/22  0527    137   K 4.1 3.6*    101   CO2 21 22   BUN 14 15   CREATININE 1.10* 0.94*     BNP: No results for input(s): BNP in the last 72 hours. PT/INR: No results for input(s): PROTIME, INR in the last 72 hours. APTT: No results for input(s): APTT in the last 72 hours. CARDIAC ENZYMES: No results for input(s): CKMB, CKMBINDEX, TROPONINI in the last 72 hours. Invalid input(s): CKTOTAL;3  FASTING LIPID PANEL:  Lab Results   Component Value Date    CHOL 180 05/23/2020    HDL 42 05/23/2020    TRIG 79 05/23/2020     LIVER PROFILE:   Recent Labs     10/23/22  0329 10/24/22  0527   AST 26 17   ALT 17 12   BILIDIR  --  0.4*   BILITOT 0.8 1.1   ALKPHOS 263* 197*      MICROBIOLOGY:   Lab Results   Component Value Date/Time    CULTURE PATIENT DISCHARGED BEFORE SAMPLE COLLECTED 12/06/2021 10:30 AM       Imaging:    XR CHEST PORTABLE    Result Date: 10/23/2022  Perihilar and bibasilar infiltrates which may represent multilobar pneumonia or pulmonary edema, with a small left-sided pleural effusion suspected.      CT CHEST PULMONARY EMBOLISM W CONTRAST    Result Date: 10/23/2022  At the bases of both lungs there are mild-to-moderate fibrotic/atelectatic changes, left more than right, but infiltrates cannot be excluded. The peripheral small branches of pulmonary arteries at the posterior bases of both lungs are poorly visualized. In the rest of the pulmonary arterial system there is no definable pulmonary embolism. Evidence of mild-to-moderate cardiomegaly. There is reflux of contrast material from right atrium into inferior vena cava suggestive of right ventricular strain. Thoracic aorta is not opacified as there is no pass of contrast material into left heart due to timing of contrast bolus. Mild pulmonary emphysema. No pleural effusion or pneumothorax. Lobulated contour of liver suggestive of cirrhosis of liver. Cholelithiasis. ASSESSMENT & PLAN     ASSESSMENT / PLAN:     Principal Problem:    AICD malfunction, initial encounter  Active Problems:    Essential hypertension    PAH (pulmonary artery hypertension) (HCC) 34 on Echo     Cirrhosis ([address]    Atrial fibrillation (HCC)    Acute combined systolic (congestive) and diastolic (congestive) heart failure (HCC)    Acute respiratory failure with hypoxia (HCC)    Mitral regurgitation    AICD (automatic cardioverter/defibrillator) present    Gout    Chest pain    Hypothyroidism  Resolved Problems:    * No resolved hospital problems. *     Chest pain  - GERD vs CHF exacerbation vs AICD malfunction vs pneumonia  - Troponin wnl , pro BNP elevated  - Pacer interrogation showed impedance in the RV area  - Respiratory panel negative  - Cardiology consulted     Acute hypoxemic respiratory failure  -Likely due to CHF exacerbation, missed 2 doses of bumex  - Continue bumex   -Continue supplemental oxygen     A fib. resume amio, toprol and xarelto     HFrEF  -EF 17%,  s/p AICD placement, severe diastolic dysfunction, severe MR  -Resume bumex, aldactone, entresto and toprol        Hypothyroidism. Resume synthroid     Hypertension. Resume home meds     Gout.  Resume allopurinol      DVT ppx : Xarelto  GI ppx: Pepcid    PT/OT: Consulted   Discharge Planning / SW:  consulted    Jose Francisco Blake MD  Internal Medicine Resident, PGY-1  [address], [place]  10/24/2022, 6:46 AM

## 2022-10-24 NOTE — FLOWSHEET NOTE
Attending team resident, Dr. Joyce Saldana., notified that pt chart shows that she has mycoplasma pneumonia. Does she require additional precautions?

## 2022-10-24 NOTE — PROGRESS NOTES
[facility]  Progress Note                   Date:   10/24/2022  Patient name: China York  Date of admission:  10/23/2022  3:05 AM  MRN:   [id number]  YOB: 1955  PCP: Douglas Tee DO    Reason for Admission: Pacemaker [Z95.0]  Hypoxia [R09.02]  AICD malfunction, initial encounter [T82.118A]  Acute combined systolic (congestive) and diastolic (congestive) heart failure ([address] [I50.41]  Chest pain, unspecified type [R07.9]    Subjective:       Clinical Changes /Abnormalities:Patient seen and examined. Complains of new onset of SOB/ chest pressure producible with touch since Saturday , patient stated that she had missed two doses of bumex due to medication was back out of order. Tele/vitals/labs reviewed . Discussed case with RN. Review of Systems    Medications:   Scheduled Meds:   bumetanide  1 mg Oral Daily    allopurinol  100 mg Oral Daily    amiodarone  200 mg Oral Daily    levothyroxine  75 mcg Oral Daily    rivaroxaban  20 mg Oral Daily    sacubitril-valsartan  0.5 tablet Oral BID    spironolactone  12.5 mg Oral Daily    sodium chloride flush  5-40 mL IntraVENous 2 times per day    budesonide-formoterol  2 puff Inhalation Daily    metoprolol succinate  50 mg Oral Daily    famotidine  20 mg Oral BID     Continuous Infusions:   sodium chloride       CBC:   Recent Labs     10/23/22  0329 10/24/22  0527   WBC 9.6 6.9   HGB 13.4 12.0    215     BMP:    Recent Labs     10/23/22  0329 10/24/22  0527    137   K 4.1 3.6*    101   CO2 21 22   BUN 14 15   CREATININE 1.10* 0.94*   GLUCOSE 137* 93     Hepatic:  Recent Labs     10/23/22  0329 10/24/22  0527   AST 26 17   ALT 17 12   BILITOT 0.8 1.1   ALKPHOS 263* 197*     Troponin:   Recent Labs     10/23/22  0329 10/23/22  0447 10/23/22  0556   TROPHS 20* 21* 19*     BNP: No results for input(s): BNP in the last 72 hours. Lipids: No results for input(s): CHOL, HDL in the last 72 hours.     Invalid input(s): LDLCALCU  INR: No results for input(s): INR in the last 72 hours. DATA:    Diagnostics:       EKG:   Ventricular paced rhythm, non-specific st-t wave changes     ECHO:   previously taken 6/2021 and show EF 17%, severe MR, patient ICD leads seen in the right atrium/ventricle. Increased septal wall thickness. EF 17% estimated RVSP 49. Objective:   Vitals: /69   Pulse 69   Temp 98.6 °F (37 °C)   Resp 20   Ht 5' 6\" (1.676 m)   Wt 210 lb (95.3 kg)   SpO2 98%   BMI 33.89 kg/m²   General appearance: alert and cooperative with exam  HEENT: Head: Normocephalic, no lesions, without obvious abnormality. Neck:no JVD, trachea midline, no adenopathy  Lungs: Clear to auscultation  Heart: Regular rate and rhythm, s1/s2 auscultated, no murmurs  Abdomen: soft, non-tender, bowel sounds active  Extremities: no edema  Neurologic: not done        Assessment / Acute Cardiac Problems:   Atypical noncardiac chest pain likely GI in origin with minimal flat troponin elevation consistent with type 2 MI due to underlying cardiomyopathy  Acute on chronic systolic congestive heart failure since she ran out of [employer] for a couple of days with volume overload  Nonischemic cardiomyopathy EF 17% s/p AICD, CRT-D revision done in August 2022 - Atrial and RV lead appeared in excellent condition, with stable impedances and threshholds at the time. Last cath 10 years ago with clean coronaries. Severe MR  Paroxysmal A.  Fib on Xarelto  MAURO  Hypertension  Hypothyroidism  History of liver cirrhosis  History of diverticular abscess, recently completed antibiotics       Patient Active Problem List:     Hypokalemia     Dilated cardiomyopathy ([address]     Pre-diabetes     Essential hypertension     PAH (pulmonary artery hypertension) ([address] 34 on Echo      Mitral regurgitation     Chronic headache     Class 1 obesity due to excess calories with body mass index (BMI) of 33.0 to 33.9 in adult     AICD (automatic cardioverter/defibrillator) present Syncope and collapse secondary to [facility]     ICD (implantable cardioverter-defibrillator) battery depletion     Gout     Dizziness     Benign paroxysmal positional vertigo     Chronic heart failure with reduced ejection fraction and diastolic dysfunction (HCC)     NSVT (nonsustained ventricular tachycardia)     Chest pain     Hypothyroidism     Chronic combined systolic (congestive) and diastolic (congestive) heart failure (HCC)     Acute diverticulitis     Abscess of sigmoid colon     Diverticulitis     Nausea & vomiting     Diverticulitis of large intestine with abscess without bleeding     Diverticulitis of colon     Lower abdominal pain     Colonic diverticular abscess     Intra-abdominal abscess (HCC)     Cirrhosis (HCC)     Atrial fibrillation (HCC)     QT prolongation     AICD malfunction, initial encounter     Acute combined systolic (congestive) and diastolic (congestive) heart failure (HCC)     Acute respiratory failure with hypoxia ([facility]      Plan of Treatment:   Continue GDMT with  Entresto, beta-blockers, Aldactone, Bumex, and Jardiance  pacemaker interrogation, no shocks received per patient. [facility]oke to [facility] [employer] and stated no issues with pacer   Awaiting ECHO - patient  already in process of contacting [facility] for her valve   4. Paroxysmal A. Fib on Xarelto  5.      No ischemic workup required at this time    Electronically signed by NELLA Menchaca NP on 10/24/2022 at 3:27 PM  [address]  [phone]

## 2022-10-24 NOTE — CONSULTS
[address] [facility]   [address] [place] [address]    Neurology Consult Note            Date:   10/24/2022  Patient name:  Bhavesh Coles  Date of admission:  10/23/2022  3:05 AM  MRN:   [id number]  Account:  [de-identified]  YOB: 1955  PCP:    Treasure Yeung DO  Room:   [address] [place] [address]  Code Status:    Full Code    Chief Complaint:     Chief Complaint   Patient presents with    Chest Pain       History Obtained From:     patient, electronic medical record    History of Present Illness: The patient is a 75 y.o. female with significant past medical history of HTN, pulm hypertension, MR, cirrhosis, atrial fibrillation on xarelto, CHF s/p AICD EF 17%, hypothyroidism, prediabetes, obesity, was admitted yesterday, who presents with chest pain started around 10 PM yesterday substernal called EMS at 3 AM and by the time pain has subsided. She has been complaining of coldness sensation of the right upper and lower extremity mostly during night, does not remember how long it lasted as she goes to sleep while having the sensations. This has been ongoing for at least 2 weeks every night. Denies any abnormal seizure activity or tremors, denies any headache or weakness. Reports restricted movement of her right upper extremity due to rotator cuff Injury. Denies any blurry vision, speech difficulty facial asymmetry. Denies any back pain, neck pain. No history of stroke. Patient was found to be hypoxic in ED was placed on 4 L nasal cannula, chest x-ray showed patchy infiltrates in the perihilar and basilar regions. CT chest showed mild to moderate fibrotic atelectasis more than the left than on the right side, mycoplasma IgM are positive. Vital signs on presentation were stable, CMP, CBC were within normal limits  Neurology were consulted based on patient's request for his abnormal sensation on the right side.   Patient was requesting an EEG to know what is going on with her brain. Past Medical History:     Past Medical History:   Diagnosis Date    Abnormal Pap smear     Acute on chronic systolic congestive heart failure (HCC)     AICD (automatic cardioverter/defibrillator) present     MAURO (acute kidney injury) ([facility]) 03/04/2017    MAURO (acute kidney injury) ([facility]) 3/4/2017    Anemia     Arthritis     Atrial fibrillation ([facility]) 10/7/2022    Cardiomyopathy ([facility]) 12/20/2012    robotic replacement of 2 left ventricular leads/replacement of ICD generator    Cardiomyopathy, nonischemic ([facility]) 12/20/2012    Chronic combined systolic and diastolic congestive heart failure (HCC)     Cirrhosis ([facility]) 10/7/2022    Ejection fraction < 50%     HTN (hypertension)     Hypothyroidism     Ischemic cardiomyopathy     Left bundle branch block     Mitral regurgitation     Morbid obesity due to excess calories ([facility]) 01/15/2017    Pulmonary hypertension ([facility])     Sudden onset of severe headache     Torn rotator cuff     RIGHT SHOULDER        Past Surgical History:     Past Surgical History:   Procedure Laterality Date    COLONOSCOPY      HERNIA REPAIR      INSERT MIDLINE CATHETER  04/12/2022         OTHER SURGICAL HISTORY  12/20/2012    robotic placement of 2 left ventricular leads/replacement of ICD generator    OTHER SURGICAL HISTORY Left     ICD REPLACEMENT    PACEMAKER PLACEMENT      medtronic Viva XT CRT -D [id number] Serial : [id number]    TUBAL LIGATION          Medications Prior to Admission:     Prior to Admission medications    Medication Sig Start Date End Date Taking? Authorizing Provider   Misc. Devices (ROLLER Sargent) MISC 1 each by Does not apply route daily 10/24/22   Tee Peterson DO   traMADol (ULTRAM) 50 MG tablet Take 1 tablet by mouth 2 times daily as needed for Pain for up to 14 days. Intended supply: 7 days.  Take lowest dose possible to manage pain 10/19/22 11/2/22  Gretel Martínez MD   levothyroxine (SYNTHROID) 75 MCG tablet Take 1 tablet by mouth Daily 10/9/22 Jude Vallejo MD   bumetanide (BUMEX) 2 MG tablet Take 0.5 tablets by mouth daily 9/30/22   Celestina Parks MD   rivaroxaban (XARELTO) 20 MG TABS tablet Take 1 tablet by mouth daily Take 1 tablet by mouth daily 9/23/22   Reynaldo Shepard MD   acetaminophen (TYLENOL) 500 MG tablet Take 1 tablet by mouth every 6 hours as needed for Pain 8/5/22   Romero Biggs MD   metoprolol succinate (TOPROL XL) 50 MG extended release tablet Take 50 mg by mouth daily    Historical Provider, MD   amiodarone (CORDARONE) 200 MG tablet  12/1/21   Historical Provider, MD   empagliflozin (JARDIANCE) 10 MG tablet Take 10 mg by mouth daily 1/4/22   Historical Provider, MD   potassium chloride (KLOR-CON M) 20 MEQ extended release tablet Take 1 tablet by mouth daily  Patient taking differently: Take 20 mEq by mouth 2 times daily 12/8/21   Jude Vallejo MD   magnesium oxide (MAG-OX) 400 MG tablet Take 0.5 tablets by mouth daily 12/8/21   Reece French MD   nitroGLYCERIN (NITROSTAT) 0.4 MG SL tablet up to max of 3 total doses. If no relief after 1 dose, call 911. 6/29/21   Milan Miranda MD   sacubitril-valsartan (ENTRESTO) 24-26 MG per tablet Take 0.5 tablets by mouth 2 times daily 6/29/21   Milan Miranda MD   allopurinol (ZYLOPRIM) 100 MG tablet Take 1 tablet by mouth daily 2/18/19   Addy Latif MD   spironolactone (ALDACTONE) 25 MG tablet Take 0.5 tablets by mouth daily 2/18/19   Addy Latif MD        Allergies:     Codeine, Morphine, and Tramadol    Social History:     Tobacco:    reports that she has never smoked. She has never used smokeless tobacco.  Alcohol:      reports no history of alcohol use. Drug Use:  reports that she does not currently use drugs.     Family History:     Family History   Problem Relation Age of Onset    Cancer Other         ovarian    High Blood Pressure Mother     Other Mother         copd    Arthritis Neg Hx     Asthma Neg Hx     Birth Defects Neg Hx     Depression Neg Hx     Diabetes Neg Hx     Early Death Neg Hx     Hearing Loss Neg Hx     Heart Disease Neg Hx     High Cholesterol Neg Hx     Kidney Disease Neg Hx     Learning Disabilities Neg Hx     Mental Retardation Neg Hx     Mental Illness Neg Hx     Miscarriages / Stillbirths Neg Hx     Stroke Neg Hx     Substance Abuse Neg Hx     Vision Loss Neg Hx     Breast Cancer Neg Hx     Colon Cancer Neg Hx     Eclampsia Neg Hx     Hypertension Neg Hx     Ovarian Cancer Neg Hx      Labor Neg Hx     Spont Abortions Neg Hx        Review of Systems:     Review of Systems      Physical Exam:   /69   Pulse 69   Temp 98.6 °F (37 °C)   Resp 20   Ht 5' 6\" (1.676 m)   Wt 210 lb (95.3 kg)   SpO2 98%   BMI 33.89 kg/m²   Temp (24hrs), Av.1 °F (36.7 °C), Min:97.5 °F (36.4 °C), Max:98.6 °F (37 °C)    No results for input(s): POCGLU in the last 72 hours.     Intake/Output Summary (Last 24 hours) at 10/24/2022 1457  Last data filed at 10/23/2022 2000  Gross per 24 hour   Intake 500 ml   Output --   Net 500 ml         Neurologic Exam    GENERAL  Appears comfortable and in no distress   HEENT  NC/ AT   HEART  S1 and S2 heard; palpation of pulses: radial pulse    NECK  Supple and no bruits heard   MENTAL STATUS:  Alert, oriented, intact memory, no confusion, normal speech, normal language, no hallucination or delusion   CRANIAL NERVES: II     -      Visual fields intact to confrontation  III,IV,VI -  PERR, EOMs full, no ptosis  V     -     Normal facial sensation   VII    -     Normal facial symmetry  VIII   -     Intact hearing   IX,X -     Symmetrical palate  XI    -     Symmetrical shoulder shrug  XII   -     Midline tongue, no atrophy    MOTOR FUNCTION: RUE: Significant for good strength of grade 5/5 in proximal and distal muscle groups   LUE: Significant for good strength of grade 5/5 in proximal and distal muscle groups   RLE: Significant for good strength of grade 5/5 in proximal and distal muscle groups   LLE: Collection Time: 10/24/22  5:27 AM   Result Value Ref Range    Albumin 3.4 (L) 3.5 - 5.2 g/dL    Alkaline Phosphatase 197 (H) 35 - 104 U/L    ALT 12 5 - 33 U/L    AST 17 <32 U/L    Total Bilirubin 1.1 0.3 - 1.2 mg/dL    Bilirubin, Direct 0.4 (H) <0.31 mg/dL    Bilirubin, Indirect 0.7 0.00 - 1.00 mg/dL    Total Protein 6.9 6.4 - 8.3 g/dL    Albumin/Globulin Ratio 1.0 1.0 - 2.5         Assessment :      Primary Problem  AICD malfunction, initial encounter    Active Hospital Problems    Diagnosis Date Noted    Essential hypertension [I10] 08/08/2014     Priority: High    PAH (pulmonary artery hypertension) ([facility] [address] 34 on Echo  [I27.21] 08/08/2014     Priority: High    AICD malfunction, initial encounter [T82.118A] 10/23/2022     Priority: Medium    Acute combined systolic (congestive) and diastolic (congestive) heart failure ([address] [I50.41] 10/23/2022     Priority: Medium    Acute respiratory failure with hypoxia ([address] [J96.01] 10/23/2022     Priority: Medium    Cirrhosis ([address] [K74.60] 10/07/2022     Priority: Medium    Atrial fibrillation ([address] [I48.91] 10/07/2022     Priority: Medium    Chest pain [R07.9] 06/26/2021    Hypothyroidism [E03.9]     Gout [M10.9] 09/05/2019    AICD (automatic cardioverter/defibrillator) present [Z95.810] 03/04/2017    Mitral regurgitation [I34.0] 01/14/2017       Patient is a 75 y.o. Non- / non  female history of hypertension, pulmonary hypertension, cirrhosis, A. fib on Xarelto, CHF s/p AICD, presented with chest pain, substernal, requested to see neurologist for right upper and lower extremity cold sensation. Reports right upper extremity weakness due to rotator cuff injury. Plan: Will discuss with attending need for imaging for head or spine.   Further recommendations may follow-up    Consultations:   IP CONSULT TO CARDIOLOGY  IP CONSULT TO INTERNAL MEDICINE  IP CONSULT TO CASE MANAGEMENT  IP CONSULT TO NEUROLOGY      Follow-up further recommendations after discussing the case with attending  The plan was discussed with the patient, patient's family and the medical staff. Patient is admitted as inpatient status because of co-morbidities listed above, severity of signs and symptoms as outlined, requirement for current medical therapies and most importantly because of direct risk to patient if care not provided in a hospital setting.     Shyam aBnuelos MD  Neurology Resident PGY-2  10/24/2022  2:57 PM    Copy sent to Dr. Nicci Razo DO

## 2022-10-24 NOTE — PLAN OF CARE
Problem: Respiratory - Adult  Goal: Achieves optimal ventilation and oxygenation  Outcome: Progressing   BRONCHOSPASM/BRONCHOCONSTRICTION   [x]  IMPROVE AERATION/BREATH SOUNDS  [x]   ADMINISTER BRONCHODILATOR THERAPY AS APPROPRIATE  [x]   ASSESS BREATH SOUNDS  []   IMPLEMENT AEROSOL/MDI PROTOCOL  [x]   PATIENT EDUCATION AS NEEDED

## 2022-10-24 NOTE — CARE COORDINATION
10/24/22 1120   Service Assessment   Patient Orientation Alert and Oriented;Person;Place;Situation   Cognition Alert   History Provided By Patient   Primary Caregiver Self   Support Systems Parent; Family Members; Children   PCP Verified by CM Yes   Last Visit to PCP Within last 3 months   Prior Functional Level Independent in ADLs/IADLs   Current Functional Level Independent in ADLs/IADLs   Can patient return to prior living arrangement Yes   Ability to make needs known: Good   Family able to assist with home care needs: Yes   Financial Resources Medicaid; Medicare   Community Resources ECF/Home Care  (home health  through [address])   Social/Functional History   Lives With Parent; Family  (mom and brother)   Type of [address] One level   Home Access Stairs to enter without rails   Entrance Stairs - Number of Steps [address] Help From [address] Responsibilities Yes   Ambulation Assistance Independent   Transfer Assistance Independent   Active  No   Mode of Transportation Family   Discharge Planning   Type of Residence Apartment   Living Arrangements Family Members;Parent  (mom and brother)   Current Services Prior To Admission [facility]  (can, [facility])   Current DME Prior to [employer] Needed N/A   DME Ordered? No   Potential Assistance Purchasing Medications No   Type of Home Care Services PT;OT;Nursing Services   Patient expects to be discharged to: Apartment   One/Two Story Residence One story   History of falls? 0   Services At/After Discharge   [facility] Information Provided?  No   Mode of Transport at Discharge Other (see comment)  (family)   Condition of Participation: Discharge Planning   The Plan for Transition of Care is related to the following treatment goals: comfort   The Patient and/or Patient Representative was provided with a Choice of Provider? Patient   The Patient and/Or Patient Representative agree with the Discharge Plan? Yes   Freedom of Choice list was provided with basic dialogue that supports the patient's individualized plan of care/goals, treatment preferences, and shares the quality data associated with the providers? Yes       Met with patient to discuss transitional planning. Plan is home with family and [facility]. Patient reports she is currently using [facility] and would like referral sent to them. Patient will have a ride home.  Patient agreeable to plan

## 2022-10-24 NOTE — TELEPHONE ENCOUNTER
Can you please make sure to addend your note from 10/19 and state why the rollator is needed thank you

## 2022-10-24 NOTE — PROGRESS NOTES
Congestive Heart Failure Education . Pt is established with  [facility] .  Pt is refusing  [facility] education today, but is agreeable to setting up next appt

## 2022-10-25 ENCOUNTER — TELEPHONE (OUTPATIENT)
Dept: INTERNAL MEDICINE | Age: 67
End: 2022-10-25

## 2022-10-25 VITALS
DIASTOLIC BLOOD PRESSURE: 71 MMHG | RESPIRATION RATE: 18 BRPM | HEIGHT: 66 IN | BODY MASS INDEX: 33.75 KG/M2 | SYSTOLIC BLOOD PRESSURE: 108 MMHG | HEART RATE: 70 BPM | OXYGEN SATURATION: 97 % | WEIGHT: 210 LBS | TEMPERATURE: 97.9 F

## 2022-10-25 DIAGNOSIS — Z91.81 AT HIGH RISK FOR FALLS: Primary | ICD-10-CM

## 2022-10-25 PROBLEM — Z95.0 PACEMAKER: Status: ACTIVE | Noted: 2022-10-25

## 2022-10-25 PROBLEM — R09.02 HYPOXIA: Status: ACTIVE | Noted: 2022-10-25

## 2022-10-25 LAB
ABSOLUTE EOS #: 0.1 K/UL (ref 0–0.44)
ABSOLUTE IMMATURE GRANULOCYTE: <0.03 K/UL (ref 0–0.3)
ABSOLUTE LYMPH #: 1.22 K/UL (ref 1.1–3.7)
ABSOLUTE MONO #: 0.79 K/UL (ref 0.1–1.2)
ANION GAP SERPL CALCULATED.3IONS-SCNC: 13 MMOL/L (ref 9–17)
BASOPHILS # BLD: 1 % (ref 0–2)
BASOPHILS ABSOLUTE: 0.04 K/UL (ref 0–0.2)
BUN BLDV-MCNC: 15 MG/DL (ref 8–23)
CALCIUM SERPL-MCNC: 8.9 MG/DL (ref 8.6–10.4)
CHLORIDE BLD-SCNC: 104 MMOL/L (ref 98–107)
CO2: 21 MMOL/L (ref 20–31)
CREAT SERPL-MCNC: 1.01 MG/DL (ref 0.5–0.9)
EKG ATRIAL RATE: 163 BPM
EKG Q-T INTERVAL: 122 MS
EKG QRS DURATION: 126 MS
EKG QTC CALCULATION (BAZETT): 200 MS
EKG R AXIS: 59 DEGREES
EKG T AXIS: 0 DEGREES
EKG VENTRICULAR RATE: 163 BPM
EOSINOPHILS RELATIVE PERCENT: 2 % (ref 1–4)
ESTIMATED AVERAGE GLUCOSE: 103 MG/DL
GFR SERPL CREATININE-BSD FRML MDRD: >60 ML/MIN/1.73M2
GLUCOSE BLD-MCNC: 90 MG/DL (ref 70–99)
HBA1C MFR BLD: 5.2 % (ref 4–6)
HCT VFR BLD CALC: 32.4 % (ref 36.3–47.1)
HEMOGLOBIN: 11.5 G/DL (ref 11.9–15.1)
IMMATURE GRANULOCYTES: 0 %
LV EF: 23 %
LVEF MODALITY: NORMAL
LYMPHOCYTES # BLD: 20 % (ref 24–43)
MCH RBC QN AUTO: 29.9 PG (ref 25.2–33.5)
MCHC RBC AUTO-ENTMCNC: 35.5 G/DL (ref 28.4–34.8)
MCV RBC AUTO: 84.2 FL (ref 82.6–102.9)
MONOCYTES # BLD: 13 % (ref 3–12)
NRBC AUTOMATED: 0 PER 100 WBC
PDW BLD-RTO: 14.5 % (ref 11.8–14.4)
PLATELET # BLD: 212 K/UL (ref 138–453)
PMV BLD AUTO: 11.2 FL (ref 8.1–13.5)
POTASSIUM SERPL-SCNC: 3.8 MMOL/L (ref 3.7–5.3)
RBC # BLD: 3.85 M/UL (ref 3.95–5.11)
RBC # BLD: ABNORMAL 10*6/UL
SEG NEUTROPHILS: 64 % (ref 36–65)
SEGMENTED NEUTROPHILS ABSOLUTE COUNT: 4.03 K/UL (ref 1.5–8.1)
SODIUM BLD-SCNC: 138 MMOL/L (ref 135–144)
WBC # BLD: 6.2 K/UL (ref 3.5–11.3)

## 2022-10-25 PROCEDURE — 6370000000 HC RX 637 (ALT 250 FOR IP)

## 2022-10-25 PROCEDURE — 85025 COMPLETE CBC W/AUTO DIFF WBC: CPT

## 2022-10-25 PROCEDURE — 99232 SBSQ HOSP IP/OBS MODERATE 35: CPT | Performed by: INTERNAL MEDICINE

## 2022-10-25 PROCEDURE — 93010 ELECTROCARDIOGRAM REPORT: CPT | Performed by: INTERNAL MEDICINE

## 2022-10-25 PROCEDURE — 6370000000 HC RX 637 (ALT 250 FOR IP): Performed by: STUDENT IN AN ORGANIZED HEALTH CARE EDUCATION/TRAINING PROGRAM

## 2022-10-25 PROCEDURE — 94640 AIRWAY INHALATION TREATMENT: CPT

## 2022-10-25 PROCEDURE — 97165 OT EVAL LOW COMPLEX 30 MIN: CPT

## 2022-10-25 PROCEDURE — 99232 SBSQ HOSP IP/OBS MODERATE 35: CPT | Performed by: PSYCHIATRY & NEUROLOGY

## 2022-10-25 PROCEDURE — 2580000003 HC RX 258

## 2022-10-25 PROCEDURE — 80048 BASIC METABOLIC PNL TOTAL CA: CPT

## 2022-10-25 PROCEDURE — 36415 COLL VENOUS BLD VENIPUNCTURE: CPT

## 2022-10-25 PROCEDURE — 97530 THERAPEUTIC ACTIVITIES: CPT

## 2022-10-25 PROCEDURE — 93306 TTE W/DOPPLER COMPLETE: CPT

## 2022-10-25 RX ORDER — ALBUTEROL SULFATE 90 UG/1
2 AEROSOL, METERED RESPIRATORY (INHALATION) EVERY 6 HOURS PRN
Qty: 18 G | Refills: 3 | Status: SHIPPED | OUTPATIENT
Start: 2022-10-25

## 2022-10-25 RX ORDER — LEVOFLOXACIN 750 MG/1
750 TABLET ORAL DAILY
Qty: 5 TABLET | Refills: 0 | Status: SHIPPED | OUTPATIENT
Start: 2022-10-26 | End: 2022-10-31

## 2022-10-25 RX ADMIN — ALLOPURINOL 100 MG: 100 TABLET ORAL at 10:21

## 2022-10-25 RX ADMIN — AMIODARONE HYDROCHLORIDE 200 MG: 200 TABLET ORAL at 10:20

## 2022-10-25 RX ADMIN — SODIUM CHLORIDE, PRESERVATIVE FREE 10 ML: 5 INJECTION INTRAVENOUS at 10:18

## 2022-10-25 RX ADMIN — LEVOTHYROXINE SODIUM 75 MCG: 75 TABLET ORAL at 06:30

## 2022-10-25 RX ADMIN — LEVOFLOXACIN 750 MG: 750 TABLET, FILM COATED ORAL at 10:14

## 2022-10-25 RX ADMIN — RIVAROXABAN 20 MG: 20 TABLET, FILM COATED ORAL at 10:13

## 2022-10-25 RX ADMIN — BUMETANIDE 1 MG: 1 TABLET ORAL at 10:19

## 2022-10-25 RX ADMIN — BUDESONIDE AND FORMOTEROL FUMARATE DIHYDRATE 2 PUFF: 80; 4.5 AEROSOL RESPIRATORY (INHALATION) at 10:11

## 2022-10-25 RX ADMIN — ACETAMINOPHEN 650 MG: 325 TABLET ORAL at 03:49

## 2022-10-25 RX ADMIN — SACUBITRIL AND VALSARTAN 0.5 TABLET: 24; 26 TABLET, FILM COATED ORAL at 10:22

## 2022-10-25 RX ADMIN — FAMOTIDINE 20 MG: 20 TABLET, FILM COATED ORAL at 10:15

## 2022-10-25 ASSESSMENT — PAIN DESCRIPTION - LOCATION: LOCATION: HEAD

## 2022-10-25 NOTE — PLAN OF CARE
Problem: Discharge Planning  Goal: Discharge to home or other facility with appropriate resources  Outcome: Progressing     Problem: Pain  Goal: Verbalizes/displays adequate comfort level or baseline comfort level  Outcome: Progressing     Problem: Safety - Adult  Goal: Free from fall injury  Outcome: Progressing     Problem: Respiratory - Adult  Goal: Achieves optimal ventilation and oxygenation  Outcome: Progressing     Problem: Chronic Conditions and Co-morbidities  Goal: Patient's chronic conditions and co-morbidity symptoms are monitored and maintained or improved  Outcome: Progressing

## 2022-10-25 NOTE — CARE COORDINATION
Discharge [facility] Case Management Department  Written by: Irene Ruvalcaba RN    Patient Name: Lizbeth Quijano  Attending Provider: Maurice Spencer MD  Admit Date: 10/23/2022  3:05 AM  MRN: [id number]  Account: [de-identified]                     : 1955  Discharge Date: 10/25/22      Disposition: home    Met with patient to discuss transitional planning. Plan is home. Patient has orders for rollator walker. Discussed DME companies and provided freedom of choice. Patient would like [facility] and would like rollator delivered to home. Notified [facility] [place] with [facility] and faxed order, face to face and facesheet to [facility]. Instructed patient to call [facility] office when she gets home this evening to arrange for delivery. Patient states she will need cab ride home.  Patient agreeable to plan    Irene Ruvalcaba RN

## 2022-10-25 NOTE — PROGRESS NOTES
[facility]  Progress Note                   Date:   10/25/2022  Patient name: Danielle Gipson  Date of admission:  10/23/2022  3:05 AM  MRN:   [id number]  YOB: 1955  PCP: Viri Bowen DO    Reason for Admission: Pacemaker [Z95.0]  Hypoxia [R09.02]  AICD malfunction, initial encounter [T82.118A]  Acute combined systolic (congestive) and diastolic (congestive) heart failure ([address] [I50.41]  Chest pain, unspecified type [R07.9]    Subjective:       Clinical Changes /Abnormalities:Patient seen and examined in the room. She denies any CP or SOB today. She is feeling much better     Review of Systems    Medications:   Scheduled Meds:   bumetanide  1 mg Oral Daily    levoFLOXacin  750 mg Oral Daily    allopurinol  100 mg Oral Daily    amiodarone  200 mg Oral Daily    levothyroxine  75 mcg Oral Daily    rivaroxaban  20 mg Oral Daily    sacubitril-valsartan  0.5 tablet Oral BID    spironolactone  12.5 mg Oral Daily    sodium chloride flush  5-40 mL IntraVENous 2 times per day    budesonide-formoterol  2 puff Inhalation Daily    metoprolol succinate  50 mg Oral Daily    famotidine  20 mg Oral BID     Continuous Infusions:   sodium chloride       CBC:   Recent Labs     10/23/22  0329 10/24/22  0527 10/25/22  0624   WBC 9.6 6.9 6.2   HGB 13.4 12.0 11.5*    215 212       BMP:    Recent Labs     10/23/22  0329 10/24/22  0527 10/25/22  0624    137 138   K 4.1 3.6* 3.8    101 104   CO2 21 22 21   BUN 14 15 15   CREATININE 1.10* 0.94* 1.01*   GLUCOSE 137* 93 90       Hepatic:  Recent Labs     10/23/22  0329 10/24/22  0527   AST 26 17   ALT 17 12   BILITOT 0.8 1.1   ALKPHOS 263* 197*       Troponin:   Recent Labs     10/23/22  0329 10/23/22  0447 10/23/22  0556   TROPHS 20* 21* 19*       BNP: No results for input(s): BNP in the last 72 hours. Lipids: No results for input(s): CHOL, HDL in the last 72 hours.     Invalid input(s): LDLCALCU  INR: No results for input(s): INR in the last 72 hours. DATA:    Diagnostics:       EKG:   Ventricular paced rhythm, non-specific st-t wave changes     ECHO:   previously taken 6/2021 and show EF 17%, severe MR, patient ICD leads seen in the right atrium/ventricle. Increased septal wall thickness. EF 17% estimated RVSP 49. Objective:   Vitals: /71   Pulse 70   Temp 97.9 °F (36.6 °C) (Oral)   Resp 18   Ht 5' 6\" (1.676 m)   Wt 210 lb (95.3 kg)   SpO2 97%   BMI 33.89 kg/m²   General appearance: alert and cooperative with exam  HEENT: Head: Normocephalic, no lesions, without obvious abnormality. Neck:no JVD, trachea midline, no adenopathy  Lungs: Clear to auscultation  Heart: Regular rate and rhythm, s1/s2 auscultated, no murmurs  Abdomen: soft, non-tender, bowel sounds active  Extremities: no edema  Neurologic: not done        Assessment / Acute Cardiac Problems:   Atypical noncardiac chest pain likely GI in origin with minimal flat troponin elevation consistent with type 2 MI due to underlying cardiomyopathy  Acute on chronic systolic congestive heart failure since she ran out of [employer] for a couple of days with volume overload  Nonischemic cardiomyopathy EF 17% s/p AICD, CRT-D revision done in August 2022 - Atrial and RV lead appeared in excellent condition, with stable impedances and threshholds at the time. Last cath 10 years ago with clean coronaries. Severe MR  Paroxysmal A.  Fib on Xarelto  MAURO  Hypertension  Hypothyroidism  History of liver cirrhosis  History of diverticular abscess, recently completed antibiotics       Patient Active Problem List:     Hypokalemia     Dilated cardiomyopathy ([address]     Pre-diabetes     Essential hypertension     PAH (pulmonary artery hypertension) ([address] 34 on Echo      Mitral regurgitation     Chronic headache     Class 1 obesity due to excess calories with body mass index (BMI) of 33.0 to 33.9 in adult     AICD (automatic cardioverter/defibrillator) present     Syncope and collapse secondary to VTAC     ICD (implantable cardioverter-defibrillator) battery depletion     Gout     Dizziness     Benign paroxysmal positional vertigo     Chronic heart failure with reduced ejection fraction and diastolic dysfunction (HCC)     NSVT (nonsustained ventricular tachycardia)     Chest pain     Hypothyroidism     Chronic combined systolic (congestive) and diastolic (congestive) heart failure (HCC)     Acute diverticulitis     Abscess of sigmoid colon     Diverticulitis     Nausea & vomiting     Diverticulitis of large intestine with abscess without bleeding     Diverticulitis of colon     Lower abdominal pain     Colonic diverticular abscess     Intra-abdominal abscess (HCC)     Cirrhosis (HCC)     Atrial fibrillation (HCC)     QT prolongation     AICD malfunction, initial encounter     Acute combined systolic (congestive) and diastolic (congestive) heart failure (HCC)     Acute respiratory failure with hypoxia ([address]      Plan of Treatment:   Continue GDMT with  Entresto, beta-blockers, Aldactone, Bumex, and Jardiance  pacemaker interrogation, no shocks received per patient. Spoke to [employer] and stated no issues with pacer   Awaiting ECHO - patient  already in process of contacting [facility] for her valve   4. Paroxysmal A. Fib on Xarelto  5. No ischemic workup required at this time  6.    If ECHO low risk ok to d/c from cardio standpoint     Electronically signed by NELLA Dooley CNP on 10/25/2022 at [address].  [phone]

## 2022-10-25 NOTE — DISCHARGE INSTR - COC
Continuity of Care Form    Patient Name: Kike Green   :    MRN:  [id number]    Admit date:  10/23/2022  Discharge date:  ***    Code Status Order: Full Code   Advance Directives:     Admitting Physician:  Cony Lo MD  PCP: Bishnu Cisneros DO    Discharging Nurse: [facility] Unit/Room#: 0331/0331-01  Discharging Unit Phone Number: ***    Emergency Contact:   Extended Emergency Contact Information  Primary Emergency Contact: Ragini Guillen  Address: NOT AVAILABLE   [address] Phone: [phone]  Work Phone: [phone]  Mobile Phone: [phone]  Relation: Child    Past Surgical History:  Past Surgical History:   Procedure Laterality Date    COLONOSCOPY      HERNIA REPAIR      INSERT MIDLINE CATHETER  2022         OTHER SURGICAL HISTORY  2012    robotic placement of 2 left ventricular leads/replacement of ICD generator    OTHER SURGICAL HISTORY Left     ICD REPLACEMENT    PACEMAKER PLACEMENT      medtronic Viva XT CRT -D [id number] Serial : [id number]    TUBAL LIGATION         Immunization History:   Immunization History   Administered Date(s) Administered    COVID-19, PFIZER PURPLE top, DILUTE for use, (age 15 y+), 30mcg/0.3mL 2021, 2021    Influenza Virus Vaccine 2012, 2018    Influenza, AFLURIA (age 1 yrs+), FLUZONE, (age 10 mo+), MDV, 0.5mL 2016    Influenza, FLUARIX, FLULAVAL, FLUZONE (age 10 mo+) AND AFLURIA, (age 1 y+), PF, 0.5mL 2022    Influenza, FLUZONE (age 72 y+), High Dose, 0.7mL 2022    Influenza, High Dose (Fluzone 65 yrs and older) 2019    Influenza, Quadv, 6 mo and older, IM (Fluzone, Flulaval) 2018    Pneumococcal Conjugate 13-valent (Grpyjdt17) 2019    Pneumococcal Polysaccharide (Sjfeendsr57) 2019       Active Problems:  Patient Active Problem List   Diagnosis Code    Hypokalemia E87.6    Dilated cardiomyopathy ([facility] I42.0    Pre-diabetes R73.03    Essential hypertension I10    PAH (pulmonary artery hypertension) (HCC) 34 on Echo  I27.21    Mitral regurgitation I34.0    Chronic headache R51.9, G89.29    Class 1 obesity due to excess calories with body mass index (BMI) of 33.0 to 33.9 in adult E66.09, Z68.33    AICD (automatic cardioverter/defibrillator) present Z95.810    Syncope and collapse secondary to [facility] R55    ICD (implantable cardioverter-defibrillator) battery depletion Z45.02    Gout M10.9    Dizziness R42    Benign paroxysmal positional vertigo H81.10    Chronic heart failure with reduced ejection fraction and diastolic dysfunction (HCC) I50.42    NSVT (nonsustained ventricular tachycardia) I47.29    Chest pain R07.9    Hypothyroidism E03.9    Chronic combined systolic (congestive) and diastolic (congestive) heart failure (HCC) I50.42    Acute diverticulitis K57.92    Abscess of sigmoid colon K63.0    Diverticulitis K57.92    Nausea & vomiting R11.2    Diverticulitis of large intestine with abscess without bleeding K57.20    Diverticulitis of colon K57.32    Lower abdominal pain R10.30    Colonic diverticular abscess K57.20    Intra-abdominal abscess (HCC) K65.1    Cirrhosis (HCC) K74.60    Atrial fibrillation (HCC) I48.91    QT prolongation R94.31    AICD malfunction, initial encounter T82.118A    Acute combined systolic (congestive) and diastolic (congestive) heart failure (HCC) I50.41    Acute respiratory failure with hypoxia (HCC) J96.01    Mycoplasma pneumonia J15.7    Paresthesia of right arm and leg R20.2       Isolation/Infection:   Isolation            Droplet          Patient Infection Status       Infection Onset Added Last Indicated Last Indicated By Review Planned Expiration Resolved Resolved By    Mycoplasma pneumonia 10/23/22 10/24/22 10/23/22 Mycoplasma pneumoniae antibody, IgM 10/31/22 11/02/22      Resolved    COVID-19 (Rule Out) 10/23/22 10/23/22 10/23/22 Respiratory Panel, Molecular, with COVID-19 (Restricted: peds pts or suitable admitted adults) (Ordered)   10/23/22 Rule-Out Test Resulted    COVID-19 (Rule Out) 10/23/22 10/23/22 10/23/22 COVID-19, Rapid (Ordered)   10/23/22 Rule-Out Test Resulted    COVID-19 21 COVID-19   21     COVID-19 (Rule Out) 21 COVID-19 (Ordered)   21 Rule-Out Test Resulted            Nurse Assessment:  Last Vital Signs: /71   Pulse 70   Temp 97.9 °F (36.6 °C) (Oral)   Resp 18   Ht 5' 6\" (1.676 m)   Wt 210 lb (95.3 kg)   SpO2 97%   BMI 33.89 kg/m²     Last documented pain score (0-10 scale): Pain Level: 4  Last Weight:   Wt Readings from Last 1 Encounters:   10/25/22 210 lb (95.3 kg)     Mental Status:  {IP PT MENTAL STATUS:}    IV Access:  { TIM IV ACCESS:876093907}    Nursing Mobility/ADLs:  Walking   {CHP DME MLSQ:161481476}  Transfer  {CHP DME KPPW:283520429}  Bathing  {CHP DME URER:234815020}  Dressing  {CHP DME YDND:825381961}  Toileting  {CHP DME NRJZ:583454783}  Feeding  {CHP DME FEOE:326962012}  Med Admin  {P DME ELDF:729890856}  Med Delivery   { TIM MED Delivery:830592399}    Wound Care Documentation and Therapy:  Incision 08/15/22 Sternum Left;Upper (Active)   Dressing Status Clean;Dry; Intact 10/24/22 0800   Dressing/Treatment Open to air 10/24/22 0800   Drainage Amount None 10/24/22 0800   Number of days: 71       Incision 19 (Active)   Number of days: 1240        Elimination:  Continence: Bowel: {YES / RK:81728}  Bladder: {YES / TP:44721}  Urinary Catheter: {Urinary Catheter:805092181}   Colostomy/Ileostomy/Ileal Conduit: {YES / U}       Date of Last BM: ***  No intake or output data in the 24 hours ending 10/25/22 1611  I/O last 3 completed shifts:   In: 500 [P.O.:500]  Out: -     Safety Concerns:     [address] Safety Concerns:939020529}    Impairments/Disabilities:      [address] Impairments/Disabilities:497796090}    Nutrition Therapy:  Current Nutrition Therapy:   [address] Diet List:541232508}    Routes of Feeding: {CHP DME Other Feedings:675984394}  Liquids: {Slp liquid thickness:33108}  Daily Fluid Restriction: {CHP DME Yes amt example:574986869}  Last Modified Barium Swallow with Video (Video Swallowing Test): {Done Not Done AIHX:758037899}    Treatments at the Time of Hospital Discharge:   Respiratory Treatments: ***  Oxygen Therapy:  {Therapy; copd oxygen:81103}  Ventilator:    {[facility] Vent VINW:044001386}    Rehab Therapies: {THERAPEUTIC INTERVENTION:5181116715}  Weight Bearing Status/Restrictions: {[facility] Weight Bearin}  Other Medical Equipment (for information only, NOT a DME order):  {EQUIPMENT:720156819}  Other Treatments: ***    Patient's personal belongings (please select all that are sent with patient):  {[facility] DME Belongings:741787973}    RN SIGNATURE:  {Esignature:235740335}    CASE MANAGEMENT/SOCIAL WORK SECTION    Inpatient Status Date: ***    Readmission Risk Assessment Score:  Readmission Risk              Risk of Unplanned Readmission:  27           Discharging to Facility/ Agency   Name:   Address:  Phone:  Fax:    Dialysis Facility (if applicable)   Name:  Address:  Dialysis Schedule:  Phone:  Fax:    / signature: {Esignature:281426280}    PHYSICIAN SECTION    Prognosis: Fair    Condition at Discharge: Stable    Rehab Potential (if transferring to Rehab): Fair    Recommended Labs or Other Treatments After Discharge:   Start taking levofloxacin for 7 days for pneumonia   Follow up with cardiology outpatient. Check you blood pressure regularly at home   Follow up with CHF clininc. follow low salt diet and fluid restriction to 1500ml daily      Physician Certification: I certify the above information and transfer of Elise Sinclair  is necessary for the continuing treatment of the diagnosis listed and that she requires [address] for greater 30 days.      Update Admission H&P: No change in H&P    PHYSICIAN SIGNATURE:  {Esignature:429395448}

## 2022-10-25 NOTE — PROGRESS NOTES
SPIRITUAL CARE DEPARTMENT - [facility]  PROGRESS NOTE    Shift date: 10/24/22  Shift day: Monday   Shift # 2    Room # 0331/0331-01   Name: Josiah Lr                Baptism:  [address] [place] [address] of Mu-ism:     Referral: Routine Visit    Admit Date & Time: 10/23/2022  3:05 AM    Assessment:  Josiah Lr is a 75 y.o. female       Intervention:  Writer introduced self and title as . Patient had requested  presence and engaged in conversation about her health and its impact. Writer offered space for patient  to express feelings, needs, and concerns and provided a ministry presence. Patient discussed her strong esperanza, biblical authors, and the current season in life.  validated patient feelings/emotions and engaged in conversation of biblical authors. Patient requested prayer which  facilitated. Outcome:  Patient expressed gratitude for bible and  visit. Plan:  Chaplains will remain available to offer spiritual and emotional support as needed.       Electronically signed by Bruno Kim on 10/24/2022 at 11:56 PM.  Hugo Ernst  [phone]

## 2022-10-25 NOTE — CARE COORDINATION
Transitional planning note: [facility] can [facility] patient back for home care when ready for d/c.  They will need TIM faxed to them

## 2022-10-25 NOTE — PROGRESS NOTES
Occupational Therapy  Facility/Department: [facility] RENAL//MED SURG  Occupational Therapy Initial Assessment    Name: Tata Sinclair  : 1955  MRN: [id number]  Date of Service: 10/25/2022    Discharge Recommendations:   No therapy recommended at discharge. OT Equipment Recommendations  Equipment Needed: Yes  Mobility Devices: Bryan Said: Rollator (4 Wheeled)       Patient Diagnosis(es): The primary encounter diagnosis was Chest pain, unspecified type. Diagnoses of Hypoxia and Pacemaker were also pertinent to this visit. Past Medical History:  has a past medical history of Abnormal Pap smear, Acute on chronic systolic congestive heart failure ([address] AICD (automatic cardioverter/defibrillator) present, MAURO (acute kidney injury) ([address] MAURO (acute kidney injury) ([address] Anemia, Arthritis, Atrial fibrillation ([address] Cardiomyopathy ([address] Cardiomyopathy, nonischemic ([address] Chronic combined systolic and diastolic congestive heart failure ([address] Cirrhosis ([address] Ejection fraction < 50%, HTN (hypertension), Hypothyroidism, Ischemic cardiomyopathy, Left bundle branch block, Mitral regurgitation, Morbid obesity due to excess calories ([address] Pulmonary hypertension ([address] Sudden onset of severe headache, and Torn rotator cuff. Past Surgical History:  has a past surgical history that includes other surgical history (2012); Tubal ligation; hernia repair; Colonoscopy; pacemaker placement; Insert Midline Catheter (2022); and other surgical history (Left). Assessment   Prognosis: Good  Decision Making: Low Complexity  No Skilled OT: Safe to return home; No OT goals identified  REQUIRES OT FOLLOW-UP: No  Activity Tolerance  Activity Tolerance: Patient Tolerated treatment well                Restrictions  Restrictions/Precautions  Required Braces or Orthoses?: No  Position Activity Restriction  Other position/activity restrictions: up with assist    Subjective   General  Patient assessed for rehabilitation services?: Yes  Family / Caregiver Present: No  Subjective  Subjective: \"My home therapists told me I need a rollator but I am having trouble getting one\" Pt requests DME at reccomendation for home OT/PT  General Comment  Comments: RN okayed for therapy. Pt agreeable to OT eval. Pt reports 0/10 at present     Social/Functional History  Social/Functional History  Lives With: Parent, Family (mom and brother)  Type of Home: Apartment (deplex)  Home Layout: Two level (3 bedroom duplex)  Home Access: Stairs to enter with rails  Entrance Stairs - Number of Steps: 3  Entrance Stairs - Rails: Both  Bathroom Shower/Tub: Tub/Shower unit, Shower chair with back  [employer]: Standard  Bathroom Equipment: Grab bars in shower, Grab bars around toilet, Hand-held shower, Shower chair  Home Equipment: [address] Help From: Family  ADL Assistance: Independent  Homemaking Assistance: Independent  Homemaking Responsibilities: Yes (shares with brother; Pt cleans, brother completes laundry/cooking)  Ambulation Assistance: Independent  Transfer Assistance: Independent  Active : Yes  Mode of Transportation: Car  Occupation: On disability  Type of Occupation: former cafateria/housekeeping at [employer]  Additional Comments: Pt reports she is a primary caregiver of mother with brother       Objective                Safety Devices  Type of Devices: Call light within reach; Left in bed;Nurse notified  Restraints  Restraints Initially in Place: No  Bed Mobility Training  Bed Mobility Training: Yes  Overall Level of Assistance: Independent  Rolling: Independent  Supine to Sit: Independent  Sit to Supine: Independent  Scooting: Independent  Balance  Sitting: Without support (~30 mins EOB IND)  Standing: With support (room distances w/RW SBA)  Transfer Training  Transfer Training: Yes  Overall Level of Assistance: Supervision  Sit to Stand: Supervision  Stand to Sit: Supervision  Gait  Overall Level of Assistance: Stand-by assistance  Assistive Device: Walker, rolling     AROM: Generally decreased, functional (R shoulder 0 degrees flexion; BUE WFL otherwise)  Strength: Generally decreased, functional (R shoulder 1/5; BUE 4-/5 grossly otherwise)  Coordination: Within functional limits  Tone: Normal  Sensation: Intact  ADL  Feeding: Independent  Grooming: Modified independent   UE Bathing: Modified independent   LE Bathing: Modified independent   UE Dressing: Modified independent   LE Dressing: Modified independent   Toileting: Modified independent   Additional Comments: Pt supine in bed at start of session. Pt IND bed mobility sup<>sit EOB. Pt Sup sit<>stand/ stand<>sit. Pt SBA functional mobility room distance w/RW. Pt able to don/doff B socks EOB SBA. Pt R shoulder AROM impaired following rotator cuff injury, however pt reports working with home therapy since August 2022 to regain independence with ADL's.  Pt has no acute OT needs at this time              Vision  Vision: Impaired  Vision Exceptions: Wears glasses for reading  Hearing  Hearing: Within functional limits  Cognition  Overall Cognitive Status: WFL  Orientation  Overall Orientation Status: Within Functional Limits                  Education Given To: Patient  Education Provided: Role of Therapy;Plan of Care  Education Method: Verbal  Barriers to Learning: None  Education Outcome: Verbalized understanding                                                                AM-PAC Score        AM-[facility] Inpatient Daily Activity Raw Score: 24 (10/25/22 1151)  AM-PAC Inpatient ADL T-Scale Score : 57.54 (10/25/22 1151)  ADL Inpatient CMS 0-100% Score: 0 (10/25/22 1151)  ADL Inpatient CMS G-Code Modifier : [facility] (10/25/22 1151)           Therapy Time   Individual Concurrent Group Co-treatment   Time In 1021         Time Out 1054         Minutes 33         Timed Code Treatment Minutes: Varela Proc. Pablo Ricky 1, OTR/L

## 2022-10-25 NOTE — PROGRESS NOTES
[facility]  Internal Medicine Teaching Residency Program  Inpatient Daily Progress Note  ______________________________________________________________________________    Patient: Ronaldo Fraire  YOB: 1955   [id number]:[id number]    Acct: [de-identified]     Room: [address] [place], [place] [address]  Admit date: 10/23/2022  Today's date: 10/25/22  Number of days in the hospital: 2    SUBJECTIVE   Admitting Diagnosis: AICD malfunction, initial encounter  CC: Chest pain    Pt examined at bedside. Chart & results reviewed. No acute events overnight  Afebrile, hemodynamically stable, saturating well on room air  Denies any acute complaints  Patient had Echo today, report pending  Appreciate Cardio and neurology recommendations      ROS:  Constitutional:  negative for chills, fevers, sweats  Respiratory:  negative for cough, dyspnea on exertion, hemoptysis, shortness of breath, wheezing  Cardiovascular:  negative for chest pain, chest pressure/discomfort, lower extremity edema, palpitations  Gastrointestinal:  negative for abdominal pain, constipation, diarrhea, nausea, vomiting  Neurological:  negative for dizziness, headache  BRIEF HISTORY     The patient is a pleasant 75 y.o. female presents with a chief complaint of chest pain. She reports that chest pain started around 10 PM yesterday which was dull, substernal and non radiating, associated with SOB, she thought it was due to acid reflux and took antacids which did not help, she called EMS at 3 AM in the morning after the pain would not subside. Past medical history is significant for CHF s/p AICD placement EF 17%. Severe MR, Paroxysmal A Fib, hypertension, hypothyroidism, liver cirrhosis. She was also recently admitted for diverticulitis with abscess and managed conservatively with antibiotics. In the ED, patient was hypoxic and was placed on 4 L nasal cannula, Troponin  trend is 20 >21 >19. ProBNP is 5489.  CXR shows patchy infiltrates in perihilar and bibasilar areas. Chest CT showed mild-to-moderate fibrotic/atelectatic changes, more in the left than right, negative for PE. Pacer interrogation showed lead impedance in the RV area. Cardiology was consulted from the ED. OBJECTIVE     Vital Signs:  BP 95/63   Pulse 71   Temp 98.8 °F (37.1 °C) (Oral)   Resp 18   Ht 5' 6\" (1.676 m)   Wt 210 lb (95.3 kg)   SpO2 96%   BMI 33.89 kg/m²     Temp (24hrs), Av.3 °F (36.8 °C), Min:97.6 °F (36.4 °C), Max:98.8 °F (37.1 °C)    No intake/output data recorded. Physical Exam:  Constitutional: This is a well developed, well nourished, 30-34.9 - Obesity Grade I 75y.o. year old female who is alert, oriented, cooperative and in no apparent distress. Head:normocephalic and atraumatic. Respiratory:  Breath sounds bilaterally were clear to auscultation. There were no wheezes, rhonchi or rales. There is no intercostal retraction or use of accessory muscles. Cardiovascular: Regular without murmur, clicks, gallops or rubs. Abdomen: Slightly rounded and soft without organomegaly. No rebound, rigidity or guarding was appreciated. Musculoskeletal: No gross muscle weakness. Extremities:  No lower extremity edema, ulcerations, tenderness, varicosities or erythema. Muscle size, tone and strength are normal.  No involuntary movements are noted. Skin:  Warm and dry. Good color, turgor and pigmentation. No lesions or scars.   No cyanosis or clubbing  Neurological/Psychiatric: The patient's general behavior, level of consciousness, thought content and emotional status is normal.        Medications:  Scheduled Medications:    bumetanide  1 mg Oral Daily    levoFLOXacin  750 mg Oral Daily    allopurinol  100 mg Oral Daily    amiodarone  200 mg Oral Daily    levothyroxine  75 mcg Oral Daily    rivaroxaban  20 mg Oral Daily    sacubitril-valsartan  0.5 tablet Oral BID    spironolactone  12.5 mg Oral Daily    sodium chloride flush  5-40 mL IntraVENous 2 times per day    budesonide-formoterol  2 puff Inhalation Daily    metoprolol succinate  50 mg Oral Daily    famotidine  20 mg Oral BID     Continuous Infusions:    sodium chloride       PRN Medicationsaluminum & magnesium hydroxide-simethicone, 30 mL, Q6H PRN  nitroGLYCERIN, 0.4 mg, Q5 Min PRN  sodium chloride flush, 5-40 mL, PRN  sodium chloride, , PRN  polyethylene glycol, 17 g, Daily PRN  acetaminophen, 650 mg, Q6H PRN   Or  acetaminophen, 650 mg, Q6H PRN  albuterol sulfate HFA, 2 puff, Q6H PRN  ipratropium-albuterol, 1 ampule, Q4H PRN  oxyCODONE-acetaminophen, 1 tablet, Q6H PRN  diphenhydrAMINE, 25 mg, Q6H PRN        Diagnostic Labs:  CBC:   Recent Labs     10/23/22  0329 10/24/22  0527 10/25/22  0624   WBC 9.6 6.9 6.2   RBC 4.49 3.99 3.85*   HGB 13.4 12.0 11.5*   HCT 39.0 33.8* 32.4*   MCV 86.9 84.7 84.2   RDW 15.1* 14.6* 14.5*    215 212     BMP:   Recent Labs     10/23/22  0329 10/24/22  0527 10/25/22  0624    137 138   K 4.1 3.6* 3.8    101 104   CO2 21 22 21   BUN 14 15 15   CREATININE 1.10* 0.94* 1.01*     BNP: No results for input(s): BNP in the last 72 hours. PT/INR: No results for input(s): PROTIME, INR in the last 72 hours. APTT: No results for input(s): APTT in the last 72 hours. CARDIAC ENZYMES: No results for input(s): CKMB, CKMBINDEX, TROPONINI in the last 72 hours.     Invalid input(s): CKTOTAL;3  FASTING LIPID PANEL:  Lab Results   Component Value Date    CHOL 180 05/23/2020    HDL 42 05/23/2020    TRIG 79 05/23/2020     LIVER PROFILE:   Recent Labs     10/23/22  0329 10/24/22  0527   AST 26 17   ALT 17 12   BILIDIR  --  0.4*   BILITOT 0.8 1.1   ALKPHOS 263* 197*      MICROBIOLOGY:   Lab Results   Component Value Date/Time    CULTURE PATIENT DISCHARGED BEFORE SAMPLE COLLECTED 12/06/2021 10:30 AM       Imaging:    XR CHEST PORTABLE    Result Date: 10/23/2022  Perihilar and bibasilar infiltrates which may represent multilobar pneumonia or pulmonary edema, with a small left-sided pleural effusion suspected. CT CHEST PULMONARY EMBOLISM W CONTRAST    Result Date: 10/23/2022  At the bases of both lungs there are mild-to-moderate fibrotic/atelectatic changes, left more than right, but infiltrates cannot be excluded. The peripheral small branches of pulmonary arteries at the posterior bases of both lungs are poorly visualized. In the rest of the pulmonary arterial system there is no definable pulmonary embolism. Evidence of mild-to-moderate cardiomegaly. There is reflux of contrast material from right atrium into inferior vena cava suggestive of right ventricular strain. Thoracic aorta is not opacified as there is no pass of contrast material into left heart due to timing of contrast bolus. Mild pulmonary emphysema. No pleural effusion or pneumothorax. Lobulated contour of liver suggestive of cirrhosis of liver. Cholelithiasis. ASSESSMENT & PLAN     ASSESSMENT / PLAN:     Principal Problem:    AICD malfunction, initial encounter  Active Problems:    Essential hypertension    PAH (pulmonary artery hypertension) (HCC) 34 on Echo     Cirrhosis ([address]    Atrial fibrillation (HCC)    Acute combined systolic (congestive) and diastolic (congestive) heart failure (HCC)    Acute respiratory failure with hypoxia (HCC)    Mycoplasma pneumonia    Paresthesia of right arm and leg    Mitral regurgitation    AICD (automatic cardioverter/defibrillator) present    Gout    Chest pain    Hypothyroidism  Resolved Problems:    * No resolved hospital problems.  *     Mycoplasma pneumonia  -Started on Levaquin  -Supplemental oxygen as needed    Chest pain  - GERD vs CHF exacerbation vs AICD malfunction vs pneumonia  - Troponin wnl , pro BNP elevated  - Pacer interrogation, [employer] reported no issues with pacer  - Mycoplasma IgM positive  - Cardiology consulted     Acute hypoxemic respiratory failure  -Likely due to CHF exacerbation, missed 2 doses of bumex  - Continue bumex   -Continue supplemental oxygen     A fib. resume amio, toprol and xarelto     HFrEF  -EF 17%,  s/p AICD placement, severe diastolic dysfunction, severe MR  -Resume bumex, aldactone, entresto and toprol  -Echo report pending     Hypothyroidism. Resume synthroid     Hypertension. Resume home meds     Gout. Resume allopurinol      DVT ppx : Xarelto  GI ppx: Pepcid    PT/OT: Consulted  Discharge Planning / SW:  consulted    Prem Anna MD  Internal Medicine Resident, PGY-1  [address], [place]  10/25/2022, 8:04 AM

## 2022-10-25 NOTE — DISCHARGE INSTRUCTIONS
Start taking levofloxacin for 7 days   Follow up with cardiology outpatient.    Check you blood pressure regularly at home   Follow up with CHF clininc. Advise to take low salt diet and fluid restriction to 1500ml daily

## 2022-10-25 NOTE — PROGRESS NOTES
[facility] Neurology   [address] [facility]    Progress Note             Date:   10/25/2022  Patient name:  Tata Sinclair  Date of admission:  10/23/2022  3:05 AM  MRN:   [id number]  Account:  [de-identified]  YOB: 1955  PCP:    Wilmar Colvin DO  Room:   [address] [place] [address]  Code Status:    Full Code    Chief Complaint:     Chief Complaint   Patient presents with    Chest Pain       Interval hx: The patient was seen and examined at bedside. Is vitally stable, alert and oriented x 3. No acute events overnight.  TSH, B12 and folate with wnl   Hgb a1c pending   Will need emg out pt         Brief History of Present Illness: The patient is a 75 y.o. female with significant past medical history of HTN, pulm hypertension, MR, cirrhosis, atrial fibrillation on xarelto, CHF s/p AICD EF 17%, hypothyroidism, prediabetes, obesity, was admitted yesterday, who presents with chest pain started around 10 PM yesterday substernal called EMS at 3 AM and by the time pain has subsided. She has been complaining of coldness sensation of the right upper and lower extremity mostly during night, does not remember how long it lasted as she goes to sleep while having the sensations. This has been ongoing for at least 2 weeks every night. Denies any abnormal seizure activity or tremors, denies any headache or weakness. Reports restricted movement of her right upper extremity due to rotator cuff Injury. Denies any blurry vision, speech difficulty facial asymmetry. Denies any back pain, neck pain. No history of stroke. Patient was found to be hypoxic in ED was placed on 4 L nasal cannula, chest x-ray showed patchy infiltrates in the perihilar and basilar regions. CT chest showed mild to moderate fibrotic atelectasis more than the left than on the right side, mycoplasma IgM are positive.    Vital signs on presentation were stable, CMP, CBC were within normal limits  Neurology were consulted based on patient's request for his abnormal sensation on the right side. Patient was requesting an EEG to know what is going on with her brain. TSH, b12 and folate were normal        Past Medical History:     Past Medical History:   Diagnosis Date    Abnormal Pap smear     Acute on chronic systolic congestive heart failure (HCC)     AICD (automatic cardioverter/defibrillator) present     MAURO (acute kidney injury) ([address] 03/04/2017    MAURO (acute kidney injury) ([address] 3/4/2017    Anemia     Arthritis     Atrial fibrillation ([address] 10/7/2022    Cardiomyopathy ([address] 12/20/2012    robotic replacement of 2 left ventricular leads/replacement of ICD generator    Cardiomyopathy, nonischemic ([facility] 12/20/2012    Chronic combined systolic and diastolic congestive heart failure (HCC)     Cirrhosis ([facility] 10/7/2022    Ejection fraction < 50%     HTN (hypertension)     Hypothyroidism     Ischemic cardiomyopathy     Left bundle branch block     Mitral regurgitation     Morbid obesity due to excess calories ([address] 01/15/2017    Pulmonary hypertension ([facility]     Sudden onset of severe headache     Torn rotator cuff     RIGHT SHOULDER        Past Surgical History:     Past Surgical History:   Procedure Laterality Date    COLONOSCOPY      HERNIA REPAIR      INSERT MIDLINE CATHETER  04/12/2022         OTHER SURGICAL HISTORY  12/20/2012    robotic placement of 2 left ventricular leads/replacement of ICD generator    OTHER SURGICAL HISTORY Left     ICD REPLACEMENT    PACEMAKER PLACEMENT      medtronic Viva XT CRT -D [id number] Serial : [id number]    TUBAL LIGATION          Medications Prior to Admission:     Prior to Admission medications    Medication Sig Start Date End Date Taking? Authorizing Provider   Misc. Devices (ROLLER [employer] [place]) MISC 1 each by Does not apply route daily 10/24/22   Nette Meehan DO   traMADol (ULTRAM) 50 MG tablet Take 1 tablet by mouth 2 times daily as needed for Pain for up to 14 days. Intended supply: 7 days. Take lowest dose possible to manage pain 10/19/22 11/2/22  Shama Daily MD   levothyroxine (SYNTHROID) 75 MCG tablet Take 1 tablet by mouth Daily 10/9/22   Jaxon Lopez MD   bumetanide (BUMEX) 2 MG tablet Take 0.5 tablets by mouth daily 9/30/22   Bony Ivy MD   rivaroxaban (XARELTO) 20 MG TABS tablet Take 1 tablet by mouth daily Take 1 tablet by mouth daily 9/23/22   Tamera Benavidez MD   acetaminophen (TYLENOL) 500 MG tablet Take 1 tablet by mouth every 6 hours as needed for Pain 8/5/22   Leland Abraham MD   metoprolol succinate (TOPROL XL) 50 MG extended release tablet Take 50 mg by mouth daily    Historical Provider, MD   amiodarone (CORDARONE) 200 MG tablet  12/1/21   Historical Provider, MD   empagliflozin (JARDIANCE) 10 MG tablet Take 10 mg by mouth daily 1/4/22   Historical Provider, MD   potassium chloride (KLOR-CON M) 20 MEQ extended release tablet Take 1 tablet by mouth daily  Patient taking differently: Take 20 mEq by mouth 2 times daily 12/8/21   Jaxon Lopez MD   magnesium oxide (MAG-OX) 400 MG tablet Take 0.5 tablets by mouth daily 12/8/21   Hayden Noguera MD   nitroGLYCERIN (NITROSTAT) 0.4 MG SL tablet up to max of 3 total doses. If no relief after 1 dose, call 911. 6/29/21   George Muniz MD   sacubitril-valsartan (ENTRESTO) 24-26 MG per tablet Take 0.5 tablets by mouth 2 times daily 6/29/21   George Muniz MD   allopurinol (ZYLOPRIM) 100 MG tablet Take 1 tablet by mouth daily 2/18/19   Zoraida Ho MD   spironolactone (ALDACTONE) 25 MG tablet Take 0.5 tablets by mouth daily 2/18/19   Zoraida Ho MD        Allergies:     Codeine, Morphine, and Tramadol    Social History:     Tobacco:    reports that she has never smoked. She has never used smokeless tobacco.  Alcohol:      reports no history of alcohol use. Drug Use:  reports that she does not currently use drugs.     Family History:     Family History   Problem Relation Age of Onset    Cancer Other         ovarian    High Blood Pressure Mother     Other Mother         copd    Arthritis Neg Hx     Asthma Neg Hx     Birth Defects Neg Hx     Depression Neg Hx     Diabetes Neg Hx     Early Death Neg Hx     Hearing Loss Neg Hx     Heart Disease Neg Hx     High Cholesterol Neg Hx     Kidney Disease Neg Hx     Learning Disabilities Neg Hx     Mental Retardation Neg Hx     Mental Illness Neg Hx     Miscarriages / Stillbirths Neg Hx     Stroke Neg Hx     Substance Abuse Neg Hx     Vision Loss Neg Hx     Breast Cancer Neg Hx     Colon Cancer Neg Hx     Eclampsia Neg Hx     Hypertension Neg Hx     Ovarian Cancer Neg Hx      Labor Neg Hx     Spont Abortions Neg Hx        Review of Systems:     Review of Systems   Neurological:  Positive for weakness and numbness. Negative for dizziness, tremors, seizures, speech difficulty, light-headedness and headaches. Physical Exam:   /71   Pulse 70   Temp 97.9 °F (36.6 °C) (Oral)   Resp 18   Ht 5' 6\" (1.676 m)   Wt 210 lb (95.3 kg)   SpO2 97%   BMI 33.89 kg/m²   Temp (24hrs), Av.1 °F (36.7 °C), Min:97.6 °F (36.4 °C), Max:98.8 °F (37.1 °C)    No results for input(s): POCGLU in the last 72 hours.   No intake or output data in the 24 hours ending 10/25/22 0903      Neurologic Exam     GENERAL  Appears comfortable and in no distress   HEENT  NC/ AT   HEART  S1 and S2 heard; palpation of pulses: radial pulse    NECK  Supple and no bruits heard   MENTAL STATUS:  Alert, oriented, intact memory, no confusion, normal speech, normal language, no hallucination or delusion   CRANIAL NERVES: II     -      Visual fields intact to confrontation  III,IV,VI -  PERR, EOMs full, no ptosis  V     -     Normal facial sensation   VII    -     Normal facial symmetry  VIII   -     Intact hearing   IX,X -     Symmetrical palate  XI    -     Symmetrical shoulder shrug  XII   -     Midline tongue, no atrophy    MOTOR FUNCTION: RUE: Significant for good strength of grade 5/5 in proximal and distal muscle groups   LUE: Significant for good strength of grade 5/5 in proximal and distal muscle groups   RLE: Significant for good strength of grade 5/5 in proximal and distal muscle groups   LLE: Significant for good strength of grade 5/5 in proximal and distal muscle groups      Normal bulk, normal tone and no involuntary movements, no tremor   SENSORY FUNCTION:  Normal touch, normal pinprick, normal vibration, normal proprioception   CEREBELLAR FUNCTION:  Intact fine motor control over upper limbs and lower limbs   REFLEX FUNCTION:  Symmetric in upper and lower extremities, no Babinski sign   STATION and GAIT  Normal gait and tandem station, normal tip toes and heel walking       Investigations:      Laboratory Testing:  Recent Results (from the past 24 hour(s))   TSH    Collection Time: 10/24/22  7:31 PM   Result Value Ref Range    TSH 3.37 0.30 - 5.00 uIU/mL   Vitamin B12 & Folate    Collection Time: 10/24/22  7:31 PM   Result Value Ref Range    Vitamin B-12 406 232 - 1245 pg/mL    Folate 12.2 >4.8 ng/mL   Basic Metabolic Panel w/ Reflex to MG    Collection Time: 10/25/22  6:24 AM   Result Value Ref Range    Glucose 90 70 - 99 mg/dL    BUN 15 8 - 23 mg/dL    Creatinine 1.01 (H) 0.50 - 0.90 mg/dL    Est, Glom Filt Rate >60 >60 mL/min/1.73m2    Calcium 8.9 8.6 - 10.4 mg/dL    Sodium 138 135 - 144 mmol/L    Potassium 3.8 3.7 - 5.3 mmol/L    Chloride 104 98 - 107 mmol/L    CO2 21 20 - 31 mmol/L    Anion Gap 13 9 - 17 mmol/L   CBC with Auto Differential    Collection Time: 10/25/22  6:24 AM   Result Value Ref Range    WBC 6.2 3.5 - 11.3 k/uL    RBC 3.85 (L) 3.95 - 5.11 m/uL    Hemoglobin 11.5 (L) 11.9 - 15.1 g/dL    Hematocrit 32.4 (L) 36.3 - 47.1 %    MCV 84.2 82.6 - 102.9 fL    MCH 29.9 25.2 - 33.5 pg    MCHC 35.5 (H) 28.4 - 34.8 g/dL    RDW 14.5 (H) 11.8 - 14.4 %    Platelets 035 921 - 592 k/uL    MPV 11.2 8.1 - 13.5 fL    NRBC Automated 0.0 0.0 per 100 WBC    Seg Neutrophils 64 36 - 65 %    Lymphocytes 20 (L) 24 - 43 %    Monocytes 13 (H) 3 - 12 %    Eosinophils % 2 1 - 4 %    Basophils 1 0 - 2 %    Immature Granulocytes 0 0 %    Segs Absolute 4.03 1.50 - 8.10 k/uL    Absolute Lymph # 1.22 1.10 - 3.70 k/uL    Absolute Mono # 0.79 0.10 - 1.20 k/uL    Absolute Eos # 0.10 0.00 - 0.44 k/uL    Basophils Absolute 0.04 0.00 - 0.20 k/uL    Absolute Immature Granulocyte <0.03 0.00 - 0.30 k/uL    RBC Morphology ANISOCYTOSIS PRESENT      Recent Labs     10/25/22  0624   WBC 6.2   RBC 3.85*   HGB 11.5*   HCT 32.4*   MCV 84.2   MCH 29.9   MCHC 35.5*   RDW 14.5*      MPV 11.2     Recent Labs     10/24/22  0527 10/25/22  0624    138   K 3.6* 3.8    104   CO2 22 21   BUN 15 15   CREATININE 0.94* 1.01*   GLUCOSE 93 90   CALCIUM 9.0 8.9   PROT 6.9  --    LABALBU 3.4*  --    BILITOT 1.1  --    ALKPHOS 197*  --    AST 17  --    ALT 12  --      Hemoglobin A1C   Date Value Ref Range Status   05/23/2020 5.6 4.0 - 6.0 % Final       Assessment :      Primary Problem  AICD malfunction, initial encounter    Active Hospital Problems    Diagnosis Date Noted    Essential hypertension [I10] 08/08/2014     Priority: High    PAH (pulmonary artery hypertension) ([facility] 34 on Echo  [I27.21] 08/08/2014     Priority: High    Mycoplasma pneumonia [J15.7] 10/24/2022     Priority: Medium    Paresthesia of right arm and leg [R20.2] 10/24/2022     Priority: Medium    AICD malfunction, initial encounter [T82.118A] 10/23/2022     Priority: Medium    Acute combined systolic (congestive) and diastolic (congestive) heart failure ([facility] [I50.41] 10/23/2022     Priority: Medium    Acute respiratory failure with hypoxia ([facility] [J96.01] 10/23/2022     Priority: Medium    Cirrhosis ([address] [K74.60] 10/07/2022     Priority: Medium    Atrial fibrillation ([facility] [I48.91] 10/07/2022     Priority: Medium    Chest pain [R07.9] 06/26/2021    Hypothyroidism [E03.9]     Gout [M10.9] 09/05/2019    AICD (automatic cardioverter/defibrillator) present [Z95.810] 03/04/2017    Mitral regurgitation [I34.0] 01/14/2017       Patient is a 75 y.o. Non- / non  female history of hypertension, pulmonary hypertension, cirrhosis, A. fib on Xarelto, CHF s/p AICD, presented with chest pain, substernal, requested to see neurologist for right upper and lower extremity cold sensation. Reports right upper extremity weakness due to rotator cuff injury. Plan:       TSH, b12 and folate wnl  Hgb a1c pending   Will likely need EMG outpatient   No imaging indicated at this moment   Will sign off     Follow-up further recommendations after discussing the case with attending  The plan was discussed with the patient, patient's family and the medical staff. Consultations:   IP CONSULT TO CARDIOLOGY  IP CONSULT TO INTERNAL MEDICINE  IP CONSULT TO CASE MANAGEMENT  IP CONSULT TO NEUROLOGY  IP CONSULT TO [place]    Patient is admitted as inpatient status because of co-morbidities listed above, severity of signs and symptoms as outlined, requirement for current medical therapies and most importantly because of direct risk to patient if care not provided in a hospital setting.     Jorge العلي MD  Neurology Resident PGY-2  10/25/2022  9:03 AM    Copy sent to Dr. River Ngo DO

## 2022-10-25 NOTE — TELEPHONE ENCOUNTER
Phone call from patient stating that the script for her walker needs to be more specific. She stated that all the told her was it needed to be more specific. Writer looked at the order and it states roller walker. DME order for rollator is pending.

## 2022-10-25 NOTE — PROGRESS NOTES
Bowen Chun was evaluated today and a DME order was entered for a wheeled walker because she requires this to successfully complete daily living tasks of eating, bathing, personal cares, ambulating, and taking own medications. A wheeled walker with seat is necessary due to the patient's unsteady gait, upper body weakness, inability to  and ambulation device, ambulating only short distances by pushing a walker, and the need to sit for a short time before resuming ambulation. These tasks cannot be completed with a lesser ambulation device such as a cane, crutch, or standard walker. The need for this equipment was discussed with the patient and she understands and is in agreement.

## 2022-10-26 ENCOUNTER — CARE COORDINATION (OUTPATIENT)
Dept: CASE MANAGEMENT | Age: 67
End: 2022-10-26

## 2022-10-26 ENCOUNTER — CARE COORDINATION (OUTPATIENT)
Dept: CARE COORDINATION | Age: 67
End: 2022-10-26

## 2022-10-26 ENCOUNTER — TELEPHONE (OUTPATIENT)
Dept: INTERNAL MEDICINE | Age: 67
End: 2022-10-26

## 2022-10-26 DIAGNOSIS — E03.9 HYPOTHYROIDISM, UNSPECIFIED TYPE: Primary | ICD-10-CM

## 2022-10-26 DIAGNOSIS — I50.41 ACUTE COMBINED SYSTOLIC (CONGESTIVE) AND DIASTOLIC (CONGESTIVE) HEART FAILURE (HCC): Primary | ICD-10-CM

## 2022-10-26 DIAGNOSIS — R07.9 CHEST PAIN, UNSPECIFIED TYPE: Primary | ICD-10-CM

## 2022-10-26 PROCEDURE — 1111F DSCHRG MED/CURRENT MED MERGE: CPT | Performed by: STUDENT IN AN ORGANIZED HEALTH CARE EDUCATION/TRAINING PROGRAM

## 2022-10-26 NOTE — PROGRESS NOTES
CLINICAL PHARMACY NOTE: MEDS TO BEDS    Total # of Prescriptions Filled: 2   The following medications were delivered to the patient:  Levofloxacin  Albuterol mdi    Additional Documentation:    Delivered to room 331 by JONH

## 2022-10-26 NOTE — CARE COORDINATION
Remote Patient Kit Ordering Note      Date/Time:  10/26/2022 1:02 PM      [x] CCSS confirmed patient shipping address  [x] Patient will receive package over the next 2-4 business days. Someone 21 years or older must be present to sign for UPS delivery. [x] Patient to contact virtual installation-specific phone number listed in the patient instructions. [x] If the patient does not contact HRS within 24 hours, an [employer] [facility] will call the patient directly: If the patient does not answer, HRS will follow up with the clinical team notifying them about the unsuccessful attempt to contact the patient. HRS will make three call attempts to the patient. [x] LPN will contact patient once equipment is active to welcome them to the program.                                                         [x] Hours of RPM monitoring - Monday-Friday [id number]                     All questions answered at this time. LPN made aware the Remote Patient Monitoring set up has been completed. CCSS notified patient of RPM equipment order.

## 2022-10-26 NOTE — TELEPHONE ENCOUNTER
Refill request for levothyroxine (SYNTHROID) 25 MCG tablet. If appropriate please send medication(s) to patients pharmacy. Next appt: 11/2/2022    The original prescription was discontinued on 10/8/2022 by Donaldo Quintanilla MD for the following reason: Stop Taking at Discharge. Renewing this prescription may not be appropriate.     Health Maintenance   Topic Date Due    Hepatitis A vaccine (1 of 2 - Risk 2-dose series) Never done    Hepatitis B vaccine (1 of 3 - Risk 3-dose series) Never done    Annual Wellness Visit (AWV)  Never done    COVID-19 Vaccine (3 - Booster for Pfizer series) 07/21/2021    DTaP/Tdap/Td vaccine (1 - Tdap) 09/30/2023 (Originally 4/24/1974)    Shingles vaccine (1 of 2) 09/30/2023 (Originally 4/24/2004)    Hepatitis C screen  09/30/2023 (Originally 4/24/1973)    Breast cancer screen  02/24/2023    Depression Screen  08/05/2023    A1C test (Diabetic or Prediabetic)  10/24/2023    Lipids  05/23/2025    Colorectal Cancer Screen  03/29/2032    DEXA (modify frequency per FRAX score)  Completed    Flu vaccine  Completed    Pneumococcal 65+ years Vaccine  Completed    Hib vaccine  Aged Out    Meningococcal (ACWY) vaccine  Aged Out       Hemoglobin A1C (%)   Date Value   10/24/2022 5.2   05/23/2020 5.6   02/14/2019 5.8             ( goal A1C is < 7)   No results found for: LABMICR  LDL Cholesterol (mg/dL)   Date Value   05/23/2020 122       (goal LDL is <100)   AST (U/L)   Date Value   10/24/2022 17     ALT (U/L)   Date Value   10/24/2022 12     BUN (mg/dL)   Date Value   10/25/2022 15     BP Readings from Last 3 Encounters:   10/25/22 108/71   10/19/22 86/61   10/08/22 104/78          (goal 120/80)          Patient Active Problem List:     Hypokalemia     Dilated cardiomyopathy ([address]     Pre-diabetes     Essential hypertension     PAH (pulmonary artery hypertension) ([address] 34 on Echo      Mitral regurgitation     Chronic headache     Class 1 obesity due to excess calories with body mass index (BMI) of 33.0 to 33.9 in adult     AICD (automatic cardioverter/defibrillator) present     Syncope and collapse secondary to [facility]     ICD (implantable cardioverter-defibrillator) battery depletion     Gout     Dizziness     Benign paroxysmal positional vertigo     Chronic heart failure with reduced ejection fraction and diastolic dysfunction (HCC)     NSVT (nonsustained ventricular tachycardia)     Chest pain     Hypothyroidism     Chronic combined systolic (congestive) and diastolic (congestive) heart failure (HCC)     Acute diverticulitis     Abscess of sigmoid colon     Diverticulitis     Nausea & vomiting     Diverticulitis of large intestine with abscess without bleeding     Diverticulitis of colon     Lower abdominal pain     Colonic diverticular abscess     Intra-abdominal abscess (HCC)     Cirrhosis (HCC)     Atrial fibrillation (HCC)     QT prolongation     AICD malfunction, initial encounter     Acute combined systolic (congestive) and diastolic (congestive) heart failure (HCC)     Acute respiratory failure with hypoxia (HCC)     Mycoplasma pneumonia     Paresthesia of right arm and leg     Hypoxia     Pacemaker

## 2022-10-26 NOTE — CARE COORDINATION
[facility] Care Transitions Initial Follow Up Call    Call within 2 business days of discharge: Yes    Care Transition Nurse contacted the patient by telephone to perform post hospital discharge assessment. Verified name and  with patient as identifiers. Provided introduction to self, and explanation of the Care Transition Nurse role. Patient: Lashon Walter Patient : 1955   MRN: [id number]  Reason for Admission: Chest pain/paresthesias Rt extremities  Discharge Date: 10/25/22 RARS: Readmission Risk Score: 24.2      Last Discharge  [address]       Date Complaint Diagnosis Description Type Department Provider    10/23/22 Chest Pain Chest pain, unspecified type . .. ED to Hosp-Admission (Discharged) (ADMITTED) [facility] Della Vasquez MD; Tierra Dubose,... Was this an external facility discharge? No Discharge Facility: [facility]    Challenges to be reviewed by the provider   Additional needs identified to be addressed with provider: Yes  7 day hospital follow up               Method of communication with provider: staff message. Was able to contact Annemarie for initial transitional outreach  She stated that she was \"feeling better\". She denied any further chest pain, Rt arm paresthesias persists due to her rotator cuff and her Rt leg still feels cold. She said that she will be following up with neurology for the Rt leg coolness and she said that she had their phone number. She denied any abdominal pain, rectal bleeding or N/V. Medications reviewed and she stated that she had all her medications. She said that she is suppose to have the rollator delivered today by [facility]. She has not heard from [facility] yet. Call placed to [facility] and a visit is scheduled for tomorrow. She said that she has an appointment with cardio coming up, but did not know the date. She said that she will be seeing her PCP soon, but again no date noted and schedule has  as next appointment.   She said that it will be before that. She had no further questions or concerns. Care Transition Nurse reviewed discharge instructions with patient who verbalized understanding. The patient was given an opportunity to ask questions and does not have any further questions or concerns at this time. Were discharge instructions available to patient? Yes. Reviewed appropriate site of care based on symptoms and resources available to patient including: PCP  Specialist  Home health  When to call 911. The patient agrees to contact the PCP office for questions related to their healthcare. Advance Care Planning:   Does patient have an Advance Directive: patient declined education. Medication reconciliation was performed with patient, who verbalizes understanding of administration of home medications. Medications reviewed, 1111F entered: yes    Was patient discharged with a pulse oximeter? no    Non-face-to-face services provided:  Obtained and reviewed discharge summary and/or continuity of care documents  Assessment and support for treatment adherence and medication management-reviewed AVS    Remote Patient Monitoring Enrollment Note      Date/Time:  10/26/2022 11:24 AM    Offered patient enrollment in the [address] [place] [address] Remote Patient Monitoring (RPM) program for in home monitoring for CHF. Patient accepted RPM services. Patient will be monitoring the following daily:  blood pressure heart rate  pulse ox reading  weight    CTN reviewed the information below with patient:    Emergency Contact: Aubree Woodson [phone]    [x] A member from the care coordination team will reach out to notify the patient once the RPM kit is ordered. [x] Once the kit is delivered, the [facility] team will contact the patient after UPS deliver to assist with set up.             [x] Determined BP cuff size: regular (9.05\"-15.74\")      [x] Determined weight scale: regular (<330lbs)                                                 [x] Hours of ACM monitoring - Monday-Friday [id number]                         All questions about RPM program answered at this time. Offered patient enrollment in the Remote Patient Monitoring (RPM) program for in-home monitoring: Yes, patient enrolled. Pt stated that she has had telehealth before with the home care agency. Isidra Ashley rpm    Care Transitions 24 Hour Call    Do you have a copy of your discharge instructions?: Yes  Do you have all of your prescriptions and are they filled?: Yes  Have you been contacted by a [employer] [address]?: No  Have you scheduled your follow up appointment?: No  Post Acute Services: Outpatient/Community Services (Comment: CHF clinic)  Do you feel like you have everything you need to keep you well at home?: Yes  Care Transitions Interventions         Follow Up  Future Appointments   Date Time Provider Yann Olivares   11/2/2022  9:00 AM ST CHF CLINIC RM 1 [facility]VZ CHF CLI [facility]   11/11/2022  2:30 PM Navjto Burgess MD  V GI [facility]   12/16/2022  8:30 AM Juan Garcia DO [facility] Transition Nurse provided contact information. Plan for follow-up call in 1-2 days based on severity of symptoms and risk factors. Plan for next call: symptom management-follow up on chest pain/any chf symptoms/ GI symptoms.   And if she has cardio/pcp appointment    Fahad Champion RN

## 2022-10-26 NOTE — TELEPHONE ENCOUNTER
----- Message from William Maria RN sent at 10/26/2022 10:50 AM EDT -----  Pt will need 7 day hospital follow up appointment.   Was discharge from [facility] on 10/25 for chest pain     Thank you

## 2022-10-26 NOTE — PROGRESS NOTES
10/26/22 11:57 AM       Remote Patient Monitoring Treatment Plan    Received request from ACKATY/JEIMY Taylor RN to order remote patient monitoring for in home monitoring of CHF and order completed. Patient will be monitoring activity  blood pressure   pulse ox   weight  survey questions daily. Patient will engage in Remote Patient Monitoring each day to develop the skills necessary for self management. RPM Care Team Responsibilities:   Alerts will be reviewed daily and addressed within 2-4 hours during operational hours (Monday -Friday 9 am-4 pm)  Alert response and intervention documented in patient medical record  Alert response escalated to PCP per protocol and documented in patient medical record  Patient monitored over approximately  days  Discharge from program based on self-management readiness    See care coordination encounters for additional details.        Salud Velazquez DNP, FNP-C, Remote Patient Monitoring NP, (Ph) [phone]

## 2022-10-27 RX ORDER — LEVOTHYROXINE SODIUM 0.03 MG/1
75 TABLET ORAL DAILY
Qty: 44 TABLET | Refills: 2 | OUTPATIENT
Start: 2022-10-27

## 2022-10-27 RX ORDER — LEVOTHYROXINE SODIUM 0.07 MG/1
75 TABLET ORAL DAILY
Qty: 30 TABLET | Refills: 3 | Status: SHIPPED | OUTPATIENT
Start: 2022-10-27

## 2022-10-27 NOTE — TELEPHONE ENCOUNTER
Patient is on 75mcg. This was ordered before and this RX may be old that pharmacy is asking for. Please inform pharmacy of new dose.

## 2022-10-28 ENCOUNTER — CARE COORDINATION (OUTPATIENT)
Dept: CARE COORDINATION | Age: 67
End: 2022-10-28

## 2022-10-28 ENCOUNTER — CARE COORDINATION (OUTPATIENT)
Dept: CASE MANAGEMENT | Age: 67
End: 2022-10-28

## 2022-10-28 ENCOUNTER — TELEPHONE (OUTPATIENT)
Dept: INTERNAL MEDICINE | Age: 67
End: 2022-10-28

## 2022-10-28 NOTE — CARE COORDINATION
Remote Patient Monitoring Welcome Note      Date/Time:  10/28/2022 1:04 PM     Verified patients name and  as identifiers. Completed and confirmed the following:     Emergency Contact: Dm Mullen  [phone]     [x] Patient received all RPM equipment (tablet, scale, blood pressure device and cuff, and pulse oximeter)  Cuff Size: Small  []  Regular   [x]  Large  []   Weight Scale: Regular   [x]  Bariatric  []               [x] Instructed patient keep box for use when returning equipment                                                          [x] Reviewed Patient Welcome Letter with patient                         [x] Reviewed expectations for patient and care team  [x] Reviewed RPM consent form         [x] Instructed patient to keep scale on flat surface                                                         [x] Instructed patient to keep tablet plugged in at all times                         [x] Instructed how to contact IT support (number listed on welcome letter)  [x] Provided Remote Patient Monitoring care  information                   All questions answered at this time. Remote Patient Monitoring Note      Date/Time:  10/28/2022 1:10 PM    LPN reviewed patients reported daily Remote Patient Monitoring metrics. All reported metrics are within normal limits. Plan/Follow Up:  Will continue to review, monitor and address alerts with follow up based on severity of symptoms and risk factors    Current Patient Metrics ---- Activity: - mins Blood Pressure: 104/75, 74bpm Pulseox: 99%, 71bpm Survey: C Weight: 201.2lbs Note Created at: 10/28/2022 01:10 PM ET ---- Time-Spent: 10 minutes 0 seconds

## 2022-10-28 NOTE — CARE COORDINATION
[facility] Care Transitions Follow Up Call    Care Transition Nurse contacted the patient by telephone to follow up after admission on 10/23/22. Verified name and  with patient as identifiers. Patient: Gretta Blizzard  Patient : 1955   MRN: [id number]  Reason for Admission: Chest pain/paresthesias Rt extremities  Discharge Date: 10/25/22 RARS: Readmission Risk Score: 24.2      Needs to be reviewed by the provider   Additional needs identified to be addressed with provider: No  none             Method of communication with provider: none. Was able to contact Annemarie for transitional outreach. She stated that she was doing \"good\". She denied any shortness of breath, chest pain or swelling. The occasional coldness that she feels in there Rt leg continues. She said that she is able to walk on that leg. She said that [facility] per PT did her medication review and she said that an alert came up with her amiodarone and levaquin. She said that a [address] called her today about the 2 medications and stated that she would get back to her. She said that she was now thinking of changing hospitals and that she will be eventually going to  [facility]  for her heart. No further questions or concerns. Addressed changes since last contact:  none  Discussed follow-up appointments. If no appointment was previously scheduled, appointment scheduling offered: No.   Is follow up appointment scheduled within 7 days of discharge? Yes.     Follow Up  Future Appointments   Date Time Provider Yann Olivares   2022 12:30 PM STV CT ROOM 1 STVZ CT SCAN [facility]   2022  9:00 AM ST[facility] CHF CLINIC RM 1 [facility] CHF CLI  [facility]   2022  1:00 PM Sam Alston MD [facility]   2022  2:30 PM Lady Fredi MD [facility]   2022  8:30 AM Jaron Phipps DO [facility]     Non-[facility] follow up appointment(s):     Care Transition Nurse reviewed medical action plan and red flags with patient and discussed any barriers to care and/or understanding of plan of care after discharge. Discussed appropriate site of care based on symptoms and resources available to patient including: PCP  Specialist  Home health  When to call 911. The patient agrees to contact the PCP office for questions related to their healthcare. Patients top risk factors for readmission: Diverticulitis/CHF/Cirrhosis  Interventions to address risk factors: Obtained and reviewed discharge summary and/or continuity of care documents       Care Transitions Subsequent and Final Call    Subsequent and Final Calls  Do you have any ongoing symptoms?: Yes  Onset of Patient-reported symptoms: In the past 7 days  Are you currently active with any services?: Outpatient/Community Services  Care Transitions Interventions  Other Interventions:             Care Transition Nurse provided contact information for future needs. Plan for follow-up call in 5-7 days based on severity of symptoms and risk factors.   Plan for next call: symptom management-follow up on CHF/ check RPM, abdominal discomfort chest pain    Abelino Drummond RN

## 2022-10-28 NOTE — TELEPHONE ENCOUNTER
Received PC from [address] at [facility], [place] --stating office notes need to be more specific on why patient needs rollator walker. Guidelines for walker: The beneficiary has a mobility limitation that significantly impairs his/her ability to  participate in one or more mobility related activities of daily living (MRADL) [address]. A mobility limitation is on that:  a. Prevents the beneficiary from accomplishing the MRADL entirely; or  b. Places the beneficiary at reasonably determined heightened risk of morbidity or  mortality secondary to the attempts to perform the MRADL; or  c. Prevents the beneficiary from completing the MRADL within a reasonable time  frame; and  2. The beneficiary is able to safely use the walker; and  3. The functional mobility deficit can be sufficiently resolved with use of a walker.     Fax addended notes to [phone]

## 2022-10-31 ENCOUNTER — TELEPHONE (OUTPATIENT)
Dept: INTERNAL MEDICINE | Age: 67
End: 2022-10-31

## 2022-10-31 ENCOUNTER — CARE COORDINATION (OUTPATIENT)
Dept: CARE COORDINATION | Age: 67
End: 2022-10-31

## 2022-10-31 NOTE — DISCHARGE SUMMARY
[address]     Department of Internal Medicine - Staff Internal Medicine Teaching Service    INPATIENT DISCHARGE SUMMARY      Patient Identification:  Mary Beth Vazquez is a 75 y.o. female. :  1955  MRN: [id number]     Acct: [de-identified]   PCP: Nette Meehan DO  Admit Date:  10/23/2022  Discharge date and time: 10/25/2022  5:32 PM   Attending Provider: No att. providers found                                     [address] Problem Lists:  Principal Problem:    AICD malfunction, initial encounter  Active Problems:    Essential hypertension    PAH (pulmonary artery hypertension) ([address] 34 on Echo     Cirrhosis ([address]    Atrial fibrillation ([address]    Acute combined systolic (congestive) and diastolic (congestive) heart failure (HCC)    Acute respiratory failure with hypoxia ([address]    Mycoplasma pneumonia    Paresthesia of right arm and leg    Hypoxia    Pacemaker    Mitral regurgitation    AICD (automatic cardioverter/defibrillator) present    Gout    Chest pain    Hypothyroidism  Resolved Problems:    * No resolved hospital problems. *      HOSPITAL STAY     Brief Inpatient course:   Mary Beth Vazquez is a 75 y.o. female who was admitted for the management of AICD malfunction, initial encounter, presented to the emergency department with chest pain with SOB. Past medical history significant for CHF s/p A ICD placement EF 17%, severe MR, paroxysmal A. Fib, hypertension, hypothyroidism, liver cirrhosis, she also had a recent episode of diverticulitis with abscess which was managed conservatively with antibiotics. She tested positive for Mycoplasma and was started on Levaquin. Her troponin was within normal levels, proBNP was mildly elevated, pacer interrogation was normal. Chest x-ray showed patch infiltrate in perihilar and bibasilar areas. Chest CT showed mild-to-moderate fibrotic/atelectatic changes, more in the left than right.     She remained stable, all her medications were optimized, and she was discharged once medically cleared as per instructions given below. Procedures/ Significant Interventions:      CT chest    Consults:     Consults:     Final Specialist Recommendations/Findings:   IP CONSULT TO CARDIOLOGY  IP CONSULT TO INTERNAL MEDICINE  IP CONSULT TO CASE MANAGEMENT  IP CONSULT TO NEUROLOGY  IP CONSULT TO [place]      Any Hospital Acquired Infections: none    Discharge Functional Status:  stable    DISCHARGE PLAN     Disposition: home    Patient Instructions:   Discharge Medication List as of 10/25/2022  4:14 PM        START taking these medications    Details   albuterol sulfate HFA (PROVENTIL;VENTOLIN;PROAIR) 108 (90 Base) MCG/ACT inhaler Inhale 2 puffs into the lungs every 6 hours as needed for Wheezing, Disp-18 g, R-3Normal      levoFLOXacin (LEVAQUIN) 750 MG tablet Take 1 tablet by mouth daily for 5 doses, Disp-5 tablet, R-0Normal           CONTINUE these medications which have NOT CHANGED    Details   !! Misc. Devices (BARIATRIC ROLLATOR) MISC ONCE Starting Tue 10/25/2022, For 1 dose, Disp-1 each, R-0, Normal      !! Misc. Devices (ROLLER WALKER) MISC DAILY Starting Mon 10/24/2022, Disp-1 each, R-0, Normal      traMADol (ULTRAM) 50 MG tablet Take 1 tablet by mouth 2 times daily as needed for Pain for up to 14 days. Intended supply: 7 days.  Take lowest dose possible to manage pain, Disp-28 tablet, R-0Print      levothyroxine (SYNTHROID) 75 MCG tablet Take 1 tablet by mouth Daily, Disp-30 tablet, R-3Normal      bumetanide (BUMEX) 2 MG tablet Take 0.5 tablets by mouth daily, Disp-30 tablet, R-3Normal      rivaroxaban (XARELTO) 20 MG TABS tablet Take 1 tablet by mouth daily Take 1 tablet by mouth daily, Disp-30 tablet, R-2Normal      acetaminophen (TYLENOL) 500 MG tablet Take 1 tablet by mouth every 6 hours as needed for Pain, Disp-60 tablet, R-0Normal      metoprolol succinate (TOPROL XL) 50 MG extended release tablet Take 50 mg by mouth dailyHistorical Med      amiodarone (CORDARONE) 200 MG tablet Historical Med      empagliflozin (JARDIANCE) 10 MG tablet Take 10 mg by mouth dailyHistorical Med      potassium chloride (KLOR-CON M) 20 MEQ extended release tablet Take 1 tablet by mouth daily, Disp-30 tablet, R-0Normal      magnesium oxide (MAG-OX) 400 MG tablet Take 0.5 tablets by mouth daily, Disp-30 tablet, R-1Normal      nitroGLYCERIN (NITROSTAT) 0.4 MG SL tablet up to max of 3 total doses. If no relief after 1 dose, call 911., Disp-25 tablet, R-3Normal      sacubitril-valsartan (ENTRESTO) 24-26 MG per tablet Take 0.5 tablets by mouth 2 times daily, Disp-60 tablet, R-11Normal      allopurinol (ZYLOPRIM) 100 MG tablet Take 1 tablet by mouth daily, Disp-30 tablet, R-3Normal      spironolactone (ALDACTONE) 25 MG tablet Take 0.5 tablets by mouth daily, Disp-30 tablet, R-3Normal       !! - Potential duplicate medications found. Please discuss with provider. Activity: activity as tolerated    Diet: Low salt diet    Follow-up:    No follow-up provider specified. Patient Instructions:     Start taking levofloxacin for 7 days   Follow up with cardiology outpatient. Check you blood pressure regularly at home   Follow up with CHF clininc. Advise to take low salt diet and fluid restriction to 1500ml daily          Delgado Root MD,   Internal Medicine Resident, PGY-1  [address], [place]  10/31/2022, 2:54 PM

## 2022-10-31 NOTE — CARE COORDINATION
Remote Patient Monitoring Note      Date/Time:  10/31/2022 10:24 AM    LPN reviewed patients reported daily Remote Patient Monitoring metrics. All reported metrics are within normal limits. Plan/Follow Up:  Will continue to review, monitor and address alerts with follow up based on severity of symptoms and risk factors    Current Patient Metrics ---- Activity: - mins Blood Pressure: 97/64, 71bpm Pulseox: 100%, 72bpm Survey: - Weight: 202.4lbs Note Created at: 10/31/2022 10:25 AM ET ---- Time-Spent: 2 minutes 0 seconds

## 2022-10-31 NOTE — PROGRESS NOTES
Physician Progress Note      PATIENT:               Balinda Phalen  CSN #:                  [id number]  :                       1955  ADMIT DATE:       10/23/2022 3:05 AM  [address] [place] DATE:        10/25/2022 5:32 PM  RESPONDING  PROVIDER #:        Gaby Sim MD          QUERY TEXT:    Patient admitted with Acute on chronic systolic and diastolic CHF and concerns   pacemaker lead impedance. Noted documentation in Cardiology progress notes   minimal flat troponin elevation consistent with type 2 MI due to underlying   cardiomyopathy  In order to support the diagnosis of type II MI, please refer to 4th universal   definition of MI below and include additional clinical indicators in your   documentation. ? Or please document if the diagnosis of type II MI has been   ruled out after study. ? The medical record reflects the following:  ?? Risk Factors: 75 yr old w/ Cardiomyopathy  ? ? Clinical Indicators: Troponin  trend is 20 >21 >19. ProBNP is 5489. CXR:   Perihilar and bibasilar infiltrates which may represent multilobar pneumonia   or pulmonary edema, with a small left-sided pleural effusion suspected    ? ? Treatment: Entresto, beta-blockers, Aldactone, Bumex, and Jardiance. Per   Cardiology no ischemic workup required    Thank you please contact me for any questions! Annamaria Barthel RN, CCDS, CDI   Supervisor [phone]    Fourth Universal Definition of Myocardial Infarction:  Clearly separates MI   from myocardial injury. Patients with elevated blood troponin levels but   without clinical evidence of ischemia are said to have had a myocardial   injury. ? To have a myocardial infarction requires both an elevated troponin   blood test along with at least one of the following:  - Symptoms of acute myocardial ischemia (Types 1 - 5 MI)  - Clinical evidence of ischemia, as evidenced in an electrocardiogram (EKG)   showing new ischemic changes (Type 1, Type 2, Type 3, or Type 4a MI)  - Development of pathological Q waves (Types 1 - 5 MI)  - Imaging evidence of new loss of viable myocardium or new regional wall   motion abnormality in a pattern consistent with an ischemic etiology (Types 1   - 5 MI)  Options provided:  -- MI type II due to, present as evidenced by, Please document indicators of   myocardial infarction. -- MI type II ruled out after study and demand ischemia due to underlying   cardiomyopathy confirmed  -- MI type II ruled out after study and non-ischemic myocardial injury due to   underlying cardiomyopathy confirmed  -- Other - I will add my own diagnosis  -- Disagree - Not applicable / Not valid  -- Disagree - Clinically unable to determine / Unknown  -- Refer to Clinical Documentation Reviewer    PROVIDER RESPONSE TEXT:    Type II MI was ruled out after study. Demand ischemia due to underlying   cardiomyopathy confirmed. Query created by: Pablo Alpine on 10/31/2022 6:47 AM      Electronically signed by:   Eduardo Toribio MD 10/31/2022 2:48 PM

## 2022-11-01 ENCOUNTER — CARE COORDINATION (OUTPATIENT)
Dept: CARE COORDINATION | Age: 67
End: 2022-11-01

## 2022-11-01 ENCOUNTER — HOSPITAL ENCOUNTER (OUTPATIENT)
Age: 67
Discharge: HOME | End: 2022-11-01
Payer: MEDICARE

## 2022-11-01 ENCOUNTER — TELEPHONE (OUTPATIENT)
Dept: INTERNAL MEDICINE | Age: 67
End: 2022-11-01

## 2022-11-01 ENCOUNTER — HOSPITAL ENCOUNTER (OUTPATIENT)
Dept: CT IMAGING | Age: 67
Discharge: HOME | End: 2022-11-03
Payer: MEDICARE

## 2022-11-01 DIAGNOSIS — K57.20 DIVERTICULITIS OF LARGE INTESTINE WITH ABSCESS WITHOUT BLEEDING: ICD-10-CM

## 2022-11-01 LAB — TSH SERPL DL<=0.05 MIU/L-ACNC: 9.88 UIU/ML (ref 0.3–5)

## 2022-11-01 PROCEDURE — 84443 ASSAY THYROID STIM HORMONE: CPT

## 2022-11-01 PROCEDURE — 36415 COLL VENOUS BLD VENIPUNCTURE: CPT

## 2022-11-01 PROCEDURE — 6360000004 HC RX CONTRAST MEDICATION: Performed by: STUDENT IN AN ORGANIZED HEALTH CARE EDUCATION/TRAINING PROGRAM

## 2022-11-01 PROCEDURE — 74177 CT ABD & PELVIS W/CONTRAST: CPT

## 2022-11-01 RX ADMIN — IOPAMIDOL 75 ML: 755 INJECTION, SOLUTION INTRAVENOUS at 12:20

## 2022-11-01 NOTE — TELEPHONE ENCOUNTER
Notes for Rollator need to be addended. Notes must include :     Standard walker and cane have been ruled out. Why does the patient need a seat on the rollator? Please addend note again.      Fax addend to [phone]

## 2022-11-01 NOTE — CARE COORDINATION
Remote Patient Monitoring Note      Date/Time:  11/1/2022 9:01 AM    LPN reviewed patients reported daily Remote Patient Monitoring metrics. All reported metrics are within normal limits. Plan/Follow Up: Will continue to review, monitor and address alerts with follow up based on severity of symptoms and risk factors.   Current Patient Metrics ---- Activity: - mins Blood Pressure: 112/84, 86bpm Pulseox: 99%, 85bpm Survey: - Weight: 204.0lbs Note Created at: 11/01/2022 09:01 AM ET ---- Time-Spent: 2 minutes 0 seconds

## 2022-11-02 ENCOUNTER — HOSPITAL ENCOUNTER (OUTPATIENT)
Dept: OTHER | Age: 67
Discharge: HOME | End: 2022-11-02

## 2022-11-02 ENCOUNTER — OFFICE VISIT (OUTPATIENT)
Dept: INTERNAL MEDICINE | Age: 67
End: 2022-11-02

## 2022-11-02 ENCOUNTER — CARE COORDINATION (OUTPATIENT)
Dept: CARE COORDINATION | Age: 67
End: 2022-11-02

## 2022-11-02 VITALS
WEIGHT: 208.2 LBS | HEART RATE: 71 BPM | TEMPERATURE: 97.2 F | DIASTOLIC BLOOD PRESSURE: 75 MMHG | HEIGHT: 66 IN | OXYGEN SATURATION: 95 % | BODY MASS INDEX: 33.46 KG/M2 | SYSTOLIC BLOOD PRESSURE: 107 MMHG

## 2022-11-02 DIAGNOSIS — I50.42 CHRONIC HEART FAILURE WITH REDUCED EJECTION FRACTION AND DIASTOLIC DYSFUNCTION (HCC): ICD-10-CM

## 2022-11-02 DIAGNOSIS — I42.0 DILATED CARDIOMYOPATHY (HCC): ICD-10-CM

## 2022-11-02 DIAGNOSIS — E03.9 HYPOTHYROIDISM, UNSPECIFIED TYPE: ICD-10-CM

## 2022-11-02 DIAGNOSIS — K57.20 DIVERTICULITIS OF LARGE INTESTINE WITH ABSCESS WITHOUT BLEEDING: ICD-10-CM

## 2022-11-02 DIAGNOSIS — Z95.810 AICD (AUTOMATIC CARDIOVERTER/DEFIBRILLATOR) PRESENT: ICD-10-CM

## 2022-11-02 DIAGNOSIS — K65.1 INTRA-ABDOMINAL ABSCESS (HCC): ICD-10-CM

## 2022-11-02 DIAGNOSIS — Z09 HOSPITAL DISCHARGE FOLLOW-UP: Primary | ICD-10-CM

## 2022-11-02 RX ORDER — NITROGLYCERIN 0.4 MG/1
TABLET SUBLINGUAL
Qty: 25 TABLET | Refills: 3 | Status: SHIPPED | OUTPATIENT
Start: 2022-11-02

## 2022-11-02 ASSESSMENT — ENCOUNTER SYMPTOMS
GASTROINTESTINAL NEGATIVE: 1
RESPIRATORY NEGATIVE: 1

## 2022-11-02 NOTE — PROGRESS NOTES
Post-Discharge Transitional Care Follow Up      Gretta Blizzard   YOB: 1955    Date of Office Visit:  11/2/2022  Date of Hospital Admission: 10/23/22  Date of Hospital Discharge: 10/25/22  Readmission Risk Score (high >=14%. Medium >=10%):Readmission Risk Score: 24.2      Care management risk score Rising risk (score 2-5) and Complex Care (Scores >=6): No Risk Score On File     Non face to face  following discharge, date last encounter closed (first attempt may have been earlier): 10/26/2022     Call initiated 2 business days of discharge: Yes     Hospital discharge follow-up  -     [place] DISCHARGE MEDS RECONCILED W/ CURRENT OUTPATIENT MED LIST    Medical Decision Making: low complexity  Return in 2 weeks (on 11/16/2022). On this date 11/2/2022 I have spent 30 minutes reviewing previous notes, test results and face to face with the patient discussing the diagnosis and importance of compliance with the treatment plan as well as documenting on the day of the visit. Subjective:   HPI  Patient presented to ED on 23rd October with chest pain and shortness of breath, has known history of CHF s/p AICD placement with severe MR, paroxysmal atrial fibrillation, hypertension, hypothyroidism, liver cirrhosis and a recent episode of diverticulitis with abscess which was managed conservatively with antibiotics. Her tropes were normal, proBNP mildly elevated, pacer interrogation was normal, chest x-ray showed patchy infiltrate in perihilar and bibasilar areas chest CT showed mild to moderate fibrotic/atelectatic changes more left than right  Patient tested positive for mycoplasma and was started on Levaquin. Repeat CT abdomen pelvis showed decreased fluid around the diverticular abscess. She was discharged on Levaquin and given follow-up with cardiology and CHF clinic as outpatient.   Denies any chest pain or shortness of breath today, states that it was just probably gas which got better once she got admitted. Of note her TSH is elevated to 9.88, but patient states that she was unable to take her thyroid medication for about a week and she did her lab test just yesterday. Denies any increased symptoms related to thyroid. Patient saw neurology for intermittent right-sided paresthesias, neurology recommended EMG/NCS and follow-up in neurology clinic as outpatient.     Patient Active Problem List   Diagnosis    Hypokalemia    Dilated cardiomyopathy (HCC)    Pre-diabetes    Essential hypertension    PAH (pulmonary artery hypertension) ([facility] [address] 34 on Echo     Mitral regurgitation    Chronic headache    Class 1 obesity due to excess calories with body mass index (BMI) of 33.0 to 33.9 in adult    AICD (automatic cardioverter/defibrillator) present    Syncope and collapse secondary to [facility]    ICD (implantable cardioverter-defibrillator) battery depletion    Gout    Dizziness    Benign paroxysmal positional vertigo    Chronic heart failure with reduced ejection fraction and diastolic dysfunction (HCC)    NSVT (nonsustained ventricular tachycardia)    Chest pain    Hypothyroidism    Chronic combined systolic (congestive) and diastolic (congestive) heart failure (HCC)    Acute diverticulitis    Abscess of sigmoid colon    Diverticulitis    Nausea & vomiting    Diverticulitis of large intestine with abscess without bleeding    Diverticulitis of colon    Lower abdominal pain    Colonic diverticular abscess    Intra-abdominal abscess (HCC)    Cirrhosis (HCC)    Atrial fibrillation (HCC)    QT prolongation    AICD malfunction, initial encounter    Acute combined systolic (congestive) and diastolic (congestive) heart failure (HCC)    Acute respiratory failure with hypoxia (HCC)    Mycoplasma pneumonia    Paresthesia of right arm and leg    Hypoxia    Pacemaker       Medications listed as ordered at the time of discharge from hospital     Medication List            Accurate as of November 2, 2022  3:24 PM. If you have any questions, ask your nurse or doctor. CHANGE how you take these medications      potassium chloride 20 MEQ extended release tablet  Commonly known as: KLOR-CON M  Take 1 tablet by mouth daily  What changed: when to take this            CONTINUE taking these medications      acetaminophen 500 MG tablet  Commonly known as: TYLENOL  Take 1 tablet by mouth every 6 hours as needed for Pain     albuterol sulfate  (90 Base) MCG/ACT inhaler  Commonly known as: PROVENTIL;VENTOLIN;PROAIR  Inhale 2 puffs into the lungs every 6 hours as needed for Wheezing     allopurinol 100 MG tablet  Commonly known as: ZYLOPRIM  Take 1 tablet by mouth daily     amiodarone 200 MG tablet  Commonly known as: CORDARONE     Bariatric Rollator Misc  1 Units by Does not apply route once for 1 dose     bumetanide 2 MG tablet  Commonly known as: BUMEX  Take 0.5 tablets by mouth daily     Jardiance 10 MG tablet  Generic drug: empagliflozin     levothyroxine 75 MCG tablet  Commonly known as: SYNTHROID  Take 1 tablet by mouth Daily     magnesium oxide 400 MG tablet  Commonly known as: MAG-OX  Take 0.5 tablets by mouth daily     metoprolol succinate 50 MG extended release tablet  Commonly known as: TOPROL XL     nitroGLYCERIN 0.4 MG SL tablet  Commonly known as: NITROSTAT  up to max of 3 total doses. If no relief after 1 dose, call 911. rivaroxaban 20 MG Tabs tablet  Commonly known as: XARELTO  Take 1 tablet by mouth daily Take 1 tablet by mouth daily     sacubitril-valsartan 24-26 MG per tablet  Commonly known as: ENTRESTO  Take 0.5 tablets by mouth 2 times daily     spironolactone 25 MG tablet  Commonly known as: ALDACTONE  Take 0.5 tablets by mouth daily     traMADol 50 MG tablet  Commonly known as: Ultram  Take 1 tablet by mouth 2 times daily as needed for Pain for up to 14 days. Intended supply: 7 days.  Take lowest dose possible to manage pain               Medications marked \"taking\" at this time  Outpatient Medications Marked as Taking for the 11/2/22 encounter (Office Visit) with Devonte Hatfield MD   Medication Sig Dispense Refill    levothyroxine (SYNTHROID) 75 MCG tablet Take 1 tablet by mouth Daily 30 tablet 3    albuterol sulfate HFA (PROVENTIL;VENTOLIN;PROAIR) 108 (90 Base) MCG/ACT inhaler Inhale 2 puffs into the lungs every 6 hours as needed for Wheezing 18 g 3    traMADol (ULTRAM) 50 MG tablet Take 1 tablet by mouth 2 times daily as needed for Pain for up to 14 days. Intended supply: 7 days. Take lowest dose possible to manage pain 28 tablet 0    bumetanide (BUMEX) 2 MG tablet Take 0.5 tablets by mouth daily 30 tablet 3    rivaroxaban (XARELTO) 20 MG TABS tablet Take 1 tablet by mouth daily Take 1 tablet by mouth daily 30 tablet 2    acetaminophen (TYLENOL) 500 MG tablet Take 1 tablet by mouth every 6 hours as needed for Pain 60 tablet 0    metoprolol succinate (TOPROL XL) 50 MG extended release tablet Take 50 mg by mouth daily      amiodarone (CORDARONE) 200 MG tablet       empagliflozin (JARDIANCE) 10 MG tablet Take 10 mg by mouth daily      potassium chloride (KLOR-CON M) 20 MEQ extended release tablet Take 1 tablet by mouth daily (Patient taking differently: Take 20 mEq by mouth 2 times daily) 30 tablet 0    magnesium oxide (MAG-OX) 400 MG tablet Take 0.5 tablets by mouth daily 30 tablet 1    nitroGLYCERIN (NITROSTAT) 0.4 MG SL tablet up to max of 3 total doses. If no relief after 1 dose, call 911. 25 tablet 3    sacubitril-valsartan (ENTRESTO) 24-26 MG per tablet Take 0.5 tablets by mouth 2 times daily 60 tablet 11    allopurinol (ZYLOPRIM) 100 MG tablet Take 1 tablet by mouth daily 30 tablet 3    spironolactone (ALDACTONE) 25 MG tablet Take 0.5 tablets by mouth daily 30 tablet 3        Medications patient taking as of now reconciled against medications ordered at time of hospital discharge: Yes    Review of Systems   Constitutional: Negative. HENT: Negative. Respiratory: Negative.      Cardiovascular: Negative. Gastrointestinal: Negative. Genitourinary: Negative. Musculoskeletal: Negative. Neurological: Negative. Psychiatric/Behavioral: Negative. Objective:    /75 (Site: Left Upper Arm, Position: Sitting, Cuff Size: Medium Adult)   Pulse 71   Temp 97.2 °F (36.2 °C)   Ht 5' 6\" (1.676 m)   Wt 208 lb 3.2 oz (94.4 kg)   SpO2 95%   BMI 33.60 kg/m²   Physical Exam  Constitutional:       Appearance: Normal appearance. HENT:      Mouth/Throat:      Mouth: Mucous membranes are moist.   Cardiovascular:      Rate and Rhythm: Normal rate and regular rhythm. Pulses: Normal pulses. Heart sounds: Normal heart sounds. Pulmonary:      Effort: Pulmonary effort is normal.      Breath sounds: Normal breath sounds. Abdominal:      General: Abdomen is flat. Palpations: Abdomen is soft. Musculoskeletal:         General: Normal range of motion. Skin:     General: Skin is warm. Neurological:      Mental Status: She is alert and oriented to person, place, and time. Mental status is at baseline. Annemarie was seen today for chest pain, hypertension and results. Diagnoses and all orders for this visit:    Hospital discharge follow-up  -     [place] DISCHARGE MEDS RECONCILED W/ CURRENT OUTPATIENT MED LIST    Intra-abdominal abscess ([address]  Diverticulitis of large intestine with abscess without bleeding  -During her last admissions, IR was consulted for potential drainage, unfortunately abscess is not in a location which is safe to attempt percutaneous drainage, General surgery was also consulted and recommended no surgical intervention owing to patient's heart issues, and instead recommended medical management with antibiotics. -Patient has already been given referral for colorectal surgeon,   -Repeat CT abdomen pelvis this admission shows improvement but due to persistent infection we will give referral for infectious diseases .   -     Keshia Metcalf MD, Infectious Disease, Mon    Hypothyroidism, unspecified type  -TSH level elevated but patient has not taken her medication for about a week. Will follow-up with PCP for repeat TSH as needed    Chronic heart failure   Severe MR  Paroxysmal atrial fibrillation  Nonischemic cardiomyopathy  AICD (automatic cardioverter/defibrillator) present        -Continue beta-blocker, Aldactone, Bumex, Jardiance, Entresto, Xarelto  -Patient to follow-up with cardiology and CHF clinic  -Does not need refill on any medication except nitro glycerin        An electronic signature was used to authenticate this note.   --Velasquez Eddy MD

## 2022-11-02 NOTE — CARE COORDINATION
Remote Patient Monitoring Note      Date/Time:  11/2/2022 4:17 PM    LPN reviewed patient's reported daily Remote Patient Monitoring metrics. Patient has not updated daily metrics at this time. Plan/Follow Up: Will continue to review, monitor and address alerts with follow up based on severity of symptoms and risk factors.      Current Patient Metrics ---- Activity: - mins Blood Pressure: -/-, -bpm Pulseox: -%, -bpm Survey: - Weight: -lbs Note Created at: 11/02/2022 04:17 PM ET ---- Time-Spent: 2 minutes 0 seconds

## 2022-11-03 ENCOUNTER — CARE COORDINATION (OUTPATIENT)
Dept: CASE MANAGEMENT | Age: 67
End: 2022-11-03

## 2022-11-03 ENCOUNTER — CARE COORDINATION (OUTPATIENT)
Dept: CARE COORDINATION | Age: 67
End: 2022-11-03

## 2022-11-03 NOTE — CARE COORDINATION
office for questions related to their healthcare. Patients top risk factors for readmission: functional physical ability and medical condition-Diverticulitis/CHF/Cirrhosis  Interventions to address risk factors: Obtained and reviewed discharge summary and/or continuity of care documents       Care Transitions Subsequent and Final Call    Subsequent and Final Calls  Do you have any ongoing symptoms?: No  Have your medications changed?: No  Do you have any questions related to your medications?: No  Do you currently have any active services?: Yes  Are you currently active with any services?: Outpatient/Community Services, Home Health  Do you have any needs or concerns that I can assist you with?: No  Care Transitions Interventions  Other Interventions:             Care Transition Nurse provided contact information for future needs. Plan for follow-up call in 7-10 days based on severity of symptoms and risk factors. Plan for next call: symptom management-follow up on any s/s of abdominal problems from abscess, CHF symptoms, neuro symptoms, neuro deficits to RT leg.      Narda Diop RN

## 2022-11-03 NOTE — CARE COORDINATION
Remote Patient Monitoring Note      Date/Time:  11/3/2022 4:01 PM    LPN reviewed patients reported daily Remote Patient Monitoring metrics. All reported metrics are within normal limits. Plan/Follow Up:  Will continue to review, monitor and address alerts with follow up based on severity of symptoms and risk factors     Current Patient Metrics ---- Activity: - mins Blood Pressure: 90/62, 82bpm Pulseox: -%, -bpm Survey: - Weight: 203.4lbs Note Created at: 11/03/2022 04:01 PM ET ---- Time-Spent: 2 minutes 0 seconds

## 2022-11-04 ENCOUNTER — CARE COORDINATION (OUTPATIENT)
Dept: CARE COORDINATION | Age: 67
End: 2022-11-04

## 2022-11-04 NOTE — CARE COORDINATION
Remote Patient Monitoring Note      Date/Time:  11/4/2022 3:18 PM    LPN reviewed patients reported daily Remote Patient Monitoring metrics. All reported metrics are within normal limits. Plan/Follow Up:  Will continue to review, monitor and address alerts with follow up based on severity of symptoms and risk factors    - Current Patient Metrics ---- Activity: - mins Blood Pressure: 92/68, 72bpm Pulseox: -%, -bpm Survey: - Weight: 203.4lbs Note Created at: 11/04/2022 03:19 PM ET ---- Time-Spent: 2 minutes 0 seconds

## 2022-11-07 ENCOUNTER — HOSPITAL ENCOUNTER (OUTPATIENT)
Dept: OTHER | Age: 67
Discharge: HOME | End: 2022-11-07
Payer: MEDICARE

## 2022-11-07 ENCOUNTER — HOSPITAL ENCOUNTER (OUTPATIENT)
Age: 67
Discharge: HOME | End: 2022-11-07
Payer: MEDICARE

## 2022-11-07 ENCOUNTER — CARE COORDINATION (OUTPATIENT)
Dept: CARE COORDINATION | Age: 67
End: 2022-11-07

## 2022-11-07 VITALS
DIASTOLIC BLOOD PRESSURE: 64 MMHG | SYSTOLIC BLOOD PRESSURE: 114 MMHG | WEIGHT: 203.4 LBS | BODY MASS INDEX: 32.83 KG/M2 | RESPIRATION RATE: 20 BRPM | OXYGEN SATURATION: 100 % | HEART RATE: 84 BPM

## 2022-11-07 LAB
CERULOPLASMIN: 33 MG/DL (ref 16–45)
FERRITIN: 149 NG/ML (ref 13–150)
HBV SURFACE AB TITR SER: <3.5 MIU/ML
HEPATITIS B SURFACE ANTIGEN: NONREACTIVE
HEPATITIS C ANTIBODY: NONREACTIVE
HIV AG/AB: NONREACTIVE
TSH SERPL DL<=0.05 MIU/L-ACNC: 5.81 UIU/ML (ref 0.3–5)

## 2022-11-07 PROCEDURE — 87389 HIV-1 AG W/HIV-1&-2 AB AG IA: CPT

## 2022-11-07 PROCEDURE — 86317 IMMUNOASSAY INFECTIOUS AGENT: CPT

## 2022-11-07 PROCEDURE — 82728 ASSAY OF FERRITIN: CPT

## 2022-11-07 PROCEDURE — 87340 HEPATITIS B SURFACE AG IA: CPT

## 2022-11-07 PROCEDURE — 82390 ASSAY OF CERULOPLASMIN: CPT

## 2022-11-07 PROCEDURE — 86803 HEPATITIS C AB TEST: CPT

## 2022-11-07 PROCEDURE — 99212 OFFICE O/P EST SF 10 MIN: CPT

## 2022-11-07 PROCEDURE — 84443 ASSAY THYROID STIM HORMONE: CPT

## 2022-11-07 ASSESSMENT — PAIN DESCRIPTION - LOCATION: LOCATION: SHOULDER

## 2022-11-07 ASSESSMENT — PAIN DESCRIPTION - DESCRIPTORS: DESCRIPTORS: DISCOMFORT

## 2022-11-07 ASSESSMENT — PAIN SCALES - GENERAL: PAINLEVEL_OUTOF10: 5

## 2022-11-07 ASSESSMENT — PAIN DESCRIPTION - ORIENTATION: ORIENTATION: RIGHT

## 2022-11-07 NOTE — PROGRESS NOTES
Date:  2022  Time:  11:46 AM    CHF Clinic at [address] [facility]    Office: [phone] Fax: [phone]    Re:  Gregg Jacobs   Patient : 1955    Vital Signs: /64   Pulse 84   Resp 20   Wt 203 lb 6.4 oz (92.3 kg)   SpO2 100%   BMI 32.83 kg/m²                       O2 Device: None (Room air)                           No results for input(s): CBC, HGB, HCT, WBC, PLATELET, NA, K, CL, CO2, BUN, CREATININE, GLUCOSE, BNP, INR in the last 72 hours. Respiratory:    Assessment  Charting Type: Reassessment    Breath Sounds  Right Upper Lobe: Clear  Right Middle Lobe: Clear  Right Lower Lobe: Clear  Left Upper Lobe: Clear  Left Lower Lobe: Clear    Cough/Sputum  Cough: Dry  Frequency: Occasional         Peripheral Vascular  RLE Edema: Trace, Non-pitting  LLE Edema: Trace, Non-pitting      Complaints: No new complaints at this time    Physician Orders None    Comment : Arrived for scheduled visit per ambulatory with rolling walker assist. Her weight is down 2.6 lbs from last visit in August. Recently hospitalized for AICD malfunction and acute on chronic combined chf. Patient is feeling much better. Denies any increase in dyspnea with exertion. Minimal lower leg, ankle and pedal edema noted. Says she had 2 bologna sandwiches and pickles over the last week. Reinforced the importance of following low sodium diet and fluid restrictions. Medication list reviewed with patient. Has 2D echo while in the hospital, EF 20-25%. Has several doctors appointments later this week. Lab work drawn this morning. No s/s acute chf at this time. Next CHF Clinic visit 22.     Electronically signed by Birgit Escoto RN on 2022 at 11:46 AM

## 2022-11-07 NOTE — CARE COORDINATION
Remote Patient Monitoring Note      Date/Time:  11/7/2022 4:22 PM    LPN reviewed patient's reported daily Remote Patient Monitoring metrics. Patient has not updated daily metrics at this time. Plan/Follow Up: Will continue to review, monitor and address alerts with follow up based on severity of symptoms and risk factors.        Current Patient Metrics ---- Activity: - mins Blood Pressure: -/-, -bpm Pulseox: -%, -bpm Survey: - Weight: 200.8lbs Note Created at: 11/07/2022 04:22 PM ET ---- Time-Spent: 2 minutes 0 seconds

## 2022-11-07 NOTE — PROGRESS NOTES
Verbally reviewed medication list with patient; patient verbalized understanding. Discussed 2000mg/day sodium restricted diet; patient verbalized understanding. Moderate daily exercise encouraged as tolerated. Discussed rest breaks as needed; patient verbalized understanding. Patient instructed to weigh self at the same time of each day, using same clothes and same scale; reinforced teaching to monitor for 3-5 lb weight increase over 1-2 days, and to notify the CHF clinic at [phone] or physician office if weight change noted. Patient verbalized understanding. Risks of smoking discussed with the patient if applicable; patient strongly discouraged to smoke. Patient verbalized understanding. Signs and symptoms of CHF discussed with patient, such as feeling more tired than normal, feeling short of breath, coughing that increases when you lie down, sudden weight gain, swelling of your feet, legs or belly. Patient verbalized understanding to notify the CHF clinic at [phone] or physician office if these symptoms occur. Compliance with plan of care and further disease process causes discussed with patient, patient encouraged to keep all follow up appointments. Patient verbalized understanding.

## 2022-11-08 ENCOUNTER — CARE COORDINATION (OUTPATIENT)
Dept: CARE COORDINATION | Age: 67
End: 2022-11-08

## 2022-11-08 DIAGNOSIS — E03.9 HYPOTHYROIDISM, UNSPECIFIED TYPE: Primary | ICD-10-CM

## 2022-11-08 NOTE — PROGRESS NOTES
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 Stop date 4-20-22  No additional antibiotics needed  Patient to follow with Dr Carito Grey and with GI service  Office follow up with ID, as needed, if condition should change    History of Present Illness:   Josiah Lr is a 75y.o.-year-old female who was initially admitted on (Not on file). Patient seen at the request of Dr. Crystal Reveles:    This patient, with a complicated medical history including diverticulitis and sigmoid colonic abscess, presented to [facility] ED on 4/9/2022 with complaints of left lower quadrant abdominal pain with associated nausea, vomiting and diarrhea. She saw Dr. Nicki Chaney, with GI, at his office on 4/5/2022 for follow-up from 3/29/2022 CT colonography that revealed abscess of the sigmoid colon with diverticulosis of the large intestine. According to Dr. Juanjo Fallon note, he opted for conservative treatment, with a CT colonoscopy versus colonoscopy, as she was asymptomatic and has a significant cardiac history. He did mention in his note that she may be at high risk for sigmoid colectomy    Upon admission to [facility], she was initiated on IV Cipro and Flagyl. This was changed to IV Zosyn per GI on 4/10/2022. IR was consulted for intra-abdominal abscess drainage, but unfortunately as there is no safe access site due to bowel and bone, IR suggest rescanning at a later date to see if the abscess has increased in size and there would be a new area for percutaneous access.     Imaging:    Impression CT abdomen/pelvis with IV contrast 4/10/2022   Recurrent or residual abnormal wall thickening in the mid sigmoid colon       which is not substantially changed from prior exam compatible with chronic   diverticulitis. Persistent peridiverticular abscess in the adjacent sigmoid mesocolon which   is increased in size as detailed above. Cholelithiasis       Stable cirrhotic liver       Marked cardiomegaly, unchanged. Infectious disease managed the antimicrobial management    Office Visit 4/26/22:    Patient indicates that she feels much better. She has completed her IV Ertapenem 6 days ago, had midline catheter removed. Has not experienced any fevers, chills, change in appetite, malaise since she completed the antibiotics. She has not experienced any abdominal pain. She has seen Dr Purvi Pelayo, GI and has modified her diet to avoid constipation. She takes nightly metamucil and morning prune juice which has normalized her bowel movements. She has lost ~30 lbs since she became ill but her weight has stabilized now. We discussed at length the nature of her prior illness, contained abscess, potential relapse, etc. All her questions were answered. ID wise no follow up needed unless her condition should change. Patient asked to call the office anytime if problems develop.      CURRENT EVALUATION :  Office Visit 11/10/22:        Past Medical History:     Past Medical History:   Diagnosis Date    Abnormal Pap smear     Acute on chronic systolic congestive heart failure ([address]     AICD (automatic cardioverter/defibrillator) present     MAURO (acute kidney injury) ([address] 03/04/2017    MAURO (acute kidney injury) ([address] 3/4/2017    Anemia     Arthritis     Atrial fibrillation ([address] 10/7/2022    Cardiomyopathy ([address] 12/20/2012    robotic replacement of 2 left ventricular leads/replacement of ICD generator    Cardiomyopathy, nonischemic ([address] 12/20/2012    Chronic combined systolic and diastolic congestive heart failure ([address] Cirrhosis ([facility] 10/7/2022    Ejection fraction < 50%     HTN (hypertension)     Hypothyroidism     Ischemic cardiomyopathy     Left bundle branch block     Mitral regurgitation     Morbid obesity due to excess calories ([facility] 01/15/2017    Pulmonary hypertension ([facility]     Sudden onset of severe headache     Torn rotator cuff     RIGHT SHOULDER       Past Surgical  History:     Past Surgical History:   Procedure Laterality Date    COLONOSCOPY      HERNIA REPAIR      INSERT MIDLINE CATHETER  04/12/2022         OTHER SURGICAL HISTORY  12/20/2012    robotic placement of 2 left ventricular leads/replacement of ICD generator    OTHER SURGICAL HISTORY Left     ICD REPLACEMENT    PACEMAKER PLACEMENT      medtronic Viva XT CRT -D [id number] Serial : [id number]    TUBAL LIGATION         Medications:      spironolactone (ALDACTONE) 25 MG tablet  12.5 mg, DAILY        Summary: Take 0.5 tablets by mouth daily, Disp-30 tablet, R-3  Normal       sacubitril-valsartan (ENTRESTO) 24-26 MG per tablet  0.5 tablet, 2 TIMES DAILY        Summary: Take 0.5 tablets by mouth 2 times daily, Disp-60 tablet, R-11  Normal       rivaroxaban (XARELTO) 20 MG TABS tablet  20 mg, DAILY        Summary: Take 1 tablet by mouth daily, Disp-30 tablet, R-11  Normal       potassium chloride (KLOR-CON M) 20 MEQ extended release tablet  20 mEq, DAILY        Summary: Take 1 tablet by mouth daily, Disp-30 tablet, R-0  Normal       nitroGLYCERIN (NITROSTAT) 0.4 MG SL tablet          Summary: up to max of 3 total doses.  If no relief after 1 dose, call 911., Disp-25 tablet, R-3  Normal       metoprolol tartrate (LOPRESSOR) 25 MG tablet  25 mg, 2 TIMES DAILY        Summary: Take 1 tablet by mouth 2 times daily, Disp-60 tablet, R-2  Normal       magnesium oxide (MAG-OX) 400 MG tablet  200 mg, DAILY        Summary: Take 0.5 tablets by mouth daily, Disp-30 tablet, R-1  Normal       lidocaine (LIDODERM) 5 %          Summary: Place one patch to both knees 12 hours on, 12 hours off., Disp-30 patch,R-0  Normal       Patient not taking. Reported on 4/9/2022       levothyroxine (SYNTHROID) 25 MCG tablet  25 mcg, DAILY        Summary: Take 1 tablet by mouth Daily, Disp-30 tablet, R-3  Normal       bumetanide (BUMEX) 2 MG tablet  2 mg, DAILY        Summary: Take 1 tablet by mouth daily, Disp-30 tablet, R-3  Normal       allopurinol (ZYLOPRIM) 100 MG tablet  100 mg, DAILY        Summary: Take 1 tablet by mouth daily, Disp-30 tablet, R-3  Normal       acetaminophen (TYLENOL) 500 MG tablet  1,000 mg, EVERY 6 HOURS PRN        Summary: Take 2 tablets          spironolactone (ALDACTONE) 25 MG tablet  12.5 mg, DAILY        Summary: Take 0.5 tablets by mouth daily, Disp-30 tablet, R-3  Normal       sacubitril-valsartan (ENTRESTO) 24-26 MG per tablet  0.5 tablet, 2 TIMES DAILY        Summary: Take 0.5 tablets by mouth 2 times daily, Disp-60 tablet, R-11  Normal       rivaroxaban (XARELTO) 20 MG TABS tablet  20 mg, DAILY        Summary: Take 1 tablet by mouth daily, Disp-30 tablet, R-11  Normal       potassium chloride (KLOR-CON M) 20 MEQ extended release tablet  20 mEq, DAILY        Summary: Take 1 tablet by mouth daily, Disp-30 tablet, R-0  Normal       nitroGLYCERIN (NITROSTAT) 0.4 MG SL tablet          Summary: up to max of 3 total doses. If no relief after 1 dose, call 911., Disp-25 tablet, R-3  Normal       metoprolol tartrate (LOPRESSOR) 25 MG tablet  25 mg, 2 TIMES DAILY        Summary: Take 1 tablet by mouth 2 times daily, Disp-60 tablet, R-2  Normal       magnesium oxide (MAG-OX) 400 MG tablet  200 mg, DAILY        Summary: Take 0.5 tablets by mouth daily, Disp-30 tablet, R-1  Normal       lidocaine (LIDODERM) 5 %          Summary: Place one patch to both knees 12 hours on, 12 hours off., Disp-30 patch,R-0  Normal       Patient not taking.  Reported on 4/9/2022       levothyroxine (SYNTHROID) 25 MCG tablet  25 mcg, DAILY        Summary: Take 1 tablet by mouth Daily, Disp-30 tablet, R-3  Normal       bumetanide (BUMEX) 2 MG tablet  2 mg, DAILY        Summary: Take 1 tablet by mouth daily, Disp-30 tablet, R-3  Normal       allopurinol (ZYLOPRIM) 100 MG tablet  100 mg, DAILY        Summary: Take 1 tablet by mouth daily, Disp-30 tablet, R-3  Normal       acetaminophen (TYLENOL) 500 MG tablet  1,000 mg, EVERY 6 HOURS PRN        Summary: Take 2 tablets             Social History:     Social History     Socioeconomic History    Marital status:      Spouse name: Not on file    Number of children: 2    Years of education: Not on file    Highest education level: Not on file   Occupational History    Not on file   Tobacco Use    Smoking status: Never    Smokeless tobacco: Never   Vaping Use    Vaping Use: Never used   Substance and Sexual Activity    Alcohol use: No     Alcohol/week: 0.0 standard drinks    Drug use: Not Currently    Sexual activity: Never   Other Topics Concern    Not on file   Social History Narrative    Not on file     Social Determinants of Health     Financial Resource Strain: Low Risk     Difficulty of Paying Living Expenses: Not hard at all   Food Insecurity: No Food Insecurity    Worried About Running Out of Food in the Last Year: Never true    Ran Out of Food in the Last Year: Never true   Transportation Needs: Not on file   Physical Activity: Not on file   Stress: Not on file   Social Connections: Not on file   Intimate Partner Violence: Not on file   Housing Stability: Not on file       Family History:     Family History   Problem Relation Age of Onset    Cancer Other         ovarian    High Blood Pressure Mother     Other Mother         copd    Arthritis Neg Hx     Asthma Neg Hx     Birth Defects Neg Hx     Depression Neg Hx     Diabetes Neg Hx     Early Death Neg Hx     Hearing Loss Neg Hx     Heart Disease Neg Hx     High Cholesterol Neg Hx     Kidney Disease Neg Hx     Learning Disabilities Neg Hx     Mental Retardation Neg Hx     Mental Illness Neg Hx Miscarriages / Stillbirths Neg Hx     Stroke Neg Hx     Substance Abuse Neg Hx     Vision Loss Neg Hx     Breast Cancer Neg Hx     Colon Cancer Neg Hx     Eclampsia Neg Hx     Hypertension Neg Hx     Ovarian Cancer Neg Hx      Labor Neg Hx     Spont Abortions Neg Hx         Allergies:   Codeine, Morphine, and Tramadol     Review of Systems:   Constitutional: No fevers or chills. No systemic other than abdominal pain  Head: No headaches  Eyes: No double vision or blurry vision. No conjunctival inflammation. ENT: No sore throat or runny nose. . No hearing loss, tinnitus or vertigo. Cardiovascular: No chest pain or palpitations. No shortness of breath. No BLANCHARD  Lung: No shortness of breath or cough. No sputum production  Abdomen: No nausea, vomiting, diarrhea. No abdominal pain. Genitourinary: No increased urinary frequency, or dysuria. No hematuria. No suprapubic or CVA pain  Musculoskeletal: No muscle aches or pains. No joint effusions, swelling or deformities  Hematologic: No bleeding or bruising. Neurologic: No headache, weakness, numbness, or tingling. Integument: No rash, no ulcers. Lt arm ecchymoses  Psychiatric: No depression. Endocrine: No polyuria, no polydipsia, no polyphagia. Physical Examination :      Patient Vitals for the past 8 hrs:    BP Temp Temp src Pulse Resp SpO2   22 0757 94/61 97.7 °F (36.5 °C) Oral 68 17 100 %      General Appearance: Awake, alert, and in no apparent distress  Head:  Normocephalic, no trauma  Eyes: Pupils equal, round, reactive to light and accommodation; extraocular movements intact; sclera anicteric; conjunctivae pink. No embolic phenomena. ENT: Oropharynx clear, without erythema, exudate, or thrush. No tenderness of sinuses. Mouth/throat: mucosa pink and moist. No lesions. Dentition in good repair. Neck:Supple, without lymphadenopathy. Thyroid normal, No bruits. Pulmonary/Chest: Clear to auscultation, without wheezes, rales, or rhonchi.   No dullness to percussion. Cardiovascular: Regular rate and rhythm without murmurs, rubs, or gallops. Abdomen: Soft, tender. Bowel sounds normal. No organomegaly. No Abdominal tenderness on pressure. No rebound or guarding. All four Extremities: No cyanosis, clubbing, edema, or effusions. Neurologic: No gross sensory or motor deficits. Skin: Warm and dry with good turgor. No signs of peripheral arterial or venous insufficiency. No ulcerations. No open wounds. Lt arm and forearm ecchymoses. Medical Decision Making -Laboratory:   I have independently reviewed/ordered the following labs:    CBC with Differential:   No results for input(s): WBC, HGB, HCT, PLT, SEGSPCT, BANDSPCT, LYMPHOPCT, MONOPCT, EOSPCT in the last 72 hours. BMP:   No results for input(s): NA, K, CL, CO2, BUN, CREATININE, CA, MG in the last 72 hours. Hepatic Function Panel:   No results for input(s): PROT, LABALBU, BILIDIR, IBILI, BILITOT, ALKPHOS, ALT, AST in the last 72 hours. No results for input(s): RPR in the last 72 hours. No results for input(s): HIV in the last 72 hours. No results for input(s): BC in the last 72 hours. Lab Results   Component Value Date/Time    MUCUS 1+ 08/23/2015 11:21 PM    PH 7.44 12/20/2012 02:45 PM    RBC 3.85 10/25/2022 06:24 AM    RBC 4.45 04/03/2012 05:31 PM    TRICHOMONAS NOT REPORTED 08/23/2015 11:21 PM    WBC 6.2 10/25/2022 06:24 AM    YEAST FEW 09/15/2022 07:44 AM    TURBIDITY Clear 09/15/2022 07:44 AM     Lab Results   Component Value Date/Time    CREATININE 1.01 10/25/2022 06:24 AM    GLUCOSE 90 10/25/2022 06:24 AM    GLUCOSE 100 04/03/2012 05:31 PM       Medical Decision Making-Imaging:     Narrative   EXAMINATION:   CT OF THE ABDOMEN AND PELVIS WITH CONTRAST 4/10/2022 7:41 am       TECHNIQUE:   CT of the abdomen and pelvis was performed with the administration of   intravenous contrast. Multiplanar reformatted images are provided for review.    Dose modulation, iterative reconstruction, and/or weight based adjustment of   the mA/kV was utilized to reduce the radiation dose to as low as reasonably   achievable. COMPARISON:   01/27/2022       HISTORY:   ORDERING SYSTEM PROVIDED HISTORY: rule out diverticulitits, h/o sigmoid   abscess   TECHNOLOGIST PROVIDED HISTORY:   rule out diverticulitits, h/o sigmoid abscess       Reason for Exam: abdominal pain       FINDINGS:   Lower Chest: No acute abnormality in the lung bases with similar scarring and       atelectasis. Unchanged cardiomegaly with streak artifact from an indwelling       ICD. Coronary artery calcifications. Organs: Unchanged macrolobulated contour of the liver with unchanged       heterogeneous attenuation. Cholelithiasis without       pericholecystic inflammatory change. Spleen is normal in size and       attenuation with a small splenule inferiorly. Atrophic pancreas. Adrenal       glands are normal caliber. Kidneys enhance symmetrically without       hydronephrosis. Ureters are normal caliber. GI/Bowel: There is persistent abnormal wall thickening of the mid sigmoid       colon with associated extensive diverticula. Moderate persistent       inflammatory changes and fat stranding in the adjacent pericolonic fat. No       bowel obstruction. Normal appendix. Incidental duodenal diverticulum       without surrounding inflammatory change. Pelvis: Diminutive postmenopausal uterus. Inflammatory changes extend to the   left adnexa/ovary, though       the ovary remains grossly normal caliber. Urinary bladder is unremarkable       Peritoneum/Retroperitoneum: Persistent thick walled mixed complex fluid and       gas collection in the sigmoid mesocolon which is increased in size from prior       measuring 5.2 x 4.5 cm. Previously measured 3.5 cm x 3.6 cm. Persistent adjacent morphologically rounded lymph nodes in       the pericolonic fat. No significant ascites. Unchanged enlarged lymph nodes in the mercy hepatis. Aortoiliac       atherosclerosis and tortuosity without aneurysm. Bones/Soft Tissues: No acute abnormality. Supraumbilical fat containing       ventral hernia without inflammatory changes. Degenerative changes without       acute or aggressive osseous lesion. Impression   Recurrent or residual abnormal wall thickening in the mid sigmoid colon       which is not substantially changed from prior exam compatible with chronic   diverticulitis. Persistent peridiverticular abscess in the adjacent sigmoid mesocolon which   is increased in size as detailed above. Cholelithiasis       Stable cirrhotic liver       Marked cardiomegaly, unchanged. Medical Decision [id number]: Thank you for allowing us to participate in the care of this patient. Please call with questions.             Pager: [phone] - Office: [phone]

## 2022-11-08 NOTE — CARE COORDINATION
Remote Patient Monitoring Note      Date/Time:  11/8/2022 11:46 AM  Remote Patient Monitoring Note Date/Time: 11/8/2022 11:46 AM LPN contacted patient by telephone regarding yellow alert received for No metrics for 2 days. Unable to reach patient. Left HIPAA compliant VM with contact information. Will attempt to contact patient again as time allows. UPDATE- 15:59- Pt has not returned call. Radha Bernal LPN, will follow up with pt as appropriate. Deondre Covarrubias LPN, [facility] PH: [phone] Plan/Follow Up: Will continue to review, monitor and address alerts with follow up based on severity of symptoms and risk factors.  ---- Current Patient Metrics ---- Activity: - mins Blood Pressure: -/-, -bpm Pulseox: -%, -bpm Survey: - Weight: -lbs Note Created at: 11/08/2022 11:46 AM ET ---- Time-Spent: 2 minutes 0 seconds

## 2022-11-09 ENCOUNTER — CARE COORDINATION (OUTPATIENT)
Dept: CASE MANAGEMENT | Age: 67
End: 2022-11-09

## 2022-11-09 ENCOUNTER — CARE COORDINATION (OUTPATIENT)
Dept: CARE COORDINATION | Age: 67
End: 2022-11-09

## 2022-11-09 NOTE — CARE COORDINATION
[facility] Care Transitions Follow Up Call      Patient: Jake Rowe  Patient : 1955   MRN: [id number]  Reason for Admission: Chest pain/paresthesias Rt extremities  Discharge Date: 10/25/22 RARS: Readmission Risk Score: 24.2      Needs to be reviewed by the provider   Additional needs identified to be addressed with provider: No  none             Method of communication with provider: none. Attempt to reach patient for Care Transitions. [facility] requesting return call. Contact information provided.   [phone]      Follow Up  Future Appointments   Date Time Provider Yann Olivares   11/10/2022  2:15 PM Magdalena Pike MD INFT DISEASE [facility]   2022  2:30 PM [facility]   [facility]   [facility]   2022  9:00 AM [facility] RM 1 [facility] CHF CLI [facility]        Care Transitions Subsequent and Final Call    Subsequent and Final Calls  Are you currently active with any services?: Outpatient/Community Services, Home Health  Care Transitions Interventions  Other Interventions:               Tahir Mcgee RN

## 2022-11-09 NOTE — CARE COORDINATION
Remote Patient Monitoring Note      Date/Time:  11/9/2022 1:34 PM    LPN reviewed patients reported daily Remote Patient Monitoring metrics. All reported metrics are within alert parameters. Plan/Follow Up: Will continue to review, monitor and address alerts with follow up based on severity of symptoms and risk factors      Left VM with patient asking if pulse oximeter was not working since we haven't gotten any recent readings. Will await for callback and try again.       Current Patient Metrics ---- Activity: - mins Blood Pressure: 112/45, 82bpm Pulseox: -%, -bpm Survey: - Weight: 200.2lbs Note Created at: 11/09/2022 01:35 PM ET ---- Time-Spent: 2 minutes 0 seconds

## 2022-11-09 NOTE — PROGRESS NOTES
Please let patient know of need for TSH check in 6 weeks to further titrate her thyroid medication. No changes at this time. She does not need to have it checked again before 6 weeks from now.

## 2022-11-10 ENCOUNTER — CARE COORDINATION (OUTPATIENT)
Dept: CARE COORDINATION | Age: 67
End: 2022-11-10

## 2022-11-10 ENCOUNTER — OFFICE VISIT (OUTPATIENT)
Dept: INFECTIOUS DISEASES | Age: 67
End: 2022-11-10
Payer: MEDICARE

## 2022-11-10 ENCOUNTER — CARE COORDINATION (OUTPATIENT)
Dept: CASE MANAGEMENT | Age: 67
End: 2022-11-10

## 2022-11-10 VITALS
BODY MASS INDEX: 32.75 KG/M2 | SYSTOLIC BLOOD PRESSURE: 106 MMHG | WEIGHT: 203.8 LBS | TEMPERATURE: 97.7 F | DIASTOLIC BLOOD PRESSURE: 80 MMHG | HEIGHT: 66 IN | HEART RATE: 84 BPM

## 2022-11-10 DIAGNOSIS — K57.20 DIVERTICULITIS OF LARGE INTESTINE WITH ABSCESS WITHOUT BLEEDING: ICD-10-CM

## 2022-11-10 DIAGNOSIS — K63.0 ABSCESS OF SIGMOID COLON: Primary | ICD-10-CM

## 2022-11-10 DIAGNOSIS — I50.41 ACUTE COMBINED SYSTOLIC (CONGESTIVE) AND DIASTOLIC (CONGESTIVE) HEART FAILURE (HCC): Primary | ICD-10-CM

## 2022-11-10 PROCEDURE — 1090F PRES/ABSN URINE INCON ASSESS: CPT | Performed by: INTERNAL MEDICINE

## 2022-11-10 PROCEDURE — 3074F SYST BP LT 130 MM HG: CPT | Performed by: INTERNAL MEDICINE

## 2022-11-10 PROCEDURE — G8427 DOCREV CUR MEDS BY ELIG CLIN: HCPCS | Performed by: INTERNAL MEDICINE

## 2022-11-10 PROCEDURE — 99213 OFFICE O/P EST LOW 20 MIN: CPT | Performed by: INTERNAL MEDICINE

## 2022-11-10 PROCEDURE — G8417 CALC BMI ABV UP PARAM F/U: HCPCS | Performed by: INTERNAL MEDICINE

## 2022-11-10 PROCEDURE — 1123F ACP DISCUSS/DSCN MKR DOCD: CPT | Performed by: INTERNAL MEDICINE

## 2022-11-10 PROCEDURE — 1111F DSCHRG MED/CURRENT MED MERGE: CPT | Performed by: INTERNAL MEDICINE

## 2022-11-10 PROCEDURE — G8482 FLU IMMUNIZE ORDER/ADMIN: HCPCS | Performed by: INTERNAL MEDICINE

## 2022-11-10 PROCEDURE — 3017F COLORECTAL CA SCREEN DOC REV: CPT | Performed by: INTERNAL MEDICINE

## 2022-11-10 PROCEDURE — G8399 PT W/DXA RESULTS DOCUMENT: HCPCS | Performed by: INTERNAL MEDICINE

## 2022-11-10 PROCEDURE — 1036F TOBACCO NON-USER: CPT | Performed by: INTERNAL MEDICINE

## 2022-11-10 PROCEDURE — 3078F DIAST BP <80 MM HG: CPT | Performed by: INTERNAL MEDICINE

## 2022-11-10 NOTE — CARE COORDINATION
Remote Patient Monitoring Note      Date/Time:  11/10/2022 11:01 AM    CTN spoke with patient this morning and patient wishes to discontinue participation in RPM program.  CTN to send message to RPM pool.      Current Patient Metrics ---- Activity: - mins Blood Pressure: -/-, -bpm Pulseox: -%, -bpm Survey: - Weight: -lbs Note Created at: 11/10/2022 11:02 AM ET ---- Time-Spent: 2 minutes 0 seconds

## 2022-11-10 NOTE — PROGRESS NOTES
11/10/22 11:35 AM     Remote Patient Order Discontinued    Received request from Stacie Taylor RN to discontinue order for remote patient monitoring of CHF and order completed.       Salud Velazquez DNP, FNP-C, Remote Patient Monitoring NP, (Ph) [phone]

## 2022-11-10 NOTE — PROGRESS NOTES
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 Stop date 4-20-22  No additional antibiotics needed  Patient was to follow-up with her primary care physician and with GI service  Office follow up with ID, as needed, if condition should change    History of Present Illness:   Denice Killian is a 75y.o.-year-old female who was seen in the office on 11-10-22. Patient seen at the request of Eloise Iraheta and Heath Dang. INITIAL HISTORY:    This patient, with a complicated medical history including diverticulitis and sigmoid colonic abscess, presented to [facility] ED on 4/9/2022 with complaints of left lower quadrant abdominal pain with associated nausea, vomiting and diarrhea. She saw Dr. Tamiko Mercedes, with GI, at his office on 4/5/2022 for follow-up from 3/29/2022 CT colonography that revealed abscess of the sigmoid colon with diverticulosis of the large intestine. According to Dr. Liliane Noguera note, he opted for conservative treatment, with a CT colonoscopy versus colonoscopy, as she was asymptomatic and has a significant cardiac history. He did mention in his note that she may be at high risk for sigmoid colectomy    Upon admission to [facility], she was initiated on IV Cipro and Flagyl. This was changed to IV Zosyn per GI on 4/10/2022.     IR was consulted for intra-abdominal abscess drainage, but unfortunately there was no safe access site because of in-between bowel and bone, IR suggest rescanning at a later date to see if the abscess has increased in size and there would be a new area for percutaneous access. Imaging:    Impression CT abdomen/pelvis with IV contrast 4/10/2022   Recurrent or residual abnormal wall thickening in the mid sigmoid colon       which is not substantially changed from prior exam compatible with chronic   diverticulitis. Persistent peridiverticular abscess in the adjacent sigmoid mesocolon which   is increased in size as detailed above. Cholelithiasis       Stable cirrhotic liver       Marked cardiomegaly, unchanged. Infectious disease managed the antimicrobial management with outpatient Ertapenem. Patient improved with treatment. Office Visit 4/26/22:    Patient indicates that she feels much better. She has completed her IV Ertapenem 6 days ago, had midline catheter removed. Has not experienced any fevers, chills, change in appetite, malaise since she completed the antibiotics. She has not experienced any abdominal pain. She has seen Dr Vahe Martinez, GI and has modified her diet to avoid constipation. She takes nightly metamucil and morning prune juice which has normalized her bowel movements. She has lost ~30 lbs since she became ill but her weight has stabilized now. We discussed at length the nature of her prior illness, contained abscess, potential relapse, etc. All her questions were answered. ID wise no follow up needed unless her condition should change. Patient asked to call the office anytime if problems develop. CURRENT EVALUATION : 11-10-22    Office Visit 11/10/22:    Patient is here after being referred by her PCP due to her persistent abdominal abscess. She is doing well and does not seem to be in any acute distress, she denies any fever, abdominal pain, chest pain , shortness of breath or any nausea or vomiting. She does complain of decrease in appetite with associated weight loss. She indicates she has become cautious with what she eats because of the fear of diverticulitis. As a result  she eats less.     Her most recent CT scan shows the abscess is shrinking in size, down to 3.4 cm in diameter. On physical examination her abdomen is soft, non tender. There is no guarding, no rebound. Bowel sounds are normal.     IR was initially consulted when she was hospitalized for drainage of abscess, but the abscess is not in a safe location to attempt percutaneous drainage. General surgery recommended no surgical intervention owing to the patient's heart issues. Given the improving size of the abscess and no symptoms or systemic signs of infection, it was decided to monitor the patient off antibiotics for now and to follow-up with ID as needed. Patient reports that she has a scheduled follow-up with a GI doctor this month. On a different note, she was also hospitalized for mycoplasma pneumonia which has resolved, she received Levaquin for treatment. DISCUSSION:  Patient with a complicated medical history including diverticulitis and sigmoid colonic abscess, CAD  Treated in April 2022 for an intraabdominal abscess secondary to perforated diverticulum. Treatment was successful despite inability to drain the abscess by IR (unsafe location to reach) or by surgery (High risk because of cardiac Hx)  She has not experienced signs of inflammation since completion of treatment, despite presence of walled off abscess. Review of films shows resolution of inflammation around the abscess and decrease in the size of the abscess itself. Patient was reassured that the abscess is walled off and it is unlikely to give her any further problems. No additional antimicrobial treatment or drainage procedure was considered necessary.     PLAN:    Completed Ertapenem 1 gm IV q 24 hr. Stop date 4-20-22  No additional antibiotics needed  Patient was to follow-up with her primary care physician and with GI service  Office follow up with ID, as needed, if condition should change    Patient seen face to face for a period of 30 min, of which more than 50% of the time was spent on counseling, explanation of diagnosis, planning of further management, and answering all questions  .     Past Medical History:     Past Medical History:   Diagnosis Date    Abnormal Pap smear     Acute on chronic systolic congestive heart failure (HCC)     AICD (automatic cardioverter/defibrillator) present     MAURO (acute kidney injury) ([facility] [address] 03/04/2017    MAURO (acute kidney injury) ([facility] [address] 3/4/2017    Anemia     Arthritis     Atrial fibrillation ([facility] [address] 10/7/2022    Cardiomyopathy ([facility] [address] 12/20/2012    robotic replacement of 2 left ventricular leads/replacement of ICD generator    Cardiomyopathy, nonischemic ([facility] [address] 12/20/2012    Chronic combined systolic and diastolic congestive heart failure ([facility] [address]     Cirrhosis ([facility] [address] 10/7/2022    Ejection fraction < 50%     HTN (hypertension)     Hypothyroidism     Ischemic cardiomyopathy     Left bundle branch block     Mitral regurgitation     Morbid obesity due to excess calories ([facility] [address] 01/15/2017    Pulmonary hypertension ([facility] [address]     Sudden onset of severe headache     Torn rotator cuff     RIGHT SHOULDER       Past Surgical  History:     Past Surgical History:   Procedure Laterality Date    COLONOSCOPY      HERNIA REPAIR      INSERT MIDLINE CATHETER  04/12/2022         OTHER SURGICAL HISTORY  12/20/2012    robotic placement of 2 left ventricular leads/replacement of ICD generator    OTHER SURGICAL HISTORY Left     ICD REPLACEMENT    PACEMAKER PLACEMENT      medtronic Viva XT CRT -D [id number] Serial : [id number]    TUBAL LIGATION         Medications:      spironolactone (ALDACTONE) 25 MG tablet  12.5 mg, DAILY        Summary: Take 0.5 tablets by mouth daily, Disp-30 tablet, R-3  Normal       sacubitril-valsartan (ENTRESTO) 24-26 MG per tablet  0.5 tablet, 2 TIMES DAILY        Summary: Take 0.5 tablets by mouth 2 times daily, Disp-60 tablet, R-11  Normal       rivaroxaban (XARELTO) 20 MG TABS tablet  20 mg, DAILY        Summary: Take 1 tablet by mouth daily, Disp-30 tablet, R-11  Normal       potassium chloride (KLOR-CON M) 20 MEQ extended release tablet  20 mEq, DAILY        Summary: Take 1 tablet by mouth daily, Disp-30 tablet, R-0  Normal       nitroGLYCERIN (NITROSTAT) 0.4 MG SL tablet          Summary: up to max of 3 total doses. If no relief after 1 dose, call 911., Disp-25 tablet, R-3  Normal       metoprolol tartrate (LOPRESSOR) 25 MG tablet  25 mg, 2 TIMES DAILY        Summary: Take 1 tablet by mouth 2 times daily, Disp-60 tablet, R-2  Normal       magnesium oxide (MAG-OX) 400 MG tablet  200 mg, DAILY        Summary: Take 0.5 tablets by mouth daily, Disp-30 tablet, R-1  Normal       lidocaine (LIDODERM) 5 %          Summary: Place one patch to both knees 12 hours on, 12 hours off., Disp-30 patch,R-0  Normal       Patient not taking.  Reported on 4/9/2022       levothyroxine (SYNTHROID) 25 MCG tablet  25 mcg, DAILY        Summary: Take 1 tablet by mouth Daily, Disp-30 tablet, R-3  Normal       bumetanide (BUMEX) 2 MG tablet  2 mg, DAILY        Summary: Take 1 tablet by mouth daily, Disp-30 tablet, R-3  Normal       allopurinol (ZYLOPRIM) 100 MG tablet  100 mg, DAILY        Summary: Take 1 tablet by mouth daily, Disp-30 tablet, R-3  Normal       acetaminophen (TYLENOL) 500 MG tablet  1,000 mg, EVERY 6 HOURS PRN        Summary: Take 2 tablets          spironolactone (ALDACTONE) 25 MG tablet  12.5 mg, DAILY        Summary: Take 0.5 tablets by mouth daily, Disp-30 tablet, R-3  Normal       sacubitril-valsartan (ENTRESTO) 24-26 MG per tablet  0.5 tablet, 2 TIMES DAILY        Summary: Take 0.5 tablets by mouth 2 times daily, Disp-60 tablet, R-11  Normal       rivaroxaban (XARELTO) 20 MG TABS tablet  20 mg, DAILY        Summary: Take 1 tablet by mouth daily, Disp-30 tablet, R-11  Normal       potassium chloride (KLOR-CON M) 20 MEQ extended release tablet  20 mEq, DAILY        Summary: Take 1 tablet by mouth daily, Disp-30 tablet, R-0  Normal       nitroGLYCERIN (NITROSTAT) 0.4 Last Year: Never true   Transportation Needs: Not on file   Physical Activity: Not on file   Stress: Not on file   Social Connections: Not on file   Intimate Partner Violence: Not on file   Housing Stability: Not on file       Family History:     Family History   Problem Relation Age of Onset    Cancer Other         ovarian    High Blood Pressure Mother     Other Mother         copd    Arthritis Neg Hx     Asthma Neg Hx     Birth Defects Neg Hx     Depression Neg Hx     Diabetes Neg Hx     Early Death Neg Hx     Hearing Loss Neg Hx     Heart Disease Neg Hx     High Cholesterol Neg Hx     Kidney Disease Neg Hx     Learning Disabilities Neg Hx     Mental Retardation Neg Hx     Mental Illness Neg Hx     Miscarriages / Stillbirths Neg Hx     Stroke Neg Hx     Substance Abuse Neg Hx     Vision Loss Neg Hx     Breast Cancer Neg Hx     Colon Cancer Neg Hx     Eclampsia Neg Hx     Hypertension Neg Hx     Ovarian Cancer Neg Hx      Labor Neg Hx     Spont Abortions Neg Hx         Allergies:   Codeine, Morphine, and Tramadol     Review of Systems:   Constitutional: No fevers or chills. No systemic other than abdominal pain  Head: No headaches  Eyes: No double vision or blurry vision. No conjunctival inflammation. ENT: No sore throat or runny nose. . No hearing loss, tinnitus or vertigo. Cardiovascular: No chest pain or palpitations. No shortness of breath. No BLANCHARD  Lung: No shortness of breath or cough. No sputum production  Abdomen: No nausea, vomiting, diarrhea. No abdominal pain. Genitourinary: No increased urinary frequency, or dysuria. No hematuria. No suprapubic or CVA pain  Musculoskeletal: No muscle aches or pains. No joint effusions, swelling or deformities  Hematologic: No bleeding or bruising. Neurologic: No headache, weakness, numbness, or tingling. Integument: No rash, no ulcers. Lt arm ecchymoses  Psychiatric: No depression. Endocrine: No polyuria, no polydipsia, no polyphagia.      Physical Examination :      Patient Vitals for the past 8 hrs:    BP Temp Temp src Pulse Resp SpO2   04/13/22 0757 94/61 97.7 °F (36.5 °C) Oral 68 17 100 %      General Appearance: Awake, alert, and in no apparent distress  Head:  Normocephalic, no trauma  Eyes: Pupils equal, round, reactive to light and accommodation; extraocular movements intact; sclera anicteric; conjunctivae pink. No embolic phenomena. ENT: Oropharynx clear, without erythema, exudate, or thrush. No tenderness of sinuses. Mouth/throat: mucosa pink and moist. No lesions. Dentition in good repair. Neck:Supple, without lymphadenopathy. Thyroid normal, No bruits. Pulmonary/Chest: Clear to auscultation, without wheezes, rales, or rhonchi. No dullness to percussion. Cardiovascular: Regular rate and rhythm without murmurs, rubs, or gallops. Abdomen: Soft, tender. Bowel sounds normal. No organomegaly. No Abdominal tenderness on pressure. No rebound or guarding. All four Extremities: No cyanosis, clubbing, edema, or effusions. Neurologic: No gross sensory or motor deficits. Skin: Warm and dry with good turgor. No signs of peripheral arterial or venous insufficiency. No ulcerations. No open wounds. Lt arm and forearm ecchymoses. Medical Decision Making -Laboratory:   I have independently reviewed/ordered the following labs:    CBC with Differential:   No results for input(s): WBC, HGB, HCT, PLT, SEGSPCT, BANDSPCT, LYMPHOPCT, MONOPCT, EOSPCT in the last 72 hours. BMP:   No results for input(s): NA, K, CL, CO2, BUN, CREATININE, CA, MG in the last 72 hours. Hepatic Function Panel:   No results for input(s): PROT, LABALBU, BILIDIR, IBILI, BILITOT, ALKPHOS, ALT, AST in the last 72 hours. No results for input(s): RPR in the last 72 hours. No results for input(s): HIV in the last 72 hours. No results for input(s): BC in the last 72 hours.   Lab Results   Component Value Date/Time    MUCUS 1+ 08/23/2015 11:21 PM    PH 7.44 12/20/2012 02:45 PM    RBC 3.85 10/25/2022 06:24 AM    RBC 4.45 04/03/2012 05:31 PM    TRICHOMONAS NOT REPORTED 08/23/2015 11:21 PM    WBC 6.2 10/25/2022 06:24 AM    YEAST FEW 09/15/2022 07:44 AM    TURBIDITY Clear 09/15/2022 07:44 AM     Lab Results   Component Value Date/Time    CREATININE 1.01 10/25/2022 06:24 AM    GLUCOSE 90 10/25/2022 06:24 AM    GLUCOSE 100 04/03/2012 05:31 PM       Medical Decision Making-Imaging:     Narrative   EXAMINATION:   CT OF THE ABDOMEN AND PELVIS WITH CONTRAST 11/1/2022 7:32 am       TECHNIQUE:   CT of the abdomen and pelvis was performed with the administration of   intravenous contrast. Multiplanar reformatted images are provided for review. Automated exposure control, iterative reconstruction, and/or weight based   adjustment of the mA/kV was utilized to reduce the radiation dose to as low   as reasonably achievable. COMPARISON:   10/07/2022       HISTORY:   ORDERING SYSTEM PROVIDED HISTORY: Diverticulitis of large intestine with   abscess without bleeding   TECHNOLOGIST PROVIDED HISTORY:       STAT Creatinine as needed:->Yes   Reason for Exam: Diverticulitis of large intestine with abscess without   bleeding       FINDINGS:   Lower Chest: Bibasilar atelectasis. Small left pleural effusion. Stable   cardiomegaly       Organs: Liver is normal in size with lobulated morphology. No focal masses   identified. No evidence of intrahepatic ductal dilatation. Spleen is   borderline enlarged. The gallbladder contains multiple small stones. Both   adrenal glands are normal.  Pancreas is normal in appearance. . The kidneys   are  normal in size and attenuation without evidence of hydronephrosis or   renal calculi. GI/Bowel: The visualized bowel and mesentery show no mass lesions. Moderate   colonic diverticulosis. Previously noted peridiverticular abscess is stable   in size measuring 3.4 cm, however contains less fluid when compared to the   prior exam..  Focal bowel wall thickening along the sigmoid colon. Normal   appendix       Pelvis: No intrapelvic mass is identified. Bladder and rectum are intact. Peritoneum/Retroperitoneum: No free fluid. No lymphadenopathy. No evidence of   pneumoperitoneum. Bones/Soft Tissues: Small fat containing umbilical hernia. Degenerative   changes seen in the visualized spine . No acute bony abnormalities. Vascular   calcifications are seen compatible with atherosclerotic disease. Impression   Moderate colonic diverticulosis. Previously noted peridiverticular abscess   is stable in size measuring 3.4 cm, however contains less fluid when compared   to the prior exam..       Cirrhotic liver       Cholelithiasis       Bibasilar atelectasis and small left pleural effusion       No other significant changes are seen from prior         Narrative   EXAMINATION:   CT OF THE ABDOMEN AND PELVIS WITH CONTRAST 4/10/2022 7:41 am       TECHNIQUE:   CT of the abdomen and pelvis was performed with the administration of   intravenous contrast. Multiplanar reformatted images are provided for review. Dose modulation, iterative reconstruction, and/or weight based adjustment of   the mA/kV was utilized to reduce the radiation dose to as low as reasonably   achievable. COMPARISON:   01/27/2022       HISTORY:   ORDERING SYSTEM PROVIDED HISTORY: rule out diverticulitits, h/o sigmoid   abscess   TECHNOLOGIST PROVIDED HISTORY:   rule out diverticulitits, h/o sigmoid abscess       Reason for Exam: abdominal pain       FINDINGS:   Lower Chest: No acute abnormality in the lung bases with similar scarring and       atelectasis. Unchanged cardiomegaly with streak artifact from an indwelling       ICD. Coronary artery calcifications. Organs: Unchanged macrolobulated contour of the liver with unchanged       heterogeneous attenuation. Cholelithiasis without       pericholecystic inflammatory change.   Spleen is normal in size and       attenuation with a small splenule inferiorly. Atrophic pancreas. Adrenal       glands are normal caliber. Kidneys enhance symmetrically without       hydronephrosis. Ureters are normal caliber. GI/Bowel: There is persistent abnormal wall thickening of the mid sigmoid       colon with associated extensive diverticula. Moderate persistent       inflammatory changes and fat stranding in the adjacent pericolonic fat. No       bowel obstruction. Normal appendix. Incidental duodenal diverticulum       without surrounding inflammatory change. Pelvis: Diminutive postmenopausal uterus. Inflammatory changes extend to the   left adnexa/ovary, though       the ovary remains grossly normal caliber. Urinary bladder is unremarkable       Peritoneum/Retroperitoneum: Persistent thick walled mixed complex fluid and       gas collection in the sigmoid mesocolon which is increased in size from prior       measuring 5.2 x 4.5 cm. Previously measured 3.5 cm x 3.6 cm. Persistent adjacent morphologically rounded lymph nodes in       the pericolonic fat. No significant ascites. Unchanged enlarged lymph nodes in the [facility] hepatis. Aortoiliac       atherosclerosis and tortuosity without aneurysm. Bones/Soft Tissues: No acute abnormality. Supraumbilical fat containing       ventral hernia without inflammatory changes. Degenerative changes without       acute or aggressive osseous lesion. Impression   Recurrent or residual abnormal wall thickening in the mid sigmoid colon       which is not substantially changed from prior exam compatible with chronic   diverticulitis. Persistent peridiverticular abscess in the adjacent sigmoid mesocolon which   is increased in size as detailed above. Cholelithiasis       Stable cirrhotic liver       Marked cardiomegaly, unchanged. Medical Decision [id number]: Thank you for allowing us to participate in the care of this patient.  Please call with questions.       Shea Vanegas MD      Pager: [phone] - Office: [phone]

## 2022-11-10 NOTE — PROGRESS NOTES
Pc to pt letting her know that she will need to have her tsh reched in 6 weeks , per dr Garsia Lab , pt understood.

## 2022-11-10 NOTE — CARE COORDINATION
[facility] Care Transitions Follow Up Call    Care Transition Nurse contacted the patient by telephone to follow up after admission on 10/23/22. Verified name and  with patient as identifiers. Patient: Vanessa Casillas  Patient : 1955   MRN: [id number]  Reason for Admission: Chest pain/paresthesias Rt extremities  Discharge Date: 10/25/22 RARS: Readmission Risk Score: 24.2      Needs to be reviewed by the provider   Additional needs identified to be addressed with provider: No  none             Method of communication with provider: none. Was able to contact Annemarie for transitional outreach. She stated that she was doing \"good\". She denied any abdominal issues and no cardiac issues. She has a follow up with ID today and GI tomorrow. Asked if she would be following up with the neurologist concerning her Rt side paresthesias and she said not at this time. Reviewed RPM reading and missed days. She said that the Telehealth that she did with [address] was better and she did not like the RPM with [facility] and that she was basically just going to ignore it. She said that she wished that she could just send it back. Explained that Bridget Garcia will speak with someone in RPM and have it discontinued as requested. No further questions or concerns. Addressed changes since last contact:  none  Discussed follow-up appointments. Follow Up  Future Appointments   Date Time Provider Yann Olivares   11/10/2022  2:15 PM Reilly Black MD INFT DISEASE [facility]   2022  2:30 PM Martin Patiño MD ST [facility] GI [facility]   2022  9:30 AM [facility]   2022  8:30 AM [facility]   2022  9:00 AM [facility]  1 [facility] CHF CLI [facility]     Non-[facility] follow up appointment(s):     Care Transition Nurse reviewed medical action plan and red flags with patient and discussed any barriers to care and/or understanding of plan of care after discharge.  Discussed appropriate site of care based on symptoms and resources available to patient including: PCP  Specialist  Home health  When to call 911. The patient agrees to contact the PCP office for questions related to their healthcare. Patients top risk factors for readmission: functional physical ability and medical condition-Diverticulitis/CHF/Cirrhosis  Interventions to address risk factors: Obtained and reviewed discharge summary and/or continuity of care documents    Offered patient enrollment in the Remote Patient Monitoring (RPM) program for in-home monitoring:  pt requesting that RPM be discontinued   . Care Transitions Subsequent and Final Call    Subsequent and Final Calls  Do you have any ongoing symptoms?: No  Have your medications changed?: No  Do you have any questions related to your medications?: No  Do you currently have any active services?: Yes  Are you currently active with any services?: Outpatient/Community Services, Home Health  Do you have any needs or concerns that I can assist you with?: No  Care Transitions Interventions  Other Interventions:             Care Transition Nurse provided contact information for future needs. Plan for follow-up call in 7-10 days based on severity of symptoms and risk factors.   Plan for next call: symptom management-follow up on CHF/abdominal pain, FU on ID and GI appointments    Yessi Holley RN

## 2022-11-11 ENCOUNTER — OFFICE VISIT (OUTPATIENT)
Dept: GASTROENTEROLOGY | Age: 67
End: 2022-11-11
Payer: MEDICARE

## 2022-11-11 VITALS
HEIGHT: 66 IN | DIASTOLIC BLOOD PRESSURE: 85 MMHG | WEIGHT: 199 LBS | BODY MASS INDEX: 31.98 KG/M2 | SYSTOLIC BLOOD PRESSURE: 109 MMHG | HEART RATE: 76 BPM

## 2022-11-11 DIAGNOSIS — K57.92 DIVERTICULITIS: Primary | ICD-10-CM

## 2022-11-11 PROCEDURE — 1123F ACP DISCUSS/DSCN MKR DOCD: CPT | Performed by: INTERNAL MEDICINE

## 2022-11-11 PROCEDURE — G8399 PT W/DXA RESULTS DOCUMENT: HCPCS | Performed by: INTERNAL MEDICINE

## 2022-11-11 PROCEDURE — G8482 FLU IMMUNIZE ORDER/ADMIN: HCPCS | Performed by: INTERNAL MEDICINE

## 2022-11-11 PROCEDURE — 1090F PRES/ABSN URINE INCON ASSESS: CPT | Performed by: INTERNAL MEDICINE

## 2022-11-11 PROCEDURE — 99213 OFFICE O/P EST LOW 20 MIN: CPT | Performed by: INTERNAL MEDICINE

## 2022-11-11 PROCEDURE — 3078F DIAST BP <80 MM HG: CPT | Performed by: INTERNAL MEDICINE

## 2022-11-11 PROCEDURE — G8427 DOCREV CUR MEDS BY ELIG CLIN: HCPCS | Performed by: INTERNAL MEDICINE

## 2022-11-11 PROCEDURE — 1111F DSCHRG MED/CURRENT MED MERGE: CPT | Performed by: INTERNAL MEDICINE

## 2022-11-11 PROCEDURE — 3074F SYST BP LT 130 MM HG: CPT | Performed by: INTERNAL MEDICINE

## 2022-11-11 PROCEDURE — 1036F TOBACCO NON-USER: CPT | Performed by: INTERNAL MEDICINE

## 2022-11-11 PROCEDURE — 3017F COLORECTAL CA SCREEN DOC REV: CPT | Performed by: INTERNAL MEDICINE

## 2022-11-11 PROCEDURE — G8417 CALC BMI ABV UP PARAM F/U: HCPCS | Performed by: INTERNAL MEDICINE

## 2022-11-11 ASSESSMENT — ENCOUNTER SYMPTOMS
CONSTIPATION: 0
NAUSEA: 0
RECTAL PAIN: 0
ABDOMINAL PAIN: 0
ABDOMINAL DISTENTION: 0
DIARRHEA: 0
BLOOD IN STOOL: 0
ANAL BLEEDING: 0
VOMITING: 0

## 2022-11-11 NOTE — PROGRESS NOTES
GI FOLLOW UP    INTERVAL HISTORY:     Prior history of diverticulitis  Recurrent episodes of diverticulitis with no significant resolution of the patient's complicated inflammation  Repeat CT scan revealed ongoing formation, patient denies any abdominal pain or discomfort  Reports some normal bowel movements      Chief Complaint   Patient presents with    Diverticulitis       1. Diverticulitis          HISTORY OF PRESENT ILLNESS: Ms.Gwendolyn Hilario is a 75 y.o. female with a past history remarkable for CHF status post AICD placement of low ejection fraction, cardiomyopathy, chronic combined systolic and diastolic heart failure, left bundle branch block, mitral regurgitation, morbid obesity, recurrent left-sided sigmoid diverticulitis with pericolonic abscess, recent hospitalization for complex diverticular disease, status post antibiotic therapy with extended intravenous course, review CT scan revealed resolving sigmoid abscess, patient reports abnormal bowel movements, no significant abdominal pain, referred for evaluation of her diverticular disease. Smoker: Denies  Drinking history: Denies  Abdominal surgeries: None recent  Prior Colonoscopy: 2 years ago  Prior EGD: None recent  FH of GI issues: None reported       Past Medical,Family, and Social History reviewed and does contribute to the patient presenting condition. Patient's PMH/PSH,SH,PSYCH Hx, MEDs, ALLERGIES, and ROS were all reviewed and updated in the appropriate sections.     PAST MEDICAL HISTORY:  Past Medical History:   Diagnosis Date    Abnormal Pap smear     Acute on chronic systolic congestive heart failure (HCC)     AICD (automatic cardioverter/defibrillator) present     MAURO (acute kidney injury) ([address] 03/04/2017    MAURO (acute kidney injury) ([address] 3/4/2017    Anemia     Arthritis     Atrial fibrillation ([address] 10/7/2022 Disp: , Rfl:     empagliflozin (JARDIANCE) 10 MG tablet, Take 10 mg by mouth daily, Disp: , Rfl:     potassium chloride (KLOR-CON M) 20 MEQ extended release tablet, Take 1 tablet by mouth daily (Patient taking differently: Take 20 mEq by mouth 2 times daily), Disp: 30 tablet, Rfl: 0    magnesium oxide (MAG-OX) 400 MG tablet, Take 0.5 tablets by mouth daily, Disp: 30 tablet, Rfl: 1    sacubitril-valsartan (ENTRESTO) 24-26 MG per tablet, Take 0.5 tablets by mouth 2 times daily, Disp: 60 tablet, Rfl: 11    allopurinol (ZYLOPRIM) 100 MG tablet, Take 1 tablet by mouth daily, Disp: 30 tablet, Rfl: 3    spironolactone (ALDACTONE) 25 MG tablet, Take 0.5 tablets by mouth daily, Disp: 30 tablet, Rfl: 3    Misc.  Devices (BARIATRIC ROLLATOR) MISC, 1 Units by Does not apply route once for 1 dose, Disp: 1 each, Rfl: 0    ALLERGIES:   Allergies   Allergen Reactions    Codeine      Hallucinations    Morphine      Hallucinations      Tramadol Itching       FAMILY HISTORY:       Problem Relation Age of Onset    Cancer Other         ovarian    High Blood Pressure Mother     Other Mother         copd    Arthritis Neg Hx     Asthma Neg Hx     Birth Defects Neg Hx     Depression Neg Hx     Diabetes Neg Hx     Early Death Neg Hx     Hearing Loss Neg Hx     Heart Disease Neg Hx     High Cholesterol Neg Hx     Kidney Disease Neg Hx     Learning Disabilities Neg Hx     Mental Retardation Neg Hx     Mental Illness Neg Hx     Miscarriages / Stillbirths Neg Hx     Stroke Neg Hx     Substance Abuse Neg Hx     Vision Loss Neg Hx     Breast Cancer Neg Hx     Colon Cancer Neg Hx     Eclampsia Neg Hx     Hypertension Neg Hx     Ovarian Cancer Neg Hx      Labor Neg Hx     Spont Abortions Neg Hx          SOCIAL HISTORY:   Social History     Socioeconomic History    Marital status:      Spouse name: Not on file    Number of children: 2    Years of education: Not on file    Highest education level: Not on file   Occupational History Not on file   Tobacco Use    Smoking status: Never    Smokeless tobacco: Never   Vaping Use    Vaping Use: Never used   Substance and Sexual Activity    Alcohol use: No     Alcohol/week: 0.0 standard drinks    Drug use: Not Currently    Sexual activity: Never   Other Topics Concern    Not on file   Social History Narrative    Not on file     Social Determinants of Health     Financial Resource Strain: Low Risk     Difficulty of Paying Living Expenses: Not hard at all   Food Insecurity: No Food Insecurity    Worried About Running Out of Food in the Last Year: Never true    Ran Out of Food in the Last Year: Never true   Transportation Needs: Not on file   Physical Activity: Not on file   Stress: Not on file   Social Connections: Not on file   Intimate Partner Violence: Not on file   Housing Stability: Not on file       REVIEW OF SYSTEMS: A 12-point review of systems was obtained and pertinent positives and negatives were listed below. REVIEW OF SYSTEMS:     Constitutional: No fever, no chills, no lethargy, no weakness. HEENT:  No headache, otalgia, itchy eyes, nasal discharge or sore throat. Cardiac:  No chest pain, dyspnea, orthopnea or PND. Chest:   No cough, phlegm or wheezing. Abdomen:      Detailed by MA   Neuro:  No focal weakness, abnormal movements or seizure like activity. Skin:   No rashes, no itching. :   No hematuria, no pyuria, no dysuria, no flank pain. Extremities:  No swelling or joint pains. ROS was otherwise negative    Review of Systems   Gastrointestinal:  Negative for abdominal distention, abdominal pain, anal bleeding, blood in stool, constipation, diarrhea, nausea, rectal pain and vomiting. PHYSICAL EXAMINATION: Vital signs reviewed per the nursing documentation. /85 (Site: Left Lower Arm, Position: Sitting, Cuff Size: Small Adult)   Pulse 76   Ht 5' 6\" (1.676 m)   Wt 199 lb (90.3 kg)   BMI 32.12 kg/m²   Body mass index is 32.12 kg/m².    Physical Exam    Physical Exam   Constitutional: Patient is oriented to person, place, and time. Patient appears well-developed and well-nourished. HENT:   Head: Normocephalic and atraumatic. Eyes: Pupils are equal, round, and reactive to light. EOM are normal.   Neck: Normal range of motion. Neck supple. No JVD present. No tracheal deviation present. No thyromegaly present. Cardiovascular: Normal rate, regular rhythm, normal heart sounds and intact distal pulses. Pulmonary/Chest: Effort normal and breath sounds normal. No stridor. No respiratory distress. He has no wheezes. He has no rales. He exhibits no tenderness. Abdominal: Soft. Bowel sounds are normal. He exhibits no distension and no mass. There is no tenderness. There is no rebound and no guarding. No hernia. Musculoskeletal: Normal range of motion. Lymphadenopathy:    Patient has no cervical adenopathy. Neurological: Patient is alert and oriented to person, place, and time. Psychiatric: Patient has a normal mood and affect.  Patient behavior is normal.       LABORATORY DATA: Reviewed  Lab Results   Component Value Date    WBC 6.2 10/25/2022    HGB 11.5 (L) 10/25/2022    HCT 32.4 (L) 10/25/2022    MCV 84.2 10/25/2022     10/25/2022     10/25/2022    K 3.8 10/25/2022     10/25/2022    CO2 21 10/25/2022    BUN 15 10/25/2022    CREATININE 1.01 (H) 10/25/2022    LABALBU 3.4 (L) 10/24/2022    BILITOT 1.1 10/24/2022    ALKPHOS 197 (H) 10/24/2022    AST 17 10/24/2022    ALT 12 10/24/2022    INR 1.1 09/16/2022         Lab Results   Component Value Date    RBC 3.85 (L) 10/25/2022    HGB 11.5 (L) 10/25/2022    MCV 84.2 10/25/2022    MCH 29.9 10/25/2022    MCHC 35.5 (H) 10/25/2022    RDW 14.5 (H) 10/25/2022    MPV 11.2 10/25/2022    BASOPCT 1 10/25/2022    LYMPHSABS 1.22 10/25/2022    MONOSABS 0.79 10/25/2022    NEUTROABS 4.03 10/25/2022    EOSABS 0.10 10/25/2022    BASOSABS 0.04 10/25/2022         DIAGNOSTIC TESTING:     DEXA BONE DENSITY AXIAL SKELETON    Result Date: 8/11/2022  EXAMINATION: BONE DENSITOMETRY 8/11/2022 7:23 am TECHNIQUE: A bone density dual x-ray absorptiometry (DXA) scan was performed of the lumbar spine and left hip on a [employer] system. COMPARISON: None. HISTORY: ORDERING SYSTEM PROVIDED HISTORY: Postmenopause Gender: F Age: 76 y/o FINDINGS: LUMBAR SPINE: L1-L4 BMD: 1.116 g/cm2 T-score: -0.6 Z-score: -0.6 LEFT TOTAL HIP: BMD: 0.844 g/cm2 T-score: -1.3 Z-score: -1.6 LEFT FEMORAL NECK: BMD: 0.822 g/cm2 T-score: -1.6 Z-score: -1.5 FRAX: 10-year fracture risk is performed using the [employer] FRAX calculator based on patient-reported risk factors. Major osteoporotic fracture: 4.1% Hip fracture: 0.5% Other situations known to alter the reliability of the FRAX score should be considered when making treatment decisions, including chronic glucocorticoid use and past treatments. Further guidance on treatment can be found at the [facility]'s website bonesource.org.     Osteopenia by WHO criteria. RECOMMENDATIONS: 1. All patients should optimize their calcium and vitamin D intake.  2. Consider FDA-approved medical therapies in postmenopausal women and men aged 48 years and older, based on the following: - A hip or vertebral (clinical or morphometric) fracture - T-score less than or equal to -2.5 at the femoral neck or spine after appropriate evaluation to exclude secondary causes - Low bone density (T-score between -1.0 and -2.5 at the femoral neck or spine) and a 10-year probability of a hip fracture greater than or equal to 3% or a 10-year probability of a major osteoporosis-related fracture greater than or equal to 20% based on FRAX calculation. - Clinician judgment and/or patient preferences may indicate treatment for people with 10-year fracture probabilities above or below these levels - Further guidance on treatment can be found at the [facility]'s website [phone]. 3. Patients with diagnosis of osteoporosis or at high risk for fracture should have regular bone mineral density tests. For patients eligible for Medicare, routine testing is allowed once every 2 years. The testing frequency can be increased to one year for patients who have rapidly progressing disease, those who are receiving or discontinuing medical therapy to restore bone mass or have additional risk factors. Template code:  [id number]              IMPRESSION: Ms.Gwendolyn Susu Tierney is a 75 y.o. female with a past history remarkable for CHF status post AICD placement of low ejection fraction, cardiomyopathy, chronic combined systolic and diastolic heart failure, left bundle branch block, mitral regurgitation, morbid obesity, recurrent left-sided sigmoid diverticulitis with pericolonic abscess, recent hospitalization for complex diverticular disease, status post antibiotic therapy with extended intravenous course, review CT scan revealed resolving sigmoid abscess, patient reports abnormal bowel movements, no significant abdominal pain, referred for evaluation of her diverticular disease. Clinically doing well from GI standpoint  Denies any GI symptoms      Assessment  1. Diverticulitis        Annemarie was seen today for diverticulitis. Diagnoses and all orders for this visit:    Diverticulitis-resolved, will send for repeat CT abdomen pelvis, prior history of pericolonic abscess, will need to evaluate for resolution. -     CT ABDOMEN PELVIS WO CONTRAST Additional Contrast? None; Future       Virtual colonoscopy--[facility] performed in 3/2022--negative for polypoid lesions. Negative screening test.     Repeat CT to evaluate for healing    Clinically doing well. Bowel movement every day to BID. RTC: Follow-up after CT abdomen. We will need to discuss endoscopic evaluation after repeat CT scan. Additional comments: Thank you for allowing me to participate in the care of Ms. Susu Tierney.  For any further questions please do not hesitate to contact me. I have reviewed and agree with the ROS entered by the MA/LPN from today's encounter documented in a separate note. Adriana Reyes MD, MPH   Board Certified in Gastroenterology  Board Certified in [address] [place] [address] #: [phone]    this note is created with the assistance of a speech recognition program.  While intending to generate a document that actually reflects the content of the visit, the document can still have some errors including those of syntax and sound a like substitutions which may escape proof reading. It such instances, actual meaning can be extrapolated by contextual diversion.

## 2022-11-16 ENCOUNTER — CARE COORDINATION (OUTPATIENT)
Dept: CASE MANAGEMENT | Age: 67
End: 2022-11-16

## 2022-11-16 NOTE — CARE COORDINATION
[facility] Care Transitions Follow Up Call      Patient: Lashon Walter  Patient : 1955   MRN: [id number]  Reason for Admission:  Chest pain/paresthesias Rt extremities  Discharge Date: 10/25/22 RARS: Readmission Risk Score: 24.2      Needs to be reviewed by the provider   Additional needs identified to be addressed with provider: No  none             Method of communication with provider: none. Attempt to reach patient for Care Transitions. [facility] requesting return call. Contact information provided.   [phone]    Follow Up  Future Appointments   Date Time Provider Yann Olivares   [facility]   2022  9:00 AM [facility]   2023  1:15 PM MD Fortunato Ribeiro [facility] 112 Transitions Subsequent and Final Call    Subsequent and Final Calls  Are you currently active with any services?: Outpatient/Community Services, Home Health  Care Transitions Interventions  Other Interventions:               Chidi Pratt RN

## 2022-11-17 ENCOUNTER — CARE COORDINATION (OUTPATIENT)
Dept: CASE MANAGEMENT | Age: 67
End: 2022-11-17

## 2022-11-17 NOTE — CARE COORDINATION
[facility] Care Transitions Follow Up Call    Care Transition Nurse contacted the patient by telephone to follow up after admission on 10/23/22. Verified name and  with patient as identifiers. Patient: Cecilia Regalado  Patient : 1955   MRN: [id number]  Reason for Admission: Chest pain, Hypoxia  Discharge Date: 10/25/22 RARS: Readmission Risk Score: 24.2      Needs to be reviewed by the provider   Additional needs identified to be addressed with provider: No  none             Method of communication with provider: none. Spoke to Ceicly who started she is doing OK, has PCP appt tomorrow. Pt went to ID and GI appts. , does not need to go back to ID, was informed intra abdominal abscess is walled off and should not bother her anymore. Stated bowels are moving regularly. Weight today was 203.8 and BP was 113/78. Stated her arthritis in shoulder and knees bother her, takes Tylenol extra strength but not helping much. Pt not a candidate for surgery at this time d/t cardiac issues. Pt has generic Voltaren gel, uses occasionally. Asked pt what she did not like about RPM. Pt stated pulse ox would never work and no one checked in with her about it. Stated service was not as personable as Kun June through [facility]. Ismael Plunkett sent someone to home to initiate service and talked to her on her tablet which she liked. No longer has tablet through [facility], was a one month service. Pt continues to monitor BP and weight daily. Addressed changes since last contact:   reviewed ID and GI appts, has PCP tomorrow  Discussed follow-up appointments.     Follow Up  Future Appointments   Date Time Provider Yann Olivares   2022  9:30 AM James Childers [facility]   2022  8:30 AM James Childers [facility]   2022  9:00 AM [facility] RM 1 [facility] CLI [facility]   2023  1:15 PM [place] [id number] [facility] Transition Nurse reviewed discharge instructions with patient and discussed any barriers to care and/or understanding of plan of care after discharge. Discussed appropriate site of care based on symptoms and resources available to patient including: PCP  Specialist  When to call [phone]. The patient agrees to contact the PCP office for questions related to their healthcare. Offered patient enrollment in the Remote Patient Monitoring (RPM) program for in-home monitoring:  had RPM and sent back, not satisfied . Care Transitions Subsequent and Final Call    Subsequent and Final Calls  Do you have any ongoing symptoms?: No  Have your medications changed?: No  Do you have any questions related to your medications?: No  Do you currently have any active services?: No  Are you currently active with any services?: Outpatient/Community Services, Home Health  Do you have any needs or concerns that I can assist you with?: No  Identified Barriers: None  Care Transitions Interventions  Other Interventions:             Care Transition Nurse provided contact information for future needs. Plan for follow-up call in 5-7 days based on severity of symptoms and risk factors. Plan for next call: symptom management-review PCP appt from 11/18, weight and BP?  Final call    Earl Dawkins RN

## 2022-11-18 ENCOUNTER — OFFICE VISIT (OUTPATIENT)
Dept: INTERNAL MEDICINE | Age: 67
End: 2022-11-18
Payer: MEDICARE

## 2022-11-18 VITALS
DIASTOLIC BLOOD PRESSURE: 74 MMHG | BODY MASS INDEX: 32.53 KG/M2 | SYSTOLIC BLOOD PRESSURE: 120 MMHG | HEART RATE: 73 BPM | WEIGHT: 202.4 LBS | HEIGHT: 66 IN | OXYGEN SATURATION: 99 % | TEMPERATURE: 98.2 F

## 2022-11-18 DIAGNOSIS — I10 ESSENTIAL HYPERTENSION: Chronic | ICD-10-CM

## 2022-11-18 DIAGNOSIS — Z12.31 ENCOUNTER FOR SCREENING MAMMOGRAM FOR MALIGNANT NEOPLASM OF BREAST: ICD-10-CM

## 2022-11-18 DIAGNOSIS — Z45.02 ICD (IMPLANTABLE CARDIOVERTER-DEFIBRILLATOR) BATTERY DEPLETION: ICD-10-CM

## 2022-11-18 DIAGNOSIS — K74.60 CIRRHOSIS OF LIVER WITHOUT ASCITES, UNSPECIFIED HEPATIC CIRRHOSIS TYPE (HCC): ICD-10-CM

## 2022-11-18 DIAGNOSIS — E03.9 HYPOTHYROIDISM, UNSPECIFIED TYPE: ICD-10-CM

## 2022-11-18 DIAGNOSIS — I42.0 DILATED CARDIOMYOPATHY (HCC): Chronic | ICD-10-CM

## 2022-11-18 DIAGNOSIS — Z95.810 CARDIAC RESYNCHRONIZATION THERAPY DEFIBRILLATOR (CRT-D) IN PLACE: ICD-10-CM

## 2022-11-18 DIAGNOSIS — I50.42 CHRONIC HEART FAILURE WITH REDUCED EJECTION FRACTION AND DIASTOLIC DYSFUNCTION (HCC): Primary | ICD-10-CM

## 2022-11-18 DIAGNOSIS — K57.20 DIVERTICULITIS OF LARGE INTESTINE WITH ABSCESS WITHOUT BLEEDING: ICD-10-CM

## 2022-11-18 DIAGNOSIS — I48.91 ATRIAL FIBRILLATION, UNSPECIFIED TYPE (HCC): ICD-10-CM

## 2022-11-18 PROBLEM — K57.92 ACUTE DIVERTICULITIS: Status: RESOLVED | Noted: 2021-12-04 | Resolved: 2022-11-18

## 2022-11-18 PROBLEM — R09.02 HYPOXIA: Status: RESOLVED | Noted: 2022-10-25 | Resolved: 2022-11-18

## 2022-11-18 PROCEDURE — G8482 FLU IMMUNIZE ORDER/ADMIN: HCPCS | Performed by: STUDENT IN AN ORGANIZED HEALTH CARE EDUCATION/TRAINING PROGRAM

## 2022-11-18 PROCEDURE — G8417 CALC BMI ABV UP PARAM F/U: HCPCS | Performed by: STUDENT IN AN ORGANIZED HEALTH CARE EDUCATION/TRAINING PROGRAM

## 2022-11-18 PROCEDURE — 99211 OFF/OP EST MAY X REQ PHY/QHP: CPT | Performed by: STUDENT IN AN ORGANIZED HEALTH CARE EDUCATION/TRAINING PROGRAM

## 2022-11-18 PROCEDURE — 99214 OFFICE O/P EST MOD 30 MIN: CPT | Performed by: STUDENT IN AN ORGANIZED HEALTH CARE EDUCATION/TRAINING PROGRAM

## 2022-11-18 PROCEDURE — 1123F ACP DISCUSS/DSCN MKR DOCD: CPT | Performed by: STUDENT IN AN ORGANIZED HEALTH CARE EDUCATION/TRAINING PROGRAM

## 2022-11-18 PROCEDURE — 1090F PRES/ABSN URINE INCON ASSESS: CPT | Performed by: STUDENT IN AN ORGANIZED HEALTH CARE EDUCATION/TRAINING PROGRAM

## 2022-11-18 PROCEDURE — 1111F DSCHRG MED/CURRENT MED MERGE: CPT | Performed by: STUDENT IN AN ORGANIZED HEALTH CARE EDUCATION/TRAINING PROGRAM

## 2022-11-18 PROCEDURE — 3017F COLORECTAL CA SCREEN DOC REV: CPT | Performed by: STUDENT IN AN ORGANIZED HEALTH CARE EDUCATION/TRAINING PROGRAM

## 2022-11-18 PROCEDURE — G8399 PT W/DXA RESULTS DOCUMENT: HCPCS | Performed by: STUDENT IN AN ORGANIZED HEALTH CARE EDUCATION/TRAINING PROGRAM

## 2022-11-18 PROCEDURE — 3078F DIAST BP <80 MM HG: CPT | Performed by: STUDENT IN AN ORGANIZED HEALTH CARE EDUCATION/TRAINING PROGRAM

## 2022-11-18 PROCEDURE — G8427 DOCREV CUR MEDS BY ELIG CLIN: HCPCS | Performed by: STUDENT IN AN ORGANIZED HEALTH CARE EDUCATION/TRAINING PROGRAM

## 2022-11-18 PROCEDURE — 3074F SYST BP LT 130 MM HG: CPT | Performed by: STUDENT IN AN ORGANIZED HEALTH CARE EDUCATION/TRAINING PROGRAM

## 2022-11-18 PROCEDURE — 1036F TOBACCO NON-USER: CPT | Performed by: STUDENT IN AN ORGANIZED HEALTH CARE EDUCATION/TRAINING PROGRAM

## 2022-11-18 NOTE — PROGRESS NOTES
[facility] [place] [id number] [address]  Dept: [phone]  Dept Fax: [phone]    Office Progress/Follow Up Note  Date of patient's visit: 11/18/2022  Patient's Name:  Jessy Cano YOB: 1955            Patient Care Team:  Avila Earl DO as PCP - Chalmer Hatchet, MD as Referring Physician (Cardiology)  Tommy Ramirez DO as Referring Physician (Cardiology)  Cici Cabello MD as Referring Physician (Internal Medicine)  Amanda Velazquez MD as Consulting Physician (Gastroenterology)  Lennie Pereira RN as Care Transitions Nurse  ________________________________________________________________________      Reason for Visit: Wants new referral for electrophysiology  ________________________________________________________________________  Chief Complaint:  Hypertension (Pt only took thyroid medication today at 4a, pt states she need help with her medication) and Results (From 10/23/22 CT chest, echo)    ________________________________________________________________________  History of Presenting Illness:  Jessy Cano is a 75 y.o. female with past medical history of recurrent diverticulitis causing multiple readmissions most recently on 68/7/8478 complicated by pelvic wall abscess, HFrEF with ejection fraction 17% status post AICD plus CRT-D, mitral regurg, A. fib on amiodarone and Xarelto, hypertension, gout, hypothyroidism currently uptitrating levothyroxine, osteoarthritis presenting for clarification of medications and she would like a new electrophysiologist referral.  Patient has no acute medical complaints at this time. She denies any chest pain, shortness of breath, fluttering in her chest she does not report any shocks from her AICD and she has not had any lower extremity edema since last she was seen. She has been taking her medications as prescribed and just wants clarification on the importance of each medication. She also would like a new referral for electrophysiology and she would like a second opinion from an outside source. She was recently seen by GI for her diverticulitis and she was scheduled for a colonoscopy in 8 weeks. She was seen by infectious disease for her abscess secondary to her diverticulitis and no additional antibiotics were recommended at this time. She is currently trying to have a second opinion for her right rotator cuff from orthopedic surgery.       Pertinent screening:  Colonoscopy: Scheduled 8 weeks from now  Breast cancer screening: Due for mammogram       Vaccinations: Due for hepatitis a and B and COVID booster however declined them at this time     Life-style:  Smoking: Non-smoker  Alcohol: Denies  Diet and Exercise: Not following any exercise regiment however does try to maintain her 2 g sodium and 2 L fluid restriction diet    Active Ambulatory Problems     Diagnosis Date Noted    Hypokalemia 07/24/2012    Dilated cardiomyopathy ([address] 12/20/2012    Pre-diabetes 08/08/2014    Essential hypertension 08/08/2014    PAH (pulmonary artery hypertension) ([address] 34 on Echo  08/08/2014    Mitral regurgitation 01/14/2017    Chronic headache 01/14/2017    Class 1 obesity due to excess calories with body mass index (BMI) of 33.0 to 33.9 in adult 01/15/2017    AICD (automatic cardioverter/defibrillator) present 03/04/2017    Syncope and collapse secondary to [facility] 03/04/2017    Gout 09/05/2019    Dizziness 09/05/2019    Benign paroxysmal positional vertigo 09/06/2019    Chronic heart failure with reduced ejection fraction and diastolic dysfunction ([address] 05/22/2020    NSVT (nonsustained ventricular tachycardia) 05/23/2020    Chest pain 06/26/2021    Hypothyroidism     Chronic combined systolic (congestive) and diastolic (congestive) heart failure ([address] 07/14/2021    Abscess of sigmoid colon 12/04/2021    Diverticulitis 04/09/2022    Nausea & vomiting 04/09/2022    Diverticulitis of large intestine with abscess without bleeding     Diverticulitis of colon     Lower abdominal pain     Colonic diverticular abscess 09/15/2022    Intra-abdominal abscess ([address] 10/07/2022    Cirrhosis ([address] 10/07/2022    Atrial fibrillation ([address] 10/07/2022    QT prolongation 10/07/2022    AICD malfunction, initial encounter 10/23/2022    Acute combined systolic (congestive) and diastolic (congestive) heart failure ([address] 10/23/2022    Paresthesia of right arm and leg 10/24/2022    Pacemaker 10/25/2022     Resolved Ambulatory Problems     Diagnosis Date Noted    Leg cramp 07/24/2012    Acute on chronic systolic congestive heart failure ([address] 02/01/2013    Back pain 02/01/2013    Headache 08/08/2014    Hypertensive urgency 03/06/2015    MAURO (acute kidney injury) ([address] 03/04/2017    V tach     Hypomagnesemia     ICD (implantable cardioverter-defibrillator) battery depletion 06/03/2019    Acute on chronic systolic CHF (congestive heart failure) ([address] 09/05/2019    Hypotension (arterial) 05/23/2020    Acute diverticulitis 12/04/2021    Hypoxia 10/25/2022     Past Medical History:   Diagnosis Date    Abnormal Pap smear     Anemia     Arthritis     Cardiomyopathy ([address] 12/20/2012    Cardiomyopathy, nonischemic ([address] 12/20/2012    Chronic combined systolic and diastolic congestive heart failure (HCC)     Ejection fraction < 50%     HTN (hypertension)     Ischemic cardiomyopathy     Left bundle branch block     Morbid obesity due to excess calories ([address] 01/15/2017    Pulmonary hypertension ([address]     Sudden onset of severe headache     Torn rotator cuff         Health Maintenance Due   Topic Date Due    Hepatitis A vaccine (1 of 2 - Risk 2-dose series) Never done    Hepatitis B vaccine (1 of 3 - Risk 3-dose series) Never done    Annual Wellness Visit (AWV)  Never done    COVID-19 Vaccine (3 - Booster for Pfizer series) 07/21/2021       Allergies   Allergen Reactions    Codeine      Hallucinations    Morphine      Hallucinations      Tramadol Itching         Current Outpatient Medications   Medication Sig Dispense Refill    nitroGLYCERIN (NITROSTAT) 0.4 MG SL tablet up to max of 3 total doses. If no relief after 1 dose, call 911. 25 tablet 3    levothyroxine (SYNTHROID) 75 MCG tablet Take 1 tablet by mouth Daily 30 tablet 3    albuterol sulfate HFA (PROVENTIL;VENTOLIN;PROAIR) 108 (90 Base) MCG/ACT inhaler Inhale 2 puffs into the lungs every 6 hours as needed for Wheezing 18 g 3    bumetanide (BUMEX) 2 MG tablet Take 0.5 tablets by mouth daily 30 tablet 3    rivaroxaban (XARELTO) 20 MG TABS tablet Take 1 tablet by mouth daily Take 1 tablet by mouth daily 30 tablet 2    acetaminophen (TYLENOL) 500 MG tablet Take 1 tablet by mouth every 6 hours as needed for Pain 60 tablet 0    metoprolol succinate (TOPROL XL) 50 MG extended release tablet Take 50 mg by mouth daily      amiodarone (CORDARONE) 200 MG tablet       empagliflozin (JARDIANCE) 10 MG tablet Take 10 mg by mouth daily      potassium chloride (KLOR-CON M) 20 MEQ extended release tablet Take 1 tablet by mouth daily (Patient taking differently: Take 20 mEq by mouth 2 times daily) 30 tablet 0    magnesium oxide (MAG-OX) 400 MG tablet Take 0.5 tablets by mouth daily 30 tablet 1    sacubitril-valsartan (ENTRESTO) 24-26 MG per tablet Take 0.5 tablets by mouth 2 times daily 60 tablet 11    allopurinol (ZYLOPRIM) 100 MG tablet Take 1 tablet by mouth daily 30 tablet 3    spironolactone (ALDACTONE) 25 MG tablet Take 0.5 tablets by mouth daily 30 tablet 3    Misc. Devices (BARIATRIC ROLLATOR) MISC 1 Units by Does not apply route once for 1 dose 1 each 0     No current facility-administered medications for this visit.        Social History     Tobacco Use    Smoking status: Never    Smokeless tobacco: Never   Vaping Use    Vaping Use: Never used   Substance Use Topics    Alcohol use: No     Alcohol/week: 0.0 standard drinks    Drug use: Not Currently       Family History   Problem Relation Age of Onset    Cancer Other         ovarian    High Blood Pressure Mother     Other Mother         copd    Arthritis Neg Hx     Asthma Neg Hx     Birth Defects Neg Hx     Depression Neg Hx     Diabetes Neg Hx     Early Death Neg Hx     Hearing Loss Neg Hx     Heart Disease Neg Hx     High Cholesterol Neg Hx     Kidney Disease Neg Hx     Learning Disabilities Neg Hx     Mental Retardation Neg Hx     Mental Illness Neg Hx     Miscarriages / Stillbirths Neg Hx     Stroke Neg Hx     Substance Abuse Neg Hx     Vision Loss Neg Hx     Breast Cancer Neg Hx     Colon Cancer Neg Hx     Eclampsia Neg Hx     Hypertension Neg Hx     Ovarian Cancer Neg Hx      Labor Neg Hx     Spont Abortions Neg Hx       ________________________________________________________________________  10 point review of systems reviewed and negative except for stated above  ________________________________________________________________________  Physical Exam:  Vitals:    22 0933   BP: 120/74   Site: Right Upper Arm   Position: Sitting   Cuff Size: Medium Adult   Pulse: 73   Temp: 98.2 °F (36.8 °C)   SpO2: 99%   Weight: 202 lb 6.4 oz (91.8 kg)   Height: 5' 6\" (1.676 m)     BP Readings from Last 3 Encounters:   22 120/74   22 109/85   11/10/22 106/80      Physical Exam:  General:  Pleasant and cooperative. No apparent distress. HEENT:  Normocephalic, atraumatic, mucus membranes moist.   Neck:  Trachea midline. Chest:  Clear to auscultation bilaterally. No wheezes, rales, or rhonchi. CV: Irregular rhythm with regular rate. No audible murmur, no gallops, no rubs  Abdomen:  Abdomen is soft, non-tender, non-distended, no rebound or guarding. Extremities:  No lower extremity edema or cyanosis, peripheral pulses intact  Neurological:  AAOx3. No focal deficits.   Skin:  Warm and dry.    ________________________________________________________________________  Diagnostic findings:  CBC:  Lab Results   Component Value Date/Time    WBC 6.2 10/25/2022 06:24 AM    HGB 11.5 10/25/2022 06:24 AM     10/25/2022 06:24 AM     04/03/2012 05:31 PM       BMP:    Lab Results   Component Value Date/Time     10/25/2022 06:24 AM    K 3.8 10/25/2022 06:24 AM     10/25/2022 06:24 AM    CO2 21 10/25/2022 06:24 AM    BUN 15 10/25/2022 06:24 AM    CREATININE 1.01 10/25/2022 06:24 AM    GLUCOSE 90 10/25/2022 06:24 AM    GLUCOSE 100 04/03/2012 05:31 PM       HEMOGLOBIN A1C:   Lab Results   Component Value Date/Time    LABA1C 5.2 10/24/2022 07:31 PM       FASTING LIPID PANEL:  Lab Results   Component Value Date    CHOL 180 05/23/2020    HDL 42 05/23/2020    TRIG 79 05/23/2020     ________________________________________________________________________  Assessment and Plan:  #HFrEF with ejection fraction 70% status post AICD and CRT-D  #Mitral regurg  #A. fib on Xarelto and amiodarone  #Hypertension  -No changes to medications. Patient denying any current heart failure symptoms and appears to be at her baseline. She does not have any fluid overload on exam.  -We will give external referral for electrophysiology. Patient [facility] to [facility]    #Diverticulitis with abscess  #Cirrhosis of the liver seen on imaging  -Patient followed with gastroenterology recently for her diverticulitis. She will have colonoscopy in 8 weeks and repeat CT was recommended. She states they were not interested in working up cirrhosis at this time until acute symptoms have resolved  -Hepatitis B antigen and antibody, ferritin, ceruloplasmin, hepatitis C antibody tests negative  -Patient did have referral to colorectal surgery however did not pursue this.  -Patient was sent to infectious disease and no further antibiotics were recommended at this time    #Hypothyroidism  -Currently on levothyroxine 75 mcg daily. Suspect this is her correct dose. We will repeat TSH in 6 weeks    #Screening/vaccines  -Mammogram ordered.   Colonoscopy with GI in 8 weeks already scheduled  -Patient declined hepatitis A B and COVID vaccines at this visit  ________________________________________________________________________  Follow up and instructions:  Return in about 3 months (around 2/18/2023). Annemarie received counseling on the following healthy behaviors: medication adherence    Discussed use, benefit, and side effects of prescribed medications. Barriers to medication compliance addressed. All patient questions answered. Pt voiced understanding. Patient given educational materials - see patient instructions    Nan Montenegro DO      Department of Internal Medicine  [facility]         11/18/2022, 3:27 PM      This note is created with the assistance of a speech-recognition program. While intending to generate a document that actually reflects the content of the visit, the document can still have some mistakes which may not have been identified and corrected by editing.

## 2022-11-22 ENCOUNTER — CARE COORDINATION (OUTPATIENT)
Dept: CARE COORDINATION | Age: 67
End: 2022-11-22

## 2022-11-22 ENCOUNTER — CARE COORDINATION (OUTPATIENT)
Dept: CASE MANAGEMENT | Age: 67
End: 2022-11-22

## 2022-11-22 NOTE — CARE COORDINATION
(JARDIANCE) 10 MG tablet Take 10 mg by mouth daily 1/4/22   Historical Provider, MD   potassium chloride (KLOR-CON M) 20 MEQ extended release tablet Take 1 tablet by mouth daily  Patient taking differently: Take 20 mEq by mouth 2 times daily 12/8/21   Jayme Jacob MD   magnesium oxide (MAG-OX) 400 MG tablet Take 0.5 tablets by mouth daily 12/8/21   Willard Valdez MD   sacubitril-valsartan (ENTRESTO) 24-26 MG per tablet Take 0.5 tablets by mouth 2 times daily 6/29/21   Lolis Holt MD   allopurinol (ZYLOPRIM) 100 MG tablet Take 1 tablet by mouth daily 2/18/19   Emanuel Gooden MD   spironolactone (ALDACTONE) 25 MG tablet Take 0.5 tablets by mouth daily 2/18/19   Emanuel Gooden MD       Future Appointments   Date Time Provider Yann Olivares   12/20/2022  9:00 AM [facility] CLINIC RM 1 [facility] CH[facility] CLI [facility]   2/13/2023  1:15 PM Lyudmila Velasquez MD [facility]   2/24/2023  9:00 AM Tyron Gonzalez, [facility] IM [address] [facility]

## 2022-11-22 NOTE — CARE COORDINATION
[facility] Care Transitions Follow Up Call    Care Transition Nurse contacted the patient by telephone to follow up after admission on 10/23/22. Verified name and  with patient as identifiers. Patient: Jake Rowe  Patient : 1955   MRN: [id number]  Reason for Admission: Chest pain/paresthesias Rt extremities  Discharge Date: 10/25/22 RARS: Readmission Risk Score: 24.2      Needs to be reviewed by the provider   Additional needs identified to be addressed with provider: No  none             Method of communication with provider: none. Was able to contact [facility] for final outreach. She stated that she was doing \"great\". She denied any GI or cardio symptoms. She did say that she did receive the referral to a [facility] electrophysiologist and is suppose to hear from them today. She said that home care is no longer seeing her. She had no questions/concerns or needs at this time. Reviewed referral to [facility] and she was receptive. Will send to [facility]. [facility]  Informed of final outreach. Addressed changes since last contact:  none  Discussed follow-up appointments. Follow Up  Future Appointments   Date Time Provider Yann Olivares   2022  9:00 AM [facility] CLINIC RM 1 [facility] CHF CLI [facility]   2023  1:15 PM Kira Dukes MD [facility] [facility]   2023  9:00 AM Prudencio Xie DO [address] [facility] [address] \[facility] [place] [facility]\""     [facility] follow up appointment(s):     Care Transition Nurse reviewed medical action plan and red flags with patient and discussed any barriers to care and/or understanding of plan of care after discharge. Discussed appropriate site of care based on symptoms and resources available to patient including: PCP  Specialist  When to call 911. The patient agrees to contact the PCP office for questions related to their healthcare.      Patients top risk factors for readmission: functional physical ability and medical condition-Divericulitis/CHF/Cirrhosis  Interventions to address risk factors: Obtained and reviewed discharge summary and/or continuity of care documents    Offered patient enrollment in the Remote Patient Monitoring (RPM) program for in-home monitoring: Patient declined. Care Transitions Subsequent and Final Call    Subsequent and Final Calls  Do you have any ongoing symptoms?: No  Have your medications changed?: No  Do you have any questions related to your medications?: No  Do you currently have any active services?: No  Are you currently active with any services?: Outpatient/Community Services, Home Health  Do you have any needs or concerns that I can assist you with?: No  Care Transitions Interventions  Other Interventions:             Care Transition Nurse provided contact information for future needs. No further follow-up call indicated based on severity of symptoms and risk factors. Plan for next call: referral to ambulatory care manager-[facility]  Final call episode ended.     Shivani Zacarias RN

## 2022-12-08 ENCOUNTER — CARE COORDINATION (OUTPATIENT)
Dept: CARE COORDINATION | Age: 67
End: 2022-12-08

## 2022-12-08 SDOH — ECONOMIC STABILITY: INCOME INSECURITY: IN THE LAST 12 MONTHS, WAS THERE A TIME WHEN YOU WERE NOT ABLE TO PAY THE MORTGAGE OR RENT ON TIME?: NO

## 2022-12-08 SDOH — HEALTH STABILITY: PHYSICAL HEALTH: ON AVERAGE, HOW MANY MINUTES DO YOU ENGAGE IN EXERCISE AT THIS LEVEL?: 20 MIN

## 2022-12-08 SDOH — HEALTH STABILITY: PHYSICAL HEALTH: ON AVERAGE, HOW MANY DAYS PER WEEK DO YOU ENGAGE IN MODERATE TO STRENUOUS EXERCISE (LIKE A BRISK WALK)?: 3 DAYS

## 2022-12-08 SDOH — ECONOMIC STABILITY: HOUSING INSECURITY
IN THE LAST 12 MONTHS, WAS THERE A TIME WHEN YOU DID NOT HAVE A STEADY PLACE TO SLEEP OR SLEPT IN A SHELTER (INCLUDING NOW)?: NO

## 2022-12-08 SDOH — ECONOMIC STABILITY: TRANSPORTATION INSECURITY
IN THE PAST 12 MONTHS, HAS THE LACK OF TRANSPORTATION KEPT YOU FROM MEDICAL APPOINTMENTS OR FROM GETTING MEDICATIONS?: NO

## 2022-12-08 SDOH — ECONOMIC STABILITY: HOUSING INSECURITY: IN THE LAST 12 MONTHS, HOW MANY PLACES HAVE YOU LIVED?: 1

## 2022-12-08 SDOH — ECONOMIC STABILITY: TRANSPORTATION INSECURITY
IN THE PAST 12 MONTHS, HAS LACK OF TRANSPORTATION KEPT YOU FROM MEETINGS, WORK, OR FROM GETTING THINGS NEEDED FOR DAILY LIVING?: NO

## 2022-12-08 ASSESSMENT — SOCIAL DETERMINANTS OF HEALTH (SDOH)
IN A TYPICAL WEEK, HOW MANY TIMES DO YOU TALK ON THE PHONE WITH FAMILY, FRIENDS, OR NEIGHBORS?: MORE THAN THREE TIMES A WEEK
HOW OFTEN DO YOU GET TOGETHER WITH FRIENDS OR RELATIVES?: MORE THAN THREE TIMES A WEEK
DO YOU BELONG TO ANY CLUBS OR ORGANIZATIONS SUCH AS CHURCH GROUPS UNIONS, FRATERNAL OR ATHLETIC GROUPS, OR SCHOOL GROUPS?: NO
HOW OFTEN DO YOU ATTEND CHURCH OR RELIGIOUS SERVICES?: MORE THAN 4 TIMES PER YEAR
HOW OFTEN DO YOU ATTENT MEETINGS OF THE CLUB OR ORGANIZATION YOU BELONG TO?: MORE THAN 4 TIMES PER YEAR

## 2022-12-08 ASSESSMENT — LIFESTYLE VARIABLES
HOW MANY STANDARD DRINKS CONTAINING ALCOHOL DO YOU HAVE ON A TYPICAL DAY: PATIENT DOES NOT DRINK
HOW OFTEN DO YOU HAVE A DRINK CONTAINING ALCOHOL: NEVER

## 2023-01-04 DIAGNOSIS — I48.0 PAROXYSMAL ATRIAL FIBRILLATION (HCC): ICD-10-CM

## 2023-01-05 NOTE — TELEPHONE ENCOUNTER
Request for xarelto.   Next kiesha on 2/2/423    Next Visit Date:  Future Appointments   Date Time Provider Yann Olivares   2/13/2023  1:15 PM Craig Huizar MD [facility]   2/24/2023  9:00 AM Rory Kimble, DO [address] [facility]   Topic Date Due    Hepatitis A vaccine (1 of 2 - Risk 2-dose series) Never done    Hepatitis B vaccine (1 of 3 - Risk 3-dose series) Never done    Annual Wellness Visit (AWV)  Never done    COVID-19 Vaccine (3 - Booster for Pfizer series) 07/21/2021    DTaP/Tdap/Td vaccine (1 - Tdap) 09/30/2023 (Originally 4/24/1974)    Shingles vaccine (1 of 2) 09/30/2023 (Originally 4/24/2004)    Breast cancer screen  02/24/2023    Depression Screen  08/05/2023    A1C test (Diabetic or Prediabetic)  10/24/2023    Lipids  05/23/2025    Colorectal Cancer Screen  03/29/2032    DEXA (modify frequency per FRAX score)  Completed    Flu vaccine  Completed    Pneumococcal 65+ years Vaccine  Completed    Hepatitis C screen  Completed    Hib vaccine  Aged Out    Meningococcal (ACWY) vaccine  Aged Out       Hemoglobin A1C (%)   Date Value   10/24/2022 5.2   05/23/2020 5.6   02/14/2019 5.8             ( goal A1C is < 7)   No results found for: LABMICR  LDL Cholesterol (mg/dL)   Date Value   05/23/2020 122       (goal LDL is <100)   AST (U/L)   Date Value   10/24/2022 17     ALT (U/L)   Date Value   10/24/2022 12     BUN (mg/dL)   Date Value   10/25/2022 15     BP Readings from Last 3 Encounters:   11/18/22 120/74   11/11/22 109/85   11/10/22 106/80          (goal 120/80)    All Future Testing planned in [facility]  Lab Frequency Next Occurrence   CT ABDOMEN PELVIS W WO CONTRAST Additional Contrast? Radiologist Recommendation Once 10/07/2022   Hepatitis C Antibody Once 10/19/2022   Hepatitis B Surface Antibody Once 10/19/2022   Hepatitis B Surface Antigen Once 10/19/2022   HIV Screen Once 10/19/2022   Ferritin Once 10/19/2022   Ceruloplasmin Once 10/19/2022   CT ABDOMEN PELVIS W IV CONTRAST Once 01/03/2023   TSH Once 12/30/2022   ESTELITA DIGITAL SCREEN W OR WO CAD BILATERAL Once 11/18/2022         Patient Active Problem List:     Hypokalemia     Dilated cardiomyopathy [address]     Pre-diabetes     Essential hypertension     PAH (pulmonary artery hypertension) ([facility] 34 on Echo      Mitral regurgitation     Chronic headache     Class 1 obesity due to excess calories with body mass index (BMI) of 33.0 to 33.9 in adult     AICD (automatic cardioverter/defibrillator) present     Syncope and collapse secondary to [facility]     Gout     Dizziness     Benign paroxysmal positional vertigo     Chronic heart failure with reduced ejection fraction and diastolic dysfunction (HCC)     NSVT (nonsustained ventricular tachycardia)     Chest pain     Hypothyroidism     Chronic combined systolic (congestive) and diastolic (congestive) heart failure (HCC)     Abscess of sigmoid colon     Diverticulitis     Nausea & vomiting     Diverticulitis of large intestine with abscess without bleeding     Diverticulitis of colon     Lower abdominal pain     Colonic diverticular abscess     Intra-abdominal abscess (HCC)     Cirrhosis (HCC)     Atrial fibrillation (HCC)     QT prolongation     AICD malfunction, initial encounter     Acute combined systolic (congestive) and diastolic (congestive) heart failure (HCC)     Paresthesia of right arm and leg     Pacemaker

## 2023-01-08 RX ORDER — RIVAROXABAN 20 MG/1
TABLET, FILM COATED ORAL
Qty: 30 TABLET | Refills: 5 | Status: SHIPPED | OUTPATIENT
Start: 2023-01-08

## 2023-01-16 ENCOUNTER — HOSPITAL ENCOUNTER (OUTPATIENT)
Age: 68
Discharge: HOME | End: 2023-01-16
Payer: MEDICARE

## 2023-01-16 ENCOUNTER — HOSPITAL ENCOUNTER (OUTPATIENT)
Dept: CT IMAGING | Age: 68
Discharge: HOME | End: 2023-01-18
Payer: MEDICARE

## 2023-01-16 DIAGNOSIS — K57.92 DIVERTICULITIS: Primary | ICD-10-CM

## 2023-01-16 DIAGNOSIS — K57.92 DIVERTICULITIS: ICD-10-CM

## 2023-01-16 LAB
CREAT SERPL-MCNC: 0.99 MG/DL (ref 0.5–0.9)
GFR SERPL CREATININE-BSD FRML MDRD: >60 ML/MIN/1.73M2

## 2023-01-16 PROCEDURE — 6360000004 HC RX CONTRAST MEDICATION: Performed by: INTERNAL MEDICINE

## 2023-01-16 PROCEDURE — 74177 CT ABD & PELVIS W/CONTRAST: CPT

## 2023-01-16 PROCEDURE — 36415 COLL VENOUS BLD VENIPUNCTURE: CPT

## 2023-01-16 PROCEDURE — A4641 RADIOPHARM DX AGENT NOC: HCPCS | Performed by: INTERNAL MEDICINE

## 2023-01-16 PROCEDURE — 82565 ASSAY OF CREATININE: CPT

## 2023-01-16 RX ADMIN — IOPAMIDOL 75 ML: 755 INJECTION, SOLUTION INTRAVENOUS at 10:27

## 2023-01-16 RX ADMIN — BARIUM SULFATE 900 ML: 20 SUSPENSION ORAL at 10:27

## 2023-01-17 ENCOUNTER — CLINICAL DOCUMENTATION (OUTPATIENT)
Dept: GASTROENTEROLOGY | Age: 68
End: 2023-01-17

## 2023-01-17 ENCOUNTER — TELEPHONE (OUTPATIENT)
Dept: GASTROENTEROLOGY | Age: 68
End: 2023-01-17

## 2023-01-17 DIAGNOSIS — K57.92 DIVERTICULITIS: Primary | ICD-10-CM

## 2023-01-17 DIAGNOSIS — K57.32 DIVERTICULITIS OF LARGE INTESTINE WITHOUT BLEEDING, UNSPECIFIED COMPLICATION STATUS: Primary | ICD-10-CM

## 2023-01-17 RX ORDER — CIPROFLOXACIN 500 MG/1
500 TABLET, FILM COATED ORAL 2 TIMES DAILY
Qty: 20 TABLET | Refills: 0 | Status: SHIPPED | OUTPATIENT
Start: 2023-01-17 | End: 2023-01-27

## 2023-01-17 RX ORDER — METRONIDAZOLE 500 MG/1
500 TABLET ORAL 3 TIMES DAILY
Qty: 30 TABLET | Refills: 0 | Status: SHIPPED | OUTPATIENT
Start: 2023-01-17 | End: 2023-01-27

## 2023-01-17 NOTE — TELEPHONE ENCOUNTER
----- Message from Marquez Norman MD sent at 1/17/2023 11:02 AM EST -----  Acute on chronic diverticulitis with josh-colonic collection. Please send for urgent referral to General Surgery for evaluation and Infectious disease. Call patient, if she has symptoms of pain, would send to the ED. Will place patient on antibiotic course x 10 days.

## 2023-01-17 NOTE — TELEPHONE ENCOUNTER
Pt is requesting a call pt is wanting to know why she is being referred to [facility] surgery

## 2023-01-17 NOTE — PROGRESS NOTES
Called patient about CT abdomen results with ongoing inflamed sigmoid colon with josh-colonic air and fluid collection. Will provide urgent referral to colorectal surgery. Patient says \" I don't feel anything wrong\". I informed the patient that she is to present to the ED for any symptoms if present. She denies any fevers, chills, or abdominal pain. She was unwilling to proceed with repeat dose of antibiotics, they were provided and ordered for her in case she re-considers. She is willing to see surgery/CR surgery and discuss her options. She agreed to present to the ED if symptoms develop. Risks, benefits, and alternatives of every option discussed with the patient and she understood with full capacity.

## 2023-01-17 NOTE — TELEPHONE ENCOUNTER
Patient returned call requesting to speak to Dr Green. Patient would like to discuss antibiotics.  [phone]

## 2023-01-26 DIAGNOSIS — I42.0 DILATED CARDIOMYOPATHY (HCC): Primary | ICD-10-CM

## 2023-01-26 NOTE — TELEPHONE ENCOUNTER
Request for jardiance.   Next kiesha on 2/24/23    Next Visit Date:  Future Appointments   Date Time Provider Yann Olivares   2/13/2023  1:15 PM Reji Dick MD [facility] [place] [facility]   2/24/2023  9:00 AM Matthew Sandoval DO [address] [facility] Maintenance   Topic Date Due    Hepatitis A vaccine (1 of 2 - Risk 2-dose series) Never done    Hepatitis B vaccine (1 of 3 - Risk 3-dose series) Never done    Annual Wellness Visit (AWV)  Never done    COVID-19 Vaccine (3 - Booster for Pfizer series) 07/21/2021    Breast cancer screen  02/24/2023    DTaP/Tdap/Td vaccine (1 - Tdap) 09/30/2023 (Originally 4/24/1973)    Shingles vaccine (1 of 2) 09/30/2023 (Originally 4/24/2004)    Depression Screen  08/05/2023    A1C test (Diabetic or Prediabetic)  10/24/2023    Lipids  12/06/2027    Colorectal Cancer Screen  03/29/2032    DEXA (modify frequency per FRAX score)  Completed    Flu vaccine  Completed    Pneumococcal 65+ years Vaccine  Completed    Hepatitis C screen  Completed    Hib vaccine  Aged Out    Meningococcal (ACWY) vaccine  Aged Out       Hemoglobin A1C (%)   Date Value   10/24/2022 5.2   05/23/2020 5.6   02/14/2019 5.8             ( goal A1C is < 7)   No results found for: LABMICR  LDL Cholesterol (mg/dL)   Date Value   05/23/2020 122       (goal LDL is <100)   AST (U/L)   Date Value   10/24/2022 17     ALT (U/L)   Date Value   10/24/2022 12     BUN (mg/dL)   Date Value   10/25/2022 15     BP Readings from Last 3 Encounters:   11/18/22 120/74   11/11/22 109/85   11/10/22 106/80          (goal 120/80)    All Future Testing planned in [facility]  Lab Frequency Next Occurrence   CT ABDOMEN PELVIS W WO CONTRAST Additional Contrast? Radiologist Recommendation Once 10/07/2022   Hepatitis C Antibody Once 10/19/2022   Hepatitis B Surface Antibody Once 10/19/2022   Hepatitis B Surface Antigen Once 10/19/2022   HIV Screen Once 10/19/2022   Ferritin Once 10/19/2022   Ceruloplasmin Once 10/19/2022   TSH Once 12/30/2022   ESTELITA DIGITAL SCREEN W OR WO CAD BILATERAL Once 11/18/2022         Patient Active Problem List:     Hypokalemia     Dilated cardiomyopathy ([facility] [address]     Pre-diabetes     Essential hypertension     PAH (pulmonary artery hypertension) ([facility] [address] 34 on Echo      Mitral regurgitation     Chronic headache     Class 1 obesity due to excess calories with body mass index (BMI) of 33.0 to 33.9 in adult     AICD (automatic cardioverter/defibrillator) present     Syncope and collapse secondary to [facility]     Gout     Dizziness     Benign paroxysmal positional vertigo     Chronic heart failure with reduced ejection fraction and diastolic dysfunction (HCC)     NSVT (nonsustained ventricular tachycardia)     Chest pain     Hypothyroidism     Chronic combined systolic (congestive) and diastolic (congestive) heart failure (HCC)     Abscess of sigmoid colon     Diverticulitis     Nausea & vomiting     Diverticulitis of large intestine with abscess without bleeding     Diverticulitis of colon     Lower abdominal pain     Colonic diverticular abscess     Intra-abdominal abscess (HCC)     Cirrhosis (HCC)     Atrial fibrillation (HCC)     QT prolongation     AICD malfunction, initial encounter     Acute combined systolic (congestive) and diastolic (congestive) heart failure (HCC)     Paresthesia of right arm and leg     Pacemaker

## 2023-02-08 DIAGNOSIS — E03.9 HYPOTHYROIDISM, UNSPECIFIED TYPE: ICD-10-CM

## 2023-02-08 NOTE — TELEPHONE ENCOUNTER
Request for levothyroxine.   Next kiesha on 2/13/23    Next Visit Date:  Future Appointments   Date Time Provider Yann Olivares   2/13/2023  1:15 PM Jitendra Gardner MD [facility]   2/24/2023  9:00 AM Danielle Peters DO [address] [facility] Maintenance   Topic Date Due    Hepatitis A vaccine (1 of 2 - Risk 2-dose series) Never done    Hepatitis B vaccine (1 of 3 - Risk 3-dose series) Never done    Annual Wellness Visit (AWV)  Never done    COVID-19 Vaccine (3 - Booster for Pfizer series) 07/21/2021    Breast cancer screen  02/24/2023    DTaP/Tdap/Td vaccine (1 - Tdap) 09/30/2023 (Originally 4/24/1973)    Shingles vaccine (1 of 2) 09/30/2023 (Originally 4/24/2004)    Depression Screen  08/05/2023    A1C test (Diabetic or Prediabetic)  10/24/2023    Lipids  12/06/2027    Colorectal Cancer Screen  03/29/2032    DEXA (modify frequency per FRAX score)  Completed    Flu vaccine  Completed    Pneumococcal 65+ years Vaccine  Completed    Hepatitis C screen  Completed    Hib vaccine  Aged Out    Meningococcal (ACWY) vaccine  Aged Out       Hemoglobin A1C (%)   Date Value   10/24/2022 5.2   05/23/2020 5.6   02/14/2019 5.8             ( goal A1C is < 7)   No results found for: LABMICR  LDL Cholesterol (mg/dL)   Date Value   05/23/2020 122       (goal LDL is <100)   AST (U/L)   Date Value   10/24/2022 17     ALT (U/L)   Date Value   10/24/2022 12     BUN (mg/dL)   Date Value   10/25/2022 15     BP Readings from Last 3 Encounters:   11/18/22 120/74   11/11/22 109/85   11/10/22 106/80          (goal 120/80)    All Future Testing planned in [facility]  Lab Frequency Next Occurrence   CT ABDOMEN PELVIS W WO CONTRAST Additional Contrast? Radiologist Recommendation Once 10/07/2022   Hepatitis C Antibody Once 10/19/2022   Hepatitis B Surface Antibody Once 10/19/2022   Hepatitis B Surface Antigen Once 10/19/2022   HIV Screen Once 10/19/2022   Ferritin Once 10/19/2022   Ceruloplasmin Once 10/19/2022   TSH Once 12/30/2022   ESTELITA DIGITAL SCREEN W OR WO CAD BILATERAL Once 11/18/2022         Patient Active Problem List:     Hypokalemia     Dilated cardiomyopathy ([facility] [address]     Pre-diabetes     Essential hypertension     PAH (pulmonary artery hypertension) ([facility] [address] 34 on Echo      Mitral regurgitation     Chronic headache     Class 1 obesity due to excess calories with body mass index (BMI) of 33.0 to 33.9 in adult     AICD (automatic cardioverter/defibrillator) present     Syncope and collapse secondary to [facility]     Gout     Dizziness     Benign paroxysmal positional vertigo     Chronic heart failure with reduced ejection fraction and diastolic dysfunction (HCC)     NSVT (nonsustained ventricular tachycardia)     Chest pain     Hypothyroidism     Chronic combined systolic (congestive) and diastolic (congestive) heart failure (HCC)     Abscess of sigmoid colon     Diverticulitis     Nausea & vomiting     Diverticulitis of large intestine with abscess without bleeding     Diverticulitis of colon     Lower abdominal pain     Colonic diverticular abscess     Intra-abdominal abscess (HCC)     Cirrhosis (HCC)     Atrial fibrillation (HCC)     QT prolongation     AICD malfunction, initial encounter     Acute combined systolic (congestive) and diastolic (congestive) heart failure (HCC)     Paresthesia of right arm and leg     Pacemaker

## 2023-02-10 RX ORDER — LEVOTHYROXINE SODIUM 0.07 MG/1
75 TABLET ORAL DAILY
Qty: 30 TABLET | Refills: 3 | Status: SHIPPED | OUTPATIENT
Start: 2023-02-10

## 2023-02-13 ENCOUNTER — TELEPHONE (OUTPATIENT)
Dept: GASTROENTEROLOGY | Age: 68
End: 2023-02-13

## 2023-02-13 NOTE — TELEPHONE ENCOUNTER
Patient has an appointment today at 1:15 pm.  She cannot make this appointment. Just came from her PCP and she is too SOB to walk that far for an appointment. She did not want to be transferred, she would just like a call back please.

## 2023-03-27 ENCOUNTER — OFFICE VISIT (OUTPATIENT)
Dept: GASTROENTEROLOGY | Age: 68
End: 2023-03-27
Payer: MEDICARE

## 2023-03-27 VITALS
HEIGHT: 66 IN | DIASTOLIC BLOOD PRESSURE: 60 MMHG | BODY MASS INDEX: 30.7 KG/M2 | SYSTOLIC BLOOD PRESSURE: 114 MMHG | WEIGHT: 191 LBS

## 2023-03-27 DIAGNOSIS — K57.20 DIVERTICULITIS OF LARGE INTESTINE WITH ABSCESS WITHOUT BLEEDING: Primary | ICD-10-CM

## 2023-03-27 DIAGNOSIS — K74.60 CIRRHOSIS OF LIVER WITHOUT ASCITES, UNSPECIFIED HEPATIC CIRRHOSIS TYPE (HCC): ICD-10-CM

## 2023-03-27 PROCEDURE — G8417 CALC BMI ABV UP PARAM F/U: HCPCS | Performed by: INTERNAL MEDICINE

## 2023-03-27 PROCEDURE — 1123F ACP DISCUSS/DSCN MKR DOCD: CPT | Performed by: INTERNAL MEDICINE

## 2023-03-27 PROCEDURE — 3078F DIAST BP <80 MM HG: CPT | Performed by: INTERNAL MEDICINE

## 2023-03-27 PROCEDURE — 99213 OFFICE O/P EST LOW 20 MIN: CPT | Performed by: INTERNAL MEDICINE

## 2023-03-27 PROCEDURE — G8428 CUR MEDS NOT DOCUMENT: HCPCS | Performed by: INTERNAL MEDICINE

## 2023-03-27 PROCEDURE — 1036F TOBACCO NON-USER: CPT | Performed by: INTERNAL MEDICINE

## 2023-03-27 PROCEDURE — 3017F COLORECTAL CA SCREEN DOC REV: CPT | Performed by: INTERNAL MEDICINE

## 2023-03-27 PROCEDURE — 3074F SYST BP LT 130 MM HG: CPT | Performed by: INTERNAL MEDICINE

## 2023-03-27 PROCEDURE — G8482 FLU IMMUNIZE ORDER/ADMIN: HCPCS | Performed by: INTERNAL MEDICINE

## 2023-03-27 PROCEDURE — 1090F PRES/ABSN URINE INCON ASSESS: CPT | Performed by: INTERNAL MEDICINE

## 2023-03-27 PROCEDURE — G8399 PT W/DXA RESULTS DOCUMENT: HCPCS | Performed by: INTERNAL MEDICINE

## 2023-03-27 ASSESSMENT — ENCOUNTER SYMPTOMS
ABDOMINAL PAIN: 0
NAUSEA: 0
RECTAL PAIN: 0
ABDOMINAL DISTENTION: 0
EYES NEGATIVE: 1
DIARRHEA: 0
VOMITING: 0
CONSTIPATION: 0
ALLERGIC/IMMUNOLOGIC NEGATIVE: 1
BLOOD IN STOOL: 0
ANAL BLEEDING: 0

## 2023-03-27 NOTE — PROGRESS NOTES
exhibits no tenderness. Abdominal: Soft. Bowel sounds are normal. He exhibits no distension and no mass. There is no tenderness. There is no rebound and no guarding. No hernia. Musculoskeletal: Normal range of motion. Lymphadenopathy:    Patient has no cervical adenopathy. Neurological: Patient is alert and oriented to person, place, and time. Psychiatric: Patient has a normal mood and affect. Patient behavior is normal.       LABORATORY DATA: Reviewed  Lab Results   Component Value Date    WBC 6.2 10/25/2022    HGB 11.5 (L) 10/25/2022    HCT 32.4 (L) 10/25/2022    MCV 84.2 10/25/2022     10/25/2022     10/25/2022    K 3.8 10/25/2022     10/25/2022    CO2 21 10/25/2022    BUN 15 10/25/2022    CREATININE 0.99 (H) 01/16/2023    LABALBU 3.4 (L) 10/24/2022    BILITOT 1.1 10/24/2022    ALKPHOS 197 (H) 10/24/2022    AST 17 10/24/2022    ALT 12 10/24/2022    INR 1.1 09/16/2022         Lab Results   Component Value Date    RBC 3.85 (L) 10/25/2022    HGB 11.5 (L) 10/25/2022    MCV 84.2 10/25/2022    MCH 29.9 10/25/2022    MCHC 35.5 (H) 10/25/2022    RDW 14.5 (H) 10/25/2022    MPV 11.2 10/25/2022    BASOPCT 1 10/25/2022    LYMPHSABS 1.22 10/25/2022    MONOSABS 0.79 10/25/2022    NEUTROABS 4.03 10/25/2022    EOSABS 0.10 10/25/2022    BASOSABS 0.04 10/25/2022         DIAGNOSTIC TESTING:     DEXA BONE DENSITY AXIAL SKELETON    Result Date: 8/11/2022  EXAMINATION: BONE DENSITOMETRY 8/11/2022 7:23 am TECHNIQUE: A bone density dual x-ray absorptiometry (DXA) scan was performed of the lumbar spine and left hip on a [employer] system. COMPARISON: None.  HISTORY: ORDERING SYSTEM PROVIDED HISTORY: Postmenopause Gender: F Age: 76 y/o FINDINGS: LUMBAR SPINE: L1-L4 BMD: 1.116 g/cm2 T-score: -0.6 Z-score: -0.6 LEFT TOTAL HIP: BMD: 0.844 g/cm2 T-score: -1.3 Z-score: -1.6 LEFT FEMORAL NECK: BMD: 0.822 g/cm2 T-score: -1.6 Z-score: -1.5 FRAX: 10-year fracture risk is performed using the [employer]

## 2023-03-28 ENCOUNTER — HOSPITAL ENCOUNTER (OUTPATIENT)
Dept: CARDIAC REHAB | Age: 68
Discharge: HOME | End: 2023-03-28

## 2023-03-28 PROCEDURE — 9900000065 HC CARDIAC REHAB PHASE 3 - 1 VISIT

## 2023-04-04 ENCOUNTER — HOSPITAL ENCOUNTER (OUTPATIENT)
Dept: CARDIAC REHAB | Age: 68
Discharge: HOME | End: 2023-04-04

## 2023-04-04 PROCEDURE — 9900000065 HC CARDIAC REHAB PHASE 3 - 1 VISIT

## 2023-04-05 ENCOUNTER — TELEPHONE (OUTPATIENT)
Dept: PHARMACY | Facility: CLINIC | Age: 68
End: 2023-04-05

## 2023-04-05 NOTE — LETTER
[place]  [address], [address]        [address]   [address] [address]           04/05/23     Dear Josiah Lr,    We tried to reach you recently regarding your JARDIANCE    TAB 10MG DAILY, but were unable to reach you on the telephone. We have on file that you are currently taking JARDIANCE    TAB 10MG. If you are no longer taking or taking differently, please call us at the number below so that we can discuss this and update your medication profile. It appears that this medication has not been filled at proper times. We are worried you might be missing doses or not taking it as directed. It is important that you take your medications regularly and try not to miss a single dose.     Some ways to help you remember to take and refill your medications are to:  Use a pill box, set an alarm, and/or keep your medication near something that you do every day  Fill a 3-month supply of your prescription at a time to save you time and trips to the pharmacy - if you would like assistance in switching your prescriptions to a 3-month supply, please contact us  Ask your pharmacy if they participate in [facility]", a program where you can  all of your medications on the same day  Ask your pharmacy if you can be set up with automatic refill, where they will automatically refill your prescription when it is due and let you know it's ready to     Sincerely,   [address] // Department, toll free [phone], Option 3

## 2023-04-06 ENCOUNTER — HOSPITAL ENCOUNTER (OUTPATIENT)
Dept: CARDIAC REHAB | Age: 68
Discharge: HOME | End: 2023-04-06

## 2023-04-06 PROCEDURE — 9900000065 HC CARDIAC REHAB PHASE 3 - 1 VISIT

## 2023-04-18 ENCOUNTER — HOSPITAL ENCOUNTER (OUTPATIENT)
Dept: CARDIAC REHAB | Age: 68
Discharge: HOME | End: 2023-04-18

## 2023-04-18 PROCEDURE — 9900000065 HC CARDIAC REHAB PHASE 3 - 1 VISIT

## 2023-04-20 ENCOUNTER — HOSPITAL ENCOUNTER (OUTPATIENT)
Dept: CARDIAC REHAB | Age: 68
Discharge: HOME | End: 2023-04-20

## 2023-04-21 ENCOUNTER — HOSPITAL ENCOUNTER (OUTPATIENT)
Age: 68
Discharge: HOME | End: 2023-04-21
Payer: MEDICARE

## 2023-04-21 ENCOUNTER — HOSPITAL ENCOUNTER (OUTPATIENT)
Dept: ULTRASOUND IMAGING | Age: 68
End: 2023-04-21
Payer: MEDICARE

## 2023-04-21 ENCOUNTER — HOSPITAL ENCOUNTER (OUTPATIENT)
Dept: CT IMAGING | Age: 68
End: 2023-04-21
Payer: MEDICARE

## 2023-04-21 DIAGNOSIS — K57.20 DIVERTICULITIS OF LARGE INTESTINE WITH ABSCESS WITHOUT BLEEDING: ICD-10-CM

## 2023-04-21 DIAGNOSIS — K74.60 CIRRHOSIS OF LIVER WITHOUT ASCITES, UNSPECIFIED HEPATIC CIRRHOSIS TYPE (HCC): ICD-10-CM

## 2023-04-21 PROCEDURE — 76705 ECHO EXAM OF ABDOMEN: CPT

## 2023-04-21 PROCEDURE — 74176 CT ABD & PELVIS W/O CONTRAST: CPT

## 2023-04-24 RX ORDER — METRONIDAZOLE 500 MG/1
500 TABLET ORAL 3 TIMES DAILY
Qty: 30 TABLET | Refills: 0 | Status: SHIPPED | OUTPATIENT
Start: 2023-04-24 | End: 2023-05-04

## 2023-04-24 RX ORDER — CIPROFLOXACIN 500 MG/1
500 TABLET, FILM COATED ORAL 2 TIMES DAILY
Qty: 20 TABLET | Refills: 0 | Status: SHIPPED | OUTPATIENT
Start: 2023-04-24 | End: 2023-05-04

## 2023-04-27 ENCOUNTER — TELEPHONE (OUTPATIENT)
Dept: GASTROENTEROLOGY | Age: 68
End: 2023-04-27

## 2023-04-27 NOTE — TELEPHONE ENCOUNTER
Jennifer Hopper MA   4/26/2023 10:28 AM EDT Back to Top      Patient notified. Requesting call as she has already seen Dr Tina Rao. Jennifer Hopper MA   4/24/2023 11:39 AM EDT       JACKSON Velazquez MD   4/24/2023 11:15 AM EDT       Please call patient and encourage follow up with her surgeon. She may need surgery based on this chronic findings. Antibiotics ordered. Spoke with Dr. Bolivar Isaac who gave options of going back to Dr. Tina Rao, seeing another colorectal surgeon, and/or general surgery referral.      Patient is still frustrated as last time she spoke with Dr. Bolivar Isaac, he would speak with Dr. Tina Rao. Patient asked if she needed a second opinion. Expressed to patient that she could, but reassured her that Dr. Bolivar Isaac was a confident physician and would only recommend procedures that would benefit the patient. Spoke with Patricia Barrow, who will discuss again with Dr. Bolivar Isaac to further help with patient confidence with the practice.

## 2023-04-28 ENCOUNTER — TELEPHONE (OUTPATIENT)
Dept: GASTROENTEROLOGY | Age: 68
End: 2023-04-28

## 2023-04-28 NOTE — TELEPHONE ENCOUNTER
Patient was called today to discuss her recent CT abdomen pelvis findings which reveal persistent pericolonic abscess with air-fluid levels and persistent colonic inflammation. Patient was placed on antibiotic course. Denies any active complaints or issues. Patient has had this finding for the last 6 months without any significant improvement. Patient reports abnormal bowel movements no bright red blood for rectum, denies any significant GI symptoms at this time however given the chronicity of these findings without any significant resolution and persistent findings on recent CT abdomen pelvis, recommend that she be evaluated by a surgeon per societal guidelines. Patient is agreeable to see a colorectal surgeon or obtain a second opinion for surgery. She understood the chronicity of the findings and how an untreated pericolonic abscess can lead to significant detriment to the patient's health. She was very much appreciative of the recommendations and the conversation we had about being evaluated by surgeon. And she agreed with the plan.     She was grateful for the conversation and thanked us for the care provided

## 2023-05-03 ENCOUNTER — TELEPHONE (OUTPATIENT)
Dept: GASTROENTEROLOGY | Age: 68
End: 2023-05-03

## 2023-05-04 ENCOUNTER — HOSPITAL ENCOUNTER (OUTPATIENT)
Dept: CARDIAC REHAB | Age: 68
Discharge: HOME | End: 2023-05-04

## 2023-06-01 ENCOUNTER — TELEPHONE (OUTPATIENT)
Dept: GASTROENTEROLOGY | Age: 68
End: 2023-06-01

## 2023-06-01 NOTE — TELEPHONE ENCOUNTER
Patient called to say they went to see the colorectal Doctor  @ [facility]. I pulled in the information thru Care [facility] .

## 2023-06-20 DIAGNOSIS — E03.9 HYPOTHYROIDISM, UNSPECIFIED TYPE: ICD-10-CM

## 2023-06-21 NOTE — TELEPHONE ENCOUNTER
Last visit: 11/18/22  Last Med refill:   Does patient have enough medication for 72 hours: No:     Next Visit Date:  No future appointments.     Health Maintenance   Topic Date Due    Hepatitis A vaccine (1 of 2 - Risk 2-dose series) Never done    Hepatitis B vaccine (1 of 3 - Risk 3-dose series) Never done    COVID-19 Vaccine (3 - Booster for Pfizer series) 07/21/2021    Breast cancer screen  06/20/2022    Annual Wellness Visit (AWV)  Never done    DTaP/Tdap/Td vaccine (1 - Tdap) 09/30/2023 (Originally 4/24/1973)    Shingles vaccine (1 of 2) 09/30/2023 (Originally 4/24/2004)    Depression Screen  08/05/2023    A1C test (Diabetic or Prediabetic)  10/24/2023    Pneumococcal 65+ years Vaccine (3 - PPSV23 if available, else PCV20) 02/14/2024    Lipids  05/23/2025    Colorectal Cancer Screen  04/05/2027    DEXA (modify frequency per FRAX score)  Completed    Flu vaccine  Completed    Hepatitis C screen  Completed    Hib vaccine  Aged Out    Meningococcal (ACWY) vaccine  Aged Out       Hemoglobin A1C (%)   Date Value   10/24/2022 5.2   05/23/2020 5.6   02/14/2019 5.8             ( goal A1C is < 7)   No results found for: LABMICR  LDL Cholesterol (mg/dL)   Date Value   05/23/2020 122   09/06/2019 105       (goal LDL is <100)   AST (U/L)   Date Value   10/24/2022 17     ALT (U/L)   Date Value   10/24/2022 12     BUN (mg/dL)   Date Value   10/25/2022 15     BP Readings from Last 3 Encounters:   03/27/23 114/60   11/18/22 120/74   11/11/22 109/85          (goal 120/80)    All Future Testing planned in [facility]  Lab Frequency Next Occurrence   CT ABDOMEN PELVIS W WO CONTRAST Additional Contrast? Radiologist Recommendation Once 10/07/2022   Hepatitis C Antibody Once 10/19/2022   Hepatitis B Surface Antibody Once 10/19/2022   Hepatitis B Surface Antigen Once 10/19/2022   HIV Screen Once 10/19/2022   Ferritin Once 10/19/2022   Ceruloplasmin Once 10/19/2022   TSH Once 12/30/2022   ESTELITA DIGITAL SCREEN W OR WO CAD BILATERAL Once

## 2023-06-22 RX ORDER — LEVOTHYROXINE SODIUM 0.07 MG/1
75 TABLET ORAL DAILY
Qty: 30 TABLET | Refills: 5 | Status: SHIPPED | OUTPATIENT
Start: 2023-06-22

## 2023-06-26 ENCOUNTER — TELEPHONE (OUTPATIENT)
Dept: GASTROENTEROLOGY | Age: 68
End: 2023-06-26

## 2023-11-13 DIAGNOSIS — E03.9 HYPOTHYROIDISM, UNSPECIFIED TYPE: ICD-10-CM

## 2023-11-13 RX ORDER — LEVOTHYROXINE SODIUM 0.07 MG/1
75 TABLET ORAL DAILY
Qty: 30 TABLET | Refills: 5 | OUTPATIENT
Start: 2023-11-13

## 2023-11-13 NOTE — TELEPHONE ENCOUNTER
Request for   Requested Prescriptions     Pending Prescriptions Disp Refills    levothyroxine (SYNTHROID) 75 MCG tablet [Pharmacy Med Name: LEVOTHYROXINE 75 MCG*** 75 Tablet] 30 tablet 5     Sig: TAKE 1 TABLET BY MOUTH DAILY    . Please review and e-scribe to pharmacy listed in chart if appropriate. Thank you.       Last Visit Date: 11/18/2022  Next Visit Date: Visit date not found

## 2024-03-27 ENCOUNTER — APPOINTMENT (OUTPATIENT)
Dept: GENERAL RADIOLOGY | Age: 69
DRG: 551 | End: 2024-03-27
Payer: MEDICARE

## 2024-03-27 ENCOUNTER — APPOINTMENT (OUTPATIENT)
Dept: CT IMAGING | Age: 69
DRG: 551 | End: 2024-03-27
Payer: MEDICARE

## 2024-03-27 ENCOUNTER — HOSPITAL ENCOUNTER (INPATIENT)
Age: 69
LOS: 8 days | Discharge: NURSING, REHAB OR LONG-TERM CARE | DRG: 551 | End: 2024-04-04
Attending: EMERGENCY MEDICINE | Admitting: STUDENT IN AN ORGANIZED HEALTH CARE EDUCATION/TRAINING PROGRAM
Payer: MEDICARE

## 2024-03-27 ENCOUNTER — APPOINTMENT (OUTPATIENT)
Dept: INTERVENTIONAL RADIOLOGY/VASCULAR | Age: 69
DRG: 551 | End: 2024-03-27
Payer: MEDICARE

## 2024-03-27 DIAGNOSIS — M54.50 LOW BACK PAIN, UNSPECIFIED BACK PAIN LATERALITY, UNSPECIFIED CHRONICITY, UNSPECIFIED WHETHER SCIATICA PRESENT: ICD-10-CM

## 2024-03-27 DIAGNOSIS — R20.0 SADDLE ANESTHESIA: ICD-10-CM

## 2024-03-27 DIAGNOSIS — E86.0 DEHYDRATION: ICD-10-CM

## 2024-03-27 DIAGNOSIS — S06.330A: Primary | ICD-10-CM

## 2024-03-27 DIAGNOSIS — E87.1 HYPONATREMIA: ICD-10-CM

## 2024-03-27 DIAGNOSIS — R29.898 WEAKNESS OF BOTH LEGS: ICD-10-CM

## 2024-03-27 DIAGNOSIS — N30.00 ACUTE CYSTITIS WITHOUT HEMATURIA: ICD-10-CM

## 2024-03-27 PROBLEM — R32 BOWEL AND BLADDER INCONTINENCE: Status: ACTIVE | Noted: 2024-03-27
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
## 2024-03-27 PROBLEM — R15.9 BOWEL AND BLADDER INCONTINENCE: Status: ACTIVE | Noted: 2024-03-27

## 2024-03-27 PROBLEM — I34.0 MITRAL REGURGITATION: Status: ACTIVE | Noted: 2017-01-14

## 2024-03-27 PROBLEM — N18.31 STAGE 3A CHRONIC KIDNEY DISEASE (HCC): Status: ACTIVE | Noted: 2024-03-27

## 2024-03-27 PROBLEM — N18.32 STAGE 3B CHRONIC KIDNEY DISEASE (CKD) (HCC): Status: ACTIVE | Noted: 2024-03-27

## 2024-03-27 LAB
ALBUMIN SERPL-MCNC: 4.1 G/DL (ref 3.5–5.2)
ALBUMIN/GLOB SERPL: 1 {RATIO} (ref 1–2.5)
ALP SERPL-CCNC: 212 U/L (ref 35–104)
ALT SERPL-CCNC: 16 U/L (ref 10–35)
ANION GAP SERPL CALCULATED.3IONS-SCNC: 13 MMOL/L (ref 9–16)
AST SERPL-CCNC: 34 U/L (ref 10–35)
BACTERIA URNS QL MICRO: ABNORMAL
BILIRUB DIRECT SERPL-MCNC: 0.6 MG/DL (ref 0–0.3)
BILIRUB INDIRECT SERPL-MCNC: 0.7 MG/DL (ref 0–1)
BILIRUB SERPL-MCNC: 1.3 MG/DL (ref 0–1.2)
BILIRUB UR QL STRIP: ABNORMAL
BNP SERPL-MCNC: 4717 PG/ML (ref 0–300)
BUN SERPL-MCNC: 28 MG/DL (ref 8–23)
CALCIUM SERPL-MCNC: 9.7 MG/DL (ref 8.6–10.4)
CASTS #/AREA URNS LPF: ABNORMAL /LPF (ref 0–8)
CHLORIDE SERPL-SCNC: 99 MMOL/L (ref 98–107)
CK SERPL-CCNC: 87 U/L (ref 26–192)
CLARITY UR: ABNORMAL
CO2 SERPL-SCNC: 21 MMOL/L (ref 20–31)
COLOR UR: ABNORMAL
CREAT SERPL-MCNC: 1.4 MG/DL (ref 0.5–0.9)
EPI CELLS #/AREA URNS HPF: ABNORMAL /HPF (ref 0–5)
GFR SERPL CREATININE-BSD FRML MDRD: 40 ML/MIN/1.73M2
GLOBULIN SER CALC-MCNC: 3.8 G/DL
GLUCOSE BLD-MCNC: 129 MG/DL (ref 65–105)
GLUCOSE SERPL-MCNC: 116 MG/DL (ref 74–99)
GLUCOSE UR STRIP-MCNC: ABNORMAL MG/DL
HGB UR QL STRIP.AUTO: ABNORMAL
INR PPP: 1.2
KETONES UR STRIP-MCNC: ABNORMAL MG/DL
LACTIC ACID, WHOLE BLOOD: 1.3 MMOL/L (ref 0.7–2.1)
LACTIC ACID, WHOLE BLOOD: 2.7 MMOL/L (ref 0.7–2.1)
LEUKOCYTE ESTERASE UR QL STRIP: ABNORMAL
LIPASE SERPL-CCNC: 15 U/L (ref 13–60)
MYOGLOBIN SERPL-MCNC: 100 NG/ML (ref 25–58)
NITRITE UR QL STRIP: NEGATIVE
PH UR STRIP: 5.5 [PH] (ref 5–8)
PLATELET # BLD AUTO: 206 K/UL (ref 138–453)
POTASSIUM SERPL-SCNC: 3.8 MMOL/L (ref 3.7–5.3)
PROT SERPL-MCNC: 7.9 G/DL (ref 6.6–8.7)
PROT UR STRIP-MCNC: ABNORMAL MG/DL
PROTHROMBIN TIME: 15 SEC (ref 11.7–14.9)
RBC #/AREA URNS HPF: ABNORMAL /HPF (ref 0–4)
SODIUM SERPL-SCNC: 133 MMOL/L (ref 136–145)
SP GR UR STRIP: 1.03 (ref 1–1.03)
T4 FREE SERPL-MCNC: 1.6 NG/DL (ref 0.92–1.68)
TROPONIN I SERPL HS-MCNC: 26 NG/L (ref 0–14)
TROPONIN I SERPL HS-MCNC: 29 NG/L (ref 0–14)
TSH SERPL DL<=0.05 MIU/L-ACNC: 4.37 UIU/ML (ref 0.27–4.2)
UROBILINOGEN UR STRIP-ACNC: NORMAL EU/DL (ref 0–1)
WBC #/AREA URNS HPF: ABNORMAL /HPF (ref 0–5)

## 2024-03-27 PROCEDURE — 80076 HEPATIC FUNCTION PANEL: CPT

## 2024-03-27 PROCEDURE — 83605 ASSAY OF LACTIC ACID: CPT

## 2024-03-27 PROCEDURE — 83880 ASSAY OF NATRIURETIC PEPTIDE: CPT

## 2024-03-27 PROCEDURE — 96375 TX/PRO/DX INJ NEW DRUG ADDON: CPT

## 2024-03-27 PROCEDURE — 72131 CT LUMBAR SPINE W/O DYE: CPT

## 2024-03-27 PROCEDURE — 83874 ASSAY OF MYOGLOBIN: CPT

## 2024-03-27 PROCEDURE — 93005 ELECTROCARDIOGRAM TRACING: CPT | Performed by: EMERGENCY MEDICINE

## 2024-03-27 PROCEDURE — 36415 COLL VENOUS BLD VENIPUNCTURE: CPT

## 2024-03-27 PROCEDURE — 72100 X-RAY EXAM L-S SPINE 2/3 VWS: CPT

## 2024-03-27 PROCEDURE — 72270 MYELOGPHY 2/> SPINE REGIONS: CPT

## 2024-03-27 PROCEDURE — 73030 X-RAY EXAM OF SHOULDER: CPT

## 2024-03-27 PROCEDURE — 72070 X-RAY EXAM THORAC SPINE 2VWS: CPT

## 2024-03-27 PROCEDURE — 96366 THER/PROPH/DIAG IV INF ADDON: CPT

## 2024-03-27 PROCEDURE — 2060000000 HC ICU INTERMEDIATE R&B

## 2024-03-27 PROCEDURE — 94761 N-INVAS EAR/PLS OXIMETRY MLT: CPT

## 2024-03-27 PROCEDURE — 84439 ASSAY OF FREE THYROXINE: CPT

## 2024-03-27 PROCEDURE — 99285 EMERGENCY DEPT VISIT HI MDM: CPT

## 2024-03-27 PROCEDURE — 96365 THER/PROPH/DIAG IV INF INIT: CPT

## 2024-03-27 PROCEDURE — 6360000004 HC RX CONTRAST MEDICATION: Performed by: INTERNAL MEDICINE

## 2024-03-27 PROCEDURE — 87086 URINE CULTURE/COLONY COUNT: CPT

## 2024-03-27 PROCEDURE — 83690 ASSAY OF LIPASE: CPT

## 2024-03-27 PROCEDURE — 72132 CT LUMBAR SPINE W/DYE: CPT

## 2024-03-27 PROCEDURE — 84484 ASSAY OF TROPONIN QUANT: CPT

## 2024-03-27 PROCEDURE — 84443 ASSAY THYROID STIM HORMONE: CPT

## 2024-03-27 PROCEDURE — 72126 CT NECK SPINE W/DYE: CPT

## 2024-03-27 PROCEDURE — 85049 AUTOMATED PLATELET COUNT: CPT

## 2024-03-27 PROCEDURE — 72129 CT CHEST SPINE W/DYE: CPT

## 2024-03-27 PROCEDURE — 2580000003 HC RX 258: Performed by: EMERGENCY MEDICINE

## 2024-03-27 PROCEDURE — 6370000000 HC RX 637 (ALT 250 FOR IP)

## 2024-03-27 PROCEDURE — 6360000002 HC RX W HCPCS: Performed by: PEDIATRICS

## 2024-03-27 PROCEDURE — 85610 PROTHROMBIN TIME: CPT

## 2024-03-27 PROCEDURE — 82947 ASSAY GLUCOSE BLOOD QUANT: CPT

## 2024-03-27 PROCEDURE — 80048 BASIC METABOLIC PNL TOTAL CA: CPT

## 2024-03-27 PROCEDURE — 82550 ASSAY OF CK (CPK): CPT

## 2024-03-27 PROCEDURE — 71045 X-RAY EXAM CHEST 1 VIEW: CPT

## 2024-03-27 PROCEDURE — 81001 URINALYSIS AUTO W/SCOPE: CPT

## 2024-03-27 RX ORDER — AMIODARONE HYDROCHLORIDE 200 MG/1
200 TABLET ORAL DAILY
Status: DISCONTINUED | OUTPATIENT
Start: 2024-03-27 | End: 2024-04-04 | Stop reason: HOSPADM

## 2024-03-27 RX ORDER — POLYETHYLENE GLYCOL 3350 17 G/17G
17 POWDER, FOR SOLUTION ORAL DAILY PRN
Status: DISCONTINUED | OUTPATIENT
Start: 2024-03-27 | End: 2024-04-04 | Stop reason: HOSPADM

## 2024-03-27 RX ORDER — BUMETANIDE 1 MG/1
1 TABLET ORAL DAILY
Status: DISCONTINUED | OUTPATIENT
Start: 2024-03-27 | End: 2024-04-02

## 2024-03-27 RX ORDER — SODIUM CHLORIDE 9 MG/ML
INJECTION, SOLUTION INTRAVENOUS PRN
Status: DISCONTINUED | OUTPATIENT
Start: 2024-03-27 | End: 2024-04-04 | Stop reason: HOSPADM

## 2024-03-27 RX ORDER — METOPROLOL SUCCINATE 25 MG/1
25 TABLET, EXTENDED RELEASE ORAL DAILY
Status: DISCONTINUED | OUTPATIENT
Start: 2024-03-27 | End: 2024-04-04 | Stop reason: HOSPADM

## 2024-03-27 RX ORDER — ACETAMINOPHEN 325 MG/1
650 TABLET ORAL EVERY 6 HOURS PRN
Status: DISCONTINUED | OUTPATIENT
Start: 2024-03-27 | End: 2024-04-04 | Stop reason: HOSPADM

## 2024-03-27 RX ORDER — 0.9 % SODIUM CHLORIDE 0.9 %
500 INTRAVENOUS SOLUTION INTRAVENOUS ONCE
Status: COMPLETED | OUTPATIENT
Start: 2024-03-27 | End: 2024-03-27

## 2024-03-27 RX ORDER — SODIUM CHLORIDE 0.9 % (FLUSH) 0.9 %
5-40 SYRINGE (ML) INJECTION EVERY 12 HOURS SCHEDULED
Status: DISCONTINUED | OUTPATIENT
Start: 2024-03-27 | End: 2024-04-04 | Stop reason: HOSPADM

## 2024-03-27 RX ORDER — ONDANSETRON 2 MG/ML
4 INJECTION INTRAMUSCULAR; INTRAVENOUS EVERY 6 HOURS PRN
Status: DISCONTINUED | OUTPATIENT
Start: 2024-03-27 | End: 2024-04-04 | Stop reason: HOSPADM

## 2024-03-27 RX ORDER — POTASSIUM CHLORIDE 7.45 MG/ML
10 INJECTION INTRAVENOUS PRN
Status: DISCONTINUED | OUTPATIENT
Start: 2024-03-27 | End: 2024-04-04 | Stop reason: HOSPADM

## 2024-03-27 RX ORDER — FENTANYL CITRATE 50 UG/ML
25 INJECTION, SOLUTION INTRAMUSCULAR; INTRAVENOUS ONCE
Status: COMPLETED | OUTPATIENT
Start: 2024-03-27 | End: 2024-03-27

## 2024-03-27 RX ORDER — IOPAMIDOL 408 MG/ML
20 INJECTION, SOLUTION INTRATHECAL
Status: COMPLETED | OUTPATIENT
Start: 2024-03-27 | End: 2024-03-27

## 2024-03-27 RX ORDER — SODIUM CHLORIDE 9 MG/ML
50 INJECTION, SOLUTION INTRAVENOUS ONCE
Status: DISCONTINUED | OUTPATIENT
Start: 2024-03-27 | End: 2024-03-29

## 2024-03-27 RX ORDER — ONDANSETRON 4 MG/1
4 TABLET, ORALLY DISINTEGRATING ORAL EVERY 8 HOURS PRN
Status: DISCONTINUED | OUTPATIENT
Start: 2024-03-27 | End: 2024-04-04 | Stop reason: HOSPADM

## 2024-03-27 RX ORDER — MAGNESIUM SULFATE IN WATER 40 MG/ML
2000 INJECTION, SOLUTION INTRAVENOUS PRN
Status: DISCONTINUED | OUTPATIENT
Start: 2024-03-27 | End: 2024-04-04 | Stop reason: HOSPADM

## 2024-03-27 RX ORDER — LEVOTHYROXINE SODIUM 0.07 MG/1
75 TABLET ORAL DAILY
Status: DISCONTINUED | OUTPATIENT
Start: 2024-03-28 | End: 2024-04-04 | Stop reason: HOSPADM

## 2024-03-27 RX ORDER — POTASSIUM CHLORIDE 20 MEQ/1
40 TABLET, EXTENDED RELEASE ORAL PRN
Status: DISCONTINUED | OUTPATIENT
Start: 2024-03-27 | End: 2024-04-04 | Stop reason: HOSPADM

## 2024-03-27 RX ORDER — SODIUM CHLORIDE 0.9 % (FLUSH) 0.9 %
5-40 SYRINGE (ML) INJECTION PRN
Status: DISCONTINUED | OUTPATIENT
Start: 2024-03-27 | End: 2024-04-04 | Stop reason: HOSPADM

## 2024-03-27 RX ORDER — BUMETANIDE 1 MG/1
1 TABLET ORAL DAILY
COMMUNITY
Start: 2024-03-20

## 2024-03-27 RX ORDER — MAGNESIUM SULFATE IN WATER 40 MG/ML
2000 INJECTION, SOLUTION INTRAVENOUS ONCE
Status: COMPLETED | OUTPATIENT
Start: 2024-03-27 | End: 2024-03-27

## 2024-03-27 RX ORDER — ACETAMINOPHEN 650 MG/1
650 SUPPOSITORY RECTAL EVERY 6 HOURS PRN
Status: DISCONTINUED | OUTPATIENT
Start: 2024-03-27 | End: 2024-03-29

## 2024-03-27 RX ADMIN — SODIUM CHLORIDE 500 ML: 9 INJECTION, SOLUTION INTRAVENOUS at 12:24

## 2024-03-27 RX ADMIN — MAGNESIUM SULFATE HEPTAHYDRATE 2000 MG: 40 INJECTION, SOLUTION INTRAVENOUS at 13:26

## 2024-03-27 RX ADMIN — IOPAMIDOL 12 ML: 408 INJECTION, SOLUTION INTRATHECAL at 17:34

## 2024-03-27 RX ADMIN — FENTANYL CITRATE 25 MCG: 50 INJECTION INTRAMUSCULAR; INTRAVENOUS at 13:25

## 2024-03-27 RX ADMIN — ACETAMINOPHEN 325MG 650 MG: 325 TABLET ORAL at 19:47

## 2024-03-27 ASSESSMENT — PAIN DESCRIPTION - LOCATION
LOCATION: BACK
LOCATION: ARM
LOCATION: BACK
LOCATION: BACK;LEG

## 2024-03-27 ASSESSMENT — PAIN SCALES - GENERAL
PAINLEVEL_OUTOF10: 8
PAINLEVEL_OUTOF10: 8
PAINLEVEL_OUTOF10: 6
PAINLEVEL_OUTOF10: 6
PAINLEVEL_OUTOF10: 8
PAINLEVEL_OUTOF10: 6

## 2024-03-27 ASSESSMENT — PAIN - FUNCTIONAL ASSESSMENT
PAIN_FUNCTIONAL_ASSESSMENT: 0-10
PAIN_FUNCTIONAL_ASSESSMENT: ACTIVITIES ARE NOT PREVENTED

## 2024-03-27 ASSESSMENT — PAIN DESCRIPTION - DESCRIPTORS: DESCRIPTORS: ACHING;DISCOMFORT;CRAMPING

## 2024-03-27 ASSESSMENT — PAIN DESCRIPTION - ORIENTATION
ORIENTATION: MID;LEFT
ORIENTATION: RIGHT

## 2024-03-27 NOTE — ED PROVIDER NOTES
[facility]  Emergency Department  Faculty Attestation     I performed a history and physical examination of the patient and discussed management with the resident. I reviewed the resident’s note and agree with the documented findings and plan of care. Any areas of disagreement are noted on the chart. I was personally present for the key portions of any procedures. I have documented in the chart those procedures where I was not present during the key portions. I have reviewed the emergency nurses triage note. I agree with the chief complaint, past medical history, past surgical history, allergies, medications, social and family history as documented unless otherwise noted below.    For Physician Assistant/ Nurse Practitioner cases/documentation I have personally evaluated this patient and have completed at least one if not all key elements of the E/M (history, physical exam, and MDM). Additional findings are as noted.    Preliminary note started at 2:10 PM EDT    Primary Care Physician:  Suly Jensen    Screenings:  [unfilled]    CHIEF COMPLAINT       Chief Complaint   Patient presents with    Fatigue       RECENT VITALS:   /82   Pulse 80   Temp 97.9 °F (36.6 °C) (Oral)   Resp 23   Ht 1.676 m (5' 6\")   Wt 88 kg (194 lb)   SpO2 97%   BMI 31.31 kg/m²     LABS:  Labs Reviewed   MYOGLOBIN, BLOOD - Abnormal; Notable for the following components:       Result Value    Myoglobin 100 (*)     All other components within normal limits   HEPATIC FUNCTION PANEL - Abnormal; Notable for the following components:    Alkaline Phosphatase 212 (*)     Total Bilirubin 1.3 (*)     Bilirubin, Direct 0.6 (*)     All other components within normal limits   BASIC METABOLIC PANEL - Abnormal; Notable for the following components:    Sodium 133 (*)     Glucose 116 (*)     BUN 28 (*)     Creatinine 1.4 (*)     Est, Glom Filt Rate 40 (*)     All other components within normal limits   BRAIN 
     [facility] ED  Emergency Department Encounter  Emergency Medicine Resident     Pt Name:Annemarie Worrell  MRN: [id number]  Birthdate 1955  Date of evaluation: 3/27/24  PCP:  Camila, Pcp    1:07 PM EDT     CHIEF COMPLAINT       Chief Complaint   Patient presents with    Fatigue       HISTORY OF PRESENT ILLNESS  (Location/Symptom, Timing/Onset, Context/Setting, Quality, Duration, Modifying Factors, Severity.)      Annemarie Worrell is a 68 y.o. female who presents with weakness. Losing control of bowel and bladder over the past 3 days with weakness in her lower extremities.  She is lost sensation to her peroneum.  She states that she has been orthopneic over the past couple months.  She has known CHF and has an AICD and a pacemaker that is not MRI compatible.  Patient is alert and oriented x 4 during encounter she does smell of urine.  Otherwise endorses back pain to her lower back.  She states that whenever she sleeps she sleeps sitting up with her legs to the ground does not elevate them.  She believes this is the cause of the swelling in her feet.  She denies loss of sensation to her feet but endorses being absolutely unable to walk.  She does live with her family, and patient's family reportedly called EMS as this was an acute inability for patient to be unable to attempt to stand at all.    EMS had to put patient in a sheet to take her to the ambulance as she was absolutely unable to walk.  No strength to her lower extremities per EMS.    PAST MEDICAL / SURGICAL / SOCIAL / FAMILY HISTORY      has a past medical history of Abnormal Pap smear, Acute diverticulitis, Acute on chronic systolic congestive heart failure (HCC), AICD (automatic cardioverter/defibrillator) present, MAURO (acute kidney injury) (HCC), MAURO (acute kidney injury) (HCC), Anemia, Arthritis, Atrial fibrillation (HCC), Cardiomyopathy (HCC), Cardiomyopathy, nonischemic (HCC), Chronic combined systolic and diastolic congestive heart 
      XR SHOULDER RIGHT (MIN 2 VIEWS)    Result Date: 3/27/2024  EXAMINATION: TWO XRAY VIEWS OF THE RIGHT SHOULDER 3/27/2024 12:54 pm COMPARISON: None. HISTORY: ORDERING SYSTEM PROVIDED HISTORY: right sholder pain TECHNOLOGIST PROVIDED HISTORY: right sholder pain Reason for Exam: Right shoulder pain, can not lift arm FINDINGS: There are advanced osteoarthritic changes of the glenohumeral joint, with erosions of the humeral head and glenoid.  There is no evidence for any fractures or dislocation.  The acromioclavicular joint appears normal.     Advanced osteoarthritic changes of the right glenohumeral joint, with erosions of the humeral head and glenoid.     XR CHEST PORTABLE    Result Date: 3/27/2024  EXAMINATION: ONE XRAY VIEW OF THE CHEST 3/27/2024 12:41 pm COMPARISON: None. HISTORY: ORDERING SYSTEM PROVIDED HISTORY: Fatigue TECHNOLOGIST PROVIDED HISTORY: Fatigue Reason for Exam: Upright port, SOB, fatigue FINDINGS: There is opacification in the the left lung base, which may represent underlying effusion, atelectasis or infiltrate.  Right lung is unremarkable. Pacer leads are in good position.  There is cardiomegaly.     1. Opacification in the left lung base, which may represent underlying effusion, atelectasis or infiltrate. 2. Cardiomegaly.     XR CHEST (2 VW)    Result Date: 3/21/2024  * * *Final Report* * * DATE OF EXAM: Mar 19 2024  1:24PM   JAIDA   [id number]  -  XR CHEST 2V FRONTAL/LAT  / ACCESSION #  [id number] PROCEDURE REASON: Non-ischemic cardiomyopathy (HCC)      * * * * Physician Interpretation * * * *  EXAMINATION:  CHEST RADIOGRAPH (2 VIEW FRONTAL & LATERAL) CLINICAL HISTORY:  Non-ischemic cardiomyopathy (HCC) MQ:  [id number] EXAM DATE/TIME:  3/19/2024 1:24 PM COMPARISON:  01/04/2022. RESULT: Lines, tubes, and devices:  Left chest ICD with its leads terminating in right atrium and right ventricle along with epicardial leads noted. Lungs and pleura:  Mild blunting of the left costophrenic sulcus reflecting

## 2024-03-27 NOTE — ED NOTES
ED to inpatient nurses report      Chief Complaint:  Chief Complaint   Patient presents with    Fatigue     Present to ED from: Home    MOA:     LOC: alert and orientated to name, place, date  Mobility: Requires assistance * 2  Oxygen Baseline: RA    Current needs required: RA   Pending ED orders: Lactic blood draw  Present condition: Stable     Why did the patient come to the ED? Fatigue, pt states she has new onset bilateral leg weakness   What is the plan? Admit for Neurosurg   Any procedures or intervention occur? Labs, xray  Any safety concerns?? Pt is fully dependent at this time d/t inability to use legs     Mental Status:  Level of Consciousness: Alert (0)    Psych Assessment:   Psychosocial  Psychosocial (WDL): Within Defined Limits  Vital signs   Vitals:    03/27/24 1400 03/27/24 1609 03/27/24 1711 03/27/24 1717   BP: 107/79  118/78 123/78   Pulse: 74 75     Resp: 21 24 20 18   Temp:       TempSrc:       SpO2:  100%     Weight:       Height:            Vitals:  Patient Vitals for the past 24 hrs:   BP Temp Temp src Pulse Resp SpO2 Height Weight   03/27/24 1717 123/78 -- -- -- 18 -- -- --   03/27/24 1711 118/78 -- -- -- 20 -- -- --   03/27/24 1609 -- -- -- 75 24 100 % -- --   03/27/24 1400 107/79 -- -- 74 21 -- -- --   03/27/24 1315 104/82 -- -- 80 23 97 % -- --   03/27/24 1152 102/75 97.9 °F (36.6 °C) Oral 80 18 97 % 1.676 m (5' 6\") 88 kg (194 lb)      Visit Vitals  /78   Pulse 75   Temp 97.9 °F (36.6 °C) (Oral)   Resp 18   Ht 1.676 m (5' 6\")   Wt 88 kg (194 lb)   SpO2 100%   BMI 31.31 kg/m²        LDAs:   Peripheral IV 03/27/24 Left Antecubital (Active)   Site Assessment Clean, dry & intact 03/27/24 1156   Line Status Blood return noted 03/27/24 1156       Ambulatory Status:  Presents to emergency department  because of falls (Syncope, seizure, or loss of consciousness): No, Age > 70: No, Altered Mental Status, Intoxication with alcohol or substance confusion (Disorientation, impaired judgment, poor

## 2024-03-27 NOTE — BRIEF OP NOTE
Brief Postoperative Note    Annemarie Worrell  YOB: 1955  [id number]    Pre-operative Diagnosis: Acute onset paraplegia; cauda equina syndrome    Post-operative Diagnosis: Same    Procedure: Fluoro guided LP for CT total spine myelogram    Anesthesia: Local    Surgeons/Assistants: John    Estimated Blood Loss: less than 50     Complications: None    Specimens: Was Not Obtained    Electronically signed by Lala Lopez MD on 3/27/2024 at 5:30 PM

## 2024-03-27 NOTE — ED NOTES
Pt arrives via EMS to room 24 alert and oriented x4   Pt states that a family member called 911 d/t her being fatigued   Pt complains on R side shoulder pain, however has a hx of arthritis   Pt denies any other pain at this time   Pt states she has been fatigued since she had an outpatient appt at [facility] this week   Pt placed on cardiac monitor, continuous pulse ox, and BP cuff.  RR even and unlabored.   NAD noted.   Whiteboard updated.  Will continue with plan of care.

## 2024-03-27 NOTE — ED NOTES
Writer to go down to IR to start Kcentra   Doctor down in IR states they did not need Kcentra started at this time

## 2024-03-27 NOTE — ED NOTES
Unable to obtain labs from 20g in L ac at this time, IV still flushes, lab will have to come draw

## 2024-03-28 PROBLEM — E86.0 DEHYDRATION: Status: ACTIVE | Noted: 2024-03-28

## 2024-03-28 LAB
ANION GAP SERPL CALCULATED.3IONS-SCNC: 12 MMOL/L (ref 9–16)
BASOPHILS # BLD: 0 K/UL (ref 0–0.2)
BASOPHILS NFR BLD: 0 % (ref 0–2)
BUN SERPL-MCNC: 21 MG/DL (ref 8–23)
CALCIUM SERPL-MCNC: 8.8 MG/DL (ref 8.6–10.4)
CHLORIDE SERPL-SCNC: 102 MMOL/L (ref 98–107)
CO2 SERPL-SCNC: 19 MMOL/L (ref 20–31)
CREAT SERPL-MCNC: 1.1 MG/DL (ref 0.5–0.9)
EKG ATRIAL RATE: 85 BPM
EKG P AXIS: 58 DEGREES
EKG P-R INTERVAL: 118 MS
EKG Q-T INTERVAL: 504 MS
EKG QRS DURATION: 202 MS
EKG QTC CALCULATION (BAZETT): 599 MS
EKG R AXIS: -171 DEGREES
EKG T AXIS: 73 DEGREES
EKG VENTRICULAR RATE: 85 BPM
EOSINOPHIL # BLD: 0 K/UL (ref 0–0.4)
EOSINOPHILS RELATIVE PERCENT: 0 % (ref 1–4)
ERYTHROCYTE [DISTWIDTH] IN BLOOD BY AUTOMATED COUNT: 15 % (ref 11.8–14.4)
GFR SERPL CREATININE-BSD FRML MDRD: 53 ML/MIN/1.73M2
GLUCOSE SERPL-MCNC: 99 MG/DL (ref 74–99)
HCT VFR BLD AUTO: 31 % (ref 36.3–47.1)
HGB BLD-MCNC: 11.1 G/DL (ref 11.9–15.1)
IMM GRANULOCYTES # BLD AUTO: 0 K/UL (ref 0–0.3)
IMM GRANULOCYTES NFR BLD: 0 %
LYMPHOCYTES NFR BLD: 0.57 K/UL (ref 1–4.8)
LYMPHOCYTES RELATIVE PERCENT: 13 % (ref 24–44)
MCH RBC QN AUTO: 30.4 PG (ref 25.2–33.5)
MCHC RBC AUTO-ENTMCNC: 35.8 G/DL (ref 28.4–34.8)
MCV RBC AUTO: 84.9 FL (ref 82.6–102.9)
MICROORGANISM SPEC CULT: NORMAL
MONOCYTES NFR BLD: 0.35 K/UL (ref 0.1–0.8)
MONOCYTES NFR BLD: 8 % (ref 1–7)
MORPHOLOGY: ABNORMAL
NEUTROPHILS NFR BLD: 79 % (ref 36–66)
NEUTS SEG NFR BLD: 3.48 K/UL (ref 1.8–7.7)
NRBC BLD-RTO: 0 PER 100 WBC
PLATELET # BLD AUTO: 191 K/UL (ref 138–453)
PMV BLD AUTO: 10.7 FL (ref 8.1–13.5)
POTASSIUM SERPL-SCNC: 3.7 MMOL/L (ref 3.7–5.3)
RBC # BLD AUTO: 3.65 M/UL (ref 3.95–5.11)
SODIUM SERPL-SCNC: 133 MMOL/L (ref 136–145)
SPECIMEN DESCRIPTION: NORMAL
WBC OTHER # BLD: 4.4 K/UL (ref 3.5–11.3)

## 2024-03-28 PROCEDURE — 80048 BASIC METABOLIC PNL TOTAL CA: CPT

## 2024-03-28 PROCEDURE — 6370000000 HC RX 637 (ALT 250 FOR IP)

## 2024-03-28 PROCEDURE — 2580000003 HC RX 258

## 2024-03-28 PROCEDURE — B01B1ZZ FLUOROSCOPY OF SPINAL CORD USING LOW OSMOLAR CONTRAST: ICD-10-PCS | Performed by: RADIOLOGY

## 2024-03-28 PROCEDURE — 94761 N-INVAS EAR/PLS OXIMETRY MLT: CPT

## 2024-03-28 PROCEDURE — 97530 THERAPEUTIC ACTIVITIES: CPT

## 2024-03-28 PROCEDURE — 99223 1ST HOSP IP/OBS HIGH 75: CPT | Performed by: INTERNAL MEDICINE

## 2024-03-28 PROCEDURE — 36415 COLL VENOUS BLD VENIPUNCTURE: CPT

## 2024-03-28 PROCEDURE — 2060000000 HC ICU INTERMEDIATE R&B

## 2024-03-28 PROCEDURE — 85025 COMPLETE CBC W/AUTO DIFF WBC: CPT

## 2024-03-28 PROCEDURE — 97162 PT EVAL MOD COMPLEX 30 MIN: CPT

## 2024-03-28 PROCEDURE — 97535 SELF CARE MNGMENT TRAINING: CPT

## 2024-03-28 PROCEDURE — 97166 OT EVAL MOD COMPLEX 45 MIN: CPT

## 2024-03-28 PROCEDURE — 93010 ELECTROCARDIOGRAM REPORT: CPT | Performed by: INTERNAL MEDICINE

## 2024-03-28 RX ORDER — OXYCODONE HYDROCHLORIDE 5 MG/1
5 TABLET ORAL ONCE
Status: COMPLETED | OUTPATIENT
Start: 2024-03-28 | End: 2024-03-28

## 2024-03-28 RX ORDER — GUAIFENESIN/DEXTROMETHORPHAN 100-10MG/5
5 SYRUP ORAL EVERY 6 HOURS PRN
Status: DISCONTINUED | OUTPATIENT
Start: 2024-03-28 | End: 2024-04-04 | Stop reason: HOSPADM

## 2024-03-28 RX ADMIN — OXYCODONE 5 MG: 5 TABLET ORAL at 00:37

## 2024-03-28 RX ADMIN — ACETAMINOPHEN 325MG 650 MG: 325 TABLET ORAL at 20:43

## 2024-03-28 RX ADMIN — BUMETANIDE 1 MG: 1 TABLET ORAL at 12:15

## 2024-03-28 RX ADMIN — SODIUM CHLORIDE, PRESERVATIVE FREE 10 ML: 5 INJECTION INTRAVENOUS at 20:43

## 2024-03-28 RX ADMIN — LEVOTHYROXINE SODIUM 75 MCG: 75 TABLET ORAL at 06:13

## 2024-03-28 RX ADMIN — METOPROLOL SUCCINATE 25 MG: 25 TABLET, EXTENDED RELEASE ORAL at 12:15

## 2024-03-28 RX ADMIN — OXYCODONE 5 MG: 5 TABLET ORAL at 23:30

## 2024-03-28 RX ADMIN — GUAIFENESIN AND DEXTROMETHORPHAN 5 ML: 100; 10 SYRUP ORAL at 00:39

## 2024-03-28 RX ADMIN — SODIUM CHLORIDE, PRESERVATIVE FREE 10 ML: 5 INJECTION INTRAVENOUS at 12:16

## 2024-03-28 RX ADMIN — AMIODARONE HYDROCHLORIDE 200 MG: 200 TABLET ORAL at 12:15

## 2024-03-28 ASSESSMENT — PAIN DESCRIPTION - DESCRIPTORS
DESCRIPTORS: ACHING
DESCRIPTORS: ACHING;DISCOMFORT
DESCRIPTORS: ACHING;DISCOMFORT;CRAMPING
DESCRIPTORS: ACHING

## 2024-03-28 ASSESSMENT — PAIN SCALES - GENERAL
PAINLEVEL_OUTOF10: 4
PAINLEVEL_OUTOF10: 8
PAINLEVEL_OUTOF10: 7
PAINLEVEL_OUTOF10: 8
PAINLEVEL_OUTOF10: 4
PAINLEVEL_OUTOF10: 3
PAINLEVEL_OUTOF10: 8
PAINLEVEL_OUTOF10: 8
PAINLEVEL_OUTOF10: 4
PAINLEVEL_OUTOF10: 6
PAINLEVEL_OUTOF10: 3

## 2024-03-28 ASSESSMENT — PAIN DESCRIPTION - LOCATION
LOCATION: BACK;LEG
LOCATION: BACK
LOCATION: GENERALIZED
LOCATION: SHOULDER
LOCATION: BACK
LOCATION: BACK

## 2024-03-28 ASSESSMENT — PAIN - FUNCTIONAL ASSESSMENT
PAIN_FUNCTIONAL_ASSESSMENT: ACTIVITIES ARE NOT PREVENTED
PAIN_FUNCTIONAL_ASSESSMENT: PREVENTS OR INTERFERES WITH ALL ACTIVE AND SOME PASSIVE ACTIVITIES
PAIN_FUNCTIONAL_ASSESSMENT: ACTIVITIES ARE NOT PREVENTED
PAIN_FUNCTIONAL_ASSESSMENT: ACTIVITIES ARE NOT PREVENTED
PAIN_FUNCTIONAL_ASSESSMENT: PREVENTS OR INTERFERES WITH MANY ACTIVE NOT PASSIVE ACTIVITIES
PAIN_FUNCTIONAL_ASSESSMENT: ACTIVITIES ARE NOT PREVENTED

## 2024-03-28 ASSESSMENT — PAIN DESCRIPTION - FREQUENCY
FREQUENCY: CONTINUOUS

## 2024-03-28 ASSESSMENT — PAIN DESCRIPTION - ONSET
ONSET: ON-GOING

## 2024-03-28 ASSESSMENT — PAIN DESCRIPTION - PAIN TYPE
TYPE: CHRONIC PAIN

## 2024-03-28 ASSESSMENT — PAIN DESCRIPTION - ORIENTATION
ORIENTATION: MID
ORIENTATION: MID;LOWER
ORIENTATION: MID;LOWER;LEFT
ORIENTATION: MID;LOWER
ORIENTATION: MID;LOWER
ORIENTATION: RIGHT

## 2024-03-28 NOTE — DISCHARGE INSTR - COC
Continuity of Care Form    Patient Name: Annemarie Worrell   :  1955  MRN:  [id number]    Admit date:  3/27/2024  Discharge date:  2024    Code Status Order: Full Code   Advance Directives:     Admitting Physician:  Chano Tan MD  PCP: Camila, Pcp    Discharging Nurse: Bryanna Tamez RN  Discharging Hospital Unit/Room#: 0415/0415-01  Discharging Unit Phone Number: [phone]    Emergency Contact:   Extended Emergency Contact Information  Primary Emergency Contact: Ragini Guillen  Address: NOT AVAILABLE   [facility]  Home Phone: [phone]  Work Phone: [phone]  Mobile Phone: [phone]  Relation: Child    Past Surgical History:  Past Surgical History:   Procedure Laterality Date    COLONOSCOPY      HERNIA REPAIR      INSERT MIDLINE CATHETER  2022         OTHER SURGICAL HISTORY  2012    robotic placement of 2 left ventricular leads/replacement of ICD generator    OTHER SURGICAL HISTORY Left     ICD REPLACEMENT    PACEMAKER PLACEMENT      [employer] Viva XT CRT -D [id number] Serial : [id number]    TUBAL LIGATION         Immunization History:   Immunization History   Administered Date(s) Administered    COVID-19, PFIZER PURPLE top, DILUTE for use, (age 12 y+), 30mcg/0.3mL 2021, 2021    Influenza Virus Vaccine 2012, 2018    Influenza, AFLURIA (age 3 yrs+), FLUZONE, (age 6 mo+), MDV, 0.5mL 2016    Influenza, FLUARIX, FLULAVAL, FLUZONE (age 6 mo+) AND AFLURIA, (age 3 y+), PF, 0.5mL 2022    Influenza, FLUZONE (age 65 y+), High Dose, 0.7mL 2022    Influenza, High Dose (Fluzone 65 yrs and older) 2019    Influenza, Quadv, 6 mo and older, IM (Fluzone, Flulaval) 2018    Pneumococcal, PCV-13, PREVNAR 13, (age 6w+), IM, 0.5mL 2019    Pneumococcal, PPSV23, PNEUMOVAX 23, (age 2y+), SC/IM, 0.5mL 2019       Active Problems:  Patient Active Problem List   Diagnosis Code    Hypokalemia E87.6    Dilated cardiomyopathy (HCC) I42.0

## 2024-03-28 NOTE — DISCHARGE INSTRUCTIONS
You were admitted to the hospital due to you having a fall, and you were unable to get out of.  For 3 to 4 days.  You were having significant pain and weakness.  There was concern for fractures in the back and also compression of the spinal cord and were admitted to the hospital for further evaluation.  You underwent imaging and scans as per neurosurgery recommendations.  The imaging was negative for any spine compression.  Neurosurgery evaluated you and recommended no further intervention at this point.  Your dose of Xarelto was reduced from 20 mg to 15 mg once a day.  This is due to your underlying kidney disease.  Please follow-up with neurosurgery in 1 month.  If you begin to experience any symptoms such as chest pain shortness of breath nausea vomiting dizziness drowsiness abdominal pain loss of consciousness or any other symptoms you find concerning please return to the ED for follow-up evaluation.  If you have been given medication please take them as prescribed. Do not take more medication than recommended at any given time. Finish all antibiotic  Please follow-up with your primary care provider within 5 to 7 days for continued care.   Please feel free return to the hospital if your symptoms worsen or any new concerning symptoms develop.  Follow-up with your primary care physician as needed for all other the concerns.      Tdap; 11-Oct-2018 20:41; Ildefonso Bullard (RN); [employer]; [id number] (Exp. Date: 10-Oct-2020); IntraMuscular; Deltoid Right.; 0.5 milliLiter(s); VIS (VIS Published: 09-May-2013, VIS Presented: 11-Oct-2018);   Tdap; 23-Jan-2020 19:35; Roopa Garcia (ROBERT); [employer]aste[employer]; [id number] (Exp. Date: 17-Sep-2021); IntraMuscular; Deltoid Left.; 0.5 milliLiter(s); VIS (VIS Published: 09-May-2013, VIS Presented: 23-Jan-2020);

## 2024-03-28 NOTE — CARE COORDINATION
Case Management Assessment  Initial Evaluation    Date/Time of Evaluation: 3/28/2024 11:23 AM  Assessment Completed by: DORIS HERBERT RN    If patient is discharged prior to next notation, then this note serves as note for discharge by case management.    Patient Name: Annemarie Worrell                   YOB: 1955  Diagnosis: Dehydration [E86.0]  Hyponatremia [E87.1]  Saddle anesthesia [R20.0]  Weakness of both legs [R29.898]  Acute cystitis without hematuria [N30.00]  Bowel and bladder incontinence [R32, R15.9]  Low back pain, unspecified back pain laterality, unspecified chronicity, unspecified whether sciatica present [M54.50]                   Date / Time: 3/27/2024 11:37 AM    Patient Admission Status: Inpatient   Readmission Risk (Low < 19, Mod (19-27), High > 27): Readmission Risk Score: 12.9    Current PCP: No, Pcp  PCP verified by CM? Yes    Chart Reviewed: Yes      History Provided by: Patient  Patient Orientation: Alert and Oriented, Person, Place, Situation, Self    Patient Cognition: Alert    Hospitalization in the last 30 days (Readmission):  No    If yes, Readmission Assessment in CM Navigator will be completed.    Advance Directives:      Code Status: Full Code   Patient's Primary Decision Maker is: Legal Next of Kin    Primary Decision Maker: Ragini Guillen - Child - [phone]    Discharge Planning:    Patient lives with: Family Members Type of Home: House  Primary Care Giver: Self  Patient Support Systems include: Family Members   Current Financial resources: None  Current community resources: None  Current services prior to admission: None            Current DME:              Type of Home Care services:  None    ADLS  Prior functional level: Independent in ADLs/IADLs  Current functional level: Independent in ADLs/IADLs    PT AM-PAC:   /24  OT AM-PAC:   /24    Family can provide assistance at DC: Yes  Would you like Case Management to discuss the discharge plan with any other

## 2024-03-28 NOTE — CARE COORDINATION
Transitional planning note: patient's daughter requests home care referral to [facility]. Referral sent as requested.     1620: spoke with gael at [facility] and they do not have any available hospital beds. Spoke with Hope at [facility] who states they do have a hospital bed available. Faxed DME orders for hospital bed, wheelchair, and bedside commode to [facility]

## 2024-03-29 ENCOUNTER — APPOINTMENT (OUTPATIENT)
Dept: CT IMAGING | Age: 69
DRG: 551 | End: 2024-03-29
Payer: MEDICARE

## 2024-03-29 PROBLEM — S06.330A INTRAPARENCHYMAL HEMATOMA OF BRAIN WITHOUT LOSS OF CONSCIOUSNESS, INITIAL ENCOUNTER (HCC): Status: ACTIVE | Noted: 2024-03-29

## 2024-03-29 LAB
ANION GAP SERPL CALCULATED.3IONS-SCNC: 12 MMOL/L (ref 9–16)
ANION GAP SERPL CALCULATED.3IONS-SCNC: 12 MMOL/L (ref 9–16)
BASOPHILS # BLD: 0.04 K/UL (ref 0–0.2)
BASOPHILS NFR BLD: 1 % (ref 0–2)
BUN SERPL-MCNC: 24 MG/DL (ref 8–23)
BUN SERPL-MCNC: 25 MG/DL (ref 8–23)
CALCIUM SERPL-MCNC: 8.5 MG/DL (ref 8.6–10.4)
CALCIUM SERPL-MCNC: 8.8 MG/DL (ref 8.6–10.4)
CHLORIDE SERPL-SCNC: 100 MMOL/L (ref 98–107)
CHLORIDE SERPL-SCNC: 102 MMOL/L (ref 98–107)
CO2 SERPL-SCNC: 18 MMOL/L (ref 20–31)
CO2 SERPL-SCNC: 20 MMOL/L (ref 20–31)
CREAT SERPL-MCNC: 1.3 MG/DL (ref 0.5–0.9)
CREAT SERPL-MCNC: 1.3 MG/DL (ref 0.5–0.9)
EOSINOPHIL # BLD: 0.09 K/UL (ref 0–0.44)
EOSINOPHILS RELATIVE PERCENT: 2 % (ref 1–4)
ERYTHROCYTE [DISTWIDTH] IN BLOOD BY AUTOMATED COUNT: 14.9 % (ref 11.8–14.4)
ERYTHROCYTE [DISTWIDTH] IN BLOOD BY AUTOMATED COUNT: 15 % (ref 11.8–14.4)
GFR SERPL CREATININE-BSD FRML MDRD: 44 ML/MIN/1.73M2
GFR SERPL CREATININE-BSD FRML MDRD: 45 ML/MIN/1.73M2
GLUCOSE SERPL-MCNC: 89 MG/DL (ref 74–99)
GLUCOSE SERPL-MCNC: 96 MG/DL (ref 74–99)
HCT VFR BLD AUTO: 31 % (ref 36.3–47.1)
HCT VFR BLD AUTO: 33 % (ref 36.3–47.1)
HGB BLD-MCNC: 10.9 G/DL (ref 11.9–15.1)
HGB BLD-MCNC: 11.6 G/DL (ref 11.9–15.1)
IMM GRANULOCYTES # BLD AUTO: <0.03 K/UL (ref 0–0.3)
IMM GRANULOCYTES NFR BLD: 0 %
LYMPHOCYTES NFR BLD: 0.94 K/UL (ref 1.1–3.7)
LYMPHOCYTES RELATIVE PERCENT: 16 % (ref 24–43)
MCH RBC QN AUTO: 30.1 PG (ref 25.2–33.5)
MCH RBC QN AUTO: 30.5 PG (ref 25.2–33.5)
MCHC RBC AUTO-ENTMCNC: 35.2 G/DL (ref 28.4–34.8)
MCHC RBC AUTO-ENTMCNC: 35.2 G/DL (ref 28.4–34.8)
MCV RBC AUTO: 85.6 FL (ref 82.6–102.9)
MCV RBC AUTO: 86.8 FL (ref 82.6–102.9)
MONOCYTES NFR BLD: 0.84 K/UL (ref 0.1–1.2)
MONOCYTES NFR BLD: 14 % (ref 3–12)
NEUTROPHILS NFR BLD: 67 % (ref 36–65)
NEUTS SEG NFR BLD: 3.91 K/UL (ref 1.5–8.1)
NRBC BLD-RTO: 0 PER 100 WBC
NRBC BLD-RTO: 0 PER 100 WBC
OSMOLALITY SERPL: 289 MOSM/KG (ref 275–295)
OSMOLALITY UR: 556 MOSM/KG (ref 80–1300)
PLATELET # BLD AUTO: 153 K/UL (ref 138–453)
PLATELET # BLD AUTO: 174 K/UL (ref 138–453)
PMV BLD AUTO: 11 FL (ref 8.1–13.5)
PMV BLD AUTO: 11.3 FL (ref 8.1–13.5)
POTASSIUM SERPL-SCNC: 3.6 MMOL/L (ref 3.7–5.3)
POTASSIUM SERPL-SCNC: 3.6 MMOL/L (ref 3.7–5.3)
RBC # BLD AUTO: 3.62 M/UL (ref 3.95–5.11)
RBC # BLD AUTO: 3.8 M/UL (ref 3.95–5.11)
RBC # BLD: ABNORMAL 10*6/UL
SODIUM SERPL-SCNC: 130 MMOL/L (ref 136–145)
SODIUM SERPL-SCNC: 134 MMOL/L (ref 136–145)
SODIUM UR-SCNC: <20 MMOL/L
WBC OTHER # BLD: 5.8 K/UL (ref 3.5–11.3)
WBC OTHER # BLD: 6.2 K/UL (ref 3.5–11.3)

## 2024-03-29 PROCEDURE — 83935 ASSAY OF URINE OSMOLALITY: CPT

## 2024-03-29 PROCEDURE — 85025 COMPLETE CBC W/AUTO DIFF WBC: CPT

## 2024-03-29 PROCEDURE — 84300 ASSAY OF URINE SODIUM: CPT

## 2024-03-29 PROCEDURE — 99223 1ST HOSP IP/OBS HIGH 75: CPT | Performed by: STUDENT IN AN ORGANIZED HEALTH CARE EDUCATION/TRAINING PROGRAM

## 2024-03-29 PROCEDURE — 6360000002 HC RX W HCPCS: Performed by: REGISTERED NURSE

## 2024-03-29 PROCEDURE — 83930 ASSAY OF BLOOD OSMOLALITY: CPT

## 2024-03-29 PROCEDURE — 97535 SELF CARE MNGMENT TRAINING: CPT

## 2024-03-29 PROCEDURE — 6370000000 HC RX 637 (ALT 250 FOR IP)

## 2024-03-29 PROCEDURE — 2580000003 HC RX 258

## 2024-03-29 PROCEDURE — 36415 COLL VENOUS BLD VENIPUNCTURE: CPT

## 2024-03-29 PROCEDURE — 30283B1 TRANSFUSION OF NONAUTOLOGOUS 4-FACTOR PROTHROMBIN COMPLEX CONCENTRATE INTO VEIN, PERCUTANEOUS APPROACH: ICD-10-PCS | Performed by: STUDENT IN AN ORGANIZED HEALTH CARE EDUCATION/TRAINING PROGRAM

## 2024-03-29 PROCEDURE — 2060000000 HC ICU INTERMEDIATE R&B

## 2024-03-29 PROCEDURE — 97110 THERAPEUTIC EXERCISES: CPT

## 2024-03-29 PROCEDURE — 70450 CT HEAD/BRAIN W/O DYE: CPT

## 2024-03-29 PROCEDURE — 99233 SBSQ HOSP IP/OBS HIGH 50: CPT | Performed by: INTERNAL MEDICINE

## 2024-03-29 PROCEDURE — 85027 COMPLETE CBC AUTOMATED: CPT

## 2024-03-29 PROCEDURE — 2580000003 HC RX 258: Performed by: STUDENT IN AN ORGANIZED HEALTH CARE EDUCATION/TRAINING PROGRAM

## 2024-03-29 PROCEDURE — 80048 BASIC METABOLIC PNL TOTAL CA: CPT

## 2024-03-29 PROCEDURE — 97530 THERAPEUTIC ACTIVITIES: CPT

## 2024-03-29 PROCEDURE — 2580000003 HC RX 258: Performed by: REGISTERED NURSE

## 2024-03-29 RX ORDER — SODIUM CHLORIDE 0.9 % (FLUSH) 0.9 %
5-40 SYRINGE (ML) INJECTION EVERY 12 HOURS SCHEDULED
Status: DISCONTINUED | OUTPATIENT
Start: 2024-03-29 | End: 2024-04-04 | Stop reason: HOSPADM

## 2024-03-29 RX ORDER — SODIUM CHLORIDE 9 MG/ML
50 INJECTION, SOLUTION INTRAVENOUS ONCE
Status: COMPLETED | OUTPATIENT
Start: 2024-03-29 | End: 2024-03-29

## 2024-03-29 RX ORDER — SODIUM CHLORIDE 9 MG/ML
INJECTION, SOLUTION INTRAVENOUS PRN
Status: DISCONTINUED | OUTPATIENT
Start: 2024-03-29 | End: 2024-04-04 | Stop reason: HOSPADM

## 2024-03-29 RX ORDER — SODIUM CHLORIDE 9 MG/ML
50 INJECTION, SOLUTION INTRAVENOUS ONCE
Status: DISCONTINUED | OUTPATIENT
Start: 2024-03-29 | End: 2024-03-29

## 2024-03-29 RX ORDER — ROSUVASTATIN CALCIUM 10 MG/1
40 TABLET, COATED ORAL NIGHTLY
Status: DISCONTINUED | OUTPATIENT
Start: 2024-03-30 | End: 2024-04-04 | Stop reason: HOSPADM

## 2024-03-29 RX ORDER — POTASSIUM CHLORIDE 20 MEQ/1
20 TABLET, EXTENDED RELEASE ORAL ONCE
Status: COMPLETED | OUTPATIENT
Start: 2024-03-29 | End: 2024-03-29

## 2024-03-29 RX ORDER — OXYCODONE HYDROCHLORIDE 5 MG/1
2.5 TABLET ORAL EVERY 6 HOURS PRN
Status: DISCONTINUED | OUTPATIENT
Start: 2024-03-29 | End: 2024-03-30

## 2024-03-29 RX ORDER — SODIUM CHLORIDE 0.9 % (FLUSH) 0.9 %
5-40 SYRINGE (ML) INJECTION PRN
Status: DISCONTINUED | OUTPATIENT
Start: 2024-03-29 | End: 2024-04-04 | Stop reason: HOSPADM

## 2024-03-29 RX ORDER — LABETALOL HYDROCHLORIDE 5 MG/ML
10 INJECTION, SOLUTION INTRAVENOUS EVERY 4 HOURS PRN
Status: DISCONTINUED | OUTPATIENT
Start: 2024-03-29 | End: 2024-04-04 | Stop reason: HOSPADM

## 2024-03-29 RX ADMIN — AMIODARONE HYDROCHLORIDE 200 MG: 200 TABLET ORAL at 09:45

## 2024-03-29 RX ADMIN — SODIUM CHLORIDE, PRESERVATIVE FREE 10 ML: 5 INJECTION INTRAVENOUS at 09:45

## 2024-03-29 RX ADMIN — SODIUM CHLORIDE, PRESERVATIVE FREE 10 ML: 5 INJECTION INTRAVENOUS at 21:40

## 2024-03-29 RX ADMIN — ACETAMINOPHEN 325MG 650 MG: 325 TABLET ORAL at 06:53

## 2024-03-29 RX ADMIN — LEVOTHYROXINE SODIUM 75 MCG: 75 TABLET ORAL at 06:37

## 2024-03-29 RX ADMIN — PROTHROMBIN, COAGULATION FACTOR VII HUMAN, COAGULATION FACTOR IX HUMAN, COAGULATION FACTOR X HUMAN, PROTEIN C, PROTEIN S HUMAN, AND WATER 2000 UNITS: KIT at 22:29

## 2024-03-29 RX ADMIN — METOPROLOL SUCCINATE 25 MG: 25 TABLET, EXTENDED RELEASE ORAL at 09:44

## 2024-03-29 RX ADMIN — SODIUM CHLORIDE, PRESERVATIVE FREE 10 ML: 5 INJECTION INTRAVENOUS at 22:53

## 2024-03-29 RX ADMIN — RIVAROXABAN 20 MG: 20 TABLET, FILM COATED ORAL at 17:40

## 2024-03-29 RX ADMIN — BUMETANIDE 1 MG: 1 TABLET ORAL at 09:44

## 2024-03-29 RX ADMIN — ACETAMINOPHEN 325MG 650 MG: 325 TABLET ORAL at 17:40

## 2024-03-29 RX ADMIN — POTASSIUM CHLORIDE 20 MEQ: 1500 TABLET, EXTENDED RELEASE ORAL at 09:43

## 2024-03-29 RX ADMIN — SODIUM CHLORIDE 50 ML: 9 INJECTION, SOLUTION INTRAVENOUS at 22:52

## 2024-03-29 ASSESSMENT — PAIN DESCRIPTION - ORIENTATION
ORIENTATION: MID;POSTERIOR
ORIENTATION: RIGHT
ORIENTATION: MID

## 2024-03-29 ASSESSMENT — PAIN - FUNCTIONAL ASSESSMENT
PAIN_FUNCTIONAL_ASSESSMENT: ACTIVITIES ARE NOT PREVENTED

## 2024-03-29 ASSESSMENT — PAIN DESCRIPTION - ONSET
ONSET: ON-GOING
ONSET: ON-GOING

## 2024-03-29 ASSESSMENT — PAIN DESCRIPTION - PAIN TYPE
TYPE: CHRONIC PAIN
TYPE: CHRONIC PAIN

## 2024-03-29 ASSESSMENT — PAIN SCALES - GENERAL
PAINLEVEL_OUTOF10: 2
PAINLEVEL_OUTOF10: 3
PAINLEVEL_OUTOF10: 7
PAINLEVEL_OUTOF10: 6
PAINLEVEL_OUTOF10: 7
PAINLEVEL_OUTOF10: 4
PAINLEVEL_OUTOF10: 3

## 2024-03-29 ASSESSMENT — PAIN DESCRIPTION - LOCATION
LOCATION: BACK
LOCATION: LEG
LOCATION: FOOT
LOCATION: BACK

## 2024-03-29 ASSESSMENT — PAIN DESCRIPTION - DESCRIPTORS
DESCRIPTORS: ACHING
DESCRIPTORS: ACHING;DISCOMFORT
DESCRIPTORS: ACHING
DESCRIPTORS: ACHING;DISCOMFORT

## 2024-03-29 ASSESSMENT — PAIN DESCRIPTION - FREQUENCY
FREQUENCY: CONTINUOUS
FREQUENCY: CONTINUOUS

## 2024-03-29 NOTE — CARE COORDINATION
Met with patient and daughter to discuss transitional planning. Plan is home with Daughter Ragini and [facility] (per shania with [facility], patient has been accepted). Patient needs hospital bed, wheelchair, and BSC. DME orders were sent to [facility] yesterday and awaiting confirmation from DME company when equipment can be delivered. Patient will need stretcher transport home.     Daughter's home address: [address] [place] [address]

## 2024-03-30 ENCOUNTER — APPOINTMENT (OUTPATIENT)
Dept: CT IMAGING | Age: 69
DRG: 551 | End: 2024-03-30
Payer: MEDICARE

## 2024-03-30 LAB
ANION GAP SERPL CALCULATED.3IONS-SCNC: 12 MMOL/L (ref 9–16)
BUN SERPL-MCNC: 22 MG/DL (ref 8–23)
CALCIUM SERPL-MCNC: 8.8 MG/DL (ref 8.6–10.4)
CHLORIDE SERPL-SCNC: 101 MMOL/L (ref 98–107)
CO2 SERPL-SCNC: 21 MMOL/L (ref 20–31)
CREAT SERPL-MCNC: 1.2 MG/DL (ref 0.5–0.9)
ERYTHROCYTE [DISTWIDTH] IN BLOOD BY AUTOMATED COUNT: 14.5 % (ref 11.8–14.4)
GFR SERPL CREATININE-BSD FRML MDRD: 51 ML/MIN/1.73M2
GLUCOSE SERPL-MCNC: 101 MG/DL (ref 74–99)
HCT VFR BLD AUTO: 34 % (ref 36.3–47.1)
HGB BLD-MCNC: 12.6 G/DL (ref 11.9–15.1)
MCH RBC QN AUTO: 30.5 PG (ref 25.2–33.5)
MCHC RBC AUTO-ENTMCNC: 37.1 G/DL (ref 28.4–34.8)
MCV RBC AUTO: 82.3 FL (ref 82.6–102.9)
NRBC BLD-RTO: 0 PER 100 WBC
PLATELET # BLD AUTO: 148 K/UL (ref 138–453)
PMV BLD AUTO: 11.7 FL (ref 8.1–13.5)
POTASSIUM SERPL-SCNC: 3.4 MMOL/L (ref 3.7–5.3)
RBC # BLD AUTO: 4.13 M/UL (ref 3.95–5.11)
SODIUM SERPL-SCNC: 134 MMOL/L (ref 136–145)
TROPONIN I SERPL HS-MCNC: 28 NG/L (ref 0–14)
WBC OTHER # BLD: 7.3 K/UL (ref 3.5–11.3)

## 2024-03-30 PROCEDURE — 80048 BASIC METABOLIC PNL TOTAL CA: CPT

## 2024-03-30 PROCEDURE — 92523 SPEECH SOUND LANG COMPREHEN: CPT

## 2024-03-30 PROCEDURE — 2580000003 HC RX 258: Performed by: STUDENT IN AN ORGANIZED HEALTH CARE EDUCATION/TRAINING PROGRAM

## 2024-03-30 PROCEDURE — 6370000000 HC RX 637 (ALT 250 FOR IP)

## 2024-03-30 PROCEDURE — 84484 ASSAY OF TROPONIN QUANT: CPT

## 2024-03-30 PROCEDURE — 97110 THERAPEUTIC EXERCISES: CPT

## 2024-03-30 PROCEDURE — 6360000004 HC RX CONTRAST MEDICATION

## 2024-03-30 PROCEDURE — 2060000000 HC ICU INTERMEDIATE R&B

## 2024-03-30 PROCEDURE — 70450 CT HEAD/BRAIN W/O DYE: CPT

## 2024-03-30 PROCEDURE — 85027 COMPLETE CBC AUTOMATED: CPT

## 2024-03-30 PROCEDURE — 99232 SBSQ HOSP IP/OBS MODERATE 35: CPT | Performed by: STUDENT IN AN ORGANIZED HEALTH CARE EDUCATION/TRAINING PROGRAM

## 2024-03-30 PROCEDURE — 99233 SBSQ HOSP IP/OBS HIGH 50: CPT | Performed by: INTERNAL MEDICINE

## 2024-03-30 PROCEDURE — 6370000000 HC RX 637 (ALT 250 FOR IP): Performed by: STUDENT IN AN ORGANIZED HEALTH CARE EDUCATION/TRAINING PROGRAM

## 2024-03-30 PROCEDURE — 94761 N-INVAS EAR/PLS OXIMETRY MLT: CPT

## 2024-03-30 PROCEDURE — 2700000000 HC OXYGEN THERAPY PER DAY

## 2024-03-30 PROCEDURE — 83036 HEMOGLOBIN GLYCOSYLATED A1C: CPT

## 2024-03-30 PROCEDURE — 36415 COLL VENOUS BLD VENIPUNCTURE: CPT

## 2024-03-30 RX ORDER — OXYCODONE HYDROCHLORIDE 5 MG/1
2.5 TABLET ORAL EVERY 4 HOURS PRN
Status: DISCONTINUED | OUTPATIENT
Start: 2024-03-30 | End: 2024-04-04 | Stop reason: HOSPADM

## 2024-03-30 RX ORDER — POTASSIUM CHLORIDE 20 MEQ/1
40 TABLET, EXTENDED RELEASE ORAL ONCE
Status: COMPLETED | OUTPATIENT
Start: 2024-03-30 | End: 2024-03-30

## 2024-03-30 RX ADMIN — AMIODARONE HYDROCHLORIDE 200 MG: 200 TABLET ORAL at 07:56

## 2024-03-30 RX ADMIN — OXYCODONE 2.5 MG: 5 TABLET ORAL at 08:51

## 2024-03-30 RX ADMIN — METOPROLOL SUCCINATE 25 MG: 25 TABLET, EXTENDED RELEASE ORAL at 07:56

## 2024-03-30 RX ADMIN — OXYCODONE 2.5 MG: 5 TABLET ORAL at 19:39

## 2024-03-30 RX ADMIN — OXYCODONE 2.5 MG: 5 TABLET ORAL at 13:59

## 2024-03-30 RX ADMIN — SODIUM CHLORIDE, PRESERVATIVE FREE 10 ML: 5 INJECTION INTRAVENOUS at 07:56

## 2024-03-30 RX ADMIN — POTASSIUM CHLORIDE 40 MEQ: 1500 TABLET, EXTENDED RELEASE ORAL at 07:55

## 2024-03-30 RX ADMIN — LEVOTHYROXINE SODIUM 75 MCG: 75 TABLET ORAL at 07:56

## 2024-03-30 RX ADMIN — ROSUVASTATIN CALCIUM 40 MG: 10 TABLET, COATED ORAL at 20:21

## 2024-03-30 RX ADMIN — BUMETANIDE 1 MG: 1 TABLET ORAL at 07:56

## 2024-03-30 RX ADMIN — IOPAMIDOL 90 ML: 755 INJECTION, SOLUTION INTRAVENOUS at 12:50

## 2024-03-30 ASSESSMENT — PAIN DESCRIPTION - ORIENTATION
ORIENTATION: LEFT;LOWER
ORIENTATION: RIGHT
ORIENTATION: RIGHT;LOWER

## 2024-03-30 ASSESSMENT — PAIN SCALES - GENERAL
PAINLEVEL_OUTOF10: 0
PAINLEVEL_OUTOF10: 6
PAINLEVEL_OUTOF10: 8
PAINLEVEL_OUTOF10: 0
PAINLEVEL_OUTOF10: 8
PAINLEVEL_OUTOF10: 0

## 2024-03-30 ASSESSMENT — PAIN DESCRIPTION - DESCRIPTORS
DESCRIPTORS: ACHING;DISCOMFORT
DESCRIPTORS: DULL;SHARP
DESCRIPTORS: DULL;SHARP
DESCRIPTORS: ACHING

## 2024-03-30 ASSESSMENT — PAIN DESCRIPTION - LOCATION
LOCATION: ARM
LOCATION: LEG
LOCATION: BACK
LOCATION: BACK

## 2024-03-30 ASSESSMENT — PAIN - FUNCTIONAL ASSESSMENT
PAIN_FUNCTIONAL_ASSESSMENT: PREVENTS OR INTERFERES SOME ACTIVE ACTIVITIES AND ADLS
PAIN_FUNCTIONAL_ASSESSMENT: PREVENTS OR INTERFERES SOME ACTIVE ACTIVITIES AND ADLS
PAIN_FUNCTIONAL_ASSESSMENT: ACTIVITIES ARE NOT PREVENTED

## 2024-03-30 ASSESSMENT — PAIN DESCRIPTION - ONSET: ONSET: ON-GOING

## 2024-03-30 ASSESSMENT — PAIN DESCRIPTION - FREQUENCY: FREQUENCY: CONTINUOUS

## 2024-03-30 ASSESSMENT — PAIN DESCRIPTION - PAIN TYPE: TYPE: CHRONIC PAIN

## 2024-03-30 NOTE — H&P
[facility] Neurology   IN-PATIENT SERVICE   [facility]    HISTORY AND PHYSICAL EXAMINATION            Date:   3/29/2024  Patient name:  Annemarie Worrell  Date of admission:  3/27/2024 11:37 AM  MRN:   [id number]  Account:  [id number]  YOB: 1955  PCP:    Suly Jensen  Room:   [address] [place] [address]  Code Status:    Full Code    Chief Complaint:   Neurology Consulted 3/29/24 for concern of bilateral lower extremity weakness and bowel and bladder incontinence    History Obtained From:     patient, electronic medical record, Nurse at bedside    History of Present Illness:   The patient is a 68 y.o.  Right handed  female who presents with Fatigue   and she is admitted to the hospital for the management of  bilateral lower extremity weakness and concern for Urinary and bladder incont. PMH significant for A-fib on Xarelto 20mg daily, [id number]% s/p AICD 12/20/2012, HTN, Pulmonary HTN, Cirrhosis. Patient states that she was at home Sunday night when she slid down off her bed. Denies any significant fall or hard injury however states that both her legs gave out and she was unable to stand. She had additional episode Monday night while trying to get up off her couch and was unable to get up and called EMS. She was then noted to be found by EMS with urine and stool incont. Neurosurgery was consulted from ER for concerns of Cauda Equina. AICD is not MRI conditional hence patient underwent CT lumbar myelogram. Deemed not NSG candidate and recommending aggressive therapy and outpatient follow up.      Neurology Consulted mid morning 3/29/24 for persistent Bilateral lower extremity weakness and no neurosurgical intervention planned however NSG requesting gen neurology eval and input. On my exam patient states that she normally walks with a rolling walker however unable to anti-gravity either leg. Patient found to have spastic right upper extremity and Hoffmans positive right upper, clonus 
Range    Color, UA Dark Yellow (A) Yellow    Turbidity UA Cloudy (A) Clear    Glucose, Ur 2+ (A) NEGATIVE mg/dL    Bilirubin Urine NEGATIVE  Verified by ictotest. (A) NEGATIVE    Ketones, Urine TRACE (A) NEGATIVE mg/dL    Specific Gravity, UA 1.029 1.005 - 1.030    Urine Hgb SMALL (A) NEGATIVE    pH, UA 5.5 5.0 - 8.0    Protein, UA 1+ (A) NEGATIVE mg/dL    Urobilinogen, Urine Normal 0.0 - 1.0 EU/dL    Nitrite, Urine NEGATIVE NEGATIVE    Leukocyte Esterase, Urine TRACE (A) NEGATIVE    WBC, UA None 0 - 5 /HPF    RBC, UA 5 TO 10 0 - 4 /HPF    Casts UA  0 - 8 /LPF     None Reference range defined for non-centrifuged specimen.    Epithelial Cells UA None 0 - 5 /HPF    Bacteria, UA None None   POC Glucose Fingerstick    Collection Time: 03/27/24  7:26 PM   Result Value Ref Range    POC Glucose 129 (H) 65 - 105 mg/dL   Protime-INR    Collection Time: 03/27/24  9:24 PM   Result Value Ref Range    Protime 15.0 (H) 11.7 - 14.9 sec    INR 1.2    Platelet Count    Collection Time: 03/27/24  9:24 PM   Result Value Ref Range    Platelets 206 138 - 453 k/uL   Lactic Acid    Collection Time: 03/27/24  9:24 PM   Result Value Ref Range    Lactic Acid, Whole Blood 1.3 0.7 - 2.1 mmol/L       Imaging:   CT THORACIC SPINE W CONTRAST    Result Date: 3/27/2024  Mild multilevel disc marginal osteophytes and kyphosis.  No significant stenosis of the central canal and neural foramina.     CT LUMBAR SPINE W CONTRAST    Result Date: 3/27/2024  1. Multilevel degenerative disc disease as described above especially at L5-S1.  Multilevel spinal stenosis as above. 2. Incidental note made of gallstones.     CT CERVICAL SPINE W CONTRAST    Result Date: 3/27/2024  Small disc protrusion at C3-4 encroaching upon the cord.  Central canal and neural foramina otherwise patent.     XR LUMBAR SPINE (2-3 VIEWS)    Result Date: 3/27/2024  1. No fracture or malalignment. 2. Minimal spondylolisthesis at L4-L5.     XR THORACIC SPINE (2 VIEWS)    Result Date:

## 2024-03-31 ENCOUNTER — APPOINTMENT (OUTPATIENT)
Dept: CT IMAGING | Age: 69
DRG: 551 | End: 2024-03-31
Payer: MEDICARE

## 2024-03-31 ENCOUNTER — APPOINTMENT (OUTPATIENT)
Dept: GENERAL RADIOLOGY | Age: 69
DRG: 551 | End: 2024-03-31
Payer: MEDICARE

## 2024-03-31 LAB
ANION GAP SERPL CALCULATED.3IONS-SCNC: 13 MMOL/L (ref 9–16)
BASOPHILS # BLD: 0 K/UL (ref 0–0.2)
BASOPHILS NFR BLD: 0 % (ref 0–2)
BUN SERPL-MCNC: 19 MG/DL (ref 8–23)
CALCIUM SERPL-MCNC: 8.9 MG/DL (ref 8.6–10.4)
CHLORIDE SERPL-SCNC: 98 MMOL/L (ref 98–107)
CO2 SERPL-SCNC: 19 MMOL/L (ref 20–31)
CREAT SERPL-MCNC: 1.1 MG/DL (ref 0.5–0.9)
EOSINOPHIL # BLD: 0.08 K/UL (ref 0–0.44)
EOSINOPHILS RELATIVE PERCENT: 1 % (ref 1–4)
ERYTHROCYTE [DISTWIDTH] IN BLOOD BY AUTOMATED COUNT: 15 % (ref 11.8–14.4)
EST. AVERAGE GLUCOSE BLD GHB EST-MCNC: 105 MG/DL
GFR SERPL CREATININE-BSD FRML MDRD: 56 ML/MIN/1.73M2
GLUCOSE SERPL-MCNC: 124 MG/DL (ref 74–99)
HBA1C MFR BLD: 5.3 % (ref 4–6)
HCT VFR BLD AUTO: 37.4 % (ref 36.3–47.1)
HGB BLD-MCNC: 12.7 G/DL (ref 11.9–15.1)
IMM GRANULOCYTES # BLD AUTO: 0 K/UL (ref 0–0.3)
IMM GRANULOCYTES NFR BLD: 0 %
LYMPHOCYTES NFR BLD: 0.6 K/UL (ref 1.1–3.7)
LYMPHOCYTES RELATIVE PERCENT: 8 % (ref 24–43)
MCH RBC QN AUTO: 30.5 PG (ref 25.2–33.5)
MCHC RBC AUTO-ENTMCNC: 34 G/DL (ref 28.4–34.8)
MCV RBC AUTO: 89.9 FL (ref 82.6–102.9)
MONOCYTES NFR BLD: 0.83 K/UL (ref 0.1–1.2)
MONOCYTES NFR BLD: 11 % (ref 3–12)
MORPHOLOGY: ABNORMAL
NEUTROPHILS NFR BLD: 80 % (ref 36–65)
NEUTS SEG NFR BLD: 5.99 K/UL (ref 1.5–8.1)
NRBC BLD-RTO: 0 PER 100 WBC
OSMOLALITY SERPL: 286 MOSM/KG (ref 275–295)
OSMOLALITY UR: 431 MOSM/KG (ref 80–1300)
PLATELET # BLD AUTO: 202 K/UL (ref 138–453)
PMV BLD AUTO: 11.2 FL (ref 8.1–13.5)
POTASSIUM SERPL-SCNC: 4.1 MMOL/L (ref 3.7–5.3)
PROCALCITONIN SERPL-MCNC: 0.08 NG/ML (ref 0–0.09)
RBC # BLD AUTO: 4.16 M/UL (ref 3.95–5.11)
RBC # BLD: ABNORMAL 10*6/UL
SODIUM SERPL-SCNC: 130 MMOL/L (ref 136–145)
SODIUM UR-SCNC: 23 MMOL/L
WBC OTHER # BLD: 7.5 K/UL (ref 3.5–11.3)

## 2024-03-31 PROCEDURE — 84145 PROCALCITONIN (PCT): CPT

## 2024-03-31 PROCEDURE — 6370000000 HC RX 637 (ALT 250 FOR IP)

## 2024-03-31 PROCEDURE — 36415 COLL VENOUS BLD VENIPUNCTURE: CPT

## 2024-03-31 PROCEDURE — 99232 SBSQ HOSP IP/OBS MODERATE 35: CPT | Performed by: STUDENT IN AN ORGANIZED HEALTH CARE EDUCATION/TRAINING PROGRAM

## 2024-03-31 PROCEDURE — 80048 BASIC METABOLIC PNL TOTAL CA: CPT

## 2024-03-31 PROCEDURE — 99233 SBSQ HOSP IP/OBS HIGH 50: CPT | Performed by: INTERNAL MEDICINE

## 2024-03-31 PROCEDURE — 83935 ASSAY OF URINE OSMOLALITY: CPT

## 2024-03-31 PROCEDURE — 83930 ASSAY OF BLOOD OSMOLALITY: CPT

## 2024-03-31 PROCEDURE — 2060000000 HC ICU INTERMEDIATE R&B

## 2024-03-31 PROCEDURE — 85025 COMPLETE CBC W/AUTO DIFF WBC: CPT

## 2024-03-31 PROCEDURE — 71045 X-RAY EXAM CHEST 1 VIEW: CPT

## 2024-03-31 PROCEDURE — 84300 ASSAY OF URINE SODIUM: CPT

## 2024-03-31 PROCEDURE — 6370000000 HC RX 637 (ALT 250 FOR IP): Performed by: STUDENT IN AN ORGANIZED HEALTH CARE EDUCATION/TRAINING PROGRAM

## 2024-03-31 PROCEDURE — 70498 CT ANGIOGRAPHY NECK: CPT

## 2024-03-31 RX ORDER — LIDOCAINE 4 G/G
1 PATCH TOPICAL DAILY
Status: DISCONTINUED | OUTPATIENT
Start: 2024-03-31 | End: 2024-04-04 | Stop reason: HOSPADM

## 2024-03-31 RX ORDER — IBUPROFEN 400 MG/1
400 TABLET ORAL EVERY 8 HOURS PRN
Status: DISCONTINUED | OUTPATIENT
Start: 2024-03-31 | End: 2024-04-04 | Stop reason: HOSPADM

## 2024-03-31 RX ORDER — IBUPROFEN 400 MG/1
400 TABLET ORAL EVERY 6 HOURS PRN
Status: DISCONTINUED | OUTPATIENT
Start: 2024-03-31 | End: 2024-03-31

## 2024-03-31 RX ORDER — POTASSIUM CHLORIDE 20 MEQ/1
20 TABLET, EXTENDED RELEASE ORAL 2 TIMES DAILY
Status: DISCONTINUED | OUTPATIENT
Start: 2024-03-31 | End: 2024-04-04 | Stop reason: HOSPADM

## 2024-03-31 RX ADMIN — LEVOTHYROXINE SODIUM 75 MCG: 75 TABLET ORAL at 08:56

## 2024-03-31 RX ADMIN — ROSUVASTATIN CALCIUM 40 MG: 10 TABLET, COATED ORAL at 20:44

## 2024-03-31 RX ADMIN — POTASSIUM CHLORIDE 20 MEQ: 1500 TABLET, EXTENDED RELEASE ORAL at 12:00

## 2024-03-31 RX ADMIN — BUMETANIDE 1 MG: 1 TABLET ORAL at 08:57

## 2024-03-31 RX ADMIN — ACETAMINOPHEN 325MG 650 MG: 325 TABLET ORAL at 17:50

## 2024-03-31 RX ADMIN — METOPROLOL SUCCINATE 25 MG: 25 TABLET, EXTENDED RELEASE ORAL at 08:56

## 2024-03-31 RX ADMIN — AMIODARONE HYDROCHLORIDE 200 MG: 200 TABLET ORAL at 08:56

## 2024-03-31 RX ADMIN — ACETAMINOPHEN 325MG 650 MG: 325 TABLET ORAL at 08:56

## 2024-03-31 RX ADMIN — POTASSIUM CHLORIDE 20 MEQ: 1500 TABLET, EXTENDED RELEASE ORAL at 20:44

## 2024-03-31 ASSESSMENT — PAIN DESCRIPTION - DESCRIPTORS
DESCRIPTORS: ACHING;SHARP
DESCRIPTORS: ACHING;SHARP;DULL
DESCRIPTORS: ACHING;SHARP

## 2024-03-31 ASSESSMENT — PAIN DESCRIPTION - LOCATION
LOCATION: BACK
LOCATION: BACK
LOCATION: BACK;LEG

## 2024-03-31 ASSESSMENT — PAIN SCALES - GENERAL
PAINLEVEL_OUTOF10: 0
PAINLEVEL_OUTOF10: 0
PAINLEVEL_OUTOF10: 7
PAINLEVEL_OUTOF10: 5
PAINLEVEL_OUTOF10: 3

## 2024-03-31 ASSESSMENT — PAIN DESCRIPTION - ORIENTATION
ORIENTATION: LEFT;LOWER

## 2024-03-31 ASSESSMENT — PAIN - FUNCTIONAL ASSESSMENT
PAIN_FUNCTIONAL_ASSESSMENT: PREVENTS OR INTERFERES WITH MANY ACTIVE NOT PASSIVE ACTIVITIES
PAIN_FUNCTIONAL_ASSESSMENT: PREVENTS OR INTERFERES WITH MANY ACTIVE NOT PASSIVE ACTIVITIES
PAIN_FUNCTIONAL_ASSESSMENT: PREVENTS OR INTERFERES SOME ACTIVE ACTIVITIES AND ADLS

## 2024-04-01 ENCOUNTER — APPOINTMENT (OUTPATIENT)
Dept: CT IMAGING | Age: 69
DRG: 551 | End: 2024-04-01
Payer: MEDICARE

## 2024-04-01 PROBLEM — R59.0 MEDIASTINAL LYMPHADENOPATHY: Status: ACTIVE | Noted: 2024-04-01

## 2024-04-01 LAB
ANION GAP SERPL CALCULATED.3IONS-SCNC: 13 MMOL/L (ref 9–16)
BASOPHILS # BLD: 0.05 K/UL (ref 0–0.2)
BASOPHILS NFR BLD: 1 % (ref 0–2)
BUN SERPL-MCNC: 19 MG/DL (ref 8–23)
CALCIUM SERPL-MCNC: 9.4 MG/DL (ref 8.6–10.4)
CHLORIDE SERPL-SCNC: 99 MMOL/L (ref 98–107)
CO2 SERPL-SCNC: 20 MMOL/L (ref 20–31)
CREAT SERPL-MCNC: 1.1 MG/DL (ref 0.5–0.9)
EOSINOPHIL # BLD: 0.1 K/UL (ref 0–0.44)
EOSINOPHILS RELATIVE PERCENT: 1 % (ref 1–4)
ERYTHROCYTE [DISTWIDTH] IN BLOOD BY AUTOMATED COUNT: 15 % (ref 11.8–14.4)
GFR SERPL CREATININE-BSD FRML MDRD: 56 ML/MIN/1.73M2
GLUCOSE SERPL-MCNC: 106 MG/DL (ref 74–99)
HCT VFR BLD AUTO: 40.6 % (ref 36.3–47.1)
HGB BLD-MCNC: 13.8 G/DL (ref 11.9–15.1)
IMM GRANULOCYTES # BLD AUTO: 0.03 K/UL (ref 0–0.3)
IMM GRANULOCYTES NFR BLD: 0 %
LYMPHOCYTES NFR BLD: 0.76 K/UL (ref 1.1–3.7)
LYMPHOCYTES RELATIVE PERCENT: 8 % (ref 24–43)
MCH RBC QN AUTO: 30.1 PG (ref 25.2–33.5)
MCHC RBC AUTO-ENTMCNC: 34 G/DL (ref 28.4–34.8)
MCV RBC AUTO: 88.6 FL (ref 82.6–102.9)
MONOCYTES NFR BLD: 1.17 K/UL (ref 0.1–1.2)
MONOCYTES NFR BLD: 13 % (ref 3–12)
NEUTROPHILS NFR BLD: 77 % (ref 36–65)
NEUTS SEG NFR BLD: 7.11 K/UL (ref 1.5–8.1)
NRBC BLD-RTO: 0 PER 100 WBC
PLATELET # BLD AUTO: 212 K/UL (ref 138–453)
PMV BLD AUTO: 11.1 FL (ref 8.1–13.5)
POTASSIUM SERPL-SCNC: 4.6 MMOL/L (ref 3.7–5.3)
RBC # BLD AUTO: 4.58 M/UL (ref 3.95–5.11)
RBC # BLD: ABNORMAL 10*6/UL
SODIUM SERPL-SCNC: 132 MMOL/L (ref 136–145)
WBC OTHER # BLD: 9.2 K/UL (ref 3.5–11.3)

## 2024-04-01 PROCEDURE — 99232 SBSQ HOSP IP/OBS MODERATE 35: CPT | Performed by: PSYCHIATRY & NEUROLOGY

## 2024-04-01 PROCEDURE — 97530 THERAPEUTIC ACTIVITIES: CPT

## 2024-04-01 PROCEDURE — 80048 BASIC METABOLIC PNL TOTAL CA: CPT

## 2024-04-01 PROCEDURE — 99222 1ST HOSP IP/OBS MODERATE 55: CPT | Performed by: INTERNAL MEDICINE

## 2024-04-01 PROCEDURE — 6370000000 HC RX 637 (ALT 250 FOR IP)

## 2024-04-01 PROCEDURE — 36415 COLL VENOUS BLD VENIPUNCTURE: CPT

## 2024-04-01 PROCEDURE — 99223 1ST HOSP IP/OBS HIGH 75: CPT | Performed by: PHYSICAL MEDICINE & REHABILITATION

## 2024-04-01 PROCEDURE — 97110 THERAPEUTIC EXERCISES: CPT

## 2024-04-01 PROCEDURE — 2060000000 HC ICU INTERMEDIATE R&B

## 2024-04-01 PROCEDURE — 97130 THER IVNTJ EA ADDL 15 MIN: CPT

## 2024-04-01 PROCEDURE — 85025 COMPLETE CBC W/AUTO DIFF WBC: CPT

## 2024-04-01 PROCEDURE — 97116 GAIT TRAINING THERAPY: CPT

## 2024-04-01 PROCEDURE — 6370000000 HC RX 637 (ALT 250 FOR IP): Performed by: STUDENT IN AN ORGANIZED HEALTH CARE EDUCATION/TRAINING PROGRAM

## 2024-04-01 PROCEDURE — 97535 SELF CARE MNGMENT TRAINING: CPT

## 2024-04-01 PROCEDURE — 2580000003 HC RX 258: Performed by: STUDENT IN AN ORGANIZED HEALTH CARE EDUCATION/TRAINING PROGRAM

## 2024-04-01 PROCEDURE — 6360000004 HC RX CONTRAST MEDICATION

## 2024-04-01 PROCEDURE — 97129 THER IVNTJ 1ST 15 MIN: CPT

## 2024-04-01 PROCEDURE — 70498 CT ANGIOGRAPHY NECK: CPT

## 2024-04-01 RX ADMIN — ROSUVASTATIN CALCIUM 40 MG: 10 TABLET, COATED ORAL at 20:57

## 2024-04-01 RX ADMIN — SODIUM CHLORIDE, PRESERVATIVE FREE 10 ML: 5 INJECTION INTRAVENOUS at 20:59

## 2024-04-01 RX ADMIN — BUMETANIDE 1 MG: 1 TABLET ORAL at 08:17

## 2024-04-01 RX ADMIN — AMIODARONE HYDROCHLORIDE 200 MG: 200 TABLET ORAL at 08:17

## 2024-04-01 RX ADMIN — POTASSIUM CHLORIDE 20 MEQ: 1500 TABLET, EXTENDED RELEASE ORAL at 20:56

## 2024-04-01 RX ADMIN — POTASSIUM CHLORIDE 20 MEQ: 1500 TABLET, EXTENDED RELEASE ORAL at 08:18

## 2024-04-01 RX ADMIN — IOPAMIDOL 90 ML: 755 INJECTION, SOLUTION INTRAVENOUS at 13:10

## 2024-04-01 RX ADMIN — SODIUM CHLORIDE, PRESERVATIVE FREE 10 ML: 5 INJECTION INTRAVENOUS at 08:27

## 2024-04-01 RX ADMIN — METOPROLOL SUCCINATE 25 MG: 25 TABLET, EXTENDED RELEASE ORAL at 08:17

## 2024-04-01 RX ADMIN — LEVOTHYROXINE SODIUM 75 MCG: 75 TABLET ORAL at 08:18

## 2024-04-01 ASSESSMENT — PAIN SCALES - GENERAL
PAINLEVEL_OUTOF10: 8
PAINLEVEL_OUTOF10: 0
PAINLEVEL_OUTOF10: 0

## 2024-04-01 ASSESSMENT — ENCOUNTER SYMPTOMS
VOMITING: 0
SHORTNESS OF BREATH: 0
SORE THROAT: 0
COUGH: 0
NAUSEA: 0
ABDOMINAL PAIN: 0
RHINORRHEA: 0

## 2024-04-01 ASSESSMENT — PAIN DESCRIPTION - ORIENTATION: ORIENTATION: LEFT;LOWER

## 2024-04-01 ASSESSMENT — PAIN DESCRIPTION - LOCATION: LOCATION: BACK

## 2024-04-01 ASSESSMENT — PAIN DESCRIPTION - PAIN TYPE: TYPE: CHRONIC PAIN

## 2024-04-01 ASSESSMENT — PAIN - FUNCTIONAL ASSESSMENT: PAIN_FUNCTIONAL_ASSESSMENT: PREVENTS OR INTERFERES SOME ACTIVE ACTIVITIES AND ADLS

## 2024-04-01 ASSESSMENT — PAIN DESCRIPTION - FREQUENCY: FREQUENCY: CONTINUOUS

## 2024-04-01 ASSESSMENT — PAIN DESCRIPTION - DESCRIPTORS: DESCRIPTORS: ACHING;SHARP

## 2024-04-01 ASSESSMENT — PAIN DESCRIPTION - ONSET: ONSET: ON-GOING

## 2024-04-01 NOTE — CARE COORDINATION
Spoke with pt and daughter Ragini to discuss transitional planning.Plan continues to be home with home care .Pt declines rehab, daughter states \"no matter what her abilities are she is going with me\" Discussed importance of working with p.t., pt is agreeable.Spoke with Lia at [facility] ,pt is accepted .

## 2024-04-02 ENCOUNTER — APPOINTMENT (OUTPATIENT)
Dept: GENERAL RADIOLOGY | Age: 69
DRG: 551 | End: 2024-04-02
Payer: MEDICARE

## 2024-04-02 LAB
ANION GAP SERPL CALCULATED.3IONS-SCNC: 14 MMOL/L (ref 9–16)
ANION GAP SERPL CALCULATED.3IONS-SCNC: 15 MMOL/L (ref 9–16)
BASOPHILS # BLD: 0.06 K/UL (ref 0–0.2)
BASOPHILS NFR BLD: 1 % (ref 0–2)
BODY TEMPERATURE: 37
BUN SERPL-MCNC: 18 MG/DL (ref 8–23)
BUN SERPL-MCNC: 18 MG/DL (ref 8–23)
CALCIUM SERPL-MCNC: 9 MG/DL (ref 8.6–10.4)
CALCIUM SERPL-MCNC: 9.1 MG/DL (ref 8.6–10.4)
CHLORIDE SERPL-SCNC: 100 MMOL/L (ref 98–107)
CHLORIDE SERPL-SCNC: 99 MMOL/L (ref 98–107)
CO2 SERPL-SCNC: 17 MMOL/L (ref 20–31)
CO2 SERPL-SCNC: 19 MMOL/L (ref 20–31)
COHGB MFR BLD: 1.1 % (ref 0–5)
CREAT SERPL-MCNC: 1.1 MG/DL (ref 0.5–0.9)
CREAT SERPL-MCNC: 1.1 MG/DL (ref 0.5–0.9)
EKG ATRIAL RATE: 68 BPM
EKG Q-T INTERVAL: 506 MS
EKG QRS DURATION: 200 MS
EKG QTC CALCULATION (BAZETT): 715 MS
EKG R AXIS: -158 DEGREES
EKG T AXIS: 91 DEGREES
EKG VENTRICULAR RATE: 120 BPM
EOSINOPHIL # BLD: 0.1 K/UL (ref 0–0.44)
EOSINOPHILS RELATIVE PERCENT: 1 % (ref 1–4)
ERYTHROCYTE [DISTWIDTH] IN BLOOD BY AUTOMATED COUNT: 15 % (ref 11.8–14.4)
FIO2 ON VENT: ABNORMAL %
GFR SERPL CREATININE-BSD FRML MDRD: 54 ML/MIN/1.73M2
GFR SERPL CREATININE-BSD FRML MDRD: 54 ML/MIN/1.73M2
GLUCOSE BLD-MCNC: 124 MG/DL (ref 65–105)
GLUCOSE SERPL-MCNC: 116 MG/DL (ref 74–99)
GLUCOSE SERPL-MCNC: 86 MG/DL (ref 74–99)
HCO3 VENOUS: 24.2 MMOL/L (ref 24–30)
HCT VFR BLD AUTO: 38.9 % (ref 36.3–47.1)
HGB BLD-MCNC: 12.9 G/DL (ref 11.9–15.1)
IMM GRANULOCYTES # BLD AUTO: <0.03 K/UL (ref 0–0.3)
IMM GRANULOCYTES NFR BLD: 0 %
LYMPHOCYTES NFR BLD: 0.92 K/UL (ref 1.1–3.7)
LYMPHOCYTES RELATIVE PERCENT: 13 % (ref 24–43)
MAGNESIUM SERPL-MCNC: 1.9 MG/DL (ref 1.6–2.4)
MCH RBC QN AUTO: 29.9 PG (ref 25.2–33.5)
MCHC RBC AUTO-ENTMCNC: 33.2 G/DL (ref 28.4–34.8)
MCV RBC AUTO: 90 FL (ref 82.6–102.9)
MONOCYTES NFR BLD: 0.98 K/UL (ref 0.1–1.2)
MONOCYTES NFR BLD: 14 % (ref 3–12)
NEUTROPHILS NFR BLD: 71 % (ref 36–65)
NEUTS SEG NFR BLD: 4.86 K/UL (ref 1.5–8.1)
NRBC BLD-RTO: 0 PER 100 WBC
O2 SAT, VEN: 43.7 % (ref 60–85)
PCO2, VEN: 38.6 MM HG (ref 39–55)
PH VENOUS: 7.41 (ref 7.32–7.42)
PLATELET # BLD AUTO: 188 K/UL (ref 138–453)
PMV BLD AUTO: 11.6 FL (ref 8.1–13.5)
PO2, VEN: 28.5 MM HG (ref 30–50)
POSITIVE BASE EXCESS, VEN: 0.3 MMOL/L (ref 0–2)
POTASSIUM SERPL-SCNC: 4.5 MMOL/L (ref 3.7–5.3)
POTASSIUM SERPL-SCNC: 4.7 MMOL/L (ref 3.7–5.3)
RBC # BLD AUTO: 4.32 M/UL (ref 3.95–5.11)
RBC # BLD: ABNORMAL 10*6/UL
SODIUM SERPL-SCNC: 131 MMOL/L (ref 136–145)
SODIUM SERPL-SCNC: 133 MMOL/L (ref 136–145)
WBC OTHER # BLD: 6.9 K/UL (ref 3.5–11.3)

## 2024-04-02 PROCEDURE — 6370000000 HC RX 637 (ALT 250 FOR IP)

## 2024-04-02 PROCEDURE — 85025 COMPLETE CBC W/AUTO DIFF WBC: CPT

## 2024-04-02 PROCEDURE — 99233 SBSQ HOSP IP/OBS HIGH 50: CPT | Performed by: INTERNAL MEDICINE

## 2024-04-02 PROCEDURE — 93010 ELECTROCARDIOGRAM REPORT: CPT | Performed by: INTERNAL MEDICINE

## 2024-04-02 PROCEDURE — 93005 ELECTROCARDIOGRAM TRACING: CPT | Performed by: STUDENT IN AN ORGANIZED HEALTH CARE EDUCATION/TRAINING PROGRAM

## 2024-04-02 PROCEDURE — 6360000002 HC RX W HCPCS

## 2024-04-02 PROCEDURE — 71045 X-RAY EXAM CHEST 1 VIEW: CPT

## 2024-04-02 PROCEDURE — 2060000000 HC ICU INTERMEDIATE R&B

## 2024-04-02 PROCEDURE — 80048 BASIC METABOLIC PNL TOTAL CA: CPT

## 2024-04-02 PROCEDURE — 99223 1ST HOSP IP/OBS HIGH 75: CPT | Performed by: INTERNAL MEDICINE

## 2024-04-02 PROCEDURE — 97130 THER IVNTJ EA ADDL 15 MIN: CPT

## 2024-04-02 PROCEDURE — 82805 BLOOD GASES W/O2 SATURATION: CPT

## 2024-04-02 PROCEDURE — 6370000000 HC RX 637 (ALT 250 FOR IP): Performed by: STUDENT IN AN ORGANIZED HEALTH CARE EDUCATION/TRAINING PROGRAM

## 2024-04-02 PROCEDURE — 82947 ASSAY GLUCOSE BLOOD QUANT: CPT

## 2024-04-02 PROCEDURE — 83735 ASSAY OF MAGNESIUM: CPT

## 2024-04-02 PROCEDURE — 2580000003 HC RX 258: Performed by: STUDENT IN AN ORGANIZED HEALTH CARE EDUCATION/TRAINING PROGRAM

## 2024-04-02 PROCEDURE — 97129 THER IVNTJ 1ST 15 MIN: CPT

## 2024-04-02 PROCEDURE — 99233 SBSQ HOSP IP/OBS HIGH 50: CPT | Performed by: PSYCHIATRY & NEUROLOGY

## 2024-04-02 PROCEDURE — 36415 COLL VENOUS BLD VENIPUNCTURE: CPT

## 2024-04-02 RX ORDER — BUMETANIDE 1 MG/1
2 TABLET ORAL DAILY
Status: DISCONTINUED | OUTPATIENT
Start: 2024-04-03 | End: 2024-04-04 | Stop reason: HOSPADM

## 2024-04-02 RX ORDER — FUROSEMIDE 10 MG/ML
20 INJECTION INTRAMUSCULAR; INTRAVENOUS ONCE
Status: COMPLETED | OUTPATIENT
Start: 2024-04-02 | End: 2024-04-02

## 2024-04-02 RX ORDER — MAGNESIUM SULFATE IN WATER 40 MG/ML
2000 INJECTION, SOLUTION INTRAVENOUS ONCE
Status: COMPLETED | OUTPATIENT
Start: 2024-04-02 | End: 2024-04-02

## 2024-04-02 RX ADMIN — ROSUVASTATIN CALCIUM 40 MG: 10 TABLET, COATED ORAL at 20:53

## 2024-04-02 RX ADMIN — FUROSEMIDE 20 MG: 10 INJECTION, SOLUTION INTRAMUSCULAR; INTRAVENOUS at 16:51

## 2024-04-02 RX ADMIN — LEVOTHYROXINE SODIUM 75 MCG: 75 TABLET ORAL at 08:36

## 2024-04-02 RX ADMIN — MAGNESIUM SULFATE HEPTAHYDRATE 2000 MG: 40 INJECTION, SOLUTION INTRAVENOUS at 14:58

## 2024-04-02 RX ADMIN — POTASSIUM CHLORIDE 20 MEQ: 1500 TABLET, EXTENDED RELEASE ORAL at 08:36

## 2024-04-02 RX ADMIN — SODIUM CHLORIDE, PRESERVATIVE FREE 10 ML: 5 INJECTION INTRAVENOUS at 20:54

## 2024-04-02 RX ADMIN — POTASSIUM CHLORIDE 20 MEQ: 1500 TABLET, EXTENDED RELEASE ORAL at 20:53

## 2024-04-02 RX ADMIN — SODIUM CHLORIDE, PRESERVATIVE FREE 10 ML: 5 INJECTION INTRAVENOUS at 08:41

## 2024-04-02 RX ADMIN — BUMETANIDE 1 MG: 1 TABLET ORAL at 08:36

## 2024-04-02 RX ADMIN — AMIODARONE HYDROCHLORIDE 200 MG: 200 TABLET ORAL at 08:36

## 2024-04-02 RX ADMIN — OXYCODONE 2.5 MG: 5 TABLET ORAL at 00:49

## 2024-04-02 RX ADMIN — OXYCODONE 2.5 MG: 5 TABLET ORAL at 13:38

## 2024-04-02 ASSESSMENT — PAIN SCALES - GENERAL
PAINLEVEL_OUTOF10: 0
PAINLEVEL_OUTOF10: 2
PAINLEVEL_OUTOF10: 0
PAINLEVEL_OUTOF10: 6
PAINLEVEL_OUTOF10: 4
PAINLEVEL_OUTOF10: 7
PAINLEVEL_OUTOF10: 8
PAINLEVEL_OUTOF10: 0

## 2024-04-02 ASSESSMENT — PAIN DESCRIPTION - LOCATION
LOCATION: KNEE
LOCATION: CHEST
LOCATION: KNEE
LOCATION: BACK

## 2024-04-02 ASSESSMENT — ENCOUNTER SYMPTOMS
ABDOMINAL PAIN: 0
SHORTNESS OF BREATH: 0
RHINORRHEA: 0
VOMITING: 0
COUGH: 0
NAUSEA: 0

## 2024-04-02 ASSESSMENT — PAIN DESCRIPTION - DESCRIPTORS
DESCRIPTORS: ACHING
DESCRIPTORS: ACHING
DESCRIPTORS: ACHING;DISCOMFORT
DESCRIPTORS: ACHING

## 2024-04-02 ASSESSMENT — PAIN DESCRIPTION - ORIENTATION
ORIENTATION: LOWER
ORIENTATION: LEFT
ORIENTATION: RIGHT;LEFT
ORIENTATION: LEFT;RIGHT

## 2024-04-02 ASSESSMENT — PAIN SCALES - WONG BAKER: WONGBAKER_NUMERICALRESPONSE: NO HURT

## 2024-04-02 NOTE — CARE COORDINATION
Transitional Planning  Spoke with Dr. Oliva and neuro team, concerned with patient returning home with dtr. Other family members are concerned as well.  Per PM&R patient is appropriate for ARU.      440 pm Per Bryanna RAMEY, patient's dtr at bedside, she is now agreeable to ARU and requests referral to [facility].  Referral sent.

## 2024-04-02 NOTE — CONSULTS
[facility]    Consult / H&P               Today's Date: 4/2/2024  Patient Name: Annemarie Worrell  Date of admission: 3/27/2024 11:37 AM  Patient's age: 68 y.o., 1955  Admission Dx: Dehydration [E86.0]  Hyponatremia [E87.1]  Saddle anesthesia [R20.0]  Weakness of both legs [R29.898]  Acute cystitis without hematuria [N30.00]  Bowel and bladder incontinence [R32, R15.9]  Low back pain, unspecified back pain laterality, unspecified chronicity, unspecified whether sciatica present [M54.50]  Intraparenchymal hematoma of brain without loss of consciousness, initial encounter ([facility]) [S06.330A]    Requesting Physician: Surinder Lopez MD    Cardiac Evaluation Reason:  Device interrogation, possible ICD shock    History Obtained From: patient and chart review     History of Present Illness:    This patient 68 y.o. years old with past medical history given below significant for non-ischemic HFrEF 20/25%, afib on xarelto and Amiodarone, AICD/biventricular pacer placed 12/20/2012 with battery exchange 2022, following with [facility], HTN, HLD, CKD3, DOROTA not on CPAP presented after fall and weakness. Neurologic work-up discovered intraparenchymal hematoma due to weakness <3/5 in upper and lower extremities. Patient was given PCC and is managed with ICP management per neurology. Concern was that ICD shock was origin for fall after work-up. Patient's pacemaker has frequently been interrogated as recently as 3/6/24 as she follows with Dr. Cummings at [facility]. Per [facility] evaluation 03/19/24 decision was to not pursue high  extraction due to battery depletion.     \"V pacing threshold at 2 V at 1 ms and then they turned up the output to 5 V at 1 ms. They also thought that the LV pacing threshold was high between 2.25 to 3 V at 1 ms. So because of concern of premature depletion of the battery she was referred for high risk extraction. However on our interrogation today her thresholds were acceptable\" 
Myelo done  
Needs: No Transportation Needs (2022)    PRAPARE - Transportation     Lack of Transportation (Medical): No     Lack of Transportation (Non-Medical): No   Physical Activity: Insufficiently Active (2022)    Exercise Vital Sign     Days of Exercise per Week: 3 days     Minutes of Exercise per Session: 20 min   Stress: No Stress Concern Present (2022)    [place] [employer] - Occupational Stress Questionnaire     Feeling of Stress : Not at all   Social Connections: Moderately Integrated (2022)    Social Connection and Isolation Panel [NHANES]     Frequency of Communication with Friends and Family: More than three times a week     Frequency of Social Gatherings with Friends and Family: More than three times a week     Attends Orthodoxy Services: More than 4 times per year     Active Member of Clubs or Organizations: No     Attends Club or Organization Meetings: More than 4 times per year     Marital Status:    Intimate Partner Violence: Not on file   Housing Stability: Low Risk  (2022)    Housing Stability Vital Sign     Unable to Pay for Housing in the Last Year: No     Number of Places Lived in the Last Year: 1     Unstable Housing in the Last Year: No       Family History:       Problem Relation Age of Onset    Cancer Other         ovarian    High Blood Pressure Mother     Other Mother         copd    Arthritis Neg Hx     Asthma Neg Hx     Birth Defects Neg Hx     Depression Neg Hx     Diabetes Neg Hx     Early Death Neg Hx     Hearing Loss Neg Hx     Heart Disease Neg Hx     High Cholesterol Neg Hx     Kidney Disease Neg Hx     Learning Disabilities Neg Hx     Mental Retardation Neg Hx     Mental Illness Neg Hx     Miscarriages / Stillbirths Neg Hx     Stroke Neg Hx     Substance Abuse Neg Hx     Vision Loss Neg Hx     Breast Cancer Neg Hx     Colon Cancer Neg Hx     Eclampsia Neg Hx     Hypertension Neg Hx     Ovarian Cancer Neg Hx      Labor Neg Hx     
Cancer Neg Hx     Colon Cancer Neg Hx     Eclampsia Neg Hx     Hypertension Neg Hx     Ovarian Cancer Neg Hx      Labor Neg Hx     Spont Abortions Neg Hx        Review of Systems:     ROS:  Constitutional  Positive for generalized fatigue   HEENT  Negative for ear discharge, ear pain, nosebleed    Eyes  Negative for photophobia, pain and discharge    Respiratory  Negative for hemoptysis and sputum    Cardiovascular  Negative for orthopnea, claudication and PND    Gastrointestinal  Negative for abdominal pain, diarrhea, blood in stool    Musculoskeletal  Significant chronic OA and bilateral knee and right shoulder pain    Neurology Positive for fall with occipital contusion and post concussive syndrome 23 otherwise denies any seizure or headaches    Skin  Negative for rash or itching    Endo/heme/allergies  Negative for polydipsia, environmental allergy    Psychiatric/behavioral  Negative for suicidal ideation. Patient is not anxious        Physical Exam:   /60   Pulse 70   Temp 98.2 °F (36.8 °C) (Temporal)   Resp 12   Ht 1.676 m (5' 6\")   Wt 88 kg (194 lb)   SpO2 93%   BMI 31.31 kg/m²   Temp (24hrs), Av.6 °F (37 °C), Min:97.7 °F (36.5 °C), Max:100.7 °F (38.2 °C)    Recent Labs     24  1926   POCGLU 129*       Intake/Output Summary (Last 24 hours) at 3/29/2024 1016  Last data filed at 3/29/2024 0800  Gross per 24 hour   Intake 1040 ml   Output 600 ml   Net 440 ml         NEUROLOGIC EXAMINATION  GENERAL  Appears comfortable and in no distress   HEENT  NC/ AT   NECK  Supple and no bruits heard   MENTAL STATUS:  Alert, oriented to person place year and president,MCI and mild impairment in short term memory recall of 2/3 items at 1 minute, no confusion, normal speech, normal language, no hallucination or delusion   CRANIAL NERVES: II     -      Visual fields intact to confrontation  III,IV,VI -  EOMs full, no afferent defect, no FOL, no ptosis  V     -     Normal facial sensation  VII  
malalignment.   2. Minimal spondylolisthesis at L4-L5.         CT LUMBAR SPINE WO CONTRAST   Final Result   1. Normal lumbar spine alignment with no acute osseous abnormality.   2. Multilevel degenerative disc and joint disease resulting in degenerative   canal and foraminal stenosis.  No CT evidence of severe canal stenosis.         XR CHEST PORTABLE   Final Result   1. Opacification in the left lung base, which may represent underlying   effusion, atelectasis or infiltrate.   2. Cardiomegaly.         XR SHOULDER RIGHT (MIN 2 VIEWS)   Final Result   Advanced osteoarthritic changes of the right glenohumeral joint, with   erosions of the humeral head and glenoid.         CT HEAD WO CONTRAST    (Results Pending)           ASSESSMENT AND PLAN:       Patient Active Problem List   Diagnosis    Hypokalemia    Dilated cardiomyopathy (HCC)    Pre-diabetes    Essential hypertension    PAH (pulmonary artery hypertension) ([facility]) 34 on Echo     Mitral regurgitation    Chronic headache    Class 1 obesity due to excess calories with body mass index (BMI) of 33.0 to 33.9 in adult    AICD (automatic cardioverter/defibrillator) present    Syncope and collapse secondary to VTAC    Gout    Dizziness    Benign paroxysmal positional vertigo    Chronic heart failure with reduced ejection fraction and diastolic dysfunction ([facility])    NSVT (nonsustained ventricular tachycardia) ([facility])    Chest pain    Hypothyroidism    Chronic combined systolic (congestive) and diastolic (congestive) heart failure (HCC)    Abscess of sigmoid colon    Diverticulitis    Nausea & vomiting    Diverticulitis of large intestine with abscess without bleeding    Diverticulitis of colon    Lower abdominal pain    Colonic diverticular abscess    Intra-abdominal abscess (HCC)    Cirrhosis (HCC)    Atrial fibrillation (HCC)    QT prolongation    AICD malfunction, initial encounter    Acute combined systolic (congestive) and diastolic (congestive) heart failure (HCC)    
no significant disc protrusion, central spinal canal stenosis or neural foraminal narrowing.  Mild broad-based annular bulge. L2-L3: Moderate broad-based annular bulge causing moderate narrowing of the neural foramina bilaterally.  Central thecal sac measures 10 mm in the midline. L3-L4: There is no significant disc protrusion, central spinal canal stenosis or neural foraminal narrowing.  Mild annular bulge. L4-L5: Moderate central annular bulge and trefoil type narrowing of the thecal sac and neural foramina bilaterally.  Hypertrophic facet disease. L5-S1: Moderate broad-based posterior disc protrusion causing central stenosis and moderate narrowing of the neural foramina bilaterally.     1. Multilevel degenerative disc disease as described above especially at L5-S1.  Multilevel spinal stenosis as above. 2. Incidental note made of gallstones.     CT CERVICAL SPINE W CONTRAST    Result Date: 3/27/2024  EXAMINATION: CT myelogram OF THE CERVICAL SPINE WITH CONTRAST 3/27/2024 5:41 pm TECHNIQUE: CT of the cervical was performed with the administration of intravenous contrast. Multiplanar reformatted images are provided for review. Automated exposure control, iterative reconstruction, and/or weight based adjustment of the mA/kV was utilized to reduce the radiation dose to as low as reasonably achievable.  Please see additional report for technique. COMPARISON: None. HISTORY: ORDERING SYSTEM PROVIDED HISTORY: CT myelogram TECHNOLOGIST PROVIDED HISTORY: CT myelogram Additional Contrast?->1 FINDINGS: BONES/ALIGNMENT: There is normal alignment of the spine. The vertebral body heights are maintained. No destructive osseous lesion is seen. DEGENERATIVE CHANGES: Small broad-based posterior disc protrusion encroaching upon the ventral cord at C3-4. SOFT TISSUES: There is no prevertebral soft tissue swelling.     Small disc protrusion at C3-4 encroaching upon the cord.  Central canal and neural foramina otherwise patent.     XR

## 2024-04-02 NOTE — CARE COORDINATION
Transitional Planning  Call to [facility] ([phone]) states that they attempted to contact patient/family and left a message that they would be in the area on 4/9.  Confirmed a bed was available but she would have to check on other equipment.  Updated that pt would most likely be discharged in the next day or 2. Family can return call to the branch at [phone].      Spoke to Dr. Levy- stated if patient fails home she can be admitted directly to [facility] rehab by family calling PM&R office.  Would need PT/OT notes 48hrs prior to admission to ARU, only pertains to [place].

## 2024-04-03 LAB
EKG ATRIAL RATE: 70 BPM
EKG ATRIAL RATE: 70 BPM
EKG P-R INTERVAL: 134 MS
EKG P-R INTERVAL: 134 MS
EKG Q-T INTERVAL: 534 MS
EKG Q-T INTERVAL: 540 MS
EKG QRS DURATION: 204 MS
EKG QRS DURATION: 206 MS
EKG QTC CALCULATION (BAZETT): 576 MS
EKG QTC CALCULATION (BAZETT): 583 MS
EKG R AXIS: -119 DEGREES
EKG R AXIS: -122 DEGREES
EKG T AXIS: -34 DEGREES
EKG T AXIS: -41 DEGREES
EKG VENTRICULAR RATE: 70 BPM
EKG VENTRICULAR RATE: 70 BPM

## 2024-04-03 PROCEDURE — 2060000000 HC ICU INTERMEDIATE R&B

## 2024-04-03 PROCEDURE — 6370000000 HC RX 637 (ALT 250 FOR IP): Performed by: STUDENT IN AN ORGANIZED HEALTH CARE EDUCATION/TRAINING PROGRAM

## 2024-04-03 PROCEDURE — 99232 SBSQ HOSP IP/OBS MODERATE 35: CPT | Performed by: INTERNAL MEDICINE

## 2024-04-03 PROCEDURE — 93005 ELECTROCARDIOGRAM TRACING: CPT

## 2024-04-03 PROCEDURE — 99232 SBSQ HOSP IP/OBS MODERATE 35: CPT | Performed by: PSYCHIATRY & NEUROLOGY

## 2024-04-03 PROCEDURE — 6370000000 HC RX 637 (ALT 250 FOR IP)

## 2024-04-03 PROCEDURE — 97530 THERAPEUTIC ACTIVITIES: CPT

## 2024-04-03 PROCEDURE — 97110 THERAPEUTIC EXERCISES: CPT

## 2024-04-03 PROCEDURE — 2580000003 HC RX 258: Performed by: STUDENT IN AN ORGANIZED HEALTH CARE EDUCATION/TRAINING PROGRAM

## 2024-04-03 PROCEDURE — 93010 ELECTROCARDIOGRAM REPORT: CPT | Performed by: INTERNAL MEDICINE

## 2024-04-03 PROCEDURE — 99233 SBSQ HOSP IP/OBS HIGH 50: CPT

## 2024-04-03 PROCEDURE — 97130 THER IVNTJ EA ADDL 15 MIN: CPT

## 2024-04-03 PROCEDURE — 97129 THER IVNTJ 1ST 15 MIN: CPT

## 2024-04-03 RX ADMIN — ROSUVASTATIN CALCIUM 40 MG: 10 TABLET, COATED ORAL at 20:05

## 2024-04-03 RX ADMIN — METOPROLOL SUCCINATE 25 MG: 25 TABLET, EXTENDED RELEASE ORAL at 08:34

## 2024-04-03 RX ADMIN — SODIUM CHLORIDE, PRESERVATIVE FREE 10 ML: 5 INJECTION INTRAVENOUS at 08:35

## 2024-04-03 RX ADMIN — SODIUM CHLORIDE, PRESERVATIVE FREE 10 ML: 5 INJECTION INTRAVENOUS at 20:12

## 2024-04-03 RX ADMIN — BUMETANIDE 2 MG: 1 TABLET ORAL at 08:34

## 2024-04-03 RX ADMIN — POTASSIUM CHLORIDE 20 MEQ: 1500 TABLET, EXTENDED RELEASE ORAL at 20:05

## 2024-04-03 RX ADMIN — POTASSIUM CHLORIDE 20 MEQ: 1500 TABLET, EXTENDED RELEASE ORAL at 08:34

## 2024-04-03 RX ADMIN — LEVOTHYROXINE SODIUM 75 MCG: 75 TABLET ORAL at 08:34

## 2024-04-03 ASSESSMENT — ENCOUNTER SYMPTOMS
SHORTNESS OF BREATH: 0
NAUSEA: 0
COUGH: 0
SORE THROAT: 0
ABDOMINAL PAIN: 0
VOMITING: 0

## 2024-04-03 ASSESSMENT — PAIN DESCRIPTION - LOCATION: LOCATION: KNEE

## 2024-04-03 ASSESSMENT — PAIN DESCRIPTION - ORIENTATION: ORIENTATION: RIGHT;LEFT

## 2024-04-03 ASSESSMENT — PAIN DESCRIPTION - DESCRIPTORS: DESCRIPTORS: ACHING

## 2024-04-03 ASSESSMENT — PAIN SCALES - GENERAL
PAINLEVEL_OUTOF10: 6
PAINLEVEL_OUTOF10: 0

## 2024-04-03 NOTE — CARE COORDINATION
Transitional Planning  VMM received from Massiel at [facility], able to accept patient.       Called Massiel, confirmed message was received and CM to start precert.      Precert started [facility] Auth #[id number] pending.

## 2024-04-03 NOTE — CARE COORDINATION
Transitional Planning  Spoke with david Eid at bedside, she now is wanting to take patient home.  She states she no longer needs a hospital bed, she has one.  She will need incontinent supplies, BSC and wants to attempt to rent a rainer steady or equipment that is similar to it.  Discussed with dtr, multiple healthcare professionals are recommending ARU.  Per dtr, she does not feel patient will be able to tolerated the therapy.  When asking patient the question of what she wants patient states \"to get better.\" Then proceeds to say she wants to go to ARU.  Dtr questions patients decision and asking if she wants to go home. Patient responds to dtr, \"I told you what I wanted.\"  Plan is for patient to go to [facility].  Update provided to patient and dtr, [facility] accepted and ins. Precert is pending.

## 2024-04-04 VITALS
BODY MASS INDEX: 33.66 KG/M2 | HEART RATE: 70 BPM | TEMPERATURE: 98.6 F | OXYGEN SATURATION: 97 % | WEIGHT: 209.44 LBS | DIASTOLIC BLOOD PRESSURE: 60 MMHG | SYSTOLIC BLOOD PRESSURE: 99 MMHG | RESPIRATION RATE: 20 BRPM | HEIGHT: 66 IN

## 2024-04-04 PROCEDURE — 99232 SBSQ HOSP IP/OBS MODERATE 35: CPT | Performed by: INTERNAL MEDICINE

## 2024-04-04 PROCEDURE — 97535 SELF CARE MNGMENT TRAINING: CPT

## 2024-04-04 PROCEDURE — 6370000000 HC RX 637 (ALT 250 FOR IP)

## 2024-04-04 PROCEDURE — 97129 THER IVNTJ 1ST 15 MIN: CPT

## 2024-04-04 PROCEDURE — 97530 THERAPEUTIC ACTIVITIES: CPT

## 2024-04-04 PROCEDURE — 2580000003 HC RX 258: Performed by: STUDENT IN AN ORGANIZED HEALTH CARE EDUCATION/TRAINING PROGRAM

## 2024-04-04 PROCEDURE — 97130 THER IVNTJ EA ADDL 15 MIN: CPT

## 2024-04-04 RX ADMIN — BUMETANIDE 2 MG: 1 TABLET ORAL at 08:45

## 2024-04-04 RX ADMIN — LEVOTHYROXINE SODIUM 75 MCG: 75 TABLET ORAL at 08:45

## 2024-04-04 RX ADMIN — POTASSIUM CHLORIDE 20 MEQ: 1500 TABLET, EXTENDED RELEASE ORAL at 08:45

## 2024-04-04 RX ADMIN — SODIUM CHLORIDE, PRESERVATIVE FREE 10 ML: 5 INJECTION INTRAVENOUS at 08:45

## 2024-04-04 ASSESSMENT — ENCOUNTER SYMPTOMS
VOMITING: 0
NAUSEA: 0
SORE THROAT: 0
RHINORRHEA: 0
SHORTNESS OF BREATH: 0
COUGH: 0
ABDOMINAL PAIN: 0

## 2024-04-04 ASSESSMENT — PAIN DESCRIPTION - DESCRIPTORS: DESCRIPTORS: ACHING

## 2024-04-04 ASSESSMENT — PAIN SCALES - GENERAL: PAINLEVEL_OUTOF10: 6

## 2024-04-04 ASSESSMENT — PAIN DESCRIPTION - ORIENTATION: ORIENTATION: RIGHT

## 2024-04-04 ASSESSMENT — PAIN DESCRIPTION - LOCATION: LOCATION: SHOULDER

## 2024-04-04 NOTE — CARE COORDINATION
Transitional Planning  VMM received from Massiel at [facility], auth received.     206 pm Called Massiel and vmm left regarding patient.     PS Dr. Irizarry for dc order, TIM completed.     225 pm Spoke with Massiel at [facility], able to accept patient today requests transportation for after 7 pm.      Faxed [facility] with a request for transportation.      PS Dr. Irizarry for dc order.     300 pm Call from Masha at [facility],  time is 7 pm. Update provided to patient, Massiel Burkett at [facility] and dtr Ragini who agrees with discharge.  TIM faxed.    Report number [phone]    Discharge Report    [facility]  Clinical Case Management Department  Written by: Carly Bach RN    Patient Name: Annemarie Worrell  Attending Provider: Surinder Lopez MD  Admit Date: 3/27/2024 11:37 AM  MRN: [id number]  Account: [id number]                     : 1955  Discharge Date:       Disposition: Rehab [facility]     Carly Bach RN

## 2024-04-04 NOTE — PROGRESS NOTES
[facility]  Internal Medicine Teaching Residency Program  Inpatient Daily Progress Note  ______________________________________________________________________________    Patient: Annemarie Worrell  YOB: 1955   MRN:[id number]    Acct: [id number]     Room: 0130133-01  Admit date: 3/27/2024  Today's date: 03/31/24  Number of days in the hospital: 4    SUBJECTIVE   Admitting Diagnosis: Intraparenchymal hematoma of brain without loss of consciousness, initial encounter ([facility])  CC: weakness  Pt examined at bedside. Chart & results reviewed.     - Patient hemodynamically stable and saturating well on room air.  -  back pain still present. Lidocaine and ibuprofen ordered   - bilateral leg weakness is persistent. Cannot walk   - states she does get the feeling of needing to have BM and can hold her bowels   - Repeat CT head shows stability of intraparenchymal hemorrhage   - no new complaints       BRIEF HISTORY     The patient is a pleasant 68 y.o. female with NICM, chronic systolic dysfunction (LVEF 25-30%, reportedly nonischemic by [facility] in 2012), severe MR, atrial fibrillation on Xarelto, SSS, s/p CRT-D (1/2008, last generator change 8/2022), VT/VF s/p ICD therapy, HTN, hypothyroidism presented with weakness.     On Sunday, she reported she slipped out of the bed and hurt her back.  EMS was called but she did not want to go to the hospital.  She asked EMS to help her to the couch.  Since Sunday, she had been sitting on the couch.  She was unable to get up due to pain and weakness.  EMS was called. She reports to have felt she needed to \"pass gas\" but when EMS arrived today and removed her bed sheet, she had defecated and urinated on herself without her knowing.      In the ED, she was afebrile and hemodynamically stable.  She complained of lower back pain and weakness in the legs.  On examination, some weakness in the lower extremities was noted.  IR reported that 
    [facility]  Internal Medicine Teaching Residency Program  Inpatient Daily Progress Note  ______________________________________________________________________________    Patient: Annemarie Worrell  YOB: 1955   MRN:[id number]    Acct: [id number]     Room: 0415/0415-01  Admit date: 3/27/2024  Today's date: 03/29/24  Number of days in the hospital: 2    SUBJECTIVE   Admitting Diagnosis: Bowel and bladder incontinence  CC: weakness  Pt examined at bedside. Chart & results reviewed.     - Patient hemodynamically stable and saturating well on room air.  - Patient states back pain is improving  - Na 130. Ordered urine studies.  - Potassium 3.6 Replaced.  - Cr baseline.  - NSG is not planning any procedure. Patient can be discharged from their standpoint.  - Reached out to NSGY. Resumed Xarelto.  - Patient had questions regarding prognosis. Consulted Neurology.      BRIEF HISTORY     The patient is a pleasant 68 y.o. female with NICM, chronic systolic dysfunction (LVEF 25-30%, reportedly nonischemic by [facility] in 2012), severe MR, atrial fibrillation on Xarelto, SSS, s/p CRT-D (1/2008, last generator change 8/2022), VT/VF s/p ICD therapy, HTN, hypothyroidism presented with weakness.     On Sunday, she reported she slipped out of the bed and hurt her back.  EMS was called but she did not want to go to the hospital.  She asked EMS to help her to the couch.  Since Sunday, she had been sitting on the couch.  She was unable to get up due to pain and weakness.  EMS was called. She reports to have felt she needed to \"pass gas\" but when EMS arrived today and removed her bed sheet, she had defecated and urinated on herself without her knowing.      In the ED, she was afebrile and hemodynamically stable.  She complained of lower back pain and weakness in the legs.  On examination, some weakness in the lower extremities was noted.  IR reported that she had no rectal tone on 
    [facility]  Internal Medicine Teaching Residency Program  Inpatient Daily Progress Note  ______________________________________________________________________________    Patient: Annemarie Worrell  YOB: 1955   MRN:[id number]    Acct: [id number]     Room: [facility]0133-01  Admit date: 3/27/2024  Today's date: 04/03/24  Number of days in the hospital: 7    SUBJECTIVE   Admitting Diagnosis: Intraparenchymal hematoma of brain without loss of consciousness, initial encounter ([facility])  CC: Weakness    Pt examined at bedside. Chart & results reviewed.   -Patient is alert, awake, oriented to self and place AO x 2, hemodynamically stable with blood pressure goal SBP <140 saturating well on room air  -No cardiology did device interrogation and did not find any V-fib or VT or shocks      ROS: Mentioned as above  BRIEF HISTORY     The patient is a pleasant 68 y.o. female with NICM, chronic systolic dysfunction (LVEF 25-30%, reportedly nonischemic by [facility] in 2012), severe MR, atrial fibrillation on Xarelto, SSS, s/p CRT-D (1/2008, last generator change 8/2022), VT/VF s/p ICD therapy, HTN, hypothyroidism presented with weakness.     On Sunday, she reported she slipped out of the bed and hurt her back.  EMS was called but she did not want to go to the hospital.  She asked EMS to help her to the couch.  Since Sunday, she had been sitting on the couch.  She was unable to get up due to pain and weakness.  EMS was called. She reports to have felt she needed to \"pass gas\" but when EMS arrived today and removed her bed sheet, she had defecated and urinated on herself without her knowing.      In the ED, she was afebrile and hemodynamically stable.  She complained of lower back pain and weakness in the legs.  On examination, some weakness in the lower extremities was noted.  IR reported that she had no rectal tone on physical examination and her sensation to her anus is diminished as she 
    [facility]  Internal Medicine Teaching Residency Program  Inpatient Daily Progress Note  ______________________________________________________________________________    Patient: Annemarie Worrell  YOB: 1955   MRN:[id number]    Acct: [id number]     Room: 0133/0133-01  Admit date: 3/27/2024  Today's date: 04/01/24  Number of days in the hospital: 5    SUBJECTIVE   Admitting Diagnosis: Intraparenchymal hematoma of brain without loss of consciousness, initial encounter ([facility])  CC: weakness  Pt examined at bedside. Chart & results reviewed.     - Patient hemodynamically stable and saturating well on room air.  -  back pain still present. Lidocaine and ibuprofen ordered   - still having significant bilateral leg weakness  - CTA head and neck showing concern for lymphadenopathy in mediastinum and retroclavicular lymph nodes. Consider hem/onc consult for lymphoproliferative disorder   - IPH seems to be stable on CTA head   - otherwise has no new complaints  - awaiting PM and R evaluation       BRIEF HISTORY     The patient is a pleasant 68 y.o. female with NICM, chronic systolic dysfunction (LVEF 25-30%, reportedly nonischemic by [facility] in 2012), severe MR, atrial fibrillation on Xarelto, SSS, s/p CRT-D (1/2008, last generator change 8/2022), VT/VF s/p ICD therapy, HTN, hypothyroidism presented with weakness.     On Sunday, she reported she slipped out of the bed and hurt her back.  EMS was called but she did not want to go to the hospital.  She asked EMS to help her to the couch.  Since Sunday, she had been sitting on the couch.  She was unable to get up due to pain and weakness.  EMS was called. She reports to have felt she needed to \"pass gas\" but when EMS arrived today and removed her bed sheet, she had defecated and urinated on herself without her knowing.      In the ED, she was afebrile and hemodynamically stable.  She complained of lower back pain and weakness 
  [facility]  Progress Note                   Date:   4/3/2024  Patient name: Annemarie Worrell  Date of admission:  3/27/2024 11:37 AM  MRN:   [id number]  YOB: 1955  PCP: No, Pcp    Reason for Admission: Dehydration [E86.0]  Hyponatremia [E87.1]  Saddle anesthesia [R20.0]  Weakness of both legs [R29.898]  Acute cystitis without hematuria [N30.00]  Bowel and bladder incontinence [R32, R15.9]  Low back pain, unspecified back pain laterality, unspecified chronicity, unspecified whether sciatica present [M54.50]  Intraparenchymal hematoma of brain without loss of consciousness, initial encounter ([facility]) [S06.330A]    Subjective:       Clinical Changes /Abnormalities:Patient seen and examined in room. Denies CP or SOB. No acute CV events overnight. Labs, vitals, and tele reviewed.   Family at bedside. Denies lightheadedness    Review of Systems    Medications:   Scheduled Meds:   bumetanide  2 mg Oral Daily    lidocaine  1 patch TransDERmal Daily    potassium chloride  20 mEq Oral BID    [Held by provider] rivaroxaban  20 mg Oral Daily    sodium chloride flush  5-40 mL IntraVENous 2 times per day    rosuvastatin  40 mg Oral Nightly    [Held by provider] amiodarone  200 mg Oral Daily    levothyroxine  75 mcg Oral Daily    metoprolol succinate  25 mg Oral Daily    sodium chloride flush  5-40 mL IntraVENous 2 times per day     Continuous Infusions:   sodium chloride      sodium chloride       CBC:   Recent Labs     03/31/24  1752 04/01/24  0413 04/02/24  0510   WBC 7.5 9.2 6.9   HGB 12.7 13.8 12.9    212 188     BMP:    Recent Labs     04/01/24  0413 04/02/24  0510 04/02/24  1551   * 131* 133*   K 4.6 4.7 4.5   CL 99 100 99   CO2 20 17* 19*   BUN 19 18 18   CREATININE 1.1* 1.1* 1.1*   GLUCOSE 106* 86 116*     Hepatic:No results for input(s): \"AST\", \"ALT\", \"ALB\", \"BILITOT\", \"ALKPHOS\" in the last 72 hours.  Troponin: No results for input(s): \"TROPHS\" in the last 72 hours.  BNP: No 
 attempted consult for AD. Pt first appeared awake and alert but fell asleep twice while  attempted to complete the document. She said she wants to name her daughter, Ragini, as her primary and her niece, Balta, as her secondary. Chaplains will attempt again tomorrow.    Electronically signed by Chaplain Daxa, on 3/28/2024 at 4:27 PM.  [facility]  [phone]   
Advance Care Planning     Advance Care Planning Inpatient Note  [facility] Department    Today's Date: 3/29/2024  Unit: STVZ 4B STEPDOWN    Received request from HealthCare Provider.  Upon review of chart and communication with care team, patient's decision making abilities are not in question.. Patient and Healthcare Decision Maker was/were present in the room during visit.    Goals of ACP Conversation:  Discuss advance care planning documents    Health Care Decision Makers:       Primary Decision Maker: Ragini Guillen - Child - [phone]                Secondary Decision Maker: Rossy CardozaNettie Kim- [phone]  Summary:  Completed New Documents  Verified Healthcare Decision Maker    Advance Care Planning Documents (Patient Wishes):  Healthcare Power of /Advance Directive Appointment of Health Care Agent     Assessment:  Upon entering room patient was awake eating breakfast and orient and calm . Present in the room was patient daughter Ragini    Interventions:  Assisted in the completion of documents according to patient's wishes at this time    Care Preferences Communicated:   No    Outcomes/Plan:  New advance directive completed.    Electronically signed by Chaplain SAAD on 3/29/2024 at 9:16 AM            03/29/24 0912   Encounter Summary   Encounter Overview/Reason  Advance Care Planning   Service Provided For: Patient   Referral/Consult From: Nurse   Support System Children   Last Encounter  03/29/24   Complexity of Encounter Moderate   Begin Time 0845   End Time  0915   Total Time Calculated 30 min   Advance Care Planning   Type Completed AD/ACP document(s)   Assessment/Intervention/Outcome   Assessment Calm;Coping   Intervention Active listening;Sustaining Presence/Ministry of presence   Outcome Acceptance;Coping;Receptive       
Congestive Heart Failure Education completed and charted. CHF booklet given. Patient was receptive to education.    Discussed the  importance of medication compliance.    Discussed the importance of a heart healthy diet. Discussed 2000 mg sodium-restricted daily diet.  Patient instructed to limit fluid intake to  1.5 to 2 liters per day.    Patient instructed to weigh self at the same time of each day each morning, reinforced teaching to monitor for 3-5 lb weight increase over 1-2 days notify physician if change noted.      Signs and symptoms of CHF discussed with patient, such as feeling more tired than normal, feeling short of breath, coughing that increases when lying down, sudden weight gain, swelling of the feet, legs or belly.  Patient verbalized understanding to notify physician office if these symptoms occur.  EF 20-25%   Pt is established with  [facility] Clinic in the past and does not wish to return: Pt follows with [facility]      
[facility] Neurology   IN-PATIENT SERVICE   [facility]    Progress Note             Date:   4/3/2024  Patient name:  Annemarie Worrell  Date of admission:  3/27/2024 11:37 AM  MRN:   [id number]  Account:  [id number]  YOB: 1955  PCP:    Suly Jensen  Room:   [address] [place] [address]  Code Status:    Full Code    Chief Complaint:     Chief Complaint   Patient presents with    Fatigue       Interval hx:     Seen and examined bedside next    Pending discharge to ARU today    Medicine following, consulted cardiology for pacemaker interrogation    Brief History of Present Illness:     A 68-year-old right-handed  female, with a complex medical history including A-fib on Xarelto 20mg daily, CHFrEF 20-25% with an AICD implanted in December 2012, hypertension, pulmonary hypertension, and cirrhosis, was admitted due to bilateral lower extremity weakness and urinary and bowel incontinence. The onset occurred at home when she experienced leg weakness leading to falls without significant trauma. EMS found her with incontinence, raising concerns for Cauda Equina, but she was not deemed a neurosurgery candidate following a CT lumbar myelogram, and was recommended for aggressive therapy and outpatient follow-up.    Neurology was consulted due to ongoing bilateral lower extremity weakness and the absence of a neurosurgical intervention plan. The examination revealed spastic right upper extremity, positive Morena's on the right upper, and bilateral lower extremity clonus. Given her history of a fall in January while on anticoagulants and the initial concern for possible subdural hematoma due to her neurological examination, a CT head without contrast was urgently requested. The follow-up CT head remained stable, and the patient was started on DVT prophylaxis. Discussions with neurosurgery concluded a plan for a repeat CT head in two weeks, with considerations to resume Xarelto then. The patient and 
[facility] Neurology   IN-PATIENT SERVICE   [facility]    Progress Note             Date:   3/30/2024  Patient name:  Annemarie Worrell  Date of admission:  3/27/2024 11:37 AM  MRN:   [id number]  Account:  [id number]  YOB: 1955  PCP:    Suly Jensen  Room:   [address] [place] [address]  Code Status:    Full Code    Chief Complaint:     Chief Complaint   Patient presents with    Fatigue     Bilateral lower extremity weakness  Bowel and bladder    Interval hx:     The patient was seen and examined at bedside. Is vitally stable, alert and oriented x 3. No acute events overnight.    The patient stated that is still having back pain    New diagnosis right frontal IPH, repeat CT head overnight was stable  Holding Xarelto  NSG following  SBP goal less than 140    Improvement in hyponatremia today 134, hypokalemia 3.4  Improvement in MAURO 1.2, does have CHF, will hold on giving fluids for now    Will get CTA head and neck        Brief History of Present Illness:     Per notes   The patient is a 68 y.o.  Right handed  female who presents with Fatigue   and she is admitted to the hospital for the management of  bilateral lower extremity weakness and concern for Urinary and bladder incont. PMH significant for A-fib on Xarelto 20mg daily, [id number]% s/p AICD 12/20/2012, HTN, Pulmonary HTN, Cirrhosis. Patient states that she was at home Sunday night when she slid down off her bed. Denies any significant fall or hard injury however states that both her legs gave out and she was unable to stand. She had additional episode Monday night while trying to get up off her couch and was unable to get up and called EMS. She was then noted to be found by EMS with urine and stool incont. Neurosurgery was consulted from ER for concerns of Cauda Equina. AICD is not MRI conditional hence patient underwent CT lumbar myelogram. Deemed not NSG candidate and recommending aggressive therapy and outpatient follow up.    
[facility] Neurology   IN-PATIENT SERVICE   [facility]    Progress Note             Date:   4/4/2024  Patient name:  Annemarie Worrell  Date of admission:  3/27/2024 11:37 AM  MRN:   [id number]  Account:  [id number]  YOB: 1955  PCP:    Suly Jensen  Room:   [address] [place] [address]  Code Status:    Full Code    Chief Complaint:     Chief Complaint   Patient presents with    Fatigue       Interval hx:     Seen and examined at bedside    Neurologically stable, pending ARU placement    Brief History of Present Illness:     A 68-year-old right-handed  female, with a complex medical history including A-fib on Xarelto 20mg daily, CHFrEF 20-25% with an AICD implanted in December 2012, hypertension, pulmonary hypertension, and cirrhosis, was admitted due to bilateral lower extremity weakness and urinary and bowel incontinence. The onset occurred at home when she experienced leg weakness leading to falls without significant trauma. EMS found her with incontinence, raising concerns for Cauda Equina, but she was not deemed a neurosurgery candidate following a CT lumbar myelogram, and was recommended for aggressive therapy and outpatient follow-up.    Neurology was consulted due to ongoing bilateral lower extremity weakness and the absence of a neurosurgical intervention plan. The examination revealed spastic right upper extremity, positive Morena's on the right upper, and bilateral lower extremity clonus. Given her history of a fall in January while on anticoagulants and the initial concern for possible subdural hematoma due to her neurological examination, a CT head without contrast was urgently requested. The follow-up CT head remained stable, and the patient was started on DVT prophylaxis. Discussions with neurosurgery concluded a plan for a repeat CT head in two weeks, with considerations to resume Xarelto then. The patient and her family were updated and understood the assessment and 
Occupational Therapy  Facility/Department: [address] STEPDOWN  Occupational Therapy Initial Assessment    Name: Annemarie Worrell  : 1955  MRN: [id number]  Date of Service: 3/28/2024    Chief Complaint   Patient presents with    Fatigue      Discharge Recommendations:  Patient would benefit from continued therapy after discharge  OT Equipment Recommendations  Equipment Needed: Yes  Mobility Devices: Wheelchair;ADL Assistive Devices  Wheelchair: Standard;Wheelchair Cushion Standard  ADL Assistive Devices: Toileting - 3-in-1 Commode       Patient Diagnosis(es): The primary encounter diagnosis was Dehydration. Diagnoses of Acute cystitis without hematuria, Hyponatremia, Weakness of both legs, Low back pain, unspecified back pain laterality, unspecified chronicity, unspecified whether sciatica present, and Saddle anesthesia were also pertinent to this visit.  Past Medical History:  has a past medical history of Abnormal Pap smear, Acute diverticulitis, Acute on chronic systolic congestive heart failure (HCC), AICD (automatic cardioverter/defibrillator) present, MAURO (acute kidney injury) (HCC), MAURO (acute kidney injury) (HCC), Anemia, Arthritis, Atrial fibrillation (HCC), Bowel and bladder incontinence, Cardiomyopathy (HCC), Cardiomyopathy, nonischemic (HCC), Chronic combined systolic and diastolic congestive heart failure (HCC), Cirrhosis (HCC), Ejection fraction < 50%, HTN (hypertension), Hypothyroidism, Ischemic cardiomyopathy, Left bundle branch block, Mitral regurgitation, Morbid obesity due to excess calories (HCC), Pulmonary hypertension (HCC), Sudden onset of severe headache, and Torn rotator cuff.  Past Surgical History:  has a past surgical history that includes other surgical history (2012); Tubal ligation; hernia repair; Colonoscopy; pacemaker placement; Insert Midline Catheter (2022); and other surgical history (Left).           Assessment   Pt limited this date d/t pain, decreased functional 
[facility] Neurology   IN-PATIENT SERVICE   [facility]    Progress Note             Date:   4/2/2024  Patient name:  Annemarie Worrell  Date of admission:  3/27/2024 11:37 AM  MRN:   [id number]  Account:  [id number]  YOB: 1955  PCP:    Suly Jensen  Room:   [address] [place] [address]  Code Status:    Full Code    Chief Complaint:     Chief Complaint   Patient presents with    Fatigue       Interval hx:     Seen and examined     Pending ARU vs home dc    Otherwise neurologically stable.      Brief History of Present Illness:     A 68-year-old right-handed  female, with a complex medical history including A-fib on Xarelto 20mg daily, CHFrEF 20-25% with an AICD implanted in December 2012, hypertension, pulmonary hypertension, and cirrhosis, was admitted due to bilateral lower extremity weakness and urinary and bowel incontinence. The onset occurred at home when she experienced leg weakness leading to falls without significant trauma. EMS found her with incontinence, raising concerns for Cauda Equina, but she was not deemed a neurosurgery candidate following a CT lumbar myelogram, and was recommended for aggressive therapy and outpatient follow-up.    Neurology was consulted due to ongoing bilateral lower extremity weakness and the absence of a neurosurgical intervention plan. The examination revealed spastic right upper extremity, positive Morena's on the right upper, and bilateral lower extremity clonus. Given her history of a fall in January while on anticoagulants and the initial concern for possible subdural hematoma due to her neurological examination, a CT head without contrast was urgently requested. The follow-up CT head remained stable, and the patient was started on DVT prophylaxis. Discussions with neurosurgery concluded a plan for a repeat CT head in two weeks, with considerations to resume Xarelto then. The patient and her family were updated and understood the assessment 
Physical Therapy  DATE: 3/30/2024  NAME: Annemarie Worrell  MRN: [id number]   : 1955    Discharge Recommendations: Continue to Assess (pending progress)     Subjective: RN and pt agreeable to PT.  Pt supine upon arrival.  Pt having 7/10 back pain upon arrival.  Pt positioned for comfort prior to exiting.  RN notified.  Pt pleasant and Ox4, but falling asleep during session and not following directions for B LE ROM.   Pain: 7/10 back pain-positioned for comfort prior to exiting.  Patient follows: Some Commands  Is patient on ventilator: No  Is patient on sedation: No  Precautions: fall risk, up as tolerated, general precautions.     Therapeutic exercises:  LE (s)  Bilateral PROM/AAROM ankle pumps, knee<>chest, hip abd/add  x 10 reps.  Pt stating she has pain in B knees.  Pt falling asleep during exercises and not participating in exercises.    Unsafe to mobilize today.  RN notified.      Goals  Short Term Goals  Short Term Goal 1: transfers with SBA  Short Term Goal 2: amb 50 ft with a RW x CGA  Short Term Goal 3: Bed mobility activity with SBA  Short Term Goal 4: 20 min strengthening exercises x SBA       Plan: Progress functional mobility as medically appropriate.   Time In: 1536  Time Out: 1556  Time Coded Minutes (treatment minutes): 11  Rehab Potential: fair  Treatments/week: 5-6x/wk    MIGUEL ANGEL JOEL PTA       
Physical Therapy  Facility/Department: [address] STEPDOWN  Physical Therapy Initial Assessment    Name: Annemarie Worrell  : 1955  MRN: [id number]  Date of Service: 3/28/2024    Discharge Recommendations:  Patient would benefit from continued therapy after discharge          Patient Diagnosis(es): The primary encounter diagnosis was Dehydration. Diagnoses of Acute cystitis without hematuria, Hyponatremia, Weakness of both legs, Low back pain, unspecified back pain laterality, unspecified chronicity, unspecified whether sciatica present, and Saddle anesthesia were also pertinent to this visit.  Past Medical History:  has a past medical history of Abnormal Pap smear, Acute diverticulitis, Acute on chronic systolic congestive heart failure (HCC), AICD (automatic cardioverter/defibrillator) present, MAURO (acute kidney injury) (HCC), MAURO (acute kidney injury) (HCC), Anemia, Arthritis, Atrial fibrillation (HCC), Bowel and bladder incontinence, Cardiomyopathy (HCC), Cardiomyopathy, nonischemic (HCC), Chronic combined systolic and diastolic congestive heart failure (HCC), Cirrhosis (HCC), Ejection fraction < 50%, HTN (hypertension), Hypothyroidism, Ischemic cardiomyopathy, Left bundle branch block, Mitral regurgitation, Morbid obesity due to excess calories (HCC), Pulmonary hypertension (HCC), Sudden onset of severe headache, and Torn rotator cuff.  Past Surgical History:  has a past surgical history that includes other surgical history (2012); Tubal ligation; hernia repair; Colonoscopy; pacemaker placement; Insert Midline Catheter (2022); and other surgical history (Left).    Assessment   Assessment: The pt ambulated 3 ft with a RW x max assist x 2. She demonstrated increased difficulty advancing her L LE forward when trying to ambulate.She fatigued quickly with mobilization. She could benefit from a continuation of PT for gait , strengthening and functional mobility prior to her DC  Therapy Prognosis: 
Physical Therapy  Facility/Department: [address] STEPDOWN  Daily Treatment Note    Name: Annemarie Worrell  : 1955  MRN: [id number]  Date of Service: 3/29/2024    Discharge Recommendations:  Patient would benefit from continued therapy after discharge          Patient Diagnosis(es): The primary encounter diagnosis was Dehydration. Diagnoses of Acute cystitis without hematuria, Hyponatremia, Weakness of both legs, Low back pain, unspecified back pain laterality, unspecified chronicity, unspecified whether sciatica present, and Saddle anesthesia were also pertinent to this visit.  Past Medical History:  has a past medical history of Abnormal Pap smear, Acute diverticulitis, Acute on chronic systolic congestive heart failure (HCC), AICD (automatic cardioverter/defibrillator) present, MAURO (acute kidney injury) (HCC), MAURO (acute kidney injury) (HCC), Anemia, Arthritis, Atrial fibrillation (HCC), Bowel and bladder incontinence, Cardiomyopathy (HCC), Cardiomyopathy, nonischemic (HCC), Chronic combined systolic and diastolic congestive heart failure (HCC), Cirrhosis (HCC), Ejection fraction < 50%, HTN (hypertension), Hypothyroidism, Ischemic cardiomyopathy, Left bundle branch block, Mitral regurgitation, Morbid obesity due to excess calories (HCC), Pulmonary hypertension (HCC), Sudden onset of severe headache, and Torn rotator cuff.  Past Surgical History:  has a past surgical history that includes other surgical history (2012); Tubal ligation; hernia repair; Colonoscopy; pacemaker placement; Insert Midline Catheter (2022); and other surgical history (Left).    Assessment   Assessment: Pt presenting with mobility deficits requiring modA x 2 for bed mobility, Vinod x 2 for sit<>stands. Pt ambulated 10 ft with RW and CGA x 2. Pt required seated rest breaks and increased time for all mobility. Pt could benefit from a continuation of PT for gait , strengthening and functional mobility prior to pt DC  Therapy 
Physical Therapy  Facility/Department: [address] STEPDOWN  Physical Therapy Daily Treatment Note    Name: Annemarie Worrell  : 1955  MRN: [id number]  Date of Service: 4/3/2024    Discharge Recommendations:  Patient would benefit from continued therapy after discharge   PT Equipment Recommendations  Equipment Needed: No      Patient Diagnosis(es): The primary encounter diagnosis was Intraparenchymal hematoma of brain without loss of consciousness, initial encounter (HCC). Diagnoses of Dehydration, Acute cystitis without hematuria, Hyponatremia, Weakness of both legs, Low back pain, unspecified back pain laterality, unspecified chronicity, unspecified whether sciatica present, and Saddle anesthesia were also pertinent to this visit.  Past Medical History:  has a past medical history of Abnormal Pap smear, Acute diverticulitis, Acute on chronic systolic congestive heart failure (HCC), AICD (automatic cardioverter/defibrillator) present, MAURO (acute kidney injury) (HCC), MAURO (acute kidney injury) (HCC), Anemia, Arthritis, Atrial fibrillation (HCC), Bowel and bladder incontinence, Cardiomyopathy (HCC), Cardiomyopathy, nonischemic (HCC), Chronic combined systolic and diastolic congestive heart failure (HCC), Cirrhosis (HCC), Ejection fraction < 50%, HTN (hypertension), Hypothyroidism, Ischemic cardiomyopathy, Left bundle branch block, Mitral regurgitation, Morbid obesity due to excess calories (HCC), Pulmonary hypertension (HCC), Sudden onset of severe headache, and Torn rotator cuff.  Past Surgical History:  has a past surgical history that includes other surgical history (2012); Tubal ligation; hernia repair; Colonoscopy; pacemaker placement; Insert Midline Catheter (2022); and other surgical history (Left).    Assessment   Assessment: Pt requiring maxA x 2 for bed mobility along with increased time to complete. Pt sat EOB for 20 mins with CGA while perfroming dynamic activities with no LOB noted. Pt 
Physical Therapy  Facility/Department: [address] STEPDOWN  Physical Therapy Daily Treatment Note      Name: Annemarie Worrell  : 1955  MRN: [id number]  Date of Service: 2024    Discharge Recommendations:  Patient would benefit from continued therapy after discharge   PT Equipment Recommendations  Equipment Needed: No      Patient Diagnosis(es): The primary encounter diagnosis was Dehydration. Diagnoses of Acute cystitis without hematuria, Hyponatremia, Weakness of both legs, Low back pain, unspecified back pain laterality, unspecified chronicity, unspecified whether sciatica present, Saddle anesthesia, and Intraparenchymal hematoma of brain without loss of consciousness, initial encounter (HCC) were also pertinent to this visit.  Past Medical History:  has a past medical history of Abnormal Pap smear, Acute diverticulitis, Acute on chronic systolic congestive heart failure (HCC), AICD (automatic cardioverter/defibrillator) present, MAURO (acute kidney injury) (HCC), MAURO (acute kidney injury) (HCC), Anemia, Arthritis, Atrial fibrillation (HCC), Bowel and bladder incontinence, Cardiomyopathy (HCC), Cardiomyopathy, nonischemic (HCC), Chronic combined systolic and diastolic congestive heart failure (HCC), Cirrhosis (HCC), Ejection fraction < 50%, HTN (hypertension), Hypothyroidism, Ischemic cardiomyopathy, Left bundle branch block, Mitral regurgitation, Morbid obesity due to excess calories (HCC), Pulmonary hypertension (HCC), Sudden onset of severe headache, and Torn rotator cuff.  Past Surgical History:  has a past surgical history that includes other surgical history (2012); Tubal ligation; hernia repair; Colonoscopy; pacemaker placement; Insert Midline Catheter (2022); and other surgical history (Left).    Assessment   Assessment: Pt presenting with mobility deficits requiring maxA x 2 for bed mobility, modA x 2 for sit<>stands. Pt ambulated 3 ft. with RW and modA x 2. Pt requires increased time for 
Pt arrives to room for C-T-L myelogram  Placed prone on table  Dr Perez to room  MC RT to bedside  Site prepped and draped  Access obtained and  12mls of iso celestino m 200 injected   Access removed and site covered with band aid  To CT for additional imaging  
SLP ALL NOTES  Speech Language Pathology  [facility]    Cognitive Treatment Note    Date: 4/2/2024  Patient’s Name: Annemarie Worrell  MRN: [id number]  Diagnosis:   Patient Active Problem List   Diagnosis Code    Hypokalemia E87.6    Dilated cardiomyopathy (HCC) I42.0    Pre-diabetes R73.03    Essential hypertension I10    PAH (pulmonary artery hypertension) ([facility]) 34 on Echo  I27.21    Mitral regurgitation I34.0    Chronic headache R51.9, G89.29    Class 1 obesity due to excess calories with body mass index (BMI) of 33.0 to 33.9 in adult E66.09, Z68.33    AICD (automatic cardioverter/defibrillator) present Z95.810    Syncope and collapse secondary to VTAC R55    Gout M10.9    Dizziness R42    Benign paroxysmal positional vertigo H81.10    Chronic heart failure with reduced ejection fraction and diastolic dysfunction ([facility]) I50.42    NSVT (nonsustained ventricular tachycardia) ([facility]) I47.29    Chest pain R07.9    Hypothyroidism E03.9    Chronic combined systolic (congestive) and diastolic (congestive) heart failure ([facility]) I50.42    Abscess of sigmoid colon K63.0    Diverticulitis K57.92    Nausea & vomiting R11.2    Diverticulitis of large intestine with abscess without bleeding K57.20    Diverticulitis of colon K57.32    Lower abdominal pain R10.30    Colonic diverticular abscess K57.20    Intra-abdominal abscess (HCC) K65.1    Cirrhosis ([facility]) K74.60    Atrial fibrillation ([facility]) I48.91    QT prolongation R94.31    AICD malfunction, initial encounter T82.118A    Acute combined systolic (congestive) and diastolic (congestive) heart failure ([facility]) I50.41    Paresthesia of right arm and leg R20.2    Pacemaker Z95.0    Bowel and bladder incontinence R32, R15.9    Fall W19.XXXA    Stage 3a chronic kidney disease (HCC) N18.31    Stage 3b chronic kidney disease (CKD) ([facility]) N18.32    Dehydration E86.0    Intraparenchymal hematoma of brain without loss of consciousness, initial encounter ([facility]) S06.330A    
SLP ALL NOTES  Speech Language Pathology  [facility]    Cognitive Treatment Note    Date: 4/4/2024  Patient’s Name: Annemarie Worrell  MRN: [id number]  Diagnosis:   Patient Active Problem List   Diagnosis Code    Hypokalemia E87.6    Dilated cardiomyopathy (HCC) I42.0    Pre-diabetes R73.03    Essential hypertension I10    PAH (pulmonary artery hypertension) ([facility]) 34 on Echo  I27.21    Mitral regurgitation I34.0    Chronic headache R51.9, G89.29    Class 1 obesity due to excess calories with body mass index (BMI) of 33.0 to 33.9 in adult E66.09, Z68.33    AICD (automatic cardioverter/defibrillator) present Z95.810    Syncope and collapse secondary to VTAC R55    Gout M10.9    Dizziness R42    Benign paroxysmal positional vertigo H81.10    Chronic heart failure with reduced ejection fraction and diastolic dysfunction ([facility]) I50.42    NSVT (nonsustained ventricular tachycardia) ([facility]) I47.29    Chest pain R07.9    Hypothyroidism E03.9    Chronic combined systolic (congestive) and diastolic (congestive) heart failure ([facility]) I50.42    Abscess of sigmoid colon K63.0    Diverticulitis K57.92    Nausea & vomiting R11.2    Diverticulitis of large intestine with abscess without bleeding K57.20    Diverticulitis of colon K57.32    Lower abdominal pain R10.30    Colonic diverticular abscess K57.20    Intra-abdominal abscess (HCC) K65.1    Cirrhosis ([facility]) K74.60    Atrial fibrillation ([facility]) I48.91    QT prolongation R94.31    AICD malfunction, initial encounter T82.118A    Acute combined systolic (congestive) and diastolic (congestive) heart failure ([facility]) I50.41    Paresthesia of right arm and leg R20.2    Pacemaker Z95.0    Bowel and bladder incontinence R32, R15.9    Fall W19.XXXA    Stage 3a chronic kidney disease (HCC) N18.31    Stage 3b chronic kidney disease (CKD) ([facility]) N18.32    Dehydration E86.0    Intraparenchymal hematoma of brain without loss of consciousness, initial encounter ([facility]) S06.330A    
SLP ALL NOTES  Speech Language Pathology  [facility]    Cognitive Treatment Note    Date: 4/1/2024  Patient’s Name: Annmearie Worrell  MRN: [id number]  Diagnosis:   Patient Active Problem List   Diagnosis Code    Hypokalemia E87.6    Dilated cardiomyopathy (HCC) I42.0    Pre-diabetes R73.03    Essential hypertension I10    PAH (pulmonary artery hypertension) ([facility]) 34 on Echo  I27.21    Mitral regurgitation I34.0    Chronic headache R51.9, G89.29    Class 1 obesity due to excess calories with body mass index (BMI) of 33.0 to 33.9 in adult E66.09, Z68.33    AICD (automatic cardioverter/defibrillator) present Z95.810    Syncope and collapse secondary to VTAC R55    Gout M10.9    Dizziness R42    Benign paroxysmal positional vertigo H81.10    Chronic heart failure with reduced ejection fraction and diastolic dysfunction ([facility]) I50.42    NSVT (nonsustained ventricular tachycardia) ([facility]) I47.29    Chest pain R07.9    Hypothyroidism E03.9    Chronic combined systolic (congestive) and diastolic (congestive) heart failure ([facility]) I50.42    Abscess of sigmoid colon K63.0    Diverticulitis K57.92    Nausea & vomiting R11.2    Diverticulitis of large intestine with abscess without bleeding K57.20    Diverticulitis of colon K57.32    Lower abdominal pain R10.30    Colonic diverticular abscess K57.20    Intra-abdominal abscess (HCC) K65.1    Cirrhosis ([facility]) K74.60    Atrial fibrillation ([facility]) I48.91    QT prolongation R94.31    AICD malfunction, initial encounter T82.118A    Acute combined systolic (congestive) and diastolic (congestive) heart failure ([facility]) I50.41    Paresthesia of right arm and leg R20.2    Pacemaker Z95.0    Bowel and bladder incontinence R32, R15.9    Fall W19.XXXA    Stage 3a chronic kidney disease (HCC) N18.31    Stage 3b chronic kidney disease (CKD) ([facility]) N18.32    Dehydration E86.0    Intraparenchymal hematoma of brain without loss of consciousness, initial encounter ([facility]) S06.330A    
SLP ALL NOTES  Speech Language Pathology  [facility]    Cognitive Treatment Note    Date: 4/3/2024  Patient’s Name: Annemarie Worrell  MRN: [id number]  Diagnosis:   Patient Active Problem List   Diagnosis Code    Hypokalemia E87.6    Dilated cardiomyopathy (HCC) I42.0    Pre-diabetes R73.03    Essential hypertension I10    PAH (pulmonary artery hypertension) ([facility]) 34 on Echo  I27.21    Mitral regurgitation I34.0    Chronic headache R51.9, G89.29    Class 1 obesity due to excess calories with body mass index (BMI) of 33.0 to 33.9 in adult E66.09, Z68.33    AICD (automatic cardioverter/defibrillator) present Z95.810    Syncope and collapse secondary to VTAC R55    Gout M10.9    Dizziness R42    Benign paroxysmal positional vertigo H81.10    Chronic heart failure with reduced ejection fraction and diastolic dysfunction ([facility]) I50.42    NSVT (nonsustained ventricular tachycardia) ([facility]) I47.29    Chest pain R07.9    Hypothyroidism E03.9    Chronic combined systolic (congestive) and diastolic (congestive) heart failure ([facility]) I50.42    Abscess of sigmoid colon K63.0    Diverticulitis K57.92    Nausea & vomiting R11.2    Diverticulitis of large intestine with abscess without bleeding K57.20    Diverticulitis of colon K57.32    Lower abdominal pain R10.30    Colonic diverticular abscess K57.20    Intra-abdominal abscess (HCC) K65.1    Cirrhosis ([facility]) K74.60    Atrial fibrillation ([facility]) I48.91    QT prolongation R94.31    AICD malfunction, initial encounter T82.118A    Acute combined systolic (congestive) and diastolic (congestive) heart failure ([facility]) I50.41    Paresthesia of right arm and leg R20.2    Pacemaker Z95.0    Bowel and bladder incontinence R32, R15.9    Fall W19.XXXA    Stage 3a chronic kidney disease (HCC) N18.31    Stage 3b chronic kidney disease (CKD) ([facility]) N18.32    Dehydration E86.0    Intraparenchymal hematoma of brain without loss of consciousness, initial encounter ([facility]) S06.330A    
U/A sent per order  
[facility]    Admission Medication Reconciliation       The patient's list of current home medications has been reviewed.     Source(s) of information: Dispense Report    Based on information provided by the above source(s), I have updated the patient's home med list as described below.     Please review the ACTION REQUESTED section of this note below for any discrepancies on current hospital orders.      I changed or updated the following medications on the patient's home medication list:  Removed Acetaminophen 500 mg Tablet - last filled 8/5/22 for 15 days   Added Diclofenac 1% Gel    Adjusted   Amiodarone 200 mg Tablet - added directions  Bumetanide - patient filling for 1 mg tablets, updated directions  Metoprolol Succinate - patient filling for 25 mg tablets  Nitroglycerin 0.4 mg SL Tablet - added directions   Potassium Chloride Tablet - patient filling for 10 mEq tablets   Spironolactone 25 mg Tablet - patient taking 1/2 tablet daily    Other Notes No Fill History Per Dispense Report:  Magnesium Oxide 400 mg Tablet     Please feel free to call me with any questions about this encounter. Thank you.    Electronically signed by Rolanda Powell on 3/27/2024 at 4:35 PM   
Writer notified patients daughter Melia of Neurology Consult and recommendation of CT head without contrast.  Daughter was in full agreement. Daughter had no further questions or concerns.  
Writer offered patient a bed bath x 3, patient refused. Patient educated on importance of hygiene.   
Adjustment For: No Adjustment                 BMI Categories: Obese Class 1 (BMI 30.0-34.9)    Estimated Daily Nutrient Needs:  Energy Requirements Based On: Formula  Weight Used for Energy Requirements: Current  Energy (kcal/day): 1700 to 1900 kcal/day  Weight Used for Protein Requirements: Ideal  Protein (g/day): 70 to 90 g/day  Method Used for Fluid Requirements: 1 ml/kcal  Fluid (ml/day): 1700 to 1900 ml/day    Nutrition Diagnosis:   Inadequate oral intake related to early satiety as evidenced by intake 0-25%, intake 26-50%    Nutrition Interventions:   Food and/or Nutrient Delivery: Continue Current Diet, Start Oral Nutrition Supplement  Nutrition Education/Counseling: No recommendation at this time  Coordination of Nutrition Care: Continue to monitor while inpatient       Goals:  Previous Goal Met:  (goal set)  Goals: Meet at least 75% of estimated needs, prior to discharge       Nutrition Monitoring and Evaluation:   Behavioral-Environmental Outcomes: None Identified  Food/Nutrient Intake Outcomes: Food and Nutrient Intake, Supplement Intake  Physical Signs/Symptoms Outcomes: Biochemical Data, Weight, Nutrition Focused Physical Findings    Discharge Planning:    Too soon to determine     Clarisa Hutson MS, RD, LD  Contact:   Floor Mobile: [phone]  Desk Phone: [phone]  RD Weekend Mobile: [phone]      
recommended at next level of care       Plan:   Speech Therapy Prognosis  Prognosis: Fair  Prognosis Considerations: Age;Severity of Impairments;Medical Diagnosis;Medical Prognosis;Participation Level;Potential;Co-Morbidities;Previous Level of Function  Individuals consulted  Consulted and agree with results and recommendations: Patient    Goals:  Short Term Goals  Time Frame for Short Term Goals: 1 week  Goal 1: Improve immediate recall of 3-5 details to 90%  Goal 2: Improve delayed recall (5-15 minutes) for recalling previously presented stimuli to 90%  Goal 3: Improve divergent naming/verbal fluency over baseline of severe  Goal 4: Improve reasoning/cognitive flexibility for IADLs to 90%       Patient/family involved in developing goals and treatment plan: yes    Subjective:   Previous level of function and limitations: Single; has 2 children; had 3 grandchildren, only 1 alive (2 ). 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 [employer]                    Objective:                 Auditory Comprehension  Comprehension: Within Functional Limits         Expression  Primary Mode of Expression: Verbal    Verbal Expression  Verbal Expression: Within functional limits                   Cognition:      Orientation  Overall Orientation Status: Within Functional Limits  Attention  Attention: Exceptions to WFL  Sustained Attention: Severe  Memory  Memory: Exceptions to WFL  Short-term Memory: Severe  Problem Solving  Problem Solving: Exceptions to WFL  Verbal Reasoning Skills: Moderate  Initiation: Moderate  Sequencing: Moderate  Executive Function Skills: Moderate      Prognosis:  Speech Therapy Prognosis  Prognosis: Fair  Prognosis Considerations: Age;Severity of Impairments;Medical Diagnosis;Medical Prognosis;Participation Level;Potential;Co-Morbidities;Previous Level of Function  Individuals consulted  Consulted and agree with results and recommendations: 
      Imaging:    CT HEAD WO CONTRAST    Result Date: 3/30/2024  Stable intraparenchymal hemorrhage in the right frontal lobe.     CT HEAD WO CONTRAST    Result Date: 3/29/2024  Acute intracranial right frontal parenchymal hemorrhage with mild mass effect and 3 mm leftward midline shift. The results were called by Dr. Arya Estrella to VIDAL ANGEL on 3/29/2024 at 21:01.       CT THORACIC SPINE W CONTRAST    Result Date: 3/27/2024  Mild multilevel disc marginal osteophytes and kyphosis.  No significant stenosis of the central canal and neural foramina.     CT LUMBAR SPINE W CONTRAST    Result Date: 3/27/2024  1. Multilevel degenerative disc disease as described above especially at L5-S1.  Multilevel spinal stenosis as above. 2. Incidental note made of gallstones.     CT CERVICAL SPINE W CONTRAST    Result Date: 3/27/2024  Small disc protrusion at C3-4 encroaching upon the cord.  Central canal and neural foramina otherwise patent.     XR LUMBAR SPINE (2-3 VIEWS)    Result Date: 3/27/2024  1. No fracture or malalignment. 2. Minimal spondylolisthesis at L4-L5.     XR THORACIC SPINE (2 VIEWS)    Result Date: 3/27/2024  Degenerative disc disease in the mid and lower thoracic spine.     CT LUMBAR SPINE WO CONTRAST    Result Date: 3/27/2024  1. Normal lumbar spine alignment with no acute osseous abnormality. 2. Multilevel degenerative disc and joint disease resulting in degenerative canal and foraminal stenosis.  No CT evidence of severe canal stenosis.     XR SHOULDER RIGHT (MIN 2 VIEWS)    Result Date: 3/27/2024  Advanced osteoarthritic changes of the right glenohumeral joint, with erosions of the humeral head and glenoid.     XR CHEST PORTABLE    Result Date: 3/27/2024  1. Opacification in the left lung base, which may represent underlying effusion, atelectasis or infiltrate. 2. Cardiomegaly.       ASSESSMENT & PLAN     Principal Problem:    Intraparenchymal hematoma of brain without loss of consciousness, initial 
Verbal cues for hand placement with fair return.     Cognition  Overall Cognitive Status: Exceptions  Arousal/Alertness: Appropriate responses to stimuli  Following Commands: Follows one step commands with increased time;Follows one step commands with repetition  Attention Span: Attends with cues to redirect  Safety Judgement: Decreased awareness of need for assistance;Decreased awareness of need for safety  Problem Solving: Assistance required to identify errors made;Assistance required to correct errors made;Decreased awareness of errors  Insights: Decreased awareness of deficits  Initiation: Requires cues for some  Comment: Pt able to follow 1-2 step commands 75% of time with increased time needed to initiate d/t fatigue/cognition.  Orientation  Overall Orientation Status: Within Functional Limits     Education Given To: Patient  Education Provided Comments: OT POC, transfer/walker safety, importance of participation in therapy, bed mobility, fall prevention, benefits of rehab with good return.  Barriers to Learning: Cognition  AM-PAC - ADL  AM-PAC Daily Activity - Inpatient   How much help is needed for putting on and taking off regular lower body clothing?: A Lot  How much help is needed for bathing (which includes washing, rinsing, drying)?: A Lot  How much help is needed for toileting (which includes using toilet, bedpan, or urinal)?: A Lot  How much help is needed for putting on and taking off regular upper body clothing?: A Lot  How much help is needed for taking care of personal grooming?: A Little  How much help for eating meals?: A Little  AM-PAC Inpatient Daily Activity Raw Score: 14  AM-PAC Inpatient ADL T-Scale Score : 33.39  ADL Inpatient CMS 0-100% Score: 59.67  ADL Inpatient CMS G-Code Modifier : CK    Goals  Short Term Goals  Time Frame for Short Term Goals: By discharge, pt will:  Short Term Goal 1: Demo Mod A for functional transfers and functional mobility with use of LRAD for engagement in 
malalignment. 2. Minimal spondylolisthesis at L4-L5.     XR THORACIC SPINE (2 VIEWS)    Result Date: 3/27/2024  Degenerative disc disease in the mid and lower thoracic spine.     CT LUMBAR SPINE WO CONTRAST    Result Date: 3/27/2024  1. Normal lumbar spine alignment with no acute osseous abnormality. 2. Multilevel degenerative disc and joint disease resulting in degenerative canal and foraminal stenosis.  No CT evidence of severe canal stenosis.     XR SHOULDER RIGHT (MIN 2 VIEWS)    Result Date: 3/27/2024  Advanced osteoarthritic changes of the right glenohumeral joint, with erosions of the humeral head and glenoid.     XR CHEST PORTABLE    Result Date: 3/27/2024  1. Opacification in the left lung base, which may represent underlying effusion, atelectasis or infiltrate. 2. Cardiomegaly.       ASSESSMENT & PLAN     ASSESSMENT / PLAN:     Concern for cauda equina  - Presented with lower back pain,lower extremity weakness, decreased rectal tone and bowel and bladder incontinence s/p fall  - MRI could not be done d/t incompatible pacemaker  - CT spine -  Multilevel degenerative disc disease as described above especially at  L5-S1  - CT myelogram reviewed by NSGY. It was unremarkable. Patient can be D/C from their standpoint.     NICM, chronic systolic dysfunction (LVEF 25-30%, reportedly nonischemic by [facility] in 2012), severe MR, atrial fibrillation on Xarelto, SSS, s/p CRT-D (1/2008, last generator change 8/2022), VT/VF s/p ICD therapy, Pulm HTN, HTN  - ECHO Feb 2023 EF 35-30% with severe MR,TR  - At home,on Jardiance 10 OD, Entresto, Spironolactone 12.5 mg OD, Toprol 50 mg OD, Bumex 2 mg OD, Amiodarone 200 OD, Rivaroxaban 20 mg OD  - Continue Amiodarone, Bumex and Toprol. Other medications held d/t slight elevation in Cr.     Hypothyrodism  - TSH 3/27/24 4.37  - Continue Levo 75     CKD Stage IIIb   - likely secondary to macrovascular disease, hypertensive nephrosclerosis or cardiorenal syndrome   - Baseline Cr 
Inpatient Daily Activity Raw Score: 14  AM-PAC Inpatient ADL T-Scale Score : 33.39  ADL Inpatient CMS 0-100% Score: 59.67  ADL Inpatient CMS G-Code Modifier : CK        Goals  Short Term Goals  Time Frame for Short Term Goals: By discharge, pt will:  Short Term Goal 1: Demo Mod A for functional transfers and functional mobility with use of LRAD for engagement in ADLs/IADLs  Short Term Goal 2: Demo CGA for UB ADLs following setup and with use of AE PRN  Short Term Goal 3: Demo Mod A for LB ADLs and toileting tasks with setup and AE use PRN  Short Term Goal 4: Engage in 3 min dynamic standing activity with Min A with LRAD for improved balance during ADL/IADL participation  Short Term Goal 5: Follow 75% of 2-step commands with appropriate initiation and sequencing to improve functional independence with ADLs/IADLs  Short Term Goal 6: Engage in bed mobility with CGA to increase independence with ADLs and decrease risk for pressure injury       Therapy Time   Individual Concurrent Group Co-treatment   Time In 1422         Time Out 1512         Minutes 50         Timed Code Treatment Minutes: 38 Minutes (co tx with PTA)       EDU Rich/ASTER     
fibrillation (HCC)    Mitral regurgitation    Class 1 obesity due to excess calories with body mass index (BMI) of 33.0 to 33.9 in adult    AICD (automatic cardioverter/defibrillator) present    Chronic heart failure with reduced ejection fraction and diastolic dysfunction (HCC)    Hypothyroidism    Bowel and bladder incontinence    Fall    Stage 3b chronic kidney disease (CKD) (HCC)    Dehydration    Mediastinal lymphadenopathy  Resolved Problems:    * No resolved hospital problems. *    Intraparenchymal hemorrhage in right frontal lobe   - CT head showed right front lobe IPH. Measuring 2.3 cm at widest width   - repeat CT head showing stability- 6 hours after prior ct head  - xarelto stopped  - kcentra given  - IPH likely secondary to xarelto usage. Did have fall prior to admission, but did not have neurotrauma.   - neurology recommend hold anticoagulation for 4 to 6 weeks, then repeat CT head and follow-up with neurosurgery     Concern for cauda equina  Lumbar spinal stenosis   L4-L5 and L5-S1 bilateral neural foraminal narrowing    - Presented with lower back pain,lower extremity weakness, decreased rectal tone and bowel and bladder incontinence s/p fall  - MRI could not be done d/t incompatible pacemaker  - CT spine   L4-L5: Moderate central annular bulge and trefoil type narrowing of the thecal sac and neural foramina bilaterally.  Hypertrophic facet disease.   L5-S1: Moderate broad-based posterior disc protrusion causing central stenosis and moderate narrowing of the neural foramina bilaterally.  - CT myelogram reviewed by NSGY. It was unremarkable. Will need outpatient follow up   - Consulted Neurology  -Patient able to ambulate 3 feet with moderate assistance.     #NICM, chronic systolic dysfunction (LVEF 25-30%, reportedly nonischemic by [facility] in 2012)  #severe MR  #atrial fibrillation on Xarelto  #SSS s/p CRT-D (1/2008, last generator change 8/2022), #VT/VF s/p ICD therapy  #Pulm HTN  # HTN  - ECHO Feb 2023 
setup and AE use PRN  Short Term Goal 4: Engage in 3 min dynamic standing activity with Min A with LRAD for improved balance during ADL/IADL participation  Short Term Goal 5: Follow 75% of 2-step commands with appropriate initiation and sequencing to improve functional independence with ADLs/IADLs  Short Term Goal 6: Engage in bed mobility with CGA to increase independence with ADLs and decrease risk for pressure injury  Short Term Goal 7: Engage in RUE AAROM X10 reps with emphasis at shoulder and elbow in all planes to increase functional use and decrease risk of contractures (goal added by JOSE Otto/L in collaboration with DARYA Rich on 04/02/2024)     Therapy Time   Individual Concurrent Group Co-treatment   Time In 0756         Time Out 0850         Minutes 54         Timed Code Treatment Minutes: 54 Minutes       JOSE Srivastava/ASTER     
nephrosclerosis or cardiorenal syndrome   - Baseline Cr 1.2-1.3  - stable      questionable Liver Cirrhosis  - past GI notes reviewed from outpatient  - April 2023 US liver in the past did not suggest cirrhotic morphology of the liver.   - CT Jan 2023 showed some nodularity of the liver   - further work up outpatient      Incidental finding of cholelithiasis  - not symptomatic         DVT ppx: none   GI ppx: Not indicated     PT/OT/SW: consulted  Discharge Planning: As per primary    Marcelo Tiffany Tan MD  Internal Medicine Resident, PGY-1  [facility], [place], [place].  4/4/2024, 7:40 AM    Attestation and add on       I have discussed the care of Annemarei Worrell , including pertinent history and exam findings,                    4/4/24    with the resident.  I have seen and examined the patient and the key elements of all parts of the encounter have been performed by me .   I agree with the assessment, plan and orders as documented by the resident.     ---- ;     [place] [address].   Phone [phone]   Fax: [phone]  Answering Service: [phone]               
NURSE/COORDINATOR  IP CONSULT TO SPIRITUAL SERVICES  IP CONSULT TO NEUROLOGY  IP CONSULT TO NEUROSURGERY  IP CONSULT TO IV TEAM  IP CONSULT TO PHYSICAL MEDICINE REHAB  IP CONSULT TO IV TEAM    Patient is admitted as inpatient status because of co-morbidities listed above, severity of signs and symptoms as outlined, requirement for current medical therapies and most importantly because of direct risk to patient if care not provided in a hospital setting.    Edie Jordan MD  Neurology Resident PGY-2  3/31/2024  8:28 AM    Copy sent to Dr. Jensen, Pcp    
[I50.42] 05/22/2020    AICD (automatic cardioverter/defibrillator) present [Z95.810] 03/04/2017    Class 1 obesity due to excess calories with body mass index (BMI) of 33.0 to 33.9 in adult [E66.09, Z68.33] 01/15/2017    Mitral regurgitation [I34.0] 01/14/2017       A 68-year-old  female with a history of A-fib, CHFrEF, and multiple comorbidities presented with bilateral lower extremity weakness and incontinence after experiencing leg weakness and falls at home. Neurology consultation was sought following a CT lumbar myelogram that excluded neurosurgery intervention. Examination revealed spastic right upper extremity and bilateral lower extremity clonus. Concerns for a potential intracerebral hemorrhage led to an urgent CT head, which remained stable. The patient was started on DVT prophylaxis with plans to re-evaluate for Xarelto resumption. The patient and family were updated on the management plan, understanding the ongoing treatment strategy.    Right frontal IPH, electrolyte disturbance, hyponatremia, hypokalemia, mild MAURO       Plan:       Acute right frontal IPH  -CT head 3/29/2024 found right frontal IPH  -CT head in 6 hours was stable  -CTA H+N Non-diagnostic due to CHF and IV blew  -Holding Xarelto for 2 weeks plan to repeat CT head at that time   -Neurosurgery to follow in 2 weeks in clinic     Multilevel DDD with L5-S1 spinal canal stenosis  -CT cervical, thoracic, and lumbar spine  -IR will myelogram lumbar spine focus completed  -Neurosurgery following  -Follow-up outpatient  -Oxycodone 2.5 mg q4h as needed for back pain      HTN  -SBP goal less than 140  -Resume Toprol-XL 25 mg daily  -IV labetalol as needed     HLD  -LDL panel ordered  -lipid panel 2/16/24-total chol-194, HDL-58, , Trig-67  -Start Crestor 40mg daily     CHFrEF 25-30%  -Continue Amiodarone 200mg daily  -bumex 1mg daily  -Cardiac ECHO 2/21/23-LVEF 25-30%, Severe global hypokinesis, left atrium severely dilated

## 2024-04-04 NOTE — PLAN OF CARE
Problem: Discharge Planning  Goal: Discharge to home or other facility with appropriate resources  3/28/2024 2102 by Ramila Garay RN  Outcome: Progressing  3/28/2024 1822 by Vee Pacheco RN  Outcome: Progressing     Problem: Pain  Goal: Verbalizes/displays adequate comfort level or baseline comfort level  3/28/2024 2102 by Ramila Garay RN  Outcome: Progressing  3/28/2024 1822 by Vee Pacheco RN  Outcome: Progressing  Flowsheets  Taken 3/28/2024 1430  Verbalizes/displays adequate comfort level or baseline comfort level: Encourage patient to monitor pain and request assistance  Taken 3/28/2024 1149  Verbalizes/displays adequate comfort level or baseline comfort level:   Encourage patient to monitor pain and request assistance   Assess pain using appropriate pain scale     Problem: Safety - Adult  Goal: Free from fall injury  3/28/2024 2102 by Ramila Garay RN  Outcome: Progressing  3/28/2024 1822 by Vee Pacheco RN  Outcome: Progressing     Problem: Chronic Conditions and Co-morbidities  Goal: Patient's chronic conditions and co-morbidity symptoms are monitored and maintained or improved  3/28/2024 2102 by Ramila Garay RN  Outcome: Progressing  3/28/2024 1822 by Vee Pacheco RN  Outcome: Progressing     Problem: Skin/Tissue Integrity  Goal: Absence of new skin breakdown  Description: 1.  Monitor for areas of redness and/or skin breakdown  2.  Assess vascular access sites hourly  3.  Every 4-6 hours minimum:  Change oxygen saturation probe site  4.  Every 4-6 hours:  If on nasal continuous positive airway pressure, respiratory therapy assess nares and determine need for appliance change or resting period.  3/28/2024 2102 by Ramila Garay RN  Outcome: Progressing  3/28/2024 1822 by Vee Pacheco RN  Outcome: Progressing     
  Problem: Discharge Planning  Goal: Discharge to home or other facility with appropriate resources  3/29/2024 2054 by Ramila Garay RN  Outcome: Progressing  Flowsheets (Taken 3/29/2024 2000)  Discharge to home or other facility with appropriate resources:   Identify barriers to discharge with patient and caregiver   Identify discharge learning needs (meds, wound care, etc)  3/29/2024 1453 by Vee Pacheco RN  Outcome: Adequate for Discharge  Flowsheets (Taken 3/29/2024 0800)  Discharge to home or other facility with appropriate resources: Identify barriers to discharge with patient and caregiver     Problem: Pain  Goal: Verbalizes/displays adequate comfort level or baseline comfort level  3/29/2024 2054 by Ramila Garay RN  Outcome: Progressing  Flowsheets (Taken 3/29/2024 1615 by Vee Pacheco RN)  Verbalizes/displays adequate comfort level or baseline comfort level: Encourage patient to monitor pain and request assistance  3/29/2024 1453 by Vee Pacheco RN  Outcome: Adequate for Discharge  Flowsheets (Taken 3/29/2024 1101)  Verbalizes/displays adequate comfort level or baseline comfort level: Encourage patient to monitor pain and request assistance     Problem: Safety - Adult  Goal: Free from fall injury  3/29/2024 2054 by Ramila Garay RN  Outcome: Progressing  Flowsheets (Taken 3/29/2024 2053)  Free From Fall Injury: Instruct family/caregiver on patient safety  3/29/2024 1453 by Vee Pacheco RN  Outcome: Adequate for Discharge     Problem: Chronic Conditions and Co-morbidities  Goal: Patient's chronic conditions and co-morbidity symptoms are monitored and maintained or improved  3/29/2024 2054 by Ramila Garay RN  Outcome: Progressing  Flowsheets (Taken 3/29/2024 2000)  Care Plan - Patient's Chronic Conditions and Co-Morbidity Symptoms are Monitored and Maintained or Improved: Monitor and assess patient's chronic conditions and comorbid symptoms for stability, deterioration, or 
  Problem: Discharge Planning  Goal: Discharge to home or other facility with appropriate resources  3/31/2024 0431 by Michelle Soria RN  Outcome: Progressing  3/30/2024 1720 by Lani Gambino RN  Outcome: Progressing  Flowsheets (Taken 3/30/2024 0800)  Discharge to home or other facility with appropriate resources:   Identify barriers to discharge with patient and caregiver   Arrange for needed discharge resources and transportation as appropriate   Identify discharge learning needs (meds, wound care, etc)   Refer to discharge planning if patient needs post-hospital services based on physician order or complex needs related to functional status, cognitive ability or social support system     Problem: Pain  Goal: Verbalizes/displays adequate comfort level or baseline comfort level  3/31/2024 0431 by Michelle Soria RN  Outcome: Progressing  3/30/2024 1720 by Lani Gambino RN  Outcome: Progressing  Flowsheets (Taken 3/30/2024 0752)  Verbalizes/displays adequate comfort level or baseline comfort level:   Encourage patient to monitor pain and request assistance   Assess pain using appropriate pain scale   Administer analgesics based on type and severity of pain and evaluate response   Implement non-pharmacological measures as appropriate and evaluate response   Consider cultural and social influences on pain and pain management   Notify Licensed Independent Practitioner if interventions unsuccessful or patient reports new pain     Problem: Safety - Adult  Goal: Free from fall injury  3/31/2024 0431 by Michelle Soria RN  Outcome: Progressing  3/30/2024 1720 by Lani Gambino, RN  Outcome: Progressing  Flowsheets (Taken 3/30/2024 0719)  Free From Fall Injury: Instruct family/caregiver on patient safety     Problem: Chronic Conditions and Co-morbidities  Goal: Patient's chronic conditions and co-morbidity symptoms are monitored and maintained or improved  3/31/2024 0431 by Michelle Soria RN  Outcome: 
  Problem: Discharge Planning  Goal: Discharge to home or other facility with appropriate resources  3/31/2024 1712 by Lani Gambino, RN  Outcome: Progressing  Flowsheets (Taken 3/31/2024 0800)  Discharge to home or other facility with appropriate resources:   Identify barriers to discharge with patient and caregiver   Arrange for needed discharge resources and transportation as appropriate   Identify discharge learning needs (meds, wound care, etc)   Refer to discharge planning if patient needs post-hospital services based on physician order or complex needs related to functional status, cognitive ability or social support system  3/31/2024 0431 by Michelle Soria, RN  Outcome: Progressing     Problem: Pain  Goal: Verbalizes/displays adequate comfort level or baseline comfort level  3/31/2024 1712 by Lani Gambino, RN  Outcome: Progressing  Flowsheets  Taken 3/31/2024 1517  Verbalizes/displays adequate comfort level or baseline comfort level:   Encourage patient to monitor pain and request assistance   Assess pain using appropriate pain scale   Administer analgesics based on type and severity of pain and evaluate response   Implement non-pharmacological measures as appropriate and evaluate response   Consider cultural and social influences on pain and pain management   Notify Licensed Independent Practitioner if interventions unsuccessful or patient reports new pain  Taken 3/31/2024 1307  Verbalizes/displays adequate comfort level or baseline comfort level:   Encourage patient to monitor pain and request assistance   Assess pain using appropriate pain scale   Administer analgesics based on type and severity of pain and evaluate response   Implement non-pharmacological measures as appropriate and evaluate response   Consider cultural and social influences on pain and pain management   Notify Licensed Independent Practitioner if interventions unsuccessful or patient reports new pain  3/31/2024 0431 by 
  Problem: Discharge Planning  Goal: Discharge to home or other facility with appropriate resources  4/1/2024 0444 by Michelle Soria, RN  Outcome: Progressing  3/31/2024 1712 by Lani Gambino, RN  Outcome: Progressing  Flowsheets (Taken 3/31/2024 0800)  Discharge to home or other facility with appropriate resources:   Identify barriers to discharge with patient and caregiver   Arrange for needed discharge resources and transportation as appropriate   Identify discharge learning needs (meds, wound care, etc)   Refer to discharge planning if patient needs post-hospital services based on physician order or complex needs related to functional status, cognitive ability or social support system     Problem: Pain  Goal: Verbalizes/displays adequate comfort level or baseline comfort level  4/1/2024 0444 by Michelle Soria RN  Outcome: Progressing  3/31/2024 1712 by Lani Gambino, RN  Outcome: Progressing  Flowsheets  Taken 3/31/2024 1517  Verbalizes/displays adequate comfort level or baseline comfort level:   Encourage patient to monitor pain and request assistance   Assess pain using appropriate pain scale   Administer analgesics based on type and severity of pain and evaluate response   Implement non-pharmacological measures as appropriate and evaluate response   Consider cultural and social influences on pain and pain management   Notify Licensed Independent Practitioner if interventions unsuccessful or patient reports new pain  Taken 3/31/2024 1307  Verbalizes/displays adequate comfort level or baseline comfort level:   Encourage patient to monitor pain and request assistance   Assess pain using appropriate pain scale   Administer analgesics based on type and severity of pain and evaluate response   Implement non-pharmacological measures as appropriate and evaluate response   Consider cultural and social influences on pain and pain management   Notify Licensed Independent Practitioner if interventions 
  Problem: Discharge Planning  Goal: Discharge to home or other facility with appropriate resources  4/1/2024 1806 by Marsha Salcido RN  Outcome: Progressing     Problem: Pain  Goal: Verbalizes/displays adequate comfort level or baseline comfort level  4/1/2024 1806 by Marsha Salcido RN  Outcome: Progressing     Problem: Safety - Adult  Goal: Free from fall injury  4/1/2024 1806 by Marsha Salcido RN  Outcome: Progressing     Problem: Chronic Conditions and Co-morbidities  Goal: Patient's chronic conditions and co-morbidity symptoms are monitored and maintained or improved  4/1/2024 1806 by Marsha Salcido, RN  Outcome: Progressing     
  Problem: Discharge Planning  Goal: Discharge to home or other facility with appropriate resources  4/2/2024 0605 by Gaby Nolen RN  Outcome: Progressing  Flowsheets (Taken 4/1/2024 2000)  Discharge to home or other facility with appropriate resources: Identify barriers to discharge with patient and caregiver  4/1/2024 1806 by Marsha Salcido RN  Outcome: Progressing     Problem: Pain  Goal: Verbalizes/displays adequate comfort level or baseline comfort level  4/2/2024 0605 by Gaby Nolen RN  Outcome: Progressing  4/1/2024 1806 by Marsha Salcido RN  Outcome: Progressing     Problem: Safety - Adult  Goal: Free from fall injury  4/2/2024 0605 by Gaby Nolen RN  Outcome: Progressing  Flowsheets (Taken 4/1/2024 2000)  Free From Fall Injury: Instruct family/caregiver on patient safety  4/1/2024 1806 by Marsha Salcido RN  Outcome: Progressing     Problem: Chronic Conditions and Co-morbidities  Goal: Patient's chronic conditions and co-morbidity symptoms are monitored and maintained or improved  4/2/2024 0605 by Gaby Nolen RN  Outcome: Progressing  Flowsheets (Taken 4/1/2024 2000)  Care Plan - Patient's Chronic Conditions and Co-Morbidity Symptoms are Monitored and Maintained or Improved: Monitor and assess patient's chronic conditions and comorbid symptoms for stability, deterioration, or improvement  4/1/2024 1806 by Marsha Salcido RN  Outcome: Progressing     
  Problem: Discharge Planning  Goal: Discharge to home or other facility with appropriate resources  4/2/2024 1527 by Bryanna Tamez RN  Outcome: Progressing  4/2/2024 0605 by Gaby Nolen RN  Outcome: Progressing  Flowsheets (Taken 4/1/2024 2000)  Discharge to home or other facility with appropriate resources: Identify barriers to discharge with patient and caregiver     Problem: Pain  Goal: Verbalizes/displays adequate comfort level or baseline comfort level  4/2/2024 1527 by Bryanna Tamez RN  Outcome: Progressing  4/2/2024 0605 by Gaby Nolen RN  Outcome: Progressing     Problem: Safety - Adult  Goal: Free from fall injury  4/2/2024 1527 by Bryanna Tamez RN  Outcome: Progressing  4/2/2024 0605 by Gaby Nolen RN  Outcome: Progressing  Flowsheets (Taken 4/1/2024 2000)  Free From Fall Injury: Instruct family/caregiver on patient safety     Problem: Chronic Conditions and Co-morbidities  Goal: Patient's chronic conditions and co-morbidity symptoms are monitored and maintained or improved  4/2/2024 1527 by Bryanna Tamez RN  Outcome: Progressing  4/2/2024 0605 by Gaby Nolen RN  Outcome: Progressing  Flowsheets (Taken 4/1/2024 2000)  Care Plan - Patient's Chronic Conditions and Co-Morbidity Symptoms are Monitored and Maintained or Improved: Monitor and assess patient's chronic conditions and comorbid symptoms for stability, deterioration, or improvement     Problem: Skin/Tissue Integrity  Goal: Absence of new skin breakdown  Description: 1.  Monitor for areas of redness and/or skin breakdown  2.  Assess vascular access sites hourly  3.  Every 4-6 hours minimum:  Change oxygen saturation probe site  4.  Every 4-6 hours:  If on nasal continuous positive airway pressure, respiratory therapy assess nares and determine need for appliance change or resting period.  4/2/2024 1527 by Bryanna Tamez RN  Outcome: Progressing  4/2/2024 0605 by Gaby Nolen RN  Outcome: Progressing   
  Problem: Discharge Planning  Goal: Discharge to home or other facility with appropriate resources  4/3/2024 1454 by Bryanna Tamez RN  Outcome: Progressing  4/3/2024 0604 by Gaby Nolen RN  Outcome: Progressing  Flowsheets (Taken 4/2/2024 2000)  Discharge to home or other facility with appropriate resources: Identify barriers to discharge with patient and caregiver     Problem: Pain  Goal: Verbalizes/displays adequate comfort level or baseline comfort level  4/3/2024 1454 by Bryanna Tamez RN  Outcome: Progressing  4/3/2024 0604 by Gaby Nolen RN  Outcome: Progressing     Problem: Safety - Adult  Goal: Free from fall injury  4/3/2024 1454 by Bryanna Tamez RN  Outcome: Progressing  4/3/2024 0604 by Gaby Nolen RN  Outcome: Progressing  Flowsheets (Taken 4/2/2024 2000)  Free From Fall Injury: Instruct family/caregiver on patient safety     Problem: Chronic Conditions and Co-morbidities  Goal: Patient's chronic conditions and co-morbidity symptoms are monitored and maintained or improved  4/3/2024 1454 by Bryanna Tamez RN  Outcome: Progressing  4/3/2024 0604 by Gaby Nolen RN  Outcome: Progressing  Flowsheets (Taken 4/2/2024 2000)  Care Plan - Patient's Chronic Conditions and Co-Morbidity Symptoms are Monitored and Maintained or Improved: Monitor and assess patient's chronic conditions and comorbid symptoms for stability, deterioration, or improvement     Problem: Skin/Tissue Integrity  Goal: Absence of new skin breakdown  Description: 1.  Monitor for areas of redness and/or skin breakdown  2.  Assess vascular access sites hourly  3.  Every 4-6 hours minimum:  Change oxygen saturation probe site  4.  Every 4-6 hours:  If on nasal continuous positive airway pressure, respiratory therapy assess nares and determine need for appliance change or resting period.  4/3/2024 1454 by Bryanna Tamez RN  Outcome: Progressing  4/3/2024 0604 by Gaby Nolen RN  Outcome: Progressing   
  Problem: Discharge Planning  Goal: Discharge to home or other facility with appropriate resources  Outcome: Adequate for Discharge  Flowsheets (Taken 3/29/2024 0800)  Discharge to home or other facility with appropriate resources: Identify barriers to discharge with patient and caregiver     Problem: Pain  Goal: Verbalizes/displays adequate comfort level or baseline comfort level  Outcome: Adequate for Discharge  Flowsheets (Taken 3/29/2024 1101)  Verbalizes/displays adequate comfort level or baseline comfort level: Encourage patient to monitor pain and request assistance     Problem: Safety - Adult  Goal: Free from fall injury  Outcome: Adequate for Discharge     Problem: Chronic Conditions and Co-morbidities  Goal: Patient's chronic conditions and co-morbidity symptoms are monitored and maintained or improved  Outcome: Adequate for Discharge  Flowsheets (Taken 3/29/2024 0800)  Care Plan - Patient's Chronic Conditions and Co-Morbidity Symptoms are Monitored and Maintained or Improved: Monitor and assess patient's chronic conditions and comorbid symptoms for stability, deterioration, or improvement     Problem: Nutrition Deficit:  Goal: Optimize nutritional status  Outcome: Adequate for Discharge     
  Problem: Discharge Planning  Goal: Discharge to home or other facility with appropriate resources  Outcome: Progressing     Problem: Pain  Goal: Verbalizes/displays adequate comfort level or baseline comfort level  Outcome: Progressing     Problem: Safety - Adult  Goal: Free from fall injury  Outcome: Progressing     Problem: Chronic Conditions and Co-morbidities  Goal: Patient's chronic conditions and co-morbidity symptoms are monitored and maintained or improved  Outcome: Progressing     
  Problem: Discharge Planning  Goal: Discharge to home or other facility with appropriate resources  Outcome: Progressing     Problem: Pain  Goal: Verbalizes/displays adequate comfort level or baseline comfort level  Outcome: Progressing     Problem: Safety - Adult  Goal: Free from fall injury  Outcome: Progressing  Flowsheets (Taken 3/27/2024 2126)  Free From Fall Injury: Instruct family/caregiver on patient safety     
  Problem: Discharge Planning  Goal: Discharge to home or other facility with appropriate resources  Outcome: Progressing     Problem: Pain  Goal: Verbalizes/displays adequate comfort level or baseline comfort level  Outcome: Progressing  Flowsheets  Taken 3/28/2024 1430  Verbalizes/displays adequate comfort level or baseline comfort level: Encourage patient to monitor pain and request assistance  Taken 3/28/2024 1149  Verbalizes/displays adequate comfort level or baseline comfort level:   Encourage patient to monitor pain and request assistance   Assess pain using appropriate pain scale     Problem: Safety - Adult  Goal: Free from fall injury  Outcome: Progressing     Problem: Chronic Conditions and Co-morbidities  Goal: Patient's chronic conditions and co-morbidity symptoms are monitored and maintained or improved  Outcome: Progressing     Problem: Skin/Tissue Integrity  Goal: Absence of new skin breakdown  Description: 1.  Monitor for areas of redness and/or skin breakdown  2.  Assess vascular access sites hourly  3.  Every 4-6 hours minimum:  Change oxygen saturation probe site  4.  Every 4-6 hours:  If on nasal continuous positive airway pressure, respiratory therapy assess nares and determine need for appliance change or resting period.  Outcome: Progressing     
  Problem: Discharge Planning  Goal: Discharge to home or other facility with appropriate resources  Outcome: Progressing  Flowsheets (Taken 4/2/2024 2000)  Discharge to home or other facility with appropriate resources: Identify barriers to discharge with patient and caregiver     Problem: Pain  Goal: Verbalizes/displays adequate comfort level or baseline comfort level  Outcome: Progressing     Problem: Safety - Adult  Goal: Free from fall injury  Outcome: Progressing  Flowsheets (Taken 4/2/2024 2000)  Free From Fall Injury: Instruct family/caregiver on patient safety     
  Problem: Discharge Planning  Goal: Discharge to home or other facility with appropriate resources  Recent Flowsheet Documentation  Taken 3/29/2024 2345 by Roxi Steward RN  Discharge to home or other facility with appropriate resources: Identify barriers to discharge with patient and caregiver  3/29/2024 2054 by Ramila Garay RN  Outcome: Progressing  Flowsheets (Taken 3/29/2024 2000)  Discharge to home or other facility with appropriate resources:   Identify barriers to discharge with patient and caregiver   Identify discharge learning needs (meds, wound care, etc)     Problem: Pain  Goal: Verbalizes/displays adequate comfort level or baseline comfort level  3/29/2024 2054 by Ramila Garay RN  Outcome: Progressing  Flowsheets (Taken 3/29/2024 1615 by Vee Pacheco RN)  Verbalizes/displays adequate comfort level or baseline comfort level: Encourage patient to monitor pain and request assistance     Problem: Safety - Adult  Goal: Free from fall injury  Recent Flowsheet Documentation  Taken 3/29/2024 2345 by Roxi Steward RN  Free From Fall Injury: Instruct family/caregiver on patient safety  3/29/2024 2054 by Ramila Garay RN  Outcome: Progressing  Flowsheets (Taken 3/29/2024 2053)  Free From Fall Injury: Instruct family/caregiver on patient safety     Problem: Chronic Conditions and Co-morbidities  Goal: Patient's chronic conditions and co-morbidity symptoms are monitored and maintained or improved  Recent Flowsheet Documentation  Taken 3/29/2024 2345 by Roxi Steward RN  Care Plan - Patient's Chronic Conditions and Co-Morbidity Symptoms are Monitored and Maintained or Improved: Monitor and assess patient's chronic conditions and comorbid symptoms for stability, deterioration, or improvement  3/29/2024 2054 by Ramila Garay RN  Outcome: Progressing  Flowsheets (Taken 3/29/2024 2000)  Care Plan - Patient's Chronic Conditions and Co-Morbidity Symptoms are Monitored and Maintained or 
Annemarie Worrell requires a bedside commode due to being confined to a single room, and is physically incapable of utilizing regular toilet facilities. Current body weight: Weight - Scale: 88 kg (194 lb).      Annemarie Worrell was evaluated today and a DME order was entered for a standard wheelchair because she requires this to successfully complete daily living tasks of eating, bathing, toileting, personal cares, and taking own medications.  A standard manual wheelchair is necessary due to patient's impaired ambulation and mobility restrictions and would be unable to resolve these daily living tasks using a cane or walker.  The patient is capable of using a standard wheelchair safely in their home and can maneuver within their home with adequate access.  There is a caregiver available to provide necessary assistance.  The need for this equipment was discussed with the patient and she understands, is in agreement, and has not expressed an unwillingness to use the wheelchair.        Annemarie Worrell was evaluated today and a DME order was entered for a semi-electric hospital bed because she requires assistance for complexity of body positioning needs, requirement of elevation of head of bed more than 30 degrees for the diagnosis of  C hronic heart failure with reduced ejection fraction and diastolic dysfunction. Physical deconditioning.  .  Patient requires frequent and immediate positioning changes for relief of pain and care needs related to medical diagnoses.  Patient needs variability of bed height requirements to perform patient transfers and for eating, bathing, toileting, personal cares, and taking own medications.  Current body Weight - Scale: 88 kg (194 lb).  The need for this equipment was discussed with the patient and she understands and is in agreement.   
Dr. Aragon reviewed the CT myelogram results as well as the radiology reads.  No neurosurgical intervention at this time  Okay for a discharge from a neurosurgery standpoint    Electronically signed by NELLA Araya CNP on 3/28/2024 at 2:42 PM    
Images reviewed and patient examined with Dr Roldan  Continue to hold anticoagulation  Follow up in 2 weeks with CT head and will discuss resuming anticoagulation at that time based on f/u [facility].   
conditions and prevent exacerbation or deterioration   Update acute care plan with appropriate goals if chronic or comorbid symptoms are exacerbated and prevent overall improvement and discharge     Problem: Skin/Tissue Integrity  Goal: Absence of new skin breakdown  Description: 1.  Monitor for areas of redness and/or skin breakdown  2.  Assess vascular access sites hourly  3.  Every 4-6 hours minimum:  Change oxygen saturation probe site  4.  Every 4-6 hours:  If on nasal continuous positive airway pressure, respiratory therapy assess nares and determine need for appliance change or resting period.  Outcome: Progressing     Problem: Nutrition Deficit:  Goal: Optimize nutritional status  Outcome: Progressing

## 2024-04-04 NOTE — CARE COORDINATION
Transitional Planning  [facility] #[id number].  Plan is for [facility]

## 2024-04-16 ENCOUNTER — HOSPITAL ENCOUNTER (OUTPATIENT)
Dept: CT IMAGING | Age: 69
Discharge: HOME | End: 2024-04-16
Payer: MEDICARE

## 2024-04-16 PROCEDURE — 70450 CT HEAD/BRAIN W/O DYE: CPT

## 2024-04-17 ENCOUNTER — TELEPHONE (OUTPATIENT)
Age: 69
End: 2024-04-17

## 2024-04-17 NOTE — TELEPHONE ENCOUNTER
4/17/2024 - called to confirm patients 4/18/24 hospital follow up appointment with daughter Ragini @ [phone]. Patient is at her home on a stretcher non weight bearing. Patient's insurance does not cover for transportation with a stretcher. Daughter states CT was done 4/16/24 @ [facility].

## 2024-04-17 NOTE — TELEPHONE ENCOUNTER
I will ck w Dr Roldan when in office tomorrow to review and advise. I can call her daughter then.   I called Ragini to let her know the plan for tomorrow and will call her back after Dr Roldan reviews CT etc. Her mom is more awake than she was at rehab. Came home w her on Saturday. PT is to start today. Is still non wt bearing. They were able to get her to the CT but It was quite a struggle due to transportation issues. We will cancel the appt tomorrow.  CHECO Solorio RN

## 2024-04-18 NOTE — TELEPHONE ENCOUNTER
I discussed situation with Dr Roldan. He independently reviewed the head CT done 4/16/24. He notes that the intraparenchymal hemorrhage is resolving.  Pt will need repeat CT in 6 weeks due to having been on xeralto 20 mg previous to this for a fib; in addition pt has pacemaker and AICD. Week of May 27th  Phone call to "[employer] - [phone] (Home Phone). I left message about  CT is improving, needs repeat in 6 weeks around May 27th or first week of June and follow up appt; left phone numbers to call back as needed.  CHECO Solorio RN